# Patient Record
Sex: MALE | Race: WHITE | Employment: OTHER | URBAN - METROPOLITAN AREA
[De-identification: names, ages, dates, MRNs, and addresses within clinical notes are randomized per-mention and may not be internally consistent; named-entity substitution may affect disease eponyms.]

---

## 2017-01-01 ENCOUNTER — TELEPHONE (OUTPATIENT)
Dept: INTERNAL MEDICINE | Facility: CLINIC | Age: 82
End: 2017-01-01

## 2017-01-01 ENCOUNTER — CARE COORDINATION (OUTPATIENT)
Dept: CARE COORDINATION | Facility: CLINIC | Age: 82
End: 2017-01-01

## 2017-01-01 ENCOUNTER — OFFICE VISIT (OUTPATIENT)
Dept: INTERNAL MEDICINE | Facility: CLINIC | Age: 82
End: 2017-01-01
Payer: COMMERCIAL

## 2017-01-01 ENCOUNTER — TELEPHONE (OUTPATIENT)
Dept: PALLIATIVE MEDICINE | Facility: CLINIC | Age: 82
End: 2017-01-01

## 2017-01-01 ENCOUNTER — RADIOLOGY INJECTION OFFICE VISIT (OUTPATIENT)
Dept: PALLIATIVE MEDICINE | Facility: CLINIC | Age: 82
End: 2017-01-01
Payer: COMMERCIAL

## 2017-01-01 ENCOUNTER — OFFICE VISIT (OUTPATIENT)
Dept: PALLIATIVE MEDICINE | Facility: CLINIC | Age: 82
End: 2017-01-01
Payer: COMMERCIAL

## 2017-01-01 ENCOUNTER — RADIANT APPOINTMENT (OUTPATIENT)
Dept: GENERAL RADIOLOGY | Facility: CLINIC | Age: 82
End: 2017-01-01
Attending: INTERNAL MEDICINE
Payer: COMMERCIAL

## 2017-01-01 ENCOUNTER — RADIANT APPOINTMENT (OUTPATIENT)
Dept: GENERAL RADIOLOGY | Facility: CLINIC | Age: 82
End: 2017-01-01
Attending: ANESTHESIOLOGY

## 2017-01-01 ENCOUNTER — APPOINTMENT (OUTPATIENT)
Dept: NUCLEAR MEDICINE | Facility: CLINIC | Age: 82
End: 2017-01-01
Attending: PHYSICIAN ASSISTANT
Payer: MEDICARE

## 2017-01-01 ENCOUNTER — DOCUMENTATION ONLY (OUTPATIENT)
Dept: OTHER | Facility: CLINIC | Age: 82
End: 2017-01-01

## 2017-01-01 ENCOUNTER — TRANSFERRED RECORDS (OUTPATIENT)
Dept: HEALTH INFORMATION MANAGEMENT | Facility: CLINIC | Age: 82
End: 2017-01-01

## 2017-01-01 ENCOUNTER — HOSPITAL ENCOUNTER (OUTPATIENT)
Dept: CT IMAGING | Facility: CLINIC | Age: 82
Discharge: HOME | End: 2017-11-28
Attending: INTERNAL MEDICINE | Admitting: INTERNAL MEDICINE
Payer: MEDICARE

## 2017-01-01 ENCOUNTER — APPOINTMENT (OUTPATIENT)
Dept: GENERAL RADIOLOGY | Facility: CLINIC | Age: 82
End: 2017-01-01
Attending: EMERGENCY MEDICINE
Payer: MEDICARE

## 2017-01-01 ENCOUNTER — TELEPHONE (OUTPATIENT)
Dept: CARE COORDINATION | Facility: CLINIC | Age: 82
End: 2017-01-01

## 2017-01-01 ENCOUNTER — HOSPITAL ENCOUNTER (OUTPATIENT)
Facility: CLINIC | Age: 82
Setting detail: OBSERVATION
Discharge: HOME | End: 2017-10-02
Attending: EMERGENCY MEDICINE | Admitting: INTERNAL MEDICINE
Payer: MEDICARE

## 2017-01-01 VITALS — HEART RATE: 69 BPM | DIASTOLIC BLOOD PRESSURE: 81 MMHG | SYSTOLIC BLOOD PRESSURE: 159 MMHG | OXYGEN SATURATION: 99 %

## 2017-01-01 VITALS
OXYGEN SATURATION: 91 % | BODY MASS INDEX: 24.02 KG/M2 | HEART RATE: 75 BPM | DIASTOLIC BLOOD PRESSURE: 78 MMHG | TEMPERATURE: 97.6 F | WEIGHT: 158 LBS | SYSTOLIC BLOOD PRESSURE: 138 MMHG

## 2017-01-01 VITALS
HEART RATE: 107 BPM | RESPIRATION RATE: 16 BRPM | DIASTOLIC BLOOD PRESSURE: 70 MMHG | OXYGEN SATURATION: 92 % | SYSTOLIC BLOOD PRESSURE: 140 MMHG

## 2017-01-01 VITALS
TEMPERATURE: 97.6 F | SYSTOLIC BLOOD PRESSURE: 136 MMHG | OXYGEN SATURATION: 92 % | DIASTOLIC BLOOD PRESSURE: 68 MMHG | HEART RATE: 80 BPM

## 2017-01-01 VITALS
OXYGEN SATURATION: 99 % | BODY MASS INDEX: 23.45 KG/M2 | RESPIRATION RATE: 24 BRPM | SYSTOLIC BLOOD PRESSURE: 162 MMHG | WEIGHT: 154.7 LBS | TEMPERATURE: 96.1 F | HEIGHT: 68 IN | DIASTOLIC BLOOD PRESSURE: 71 MMHG

## 2017-01-01 VITALS
BODY MASS INDEX: 22.5 KG/M2 | HEART RATE: 70 BPM | DIASTOLIC BLOOD PRESSURE: 74 MMHG | SYSTOLIC BLOOD PRESSURE: 160 MMHG | OXYGEN SATURATION: 94 % | TEMPERATURE: 97.9 F | WEIGHT: 148 LBS

## 2017-01-01 VITALS — OXYGEN SATURATION: 95 % | HEART RATE: 76 BPM | DIASTOLIC BLOOD PRESSURE: 76 MMHG | SYSTOLIC BLOOD PRESSURE: 143 MMHG

## 2017-01-01 DIAGNOSIS — M47.817 FACET ARTHROPATHY, LUMBOSACRAL: Primary | ICD-10-CM

## 2017-01-01 DIAGNOSIS — S09.90XA TRAUMATIC INJURY OF HEAD, INITIAL ENCOUNTER: Primary | ICD-10-CM

## 2017-01-01 DIAGNOSIS — D72.829 LEUKOCYTOSIS, UNSPECIFIED TYPE: ICD-10-CM

## 2017-01-01 DIAGNOSIS — G89.29 OTHER CHRONIC PAIN: ICD-10-CM

## 2017-01-01 DIAGNOSIS — K21.9 GASTROESOPHAGEAL REFLUX DISEASE, ESOPHAGITIS PRESENCE NOT SPECIFIED: ICD-10-CM

## 2017-01-01 DIAGNOSIS — R07.9 CHEST PAIN, UNSPECIFIED TYPE: ICD-10-CM

## 2017-01-01 DIAGNOSIS — Z71.89 ADVANCED DIRECTIVES, COUNSELING/DISCUSSION: Chronic | ICD-10-CM

## 2017-01-01 DIAGNOSIS — M47.816 LUMBAR FACET ARTHROPATHY: Primary | ICD-10-CM

## 2017-01-01 DIAGNOSIS — J84.10 PULMONARY FIBROSIS (H): Primary | Chronic | ICD-10-CM

## 2017-01-01 DIAGNOSIS — R41.0 DELIRIUM: ICD-10-CM

## 2017-01-01 DIAGNOSIS — I50.9 CONGESTIVE HEART FAILURE, UNSPECIFIED CONGESTIVE HEART FAILURE CHRONICITY, UNSPECIFIED CONGESTIVE HEART FAILURE TYPE: Chronic | ICD-10-CM

## 2017-01-01 DIAGNOSIS — M47.816 LUMBAR FACET ARTHROPATHY: ICD-10-CM

## 2017-01-01 DIAGNOSIS — G89.29 OTHER CHRONIC PAIN: Primary | ICD-10-CM

## 2017-01-01 DIAGNOSIS — M47.816 FACET ARTHROPATHY, LUMBAR: Primary | ICD-10-CM

## 2017-01-01 DIAGNOSIS — M54.16 LUMBAR RADICULOPATHY: ICD-10-CM

## 2017-01-01 DIAGNOSIS — J84.10 PULMONARY FIBROSIS (H): Chronic | ICD-10-CM

## 2017-01-01 DIAGNOSIS — S09.90XA TRAUMATIC INJURY OF HEAD, INITIAL ENCOUNTER: ICD-10-CM

## 2017-01-01 DIAGNOSIS — N18.30 CKD (CHRONIC KIDNEY DISEASE) STAGE 3, GFR 30-59 ML/MIN (H): Chronic | ICD-10-CM

## 2017-01-01 DIAGNOSIS — M48.061 LUMBAR STENOSIS: ICD-10-CM

## 2017-01-01 DIAGNOSIS — G89.29 CHRONIC BACK PAIN, UNSPECIFIED BACK LOCATION, UNSPECIFIED BACK PAIN LATERALITY: Primary | ICD-10-CM

## 2017-01-01 DIAGNOSIS — I25.10 CORONARY ARTERY DISEASE INVOLVING NATIVE CORONARY ARTERY OF NATIVE HEART WITHOUT ANGINA PECTORIS: ICD-10-CM

## 2017-01-01 DIAGNOSIS — M54.9 CHRONIC BACK PAIN, UNSPECIFIED BACK LOCATION, UNSPECIFIED BACK PAIN LATERALITY: Primary | ICD-10-CM

## 2017-01-01 DIAGNOSIS — M47.816 FACET ARTHROPATHY, LUMBAR: ICD-10-CM

## 2017-01-01 DIAGNOSIS — M47.817 LUMBOSACRAL SPONDYLOSIS WITHOUT MYELOPATHY: Primary | ICD-10-CM

## 2017-01-01 DIAGNOSIS — I10 ESSENTIAL HYPERTENSION: Chronic | ICD-10-CM

## 2017-01-01 DIAGNOSIS — R41.3 MEMORY LOSS: ICD-10-CM

## 2017-01-01 DIAGNOSIS — D69.6 THROMBOCYTOPENIA (H): ICD-10-CM

## 2017-01-01 LAB
ALBUMIN SERPL-MCNC: 3.7 G/DL (ref 3.4–5)
ALBUMIN SERPL-MCNC: 3.7 G/DL (ref 3.4–5)
ALBUMIN UR-MCNC: 30 MG/DL
ALP SERPL-CCNC: 151 U/L (ref 40–150)
ALP SERPL-CCNC: 176 U/L (ref 40–150)
ALT SERPL W P-5'-P-CCNC: 20 U/L (ref 0–70)
ALT SERPL W P-5'-P-CCNC: 33 U/L (ref 0–70)
ANION GAP SERPL CALCULATED.3IONS-SCNC: 4 MMOL/L (ref 3–14)
ANION GAP SERPL CALCULATED.3IONS-SCNC: 6 MMOL/L (ref 3–14)
ANION GAP SERPL CALCULATED.3IONS-SCNC: 7 MMOL/L (ref 3–14)
APPEARANCE UR: CLEAR
AST SERPL W P-5'-P-CCNC: 19 U/L (ref 0–45)
AST SERPL W P-5'-P-CCNC: 23 U/L (ref 0–45)
BASOPHILS # BLD AUTO: 0.1 10E9/L (ref 0–0.2)
BASOPHILS NFR BLD AUTO: 0.8 %
BILIRUB SERPL-MCNC: 0.7 MG/DL (ref 0.2–1.3)
BILIRUB SERPL-MCNC: 0.8 MG/DL (ref 0.2–1.3)
BILIRUB UR QL STRIP: NEGATIVE
BUN SERPL-MCNC: 26 MG/DL (ref 7–30)
BUN SERPL-MCNC: 26 MG/DL (ref 7–30)
BUN SERPL-MCNC: 34 MG/DL (ref 7–30)
CALCIUM SERPL-MCNC: 10.1 MG/DL (ref 8.5–10.1)
CALCIUM SERPL-MCNC: 10.1 MG/DL (ref 8.5–10.1)
CALCIUM SERPL-MCNC: 9.6 MG/DL (ref 8.5–10.1)
CHLORIDE SERPL-SCNC: 100 MMOL/L (ref 94–109)
CHLORIDE SERPL-SCNC: 103 MMOL/L (ref 94–109)
CHLORIDE SERPL-SCNC: 105 MMOL/L (ref 94–109)
CO2 SERPL-SCNC: 32 MMOL/L (ref 20–32)
CO2 SERPL-SCNC: 32 MMOL/L (ref 20–32)
CO2 SERPL-SCNC: 33 MMOL/L (ref 20–32)
COLOR UR AUTO: YELLOW
CREAT SERPL-MCNC: 1.45 MG/DL (ref 0.66–1.25)
CREAT SERPL-MCNC: 1.49 MG/DL (ref 0.66–1.25)
CREAT SERPL-MCNC: 1.96 MG/DL (ref 0.66–1.25)
DIFFERENTIAL METHOD BLD: ABNORMAL
EOSINOPHIL # BLD AUTO: 0.3 10E9/L (ref 0–0.7)
EOSINOPHIL NFR BLD AUTO: 3.5 %
ERYTHROCYTE [DISTWIDTH] IN BLOOD BY AUTOMATED COUNT: 12.4 % (ref 10–15)
ERYTHROCYTE [DISTWIDTH] IN BLOOD BY AUTOMATED COUNT: 12.9 % (ref 10–15)
ERYTHROCYTE [DISTWIDTH] IN BLOOD BY AUTOMATED COUNT: 14.1 % (ref 10–15)
GFR SERPL CREATININE-BSD FRML MDRD: 32 ML/MIN/1.7M2
GFR SERPL CREATININE-BSD FRML MDRD: 44 ML/MIN/1.7M2
GFR SERPL CREATININE-BSD FRML MDRD: 46 ML/MIN/1.7M2
GLUCOSE SERPL-MCNC: 102 MG/DL (ref 70–99)
GLUCOSE SERPL-MCNC: 105 MG/DL (ref 70–99)
GLUCOSE SERPL-MCNC: 105 MG/DL (ref 70–99)
GLUCOSE UR STRIP-MCNC: NEGATIVE MG/DL
HCT VFR BLD AUTO: 36.3 % (ref 40–53)
HCT VFR BLD AUTO: 38.2 % (ref 40–53)
HCT VFR BLD AUTO: 42.7 % (ref 40–53)
HGB BLD-MCNC: 11.6 G/DL (ref 13.3–17.7)
HGB BLD-MCNC: 12.8 G/DL (ref 13.3–17.7)
HGB BLD-MCNC: 13.9 G/DL (ref 13.3–17.7)
HGB UR QL STRIP: NEGATIVE
IMM GRANULOCYTES # BLD: 0 10E9/L (ref 0–0.4)
IMM GRANULOCYTES NFR BLD: 0.2 %
INR PPP: 0.94 (ref 0.86–1.14)
INTERPRETATION ECG - MUSE: NORMAL
KETONES UR STRIP-MCNC: NEGATIVE MG/DL
LEUKOCYTE ESTERASE UR QL STRIP: NEGATIVE
LIPASE SERPL-CCNC: 105 U/L (ref 73–393)
LYMPHOCYTES # BLD AUTO: 1.5 10E9/L (ref 0.8–5.3)
LYMPHOCYTES NFR BLD AUTO: 17.3 %
MAGNESIUM SERPL-MCNC: 2.3 MG/DL (ref 1.6–2.3)
MCH RBC QN AUTO: 29.6 PG (ref 26.5–33)
MCH RBC QN AUTO: 29.7 PG (ref 26.5–33)
MCH RBC QN AUTO: 30.2 PG (ref 26.5–33)
MCHC RBC AUTO-ENTMCNC: 32 G/DL (ref 31.5–36.5)
MCHC RBC AUTO-ENTMCNC: 32.6 G/DL (ref 31.5–36.5)
MCHC RBC AUTO-ENTMCNC: 33.5 G/DL (ref 31.5–36.5)
MCV RBC AUTO: 89 FL (ref 78–100)
MCV RBC AUTO: 93 FL (ref 78–100)
MCV RBC AUTO: 93 FL (ref 78–100)
MONOCYTES # BLD AUTO: 0.7 10E9/L (ref 0–1.3)
MONOCYTES NFR BLD AUTO: 7.4 %
MUCOUS THREADS #/AREA URNS LPF: PRESENT /LPF
NEUTROPHILS # BLD AUTO: 6.3 10E9/L (ref 1.6–8.3)
NEUTROPHILS NFR BLD AUTO: 70.8 %
NITRATE UR QL: NEGATIVE
NRBC # BLD AUTO: 0 10*3/UL
NRBC BLD AUTO-RTO: 0 /100
PH UR STRIP: 7 PH (ref 5–7)
PLATELET # BLD AUTO: 165 10E9/L (ref 150–450)
PLATELET # BLD AUTO: 178 10E9/L (ref 150–450)
PLATELET # BLD AUTO: 200 10E9/L (ref 150–450)
POTASSIUM SERPL-SCNC: 3.7 MMOL/L (ref 3.4–5.3)
POTASSIUM SERPL-SCNC: 4.7 MMOL/L (ref 3.4–5.3)
POTASSIUM SERPL-SCNC: 4.7 MMOL/L (ref 3.4–5.3)
PROCALCITONIN SERPL-MCNC: <0.05 NG/ML
PROT SERPL-MCNC: 7.3 G/DL (ref 6.8–8.8)
PROT SERPL-MCNC: 7.4 G/DL (ref 6.8–8.8)
RBC # BLD AUTO: 3.92 10E12/L (ref 4.4–5.9)
RBC # BLD AUTO: 4.31 10E12/L (ref 4.4–5.9)
RBC # BLD AUTO: 4.61 10E12/L (ref 4.4–5.9)
RBC #/AREA URNS AUTO: 1 /HPF (ref 0–2)
SODIUM SERPL-SCNC: 138 MMOL/L (ref 133–144)
SODIUM SERPL-SCNC: 142 MMOL/L (ref 133–144)
SODIUM SERPL-SCNC: 142 MMOL/L (ref 133–144)
SOURCE: ABNORMAL
SP GR UR STRIP: 1.01 (ref 1–1.03)
SQUAMOUS #/AREA URNS AUTO: <1 /HPF (ref 0–1)
TROPONIN I SERPL-MCNC: <0.015 UG/L (ref 0–0.04)
TSH SERPL DL<=0.005 MIU/L-ACNC: 1.01 MU/L (ref 0.4–4)
UROBILINOGEN UR STRIP-MCNC: 0 MG/DL (ref 0–2)
WBC # BLD AUTO: 12.1 10E9/L (ref 4–11)
WBC # BLD AUTO: 8.7 10E9/L (ref 4–11)
WBC # BLD AUTO: 8.9 10E9/L (ref 4–11)
WBC #/AREA URNS AUTO: 1 /HPF (ref 0–2)

## 2017-01-01 PROCEDURE — 84484 ASSAY OF TROPONIN QUANT: CPT | Performed by: EMERGENCY MEDICINE

## 2017-01-01 PROCEDURE — 78452 HT MUSCLE IMAGE SPECT MULT: CPT

## 2017-01-01 PROCEDURE — 64494 INJ PARAVERT F JNT L/S 2 LEV: CPT | Mod: 50 | Performed by: ANESTHESIOLOGY

## 2017-01-01 PROCEDURE — 25000132 ZZH RX MED GY IP 250 OP 250 PS 637: Mod: GY | Performed by: PHYSICIAN ASSISTANT

## 2017-01-01 PROCEDURE — 34300033 ZZH RX 343: Performed by: INTERNAL MEDICINE

## 2017-01-01 PROCEDURE — A9270 NON-COVERED ITEM OR SERVICE: HCPCS | Mod: GY | Performed by: PHYSICIAN ASSISTANT

## 2017-01-01 PROCEDURE — 99214 OFFICE O/P EST MOD 30 MIN: CPT | Performed by: INTERNAL MEDICINE

## 2017-01-01 PROCEDURE — 85025 COMPLETE CBC W/AUTO DIFF WBC: CPT | Performed by: EMERGENCY MEDICINE

## 2017-01-01 PROCEDURE — 99495 TRANSJ CARE MGMT MOD F2F 14D: CPT | Performed by: INTERNAL MEDICINE

## 2017-01-01 PROCEDURE — 94640 AIRWAY INHALATION TREATMENT: CPT

## 2017-01-01 PROCEDURE — 84484 ASSAY OF TROPONIN QUANT: CPT | Performed by: INTERNAL MEDICINE

## 2017-01-01 PROCEDURE — 80048 BASIC METABOLIC PNL TOTAL CA: CPT | Performed by: INTERNAL MEDICINE

## 2017-01-01 PROCEDURE — 70450 CT HEAD/BRAIN W/O DYE: CPT

## 2017-01-01 PROCEDURE — A9502 TC99M TETROFOSMIN: HCPCS | Performed by: INTERNAL MEDICINE

## 2017-01-01 PROCEDURE — 85610 PROTHROMBIN TIME: CPT | Performed by: EMERGENCY MEDICINE

## 2017-01-01 PROCEDURE — 25000125 ZZHC RX 250: Performed by: PHYSICIAN ASSISTANT

## 2017-01-01 PROCEDURE — 85027 COMPLETE CBC AUTOMATED: CPT | Performed by: INTERNAL MEDICINE

## 2017-01-01 PROCEDURE — 84443 ASSAY THYROID STIM HORMONE: CPT | Performed by: INTERNAL MEDICINE

## 2017-01-01 PROCEDURE — 64493 INJ PARAVERT F JNT L/S 1 LEV: CPT | Mod: 50 | Performed by: ANESTHESIOLOGY

## 2017-01-01 PROCEDURE — 99217 ZZC OBSERVATION CARE DISCHARGE: CPT | Performed by: PHYSICIAN ASSISTANT

## 2017-01-01 PROCEDURE — 40000275 ZZH STATISTIC RCP TIME EA 10 MIN

## 2017-01-01 PROCEDURE — A9270 NON-COVERED ITEM OR SERVICE: HCPCS | Mod: GY | Performed by: EMERGENCY MEDICINE

## 2017-01-01 PROCEDURE — 25000132 ZZH RX MED GY IP 250 OP 250 PS 637: Mod: GY | Performed by: EMERGENCY MEDICINE

## 2017-01-01 PROCEDURE — 99220 ZZC INITIAL OBSERVATION CARE,LEVL III: CPT | Performed by: PHYSICIAN ASSISTANT

## 2017-01-01 PROCEDURE — 78452 HT MUSCLE IMAGE SPECT MULT: CPT | Mod: 26 | Performed by: INTERNAL MEDICINE

## 2017-01-01 PROCEDURE — 93005 ELECTROCARDIOGRAM TRACING: CPT

## 2017-01-01 PROCEDURE — G0378 HOSPITAL OBSERVATION PER HR: HCPCS

## 2017-01-01 PROCEDURE — 71020 XR CHEST 2 VW: CPT

## 2017-01-01 PROCEDURE — 25000128 H RX IP 250 OP 636

## 2017-01-01 PROCEDURE — 36415 COLL VENOUS BLD VENIPUNCTURE: CPT | Performed by: INTERNAL MEDICINE

## 2017-01-01 PROCEDURE — 80053 COMPREHEN METABOLIC PANEL: CPT | Performed by: EMERGENCY MEDICINE

## 2017-01-01 PROCEDURE — 99285 EMERGENCY DEPT VISIT HI MDM: CPT | Mod: 25

## 2017-01-01 PROCEDURE — 93017 CV STRESS TEST TRACING ONLY: CPT

## 2017-01-01 PROCEDURE — 80053 COMPREHEN METABOLIC PANEL: CPT | Performed by: INTERNAL MEDICINE

## 2017-01-01 PROCEDURE — 83735 ASSAY OF MAGNESIUM: CPT | Performed by: EMERGENCY MEDICINE

## 2017-01-01 PROCEDURE — A9585 GADOBUTROL INJECTION: HCPCS | Performed by: ANESTHESIOLOGY

## 2017-01-01 PROCEDURE — 84145 PROCALCITONIN (PCT): CPT | Performed by: EMERGENCY MEDICINE

## 2017-01-01 PROCEDURE — 81001 URINALYSIS AUTO W/SCOPE: CPT | Performed by: EMERGENCY MEDICINE

## 2017-01-01 PROCEDURE — 99215 OFFICE O/P EST HI 40 MIN: CPT | Performed by: NURSE PRACTITIONER

## 2017-01-01 PROCEDURE — 93018 CV STRESS TEST I&R ONLY: CPT | Performed by: INTERNAL MEDICINE

## 2017-01-01 PROCEDURE — 83690 ASSAY OF LIPASE: CPT | Performed by: EMERGENCY MEDICINE

## 2017-01-01 PROCEDURE — 99213 OFFICE O/P EST LOW 20 MIN: CPT | Performed by: NURSE PRACTITIONER

## 2017-01-01 PROCEDURE — 36415 COLL VENOUS BLD VENIPUNCTURE: CPT | Performed by: EMERGENCY MEDICINE

## 2017-01-01 RX ORDER — DONEPEZIL HYDROCHLORIDE 10 MG/1
10 TABLET, FILM COATED ORAL AT BEDTIME
Status: DISCONTINUED | OUTPATIENT
Start: 2017-01-01 | End: 2017-01-01 | Stop reason: HOSPADM

## 2017-01-01 RX ORDER — ACETAMINOPHEN 325 MG/1
325 TABLET ORAL 3 TIMES DAILY PRN
COMMUNITY
End: 2018-01-01

## 2017-01-01 RX ORDER — FUROSEMIDE 20 MG
TABLET ORAL
Qty: 30 TABLET | Refills: 0 | OUTPATIENT
Start: 2017-01-01

## 2017-01-01 RX ORDER — ATORVASTATIN CALCIUM 20 MG/1
20 TABLET, FILM COATED ORAL DAILY
Qty: 30 TABLET | Refills: 0 | Status: SHIPPED | OUTPATIENT
Start: 2017-01-01 | End: 2018-01-01

## 2017-01-01 RX ORDER — PANTOPRAZOLE SODIUM 40 MG/1
40 TABLET, DELAYED RELEASE ORAL EVERY MORNING
Status: DISCONTINUED | OUTPATIENT
Start: 2017-01-01 | End: 2017-01-01 | Stop reason: HOSPADM

## 2017-01-01 RX ORDER — LEVOTHYROXINE SODIUM 88 UG/1
88 TABLET ORAL DAILY
Status: DISCONTINUED | OUTPATIENT
Start: 2017-01-01 | End: 2017-01-01 | Stop reason: HOSPADM

## 2017-01-01 RX ORDER — DONEPEZIL HYDROCHLORIDE 10 MG/1
TABLET, FILM COATED ORAL
Qty: 90 TABLET | Refills: 0 | Status: SHIPPED | OUTPATIENT
Start: 2017-01-01 | End: 2018-01-01

## 2017-01-01 RX ORDER — NITROGLYCERIN 0.4 MG/1
0.4 TABLET SUBLINGUAL EVERY 5 MIN PRN
Status: DISCONTINUED | OUTPATIENT
Start: 2017-01-01 | End: 2017-01-01

## 2017-01-01 RX ORDER — LEVOFLOXACIN 250 MG/1
250 TABLET, FILM COATED ORAL DAILY
Qty: 7 TABLET | Refills: 0 | Status: SHIPPED | OUTPATIENT
Start: 2017-01-01 | End: 2017-01-01

## 2017-01-01 RX ORDER — ALUMINA, MAGNESIA, AND SIMETHICONE 2400; 2400; 240 MG/30ML; MG/30ML; MG/30ML
15-30 SUSPENSION ORAL EVERY 4 HOURS PRN
Status: DISCONTINUED | OUTPATIENT
Start: 2017-01-01 | End: 2017-01-01 | Stop reason: HOSPADM

## 2017-01-01 RX ORDER — ATORVASTATIN CALCIUM 20 MG/1
20 TABLET, FILM COATED ORAL EVERY EVENING
Status: DISCONTINUED | OUTPATIENT
Start: 2017-01-01 | End: 2017-01-01 | Stop reason: HOSPADM

## 2017-01-01 RX ORDER — ASPIRIN 81 MG/1
81 TABLET ORAL EVERY EVENING
Status: DISCONTINUED | OUTPATIENT
Start: 2017-01-01 | End: 2017-01-01 | Stop reason: HOSPADM

## 2017-01-01 RX ORDER — IPRATROPIUM BROMIDE AND ALBUTEROL SULFATE 2.5; .5 MG/3ML; MG/3ML
3 SOLUTION RESPIRATORY (INHALATION) 4 TIMES DAILY PRN
Status: DISCONTINUED | OUTPATIENT
Start: 2017-01-01 | End: 2017-01-01 | Stop reason: HOSPADM

## 2017-01-01 RX ORDER — POLYETHYLENE GLYCOL 3350 17 G/17G
1 POWDER, FOR SOLUTION ORAL DAILY PRN
COMMUNITY
End: 2018-01-01

## 2017-01-01 RX ORDER — LOSARTAN POTASSIUM 100 MG/1
100 TABLET ORAL EVERY EVENING
Status: DISCONTINUED | OUTPATIENT
Start: 2017-01-01 | End: 2017-01-01 | Stop reason: HOSPADM

## 2017-01-01 RX ORDER — TRAMADOL HYDROCHLORIDE 50 MG/1
TABLET ORAL
Qty: 20 TABLET
Start: 2017-01-01 | End: 2017-01-01

## 2017-01-01 RX ORDER — ASPIRIN 81 MG/1
324 TABLET, CHEWABLE ORAL ONCE
Status: COMPLETED | OUTPATIENT
Start: 2017-01-01 | End: 2017-01-01

## 2017-01-01 RX ORDER — AMLODIPINE BESYLATE 5 MG/1
10 TABLET ORAL EVERY EVENING
Status: DISCONTINUED | OUTPATIENT
Start: 2017-01-01 | End: 2017-01-01 | Stop reason: HOSPADM

## 2017-01-01 RX ORDER — LIDOCAINE/PRILOCAINE 2.5 %-2.5%
CREAM (GRAM) TOPICAL
COMMUNITY
Start: 2017-01-01 | End: 2018-01-01

## 2017-01-01 RX ORDER — METOPROLOL SUCCINATE 25 MG/1
25 TABLET, EXTENDED RELEASE ORAL DAILY
Status: DISCONTINUED | OUTPATIENT
Start: 2017-01-01 | End: 2017-01-01 | Stop reason: HOSPADM

## 2017-01-01 RX ORDER — LEVOFLOXACIN 250 MG/1
250 TABLET, FILM COATED ORAL DAILY
Status: DISCONTINUED | OUTPATIENT
Start: 2017-01-01 | End: 2017-01-01

## 2017-01-01 RX ORDER — SENNOSIDES 8.6 MG
1-2 TABLET ORAL 2 TIMES DAILY PRN
Status: DISCONTINUED | OUTPATIENT
Start: 2017-01-01 | End: 2017-01-01 | Stop reason: HOSPADM

## 2017-01-01 RX ORDER — FUROSEMIDE 20 MG
TABLET ORAL
Qty: 30 TABLET | Refills: 0 | Status: SHIPPED | OUTPATIENT
Start: 2017-01-01 | End: 2017-01-01

## 2017-01-01 RX ORDER — LIDOCAINE/PRILOCAINE 2.5 %-2.5%
CREAM (GRAM) TOPICAL
COMMUNITY
Start: 2016-11-11 | End: 2017-01-01

## 2017-01-01 RX ORDER — ACETAMINOPHEN 325 MG/1
650 TABLET ORAL EVERY 4 HOURS PRN
Status: DISCONTINUED | OUTPATIENT
Start: 2017-01-01 | End: 2017-01-01 | Stop reason: HOSPADM

## 2017-01-01 RX ORDER — DULOXETIN HYDROCHLORIDE 30 MG/1
30 CAPSULE, DELAYED RELEASE ORAL DAILY
Qty: 30 CAPSULE | Refills: 11 | Status: SHIPPED | OUTPATIENT
Start: 2017-01-01 | End: 2018-01-01

## 2017-01-01 RX ORDER — AMINOPHYLLINE 25 MG/ML
INJECTION, SOLUTION INTRAVENOUS
Status: COMPLETED
Start: 2017-01-01 | End: 2017-01-01

## 2017-01-01 RX ORDER — PANTOPRAZOLE SODIUM 40 MG/1
TABLET, DELAYED RELEASE ORAL
Qty: 90 TABLET | Refills: 3 | Status: SHIPPED | OUTPATIENT
Start: 2017-01-01 | End: 2018-01-01

## 2017-01-01 RX ORDER — HYDROMORPHONE HCL/0.9% NACL/PF 0.2MG/0.2
0.2 SYRINGE (ML) INTRAVENOUS
Status: DISCONTINUED | OUTPATIENT
Start: 2017-01-01 | End: 2017-01-01 | Stop reason: HOSPADM

## 2017-01-01 RX ORDER — NORTRIPTYLINE HCL 25 MG
25 CAPSULE ORAL AT BEDTIME
Status: DISCONTINUED | OUTPATIENT
Start: 2017-01-01 | End: 2017-01-01

## 2017-01-01 RX ORDER — METHYLPREDNISOLONE 16 MG/1
TABLET ORAL
COMMUNITY
Start: 2017-06-09 | End: 2017-01-01

## 2017-01-01 RX ORDER — METOPROLOL SUCCINATE 25 MG/1
TABLET, EXTENDED RELEASE ORAL
Qty: 90 TABLET | Refills: 2 | Status: SHIPPED | OUTPATIENT
Start: 2017-01-01 | End: 2018-01-01

## 2017-01-01 RX ORDER — LEVOFLOXACIN 500 MG/1
500 TABLET, FILM COATED ORAL ONCE
Status: COMPLETED | OUTPATIENT
Start: 2017-01-01 | End: 2017-01-01

## 2017-01-01 RX ORDER — TRAMADOL HYDROCHLORIDE 50 MG/1
TABLET ORAL
Qty: 120 TABLET | Refills: 5 | Status: ON HOLD
Start: 2017-01-01 | End: 2017-01-01

## 2017-01-01 RX ORDER — FUROSEMIDE 20 MG
TABLET ORAL
COMMUNITY
Start: 2017-01-01 | End: 2017-01-01

## 2017-01-01 RX ORDER — LIDOCAINE/PRILOCAINE 2.5 %-2.5%
CREAM (GRAM) TOPICAL
Qty: 30 G | Refills: 2 | Status: ON HOLD | OUTPATIENT
Start: 2017-01-01 | End: 2017-01-01

## 2017-01-01 RX ORDER — ACETAMINOPHEN 325 MG/1
325-650 TABLET ORAL EVERY 6 HOURS PRN
Status: DISCONTINUED | OUTPATIENT
Start: 2017-01-01 | End: 2017-01-01 | Stop reason: DRUGHIGH

## 2017-01-01 RX ORDER — REGADENOSON 0.08 MG/ML
INJECTION, SOLUTION INTRAVENOUS
Status: COMPLETED
Start: 2017-01-01 | End: 2017-01-01

## 2017-01-01 RX ORDER — TRAMADOL HYDROCHLORIDE 50 MG/1
50 TABLET ORAL 3 TIMES DAILY
COMMUNITY
End: 2017-01-01

## 2017-01-01 RX ORDER — ACETAMINOPHEN 650 MG/1
650 SUPPOSITORY RECTAL EVERY 4 HOURS PRN
Status: DISCONTINUED | OUTPATIENT
Start: 2017-01-01 | End: 2017-01-01 | Stop reason: HOSPADM

## 2017-01-01 RX ORDER — NORTRIPTYLINE HCL 25 MG
CAPSULE ORAL
COMMUNITY
Start: 2017-01-01 | End: 2017-01-01 | Stop reason: ALTCHOICE

## 2017-01-01 RX ORDER — METHYLPREDNISOLONE 32 MG/1
TABLET ORAL
COMMUNITY
Start: 2017-01-24 | End: 2017-01-01

## 2017-01-01 RX ORDER — NALOXONE HYDROCHLORIDE 0.4 MG/ML
.1-.4 INJECTION, SOLUTION INTRAMUSCULAR; INTRAVENOUS; SUBCUTANEOUS
Status: DISCONTINUED | OUTPATIENT
Start: 2017-01-01 | End: 2017-01-01 | Stop reason: HOSPADM

## 2017-01-01 RX ADMIN — LEVOFLOXACIN 500 MG: 500 TABLET, FILM COATED ORAL at 17:34

## 2017-01-01 RX ADMIN — ACETAMINOPHEN 650 MG: 325 TABLET, FILM COATED ORAL at 16:55

## 2017-01-01 RX ADMIN — ASPIRIN 81 MG: 81 TABLET, COATED ORAL at 21:47

## 2017-01-01 RX ADMIN — TETROFOSMIN 10 MCI.: 1.38 INJECTION, POWDER, LYOPHILIZED, FOR SOLUTION INTRAVENOUS at 08:28

## 2017-01-01 RX ADMIN — AMINOPHYLLINE 50 MG: 25 INJECTION, SOLUTION INTRAVENOUS at 10:16

## 2017-01-01 RX ADMIN — SENNOSIDES 1 TABLET: 8.6 TABLET, FILM COATED ORAL at 17:19

## 2017-01-01 RX ADMIN — LEVOTHYROXINE SODIUM 88 MCG: 88 TABLET ORAL at 16:55

## 2017-01-01 RX ADMIN — ASPIRIN 81 MG 324 MG: 81 TABLET ORAL at 10:23

## 2017-01-01 RX ADMIN — REGADENOSON 0.4 MG: 0.08 INJECTION, SOLUTION INTRAVENOUS at 10:02

## 2017-01-01 RX ADMIN — TETROFOSMIN 30.5 MCI.: 1.38 INJECTION, POWDER, LYOPHILIZED, FOR SOLUTION INTRAVENOUS at 10:00

## 2017-01-01 RX ADMIN — LOSARTAN POTASSIUM 100 MG: 100 TABLET, FILM COATED ORAL at 21:48

## 2017-01-01 RX ADMIN — IPRATROPIUM BROMIDE AND ALBUTEROL SULFATE 3 ML: .5; 3 SOLUTION RESPIRATORY (INHALATION) at 15:23

## 2017-01-01 RX ADMIN — PANTOPRAZOLE SODIUM 40 MG: 40 TABLET, DELAYED RELEASE ORAL at 08:04

## 2017-01-01 RX ADMIN — ATORVASTATIN CALCIUM 20 MG: 20 TABLET, FILM COATED ORAL at 21:48

## 2017-01-01 RX ADMIN — DONEPEZIL HYDROCHLORIDE 10 MG: 10 TABLET ORAL at 21:47

## 2017-01-01 RX ADMIN — AMLODIPINE BESYLATE 10 MG: 5 TABLET ORAL at 21:48

## 2017-01-01 RX ADMIN — LEVOTHYROXINE SODIUM 88 MCG: 88 TABLET ORAL at 08:04

## 2017-01-01 ASSESSMENT — ANXIETY QUESTIONNAIRES
5. BEING SO RESTLESS THAT IT IS HARD TO SIT STILL: NOT AT ALL
GAD7 TOTAL SCORE: 0
1. FEELING NERVOUS, ANXIOUS, OR ON EDGE: NOT AT ALL
3. WORRYING TOO MUCH ABOUT DIFFERENT THINGS: NOT AT ALL
7. FEELING AFRAID AS IF SOMETHING AWFUL MIGHT HAPPEN: NOT AT ALL
GAD7 TOTAL SCORE: 0
GAD7 TOTAL SCORE: 0
2. NOT BEING ABLE TO STOP OR CONTROL WORRYING: NOT AT ALL
4. TROUBLE RELAXING: NOT AT ALL
GAD7 TOTAL SCORE: 0
7. FEELING AFRAID AS IF SOMETHING AWFUL MIGHT HAPPEN: NOT AT ALL
6. BECOMING EASILY ANNOYED OR IRRITABLE: NOT AT ALL

## 2017-01-01 ASSESSMENT — ENCOUNTER SYMPTOMS
ABDOMINAL PAIN: 1
SHORTNESS OF BREATH: 1
LIGHT-HEADEDNESS: 0
CONFUSION: 1
BACK PAIN: 1
NAUSEA: 1
VOMITING: 0
HEADACHES: 0

## 2017-01-01 ASSESSMENT — PAIN SCALES - GENERAL: PAINLEVEL: MODERATE PAIN (4)

## 2017-01-01 ASSESSMENT — PAIN DESCRIPTION - DESCRIPTORS: DESCRIPTORS: ACHING

## 2017-01-01 ASSESSMENT — PATIENT HEALTH QUESTIONNAIRE - PHQ9: SUM OF ALL RESPONSES TO PHQ QUESTIONS 1-9: 15

## 2017-01-06 ENCOUNTER — CARE COORDINATION (OUTPATIENT)
Dept: CARE COORDINATION | Facility: CLINIC | Age: 82
End: 2017-01-06

## 2017-01-06 NOTE — PROGRESS NOTES
Aracelis-  This patient has had only 1 office visit with PCP within [facility] & that was with Dr Fox on 11/26/2016 at [facility].  Radha Wilson is the Clinic Care Coordinator,  for that clinic so this information is being forwarded to Radha.  HARINDER Luo  [email or web address]  [phone]  Clinic  for [facility], [facility], [facility] & [facility]  What is Care Coordination?  [facility] Care Coordination services are available to people in complex situations, for example medical, social, or financial. The care coordinator, a  or nurse, works with the patient and their doctor or primary care provider to determine health goals, obtain resources, achieve outcomes, and develop plans to coordinate care across settings.      The information transmitted in this message (including any attachments) is intended only for the person or persons to whom it is addressed, and may contain material that is confidential and/or privileged. Any review, retransmission, dissemination or other use of the information contained herein by persons or entities other than the intended recipient is prohibited. If you have received this message in error, please notify the sender immediately and delete this message.          From: Aracelis Reed   Sent: Friday, January 06, 2017 9:02 AM  To: Dione Schultz  Subject: FW: PHI    Correction, this is from my visit with Chaz on 12/15/16.     RAJAN Guo  [facility]  cell [phone], email [email or web address]    From: Aracelis Reed  Sent: Friday, January 06, 2017 9:00 AM  To: Dione Schultz  Subject: PHI  Hi Dione     The following is visit note on Chaz Soler. I met with him and family 12/20/17. Wife needs encouragement to accept assistance. CH DCd 12/30/17. Thanks for following up.     Pt ambulated to dining room table with walker upon arrival. Appeared well  groomed. Friendly and receptive. Wife and son present and participated.     LISW explored long term plan. Wife expressed desire for them to remain in their condo as long as possible. She does receive cleaning assist 1x/month and family appears to be very supportive.      ADC  LISW noted Pt needed extra time to respond to some questions. He has Dx dementia. 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 She also reported Pt has not been to Sh[employer] for a while, a change as she is involved in music ministry there. LISW explored need for increased stimulation and socialization. Wife and son reported Pt does attend some activities within the building. LISW explained that persons with memory problems often do not initiate activities and need to be encouraged which is what family appears to be doing. Educated re ADC as both an option for increased activity/stimulation for Pt and respite for wife. Couple and son seemed open to the idea. Provided contact info for [employer] and [employer] programs, encouraging them to schedule tours.      Neurology  Son commented Pt does have memory problems, but does not feel this is a significant issue for Pt. LISW encouraged wife and son to schedule appt with a neurologist, explaining this is specialist who can address memory loss and may also be able to help with treating chronic pain. Pt, wife, and son receptive. Provided contact info for [facility] in [place].     Caregiver Support  LISW counseled wife re need to care for herself, encouraging her make any necessary appts. Son concurred, pointing out that 2 of his sisters will be in town next week and can stay with Pt while she goes out to appts. Wife agreed to call and schedule appt to address her foot problem. LISW provided hand out for [facility]. Wife stated she has been attending a group through her Shinto. Pt commented, Shes  an excellent caregiver. LISW also provided contact info for [employer], encouraging wife to call there for additional info re support groups and other resources related to dementia.      Safe Return  LISW briefly educated re Safe Return program, providing contact number. Wife and son reported there has not been any wandering.      [employer]  LISW encouraged LL install for both Pt and wife. Provided pamphlet and contact number as well as monthly cost. Explained there are many programs available and that the ones advertised on tv are typically out of state and often require a contract.      Meals  Wife and son denied need for meal delivery at this time. Couple receives some food from [employer] and some prepared meals from [employer]. That is meeting their needs for now. Provided meal hand out which has contact info for several meal delivery programs for their reference should meals be needed in the future.     Private Pay Homemaker/HHA  Couple not in need of private pay services at this time. Provided hand out explaining difference between homemaker and HHA. Wrote out contact info for 3 agencies as well as hourly rates and minimums for each agency. Wife will call agencies if needed.     Advance Care Planing  LISW explored advance care planning needs. Pt and wife have not completed HC Directive. Explained benefit of doing so. Facilitated completion of short form with both Pt and wife. Cleaning lady who was in the home served as second witness. Explained copy of form should be given to PMD and one to each person appointed. Pts agent is his wife and alternate is son. Wife will follow through with getting copies where they need to go.  Educated re POLST, explaining that it is MD order and focus of document is on whether a person wants CPR if found unresponsive. Educated re CPR methods and diminished effectiveness in those who are older with medical problems. Pt indicated he does not want CPR and wife the same.  Facilitated completion of POLST with both of them. Explained form will be sent to MD for review/signature and copy will be sent to them to be placed on fridge. Suggested they clip copy of HC Directive to the back of each POLST. Wife verbalized understanding.  Plan FU by phone in 2 to 3 weeks to check status of POLST. Wife aware and in agreement. Report given to team.     RAJAN Guo  [facility]  cell [phone], email [email or web address]

## 2017-01-08 DIAGNOSIS — N40.1 BPH (BENIGN PROSTATIC HYPERTROPHY) WITH URINARY RETENTION: Primary | ICD-10-CM

## 2017-01-08 DIAGNOSIS — R33.8 BPH (BENIGN PROSTATIC HYPERTROPHY) WITH URINARY RETENTION: Primary | ICD-10-CM

## 2017-01-08 NOTE — TELEPHONE ENCOUNTER
tamsulosin (FLOMAX) 0.4 MG 24 hr capsule    Last Written Prescription Date: 03/21/16  Last Fill Quantity: 90 cap, # refills: 2  Last Office Visit with [facility] or [facility] prescribing provider: 07/11/16       DEXA Scan:  No order of DX HIP/PELVIS/SPINE is found.     No order of DX HIP/PELVIS/SPINE W LAT FRACTION ANALYSIS is found.       CREATININE   Date Value Ref Range Status   11/14/2016 1.51* 0.66 - 1.25 mg/dL Final

## 2017-01-09 RX ORDER — TAMSULOSIN HYDROCHLORIDE 0.4 MG/1
0.4 CAPSULE ORAL DAILY
Qty: 90 CAPSULE | Refills: 1 | Status: SHIPPED | OUTPATIENT
Start: 2017-01-09 | End: 2017-07-27

## 2017-01-09 NOTE — TELEPHONE ENCOUNTER
Prescription approved per [facility] Refill Protocol. MELINDA Muir R.N.

## 2017-01-18 DIAGNOSIS — M54.50 CHRONIC BILATERAL LOW BACK PAIN WITHOUT SCIATICA: Primary | ICD-10-CM

## 2017-01-18 DIAGNOSIS — G89.29 CHRONIC BILATERAL LOW BACK PAIN WITHOUT SCIATICA: Primary | ICD-10-CM

## 2017-01-18 RX ORDER — GABAPENTIN 100 MG/1
CAPSULE ORAL
Qty: 90 CAPSULE | Refills: 0 | Status: SHIPPED | OUTPATIENT
Start: 2017-01-18 | End: 2017-02-24

## 2017-01-18 NOTE — TELEPHONE ENCOUNTER
Medication request reviewed and approved.  Need to schedule follow up visit.  Lissy Galeana, CNP    [facility]

## 2017-01-18 NOTE — TELEPHONE ENCOUNTER
RX confirmed - spoke with Jada and scheduled follow up 1/20/17 at 0900.    gabapentin (NEURONTIN) 100 MG capsule 90 capsule 0 1/18/2017  No      Sig: Take 1 capsules Three times a day for pain     Class: E-Prescribe     Order: [id number]     E-Prescribing Status: Receipt confirmed by pharmacy (1/18/2017 10:34 AM CST)

## 2017-01-18 NOTE — TELEPHONE ENCOUNTER
Received fax request from [facility] requesting refill(s) for Gabapentin 100 mg    Last refilled on 12/16/2016    Pt last seen on 12/01/2016  Next appt scheduled for no future appointments    Will facilitate refill.      Tracey Newsome [facility] [place]

## 2017-01-20 ENCOUNTER — OFFICE VISIT (OUTPATIENT)
Dept: PALLIATIVE MEDICINE | Facility: CLINIC | Age: 82
End: 2017-01-20
Payer: COMMERCIAL

## 2017-01-20 VITALS
DIASTOLIC BLOOD PRESSURE: 50 MMHG | OXYGEN SATURATION: 94 % | HEART RATE: 78 BPM | SYSTOLIC BLOOD PRESSURE: 94 MMHG | WEIGHT: 155 LBS | BODY MASS INDEX: 25.03 KG/M2

## 2017-01-20 DIAGNOSIS — M54.50 BILATERAL LOW BACK PAIN WITHOUT SCIATICA: Primary | ICD-10-CM

## 2017-01-20 DIAGNOSIS — G89.29 CHRONIC MIDLINE LOW BACK PAIN WITHOUT SCIATICA: Primary | ICD-10-CM

## 2017-01-20 DIAGNOSIS — M54.50 CHRONIC MIDLINE LOW BACK PAIN WITHOUT SCIATICA: Primary | ICD-10-CM

## 2017-01-20 PROCEDURE — 99214 OFFICE O/P EST MOD 30 MIN: CPT | Performed by: NURSE PRACTITIONER

## 2017-01-20 ASSESSMENT — PAIN SCALES - GENERAL: PAINLEVEL: SEVERE PAIN (7)

## 2017-01-20 NOTE — NURSING NOTE
"Chief Complaint   Patient presents with     Pain       Initial BP 94/50 mmHg  Pulse 78  Wt 70.308 kg (155 lb)  SpO2 94% Estimated body mass index is 25.03 kg/(m^2) as calculated from the following:    Height as of 11/14/16: 1.676 m (5' 6\").    Weight as of this encounter: 70.308 kg (155 lb).  BP completed using cuff size: gabi Avalos CMA   [facility]    "

## 2017-01-20 NOTE — PROGRESS NOTES
"           [facility]  INTERVAL VISIT    This a  follow up examination  for Chaz Soler is a 88 year old male.  Primary Care provider:  Alfonso Lynn MD    Today's visit: 01/20/2017  Last visit: 11/10/16    Chief Complaint   Patient presents with     Pain   post FRANCK Transforaminal right L4-5  Helped alot, pain in right low back starting to return  Finished Physical Therapy at home      MEDICAL DECISION MAKING:   \"ELIZABETH\"is an 88 year old male with mild dementia in the setting of stage 3 CKD CAD and CHF who presents in follow up to the pain clinic seeking improved pain control of his Chronic right radicular low back pain. Wife and son are frustrated with not being able to find a source for his pain. .   The patient is post right aminotomy many years ago and 3 months post FRANCK at L4-5.  He report that his back pain as worse and right leg pain since hip bursa injection gone.  He has a history of  falls due to some RLE weakness from sitting to standing, but not since last visit.  It is unclear if the right leg pain is radicular low back or coming from hip.  He also had minimal relief from the recent FRANCK.  His imaging of the lumbar spine does not support facet mediated pain so  a medial branch block would not. Could consider a repeat FRANCK at L4-5. He is not able to tolerate a caudal approach due to positioning.   His dementia is likely playing a role in pain cognitive processing.   For now, will start low dose Gabapentin and liberlize Tramadol to 4 /day. No further increase in Gabapentin with Creatinine Clearance is  32.4 mg/dL  PAIN REHABILITATION POTENTIAL: Patient  limited chronic pain rehab potential due to cognitive impairment.    Chronic pain is a multidimensional disorder which  impacts the physical, psychological and social aspects of the patient. The treatment plan for this patient will focus on these areas. I am recommending home care referral with home Physical Therapy.    Treatment limitations " include age, CKD and progressive dementia.    Pain Rehab potential limited.    ASSESSMENT  1. Chronic pain Syndrome    2. Chronic Low Back pain with radicular features to the L4-5 right    1. Lumbar Facet Disease L4-5 right with moderate stenosis  2. Lumbar Disc Disease L4-5    3. Long term use of current opioid analgesia     1. Tramadol    4. Limiting treatment conditions:    1. Dementia  2. Gait and balance problems, falls risk    3. CKD  4. CAD  5. Probable right hip bursitis  6. Dysfunctional walking and glutea1/quadraceps deconditioning play a role in referred right leg pain      PLAN:  continue Gabapentin 100 mg 3x per day for now.  Tramadol 50 mg to one tablet every 6 hours as needed for pain, limit to 3 tabs per day  Add one Tylenol 325 mg with each Tramadol   Continue topicals, Diclofenac gel  Referral: Repeat Lumbar epidural right L4-5.  [phone] to schedule if not contacted in a few days  Imaging:     Follow up:  2-4  weeks with Lissy Serrato  After epidural   Family to talk to PCP about restarting Home Physical Therapy. Discussed in home, home care such as  [facility]iting [facility].     PAIN HISTORY:Mr. Soler presents today with a longstanding history of low back pain.  He reports that his back pain began as early as age 12 and as a teen he had to wear a corset.  Over the years his pain remained essentially unchanged, chronic and unrelenting.  However, in 2014, he had a resection and cyst removal at L3-4.  He states this gave him 6 months of relief.  Over the past couple of months prior to this consultation, he reports that he was having increased focal low back pain.  He received a lumbar epidural at L4-L5 for stenosis of moderate severe lright L4-L5.  He states that the epidural helped for only a few minutes.  Approximately 2 weeks ago, he reports that he fell while he was in a sitting to standing position.  He reports right leg weakness and pain in his low back and also behind his  right knee.  He also states that the right leg weakness is intermittent and it is usually from sitting or lying to standing.      The patient has never been to a pain clinic.  He has had physical therapy in the past without help.  He also recently had a lumbar epidural at L4-L5 on the right without significant relief. Lumbar surgery  which was noted above.  The patient reports that he also sits in his vibrating chair which seems to help his back pain significantly.  The patient also has used a vibrating chair which he says helps his back pain significantly.        PAST MEDICATION TRIALS:    OPIOIDS:  Vicodin, oxycodone and tramadol.    NONSTEROIDAL ANTI-INFLAMMATORIES:  None.    ANTIMIGRAINE MEDICATIONS:  None.    MUSCLE RELAXANTS:  None.    ANTICONVULSANTS:  None.    TRANQUILIZERS, SLEEP AIDS:  None.    TOPICAL MEDICATIONS:  None.          INTERVAL HISTORY:  Pain Description: less in the leg and hips, but still stabbing, shooting, aching throbbing, stabbing  Location: low back (L4-5)  Pain rating 8/10, range: 3-8/10  Quality: constant  Aggravating factors:  Heat, rest,   Relieving factors:  Weight bearing    Current Pain Medications: Tramadol 50 mg 2 per day, Diclofenac gel, Lidocaine cream, Acetaminophen (Tylenol) Gabapentin   Medication side effects: none     Progress in pain program: limited, needs home service due to dementia   Impact on function: moderate  Working  Retired   Quality of life: reduced ( in the setting of co morbid dementia)  Self care yes exercise some walking, relaxation no, body awareness no. Able to walk about 30 ft before quads tire, no increase pain or radiculitis.     INVESTIGATIONAL:  All relevant labs and diagnostic tests have been reviewed         11/04/16 MRI Lumbar  No disc herniation or stenosis. Facet joints are  unremarkable.     L1-L2:  Broad-based disc bulge lateralizes to the left. No central  stenosis. Neural foramen are patent. Facet joints are unremarkable.     L2-L3:  Mild  diffuse disc bulge. No central or foraminal stenosis.  Previously there was a right central disc bulge or protrusion that is  less prominent on the current study. Facet joints are unremarkable.     L3-L4:  Previous right laminotomy at this level. No disc herniation or  stenosis. Mild facet degenerative changes.     L4-L5:  Degenerative disc disease with broad-based disc bulge and  osteophytic ridging lateralizing to the right. Mild to moderate  central stenosis. Moderate right foraminal stenosis. Left neural  foramen is patent. Likely no significant change.     L5-S1:  Advanced degenerative disc disease. No stenosis.     Paraspinous soft tissues:  Probable abdominal aortic aneurysm that is  minimally imaged on this study. This has been recently imaged on a CT  scan from 6/14/2016. There is a stent graft in place.                                                                       IMPRESSION:     1. At L2-L3 there is degenerative disc disease and disc bulge without  stenosis.  2. At L3-L4 there is a previous right laminotomy. No disc herniation  or stenosis.  3. At L4-L5 there is mild to moderate central stenosis. Moderate right  foraminal stenosis. Left neural foramen is patent. Likely no  significant change.  4. At L5-S1 there is advanced degenerative disc disease but no  Stenosis.    11/10/16 x ray hips and Pelvis  No acute or significant chronic bony abnormalities related  to the hips or pelvis. There is degenerative change in the visualized  lower lumbar spine. There is an aortobiiliac vascular graft and  extensive vascular calcifications are seen. No change since the prior    Creatinine Clearance 32.4 Mg/dL based on creatinine of 1.72mg/dL on 10/21/16    PMHX:  Past Medical History   Diagnosis Date     Hyperplasia of prostate      Lumbago      LEFT LUNG GRANULOMA      Essential hypertension      Gout 1/06     knee     Unspecified hypothyroidism      Vitamin D deficiency      Pulmonary fibrosis (H)       Hyperparathyroidism (H)      AAA (abdominal aortic aneurysm) (H) 10/5/2011     6.1 cm     CAD (coronary artery disease)      MI - distal inferior/apex. Non transmural     Hyperlipidemia LDL goal < 70      CKD (chronic kidney disease) stage 3, GFR 30-59 ml/min 11/30/2011     Anemia 11/30/2011     Thrombocytopenia (H) 11/30/2011     Proteinuria 2/8/2012     Aortic stenosis 10/26/2012     CHF (congestive heart failure) (H) 12/31/2014     Hypercalcemia 1/2/2015     Hyperparathyroidism, unspecified (H) 4/22/2015     Dementia 8/4/2015     Edema, unspecified edema 1/17/2016     Other chronic pain 10/11/2016     PAST SURGICAL HISTORY  Past Surgical History   Procedure Laterality Date     Vasectomy       Right cataract extraction/iol  12/03     Colonoscopy  9/02 per patient     Laparoscopic cholecystectomy  Nov 2011     Repair aneurysm abdominal aorta  Nov 2011     Endovascular repair aneurysm abdominal aorta  11/18/2011     Procedure:ENDOVASCULAR REPAIR ANEURYSM ABDOMINAL AORTA; ENDOVASCULAR AAA,RIGHT FEMORAL       Laparoscopic cholecystectomy  11/18/2011     Procedure:LAPAROSCOPIC CHOLECYSTECTOMY; LAPAROSCOPIC CHOLECYSTECTOMY ; Surgeon:DARRYL DORADO; Location: OR     Discectomy lumbar posterior microscopic one level  8/11/2014     Procedure: DISCECTOMY LUMBAR POSTERIOR MICROSCOPIC ONE LEVEL;  Surgeon: Jone Carvalho MD;  Location:  OR     Transcatheter aortic valve implant anesthesia N/A 11/12/2014     Procedure: TRANSCATHETER AORTIC VALVE IMPLANT ANESTHESIA;  Surgeon: Generic Anesthesia Provider;  Location: [facility] OR       CURRENT MEDICATIONS:   Current Outpatient Prescriptions   Medication     gabapentin (NEURONTIN) 100 MG capsule     tamsulosin (FLOMAX) 0.4 MG capsule     losartan (COZAAR) 100 MG tablet     nortriptyline (PAMELOR) 25 MG capsule     levothyroxine (SYNTHROID/LEVOTHROID) 88 MCG tablet     traMADol (ULTRAM) 50 MG tablet     order for DME     polyethylene glycol (MIRALAX) packet      "atorvastatin (LIPITOR) 20 MG tablet     cyanocobalamin (VITAMIN  B-12) 1000 MCG tablet     AMOXICILLIN PO     diclofenac (VOLTAREN) 1 % GEL     lidocaine-prilocaine (EMLA) cream     donepezil (ARICEPT) 10 MG tablet     amLODIPine (NORVASC) 10 MG tablet     metoprolol (TOPROL-XL) 25 MG 24 hr tablet     pantoprazole (PROTONIX) 40 MG enteric coated tablet     aspirin 81 MG tablet     acetaminophen (TYLENOL) 325 MG tablet     Ferrous Sulfate (IRON SUPPLEMENT PO)     No current facility-administered medications for this visit.     Facility-Administered Medications Ordered in Other Visits   Medication     neostigmine (PROSTIGMINE) injection     ROS: twelve systems review negative and included in interval except for: lorenzo,   Since last visit: changes in mood: No, suicide thoughts No  Any changes in your medical condition, illness, injury or hospitalizations: Yes  Post ED to hospital 11/14 intractable pain and constipation  Sleep: Restorative: Yes Current sleep well per wife and son     PHYSICAL EXAM:  Constitutional:Blood pressure 94/50, pulse 78, weight 70.308 kg (155 lb), SpO2 94 %. Body mass index is 25.03 kg/(m^2).  Psyche: oriented to person place, Behavioral Observations: Eye contact fair. Mood  and spirits are anxious, mildly distressed.  Sits in w/chair with eyes closed \"something must be done\".  Musculoskeletal exam:  Gait: not onbserved, in wheelchair. SOLOMON x 4, mild tenderness left low back  Neuro exam:   Alert,  [place] @  Speech clear fluent and appropriate. Strength 5/5 reduced quadraceps  Skin/Vascular/ Autonomic:  warm, dry and intact    OTHER: Opioid risk for ongoing opioid management for non malignant chronic pain   DIRE Score for ongoing opioid management is calculated as follows:    Diagnosis = 2    Intractability = 2    Risk: Psych = 2  Chem Hlth = 3  Reliability = 1  Social = 1    Efficacy = 2    Total DIRE Score = 13 (14 or higher predicts good candidate for ongoing opioid management; 13 or lower " predicts poor candidate for opioid management)   Magdaleno Steven 2006,The Journal of  Pain.,The DIRE Score: Predicting Outcomes of Opioid Prescribing for Chronic Pain Vol.7,No.9, pp 671-681.  MNPMP : Reviewed and as expected without evidence of abuse or misuse? Yes  URINE DRUG SCREEN  Yes, DATE: 9/15/16. OPIOID AGREEMENT: No DATE:  OPOID TOLERANCE:low, Morphine  EQ:  10 mg /day   Analgesia poor Adverse effects none  Activity reduced  Adherence good     Opioid management will continue with Lissy Galeana CNP, Tramadol     PROCEDURES: none      Time spent: 30  minutes 100 % face to face including  20 minutes counseling on treatment options, reviewing dx tests and coordinating care for the above identified medical problems.   Signed: KALYANI Smith, C.N.P.,   [facility]

## 2017-01-20 NOTE — PATIENT INSTRUCTIONS
PLAN:  continue Gabapentin 100 mg 3x per day for now.  Tramadol 50 mg to one tablet every 6 hours as needed for pain, limit to 3 tabs per day  Add one Tylenol 325 mg with each Tramadol   Continue topicals, Diclofenac gel  Referral: Repeat Lumbar epidural right L4-5.  [phone] to schedule if not contacted in a few days  Imaging:     Follow up:  2-4  weeks with Lissy Serrato  After epidural   Family to talk to PCP about restarting Home Physical Therapy. Discussed in home, home care such as  Visiting [facility] Instead.       Nurse Triage line:  [phone]   Call this number with any questions or concerns. You may leave a detailed message anytime. Calls are typically returned Monday through Friday between 8 AM and 4:30 PM. We usually get back to you within 2 business days depending on the issue/request.       Medication refills:    For non-narcotic medications, call your pharmacy directly to request a refill. The pharmacy will contact the Pain Management Center for authorization. Please allow 3-4 days for these refills to be processed.     For narcotic refills, call the nurse triage line or send a [employer] message. Please contact us 7-10 days before your refill is due. The message MUST include the name of the specific medication(s) requested and how you would like to receive the prescription(s). The options are as follows:    Pain Clinic staff can mail the prescription to your pharmacy. Please tell us the name of the pharmacy.    You may pick the prescription up at the Pain Clinic (tell us the location) or during a clinic visit with your pain provider    Pain Clinic staff can deliver the prescription to the [facility] pharmacy in the clinic building. Please tell us the location.      Scheduling number: [phone].  Call this number to schedule or change appointments.    We believe regular attendance is key to your success in our program.    Any time you are unable to keep your appointment we ask that you  call us at least 24 hours in advance to let us know. This will allow us to offer the appointment time to another patient.

## 2017-01-20 NOTE — MR AVS SNAPSHOT
After Visit Summary   1/20/2017    Chaz Soler    MRN: [id number]           Patient Information     Date Of Birth          5/21/1928        Visit Information        Provider Department      1/20/2017 9:00 AM Lissy Galeana APRN CNP [facility]        Today's Diagnoses     Chronic midline low back pain without sciatica    -  1       Care Instructions    PLAN:  continue Gabapentin 100 mg 3x per day for now.  Tramadol 50 mg to one tablet every 6 hours as needed for pain, limit to 3 tabs per day  Add one Tylenol 325 mg with each Tramadol   Continue topicals, Diclofenac gel  Referral: Repeat Lumbar epidural right L4-5.  [phone] to schedule if not contacted in a few days  Imaging:     Follow up:  2-4  weeks with Lissy Serrato  After epidural   Family to talk to PCP about restarting Home Physical Therapy. Discussed in home, home care such as  Vis[facility] Instead.       Nurse Triage line:  [phone]   Call this number with any questions or concerns. You may leave a detailed message anytime. Calls are typically returned Monday through Friday between 8 AM and 4:30 PM. We usually get back to you within 2 business days depending on the issue/request.       Medication refills:    For non-narcotic medications, call your pharmacy directly to request a refill. The pharmacy will contact the [facility] for authorization. Please allow 3-4 days for these refills to be processed.     For narcotic refills, call the nurse triage line or send a [employer] message. Please contact us 7-10 days before your refill is due. The message MUST include the name of the specific medication(s) requested and how you would like to receive the prescription(s). The options are as follows:    Pain Clinic staff can mail the prescription to your pharmacy. Please tell us the name of the pharmacy.    You may pick the prescription up at the Pain Clinic (tell us the location)  "or during a clinic visit with your pain provider    Pain Clinic staff can deliver the prescription to the [facility] pharmacy in the clinic building. Please tell us the location.      Scheduling number: [phone].  Call this number to schedule or change appointments.    We believe regular attendance is key to your success in our program.    Any time you are unable to keep your appointment we ask that you call us at least 24 hours in advance to let us know. This will allow us to offer the appointment time to another patient.             Follow-ups after your visit        Future tests that were ordered for you today     Open Future Orders        Priority Expected Expires Ordered    XR Lumbar Translaminar Inj Incl Imaging Routine 1/20/2017 1/20/2018 1/20/2017            Who to contact     If you have questions or need follow up information about today's clinic visit or your schedule please contact [facility] directly at [phone].  Normal or non-critical lab and imaging results will be communicated to you by MyChart, letter or phone within 4 business days after the clinic has received the results. If you do not hear from us within 7 days, please contact the clinic through [employer] or phone. If you have a critical or abnormal lab result, we will notify you by phone as soon as possible.  Submit refill requests through [employer] or call your pharmacy and they will forward the refill request to us. Please allow 3 business days for your refill to be completed.          Additional Information About Your Visit        MyChart Information     View[employer] lets you send messages to your doctor, view your test results, renew your prescriptions, schedule appointments and more. To sign up, go to [email or web address] . Click on \"Log in\" on the left side of the screen, which will take you to the Welcome page. Then click on \"Sign up Now\" on the right side of the page.     You will be asked to enter the access code " listed below, as well as some personal information. Please follow the directions to create your username and password.     Your access code is: [email or web address]  Expires: 2017  9:51 AM     Your access code will  in 90 days. If you need help or a new code, please call your [facility] or [phone].        Care EveryWhere ID     This is your Care EveryWhere ID. This could be used by other organizations to access your [facility] medical records  [id number]        Your Vitals Were     Pulse Pulse Oximetry                78 94%           Blood Pressure from Last 3 Encounters:   17 94/50   16 86/48   16 120/64    Weight from Last 3 Encounters:   17 70.308 kg (155 lb)   16 71.215 kg (157 lb)   16 65.545 kg (144 lb 8 oz)               Primary Care Provider Office Phone # Fax #    Alirio Fox [phone]       [facility] [address] [place] [address]        Thank you!     Thank you for choosing [facility]  for your care. Our goal is always to provide you with excellent care. Hearing back from our patients is one way we can continue to improve our services. Please take a few minutes to complete the written survey that you may receive in the mail after your visit with us. Thank you!             Your Updated Medication List - Protect others around you: Learn how to safely use, store and throw away your medicines at www.disposemymeds.org.          This list is accurate as of: 17  9:53 AM.  Always use your most recent med list.                   Brand Name Dispense Instructions for use    acetaminophen 325 MG tablet    TYLENOL    100 tablet    Take 2 tablets (650 mg) by mouth every 6 hours as needed for mild pain       amLODIPine 10 MG tablet    NORVASC    90 tablet    Take 1 tablet (10 mg) by mouth daily       AMOXICILLIN PO      Take 500 mg by mouth once as needed       aspirin 81 MG tablet      Take 81 mg by mouth daily        atorvastatin 20 MG tablet    LIPITOR    90 tablet    Take 1 tablet (20 mg) by mouth daily       cyanocobalamin 1000 MCG tablet    vitamin  B-12     Take 1,000 mcg by mouth daily       diclofenac 1 % Gel topical gel    VOLTAREN    100 g    Apply 4 grams to low back right hip and right leg four times as needed  daily using enclosed dosing card.  Lidocaine cream       donepezil 10 MG tablet    ARICEPT    90 tablet    Take 1 tablet (10 mg) by mouth At Bedtime       gabapentin 100 MG capsule    NEURONTIN    90 capsule    Take 1 capsules Three times a day for pain       IRON SUPPLEMENT PO      Take 325 mg by mouth daily (with breakfast)       levothyroxine 88 MCG tablet    SYNTHROID/LEVOTHROID    90 tablet    Take 1 tablet (88 mcg) by mouth daily       lidocaine-prilocaine cream    EMLA    30 g    Apply topically as needed for moderate pain To low back, right hip  and right leg Apply same time as Diclofenac gel       losartan 100 MG tablet    COZAAR    90 tablet    Take 1 tablet (100 mg) by mouth daily       metoprolol 25 MG 24 hr tablet    TOPROL-XL    90 tablet    Take 1 tablet (25 mg) by mouth daily       nortriptyline 25 MG capsule    PAMELOR    90 capsule    Take 1 capsule (25 mg) by mouth At Bedtime       order for DME     1 each    Equipment being ordered: TENS and supplies.       pantoprazole 40 MG EC tablet    PROTONIX    90 tablet    Take 1 tablet (40 mg) by mouth every morning       polyethylene glycol Packet    MIRALAX    30 packet    Take 17 g by mouth daily       tamsulosin 0.4 MG capsule    FLOMAX    90 capsule    Take 1 capsule (0.4 mg) by mouth daily       traMADol 50 MG tablet    ULTRAM    90 tablet    1 tab tid a day prn abut 8 hrs apart

## 2017-01-24 ENCOUNTER — TELEPHONE (OUTPATIENT)
Dept: INTERNAL MEDICINE | Facility: CLINIC | Age: 82
End: 2017-01-24

## 2017-01-24 ENCOUNTER — TRANSFERRED RECORDS (OUTPATIENT)
Dept: HEALTH INFORMATION MANAGEMENT | Facility: CLINIC | Age: 82
End: 2017-01-24

## 2017-01-24 NOTE — TELEPHONE ENCOUNTER
Contacted patients wife and gave her instructions per Dr. Fox. Patient stated that she is not going to stop with the Nortriptyline at this time. But will call into the clinic to give an update in the event that she does.

## 2017-01-24 NOTE — TELEPHONE ENCOUNTER
Don't know who Dr Hernandez is and what type of physician he is. MD aware that this med can cause some sedation  But was started due to limited options for longterm pain control in pt. Pt on Nortriptyline as part of pain control for chronic back pain and radiation of pain into  LEs.  Has been on med since 2014 to lessen pain signals along with other meds through Pain clinic. Given pt's mental status about the same before and after med was started, unlikely to be causing memory loss in patient. Pt may try holding the med for 1 week to see if mental status improves but may also worsen pain off of med. Pt to try stopping Nortriptyline for the next 1 week and wife then t call update to nurseline re: if improvement in mental status/fatigue and if pain worse or the same

## 2017-01-24 NOTE — TELEPHONE ENCOUNTER
Patients wife called and they saw Dr. Frost today and he was questioning why Patient is taking Nortriptyline because that causes memory loss.  Doctor was wondering if patient could drop this med.  Call wife back for clarification at home #[phone] as far as why he was taking it and if okay to drop med. Ok to leave message.

## 2017-01-25 ENCOUNTER — HOSPITAL ENCOUNTER (OUTPATIENT)
Dept: GENERAL RADIOLOGY | Facility: CLINIC | Age: 82
Discharge: HOME | End: 2017-01-25
Attending: NURSE PRACTITIONER | Admitting: NURSE PRACTITIONER
Payer: MEDICARE

## 2017-01-25 PROCEDURE — 25000125 ZZHC RX 250: Performed by: PHYSICIAN ASSISTANT

## 2017-01-25 PROCEDURE — 62323 NJX INTERLAMINAR LMBR/SAC: CPT

## 2017-01-25 PROCEDURE — 25500064 ZZH RX 255 OP 636: Performed by: PHYSICIAN ASSISTANT

## 2017-01-25 RX ORDER — LIDOCAINE HYDROCHLORIDE 10 MG/ML
3 INJECTION, SOLUTION EPIDURAL; INFILTRATION; INTRACAUDAL; PERINEURAL ONCE
Status: COMPLETED | OUTPATIENT
Start: 2017-01-25 | End: 2017-01-25

## 2017-01-25 RX ORDER — IOPAMIDOL 408 MG/ML
10 INJECTION, SOLUTION INTRATHECAL ONCE
Status: COMPLETED | OUTPATIENT
Start: 2017-01-25 | End: 2017-01-25

## 2017-01-25 RX ORDER — DEXAMETHASONE SODIUM PHOSPHATE 10 MG/ML
20 INJECTION, SOLUTION INTRAMUSCULAR; INTRAVENOUS ONCE
Status: COMPLETED | OUTPATIENT
Start: 2017-01-25 | End: 2017-01-25

## 2017-01-25 RX ORDER — LIDOCAINE HYDROCHLORIDE 10 MG/ML
5 INJECTION, SOLUTION EPIDURAL; INFILTRATION; INTRACAUDAL; PERINEURAL ONCE
Status: COMPLETED | OUTPATIENT
Start: 2017-01-25 | End: 2017-01-25

## 2017-01-25 RX ADMIN — LIDOCAINE HYDROCHLORIDE 50 MG: 10 INJECTION, SOLUTION EPIDURAL; INFILTRATION; INTRACAUDAL; PERINEURAL at 15:02

## 2017-01-25 RX ADMIN — IOPAMIDOL 3 ML: 408 INJECTION, SOLUTION INTRATHECAL at 15:02

## 2017-01-25 RX ADMIN — DEXAMETHASONE SODIUM PHOSPHATE 20 MG: 10 INJECTION, SOLUTION INTRAMUSCULAR; INTRAVENOUS at 15:03

## 2017-01-25 RX ADMIN — LIDOCAINE HYDROCHLORIDE 30 MG: 10 INJECTION, SOLUTION EPIDURAL; INFILTRATION; INTRACAUDAL; PERINEURAL at 15:03

## 2017-01-25 NOTE — PROGRESS NOTES
Pt tolerated procedure well, pt was premedicated with methylprednisolone 32 mg 12 and 2 hours prior to procedure and did just fine, no signs of allergy from contrast. Post procedure pain level improved, was numb. Pt verbalized understanding of written and verbal instructions and left department in satisfactory condition with son and friend. There is no evidence of bleeding upon discharge.

## 2017-01-29 ENCOUNTER — APPOINTMENT (OUTPATIENT)
Dept: GENERAL RADIOLOGY | Facility: CLINIC | Age: 82
DRG: 551 | End: 2017-01-29
Attending: EMERGENCY MEDICINE
Payer: MEDICARE

## 2017-01-29 ENCOUNTER — APPOINTMENT (OUTPATIENT)
Dept: CT IMAGING | Facility: CLINIC | Age: 82
DRG: 551 | End: 2017-01-29
Attending: EMERGENCY MEDICINE
Payer: MEDICARE

## 2017-01-29 ENCOUNTER — HOSPITAL ENCOUNTER (INPATIENT)
Facility: CLINIC | Age: 82
LOS: 3 days | Discharge: NURSING, REHAB OR LONG-TERM CARE | DRG: 551 | End: 2017-02-01
Attending: EMERGENCY MEDICINE | Admitting: INTERNAL MEDICINE
Payer: MEDICARE

## 2017-01-29 DIAGNOSIS — G89.29 OTHER CHRONIC PAIN: Primary | ICD-10-CM

## 2017-01-29 DIAGNOSIS — M54.16 LUMBAR RADICULOPATHY: ICD-10-CM

## 2017-01-29 DIAGNOSIS — S32.000A COMPRESSION FX, LUMBAR SPINE, CLOSED, INITIAL ENCOUNTER (H): ICD-10-CM

## 2017-01-29 DIAGNOSIS — J18.9 PNEUMONIA OF LEFT LOWER LOBE DUE TO INFECTIOUS ORGANISM: ICD-10-CM

## 2017-01-29 DIAGNOSIS — F03.91 DEMENTIA WITH BEHAVIORAL DISTURBANCE, UNSPECIFIED DEMENTIA TYPE: ICD-10-CM

## 2017-01-29 LAB
ALBUMIN UR-MCNC: 30 MG/DL
ANION GAP SERPL CALCULATED.3IONS-SCNC: 8 MMOL/L (ref 3–14)
APPEARANCE UR: CLEAR
BASOPHILS # BLD AUTO: 0.1 10E9/L (ref 0–0.2)
BASOPHILS NFR BLD AUTO: 0.4 %
BILIRUB UR QL STRIP: NEGATIVE
BUN SERPL-MCNC: 31 MG/DL (ref 7–30)
CALCIUM SERPL-MCNC: 9.3 MG/DL (ref 8.5–10.1)
CHLORIDE SERPL-SCNC: 101 MMOL/L (ref 94–109)
CO2 SERPL-SCNC: 30 MMOL/L (ref 20–32)
COLOR UR AUTO: YELLOW
CREAT SERPL-MCNC: 1.48 MG/DL (ref 0.66–1.25)
DIFFERENTIAL METHOD BLD: ABNORMAL
EOSINOPHIL # BLD AUTO: 0.6 10E9/L (ref 0–0.7)
EOSINOPHIL NFR BLD AUTO: 4.2 %
ERYTHROCYTE [DISTWIDTH] IN BLOOD BY AUTOMATED COUNT: 12.7 % (ref 10–15)
FLUAV+FLUBV AG SPEC QL: NEGATIVE
FLUAV+FLUBV AG SPEC QL: NORMAL
GFR SERPL CREATININE-BSD FRML MDRD: 45 ML/MIN/1.7M2
GLUCOSE SERPL-MCNC: 100 MG/DL (ref 70–99)
GLUCOSE UR STRIP-MCNC: NEGATIVE MG/DL
HCT VFR BLD AUTO: 38.3 % (ref 40–53)
HGB BLD-MCNC: 12.8 G/DL (ref 13.3–17.7)
HGB UR QL STRIP: NEGATIVE
IMM GRANULOCYTES # BLD: 0 10E9/L (ref 0–0.4)
IMM GRANULOCYTES NFR BLD: 0.3 %
KETONES UR STRIP-MCNC: NEGATIVE MG/DL
LACTATE BLD-SCNC: 1.3 MMOL/L (ref 0.7–2.1)
LEUKOCYTE ESTERASE UR QL STRIP: NEGATIVE
LYMPHOCYTES # BLD AUTO: 1.7 10E9/L (ref 0.8–5.3)
LYMPHOCYTES NFR BLD AUTO: 12.6 %
MCH RBC QN AUTO: 29.9 PG (ref 26.5–33)
MCHC RBC AUTO-ENTMCNC: 33.4 G/DL (ref 31.5–36.5)
MCV RBC AUTO: 90 FL (ref 78–100)
MONOCYTES # BLD AUTO: 1.1 10E9/L (ref 0–1.3)
MONOCYTES NFR BLD AUTO: 8.2 %
NEUTROPHILS # BLD AUTO: 9.8 10E9/L (ref 1.6–8.3)
NEUTROPHILS NFR BLD AUTO: 74.3 %
NITRATE UR QL: NEGATIVE
NRBC # BLD AUTO: 0 10*3/UL
NRBC BLD AUTO-RTO: 0 /100
PH UR STRIP: 7 PH (ref 5–7)
PLATELET # BLD AUTO: 151 10E9/L (ref 150–450)
POTASSIUM SERPL-SCNC: 4.3 MMOL/L (ref 3.4–5.3)
RBC # BLD AUTO: 4.28 10E12/L (ref 4.4–5.9)
RBC #/AREA URNS AUTO: 3 /HPF (ref 0–2)
SODIUM SERPL-SCNC: 139 MMOL/L (ref 133–144)
SP GR UR STRIP: 1.01 (ref 1–1.03)
SPECIMEN SOURCE: NORMAL
URN SPEC COLLECT METH UR: ABNORMAL
UROBILINOGEN UR STRIP-MCNC: NORMAL MG/DL (ref 0–2)
WBC # BLD AUTO: 13.2 10E9/L (ref 4–11)
WBC #/AREA URNS AUTO: 1 /HPF (ref 0–2)

## 2017-01-29 PROCEDURE — 80048 BASIC METABOLIC PNL TOTAL CA: CPT | Performed by: EMERGENCY MEDICINE

## 2017-01-29 PROCEDURE — 87040 BLOOD CULTURE FOR BACTERIA: CPT | Performed by: EMERGENCY MEDICINE

## 2017-01-29 PROCEDURE — A9270 NON-COVERED ITEM OR SERVICE: HCPCS | Mod: GY | Performed by: INTERNAL MEDICINE

## 2017-01-29 PROCEDURE — 96374 THER/PROPH/DIAG INJ IV PUSH: CPT

## 2017-01-29 PROCEDURE — A9270 NON-COVERED ITEM OR SERVICE: HCPCS | Mod: GY | Performed by: EMERGENCY MEDICINE

## 2017-01-29 PROCEDURE — 99223 1ST HOSP IP/OBS HIGH 75: CPT | Mod: AI | Performed by: INTERNAL MEDICINE

## 2017-01-29 PROCEDURE — 93005 ELECTROCARDIOGRAM TRACING: CPT

## 2017-01-29 PROCEDURE — 25000125 ZZHC RX 250: Performed by: EMERGENCY MEDICINE

## 2017-01-29 PROCEDURE — 81001 URINALYSIS AUTO W/SCOPE: CPT | Performed by: EMERGENCY MEDICINE

## 2017-01-29 PROCEDURE — 25000132 ZZH RX MED GY IP 250 OP 250 PS 637: Mod: GY | Performed by: EMERGENCY MEDICINE

## 2017-01-29 PROCEDURE — 25000132 ZZH RX MED GY IP 250 OP 250 PS 637: Mod: GY | Performed by: INTERNAL MEDICINE

## 2017-01-29 PROCEDURE — 99285 EMERGENCY DEPT VISIT HI MDM: CPT

## 2017-01-29 PROCEDURE — 12000000 ZZH R&B MED SURG/OB

## 2017-01-29 PROCEDURE — 85025 COMPLETE CBC W/AUTO DIFF WBC: CPT | Performed by: EMERGENCY MEDICINE

## 2017-01-29 PROCEDURE — 87804 INFLUENZA ASSAY W/OPTIC: CPT | Performed by: EMERGENCY MEDICINE

## 2017-01-29 PROCEDURE — 83605 ASSAY OF LACTIC ACID: CPT | Performed by: EMERGENCY MEDICINE

## 2017-01-29 PROCEDURE — 71020 XR CHEST 2 VW: CPT

## 2017-01-29 PROCEDURE — 72131 CT LUMBAR SPINE W/O DYE: CPT

## 2017-01-29 PROCEDURE — 36415 COLL VENOUS BLD VENIPUNCTURE: CPT

## 2017-01-29 RX ORDER — AZITHROMYCIN 250 MG/1
250 TABLET, FILM COATED ORAL EVERY EVENING
Status: DISCONTINUED | OUTPATIENT
Start: 2017-01-30 | End: 2017-01-29

## 2017-01-29 RX ORDER — NALOXONE HYDROCHLORIDE 0.4 MG/ML
.1-.4 INJECTION, SOLUTION INTRAMUSCULAR; INTRAVENOUS; SUBCUTANEOUS
Status: DISCONTINUED | OUTPATIENT
Start: 2017-01-29 | End: 2017-02-01 | Stop reason: HOSPADM

## 2017-01-29 RX ORDER — FUROSEMIDE 20 MG
20 TABLET ORAL DAILY
COMMUNITY
End: 2017-02-24

## 2017-01-29 RX ORDER — AZITHROMYCIN 250 MG/1
500 TABLET, FILM COATED ORAL ONCE
Status: COMPLETED | OUTPATIENT
Start: 2017-01-29 | End: 2017-01-29

## 2017-01-29 RX ORDER — TRAMADOL HYDROCHLORIDE 50 MG/1
50 TABLET ORAL ONCE
Status: COMPLETED | OUTPATIENT
Start: 2017-01-29 | End: 2017-01-29

## 2017-01-29 RX ORDER — NORTRIPTYLINE HCL 25 MG
25 CAPSULE ORAL AT BEDTIME
Status: DISCONTINUED | OUTPATIENT
Start: 2017-01-29 | End: 2017-02-01 | Stop reason: HOSPADM

## 2017-01-29 RX ORDER — HYDROMORPHONE HCL/0.9% NACL/PF 0.2MG/0.2
0.2 SYRINGE (ML) INTRAVENOUS
Status: DISCONTINUED | OUTPATIENT
Start: 2017-01-29 | End: 2017-02-01 | Stop reason: HOSPADM

## 2017-01-29 RX ORDER — AMLODIPINE BESYLATE 10 MG/1
10 TABLET ORAL DAILY
Status: DISCONTINUED | OUTPATIENT
Start: 2017-01-30 | End: 2017-02-01 | Stop reason: HOSPADM

## 2017-01-29 RX ORDER — ONDANSETRON 4 MG/1
4 TABLET, ORALLY DISINTEGRATING ORAL EVERY 6 HOURS PRN
Status: DISCONTINUED | OUTPATIENT
Start: 2017-01-29 | End: 2017-02-01 | Stop reason: HOSPADM

## 2017-01-29 RX ORDER — POLYETHYLENE GLYCOL 3350 17 G/17G
17 POWDER, FOR SOLUTION ORAL DAILY
Status: DISCONTINUED | OUTPATIENT
Start: 2017-01-30 | End: 2017-02-01 | Stop reason: HOSPADM

## 2017-01-29 RX ORDER — ATORVASTATIN CALCIUM 20 MG/1
20 TABLET, FILM COATED ORAL DAILY
Status: DISCONTINUED | OUTPATIENT
Start: 2017-01-30 | End: 2017-02-01 | Stop reason: HOSPADM

## 2017-01-29 RX ORDER — ACETAMINOPHEN 500 MG
1000 TABLET ORAL 3 TIMES DAILY
Status: DISCONTINUED | OUTPATIENT
Start: 2017-01-30 | End: 2017-02-01 | Stop reason: HOSPADM

## 2017-01-29 RX ORDER — AMOXICILLIN 250 MG
1-2 CAPSULE ORAL 2 TIMES DAILY PRN
Status: DISCONTINUED | OUTPATIENT
Start: 2017-01-29 | End: 2017-02-01 | Stop reason: HOSPADM

## 2017-01-29 RX ORDER — ONDANSETRON 2 MG/ML
4 INJECTION INTRAMUSCULAR; INTRAVENOUS EVERY 6 HOURS PRN
Status: DISCONTINUED | OUTPATIENT
Start: 2017-01-29 | End: 2017-02-01 | Stop reason: HOSPADM

## 2017-01-29 RX ORDER — LOSARTAN POTASSIUM 100 MG/1
100 TABLET ORAL EVERY EVENING
Status: DISCONTINUED | OUTPATIENT
Start: 2017-01-29 | End: 2017-02-01 | Stop reason: HOSPADM

## 2017-01-29 RX ORDER — BISACODYL 10 MG
10 SUPPOSITORY, RECTAL RECTAL DAILY PRN
Status: DISCONTINUED | OUTPATIENT
Start: 2017-01-29 | End: 2017-02-01 | Stop reason: HOSPADM

## 2017-01-29 RX ORDER — AZITHROMYCIN 250 MG/1
250 TABLET, FILM COATED ORAL EVERY EVENING
Status: DISCONTINUED | OUTPATIENT
Start: 2017-01-30 | End: 2017-02-01 | Stop reason: HOSPADM

## 2017-01-29 RX ORDER — TRAMADOL HYDROCHLORIDE 50 MG/1
50 TABLET ORAL EVERY 6 HOURS PRN
Status: DISCONTINUED | OUTPATIENT
Start: 2017-01-29 | End: 2017-02-01 | Stop reason: HOSPADM

## 2017-01-29 RX ORDER — DONEPEZIL HYDROCHLORIDE 10 MG/1
10 TABLET, FILM COATED ORAL AT BEDTIME
Status: DISCONTINUED | OUTPATIENT
Start: 2017-01-29 | End: 2017-02-01 | Stop reason: HOSPADM

## 2017-01-29 RX ORDER — CEFTRIAXONE 1 G/1
1 INJECTION, POWDER, FOR SOLUTION INTRAMUSCULAR; INTRAVENOUS ONCE
Status: COMPLETED | OUTPATIENT
Start: 2017-01-29 | End: 2017-01-29

## 2017-01-29 RX ORDER — TAMSULOSIN HYDROCHLORIDE 0.4 MG/1
0.4 CAPSULE ORAL DAILY
Status: DISCONTINUED | OUTPATIENT
Start: 2017-01-30 | End: 2017-02-01 | Stop reason: HOSPADM

## 2017-01-29 RX ORDER — PROCHLORPERAZINE MALEATE 5 MG
5 TABLET ORAL EVERY 6 HOURS PRN
Status: DISCONTINUED | OUTPATIENT
Start: 2017-01-29 | End: 2017-02-01 | Stop reason: HOSPADM

## 2017-01-29 RX ORDER — NORTRIPTYLINE HCL 25 MG
25 CAPSULE ORAL AT BEDTIME
COMMUNITY
End: 2017-02-06 | Stop reason: DRUGHIGH

## 2017-01-29 RX ORDER — LIDOCAINE 50 MG/G
2 PATCH TOPICAL
Status: DISCONTINUED | OUTPATIENT
Start: 2017-01-29 | End: 2017-02-01 | Stop reason: HOSPADM

## 2017-01-29 RX ORDER — MORPHINE SULFATE 2 MG/ML
2 INJECTION, SOLUTION INTRAMUSCULAR; INTRAVENOUS EVERY 10 MIN PRN
Status: DISCONTINUED | OUTPATIENT
Start: 2017-01-29 | End: 2017-01-29

## 2017-01-29 RX ORDER — GABAPENTIN 100 MG/1
100 CAPSULE ORAL 3 TIMES DAILY
Status: DISCONTINUED | OUTPATIENT
Start: 2017-01-29 | End: 2017-02-01 | Stop reason: HOSPADM

## 2017-01-29 RX ORDER — LEVOTHYROXINE SODIUM 88 UG/1
88 TABLET ORAL DAILY
Status: DISCONTINUED | OUTPATIENT
Start: 2017-01-30 | End: 2017-02-01 | Stop reason: HOSPADM

## 2017-01-29 RX ORDER — ACETAMINOPHEN 325 MG/1
650 TABLET ORAL ONCE
Status: COMPLETED | OUTPATIENT
Start: 2017-01-29 | End: 2017-01-29

## 2017-01-29 RX ORDER — FUROSEMIDE 20 MG
20 TABLET ORAL DAILY
Status: DISCONTINUED | OUTPATIENT
Start: 2017-01-30 | End: 2017-02-01 | Stop reason: HOSPADM

## 2017-01-29 RX ORDER — METOPROLOL SUCCINATE 25 MG/1
25 TABLET, EXTENDED RELEASE ORAL DAILY
Status: DISCONTINUED | OUTPATIENT
Start: 2017-01-30 | End: 2017-02-01 | Stop reason: HOSPADM

## 2017-01-29 RX ORDER — METHOCARBAMOL 500 MG/1
500 TABLET, FILM COATED ORAL ONCE
Status: COMPLETED | OUTPATIENT
Start: 2017-01-29 | End: 2017-01-29

## 2017-01-29 RX ORDER — ACETAMINOPHEN 325 MG/1
650 TABLET ORAL EVERY 4 HOURS PRN
Status: DISCONTINUED | OUTPATIENT
Start: 2017-01-29 | End: 2017-01-29

## 2017-01-29 RX ORDER — POLYETHYLENE GLYCOL 3350 17 G/17G
17 POWDER, FOR SOLUTION ORAL DAILY PRN
Status: DISCONTINUED | OUTPATIENT
Start: 2017-01-29 | End: 2017-02-01 | Stop reason: HOSPADM

## 2017-01-29 RX ORDER — PROCHLORPERAZINE 25 MG
12.5 SUPPOSITORY, RECTAL RECTAL EVERY 12 HOURS PRN
Status: DISCONTINUED | OUTPATIENT
Start: 2017-01-29 | End: 2017-02-01 | Stop reason: HOSPADM

## 2017-01-29 RX ORDER — ASPIRIN 81 MG/1
81 TABLET ORAL EVERY EVENING
Status: DISCONTINUED | OUTPATIENT
Start: 2017-01-29 | End: 2017-02-01 | Stop reason: HOSPADM

## 2017-01-29 RX ORDER — PANTOPRAZOLE SODIUM 40 MG/1
40 TABLET, DELAYED RELEASE ORAL EVERY MORNING
Status: DISCONTINUED | OUTPATIENT
Start: 2017-01-30 | End: 2017-02-01 | Stop reason: HOSPADM

## 2017-01-29 RX ORDER — CEFTRIAXONE 1 G/1
1 INJECTION, POWDER, FOR SOLUTION INTRAMUSCULAR; INTRAVENOUS EVERY 24 HOURS
Status: DISCONTINUED | OUTPATIENT
Start: 2017-01-30 | End: 2017-02-01 | Stop reason: HOSPADM

## 2017-01-29 RX ORDER — LIDOCAINE 40 MG/G
CREAM TOPICAL
Status: DISCONTINUED | OUTPATIENT
Start: 2017-01-29 | End: 2017-02-01 | Stop reason: HOSPADM

## 2017-01-29 RX ADMIN — METHOCARBAMOL 500 MG: 500 TABLET ORAL at 19:18

## 2017-01-29 RX ADMIN — ASPIRIN 81 MG: 81 TABLET, COATED ORAL at 23:15

## 2017-01-29 RX ADMIN — CEFTRIAXONE 1 G: 1 INJECTION, POWDER, FOR SOLUTION INTRAMUSCULAR; INTRAVENOUS at 21:17

## 2017-01-29 RX ADMIN — LIDOCAINE 2 PATCH: 50 PATCH CUTANEOUS at 23:17

## 2017-01-29 RX ADMIN — Medication 12.5 MG: at 23:16

## 2017-01-29 RX ADMIN — ACETAMINOPHEN 650 MG: 325 TABLET, FILM COATED ORAL at 19:18

## 2017-01-29 RX ADMIN — DONEPEZIL HYDROCHLORIDE 10 MG: 10 TABLET, FILM COATED ORAL at 23:16

## 2017-01-29 RX ADMIN — AZITHROMYCIN 500 MG: 250 TABLET, FILM COATED ORAL at 21:17

## 2017-01-29 RX ADMIN — TRAMADOL HYDROCHLORIDE 50 MG: 50 TABLET, COATED ORAL at 21:17

## 2017-01-29 RX ADMIN — GABAPENTIN 100 MG: 100 CAPSULE ORAL at 23:15

## 2017-01-29 ASSESSMENT — ACTIVITIES OF DAILY LIVING (ADL)
COGNITION: 1 - ATTENTION OR MEMORY DEFICITS
TOILETING: 1-->ASSISTIVE EQUIPMENT
AMBULATION: 1-->ASSISTIVE EQUIPMENT
RETIRED_COMMUNICATION: 0-->UNDERSTANDS/COMMUNICATES WITHOUT DIFFICULTY
RETIRED_EATING: 0-->INDEPENDENT
FALL_HISTORY_WITHIN_LAST_SIX_MONTHS: YES
SWALLOWING: 0-->SWALLOWS FOODS/LIQUIDS WITHOUT DIFFICULTY
WHICH_OF_THE_ABOVE_FUNCTIONAL_RISKS_HAD_A_RECENT_ONSET_OR_CHANGE?: AMBULATION
DRESS: 0-->INDEPENDENT
TRANSFERRING: 1-->ASSISTIVE EQUIPMENT
NUMBER_OF_TIMES_PATIENT_HAS_FALLEN_WITHIN_LAST_SIX_MONTHS: 5
BATHING: 0-->INDEPENDENT

## 2017-01-29 ASSESSMENT — ENCOUNTER SYMPTOMS
CONFUSION: 0
FEVER: 1
COUGH: 1
BACK PAIN: 1

## 2017-01-29 NOTE — IP AVS SNAPSHOT
[facility]: [phone]                                              INTERAGENCY TRANSFER FORM - LAB / IMAGING / EKG / EMG RESULTS   2017                    Hospital Admission Date: 2017  NATTY MAN   : 1928  Sex: Male        Attending Provider: Carie Bowers MD     Allergies:  Contrast Dye, Lisinopril, Oxycodone    Infection:  None   Service:  GENERAL MEDI    Ht:  --   Wt:  --   Admission Wt:  --    BMI:  --   BSA:  --            Patient PCP Information     Provider PCP Type    Alirio Fox MD General         Lab Results - 3 Days (17 - 17)      Sputum Culture Aerobic Bacterial [[id number]]  Resulted: 17 1129, Result status: Final result    Ordering provider: Carie Bowers MD  17 2210 Resulting lab: [facility]    Specimen Information    Type Source Collected On   Sputum  17 0930          Components       Value Reference Range Flag Lab   Specimen Description Sputum   FrRdHs   Culture Micro Light growth Normal jakub   225   Micro Report Status FINAL 2017   225            Creatinine [[id number]] (Abnormal)  Resulted: 17 0742, Result status: Final result    Ordering provider: Carie Bowers MD  17 1800 Resulting lab: [facility]    Specimen Information    Type Source Collected On   Blood  17 0652          Components       Value Reference Range Flag Lab   Creatinine 1.65 0.66 - 1.25 mg/dL H FrStHsLb   GFR Estimate 40 >60 mL/min/1.7m2 L FrStHsLb   Comment:  Non  GFR Calc   GFR Estimate If Black 48 >60 mL/min/1.7m2 L FrStHsLb   Comment:   GFR Calc            Platelet count [[id number]] (Abnormal)  Resulted: 17 0730, Result status: Final result    Ordering provider: Carie Bowers MD  17 0000 Resulting lab: [facility]    Specimen Information    Type Source Collected On   Blood  17 0652           Components       Value Reference Range Flag Lab   Platelet Count 127 150 - 450 10e9/L L [facility]            Blood culture [[id number]]  Resulted: 02/01/17 0237, Result status: Preliminary result    Ordering provider: Shaun Ponce MD  01/29/17 2119 Resulting lab: [facility]    Specimen Information    Type Source Collected On   Blood Arm, Right 01/29/17 2125          Components       Value Reference Range Flag Lab   Specimen Description Blood Unspecified Site   75   Special Requests Aerobic and anaerobic bottles received   75   Culture Micro No growth after 3 days   75   Micro Report Status Pending   75            Blood culture [[id number]]  Resulted: 02/01/17 0237, Result status: Preliminary result    Ordering provider: Lenora Clements MD  01/29/17 1908 Resulting lab: [facility]    Specimen Information    Type Source Collected On   Blood Arm, Left 01/29/17 1850          Components       Value Reference Range Flag Lab   Specimen Description Blood Left Arm   75   Special Requests Aerobic and anaerobic bottles received   75   Culture Micro No growth after 3 days   75   Micro Report Status Pending   75            Gram stain [[id number]]  Resulted: 01/30/17 1738, Result status: Final result    Ordering provider: Carie Bowers MD  01/30/17 0930 Resulting lab: [facility]ID [facility]    Specimen Information    Type Source Collected On     01/30/17 0930          Components       Value Reference Range Flag Lab   Specimen Description Sputum   [facility]   Special Requests Screen   75   Gram Stain --   226   Result:         <10 Squamous epithelial cells/low power field  >25 PMNs/low power field  Few Mixed gram positive and gram negative bacteria present.     Micro Report Status FINAL 01/30/2017   226   Result:              Basic metabolic panel [[id number]] (Abnormal)  Resulted: 01/30/17 0726, Result status: Final result    Ordering provider:  Carie Bowers MD  01/30/17 0000 Resulting lab: [facility]    Specimen Information    Type Source Collected On   Blood  01/30/17 0655          Components       Value Reference Range Flag Lab   Sodium 139 133 - 144 mmol/L  FrRdHs   Potassium 4.2 3.4 - 5.3 mmol/L  FrRdHs   Chloride 102 94 - 109 mmol/L  FrRdHs   Carbon Dioxide 31 20 - 32 mmol/L  FrRdHs   Anion Gap 6 3 - 14 mmol/L  FrRdHs   Glucose 88 70 - 99 mg/dL  FrRdHs   Urea Nitrogen 29 7 - 30 mg/dL  FrRdHs   Creatinine 1.36 0.66 - 1.25 mg/dL H FrRdHs   GFR Estimate 49 >60 mL/min/1.7m2 L FrRdHs   Comment:  Non  GFR Calc   GFR Estimate If Black 60 >60 mL/min/1.7m2 L FrRdHs   Comment:  African American GFR Calc   Calcium 9.5 8.5 - 10.1 mg/dL  FrRdHs            Magnesium [[id number]]  Resulted: 01/30/17 0726, Result status: Final result    Ordering provider: Carie Bowers MD  01/30/17 0000 Resulting lab: [facility]    Specimen Information    Type Source Collected On   Blood  01/30/17 0655          Components       Value Reference Range Flag Lab   Magnesium 2.3 1.6 - 2.3 mg/dL  FrRdHs            CBC with platelets [[id number]] (Abnormal)  Resulted: 01/30/17 0710, Result status: Final result    Ordering provider: Carie Bowers MD  01/30/17 0000 Resulting lab: [facility]    Specimen Information    Type Source Collected On   Blood  01/30/17 0655          Components       Value Reference Range Flag Lab   WBC 9.5 4.0 - 11.0 10e9/L  FrRdHs   RBC Count 3.77 4.4 - 5.9 10e12/L L FrRdHs   Hemoglobin 11.3 13.3 - 17.7 g/dL L FrRdHs   Hematocrit 33.9 40.0 - 53.0 % L FrRdHs   MCV 90 78 - 100 fl  FrRdHs   MCH 30.0 26.5 - 33.0 pg  FrRdHs   MCHC 33.3 31.5 - 36.5 g/dL  FrRdHs   RDW 12.6 10.0 - 15.0 %  FrRdHs   Platelet Count 120 150 - 450 10e9/L L FrRdHs            UA reflex to Microscopic [[id number]] (Abnormal)  Resulted: 01/29/17 2103, Result status: Final result    Ordering provider: Lenora Clements  MD Christo  01/29/17 1905 Resulting lab: [facility]    Specimen Information    Type Source Collected On   Urine Urine clean catch 01/29/17 2026          Components       Value Reference Range Flag Lab   Color Urine Yellow   FrRdHs   Appearance Urine Clear   FrRdHs   Glucose Urine Negative NEG mg/dL  FrRdHs   Bilirubin Urine Negative NEG   FrRdHs   Ketones Urine Negative NEG mg/dL  FrRdHs   Specific Gravity Urine 1.011 1.003 - 1.035   FrRdHs   Blood Urine Negative NEG   FrRdHs   pH Urine 7.0 5.0 - 7.0 pH  FrRdHs   Protein Albumin Urine 30 NEG mg/dL A FrRdHs   Urobilinogen mg/dL Normal 0.0 - 2.0 mg/dL  FrRdHs   Nitrite Urine Negative NEG   FrRdHs   Leukocyte Esterase Urine Negative NEG   FrRdHs   Source Midstream Urine   FrRdHs   RBC Urine 3 0 - 2 /HPF H FrRdHs   WBC Urine 1 0 - 2 /HPF  FrRdHs            Influenza A/B antigen [[id number]]  Resulted: 01/29/17 2009, Result status: Final result    Ordering provider: Lenora Clements MD  01/29/17 1905 Resulting lab: [facility]    Specimen Information    Type Source Collected On   Nasopharyngeal  01/29/17 1921          Components       Value Reference Range Flag Lab   Influenza A/B Agn Specimen Nasopharyngeal   FrRdHs   Influenza A Negative NEG   FrRdHs   Influenza B -- NEG   FrRdHs   Result:         Negative   Test results must be correlated with clinical data. If necessary, results   should be confirmed by a molecular assay or viral culture.              Basic metabolic panel [[id number]] (Abnormal)  Resulted: 01/29/17 1942, Result status: Final result    Ordering provider: Lenora Clements MD  01/29/17 1905 Resulting lab: [facility]    Specimen Information    Type Source Collected On   Blood  01/29/17 1850          Components       Value Reference Range Flag Lab   Sodium 139 133 - 144 mmol/L  FrRdHs   Potassium 4.3 3.4 - 5.3 mmol/L  FrRdHs   Chloride 101 94 - 109 mmol/L  FrRdHs   Carbon Dioxide 30 20  - 32 mmol/L  FrRdHs   Anion Gap 8 3 - 14 mmol/L  FrRdHs   Glucose 100 70 - 99 mg/dL H FrRdHs   Urea Nitrogen 31 7 - 30 mg/dL H FrRdHs   Creatinine 1.48 0.66 - 1.25 mg/dL H FrRdHs   GFR Estimate 45 >60 mL/min/1.7m2 L FrRdHs   Comment:  Non  GFR Calc   GFR Estimate If Black 54 >60 mL/min/1.7m2 L FrRdHs   Comment:  African American GFR Calc   Calcium 9.3 8.5 - 10.1 mg/dL  FrRdHs            Lactic acid whole blood [[id number]]  Resulted: 01/29/17 1929, Result status: Final result    Ordering provider: Lenora Clements MD  01/29/17 1905 Resulting lab: [facility]    Specimen Information    Type Source Collected On   Blood  01/29/17 1850          Components       Value Reference Range Flag Lab   Lactic Acid 1.3 0.7 - 2.1 mmol/L  Rd            CBC + differential [[id number]] (Abnormal)  Resulted: 01/29/17 1925, Result status: Final result    Ordering provider: Lenora Clements MD  01/29/17 1905 Resulting lab: [facility]    Specimen Information    Type Source Collected On   Blood  01/29/17 1850          Components       Value Reference Range Flag Lab   WBC 13.2 4.0 - 11.0 10e9/L H FrRdHs   RBC Count 4.28 4.4 - 5.9 10e12/L L FrRdHs   Hemoglobin 12.8 13.3 - 17.7 g/dL L FrRdHs   Hematocrit 38.3 40.0 - 53.0 % L FrRdHs   MCV 90 78 - 100 fl  FrRdHs   MCH 29.9 26.5 - 33.0 pg  FrRdHs   MCHC 33.4 31.5 - 36.5 g/dL  FrRdHs   RDW 12.7 10.0 - 15.0 %  FrRdHs   Platelet Count 151 150 - 450 10e9/L  FrRdHs   Diff Method Automated Method   FrRdHs   % Neutrophils 74.3 %  FrRdHs   % Lymphocytes 12.6 %  FrRdHs   % Monocytes 8.2 %  FrRdHs   % Eosinophils 4.2 %  FrRdHs   % Basophils 0.4 %  FrRdHs   % Immature Granulocytes 0.3 %  FrRdHs   Nucleated RBCs 0 0 /100  FrRdHs   Absolute Neutrophil 9.8 1.6 - 8.3 10e9/L H FrRdHs   Absolute Lymphocytes 1.7 0.8 - 5.3 10e9/L  FrRdHs   Absolute Monocytes 1.1 0.0 - 1.3 10e9/L  FrRdHs   Absolute Eosinophils 0.6 0.0 - 0.7 10e9/L  FrRdHs    Absolute Basophils 0.1 0.0 - 0.2 10e9/L  FrRdHs   Abs Immature Granulocytes 0.0 0 - 0.4 10e9/L  FrRdHs   Absolute Nucleated RBC 0.0   FrRdHs            Testing Performed By     Lab - Abbreviation Name Director Address Valid Date Range    12 - FrRdHs [facility] Unknown [address] [place] [address] 05/08/15 1057 - Present    14 - FrStHsLb [facility] Unknown [address] [place] [address] 05/08/15 1057 - Present    75 - Unknown [facility] Unknown [address] [facility] [address] 01/15/15 1019 - Present    225 - Unknown [facility] Unknown [address] [facility] [address] 12/19/14 0954 - Present    226 - Unknown MICRO RAPID TESTING LAB Unknown [address] [facility] [address] 12/19/14 0955 - Present            Unresulted Labs (24h ago through future)    Start       Ordered    02/01/17 0600  Platelet count -  (Pharmacological Prophylaxis - enoxaparin (LOVENOX) *Use only if creatinine clearance is greater than 30 mL/min)   EVERY THREE DAYS,   Routine     Comments:  Repeat every 3 days while on VTE prophylaxis.  Notify provider and hold enoxaparin if platelet count falls by 50% of baseline. If no result is listed, this lab has not been done the past 365 days. LATEST LAB RESULT: PLATELET COUNT       Date                     Value                 01/29/2017               151 10e9/L            11/25/2014               179 10^9/L       ----------    01/29/17 2210 02/01/17 0000  Creatinine -  (Pharmacological Prophylaxis - enoxaparin (LOVENOX) *Use only if creatinine clearance is greater than 30 mL/min)   EVERY THREE DAYS,   Routine     Comments:  Repeat every 3 days while on VTE prophylaxis.    01/29/17 2210         Imaging Results - 3 Days (01/29/17 - 01/29/17)      XR Chest 2 Views [[id number]]  Resulted: 01/29/17 2148, Result status: Final result    Ordering provider: Lenora Clements  MD Christo  01/29/17 1905 Resulted by: Gage Baker MD    Performed: 01/29/17 1946 - 01/29/17 1956 Resulting lab: RADIOLOGY RESULTS    Narrative:       CHEST TWO VIEW 1/29/2017 7:56 PM     COMPARISON: Chest CT 6/14/2016.    HISTORY: Cough, fever.      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 There may be new airspace opacity in the mid and  lower aspects of the left lung that may represent pneumonia. There is  no pleural effusion or pneumothorax. Heart size is normal. There is no  evidence for congestive failure.    GAGE BAKER MD    Specimen Information    Type Source Collected On                  Lumbar spine CT w/o contrast [[id number]]  Resulted: 01/29/17 1954, Result status: Final result    Ordering provider: Lenora Clements MD  01/29/17 1905 Resulted by: Gage Baker MD    Performed: 01/29/17 1932 - 01/29/17 1943 Resulting lab: RADIOLOGY RESULTS    Narrative:       CT OF THE LUMBAR SPINE WITHOUT CONTRAST  1/29/2017 7:43 PM     COMPARISON: Lumbar spine MRI 11/4/16.    HISTORY: Fall, back pain.     TECHNIQUE: Axial images of the lumbar spine were acquired without  intravenous contrast. Multiplanar reformations were created from the  axial source images.      FINDINGS:  There has been interval development of mild compression  deformities of the L2 and L3 vertebral bodies. No other fractures.  Mild degenerative anterolisthesis of L4 upon L5 again noted. There is  moderate facet arthropathy bilaterally at L3-L4, L4-L5 and L5-S1.  There is no significant spinal canal stenosis of the lumbar spine.  There is moderate facet arthropathy bilaterally at the L1-L2, L2-L3,  L3-L4 and L4-L5 levels.      Impression:       IMPRESSION:  1. Recent mild compression fractures of the L2 and L3 vertebral bodies  that are new from the comparison lumbar spine MRI 11/4/2016.  2. No significant spinal canal stenosis of the lumbar  spine.  3. Diffuse degenerative changes of the lumbar spine.      Radiation dose for this scan was reduced using automated exposure  control, adjustment of the mA and/or kV according to patient size, or  iterative reconstruction technique    GAGE GARRETT MD    Specimen Information    Type Source Collected On                  Testing Performed By     Lab - Abbreviation Name Director Address Valid Date Range    104 - Rad Rslts RADIOLOGY RESULTS Unknown Unknown 02/16/05 1553 - Present            Encounter-Level Documents:     There are no encounter-level documents.      Order-Level Documents:     There are no order-level documents.

## 2017-01-29 NOTE — IP AVS SNAPSHOT
[facility] ORTHO SPINE: [phone]                                              INTERAGENCY TRANSFER FORM - PHYSICIAN ORDERS   2017                    Hospital Admission Date: 2017  NATTY MAN   : 1928  Sex: Male        Attending Provider: Carie Bowers MD     Allergies:  Contrast Dye, Lisinopril, Oxycodone    Infection:  None   Service:  GENERAL MEDI    Ht:  --   Wt:  --   Admission Wt:  --    BMI:  --   BSA:  --            Patient PCP Information     Provider PCP Type    Alirio Fox MD General      ED Clinical Impression     Diagnosis Description Comment Added By Time Added    Compression fx, lumbar spine, closed, initial encounter (H) [S32.000A] Compression fx, lumbar spine, closed, initial encounter (H) [S32.000A]  Lenora Clements MD 2017  8:38 PM    Pneumonia of left lower lobe due to infectious organism [J18.9] Pneumonia of left lower lobe due to infectious organism [J18.9]  Lenora Clements MD 2017  8:38 PM      Hospital Problems as of 2017              Priority Class Noted POA    Pneumonia Medium  2017 Yes      Non-Hospital Problems as of 2017              Priority Class Noted    Congenital anomaly of lung   2003    Lumbago   2003    Essential hypertension   2005    Other specified disorders of prostate   2006    Pulmonary fibrosis (H)   Unknown    Advanced directives, counseling/discussion   2011    CAD (coronary artery disease)   Unknown    CKD (chronic kidney disease) stage 3, GFR 30-59 ml/min   2011    Proteinuria   2012    Hyperlipidemia with target LDL less than 70   Unknown    Low back pain   10/1/2013    Lumbar radiculopathy   2014    Status post lumbar discectomy   2014    SOB (shortness of breath)   10/8/2014    S/P TAVR (transcatheter aortic valve replacement)   2014    Physical deconditioning   2014    CHF (congestive heart failure) (H)   2014     Hypercalcemia   1/2/2015    Anemia Medium  1/20/2015    SAI (obstructive sleep apnea) Medium  2/24/2015    Hyperparathyroidism (H)   4/22/2015    Dementia Medium  8/4/2015    Hypothyroidism Medium  10/27/2015    Health Care Home   11/5/2015    Edema, unspecified edema Medium  1/17/2016    Other chronic pain Medium  10/11/2016    Altered mental status Medium  11/14/2016      Code Status History     Date Active Date Inactive Code Status Order ID Comments User Context    2/1/2017  9:49 AM  Full Code [id number]  lAejo Lopez MD Outpatient    1/29/2017 10:10 PM 2/1/2017  9:49 AM Full Code [id number]  Carie Bowers MD Inpatient    11/15/2016 11:02 AM 1/29/2017 10:10 PM Full Code [id number]  Yudelka Castillo MD Outpatient    11/14/2016  6:32 PM 11/15/2016 11:02 AM Full Code [id number]  Yudelka Castillo MD Inpatient    11/20/2014 10:49 AM 11/14/2016  6:32 PM Full Code [id number]  Martínez Larson MD Outpatient    11/19/2014  4:47 AM 11/20/2014 10:49 AM Full Code [id number]  Lokesh Walls MD Inpatient    11/17/2014  3:16 PM 11/19/2014  4:47 AM Full Code [id number]  Joie Caballero PA-C Outpatient    11/12/2014  3:48 PM 11/17/2014  3:16 PM Full Code [id number]  Ruby Monteiro MD Inpatient    10/8/2014  2:52 PM 10/10/2014  8:06 PM Full Code [id number]  Carie Bowers MD Inpatient    8/15/2014 12:40 PM 10/8/2014  2:52 PM Full Code [id number]  Melissa Isaac NP Outpatient    8/14/2014  9:55 AM 8/15/2014 12:40 PM Full Code [id number]  Melissa Isaac NP Outpatient    8/11/2014 12:20 PM 8/14/2014  9:55 AM Full Code [id number]  Jone Carvalho MD Inpatient    11/21/2011  9:22 AM 8/11/2014 12:20 PM Full Code [id number]  Jose Antonio Shin PA-C Outpatient    11/18/2011  6:50 PM 11/21/2011  9:22 AM Full Code [id number]  Niya Persaud, ELIOT Inpatient    11/18/2011  3:48 PM 11/18/2011  6:49 PM Full Code [id number]  Niya Persaud, ELIOT Inpatient         Medication Review      START  taking        Dose / Directions Comments    azithromycin 250 MG tablet   Commonly known as:  ZITHROMAX   Indication:  Community Acquired Pneumonia   Used for:  Pneumonia of left lower lobe due to infectious organism        Dose:  250 mg   Take 1 tablet (250 mg) by mouth every evening for 2 days   Quantity:  2 tablet   Refills:  0        cefUROXime 500 MG tablet   Commonly known as:  CEFTIN   Used for:  Pneumonia of left lower lobe due to infectious organism        Dose:  500 mg   Take 1 tablet (500 mg) by mouth 2 times daily for 4 days   Quantity:  8 tablet   Refills:  0        QUEtiapine 25 MG tablet   Commonly known as:  SEROquel   Used for:  Dementia with behavioral disturbance, unspecified dementia type        Dose:  12.5 mg   Take 0.5 tablets (12.5 mg) by mouth At Bedtime   Refills:  0        senna-docusate 8.6-50 MG per tablet   Commonly known as:  SENOKOT-S;PERICOLACE   Used for:  Compression fx, lumbar spine, closed, initial encounter (H)   Notes to Patient:  Last bm 1/31        Dose:  2 tablet   Take 2 tablets by mouth 2 times daily as needed (constipation )   Quantity:  100 tablet   Refills:  0          CONTINUE these medications which may have CHANGED, or have new prescriptions. If we are uncertain of the size of tablets/capsules you have at home, strength may be listed as something that might have changed.        Dose / Directions Comments    acetaminophen 500 MG tablet   Commonly known as:  TYLENOL   This may have changed:    - medication strength  - how much to take  - when to take this  - reasons to take this   Used for:  Compression fx, lumbar spine, closed, initial encounter (H)        Dose:  1000 mg   Take 2 tablets (1,000 mg) by mouth 3 times daily   Refills:  0        traMADol 50 MG tablet   Commonly known as:  ULTRAM   This may have changed:    - how much to take  - how to take this  - when to take this  - reasons to take this  - additional instructions   Used for:  Other chronic pain, Lumbar  radiculopathy        Dose:  50 mg   Take 1 tablet (50 mg) by mouth every 6 hours as needed for moderate pain   Quantity:  30 tablet   Refills:  0          CONTINUE these medications which have NOT CHANGED        Dose / Directions Comments    amLODIPine 10 MG tablet   Commonly known as:  NORVASC   Used for:  Essential hypertension        Dose:  10 mg   Take 1 tablet (10 mg) by mouth daily   Quantity:  90 tablet   Refills:  2        AMOXICILLIN PO   Indication:  pre dental        Dose:  500 mg   Take 500 mg by mouth once as needed   Refills:  0        aspirin 81 MG EC tablet        Dose:  81 mg   Take 81 mg by mouth every evening   Refills:  0        atorvastatin 20 MG tablet   Commonly known as:  LIPITOR   Used for:  Coronary artery disease involving native coronary artery of native heart without angina pectoris        Dose:  20 mg   Take 1 tablet (20 mg) by mouth daily   Quantity:  90 tablet   Refills:  3        cyanocobalamin 1000 MCG tablet   Commonly known as:  vitamin  B-12   Indication:  Inadequate Vitamin B12        Dose:  1000 mcg   Take 1,000 mcg by mouth daily   Refills:  0        diclofenac 1 % Gel topical gel   Commonly known as:  VOLTAREN   Used for:  Chronic pain syndrome, Lumbar radiculopathy, Facet arthritis of lumbar region (H), DDD (degenerative disc disease), lumbar        Apply 4 grams to low back right hip and right leg four times as needed  daily using enclosed dosing card.  Lidocaine cream   Quantity:  100 g   Refills:  1        donepezil 10 MG tablet   Commonly known as:  ARICEPT   Used for:  Memory loss        Dose:  10 mg   Take 1 tablet (10 mg) by mouth At Bedtime   Quantity:  90 tablet   Refills:  3        furosemide 20 MG tablet   Commonly known as:  LASIX        Dose:  20 mg   Take 20 mg by mouth daily   Refills:  0        gabapentin 100 MG capsule   Commonly known as:  NEURONTIN   Used for:  Chronic bilateral low back pain without sciatica        Take 1 capsules Three times a day for  pain   Quantity:  90 capsule   Refills:  0        IRON SUPPLEMENT PO        Dose:  325 mg   Take 325 mg by mouth daily (with breakfast)   Refills:  0        levothyroxine 88 MCG tablet   Commonly known as:  SYNTHROID/LEVOTHROID   Used for:  Hypothyroidism, unspecified type        Dose:  88 mcg   Take 1 tablet (88 mcg) by mouth daily   Quantity:  90 tablet   Refills:  3        lidocaine-prilocaine cream   Commonly known as:  EMLA   Used for:  Chronic pain syndrome, Lumbar radiculopathy, Facet arthritis of lumbar region (H), DDD (degenerative disc disease), lumbar        Apply topically as needed for moderate pain To low back, right hip  and right leg Apply same time as Diclofenac gel   Quantity:  30 g   Refills:  1        losartan 100 MG tablet   Commonly known as:  COZAAR   Used for:  Essential hypertension        Dose:  100 mg   Take 1 tablet (100 mg) by mouth daily   Quantity:  90 tablet   Refills:  3    Note dose change back to 100mg daily. Profile only. Pt doesn't require refill at this time       metoprolol 25 MG 24 hr tablet   Commonly known as:  TOPROL-XL   Used for:  Essential hypertension        Dose:  25 mg   Take 1 tablet (25 mg) by mouth daily   Quantity:  90 tablet   Refills:  3        nortriptyline 25 MG capsule   Commonly known as:  PAMELOR        Dose:  25 mg   Take 25 mg by mouth At Bedtime   Refills:  0        order for DME   Used for:  Other chronic pain, Lumbar radiculopathy, DDD (degenerative disc disease), lumbar, Spinal stenosis of lumbar region without neurogenic claudication        Equipment being ordered: TENS and supplies.   Quantity:  1 each   Refills:  0        pantoprazole 40 MG EC tablet   Commonly known as:  PROTONIX   Used for:  Gastroesophageal reflux disease, esophagitis presence not specified        Dose:  40 mg   Take 1 tablet (40 mg) by mouth every morning   Quantity:  90 tablet   Refills:  3    Profile only. Pt doesn't require refill at this time. Note increase in quantity per  refill       polyethylene glycol Packet   Commonly known as:  MIRALAX   Used for:  Chronic constipation        Dose:  1 packet   Take 17 g by mouth daily   Quantity:  30 packet   Refills:  11        tamsulosin 0.4 MG capsule   Commonly known as:  FLOMAX   Used for:  BPH (benign prostatic hypertrophy) with urinary retention        Dose:  0.4 mg   Take 1 capsule (0.4 mg) by mouth daily   Quantity:  90 capsule   Refills:  1                  Further instructions from your care team       Call [phone] to make a follow up appointment with Jessie Wolff CNP at [facility] in 6 weeks.     Obtain a lumbar/flexion extension x-ray prior to this appointment.   Wear brace when out of bed, check skin daily to check for irritated areas. Call spine clinic if issues with brace    Call primary care md if:  Temperature  Increased/persistent cough above baseline  Increased shortness of breath at rest and/or with activity.  New or increased numbness, tingling and/or increased weakness in lower ext. Call spine surgeon  Chest pain or extreme shortness of breath call [phone]    Summary of Visit     Reason for your hospital stay       You were admitted with a fall found to have lumbar compression fractures as well as pneumonia.             After Care     Activity - Up with nursing assistance       Brace on when out of bed. May take off to shower with supervision.       Advance Diet as Tolerated       Follow this diet upon discharge: regular diet       Fall precautions           General info for SNF       Length of Stay Estimate: Short Term Care: Estimated # of Days <30  Condition at Discharge: Improving  Level of care:skilled   Rehabilitation Potential: Fair  Admission H&P remains valid and up-to-date: Yes  Recent Chemotherapy: N/A  Use Nursing Home Standing Orders: Yes       Mantoux instructions       Give two-step Mantoux (PPD) Per Facility Policy Yes             Referrals     Occupational Therapy Adult Consult       Evaluate  and treat as clinically indicated.    Reason:  Deconditioning       Physical Therapy Adult Consult       Evaluate and treat as clinically indicated.    Reason:  Deconditioning             Radiology & Cardiology Orders     Future Labs/Procedures Complete By Expires    X-ray lumbar spine 2-3 views*  3/14/2017 (Approximate) 4/30/2017    Comments:    FSOC -flexion/extension      Radiology & Cardiology Orders     X-ray lumbar spine 2-3 views*       FS[facility] -flexion/extension             Your next 10 appointments already scheduled     Feb 24, 2017  9:00 AM   Return Visit with CELIA Olson CNP   [facility] ([facility])    [address]  [address]  [place] [address]   [phone]              Follow-Up Appointment Instructions     Future Labs/Procedures    Follow Up and recommended labs and tests     Comments:    Please follow up at the [facility] in 6 weeks with flex/ext X-ray prior to appointment. Please call the clinic at [phone] to schedule your appointment with Jessie Wolff CNP.    Follow Up and recommended labs and tests     Comments:    Follow up with Nursing home physician.      Follow-Up Appointment Instructions     Follow Up and recommended labs and tests       Please follow up at the [facility] in 6 weeks with flex/ext X-ray prior to appointment. Please call the clinic at [phone] to schedule your appointment with Jessie Wolff CNP.       Follow Up and recommended labs and tests       Follow up with Nursing home physician.             Statement of Approval     Ordered          02/01/17 1025  I have reviewed and agree with all the recommendations and orders detailed in this document.   EFFECTIVE NOW     Approved and electronically signed by:  Alejo Lopez MD

## 2017-01-29 NOTE — LETTER
Warm Hand-Off to Next Level of Care    Name: Chaz Soler  MRN #: [id number]  :  1928  Reason for Hospitalization:  Compression fx, lumbar spine, closed, initial encounter (H) [S32.000A]  Pneumonia of left lower lobe due to infectious organism [J18.9]  Admit Date/Time: 2017  6:25 PM  Discharge Date:  2017  Payor Source:  Medicare   PCP: Alirio Fox    Patient will receive the following: TCU    Key Recommendations: Pt. was consulted by Neuro/Spine and fitted for TLSO, discharged to [facility]    Eugenia Weller RN BSN CTS  Care Transitions Team  [phone]

## 2017-01-29 NOTE — IP AVS SNAPSHOT
` `     [facility] ORTHO SPINE: [phone]            Medication Administration Report for Chaz Soler as of 02/01/17 1309   Legend:    Given Hold Not Given Due Canceled Entry Other Actions    Time Time (Time) Time  Time-Action       Inactive    Active    Linked        Medications 01/26/17 01/27/17 01/28/17 01/29/17 01/30/17 01/31/17 02/01/17    acetaminophen (TYLENOL) tablet 1,000 mg  Dose: 1,000 mg Freq: 3 TIMES DAILY Route: PO  Start: 01/30/17 0800   Admin Instructions: Maximum acetaminophen dose from all sources = 75 mg/kg/day not to exceed 4 gram         0920 (1,000 mg)-Given       1453 (1,000 mg)-Given       2120 (1,000 mg)-Given        0942 (1,000 mg)-Given       1705 (1,000 mg)-Given       2055 (1,000 mg)-Given        0954 (1,000 mg)-Given       [ ] 1400       [ ] 2000           amLODIPine (NORVASC) tablet 10 mg  Dose: 10 mg Freq: DAILY Route: PO  Start: 01/30/17 0800   Admin Instructions: Hold for SBP < 120         0923 (10 mg)-Given        0944 (10 mg)-Given        0955 (10 mg)-Given           aspirin EC EC tablet 81 mg  Dose: 81 mg Freq: EVERY EVENING Route: PO  Start: 01/29/17 2300       2315 (81 mg)-Given        2120 (81 mg)-Given        2055 (81 mg)-Given        [ ] 2000           atorvastatin (LIPITOR) tablet 20 mg  Dose: 20 mg Freq: DAILY Route: PO  Start: 01/30/17 0800        0922 (20 mg)-Given        0943 (20 mg)-Given        0956 (20 mg)-Given           azithromycin (ZITHROMAX) tablet 250 mg  Dose: 250 mg Freq: EVERY EVENING Route: PO  Indications of Use: COMMUNITY ACQUIRED PNEUMONIA  Start: 01/30/17 2000   End: 02/03/17 1959        2120 (250 mg)-Given        2055 (250 mg)-Given        [ ] 2000           bisacodyl (DULCOLAX) Suppository 10 mg  Dose: 10 mg Freq: DAILY PRN Route: RE  PRN Reason: constipation  Start: 01/29/17 2210   Admin Instructions: Hold for loose stools.  This is the third step of a three step constipation treatment protocol.               cefTRIAXone (ROCEPHIN) 1 g  vial to attach to  mL bag for ADULTS or NS 50 mL bag for PEDS  Dose: 1 g Freq: EVERY 24 HOURS Route: IV  Indications of Use: COMMUNITY ACQUIRED PNEUMONIA  Last Dose: 1 g (01/31/17 2218)  Start: 01/30/17 2100 2119 (1 g)-New Bag        (2054)-Not Given       2218 (1 g)-New Bag        [ ] 2100           donepezil (ARICEPT) tablet 10 mg  Dose: 10 mg Freq: AT BEDTIME Route: PO  Start: 01/29/17 2230       2316 (10 mg)-Given        2120 (10 mg)-Given        2055 (10 mg)-Given               [ ] 2200           enoxaparin (LOVENOX) injection 30 mg  Dose: 30 mg Freq: EVERY 24 HOURS Route: SC  Start: 01/30/17 1000   Admin Instructions: HOLD if platelet count falls below 50% of baseline or less than 100,000/ L and notify provider.         1043 (30 mg)-Given        0946 (30 mg)-Given        0956 (30 mg)-Given           furosemide (LASIX) tablet 20 mg  Dose: 20 mg Freq: DAILY Route: PO  Start: 01/30/17 0800        0921 (20 mg)-Given        0945 (20 mg)-Given        0955 (20 mg)-Given           gabapentin (NEURONTIN) capsule 100 mg  Dose: 100 mg Freq: 3 TIMES DAILY Route: PO  Start: 01/29/17 2230       2315 (100 mg)-Given        0923 (100 mg)-Given       1454 (100 mg)-Given       2120 (100 mg)-Given        0945 (100 mg)-Given       1442 (100 mg)-Given       2055 (100 mg)-Given        0955 (100 mg)-Given       [ ] 1400       [ ] 2000           HYDROmorphone (DILAUDID) injection 0.2 mg  Dose: 0.2 mg Freq: EVERY 3 HOURS PRN Route: IV  PRN Reason: severe pain  Start: 01/29/17 2229              levothyroxine (SYNTHROID/LEVOTHROID) tablet 88 mcg  Dose: 88 mcg Freq: DAILY Route: PO  Start: 01/30/17 0800        0922 (88 mcg)-Given        0945 (88 mcg)-Given        0954 (88 mcg)-Given           lidocaine (LIDODERM) 5 % Patch 2 patch  Dose: 2 patch Freq: EVERY 24 HOURS 2000 Route: TD  Start: 01/29/17 2230   Admin Instructions: Apply patch(s) to area of pain on back . To prevent lidocaine toxicity, patient should be patch free  "for 12 hrs daily. Patches may be cut to smaller size prior to removing release liner.  NEVER APPLY HEAT OVER PATCH which will increase absorption and may lead to risk of local anesthetic toxicity. Do not apply over area where liposomal bupivacaine was injected for 96 hours post injection.        2317 (2 patch)-Given        2121 (2 patch)-Given [C]        2054 (2 patch)-Given [C]        [ ] 2000          And  lidocaine (LIDODERM) patch REMOVAL  Freq: EVERY 24 HOURS 0800 Route: TD  Start: 01/30/17 0800   Admin Instructions: Remove lidocaine Patch.         0932 ( )-Patch Removed        0900 ( )-Patch Removed        1000 ( )-Patch Removed          And  lidocaine (LIDODERM) Patch in Place  Freq: EVERY 8 HOURS Route: TD  Start: 01/29/17 2230   Admin Instructions: Chart every shift, confirming that patch is still in place on patient (no barcode scan needed). See patch order for dose information.  NEVER APPLY HEAT OVER PATCH which will increase absorption and may lead to risk of local anesthetic toxicity. Do not apply over area where liposomal bupivacaine injected for 96 hours.        2317 ( )-Patch in Place        0652 ( )-Patch in Place       (1455)-Not Given       2145 ( )-Patch in Place        0508 ( )-Patch in Place       (1434)-Not Given       2218 ( )-Patch in Place        0500 ( )-Patch in Place       [ ] 1400       [ ] 2200           lidocaine (LMX4) kit  Freq: EVERY 1 HOUR PRN Route: Top  PRN Reason: pain  PRN Comment: with VAD insertion or accessing implanted port.  Start: 01/29/17 2210   Admin Instructions: Do NOT give if patient has a history of allergy to any local anesthetic or any \"vikas\" product.   Apply 30 minutes prior to VAD insertion or port access.  MAX Dose:  2.5 g (  of 5 g tube)               lidocaine 1 % 1 mL  Dose: 1 mL Freq: EVERY 1 HOUR PRN Route: OTHER  PRN Comment: mild pain with VAD insertion or accessing implanted port  Start: 01/29/17 2210   Admin Instructions: Do NOT give if patient has a " "history of allergy to any local anesthetic or any \"vikas\" product. MAX dose 1 mL subcutaneous OR intradermal in divided doses.               losartan (COZAAR) tablet 100 mg  Dose: 100 mg Freq: EVERY EVENING Route: PO  Start: 01/29/17 2300   Admin Instructions: Hold for SBP < 110         0013 (100 mg)-Given       2120 (100 mg)-Given        2055 (100 mg)-Given        [ ] 2000           metoprolol (TOPROL-XL) 24 hr tablet 25 mg  Dose: 25 mg Freq: DAILY Route: PO  Start: 01/30/17 0800   Admin Instructions: DO NOT CRUSH. Tablet may be split in half along score line. Hold for SBP < 110         0923 (25 mg)-Given        0944 (25 mg)-Given        0955 (25 mg)-Given           naloxone (NARCAN) injection 0.1-0.4 mg  Dose: 0.1-0.4 mg Freq: EVERY 2 MIN PRN Route: IV  PRN Reason: opioid reversal  Start: 01/29/17 2210   Admin Instructions: For respiratory rate LESS than or EQUAL to 8.  Partial reversal dose:  0.1 mg titrated q 2 minutes for Analgesia Side Effects Monitoring Sedation Level of 3 (frequently drowsy, arousable, drifts to sleep during conversation).Full reversal dose:  0.4 mg bolus for Analgesia Side Effects Monitoring Sedation Level of 4 (somnolent, minimal or no response to stimulation).               nortriptyline (PAMELOR) capsule 25 mg  Dose: 25 mg Freq: AT BEDTIME Route: PO  Start: 01/29/17 2230        0013 (25 mg)-Given       2120 (25 mg)-Given               2055 (25 mg)-Given               [ ] 2200           ondansetron (ZOFRAN-ODT) ODT tab 4 mg  Dose: 4 mg Freq: EVERY 6 HOURS PRN Route: PO  PRN Reason: nausea  Start: 01/29/17 2210   Admin Instructions: This is Step 1 of nausea and vomiting management.  If nausea not resolved in 15 minutes, go to Step 2 prochlorperazine (COMPAZINE). Do not push through foil backing. Peel back foil and gently remove. Place on tongue immediately. Administration with liquid unnecessary              Or  ondansetron (ZOFRAN) injection 4 mg  Dose: 4 mg Freq: EVERY 6 HOURS PRN Route: " IV  PRN Reasons: nausea,vomiting  Start: 01/29/17 2210   Admin Instructions: This is Step 1 of nausea and vomiting management.  If nausea not resolved in 15 minutes, go to Step 2 prochlorperazine (COMPAZINE).               pantoprazole (PROTONIX) EC tablet 40 mg  Dose: 40 mg Freq: EVERY MORNING Route: PO  Start: 01/30/17 0800   Admin Instructions: DO NOT CRUSH.         0920 (40 mg)-Given        0944 (40 mg)-Given        0956 (40 mg)-Given           polyethylene glycol (MIRALAX/GLYCOLAX) Packet 17 g  Dose: 17 g Freq: DAILY Route: PO  Start: 01/30/17 0800   Admin Instructions: 1 Packet = 17 grams. Mixed prescribed dose in 8 ounces of water. Follow with 8 oz. of water.         0924 (17 g)-Given        0945 (17 g)-Given        0953 (17 g)-Given           polyethylene glycol (MIRALAX/GLYCOLAX) Packet 17 g  Dose: 17 g Freq: DAILY PRN Route: PO  PRN Reason: constipation  Start: 01/29/17 2210   Admin Instructions: Give in 8oz of  water, juice, or soda. Hold for loose stools.  This is the second step of a three step constipation treatment protocol.  1 Packet = 17 grams. Mixed prescribed dose in 8 ounces of water. Follow with 8 oz. of water.               prochlorperazine (COMPAZINE) injection 5 mg  Dose: 5 mg Freq: EVERY 6 HOURS PRN Route: IV  PRN Reasons: nausea,vomiting  Start: 01/29/17 2210   Admin Instructions: This is Step 2 of nausea and vomiting management.   If nausea not resolved in 15 minutes, give metoclopramide (REGLAN) if ordered (step 3 of nausea and vomiting management)              Or  prochlorperazine (COMPAZINE) tablet 5 mg  Dose: 5 mg Freq: EVERY 6 HOURS PRN Route: PO  PRN Reason: vomiting  Start: 01/29/17 2210   Admin Instructions: This is Step 2 of nausea and vomiting management.   If nausea not resolved in 15 minutes, give metoclopramide (REGLAN) if ordered (step 3 of nausea and vomiting management)              Or  prochlorperazine (COMPAZINE) Suppository 12.5 mg  Dose: 12.5 mg Freq: EVERY 12 HOURS PRN  Route: RE  PRN Reasons: nausea,vomiting  Start: 01/29/17 2210   Admin Instructions: This is Step 2 of nausea and vomiting management.   If nausea not resolved in 15 minutes, give metoclopramide (REGLAN) if ordered (step 3 of nausea and vomiting management)               QUEtiapine (SEROquel) quarter-tab 12.5 mg  Dose: 12.5 mg Freq: AT BEDTIME Route: PO  Start: 01/29/17 2215       2316 (12.5 mg)-Given        2121 (12.5 mg)-Given        2054 (12.5 mg)-Given               [ ] 2200           senna-docusate (SENOKOT-S;PERICOLACE) 8.6-50 MG per tablet 1-2 tablet  Dose: 1-2 tablet Freq: 2 TIMES DAILY PRN Route: PO  PRN Comment: constipation   Start: 01/29/17 2210   Admin Instructions: If no bowel movement in 24 hours, increase to 2 tablets PO BID.  Hold for loose stools.   This is the first step of a three step constipation treatment protocol.               sodium chloride (PF) 0.9% PF flush 3 mL  Dose: 3 mL Freq: EVERY 8 HOURS Route: IK  Start: 01/29/17 2215   Admin Instructions: And Q1H PRN, to lock peripheral IV dormant line.         0013 (3 mL)-Given              1455 (3 mL)-Given       2122 (3 mL)-Given        (0307)-Not Given       0509 (3 mL)-Given       0951 (3 mL)-Given       1706 (3 mL)-Given        (0500)-Not Given       [ ] 0800       [ ] 1600           sodium chloride (PF) 0.9% PF flush 3 mL  Dose: 3 mL Freq: EVERY 1 HOUR PRN Route: IK  PRN Reason: line flush  PRN Comment: for peripheral IV flush post IV meds  Start: 01/29/17 2210              tamsulosin (FLOMAX) capsule 0.4 mg  Dose: 0.4 mg Freq: DAILY Route: PO  Start: 01/30/17 0800   Admin Instructions: Administer 30 minutes after the same meal each day.  Capsules should be swallowed whole; do not crush chew or open.         0922 (0.4 mg)-Given        0943 (0.4 mg)-Given        0954 (0.4 mg)-Given           traMADol (ULTRAM) tablet 50 mg  Dose: 50 mg Freq: EVERY 6 HOURS PRN Route: PO  PRN Reason: moderate pain  Start: 01/29/17 2226             Completed  Medications  Medications 01/26/17 01/27/17 01/28/17 01/29/17 01/30/17 01/31/17 02/01/17         Dose: 650 mg Freq: ONCE Route: PO  Start: 01/29/17 1906   End: 01/29/17 1918   Admin Instructions: Maximum acetaminophen dose from all sources = 75 mg/kg/day not to exceed 4 grams/day.        1918 (650 mg)-Given                Dose: 500 mg Freq: ONCE Route: PO  Indications of Use: COMMUNITY ACQUIRED PNEUMONIA  Start: 01/29/17 2023   End: 01/29/17 2117       2117 (500 mg)-Given                Dose: 1 g Freq: ONCE Route: IV  Indications of Use: COMMUNITY ACQUIRED PNEUMONIA  Start: 01/29/17 2023   End: 01/29/17 2147 2117 (1 g)-New Bag                Dose: 500 mg Freq: ONCE Route: PO  Start: 01/29/17 1906   End: 01/29/17 1918 1918 (500 mg)-Given                Dose: 50 mg Freq: ONCE Route: PO  Start: 01/29/17 2036   End: 01/29/17 2117 2117 (50 mg)-Given             Discontinued Medications  Medications 01/26/17 01/27/17 01/28/17 01/29/17 01/30/17 01/31/17 02/01/17         Dose: 650 mg Freq: EVERY 4 HOURS PRN Route: PO  PRN Reason: mild pain  Start: 01/29/17 2210   End: 01/29/17 2355   Admin Instructions: Alternate ibuprofen (if ordered) with acetaminophen.  Maximum acetaminophen dose from all sources = 75 mg/kg/day not to exceed 4 grams/day.        2355-Med Discontinued            Dose: 81 mg Freq: EVERY EVENING Route: PO  Start: 01/30/17 2000   End: 01/30/17 1308   Admin Instructions: DO NOT CRUSH.         1308-Med Discontinued           Dose: 250 mg Freq: EVERY EVENING Route: PO  Indications of Use: COMMUNITY ACQUIRED PNEUMONIA  Start: 01/30/17 2000   End: 01/29/17 2355       2355-Med Discontinued            Dose: 2 mg Freq: EVERY 10 MIN PRN Route: IV  PRN Reason: moderate to severe pain  Start: 01/29/17 1904   End: 01/29/17 2210       2210-Med Discontinued

## 2017-01-29 NOTE — IP AVS SNAPSHOT
` `     [facility] ORTHO SPINE: [phone]                                              INTERAGENCY TRANSFER FORM - NURSING   2017                    Hospital Admission Date: 2017  NATTY MAN   : 1928  Sex: Male        Attending Provider: Carie Bowers MD     Allergies:  Contrast Dye, Lisinopril, Oxycodone    Infection:  None   Service:  GENERAL [facility]    Ht:  --   Wt:  --   Admission Wt:  --    BMI:  --   BSA:  --            Patient PCP Information     Provider PCP Type    Alirio Fox MD General      Current Code Status     Date Active Code Status Order ID Comments User Context       Prior      Code Status History     Date Active Date Inactive Code Status Order ID Comments User Context    2017  9:49 AM  Full Code [id number]  Alejo Lopez MD Outpatient    2017 10:10 PM 2017  9:49 AM Full Code [id number]  Carie Bowers MD Inpatient    11/15/2016 11:02 AM 2017 10:10 PM Full Code [id number]  Yudelka Castillo MD Outpatient    2016  6:32 PM 11/15/2016 11:02 AM Full Code [id number]  Yudelka Castillo MD Inpatient    2014 10:49 AM 2016  6:32 PM Full Code [id number]  Martínez Larson MD Outpatient    2014  4:47 AM 2014 10:49 AM Full Code [id number]  Lokesh Walls MD Inpatient    2014  3:16 PM 2014  4:47 AM Full Code [id number]  Joie Caballero PA-C Outpatient    2014  3:48 PM 2014  3:16 PM Full Code [id number]  Ruby Monteiro MD Inpatient    10/8/2014  2:52 PM 10/10/2014  8:06 PM Full Code [id number]  Carie Bowers MD Inpatient    8/15/2014 12:40 PM 10/8/2014  2:52 PM Full Code [id number]  Melissa Isaac NP Outpatient    2014  9:55 AM 8/15/2014 12:40 PM Full Code [id number]  Melissa Isaac NP Outpatient    2014 12:20 PM 2014  9:55 AM Full Code [id number]  Jone Carvalho MD Inpatient    2011  9:22 AM 2014 12:20 PM Full Code [id number]   Jose Antonio Shin PA-C Outpatient    11/18/2011  6:50 PM 11/21/2011  9:22 AM Full Code [id number]  Niya Persaud, RN Inpatient    11/18/2011  3:48 PM 11/18/2011  6:49 PM Full Code [id number]  Niya Persaud, RN Inpatient      Advance Directives        Does patient have a scanned Advance Directive/ACP document in EPIC?           Yes        Hospital Problems as of 2/1/2017              Priority Class Noted POA    Pneumonia Medium  1/29/2017 Yes      Non-Hospital Problems as of 2/1/2017              Priority Class Noted    Congenital anomaly of lung   1/31/2003    Lumbago   1/31/2003    Essential hypertension   2/18/2005    Other specified disorders of prostate   4/4/2006    Pulmonary fibrosis (H)   Unknown    Advanced directives, counseling/discussion   9/13/2011    CAD (coronary artery disease)   Unknown    CKD (chronic kidney disease) stage 3, GFR 30-59 ml/min   11/30/2011    Proteinuria   2/8/2012    Hyperlipidemia with target LDL less than 70   Unknown    Low back pain   10/1/2013    Lumbar radiculopathy   8/11/2014    Status post lumbar discectomy   8/12/2014    SOB (shortness of breath)   10/8/2014    S/P TAVR (transcatheter aortic valve replacement)   11/12/2014    Physical deconditioning   11/21/2014    CHF (congestive heart failure) (H)   12/31/2014    Hypercalcemia   1/2/2015    Anemia Medium  1/20/2015    SAI (obstructive sleep apnea) Medium  2/24/2015    Hyperparathyroidism (H)   4/22/2015    Dementia Medium  8/4/2015    Hypothyroidism Medium  10/27/2015    Health Care Home   11/5/2015    Edema, unspecified edema Medium  1/17/2016    Other chronic pain Medium  10/11/2016    Altered mental status Medium  11/14/2016      Immunizations     Name Date      HERPES ZOSTER 04/27/10     Influenza (H1N1) 01/09/10     Influenza (High Dose) 3 valent vaccine 10/11/16     Influenza (High Dose) 3 valent vaccine 10/08/15     Influenza (High Dose) 3 valent vaccine 10/10/14     Influenza (IIV3) 10/05/12     Influenza (IIV3)  10/16/11     Influenza (IIV3) 10/01/05     Influenza (IIV3) 10/21/04     Pneumococcal (PCV 13) 04/22/15     Pneumococcal 23 valent 10/01/05     Pneumococcal 23 valent 01/01/98     TD (ADULT, 7+) 04/20/10     TD (ADULT, 7+) 05/21/99          END      ASSESSMENT     Discharge Profile Flowsheet     EXPECTED DISCHARGE     COMMUNICATION ASSESSMENT      Expected Discharge Date  02/02/17 (DRG LOS 4.2/[facility] senior living ) 01/30/17 1051   Patient's communication style  spoken language (English or Bilingual) 01/29/17 2159    DISCHARGE NEEDS ASSESSMENT     FINAL RESOURCES      Concerns To Be Addressed  other (see comments) (caregiving issues) 01/06/17 1133   Resources List  Skilled Nursing Facility 01/30/17 1407    Concerns Comments  routed to Clinic Care CoordinatorPETER assigned to patient's clnic 01/06/17 1133   Skilled Nursing Facility  [facility] [phone], Fax: [phone] 01/31/17 1459    Patient/family verbalizes understanding of discharge plan recommendations?  Yes 01/30/17 1407   Existing Resources/Services  Home Care ([facility]) 11/17/14 1415    Medical Team notified of plan?  yes 01/30/17 1407   SKIN      Readmission Within The Last 30 Days  no previous admission in last 30 days 01/30/17 1407   Inspection  Full 02/01/17 1015    Transportation Available  family or friend will provide (2/1 @ 2575) 01/31/17 1459   Skin WDL  ex 02/01/17 1015    # of Referrals Placed by CTS  Post Acute Facilities 01/30/17 1407   Skin Color/Characteristics  bruised (ecchymotic) 02/01/17 1015    Equipment Used at Home  cane, straight 11/05/15 1604   Skin Temperature  warm 02/01/17 1015    ASSESSMENT OF FUNCTIONAL STATUS     Skin Moisture  dry 02/01/17 1015    Prior to admission patient needed assistance with:  Home maintenance/Yard work 01/30/17 1407   Skin Elasticity  quick return to original state 01/31/17 1515    Assesssment of Functional Status  Not at baseline with ADL Functioning;Not at baseline with  "mobility;Needs placement in a SNF/TCF for rehabilitation 01/30/17 1407   Skin Integrity  intact 02/01/17 1015    GASTROINTESTINAL (ADULT,PEDIATRIC,OB)     SAFETY      GI WDL  WDL 02/01/17 1015   Safety WDL  WDL 02/01/17 1015    Last Bowel Movement  01/31/17 01/31/17 1716   Safety Factors  ID band on;call light in reach;wheels locked;bed in low position 01/31/17 1720    Passing flatus  yes 01/31/17 1716                      Assessment WDL (Within Defined Limits) Definitions           Safety WDL     Effective: 09/28/15    Row Information: <b>WDL Definition:</b> Bed in low position, wheels locked; call light in reach; upper side rails up x 2; ID band on<br> <font color=\"gray\"><i>Item=AS safety wdl>>List=AS safety wdl>>Version=F14</i></font>      Skin WDL     Effective: 09/28/15    Row Information: <b>WDL Definition:</b> Warm; dry; intact; elastic; without discoloration; pressure points without redness<br> <font color=\"gray\"><i>Item=AS skin wdl>>List=AS skin wdl>>Version=F14</i></font>      Vitals     Vital Signs Flowsheet     VITAL SIGNS     Pain Orientation  Lower 01/31/17 1711    Temp  97.7  F (36.5  C) 02/01/17 0951   Pain Descriptors  Aching;Sore;Constant 01/31/17 1711    Temp src  Oral 02/01/17 0951   Pain Management Interventions  pain plan reviewed with patient/caregiver 02/01/17 0951    Resp  18 02/01/17 0951   Pain Intervention(s)  Repositioned 02/01/17 0951    Pulse  74 02/01/17 0951   Response to Interventions  Absence of nonverbal indicators of pain 01/31/17 1550    Pulse/Heart Rate Source  Monitor 01/31/17 0508   ANALGESIA SIDE EFFECTS MONITORING      BP  146/46 mmHg 02/01/17 0951   Side Effects Monitoring: Respiratory Quality  R 02/01/17 0620    BP Location  Right arm 01/31/17 1311   Side Effects Monitoring: Respiratory Depth  N 02/01/17 0620    OXYGEN THERAPY     Side Effects Monitoring: Sedation Level  S 02/01/17 0620    SpO2  96 % 02/01/17 0951   POSITIONING      O2 Device  Nasal cannula 02/01/17 0951  "  Body Position  independently positioning 02/01/17 1015    Oxygen Delivery  3 LPM 02/01/17 0951   Head of Bed (HOB)  HOB at 20-30 degrees 02/01/17 1015    PAIN/COMFORT     Positioning/Transfer Devices  pillows;in use 01/31/17 2138    Patient Currently in Pain  denies 02/01/17 0951   DAILY CARE      Preferred Pain Scale  number (Numeric Rating Pain Scale) 01/31/17 1711   Activity Type  activity adjusted per tolerance;activity encouraged 02/01/17 1015    Patient's Stated Pain Goal  4 01/31/17 1443   Activity Level of Assistance  assistance, 2 people 02/01/17 1015    0-10 Pain Scale  6 02/01/17 0954   Activity Assistive Device  gait belt;walker 02/01/17 1015    Pain Location  Back 01/31/17 1711   Symptoms Noted During/After Activity  significant change in vital signs (drop in O2 sat on O2) 01/30/17 2253            Patient Lines/Drains/Airways Status    Active LINES/DRAINS/AIRWAYS     Name: Placement date: Placement time: Site: Days: Last dressing change:    Peripheral IV 01/31/17 Right;Anterior 01/31/17  2205    less than 1     Incision/Surgical Site 11/19/14 Lateral Chest 11/19/14  0212   805     Incision/Surgical Site 11/19/14 Mid;Lateral Chest 11/19/14  0214   805             Patient Lines/Drains/Airways Status    Active PICC/CVC     **None**            Intake/Output Detail Report     Date Intake   Output Net    Shift P.O. IV Piggyback Total Urine Total       Noc 01/30/17 2300 - 01/31/17 0659 -- -- -- 300 300 -300    Day 01/31/17 0700 - 01/31/17 1459 -- -- -- 650 650 -650    Shabnam 01/31/17 1500 - 01/31/17 2259 360 -- 360 [id number] [id number]    Noc 01/31/17 2300 - 02/01/17 0659 60 -- 60 200 200 -140    Day 02/01/17 0700 - 02/01/17 1459 -- -- -- -- -- 0      Last Void/BM       Most Recent Value    Urine Occurrence 1 at 02/01/2017 0620    Stool Occurrence       Case Management/Discharge Planning     Case Management/Discharge Planning Flowsheet     REFERRAL INFORMATION     Do you work full or part-time?  no 01/30/17 7273     Did the Initial Social Work Assessment result in a Social Work Case?  Yes 01/30/17 1407   COPING/STRESS      Admission Type  inpatient 01/30/17 1407   Major Change/Loss/Stressor  denies 01/29/17 2208    Arrived From  home or self-care 01/30/17 1407   Patient Personal Strengths  expressive of needs;strong support system 01/30/17 1407    Referral Source  physician 01/30/17 1407   Sources Of Support  adult child(katherine);spouse;other family members 01/30/17 1407    New Steerage to  Clinics?  No 01/30/17 1407   Reaction To Health Status  adjusting;accepting 01/30/17 1407    # of Referrals Placed by CTS  Post Acute Facilities 01/30/17 1407   EXPECTED DISCHARGE      Reason For Consult  discharge planning 01/30/17 1407   Expected Discharge Date  02/02/17 (DRG LOS 4.2/[facility] senior living ) 01/30/17 1051    Record Reviewed  clinical discipline documentation;history and physical;plan of care;patient profile 01/30/17 1407   ASSESSMENT/CONCERNS TO BE ADDRESSED       Assigned to Case  Marisol Owens 01/30/17 1407   Concerns To Be Addressed  other (see comments) (caregiving issues) 01/06/17 1133    Primary Care MD Name  Veum 01/30/17 1407   Concerns Comments  routed to Clinic Care Coordinator, PETER assigned to patient's clnic 01/06/17 1133    LIVING ENVIRONMENT     DISCHARGE PLANNING      Lives With  spouse 01/30/17 1407   Patient/family verbalizes understanding of discharge plan recommendations?  Yes 01/30/17 1407    Living Arrangements  apartment 01/30/17 1407   Medical Team notified of plan?  yes 01/30/17 1407    Provides Primary Care For  -- 01/30/17 1407   Readmission Within The Last 30 Days  no previous admission in last 30 days 01/30/17 1407    Primary Care Provided By  spouse/significant other (wife oversees pts needs due to his cognition) 01/30/17 1418   Transportation Available  family or friend will provide (2/1 @ 1330) 01/31/17 1459    Quality Of Family Relationships  supportive;involved 01/30/17 1407    Equipment Used at Home  cane, straight 11/05/15 1604    Able to Return to Prior Living Arrangements  other (see comments) (anticipated after rehab stay) 01/30/17 1407   PATIENT PLACEMENT INFORMATION      ASSESSMENT OF FAMILY/SOCIAL SUPPORT     Did the patient choose [facility]?  Yes 01/30/17 1407    Marital Status   01/30/17 1407   Placement Choice Reason  location 01/30/17 1407    Who is your support system?  Wife;Children 01/30/17 1407   Did [facility] accept the patient?  Yes 01/31/17 1459    Spouse's Name  Kylee 01/30/17 1407   FINAL RESOURCES      Description of Support System  Involved;Supportive 01/30/17 1407   Resources List  Skilled Nursing Facility 01/30/17 1407    Support Assessment  Adequate family and caregiver support 01/30/17 1407   Skilled Nursing Facility  [facility] [phone], Fax: [phone] 01/31/17 1459    Quality of Family Relationships  supportive;involved 01/30/17 1407   Existing Resources/Services  Home Care ( Home Care) 11/17/14 1415    ASSESSMENT OF FUNCTIONAL STATUS     ABUSE RISK SCREEN      Prior to admission patient needed assistance with:  Home maintenance/Yard work 01/30/17 1407   QUESTION TO PATIENT:  Has a member of your family or a partner(now or in the past) intimidated, hurt, manipulated, or controlled you in any way?  no 01/29/17 1825    Assesssment of Functional Status  Not at baseline with ADL Functioning;Not at baseline with mobility;Needs placement in a SNF/TCF for rehabilitation 01/30/17 1407   (R) MENTAL HEALTH SUICIDE RISK      EMPLOYMENT     Are you depressed or being treated for depression?  No 01/29/17 6676

## 2017-01-29 NOTE — IP AVS SNAPSHOT
` `     [facility] [place][note 3] ORTHO SPINE: [phone]                 INTERAGENCY TRANSFER FORM - NOTES (H&P, Discharge Summary, Consults, Procedures, Therapies)   2017                    Hospital Admission Date: 2017  NATTY MAN   : 1928  Sex: Male        Patient PCP Information     Provider PCP Type    Alirio Fox MD General      History & Physicals     No notes of this type exist for this encounter.         Discharge Summaries      Discharge Summaries by Alejo Lopez MD at 2017  9:38 AM     Author:  Alejo Lopez MD Service:  Hospitalist Author Type:  Physician    Filed:  2017 10:27 AM Note Time:  2017  9:38 AM Status:  Addendum    :  Alejo Lopez MD (Physician)      Related Notes: Original Note by Alejo Lopez MD (Physician) filed at 2017 10:23 AM         [facility]  Discharge Summary  Name: Natty Man    MRN: [id number]  YOB: 1928    Age: 88 year old  Date of Discharge:  2017  Date of Admission: 2017  Primary Care Provider: Alirio Fox  Discharge Physician:  Vazquez Lopez MD  Discharging Service:  Hospitalist      Hospital Course/Discharge Diagnoses:  Natty Man is a 88 year old male who was admitted on 2017. Patient with a past medical history significant for pulmonary fibrosis on chronic home oxygen, heart failure with preserved EF, coronary artery disease, chronic kidney disease, s/p TAVR and more recently issues with chronic back pain, who presented to the hospital after a fall. In ER, he was found to have a pneumonia and compression fractures of L2 and L3.  He was treated for pneumonia with ceftriaxone and azithromycin and now is back to his baseline O2 requirement (2-3L).  He will complete oral antibiotics with ceftin and azithro after discharge.  He was seen in consultation by Spine Surgery with placement of a brace to be used when up  and outpatient follow up has been recommended.  He is discharging to a TCU today.  I discussed all of this at length with his son, Daniel, who is in agreement with the plan of care.      1.  Left Sided Community Acquired Pneumonia:  -- on Ceftriaxone and Zithromax while here, change to Ceftin and azithro to complete 7 and 5 day courses respectively  -- Blood cultures still negative  -- influenza neg    2.  compression fractures of L2 and L3:  -- pain control work continues.  Still painful with movement.  Contiue PRN tramadol and scheduled tylenol and gabapentin for now.  -- h/o confusion with narcs so avoiding stronger narcotics as possible  -- spine surgery consulted, recommended TLSO brace when up with outpatient follow-up.  --- Dementia may cause trouble wearing brace, it should be worn as he is able/willing with redirection.     3.  S/p Fall:  -- mechanical fall, comp fractures as above.  -- falls precaution  -- PT/OT    4.  AS s/p TAVR  -- stable, no new cardiac issues.    5.  CKD:  -- Baseline 1.4- 1.6  -- creatinine 1.36 most recently      6.  Chronic hypoxemic respiratory failure on chronic home O2:  --h/o pulm fibrosis and COPD  --on Home O2 2-3 liters    7.  H/o Delirium: likely worse due to pain/meds, pneumonia, new environment.  --treat underlying issues  -- started on low dose Seroquel at bedtime with good effect.    8.  HTN:  -- on amlodipine, losartan and metoprolol    9.  Hypothyroidism:  -- On synthyroid    10.  Dementia:  -- on Aricept       Discharge Disposition:  Discharged to short-term care facility     Allergies:  Allergies   Allergen Reactions     Contrast Dye      SOB, increased Bp, difficulty swallowing. Date 6/3/96 (ipaque contrast)  Was premedicated prior to FRANCK and had no reaction at all (solumedrol and benadryl) 2016  Was premedicated with Methylprednisolone protocol, no reaction 1/25/17     Lisinopril      cough     Oxycodone      Delirium        Discharge Medications:      Review of  your medicines      START taking       Dose / Directions    azithromycin 250 MG tablet   Commonly known as:  ZITHROMAX   Indication:  Community Acquired Pneumonia   Used for:  Pneumonia of left lower lobe due to infectious organism        Dose:  250 mg   Take 1 tablet (250 mg) by mouth every evening for 2 days   Quantity:  2 tablet   Refills:  0       cefUROXime 500 MG tablet   Commonly known as:  CEFTIN   Used for:  Pneumonia of left lower lobe due to infectious organism        Dose:  500 mg   Take 1 tablet (500 mg) by mouth 2 times daily for 4 days   Quantity:  8 tablet   Refills:  0       QUEtiapine 25 MG tablet   Commonly known as:  SEROquel   Used for:  Dementia with behavioral disturbance, unspecified dementia type        Dose:  12.5 mg   Take 0.5 tablets (12.5 mg) by mouth At Bedtime   Refills:  0       senna-docusate 8.6-50 MG per tablet   Commonly known as:  SENOKOT-S;PERICOLACE   Used for:  Compression fx, lumbar spine, closed, initial encounter (H)        Dose:  2 tablet   Take 2 tablets by mouth 2 times daily as needed (constipation )   Quantity:  100 tablet   Refills:  0         CONTINUE these medicines which may have CHANGED, or have new prescriptions. If we are uncertain of the size of tablets/capsules you have at home, strength may be listed as something that might have changed.       Dose / Directions    acetaminophen 500 MG tablet   Commonly known as:  TYLENOL   This may have changed:    - medication strength  - how much to take  - when to take this  - reasons to take this   Used for:  Compression fx, lumbar spine, closed, initial encounter (H)        Dose:  1000 mg   Take 2 tablets (1,000 mg) by mouth 3 times daily   Refills:  0       traMADol 50 MG tablet   Commonly known as:  ULTRAM   This may have changed:    - how much to take  - how to take this  - when to take this  - reasons to take this  - additional instructions   Used for:  Other chronic pain, Lumbar radiculopathy        Dose:  50 mg    Take 1 tablet (50 mg) by mouth every 6 hours as needed for moderate pain   Quantity:  30 tablet   Refills:  0         CONTINUE these medicines which have NOT CHANGED       Dose / Directions    amLODIPine 10 MG tablet   Commonly known as:  NORVASC   Used for:  Essential hypertension        Dose:  10 mg   Take 1 tablet (10 mg) by mouth daily   Quantity:  90 tablet   Refills:  2       AMOXICILLIN PO   Indication:  pre dental        Dose:  500 mg   Take 500 mg by mouth once as needed   Refills:  0       aspirin 81 MG EC tablet        Dose:  81 mg   Take 81 mg by mouth every evening   Refills:  0       atorvastatin 20 MG tablet   Commonly known as:  LIPITOR   Used for:  Coronary artery disease involving native coronary artery of native heart without angina pectoris        Dose:  20 mg   Take 1 tablet (20 mg) by mouth daily   Quantity:  90 tablet   Refills:  3       cyanocobalamin 1000 MCG tablet   Commonly known as:  vitamin  B-12   Indication:  Inadequate Vitamin B12        Dose:  1000 mcg   Take 1,000 mcg by mouth daily   Refills:  0       diclofenac 1 % Gel topical gel   Commonly known as:  VOLTAREN   Used for:  Chronic pain syndrome, Lumbar radiculopathy, Facet arthritis of lumbar region (H), DDD (degenerative disc disease), lumbar        Apply 4 grams to low back right hip and right leg four times as needed  daily using enclosed dosing card.  Lidocaine cream   Quantity:  100 g   Refills:  1       donepezil 10 MG tablet   Commonly known as:  ARICEPT   Used for:  Memory loss        Dose:  10 mg   Take 1 tablet (10 mg) by mouth At Bedtime   Quantity:  90 tablet   Refills:  3       furosemide 20 MG tablet   Commonly known as:  LASIX        Dose:  20 mg   Take 20 mg by mouth daily   Refills:  0       gabapentin 100 MG capsule   Commonly known as:  NEURONTIN   Used for:  Chronic bilateral low back pain without sciatica        Take 1 capsules Three times a day for pain   Quantity:  90 capsule   Refills:  0       IRON  SUPPLEMENT PO        Dose:  325 mg   Take 325 mg by mouth daily (with breakfast)   Refills:  0       levothyroxine 88 MCG tablet   Commonly known as:  SYNTHROID/LEVOTHROID   Used for:  Hypothyroidism, unspecified type        Dose:  88 mcg   Take 1 tablet (88 mcg) by mouth daily   Quantity:  90 tablet   Refills:  3       lidocaine-prilocaine cream   Commonly known as:  EMLA   Used for:  Chronic pain syndrome, Lumbar radiculopathy, Facet arthritis of lumbar region (H), DDD (degenerative disc disease), lumbar        Apply topically as needed for moderate pain To low back, right hip  and right leg Apply same time as Diclofenac gel   Quantity:  30 g   Refills:  1       losartan 100 MG tablet   Commonly known as:  COZAAR   Used for:  Essential hypertension        Dose:  100 mg   Take 1 tablet (100 mg) by mouth daily   Quantity:  90 tablet   Refills:  3       metoprolol 25 MG 24 hr tablet   Commonly known as:  TOPROL-XL   Used for:  Essential hypertension        Dose:  25 mg   Take 1 tablet (25 mg) by mouth daily   Quantity:  90 tablet   Refills:  3       nortriptyline 25 MG capsule   Commonly known as:  PAMELOR        Dose:  25 mg   Take 25 mg by mouth At Bedtime   Refills:  0       order for DME   Used for:  Other chronic pain, Lumbar radiculopathy, DDD (degenerative disc disease), lumbar, Spinal stenosis of lumbar region without neurogenic claudication        Equipment being ordered: TENS and supplies.   Quantity:  1 each   Refills:  0       pantoprazole 40 MG EC tablet   Commonly known as:  PROTONIX   Used for:  Gastroesophageal reflux disease, esophagitis presence not specified        Dose:  40 mg   Take 1 tablet (40 mg) by mouth every morning   Quantity:  90 tablet   Refills:  3       polyethylene glycol Packet   Commonly known as:  MIRALAX   Used for:  Chronic constipation        Dose:  1 packet   Take 17 g by mouth daily   Quantity:  30 packet   Refills:  11       tamsulosin 0.4 MG capsule   Commonly known as:   FLOMAX   Used for:  BPH (benign prostatic hypertrophy) with urinary retention        Dose:  0.4 mg   Take 1 capsule (0.4 mg) by mouth daily   Quantity:  90 capsule   Refills:  1            Where to get your medicines      Some of these will need a paper prescription and others can be bought over the counter. Ask your nurse if you have questions.     You don't need a prescription for these medications    - acetaminophen 500 MG tablet  - azithromycin 250 MG tablet  - cefUROXime 500 MG tablet  - QUEtiapine 25 MG tablet  - senna-docusate 8.6-50 MG per tablet      Information about where to get these medications is not yet available     ! Ask your nurse or doctor about these medications    - traMADol 50 MG tablet                 Condition on Discharge:  Discharge condition: Stable   Discharge vitals: Blood pressure 129/67, pulse 71, temperature 97.5  F (36.4  C), temperature source Axillary, resp. rate 18, SpO2 94 %.   Code status on discharge: Full Code     History of Illness:  See detailed admission note for full details.    Physical Exam:  Blood pressure 129/67, pulse 71, temperature 97.5  F (36.4  C), temperature source Axillary, resp. rate 18, SpO2 94 %.  Wt Readings from Last 1 Encounters:   01/20/17 70.308 kg (155 lb)     General: Alert, awake, no acute distress.  HEENT: NC/AT, eyes anicteric, external occular movements intact, face symmetric.  Dentition WNL, MM moist.  Cardiac: RRR, S1, S2.  No murmurs appreciated.  Pulmonary: Normal chest rise, normal work of breathing.  Lungs CTA BL  Abdomen: soft, non-tender, non-distended.  Bowel Sounds Present.  No guarding.  Extremities: no deformities.  Warm, well perfused.  Skin: no rashes or lesions noted.  Warm and Dry.  Neuro: No focal deficits noted.  Speech clear.  Coordination and strength grossly normal.  Psych: Appropriate affect.    Procedures other than Imaging:  None other than placement of brace     Imaging:  Results for orders placed or performed during the  hospital encounter of 01/29/17   XR Chest 2 Views    Narrative    CHEST TWO VIEW 1/29/2017 7:56 PM     COMPARISON: Chest CT 6/14/2016.    HISTORY: Cough, fever.      Impression    IMPRESSION: Severe extensive coarse fibrotic changes throughout both  lungs again noted. There may be new airspace opacity in the mid and  lower aspects of the left lung that may represent pneumonia. There is  no pleural effusion or pneumothorax. Heart size is normal. There is no  evidence for congestive failure.    GAGE GARRETT MD   Lumbar spine CT w/o contrast    Narrative    CT OF THE LUMBAR SPINE WITHOUT CONTRAST  1/29/2017 7:43 PM     COMPARISON: Lumbar spine MRI 11/4/16.    HISTORY: Fall, back pain.     TECHNIQUE: Axial images of the lumbar spine were acquired without  intravenous contrast. Multiplanar reformations were created from the  axial source images.      FINDINGS:  There has been interval development of mild compression  deformities of the L2 and L3 vertebral bodies. No other fractures.  Mild degenerative anterolisthesis of L4 upon L5 again noted. There is  moderate facet arthropathy bilaterally at L3-L4, L4-L5 and L5-S1.  There is no significant spinal canal stenosis of the lumbar spine.  There is moderate facet arthropathy bilaterally at the L1-L2, L2-L3,  L3-L4 and L4-L5 levels.      Impression    IMPRESSION:  1. Recent mild compression fractures of the L2 and L3 vertebral bodies  that are new from the comparison lumbar spine MRI 11/4/2016.  2. No significant spinal canal stenosis of the lumbar spine.  3. Diffuse degenerative changes of the lumbar spine.      Radiation dose for this scan was reduced using automated exposure  control, adjustment of the mA and/or kV according to patient size, or  iterative reconstruction technique    GAGE GARRETT MD        Consultations:  Spine Surgery  PT/OT/SW.       Recent Lab Results:    Recent Labs  Lab 02/01/17  0652 01/30/17  0655 01/29/17  1850   WBC  --  9.5 13.2*   HGB  --   11.3* 12.8*   HCT  --  33.9* 38.3*   MCV  --  90 90   * 120* 151          NA      139   1/30/2017  NA      139   1/29/2017  NA      139   11/14/2016 CHLORIDE      102   1/30/2017  CHLORIDE      101   1/29/2017  CHLORIDE      103   11/14/2016 BUN       29   1/30/2017  BUN       31   1/29/2017  BUN       33   11/14/2016   POTASSIUM      4.2   1/30/2017  POTASSIUM      4.3   1/29/2017  POTASSIUM      4.8   11/14/2016 CO2       31   1/30/2017  CO2       30   1/29/2017  CO2       33   11/14/2016 CR     1.65   2/1/2017  CR     1.36   1/30/2017  CR     1.48   1/29/2017          Pending Results:    Unresulted Labs Ordered in the Past 30 Days of this Admission     Date and Time Order Name Status Description    1/29/2017 2210 Sputum Culture Aerobic Bacterial Preliminary     1/29/2017 2119 Blood culture Preliminary     1/29/2017 1908 Blood culture Preliminary            Discharge Instructions and Follow-Up:     Discharge Procedure Orders  X-ray lumbar spine 2-3 views*   Standing Status: Future  Standing Exp. Date: 04/30/17   Order Comments: FSOC -flexion/extension   Order Specific Question Answer Comments   Is this exam to be performed at [facility]? No      Activity - Up with nursing assistance   Order Comments: Brace on when out of bed. May take off to shower with supervision.   Order Specific Question Answer Comments   Is discharge order? Yes      Follow Up and recommended labs and tests   Order Comments: [facility] in 6 weeks with flex/ext X-ray prior to appointment. Please call the clinic at [phone] to schedule your appointment with Jessie Wolff CNP.         Total time spent in face to face contact with the patient and coordinating discharge was:  50 Minutes.                        Consult Notes      Consults by Smith Owens APRN CNP at 1/30/2017  9:50 AM     Author:  Smith Owens APRN CNP Service:  Neurosurgery Author Type:   Nurse Practitioner    Filed:  1/30/2017 10:40 AM Note Time:  1/30/2017  9:50 AM Status:  Attested    :  Smith Owens APRN CNP (Nurse Practitioner) Cosigner:  Jone Carvalho MD at 1/31/2017  3:10 AM     Consult Orders:    1. Spine Surgery Adult IP Consult: new compression fracture; Consultant may enter orders: Yes; Patient to be seen: Routine - within 24 hours [[id number]] ordered by Carie Bowers MD at 01/29/17 2144           Attestation signed by Jone Carvalho MD at 1/31/2017  3:10 AM        Physician Attestation  I agree with the information in this note.    Jone Carvalho                        [facility]    Neurosurgery Consultation     Date of Admission:  1/29/2017  Date of Consult (When I saw the patient): 01/30/2017    Assessment and Plan  Chaz Soler is a 88 year old male who was admitted on 1/29/2017. I was asked to see the patient for low back pain. History is obtained with the help of patient's son who was present in the room. Patient does have some baseline dementia. He sustained a mechanical fall in his senior living [facility] on Friday as he was reaching for his walker. Since the fall he has had increased low back back pain, muscle spasms and was unable to get up off the toilet, prompting his family to call EMS. He has also had worsening cough.  He denies hitting his head or losing consciousness. Denies any changes to bowel or bladder.  He reports 7/10 lumbar pain. Denies any radicular symptoms, tingling or numbness in his bilateral LE. He does report that his bilateral lower extremities have been weaker for the past several months and he has been using his walker. He has a long history of chronic low back pain and is seen at the [facility] for this. He has undergone 2 previous epidural steroid injections (most recently on 1/25/17) and his low back pain was reportedly improving until his fall on Friday. He also states he  underwent an injection into his right hip which seems to have eliminated his past RLE pain. CT of his lumbar spine reveals new mild compression fractures of the L2 and L3 vertebral bodies but no significant spinal stenosis. No surgery is indicated.     Active Problems:    Pneumonia, Low Back Pain    Assessment: - Mild Compression Fractures L2 and L3.      Plan: No surgery indicated. Orthotics Consult entered for TLSO brace. Patient will need to wear brace when OOB for a minimum of 12 weeks. Brace may be removed in bed or when showering but should have nursing assistance with this. He should call [phone] to make a follow up appointment with [facility]ff [facility] 6 weeks. He should obtain a lumbar/flexion extension x-ray prior to this appointment. Due to his dementia, concurrent pneumonia and likely physical de-conditioning, patient may benefit from TCU placement. SW has been consulted to assist with this if needed.       I have discussed the following assessment and plan with the Dr. Jone Carvalho who is in agreement with initial plan and will follow up with further consultation recommendations.    CELIA Hood CNP  [facility]  [place] [address]    Tel [phone]  Pager [phone]        Code Status   Full Code    Reason for Consult  Reason for consult: I was asked by Dr. Bowers to evaluate this patient for low back pain.    Primary Care Physician  Alirio Fox    Chief Complaint  Low back pain    History is obtained from the patient and his son who was present in the room.     History of Present Illness  Chaz Soler is a 88 year old male who presents with increasing low back pain. He sustained a mechanical fall in his senior living [facility] on Friday as he was reaching for his walker. Since the fall he has had increased low back back pain, muscle spasms and was unable to get up off the  toilet, prompting his family to call EMS. He has also had worsening cough.  He denies hitting his head or losing consciousness. Denies any changes to bowel or bladder.  He reports 7/10 lumbar pain. Denies any radicular symptoms, tingling or numbness in his bilateral LE. He does report that his bilateral lower extremities have been weaker for the past several months and he has been using his walker. He has a long history of chronic low back pain and is seen at the [facility] for this. He has undergone 2 previous epidural steroid injections (most recently on 1/25/17) and his low back pain was reportedly improving until his fall on Friday. He also states he underwent an injection into his right hip which seems to have eliminated his past RLE pain.     Past Medical History  I have reviewed this patient's medical history and updated it with pertinent information if needed.   Past Medical History   Diagnosis Date     Hyperplasia of prostate      Lumbago      LEFT LUNG GRANULOMA      Essential hypertension      Gout 1/06     knee     Unspecified hypothyroidism      Vitamin D deficiency      Pulmonary fibrosis (H)      Hyperparathyroidism (H)      AAA (abdominal aortic aneurysm) (H) 10/5/2011     6.1 cm     CAD (coronary artery disease)      MI - distal inferior/apex. Non transmural     Hyperlipidemia LDL goal < 70      CKD (chronic kidney disease) stage 3, GFR 30-59 ml/min 11/30/2011     Anemia 11/30/2011     Thrombocytopenia (H) 11/30/2011     Proteinuria 2/8/2012     Aortic stenosis 10/26/2012     CHF (congestive heart failure) (H) 12/31/2014     Hypercalcemia 1/2/2015     Hyperparathyroidism, unspecified (H) 4/22/2015     Dementia 8/4/2015     Edema, unspecified edema 1/17/2016     Other chronic pain 10/11/2016       Past Surgical History  I have reviewed this patient's surgical history and updated it with pertinent information if needed.  Past Surgical History   Procedure Laterality Date     Vasectomy        Right cataract extraction/iol  12/03     Colonoscopy  9/02 per patient     Laparoscopic cholecystectomy  Nov 2011     Repair aneurysm abdominal aorta  Nov 2011     Endovascular repair aneurysm abdominal aorta  11/18/2011     Procedure:ENDOVASCULAR REPAIR ANEURYSM ABDOMINAL AORTA; ENDOVASCULAR AAA,RIGHT FEMORAL       Laparoscopic cholecystectomy  11/18/2011     Procedure:LAPAROSCOPIC CHOLECYSTECTOMY; LAPAROSCOPIC CHOLECYSTECTOMY ; Surgeon:DARRYL DORADO; Location:[facility] OR     Discectomy lumbar posterior microscopic one level  8/11/2014     Procedure: DISCECTOMY LUMBAR POSTERIOR MICROSCOPIC ONE LEVEL;  Surgeon: Jone Carvalho MD;  Location: [facility] OR     Transcatheter aortic valve implant anesthesia N/A 11/12/2014     Procedure: TRANSCATHETER AORTIC VALVE IMPLANT ANESTHESIA;  Surgeon: Generic Anesthesia Provider;  Location: [facility] OR       Prior to Admission Medications  Prior to Admission Medications   Prescriptions Last Dose Informant Patient Reported? Taking?   AMOXICILLIN PO  Spouse/Significant Other Yes Yes   Sig: Take 500 mg by mouth once as needed   Ferrous Sulfate (IRON SUPPLEMENT PO)  Spouse/Significant Other Yes Yes   Sig: Take 325 mg by mouth daily (with breakfast)    acetaminophen (TYLENOL) 325 MG tablet 1/29/2017 at Unknown time Spouse/Significant Other No Yes   Sig: Take 2 tablets (650 mg) by mouth every 6 hours as needed for mild pain   amLODIPine (NORVASC) 10 MG tablet  Spouse/Significant Other No Yes   Sig: Take 1 tablet (10 mg) by mouth daily   aspirin 81 MG EC tablet 1/28/2017 at Unknown time  Yes Yes   Sig: Take 81 mg by mouth every evening   atorvastatin (LIPITOR) 20 MG tablet  Spouse/Significant Other No Yes   Sig: Take 1 tablet (20 mg) by mouth daily   cyanocobalamin (VITAMIN  B-12) 1000 MCG tablet  Spouse/Significant Other Yes Yes   Sig: Take 1,000 mcg by mouth daily   diclofenac (VOLTAREN) 1 % GEL  Spouse/Significant Other No Yes   Sig: Apply 4 grams to low back right hip and right leg four  times as needed  daily using enclosed dosing card.  Lidocaine cream   donepezil (ARICEPT) 10 MG tablet  Spouse/Significant Other No Yes   Sig: Take 1 tablet (10 mg) by mouth At Bedtime   furosemide (LASIX) 20 MG tablet  Spouse/Significant Other Yes Yes   Sig: Take 20 mg by mouth daily   gabapentin (NEURONTIN) 100 MG capsule 2017 at PM Spouse/Significant Other No Yes   Sig: Take 1 capsules Three times a day for pain   levothyroxine (SYNTHROID/LEVOTHROID) 88 MCG tablet  Spouse/Significant Other No Yes   Sig: Take 1 tablet (88 mcg) by mouth daily   lidocaine-prilocaine (EMLA) cream  Spouse/Significant Other No Yes   Sig: Apply topically as needed for moderate pain To low back, right hip  and right leg Apply same time as Diclofenac gel   losartan (COZAAR) 100 MG tablet 2017 at PM Spouse/Significant Other No Yes   Sig: Take 1 tablet (100 mg) by mouth daily   metoprolol (TOPROL-XL) 25 MG 24 hr tablet  Spouse/Significant Other No Yes   Sig: Take 1 tablet (25 mg) by mouth daily   nortriptyline (PAMELOR) 25 MG capsule  Spouse/Significant Other Yes Yes   Sig: Take 25 mg by mouth At Bedtime   order for DME  Spouse/Significant Other No No   Sig: Equipment being ordered: TENS and supplies.   pantoprazole (PROTONIX) 40 MG enteric coated tablet  Spouse/Significant Other No Yes   Sig: Take 1 tablet (40 mg) by mouth every morning   polyethylene glycol (MIRALAX) packet  Spouse/Significant Other No Yes   Sig: Take 17 g by mouth daily   tamsulosin (FLOMAX) 0.4 MG capsule  Spouse/Significant Other No Yes   Sig: Take 1 capsule (0.4 mg) by mouth daily   traMADol (ULTRAM) 50 MG tablet 2017 at PM Spouse/Significant Other No Yes   Si tab tid a day prn abut 8 hrs apart      Facility-Administered Medications: None     Allergies  Allergies   Allergen Reactions     Contrast Dye      SOB, increased Bp, difficulty swallowing. Date 6/3/96 (ipaque contrast)  Was premedicated prior to FRANCK and had no reaction at all (solumedrol and  benadryl) 2016  Was premedicated with Methylprednisolone protocol, no reaction 1/25/17     Lisinopril      cough     Oxycodone      Delirium       Social History  I have reviewed this patient's social history and updated it with pertinent information if needed. Chaz Soler  reports that he has never smoked. He has never used smokeless tobacco. He reports that he drinks alcohol. He reports that he does not use illicit drugs.    Family History  I have reviewed this patient's family history and updated it with pertinent information if needed.   Family History   Problem Relation Age of Onset     Hypertension Mother      Hypertension Father        Review of Systems  C: NEGATIVE for fever, chills, change in weight  I: NEGATIVE for worrisome rashes, moles or lesions  E: NEGATIVE for vision changes or irritation  E/M: NEGATIVE for ear, mouth and throat problems  R: Positive for cough and increased congestion.NEGATIVE for SOB    B: NEGATIVE for masses, tenderness or discharge  CV: NEGATIVE for chest pain, palpitations or peripheral edema  GI: NEGATIVE for nausea, abdominal pain, heartburn, or change in bowel habits  : NEGATIVE for frequency, dysuria, or hematuria  M: Positive for recent muscle spasms in low back. NEGATIVE for significant arthralgias   N: Positive for weakness in BLE. NEGATIVE for dizziness or paresthesias  E: NEGATIVE for temperature intolerance, skin/hair changes  H: NEGATIVE for bleeding problems  P: NEGATIVE for changes in mood or affect    Physical Exam  Temp: 99  F (37.2  C) Temp src: Oral BP: 171/77 mmHg Pulse: 79   Resp: 18 SpO2: 96 % O2 Device: Oxymask Oxygen Delivery: 2 LPM  Vital Signs with Ranges  Temp:  [98.4  F (36.9  C)-100.9  F (38.3  C)] 99  F (37.2  C)  Pulse:  [65-94] 79  Resp:  [18-22] 18  BP: (124-181)/(46-91) 171/77 mmHg  SpO2:  [85 %-98 %] 96 %  0 lbs 0 oz     , Blood pressure 171/77, pulse 79, temperature 99  F (37.2  C), temperature source Oral, resp. rate 18, SpO2 96 %.  0 lbs  0 oz  HEENT:  Normocephalic, atraumatic.  PERRLA.  EOM s intact.   Neck:  Supple, non-tender, without lymphadenopathy.  Heart:  No peripheral edema  Lungs:  No SOB  Abdomen:  Soft, non-tender, non-distended.  Normal bowel sounds.  Skin:  Warm and dry, good capillary refill.  Extremities:  Good radial and dorsalis pedis pulses bilaterally, no edema, cyanosis or clubbing.    NEUROLOGICAL EXAMINATION:   Mental status:  Alert and Oriented to person. Not oriented to place, speech is fluent.  Cranial nerves:  II-XII intact.   Motor:  Strength is 5/5 throughout the lower extremities  Hip Flexor:                Right: 5/5  Left:  5/5  Hip Adductor:             Right:  5/5  Left:  5/5  Hip Abductor:             Right:  5/5  Left:  5/5  Gastroc Soleus:        Right:  5/5  Left:  5/5  Tib/Ant:                      Right:  5/5  Left:  5/5  EHL:                     Right:  5/5  Left:  5/5  Sensation:  intact  Reflexes:   Negative Babinski.  Negative Clonus.    Gait:  Unsteady. Currently assist of 2 with standing/ambulating    Lumbar examination reveals tenderness of the lumbar spine and paraspinous muscles. Straight leg raise is negative bilaterally.     Data  All new lab and imaging data was personally reviewed by me.  CT: CT OF THE LUMBAR SPINE WITHOUT CONTRAST  1/29/2017 7:43 PM       COMPARISON: Lumbar spine MRI 11/4/16.     HISTORY: Fall, back pain.      TECHNIQUE: Axial images of the lumbar spine were acquired without  intravenous contrast. Multiplanar reformations were created from the  axial source images.        FINDINGS:  There has been interval development of mild compression  deformities of the L2 and L3 vertebral bodies. No other fractures.  Mild degenerative anterolisthesis of L4 upon L5 again noted. There is  moderate facet arthropathy bilaterally at L3-L4, L4-L5 and L5-S1.  There is no significant spinal canal stenosis of the lumbar spine.  There is moderate facet arthropathy bilaterally at the L1-L2,  L2-L3,  L3-L4 and L4-L5 levels.                                                                       IMPRESSION:  1. Recent mild compression fractures of the L2 and L3 vertebral bodies  that are new from the comparison lumbar spine MRI 11/4/2016.  2. No significant spinal canal stenosis of the lumbar spine.  3. Diffuse degenerative changes of the lumbar spine.        Radiation dose for this scan was reduced using automated exposure  control, adjustment of the mA and/or kV according to patient size, or  iterative reconstruction technique     GAGE GARRETT MD    CBC RESULTS:   Recent Labs   Lab Test  01/30/17   0655   WBC  9.5   RBC  3.77*   HGB  11.3*   HCT  33.9*   MCV  90   MCH  30.0   MCHC  33.3   RDW  12.6   PLT  120*     Basic Metabolic Panel:  NA      139   1/30/2017   POTASSIUM      4.2   1/30/2017  CHLORIDE      102   1/30/2017  LEXIS      9.5   1/30/2017  CO2       31   1/30/2017  BUN       29   1/30/2017  CR     1.36   1/30/2017  GLC       88   1/30/2017           Consults by Marisol Owens LSW at 1/30/2017  2:07 PM     Author:  Marisol Owens LSW Service:  (none) Author Type:      Filed:  1/30/2017  2:39 PM Note Time:  1/30/2017  2:07 PM Status:  Signed    :  Marisol Owens LSW ()       Consult Orders:    1. Physical Therapy Adult Consult [[id number]] ordered by Martínez Larson MD at 11/20/14 1049                SWS     D: Per MD request to assist with discharge planning, per discussion with spine PA anticipate pt's transfer to rehab facility on discharge. Chart reviewed noting pt's admit yesterday due to fever and cough, back pain... noted findings of compression fx, plan of care for treatment of pneumonia. Not past medical hx including COPD, pt's use of home oxygen. With chart review it is noted that pt had been receiving [facility] services until December, 2016. Documentation of home care SW notes that pt had been independent with ambulation with use of  walker, wife and family noted to be supportive and available for assist as needed. Documentation also notes of pt's cognitive limitations with wife's monitoring of his needs at home.They have bee receiving housekeeping support at home as well as use of [employer] food delivery for some meals, daughter-in-law also assists with brining meals to them at times. Information provided to family by SW at the time of December visit included [employer], other food delivery services, [facility] adult day services for pt as well as caregiver support services for wife.     I: Met with pt, wife and daughter also present in room.. they affirm planning in anticipation of pt's transfer to rehab facility on discharge and identify [facility] as facility of preference due to pt's previous stay there, semi-private room has been requested. Referral made, it is noted in contact to with admissions coordinator that there are currently beds available at facility, assessment in progress.      A/P: Good family support and needs identification, will await MD determination of discharge date, [facility] assessment, continue planning accordingly.                 Progress Notes - Physician (Notes from 01/29/17 through 02/01/17)      Progress Notes by Marisol Owens LSW at 1/31/2017  2:51 PM     Author:  Marisol Owens LSW Service:  (none) Author Type:      Filed:  1/31/2017  2:58 PM Note Time:  1/31/2017  2:51 PM Status:  Signed    :  Marisol Owens LSW ()           SWS     D: Discharge planning continuing.. per discussion with MD anticipate pt's discharge tomorrow. [facility] has assessed and would be able to accept pt tomorrow, semi-private room.      I: Spoke by phone to wife discussed above, she has asked that planning continue for pt's tranfser to [facility], family will provide transport tomorrow @ 1330, son will bring portable oxygen unit from home for the transport. Additional questions of wife  were addressed.      A/P: Anticipate no problem with arrangements made for transfer tomorrow, SW available until discharge should adjustments be needed in plan as noted.        Progress Notes by Alirio Falk at 1/31/2017  2:29 PM     Author:  Alirio Falk Service:  (none) Author Type:  Orthotist    Filed:  1/31/2017  2:30 PM Note Time:  1/31/2017  2:29 PM Status:  Signed    :  Alirio Falk (Orthotist)           Fit patient with tlso. Patient wamted it left on.  I left it on and told nursing.  I checked chart, it says brace on when out of bed.        Progress Notes by Michael Gallardo DO at 1/31/2017 11:17 AM     Author:  Michael Gallardo DO Service:  Hospitalist Author Type:  Physician    Filed:  1/31/2017 11:29 AM Note Time:  1/31/2017 11:17 AM Status:  Signed    :  Michael Gallardo DO (Physician)           [facility]    Hospitalist Progress Note  Name: Chaz Soler    MRN: [id number]  Provider:  Michael Gallardo DO [facility] ([facility])  Date of Service: 01/31/2017    Assessment and Plan  Summary of Stay: Chaz Soler is a 88 year old male who was admitted on 1/29/2017. Patient with a past medical history significant for pulmonary fibrosis on chronic home oxygen, heart failure with preserved EF, coronary artery disease, chronic kidney disease, s/p TAVR and more recently issues with chronic back pain, who presented to the hospital after a fall. In ER, he was found to have a pneumonia and a compression fracture.      1.  Left Sided Community Acquired Pneumonia:  -- on Ceftriaxone and Zithromax, likely change to oral tomorrow.    -- Blood cultures pending, still negative  -- influenza neg    2.  compression fractures of L2 and L3:  -- pain control work continues.  Still painful with movement.  Contiue PRN tramadol and scheduled tylenol and gabapentin.  -- h/o confusion with narcs so avoiding stronger narcotics as possible  -- spine surgery consulted, recommended TLSO  brace when up with outpatient follow-up.  --- Dementia may cause trouble wearing brace, it should be worn as he is able/willing with redirection.     3.  S/p Fall:  -- mechanical fall, comp fractures as above.  -- falls precaution  -- PT/OT    4.  AS s/p TAVR  -- stable, no new cardiac issues.    5.  CKD:  -- Baseline 1.4- 1.6  -- creatinine 1.36 today      6.  Chronic hypoxemic respiratory failure on chronic home O2:  --h/o pulm fibrosis and COPD  --on Home O2 2-3 liters    7.  H/o Delirium:  -- on Delirium protocol  -- started on low dose Seroquel at bedtime    8.  HTN:  -- on amlodipine, losartan and metoprolol    9.  Hypothyroidism:  -- On synthyroid    10.  Dementia:  -- on Aricept    DVT Prophylaxis: Enoxaprain (Lovenox) SQ  Code Status: Full Code    Disposition:  Based on status as AM progressed, I think he needs one more day in the hospital.  Plan continued work on pain control and increased activity.  Like to rehab/TCU tomorrow.      Interval History  Assumed care for the day, history reviewed.      -Data reviewed today: I reviewed all new labs and imaging reports over the last 24 hours. I personally reviewed no images or EKG's today.    Physical Exam  Temp: 96.7  F (35.9  C) Temp src: Oral BP: 163/76 mmHg Pulse: 78   Resp: 18 SpO2: 90 % O2 Device: Oxymask Oxygen Delivery: 4 LPM  There were no vitals filed for this visit.  Vital Signs with Ranges  Temp:  [95.2  F (35.1  C)-99.9  F (37.7  C)] 96.7  F (35.9  C)  Pulse:  [60-78] 78  Resp:  [18-20] 18  BP: (119-163)/(51-76) 163/76 mmHg  SpO2:  [80 %-100 %] 90 %  I/O last 3 completed shifts:  In: 360 [P.O.:360]  Out: 1450 [Urine:1450]    GEN:  Alert, appears comfortable, no overt distress.  HEENT:  Normocephalic/atraumatic, no scleral icterus, no nasal discharge, mouth moist.  CV:  Regular rate and rhythm, distant, no loud murmur/rub.  LUNGS:  Clear to auscultation upper with faint bibasilar crackles.  No wheezes/retractions.  Symmetric chest rise on inhalation  noted.  ABD:  Active bowel sounds, soft, non-tender/non-distended.  No rebound/guarding/rigidity.  EXT:  Trace edema.  No cyanosis.  No acute joint synovitis noted.  SKIN:  Dry to touch, no exanthems noted in the visualized areas.    Medications       cefTRIAXone  1 g Intravenous Q24H     sodium chloride (PF)  3 mL Intracatheter Q8H     enoxaparin  30 mg Subcutaneous Q24H     acetaminophen  1,000 mg Oral TID     QUEtiapine  12.5 mg Oral At Bedtime     amLODIPine  10 mg Oral Daily     aspirin EC  81 mg Oral QPM     atorvastatin  20 mg Oral Daily     donepezil  10 mg Oral At Bedtime     furosemide  20 mg Oral Daily     gabapentin  100 mg Oral TID     levothyroxine  88 mcg Oral Daily     losartan  100 mg Oral QPM     metoprolol  25 mg Oral Daily     nortriptyline  25 mg Oral At Bedtime     pantoprazole  40 mg Oral QAM     polyethylene glycol  17 g Oral Daily     tamsulosin  0.4 mg Oral Daily     lidocaine  2 patch Transdermal Q24h    And     lidocaine   Transdermal Q24H    And     lidocaine   Transdermal Q8H     azithromycin  250 mg Oral QPM     Data      Recent Labs  Lab 01/30/17  0655 01/29/17  1850   WBC 9.5 13.2*   HGB 11.3* 12.8*   HCT 33.9* 38.3*   MCV 90 90   * 151       Recent Labs  Lab 01/29/17  2125 01/29/17  1850   CULT No growth after 2 days No growth after 2 days       Recent Labs  Lab 01/30/17  0655 01/29/17  1850    139   POTASSIUM 4.2 4.3   CHLORIDE 102 101   CO2 31 30   ANIONGAP 6 8   GLC 88 100*   BUN 29 31*   CR 1.36* 1.48*   GFRESTIMATED 49* 45*   GFRESTBLACK 60* 54*   LEXIS 9.5 9.3   MAG 2.3  --        No results found for this or any previous visit (from the past 24 hour(s)).             Progress Notes by Michael Gallardo DO at 1/31/2017  8:24 AM     Author:  Michael Gallardo DO Service:  Hospitalist Author Type:  Physician    Filed:  1/31/2017  8:26 AM Note Time:  1/31/2017  8:24 AM Status:  Signed    :  Michael Gallardo DO (Physician)           Doing better, no new complaints.   Weaning O2 (hypoxia improving).  Continue abx today.  Await TLSO brace arrival.  Once available will get patient up and more active to assess pain/mobility with therapy.  If he does well with activity and hypoxia he may be able to d/c late today to rehab.  If uncontrolled pain, TLSO brace not available, or more respiratory issues/fevers he will need another day in the hospital.  I will check back on his status this afternoon.       Progress Notes by Alirio Falk at 1/30/2017  5:01 PM     Author:  Alirio Falk Service:  (none) Author Type:  Orthotist    Filed:  1/30/2017  5:01 PM Note Time:  1/30/2017  5:01 PM Status:  Signed    :  Alirio Falk (Orthotist)           Measured patient for tlso.  Will deliver in the afternoon tomorrow.         Progress Notes by Bernice Saravia MD at 1/30/2017  9:03 AM     Author:  Bernice Saravia MD Service:  Hospitalist Author Type:  Physician    Filed:  1/30/2017 12:57 PM Note Time:  1/30/2017  9:03 AM Status:  Signed    :  Bernice Saravia MD (Physician)           [facility]    Hospitalist Progress Note  Name: Chaz Soler    MRN: [id number]  Provider: Bernice Saravia MD  Date of Service: 01/30/2017    Assessment and Plan  Summary of Stay: Chaz Soler is a 88 year old male who was admitted on 1/29/2017. Patient with a past medical history significant for pulmonary fibrosis on chronic home oxygen, heart failure with preserved EF, coronary artery disease, chronic kidney disease, s/p TAVR and more recently issues with chronic back pain, who presented to the hospital after a fall. In ER, he was found to have a pneumonia and a compression fracture.      Left Sided Pneumonia  --Started on Ceftriaxone for community acquired pneumonia  -- on Ceftriaxone and Azithromax  -- Blood cultures pending  -- influenza neg    compression fractures of L2 and L3  -- pain control. PRN tramadol  -- continue gabapentin  -- h/o  confusion with narcs  -- low dose hydrocodone for breakthrough pain  -- spine surgery consulted  --- patient may need brace but his dementia may cause trouble wearing brace    S/p Fall  -- mechanical fall  -- falls precaution  -- PT/OT    AS s/p TAVR  -- stable    CKD  -- Baseline 1.4- 1.6  -- creatinine 1.36 today      Ch hypoxemic respiratory failure  --h/o pulm fibrosis and COPD  --on Home O2 2-3 liters    H/o Delirium  -- on Delirium protocol  -- started on low dose Seroquel at bedtime    HTN  -- on amlodipine, losartan and metoprolol    Hypothyroidism  -- On synthyroid    Dementia  -- on Aricept    Care plan d/w wife    DVT Prophylaxis: Enoxaprain (Lovenox) SQ  Code Status: Full Code    Disposition: Expected discharge in 1-2 days to short term facility/ TCU for deconditioning and pain      Interval History  C/o weakness, severe back pain. + cough. No chest pain, no palp, no fever or chills. Review of all other systems negative    -Data reviewed today: I reviewed all new labs and imaging reports over the last 24 hours. I personally reviewed no images or EKG's today.    Physical Exam  Temp: 99  F (37.2  C) Temp src: Oral BP: 166/56 mmHg Pulse: 65   Resp: 18 SpO2: 95 % O2 Device: Oxymask Oxygen Delivery: 4 LPM  There were no vitals filed for this visit.  Vital Signs with Ranges  Temp:  [98.4  F (36.9  C)-100.9  F (38.3  C)] 99  F (37.2  C)  Pulse:  [65-94] 65  Resp:  [18-22] 18  BP: (124-181)/(46-91) 166/56 mmHg  SpO2:  [85 %-98 %] 95 %  I/O last 3 completed shifts:  In: 0   Out: 300 [Urine:300]      GEN:  Alert, oriented x 3, appears comfortable, NAD.  HEENT:  Normocephalic/atraumatic, no scleral icterus, no nasal discharge, mouth moist.  CV:  Regular rate and rhythm, no murmur or JVD.  S1 + S2 noted, no S3 or S4.  LUNGS:  Few bibasilar crackles. Bilateral air entry.  Symmetric chest rise on inhalation noted.  ABD:  Active bowel sounds, soft, non-tender/non-distended.  No rebound/guarding/rigidity.  EXT:  No  edema.  No cyanosis.    SKIN:  Dry to touch, no exanthems noted in the visualized areas.    Medications       cefTRIAXone  1 g Intravenous Q24H     sodium chloride (PF)  3 mL Intracatheter Q8H     enoxaparin  30 mg Subcutaneous Q24H     acetaminophen  1,000 mg Oral TID     QUEtiapine  12.5 mg Oral At Bedtime     amLODIPine  10 mg Oral Daily     aspirin EC  81 mg Oral QPM     atorvastatin  20 mg Oral Daily     donepezil  10 mg Oral At Bedtime     furosemide  20 mg Oral Daily     gabapentin  100 mg Oral TID     levothyroxine  88 mcg Oral Daily     losartan  100 mg Oral QPM     metoprolol  25 mg Oral Daily     nortriptyline  25 mg Oral At Bedtime     pantoprazole  40 mg Oral QAM     polyethylene glycol  17 g Oral Daily     tamsulosin  0.4 mg Oral Daily     lidocaine  2 patch Transdermal Q24h    And     lidocaine   Transdermal Q24H    And     lidocaine   Transdermal Q8H     azithromycin  250 mg Oral QPM     Data      Recent Labs  Lab 01/30/17  0655 01/29/17  1850   WBC 9.5 13.2*   HGB 11.3* 12.8*   HCT 33.9* 38.3*   MCV 90 90   * 151       Recent Labs  Lab 01/30/17  0655 01/29/17  1850    139   POTASSIUM 4.2 4.3   CHLORIDE 102 101   CO2 31 30   ANIONGAP 6 8   GLC 88 100*   BUN 29 31*   CR 1.36* 1.48*   GFRESTIMATED 49* 45*   GFRESTBLACK 60* 54*   LEXIS 9.5 9.3       Recent Labs  Lab 01/29/17  2125 01/29/17  1850   CULT No growth after 1 hour No growth after 1 hour     No results for input(s): AST, ALT, GGT, ALKPHOS, BILITOTAL, BILICONJ, BILIDIRECT, WAYNE in the last 168 hours.    Invalid input(s): BILIRUBININDIRECT  No results for input(s): INR in the last 168 hours.    Recent Labs  Lab 01/29/17 2026   COLOR Yellow   APPEARANCE Clear   URINEGLC Negative   URINEBILI Negative   URINEKETONE Negative   SG 1.011   UBLD Negative   URINEPH 7.0   PROTEIN 30*   NITRITE Negative   LEUKEST Negative   RBCU 3*   WBCU 1       Recent Results (from the past 24 hour(s))   Lumbar spine CT w/o contrast    Narrative    CT OF  THE LUMBAR SPINE WITHOUT CONTRAST  1/29/2017 7:43 PM     COMPARISON: Lumbar spine MRI 11/4/16.    HISTORY: Fall, back pain.     TECHNIQUE: Axial images of the lumbar spine were acquired without  intravenous contrast. Multiplanar reformations were created from the  axial source images.      FINDINGS:  There has been interval development of mild compression  deformities of the L2 and L3 vertebral bodies. No other fractures.  Mild degenerative anterolisthesis of L4 upon L5 again noted. There is  moderate facet arthropathy bilaterally at L3-L4, L4-L5 and L5-S1.  There is no significant spinal canal stenosis of the lumbar spine.  There is moderate facet arthropathy bilaterally at the L1-L2, L2-L3,  L3-L4 and L4-L5 levels.      Impression    IMPRESSION:  1. Recent mild compression fractures of the L2 and L3 vertebral bodies  that are new from the comparison lumbar spine MRI 11/4/2016.  2. No significant spinal canal stenosis of the lumbar spine.  3. Diffuse degenerative changes of the lumbar spine.      Radiation dose for this scan was reduced using automated exposure  control, adjustment of the mA and/or kV according to patient size, or  iterative reconstruction technique    GAGE GARRETT MD   XR Chest 2 Views    Narrative    CHEST TWO VIEW 1/29/2017 7:56 PM     COMPARISON: Chest CT 6/14/2016.    HISTORY: Cough, fever.      Impression    IMPRESSION: Severe extensive coarse fibrotic changes throughout both  lungs again noted. There may be new airspace opacity in the mid and  lower aspects of the left lung that may represent pneumonia. There is  no pleural effusion or pneumothorax. Heart size is normal. There is no  evidence for congestive failure.    GAGE GARRETT MD                  ED Provider Notes by Lenora Clements MD at 1/29/2017  6:38 PM     Author:  Lenora Clements MD Service:  Emergency Medicine Author Type:  Physician    Filed:  1/29/2017 11:24 PM Note Time:  1/29/2017  6:38 PM  "Status:  Signed    :  Lenora Clements MD (Physician)             History     Chief Complaint:  Back pain  Fever  cough    HPI   Patient presents to the ED via EMS and with his wife and daughter who present his history secondary to his condition.  Chaz Soler is a 88 year old male, with a history of chronic back pain, hypertension, gout, CAD, AAA- s/p repair, pulmonary fibrosis, among others, who presents with back pain, fever and cough. The daughter reports that over the past few months his chronic back pain has worsened. On Wednesday he had an epidural steroid injection for this. Thursday he was feeling ok. Friday he was exercising in his building. When he tried to get off the exercise back he complained to his daughter and wife that his back pain was worse and he had difficulties getting up.  They helped him to the apartment and into his walker. They left him for a second and  He got up and went to the bathroom. En route to the bathroom, he fell trying to reach his walker without hitting his head or losing consciousness.  He landed on his bottom.  His back pain has been much worse since the fall.  Additionally, since Friday he has also had a cough and some congestion. Saturday he noted that his \"spasming\" pain in his back had been worsening.  However, he took a nap, went to the bathroom, and then could not get off from the toilet secondary to the muscle spasming and pain, prompting his wife to call EMS for transport here for evaluation. On the toilet he was noted to be nauseous but did not vomit. On EMS arrival he was noted to have a fever. At home he is noted to be constantly on oxygen at 2-2.5L O2 for a hx of pulmonary fibrosis. The family denies the patient's confusion being any different than normal. He was last given Gabapentin, tramadol, and tylenol this morning at breakfast. He endorses trouble making it to the bathroom secondary to pain but not due to " incontinence.      Allergies:  Contrast dye - increased BP, difficulty swallowing  Lisinopril - cough   Oxycodone - Delirium     Medications:    Gabapentin (neurontin) 100 mg capsule  Tamsulosin (flomax) 0.4 mg capsule  Losartan (cozaar) 100 mg tablet  Levothyroxine (synthroid/levothroid) 88 mcg tablet  Tramadol (ultram) 50 mg tablet  Order for dme  Polyethylene glycol (miralax) packet  Atorvastatin (lipitor) 20 mg tablet  Cyanocobalamin (vitamin  b-12) 1000 mcg tablet  Amoxicillin po  Diclofenac (voltaren) 1 % gel  Lidocaine-prilocaine (emla) cream  Donepezil (aricept) 10 mg tablet  Amlodipine (norvasc) 10 mg tablet  Metoprolol (toprol-xl) 25 mg 24 hr tablet  Pantoprazole (protonix) 40 mg enteric coated tablet  Aspirin 81 mg tablet  Acetaminophen (tylenol) 325 mg tablet  Ferrous sulfate (iron supplement po)    Past Medical History:    Hyperplasia of prostate   Lumbago   LEFT LUNG GRANULOMA   Essential hypertension   Gout   Unspecified hypothyroidism   Vitamin D deficiency   Pulmonary fibrosis (H)   Hyperparathyroidism (H)   AAA (abdominal aortic aneurysm) (H)   CAD (coronary artery disease)   Hyperlipidemia LDL goal < 70   CKD (chronic kidney disease) stage 3, GFR 30-59 ml/min   Anemia   Thrombocytopenia (H)   Proteinuria   Aortic stenosis   CHF (congestive heart failure) (H)   Hypercalcemia   Hyperparathyroidism, unspecified (H)   Dementia   Edema, unspecified edema   Other chronic pain       Past Surgical History:    Vasectomy   Right cataract extraction/iol   Colonoscopy   Laparoscopic cholecystectomy   Repair aneurysm abdominal aorta   Endovascular repair aneurysm abdominal aorta   Laparoscopic cholecystectomy   Discectomy lumbar posterior microscopic one level   Transcatheter aortic valve implant anesthesia     Family History:    Hypertension     Social History:  Smoking status: never  Alcohol use: yes - 1 glass wine/day   Marital Status:       Review of Systems   Constitutional: Positive for fever.    HENT: Positive for congestion.    Respiratory: Positive for cough.    Musculoskeletal: Positive for back pain.   Neurological: Negative for syncope.   Psychiatric/Behavioral: Negative for confusion.   All other systems reviewed and are negative.      Physical Exam     Patient Vitals for the past 24 hrs:   BP Temp Temp src Pulse Resp SpO2   01/29/17 1850 - - - - - 98 %   01/29/17 1845 - - - - - 98 %   01/29/17 1840 - - - - - 97 %   01/29/17 1832 (!) 176/91 mmHg 100.9  F (38.3  C) Oral 94 22 90 %       Physical Exam    Gen: alert  HEENT: PERRL, oropharynx clear  Neck: normal ROM  CV: RRR, no murmurs  Pulm: breath sounds equal, lungs clear  Abd: Soft, nontender  Back: no evidence of injury, no cva tenderness  MSK: no deformity, moves all extremities  Skin: no rash  Neuro: alert, appropriate conversation and interaction  Neuro: 5/5 strength BLE in hip flexion and ext, knee flexion and ext, plantar and dorsiflexion, and extension of EHL, sensation intact to light touch over all dermatomes of the legs      Emergency Department Course   ECG (20:15:22):  Rate 87 bpm. KS interval 184. QRS duration 120. QT/QTc 360/433. P-R-T axes 20 -37 99.   Normal sinus rhythm.  Left axis deviation.  Left ventricular hypertrophy with QRS widening and repolarization abnormality.  Abnormal ECG.  Agree with computer interpretation.  Interpreted at 2109 by Lenora Clements MD*.    Imaging:  Radiographic findings were communicated with the patient who voiced understanding of the findings.    XR chest 2 views  IMPRESSION: Severe extensive coarse fibrotic changes throughout both  lungs again noted. There may be new airspace opacity in the mid and  lower aspects of the left lung that may represent pneumonia. There is  no pleural effusion or pneumothorax. Heart size is normal. There is no  evidence for congestive failure.  As read by Radiology.    Lumbar spine CT w/o contrast  IMPRESSION:  1. Recent mild compression fractures of the L2 and  L3 vertebral bodies  that are new from the comparison lumbar spine MRI 11/4/2016.  2. No significant spinal canal stenosis of the lumbar spine.  3. Diffuse degenerative changes of the lumbar spine.  Radiation dose for this scan was reduced using automated exposure  control, adjustment of the mA and/or kV according to patient size, or  iterative reconstruction technique  As read by Radiology.    Laboratory:  Blood Culture x2: Pending  CBC: WBC 13.2 (H), HGB 12.8 (L), o/w WNL ()   BMP: Glucose 100 (H), BUN 31 (H), Creatinine 1.48 (H), GFR estimate 45 (L), o/w WNL  UA reflex to microscopic: protein albumin urine 30, RBC/HPF 3 (H), o/w negative  Influenza A/B antigen: negative  Lactic acid whole blood: 1.3    Interventions:  1918 - Tylenol 650 mg PO  1918 - Robaxin 500 mg PO    Emergency Department Course:  Past medical records, nursing notes, and vitals reviewed.  1838: I performed an exam of the patient and obtained history, as documented above.  IV inserted and blood drawn.  The patient was sent for a X-ray and CT scan while in the emergency department, findings above.  2036: I discussed the case with Dr. Bowers regarding the patient.  Findings and plan explained to the Patient who consents to admission.   2039: Discussed the patient with Dr. Bowers, who will admit the patient to a medical bed for further monitoring, evaluation, and treatment.       Impression & Plan      Medical Decision Making:  Chaz Soler is a 88 year old male who presents for multiple complaints. First is back pain. Patient has chronic back pain, however fell at home. CT of the lumbar spine shows new compression fractures of the L2 and L3 vertebrae. Lower extremity neuro exam is intact.  No neck pain or thoracic back pain to suggest need for further spinal imaging. Nursing note had noted incontinence, however this is further specified with the patient and he reports he has been unable to make it to the bathroom secondary to back pain,  but not truly having urinary incontinence. Again, distally neurologically intact.   The patient also notes fever and cough. Patient has productive cough and coarse bilateral breath sounds. Fever and leukocytosis noted. Lactic acid within normal limits. Blood pressures slightly hypertensive. No tachycardia. Chest X-ray shows new left-sided infiltrate. Will treat for community-acquired pneumonia.  Fever attributed to pneumonia and did not feel that MRI of the spine was indicated to eval for epidural abscess or infectious complication of FRANCK.  Back pain is explained by fall and compression fractures. It is noted that the patient is briefly admitted to [facility] for observation overnight in November. Given this is a brief stay in and no other medical exposure, we will treat for CAP. UA negative. Influenza negative.antibiotics started. Pain control. Plan for admission for treatment of compression fractures as well as pneumonia. Patient was assessed with discussed with Dr. Bowers of the hospitalist services and was admitted to a medical bed.    Diagnosis:    ICD-10-CM    1. Compression fx, lumbar spine, closed, initial encounter (H) S32.000A UA reflex to Microscopic     Blood culture   2. Pneumonia of left lower lobe due to infectious organism J18.9        Disposition:  Admitted to Dr. Bowers.        Janak Costello  1/29/2017   [facility] EMERGENCY DEPARTMENT    I, Janak Costello, am serving as a scribe at 6:38 PM on 1/29/2017 to document services personally performed by Lenora Clements MD* based on my observations and the provider's statements to me.      Lenora Clements MD  01/29/17 9110       Progress Notes by Carie Bowers MD at 1/29/2017 10:36 PM     Author:  Carie Bowers MD Service:  (none) Author Type:  Physician    Filed:  1/29/2017 10:36 PM Note Time:  1/29/2017 10:36 PM Status:  Signed    :  Carie Bowers MD (Physician)           H&P dictated. 87 yo  male presenting with pneumonia and compression fracture.        ED Notes by Gretta Davis RN at 1/29/2017  9:42 PM     Author:  Gretta Davis RN Service:  Emergency Medicine Author Type:  Registered Nurse    Filed:  1/29/2017  9:42 PM Note Time:  1/29/2017  9:42 PM Status:  Signed    :  Gretta Davis RN (Registered Nurse)           Report called to the floor and pt can be transferred at this time. Denies complaints at this time. Family remains at bedside. Will cont to monitor.        ED Notes by Gretta Davis RN at 1/29/2017  8:30 PM     Author:  Gretta Davis RN Service:  Emergency Medicine Author Type:  Registered Nurse    Filed:  1/29/2017  9:42 PM Note Time:  1/29/2017  8:30 PM Status:  Signed    :  Gretta Davis RN (Registered Nurse)           Pt resting on cart. Denies complaints at this time. Offered comfort cares at this time. No acute distress noted. Will cont to monitor.          ED Notes by Gretta Davis RN at 1/29/2017  7:30 PM     Author:  Gretta Davis RN Service:  Emergency Medicine Author Type:  Registered Nurse    Filed:  1/29/2017  9:37 PM Note Time:  1/29/2017  7:30 PM Status:  Signed    :  Gretta Davis RN (Registered Nurse)           Assumed pt care at this time. Pt resting on cart at this time. Family at bedside. Will cont to monitor.        ED Notes by Rachael Nagy RN at 1/29/2017  6:35 PM     Author:  Rachael Nagy RN Service:  (none) Author Type:  Registered Nurse    Filed:  1/29/2017  6:35 PM Note Time:  1/29/2017  6:35 PM Status:  Signed    :  Rachael Nagy RN (Registered Nurse)           Pt has incontinence x 1 today, which is not usual for him.        ED Notes by Rachael Nagy RN at 1/29/2017  6:29 PM     Author:  Rachael Nagy RN Service:  (none) Author Type:  Registered Nurse    Filed:  1/29/2017  6:32 PM Note Time:  1/29/2017  6:29 PM Status:  Signed    :  Rachael Nagy RN (Registered  "Nurse)           ABCs intact. Pt hx chronic back pain. Pt had an epidural on Wednesday. Pt was finishing exercising on Friday and fell onto his buttocks.  Pt didn't remember the fall. Pt c/o increased back pain since fall. Pt had an episode of \"nonsense talking\". Family states he has \"periods of nonsense talking\" every once in a while.     Pt's home meds: see [facility]       ED Notes by Shanae Curiel RN at 1/29/2017  6:25 PM     Author:  Shanae Curiel RN Service:  (none) Author Type:  Registered Nurse    Filed:  1/29/2017  6:25 PM Note Time:  1/29/2017  6:25 PM Status:  Signed    :  Shanae Curiel RN (Registered Nurse)           Bed: ED01  Expected date: 1/29/17  Expected time:   Means of arrival: Ambulance  Comments:  Yonis 594, 87 y/o M             Procedure Notes     No notes of this type exist for this encounter.      Progress Notes - Therapies (Notes from 01/29/17 through 02/01/17)     No notes of this type exist for this encounter.      "

## 2017-01-30 LAB
ANION GAP SERPL CALCULATED.3IONS-SCNC: 6 MMOL/L (ref 3–14)
BUN SERPL-MCNC: 29 MG/DL (ref 7–30)
CALCIUM SERPL-MCNC: 9.5 MG/DL (ref 8.5–10.1)
CHLORIDE SERPL-SCNC: 102 MMOL/L (ref 94–109)
CO2 SERPL-SCNC: 31 MMOL/L (ref 20–32)
CREAT SERPL-MCNC: 1.36 MG/DL (ref 0.66–1.25)
ERYTHROCYTE [DISTWIDTH] IN BLOOD BY AUTOMATED COUNT: 12.6 % (ref 10–15)
GFR SERPL CREATININE-BSD FRML MDRD: 49 ML/MIN/1.7M2
GLUCOSE SERPL-MCNC: 88 MG/DL (ref 70–99)
GRAM STN SPEC: NORMAL
HCT VFR BLD AUTO: 33.9 % (ref 40–53)
HGB BLD-MCNC: 11.3 G/DL (ref 13.3–17.7)
INTERPRETATION ECG - MUSE: NORMAL
Lab: NORMAL
MAGNESIUM SERPL-MCNC: 2.3 MG/DL (ref 1.6–2.3)
MCH RBC QN AUTO: 30 PG (ref 26.5–33)
MCHC RBC AUTO-ENTMCNC: 33.3 G/DL (ref 31.5–36.5)
MCV RBC AUTO: 90 FL (ref 78–100)
MICRO REPORT STATUS: NORMAL
PLATELET # BLD AUTO: 120 10E9/L (ref 150–450)
POTASSIUM SERPL-SCNC: 4.2 MMOL/L (ref 3.4–5.3)
RBC # BLD AUTO: 3.77 10E12/L (ref 4.4–5.9)
SODIUM SERPL-SCNC: 139 MMOL/L (ref 133–144)
SPECIMEN SOURCE: NORMAL
WBC # BLD AUTO: 9.5 10E9/L (ref 4–11)

## 2017-01-30 PROCEDURE — A9270 NON-COVERED ITEM OR SERVICE: HCPCS | Mod: GY | Performed by: INTERNAL MEDICINE

## 2017-01-30 PROCEDURE — 99233 SBSQ HOSP IP/OBS HIGH 50: CPT | Performed by: INTERNAL MEDICINE

## 2017-01-30 PROCEDURE — 99222 1ST HOSP IP/OBS MODERATE 55: CPT | Performed by: NURSE PRACTITIONER

## 2017-01-30 PROCEDURE — 83735 ASSAY OF MAGNESIUM: CPT | Performed by: INTERNAL MEDICINE

## 2017-01-30 PROCEDURE — 25000132 ZZH RX MED GY IP 250 OP 250 PS 637: Mod: GY | Performed by: INTERNAL MEDICINE

## 2017-01-30 PROCEDURE — 80048 BASIC METABOLIC PNL TOTAL CA: CPT | Performed by: INTERNAL MEDICINE

## 2017-01-30 PROCEDURE — 87205 SMEAR GRAM STAIN: CPT | Performed by: INTERNAL MEDICINE

## 2017-01-30 PROCEDURE — 25000125 ZZHC RX 250: Performed by: INTERNAL MEDICINE

## 2017-01-30 PROCEDURE — 12000000 ZZH R&B MED SURG/OB

## 2017-01-30 PROCEDURE — 85027 COMPLETE CBC AUTOMATED: CPT | Performed by: INTERNAL MEDICINE

## 2017-01-30 PROCEDURE — 36415 COLL VENOUS BLD VENIPUNCTURE: CPT | Performed by: INTERNAL MEDICINE

## 2017-01-30 PROCEDURE — 87070 CULTURE OTHR SPECIMN AEROBIC: CPT | Performed by: INTERNAL MEDICINE

## 2017-01-30 RX ORDER — ASPIRIN 81 MG/1
81 TABLET ORAL EVERY EVENING
Status: DISCONTINUED | OUTPATIENT
Start: 2017-01-30 | End: 2017-01-30

## 2017-01-30 RX ADMIN — GABAPENTIN 100 MG: 100 CAPSULE ORAL at 14:54

## 2017-01-30 RX ADMIN — ENOXAPARIN SODIUM 30 MG: 30 INJECTION SUBCUTANEOUS at 10:43

## 2017-01-30 RX ADMIN — FUROSEMIDE 20 MG: 20 TABLET ORAL at 09:21

## 2017-01-30 RX ADMIN — Medication 12.5 MG: at 21:21

## 2017-01-30 RX ADMIN — METOPROLOL SUCCINATE 25 MG: 25 TABLET, EXTENDED RELEASE ORAL at 09:23

## 2017-01-30 RX ADMIN — LIDOCAINE 2 PATCH: 50 PATCH CUTANEOUS at 21:21

## 2017-01-30 RX ADMIN — LEVOTHYROXINE SODIUM 88 MCG: 88 TABLET ORAL at 09:22

## 2017-01-30 RX ADMIN — LOSARTAN POTASSIUM 100 MG: 100 TABLET, FILM COATED ORAL at 00:13

## 2017-01-30 RX ADMIN — ACETAMINOPHEN 1000 MG: 500 TABLET, FILM COATED ORAL at 21:20

## 2017-01-30 RX ADMIN — GABAPENTIN 100 MG: 100 CAPSULE ORAL at 09:23

## 2017-01-30 RX ADMIN — NORTRIPTYLINE HYDROCHLORIDE 25 MG: 25 CAPSULE ORAL at 00:13

## 2017-01-30 RX ADMIN — TAMSULOSIN HYDROCHLORIDE 0.4 MG: 0.4 CAPSULE ORAL at 09:22

## 2017-01-30 RX ADMIN — AZITHROMYCIN 250 MG: 250 TABLET, FILM COATED ORAL at 21:20

## 2017-01-30 RX ADMIN — ASPIRIN 81 MG: 81 TABLET, COATED ORAL at 21:20

## 2017-01-30 RX ADMIN — PANTOPRAZOLE SODIUM 40 MG: 40 TABLET, DELAYED RELEASE ORAL at 09:20

## 2017-01-30 RX ADMIN — ACETAMINOPHEN 1000 MG: 500 TABLET, FILM COATED ORAL at 09:20

## 2017-01-30 RX ADMIN — NORTRIPTYLINE HYDROCHLORIDE 25 MG: 25 CAPSULE ORAL at 21:20

## 2017-01-30 RX ADMIN — AMLODIPINE BESYLATE 10 MG: 10 TABLET ORAL at 09:23

## 2017-01-30 RX ADMIN — CEFTRIAXONE 1 G: 1 INJECTION, POWDER, FOR SOLUTION INTRAMUSCULAR; INTRAVENOUS at 21:19

## 2017-01-30 RX ADMIN — GABAPENTIN 100 MG: 100 CAPSULE ORAL at 21:20

## 2017-01-30 RX ADMIN — DONEPEZIL HYDROCHLORIDE 10 MG: 10 TABLET, FILM COATED ORAL at 21:20

## 2017-01-30 RX ADMIN — ATORVASTATIN CALCIUM 20 MG: 20 TABLET, FILM COATED ORAL at 09:22

## 2017-01-30 RX ADMIN — LOSARTAN POTASSIUM 100 MG: 100 TABLET, FILM COATED ORAL at 21:20

## 2017-01-30 RX ADMIN — ACETAMINOPHEN 1000 MG: 500 TABLET, FILM COATED ORAL at 14:53

## 2017-01-30 RX ADMIN — POLYETHYLENE GLYCOL 3350 17 G: 17 POWDER, FOR SOLUTION ORAL at 09:24

## 2017-01-30 NOTE — PHARMACY-ADMISSION MEDICATION HISTORY
Admission medication history interview status for this patient is complete. See [facility] admission navigator for allergy information, prior to admission medications and immunization status.     Medication history interview source(s): Wife  Medication history resources (including written lists, pill bottles, clinic record): Printed list  Primary pharmacy: [facility]  [place]    Changes made to PTA medication list:  Added (2): Furosemide, Nortriptyline  Deleted: N/A  Changed: N/A    Actions taken by pharmacist (provider contacted, etc):None     Additional medication history information:None    Medication reconciliation/reorder completed by provider prior to medication history? No    For patients on insulin therapy: No    Prior to Admission medications    Medication Sig Last Dose Taking? Auth Provider   furosemide (LASIX) 20 MG tablet Take 20 mg by mouth daily  Yes Unknown, Entered By History   nortriptyline (PAMELOR) 25 MG capsule Take 25 mg by mouth At Bedtime  Yes Unknown, Entered By History   gabapentin (NEURONTIN) 100 MG capsule Take 1 capsules Three times a day for pain 1/28/2017 at PM Yes Lissy Galeana APRN CNP   tamsulosin (FLOMAX) 0.4 MG capsule Take 1 capsule (0.4 mg) by mouth daily  Yes Alirio Fox MD   losartan (COZAAR) 100 MG tablet Take 1 tablet (100 mg) by mouth daily 1/28/2017 at PM Yes Alirio Fox MD   levothyroxine (SYNTHROID/LEVOTHROID) 88 MCG tablet Take 1 tablet (88 mcg) by mouth daily  Yes Alirio Fox MD   traMADol (ULTRAM) 50 MG tablet 1 tab tid a day prn abut 8 hrs apart 1/28/2017 at PM Yes Lissy Galeana APRN CNP   polyethylene glycol (MIRALAX) packet Take 17 g by mouth daily  Yes Yudelka Castillo MD   atorvastatin (LIPITOR) 20 MG tablet Take 1 tablet (20 mg) by mouth daily  Yes Tyrell Jackson MD   cyanocobalamin (VITAMIN  B-12) 1000 MCG tablet Take 1,000 mcg by mouth daily  Yes Reported, Patient   AMOXICILLIN PO Take 500 mg by mouth once as  needed  Yes Reported, Patient   diclofenac (VOLTAREN) 1 % GEL Apply 4 grams to low back right hip and right leg four times as needed  daily using enclosed dosing card.  Lidocaine cream  Yes Lissy Galeana APRN CNP   lidocaine-prilocaine (EMLA) cream Apply topically as needed for moderate pain To low back, right hip  and right leg Apply same time as Diclofenac gel  Yes Lissy Galeana APRN CNP   donepezil (ARICEPT) 10 MG tablet Take 1 tablet (10 mg) by mouth At Bedtime  Yes Alirio Fox MD   amLODIPine (NORVASC) 10 MG tablet Take 1 tablet (10 mg) by mouth daily  Yes Aliroi Fox MD   metoprolol (TOPROL-XL) 25 MG 24 hr tablet Take 1 tablet (25 mg) by mouth daily  Yes Alirio Fox MD   pantoprazole (PROTONIX) 40 MG enteric coated tablet Take 1 tablet (40 mg) by mouth every morning  Yes Alirio Fox MD   aspirin 81 MG tablet Take 81 mg by mouth daily  1/28/2017 at PM Yes Reported, Patient   acetaminophen (TYLENOL) 325 MG tablet Take 2 tablets (650 mg) by mouth every 6 hours as needed for mild pain 1/29/2017 at Unknown time Yes Melissa Isaac APRN CNP   Ferrous Sulfate (IRON SUPPLEMENT PO) Take 325 mg by mouth daily (with breakfast)   Yes Reported, Patient   order for DME Equipment being ordered: TENS and supplies.   Lissy Galeana APRN CNP

## 2017-01-30 NOTE — ED NOTES
Assumed pt care at this time. Pt resting on cart at this time. Family at bedside. Will cont to monitor.

## 2017-01-30 NOTE — ED NOTES
Bed: ED01  Expected date: 1/29/17  Expected time:   Means of arrival: Ambulance  Comments:  Yonis 594, 89 y/o M

## 2017-01-30 NOTE — PLAN OF CARE
Problem: Goal Outcome Summary  Goal: Goal Outcome Summary  OT: Orders received. Pt awaiting TLSO and will likely discharge to rehab facility, will postpone evaluation until after PT evaluation tomorrow to assess for OT needs.

## 2017-01-30 NOTE — CONSULTS
MERARY     D: Per MD request to assist with discharge planning, per discussion with spine PA anticipate pt's transfer to rehab facility on discharge. Chart reviewed noting pt's admit yesterday due to fever and cough, back pain... noted findings of compression fx, plan of care for treatment of pneumonia. Not past medical hx including COPD, pt's use of home oxygen. With chart review it is noted that pt had been receiving [facility] services until December, 2016. Documentation of home care SW notes that pt had been independent with ambulation with use of walker, wife and family noted to be supportive and available for assist as needed. Documentation also notes of pt's cognitive limitations with wife's monitoring of his needs at home.They have bee receiving housekeeping support at home as well as use of [employer] food delivery for some meals, daughter-in-law also assists with brining meals to them at times. Information provided to family by SW at the time of December visit included [employer], other food delivery services, [facility] adult day services for pt as well as caregiver support services for wife.     I: Met with pt, wife and daughter also present in room.. they affirm planning in anticipation of pt's transfer to rehab facility on discharge and identify [facility] as facility of preference due to pt's previous stay there, semi-private room has been requested. Referral made, it is noted in contact to with admissions coordinator that there are currently beds available at facility, assessment in progress.      A/P: Good family support and needs identification, will await MD determination of discharge date, [facility]- assessment, continue planning accordingly.

## 2017-01-30 NOTE — ED NOTES
Report called to the floor and pt can be transferred at this time. Denies complaints at this time. Family remains at bedside. Will cont to monitor.

## 2017-01-30 NOTE — ED NOTES
"ABCs intact. Pt hx chronic back pain. Pt had an epidural on Wednesday. Pt was finishing exercising on Friday and fell onto his buttocks.  Pt didn't remember the fall. Pt c/o increased back pain since fall. Pt had an episode of \"nonsense talking\". Family states he has \"periods of nonsense talking\" every once in a while.     Pt's home meds: see [facility]  "

## 2017-01-30 NOTE — DISCHARGE INSTRUCTIONS
Call [phone] to make a follow up appointment with Jessie Wolff CNP at [facility] in 6 weeks.     Obtain a lumbar/flexion extension x-ray prior to this appointment.   Wear brace when out of bed, check skin daily to check for irritated areas. Call spine clinic if issues with brace    Call primary care md if:  Temperature  Increased/persistent cough above baseline  Increased shortness of breath at rest and/or with activity.  New or increased numbness, tingling and/or increased weakness in lower ext. Call spine surgeon  Chest pain or extreme shortness of breath call [phone]

## 2017-01-30 NOTE — PLAN OF CARE
Problem: Goal Outcome Summary  Goal: Goal Outcome Summary  Outcome: Improving  Disoriented to place, time and situation. VS stable, afebrile. Denies pain. Lidoderm patches on pt's back. Stood at the bedside with assist of two, continent of urine. Infrequent nonproductive cough, need a sputum culture. Sleeping between cares. Continue monitoring.

## 2017-01-30 NOTE — PLAN OF CARE
Problem: Goal Outcome Summary  Goal: Goal Outcome Summary  Outcome: No Change  Collected sputum sample and sent to the lab, awaiting results. Pt is droplet precautions, confused regarding person, time. Spinal brace has been ordered. Pt will remain on antibiotics for 2 days [facility] family informed. Pt was steady on 2 LPM/NC throughout day until 1430, patient sat up on own, dangling legs off side of bed wanting to use bathroom. SP02 dropped to 85%, slowly increased o2 to stabalize spo2, Nc at 4.5 LPM at moment c reading of 99%. Pt c/o of pain in back in beginning of shift, taking tylenol for pain mgmt. narcs make pt hallucinate. Pt very lethargic most of shift. Incontinent in pull ups

## 2017-01-30 NOTE — PLAN OF CARE
Problem: Goal Outcome Summary  Goal: Goal Outcome Summary  PT: Orders received. Approached pt for evaluation and treatment. Pt very sleepy and painful with movement. Pt attempts adjusting in bed and reports that his pain is very high. This writer assists pt with adjusting pillows for comfort. Upon completion pt is falling back to sleep. Additionally, pt awaiting TLSO. Given these factors PT eval will be postponed. Nursing in agreement. Pt rescheduled appropriately.

## 2017-01-30 NOTE — PROGRESS NOTES
[facility]    Hospitalist Progress Note  Name: Chaz Soler    MRN: [id number]  Provider: Bernice Saravia MD  Date of Service: 01/30/2017    Assessment and Plan  Summary of Stay: Chaz Soler is a 88 year old male who was admitted on 1/29/2017. Patient with a past medical history significant for pulmonary fibrosis on chronic home oxygen, heart failure with preserved EF, coronary artery disease, chronic kidney disease, s/p TAVR and more recently issues with chronic back pain, who presented to the hospital after a fall. In ER, he was found to have a pneumonia and a compression fracture.      Left Sided Pneumonia  --Started on Ceftriaxone for community acquired pneumonia  -- on Ceftriaxone and Azithromax  -- Blood cultures pending  -- influenza neg    compression fractures of L2 and L3  -- pain control. PRN tramadol  -- continue gabapentin  -- h/o confusion with narcs  -- low dose hydrocodone for breakthrough pain  -- spine surgery consulted  --- patient may need brace but his dementia may cause trouble wearing brace    S/p Fall  -- mechanical fall  -- falls precaution  -- PT/OT    AS s/p TAVR  -- stable    CKD  -- Baseline 1.4- 1.6  -- creatinine 1.36 today      Ch hypoxemic respiratory failure  --h/o pulm fibrosis and COPD  --on Home O2 2-3 liters    H/o Delirium  -- on Delirium protocol  -- started on low dose Seroquel at bedtime    HTN  -- on amlodipine, losartan and metoprolol    Hypothyroidism  -- On synthyroid    Dementia  -- on Aricept    Care plan d/w wife    DVT Prophylaxis: Enoxaprain (Lovenox) SQ  Code Status: Full Code    Disposition: Expected discharge in 1-2 days to short term facility/ TCU for deconditioning and pain      Interval History  C/o weakness, severe back pain. + cough. No chest pain, no palp, no fever or chills. Review of all other systems negative    -Data reviewed today: I reviewed all new labs and imaging reports over the last 24 hours. I personally reviewed no images  or EKG's today.    Physical Exam  Temp: 99  F (37.2  C) Temp src: Oral BP: 166/56 mmHg Pulse: 65   Resp: 18 SpO2: 95 % O2 Device: Oxymask Oxygen Delivery: 4 LPM  There were no vitals filed for this visit.  Vital Signs with Ranges  Temp:  [98.4  F (36.9  C)-100.9  F (38.3  C)] 99  F (37.2  C)  Pulse:  [65-94] 65  Resp:  [18-22] 18  BP: (124-181)/(46-91) 166/56 mmHg  SpO2:  [85 %-98 %] 95 %  I/O last 3 completed shifts:  In: 0   Out: 300 [Urine:300]      GEN:  Alert, oriented x 3, appears comfortable, NAD.  HEENT:  Normocephalic/atraumatic, no scleral icterus, no nasal discharge, mouth moist.  CV:  Regular rate and rhythm, no murmur or JVD.  S1 + S2 noted, no S3 or S4.  LUNGS:  Few bibasilar crackles. Bilateral air entry.  Symmetric chest rise on inhalation noted.  ABD:  Active bowel sounds, soft, non-tender/non-distended.  No rebound/guarding/rigidity.  EXT:  No edema.  No cyanosis.    SKIN:  Dry to touch, no exanthems noted in the visualized areas.    Medications       cefTRIAXone  1 g Intravenous Q24H     sodium chloride (PF)  3 mL Intracatheter Q8H     enoxaparin  30 mg Subcutaneous Q24H     acetaminophen  1,000 mg Oral TID     QUEtiapine  12.5 mg Oral At Bedtime     amLODIPine  10 mg Oral Daily     aspirin EC  81 mg Oral QPM     atorvastatin  20 mg Oral Daily     donepezil  10 mg Oral At Bedtime     furosemide  20 mg Oral Daily     gabapentin  100 mg Oral TID     levothyroxine  88 mcg Oral Daily     losartan  100 mg Oral QPM     metoprolol  25 mg Oral Daily     nortriptyline  25 mg Oral At Bedtime     pantoprazole  40 mg Oral QAM     polyethylene glycol  17 g Oral Daily     tamsulosin  0.4 mg Oral Daily     lidocaine  2 patch Transdermal Q24h    And     lidocaine   Transdermal Q24H    And     lidocaine   Transdermal Q8H     azithromycin  250 mg Oral QPM     Data      Recent Labs  Lab 01/30/17  0655 01/29/17  1850   WBC 9.5 13.2*   HGB 11.3* 12.8*   HCT 33.9* 38.3*   MCV 90 90   * 151       Recent Labs  Lab  01/30/17  0655 01/29/17  1850    139   POTASSIUM 4.2 4.3   CHLORIDE 102 101   CO2 31 30   ANIONGAP 6 8   GLC 88 100*   BUN 29 31*   CR 1.36* 1.48*   GFRESTIMATED 49* 45*   GFRESTBLACK 60* 54*   LEXIS 9.5 9.3       Recent Labs  Lab 01/29/17  2125 01/29/17  1850   CULT No growth after 1 hour No growth after 1 hour     No results for input(s): AST, ALT, GGT, ALKPHOS, BILITOTAL, BILICONJ, BILIDIRECT, WAYNE in the last 168 hours.    Invalid input(s): BILIRUBININDIRECT  No results for input(s): INR in the last 168 hours.    Recent Labs  Lab 01/29/17 2026   COLOR Yellow   APPEARANCE Clear   URINEGLC Negative   URINEBILI Negative   URINEKETONE Negative   SG 1.011   UBLD Negative   URINEPH 7.0   PROTEIN 30*   NITRITE Negative   LEUKEST Negative   RBCU 3*   WBCU 1       Recent Results (from the past 24 hour(s))   Lumbar spine CT w/o contrast    Narrative    CT OF THE LUMBAR SPINE WITHOUT CONTRAST  1/29/2017 7:43 PM     COMPARISON: Lumbar spine MRI 11/4/16.    HISTORY: Fall, back pain.     TECHNIQUE: Axial images of the lumbar spine were acquired without  intravenous contrast. Multiplanar reformations were created from the  axial source images.      FINDINGS:  There has been interval development of mild compression  deformities of the L2 and L3 vertebral bodies. No other fractures.  Mild degenerative anterolisthesis of L4 upon L5 again noted. There is  moderate facet arthropathy bilaterally at L3-L4, L4-L5 and L5-S1.  There is no significant spinal canal stenosis of the lumbar spine.  There is moderate facet arthropathy bilaterally at the L1-L2, L2-L3,  L3-L4 and L4-L5 levels.      Impression    IMPRESSION:  1. Recent mild compression fractures of the L2 and L3 vertebral bodies  that are new from the comparison lumbar spine MRI 11/4/2016.  2. No significant spinal canal stenosis of the lumbar spine.  3. Diffuse degenerative changes of the lumbar spine.      Radiation dose for this scan was reduced using automated  exposure  control, adjustment of the mA and/or kV according to patient size, or  iterative reconstruction technique    GAGE GARRETT MD   XR Chest 2 Views    Narrative    CHEST TWO VIEW 1/29/2017 7:56 PM     COMPARISON: Chest CT 6/14/2016.    HISTORY: Cough, fever.      Impression    IMPRESSION: Severe extensive coarse fibrotic changes throughout both  lungs again noted. There may be new airspace opacity in the mid and  lower aspects of the left lung that may represent pneumonia. There is  no pleural effusion or pneumothorax. Heart size is normal. There is no  evidence for congestive failure.    GAGE GARRETT MD

## 2017-01-30 NOTE — H&P
[facility]    History and Physical  Hospitalist    Name: Chaz Soler    MRN: [id number]  YOB: 1928    Age: 88 year old  Primary care provider: Alirio Fox       Date of Admission:  1/29/2017  Date of Service (when I saw the patient): 01/29/2017      ASSESSMENT AND PLAN:  Chaz Soler is an 88-year-old gentleman with a past medical history significant for pulmonary fibrosis on chronic home oxygen, heart failure with preserved EF, coronary artery disease, chronic kidney disease, s/p TAVR and more recently issues with chronic back pain, who presents to the hospital after a fall.     He had a steroid injection a few days ago for his chronic back pain.  He has been having more cough at home recently, and has been more weak.  Today, he fell down while he was ambulating without using his walker, and fell down on his buttocks.  In the ER, he was found to have a pneumonia and a compression fracture, and is being admitted for further evaluation.     1.  Left-sided pneumonia:  He has chronic fibrotic changes on x-ray, although on my review of imaging, he does seem to have increased consolidation on the left side.  He had a brief hospital stay for an observation visit a few months ago.  For now we will continue treating for community-acquired sources with ceftriaxone and azithromycin.  We will try to obtain sputum cultures.  Blood cultures have been obtained and will be followed.  His influenza antigen is negative.     2.  Recent mild compression fractures of L2 and L3, causing acute on chronic worsening of his back pain:  As mentioned above, the patient had a steroid injection into his back just a few days ago.  For now we will continue pain control mainly with p.r.n. tramadol and continue his home gabapentin.  Family mentions that he is very sensitive to pain medications and gets confused, and they do not want him on any medications like Percocet or oxycodone.  We discussed that we will  have low-dose hydromorphone available for breakthrough pain, and the family is agreeable to that.  We will also request a Spine Surgery consultation for further evaluation, as the patient may need a brace, although with his dementia, he may have difficulty wearing that.     3.  Fall:  Sounds mechanical in nature, as he was not using his walker to get around.  No other signs of trauma noted.  Spine Surgery consultation, as discussed above.  We will have PT and OT evaluate the patient.   4.  Aortic valve stenosis, status post TAVR procedure, and history of chronic diastolic heart failure:  Seems to be compensated at this time.  No signs of acute volume overload.  We will follow his usual cardiac medications including the diuretic.   5.  Chronic kidney disease:  Creatinine appears to be stable.  This will be followed while in the hospital.  His baseline seems to be around the 1.4-1.6 range.   6.  Chronic hypoxemic respiratory failure:  The patient has a history of pulmonary fibrosis and COPD, and is on chronic home oxygen.  Right now he seems to be doing well on about 2-3 liters of oxygen, which is close to his baseline.  He might have some worsening due to pneumonia, and will be closely monitored.   7.  Concerns for delirium:  The patient is elderly with memory impairment and dementia, and he also gets confused in the hospital.  We will resume the delirium protocol and we will closely monitor him.  I will try him on a low dose Seroquel at bedtime, 12.5 mg at bedtime, as this seemed to have benefited him before.  Family is in agreement with that.   8.  Hypertension:  Resume the patient on his other usual chronic home medications including amlodipine, metoprolol, and losartan.   9.  Hypothyroidism:  Resume his Synthroid.   10.  Dementia:  Resume the patient on his Aricept.   11.  DVT prophylaxis:  Enoxaparin.   12.  CODE STATUS:  I have discussed this with the patient's family at bedside.  They would like him to be FULL  CODE for now.  On looking at his previous Advance Directive, it sounds like he was DNR with a short trial of intubation for reversible illness only.  This can be further discussed with the family later on.      Plan of care has been discussed with the patient and his family at bedside in great detail including his spouse and his daughter.  All of their questions were answered.  They are comfortable with the plan and agree with it      CHIEF COMPLAINT:  Fall and back pain.      HISTORY OF PRESENTING ILLNESS:  This is an 88-year-old gentleman with a complicated past medical history, as discussed above, with multiple comorbidities, who presented to the hospital after a fall.  Family mentions that he has chronic issues with back pain, and he had an epidural injection on Wednesday.  They said that he has been just feeling weak and has been having a lot of productive coughing with sticky greenish phlegm.  They have not noticed any fevers at home, although he was febrile in the ER today.  Today, he was trying to get to the washroom, and apparently was not using his walker, and he fell down on his buttocks.  No other trauma.  No injury to the head was noted.  He was having increased back pain afterwards and had a hard time getting up on his own.  He also was getting more confused, and the family brought him here to the hospital.  They have noticed some increased urinary frequency and episode of incontinence as well at home.  His UA today has been negative for UTI.      On arrival to the ER, the patient's imaging showed concern for pneumonia, and a compression fracture in his spine.  At this time, the patient reports that his pain is doing okay.  He is somewhat confused and trying to get out of bed.  He says that he does not have any pain in his chest.  There are no reports of any shortness of breath, although he does state that he is having a productive cough.  Otherwise, he is somewhat confused and it is difficult to  obtain a detailed history.        Past Medical History   I have reviewed this patient's medical history and updated it with pertinent information if needed.   Past Medical History   Diagnosis Date     Hyperplasia of prostate      Lumbago      LEFT LUNG GRANULOMA      Essential hypertension      Gout 1/06     knee     Unspecified hypothyroidism      Vitamin D deficiency      Pulmonary fibrosis (H)      Hyperparathyroidism (H)      AAA (abdominal aortic aneurysm) (H) 10/5/2011     6.1 cm     CAD (coronary artery disease)      MI - distal inferior/apex. Non transmural     Hyperlipidemia LDL goal < 70      CKD (chronic kidney disease) stage 3, GFR 30-59 ml/min 11/30/2011     Anemia 11/30/2011     Thrombocytopenia (H) 11/30/2011     Proteinuria 2/8/2012     Aortic stenosis 10/26/2012     CHF (congestive heart failure) (H) 12/31/2014     Hypercalcemia 1/2/2015     Hyperparathyroidism, unspecified (H) 4/22/2015     Dementia 8/4/2015     Edema, unspecified edema 1/17/2016     Other chronic pain 10/11/2016       Past Surgical History  I have reviewed this patient's surgical history and updated it with pertinent information if needed.  Past Surgical History   Procedure Laterality Date     Vasectomy       Right cataract extraction/iol  12/03     Colonoscopy  9/02 per patient     Laparoscopic cholecystectomy  Nov 2011     Repair aneurysm abdominal aorta  Nov 2011     Endovascular repair aneurysm abdominal aorta  11/18/2011     Procedure:ENDOVASCULAR REPAIR ANEURYSM ABDOMINAL AORTA; ENDOVASCULAR AAA,RIGHT FEMORAL       Laparoscopic cholecystectomy  11/18/2011     Procedure:LAPAROSCOPIC CHOLECYSTECTOMY; LAPAROSCOPIC CHOLECYSTECTOMY ; Surgeon:DARRYL DORADO; Location: OR     Discectomy lumbar posterior microscopic one level  8/11/2014     Procedure: DISCECTOMY LUMBAR POSTERIOR MICROSCOPIC ONE LEVEL;  Surgeon: Jone Carvalho MD;  Location:  OR     Transcatheter aortic valve implant anesthesia N/A 11/12/2014      Procedure: TRANSCATHETER AORTIC VALVE IMPLANT ANESTHESIA;  Surgeon: Generic Anesthesia Provider;  Location: [facility] OR       Prior to Admission Medications  Prior to Admission Medications   Prescriptions Last Dose Informant Patient Reported? Taking?   AMOXICILLIN PO  Spouse/Significant Other Yes Yes   Sig: Take 500 mg by mouth once as needed   Ferrous Sulfate (IRON SUPPLEMENT PO)  Spouse/Significant Other Yes Yes   Sig: Take 325 mg by mouth daily (with breakfast)    acetaminophen (TYLENOL) 325 MG tablet 1/29/2017 at Unknown time Spouse/Significant Other No Yes   Sig: Take 2 tablets (650 mg) by mouth every 6 hours as needed for mild pain   amLODIPine (NORVASC) 10 MG tablet  Spouse/Significant Other No Yes   Sig: Take 1 tablet (10 mg) by mouth daily   aspirin 81 MG EC tablet 1/28/2017 at Unknown time  Yes Yes   Sig: Take 81 mg by mouth every evening   atorvastatin (LIPITOR) 20 MG tablet  Spouse/Significant Other No Yes   Sig: Take 1 tablet (20 mg) by mouth daily   cyanocobalamin (VITAMIN  B-12) 1000 MCG tablet  Spouse/Significant Other Yes Yes   Sig: Take 1,000 mcg by mouth daily   diclofenac (VOLTAREN) 1 % GEL  Spouse/Significant Other No Yes   Sig: Apply 4 grams to low back right hip and right leg four times as needed  daily using enclosed dosing card.  Lidocaine cream   donepezil (ARICEPT) 10 MG tablet  Spouse/Significant Other No Yes   Sig: Take 1 tablet (10 mg) by mouth At Bedtime   furosemide (LASIX) 20 MG tablet  Spouse/Significant Other Yes Yes   Sig: Take 20 mg by mouth daily   gabapentin (NEURONTIN) 100 MG capsule 1/28/2017 at PM Spouse/Significant Other No Yes   Sig: Take 1 capsules Three times a day for pain   levothyroxine (SYNTHROID/LEVOTHROID) 88 MCG tablet  Spouse/Significant Other No Yes   Sig: Take 1 tablet (88 mcg) by mouth daily   lidocaine-prilocaine (EMLA) cream  Spouse/Significant Other No Yes   Sig: Apply topically as needed for moderate pain To low back, right hip  and right leg Apply same time as  Diclofenac gel   losartan (COZAAR) 100 MG tablet 2017 at PM Spouse/Significant Other No Yes   Sig: Take 1 tablet (100 mg) by mouth daily   metoprolol (TOPROL-XL) 25 MG 24 hr tablet  Spouse/Significant Other No Yes   Sig: Take 1 tablet (25 mg) by mouth daily   nortriptyline (PAMELOR) 25 MG capsule  Spouse/Significant Other Yes Yes   Sig: Take 25 mg by mouth At Bedtime   order for DME  Spouse/Significant Other No No   Sig: Equipment being ordered: TENS and supplies.   pantoprazole (PROTONIX) 40 MG enteric coated tablet  Spouse/Significant Other No Yes   Sig: Take 1 tablet (40 mg) by mouth every morning   polyethylene glycol (MIRALAX) packet  Spouse/Significant Other No Yes   Sig: Take 17 g by mouth daily   tamsulosin (FLOMAX) 0.4 MG capsule  Spouse/Significant Other No Yes   Sig: Take 1 capsule (0.4 mg) by mouth daily   traMADol (ULTRAM) 50 MG tablet 2017 at PM Spouse/Significant Other No Yes   Si tab tid a day prn abut 8 hrs apart      Facility-Administered Medications: None     Allergies  Allergies   Allergen Reactions     Contrast Dye      SOB, increased Bp, difficulty swallowing. Date 6/3/96 (ipaque contrast)  Was premedicated prior to FRANCK and had no reaction at all (solumedrol and benadryl) 2016  Was premedicated with Methylprednisolone protocol, no reaction 17     Lisinopril      cough     Oxycodone      Delirium       Social History  I have reviewed this patient's social history and updated it with pertinent information if needed. Chaz Kingarely  reports that he has never smoked. He has never used smokeless tobacco. He reports that he drinks alcohol. He reports that he does not use illicit drugs.    Family History  I have reviewed this patient's family history and updated it with pertinent information if needed.   Family History   Problem Relation Age of Onset     Hypertension Mother      Hypertension Father        Review of Systems  The 10 point Review of Systems is negative other than  noted in the HPI or here.     Physical Exam  Temp: 99.9  F (37.7  C) Temp src: Oral BP: 156/68 mmHg Pulse: 74   Resp: 20 SpO2: 94 % O2 Device: Oxymask Oxygen Delivery: 2.5 LPM  Vital Signs with Ranges  Temp:  [99.9  F (37.7  C)-100.9  F (38.3  C)] 99.9  F (37.7  C)  Pulse:  [74-94] 74  Resp:  [20-22] 20  BP: (156-181)/(68-91) 156/68 mmHg  SpO2:  [90 %-98 %] 94 %  0 lbs 0 oz  70.3 kg    GENERAL: Comfortable appearing, No acute distress. Confused, trying to get out of bed.    PSYCH: confused but not agitated, no hallucinations , redirectable   EYES: EOMI, conjunctiva clear  HEENT:  Head is normocephalic, atraumatic, Neck is Supple, trachea is midline    CARDIOVASCULAR: Regular rate and rhythm, Normal S1, S2, no loud murmurs, no rubs or gallops.    PULMONARY:  Coarse sounds bilaterally with some rhonchi on left lung base, good entry on both sides  CHEST: Good inspiratory effort bilaterally    GI: Abdomen is soft, non tender, non-distended, no masses palpated, normal bowel sounds. No rebound or guarding    SKIN:  Dry, warm to touch. No obvious exanthems on exposed areas  EXTREMITIES:  Good capillary refill with signs of adequate peripheral perfusion. No peripheral edema    Neuro: Awake, alert, able to tell me he is in hospital, not the date, good strength in all four extremities, 5/5, No focal weakness or numbness is noted. Grossly non-focal.   MSK: Good range of motion in all major joints of upper and lower extremity, No acute joint synovitis of the major joints is noted.     Data  Data reviewed today: I personally reviewed the EKG tracing showing sinus rythm with LVH, QTc is 433.      Lab data and imaging results from today have been reviewed.       Recent Labs  Lab 01/29/17  1850   WBC 13.2*   HGB 12.8*   MCV 90         POTASSIUM 4.3   CHLORIDE 101   CO2 30   BUN 31*   CR 1.48*   ANIONGAP 8   LEXIS 9.3   *       Recent Results (from the past 24 hour(s))   Lumbar spine CT w/o contrast    Narrative     CT OF THE LUMBAR SPINE WITHOUT CONTRAST  2017 7:43 PM     COMPARISON: Lumbar spine MRI 16.    HISTORY: Fall, back pain.     TECHNIQUE: Axial images of the lumbar spine were acquired without  intravenous contrast. Multiplanar reformations were created from the  axial source images.      FINDINGS:  There has been interval development of mild compression  deformities of the L2 and L3 vertebral bodies. No other fractures.  Mild degenerative anterolisthesis of L4 upon L5 again noted. There is  moderate facet arthropathy bilaterally at L3-L4, L4-L5 and L5-S1.  There is no significant spinal canal stenosis of the lumbar spine.  There is moderate facet arthropathy bilaterally at the L1-L2, L2-L3,  L3-L4 and L4-L5 levels.      Impression    IMPRESSION:  1. Recent mild compression fractures of the L2 and L3 vertebral bodies  that are new from the comparison lumbar spine MRI 2016.  2. No significant spinal canal stenosis of the lumbar spine.  3. Diffuse degenerative changes of the lumbar spine.      Radiation dose for this scan was reduced using automated exposure  control, adjustment of the mA and/or kV according to patient size, or  iterative reconstruction technique    GAGE GARRETT MD   XR Chest 2 Views    Narrative    CHEST TWO VIEW 2017 7:56 PM     COMPARISON: Chest CT 2016.    HISTORY: Cough, fever.      Impression    IMPRESSION: Severe extensive coarse fibrotic changes throughout both  lungs again noted. There may be new airspace opacity in the mid and  lower aspects of the left lung that may represent pneumonia. There is  no pleural effusion or pneumothorax. Heart size is normal. There is no  evidence for congestive failure.    MD FILIPPO BRICENO MD             D: 2017 22:33   T: 2017 23:57   MT: [id number]      Name:     NATTY MAN   MRN:      [id number]        Account:      [id number]   :      1928           Admitted:     [id number]       Document: [id number]       cc: Alirio Fox MD

## 2017-01-30 NOTE — ED PROVIDER NOTES
"  History     Chief Complaint:  Back pain  Fever  cough    HPI   Patient presents to the ED via EMS and with his wife and daughter who present his history secondary to his condition.  Chaz Soler is a 88 year old male, with a history of chronic back pain, hypertension, gout, CAD, AAA- s/p repair, pulmonary fibrosis, among others, who presents with back pain, fever and cough. The daughter reports that over the past few months his chronic back pain has worsened. On Wednesday he had an epidural steroid injection for this. Thursday he was feeling ok. Friday he was exercising in his building. When he tried to get off the exercise back he complained to his daughter and wife that his back pain was worse and he had difficulties getting up.  They helped him to the apartment and into his walker. They left him for a second and  He got up and went to the bathroom. En route to the bathroom, he fell trying to reach his walker without hitting his head or losing consciousness.  He landed on his bottom.  His back pain has been much worse since the fall.  Additionally, since Friday he has also had a cough and some congestion. Saturday he noted that his \"spasming\" pain in his back had been worsening.  However, he took a nap, went to the bathroom, and then could not get off from the toilet secondary to the muscle spasming and pain, prompting his wife to call EMS for transport here for evaluation. On the toilet he was noted to be nauseous but did not vomit. On EMS arrival he was noted to have a fever. At home he is noted to be constantly on oxygen at 2-2.5L O2 for a hx of pulmonary fibrosis. The family denies the patient's confusion being any different than normal. He was last given Gabapentin, tramadol, and tylenol this morning at breakfast. He endorses trouble making it to the bathroom secondary to pain but not due to incontinence.      Allergies:  Contrast dye - increased BP, difficulty swallowing  Lisinopril - cough   Oxycodone " - Delirium     Medications:    Gabapentin (neurontin) 100 mg capsule  Tamsulosin (flomax) 0.4 mg capsule  Losartan (cozaar) 100 mg tablet  Levothyroxine (synthroid/levothroid) 88 mcg tablet  Tramadol (ultram) 50 mg tablet  Order for dme  Polyethylene glycol (miralax) packet  Atorvastatin (lipitor) 20 mg tablet  Cyanocobalamin (vitamin  b-12) 1000 mcg tablet  Amoxicillin po  Diclofenac (voltaren) 1 % gel  Lidocaine-prilocaine (emla) cream  Donepezil (aricept) 10 mg tablet  Amlodipine (norvasc) 10 mg tablet  Metoprolol (toprol-xl) 25 mg 24 hr tablet  Pantoprazole (protonix) 40 mg enteric coated tablet  Aspirin 81 mg tablet  Acetaminophen (tylenol) 325 mg tablet  Ferrous sulfate (iron supplement po)    Past Medical History:    Hyperplasia of prostate   Lumbago   LEFT LUNG GRANULOMA   Essential hypertension   Gout   Unspecified hypothyroidism   Vitamin D deficiency   Pulmonary fibrosis (H)   Hyperparathyroidism (H)   AAA (abdominal aortic aneurysm) (H)   CAD (coronary artery disease)   Hyperlipidemia LDL goal < 70   CKD (chronic kidney disease) stage 3, GFR 30-59 ml/min   Anemia   Thrombocytopenia (H)   Proteinuria   Aortic stenosis   CHF (congestive heart failure) (H)   Hypercalcemia   Hyperparathyroidism, unspecified (H)   Dementia   Edema, unspecified edema   Other chronic pain       Past Surgical History:    Vasectomy   Right cataract extraction/iol   Colonoscopy   Laparoscopic cholecystectomy   Repair aneurysm abdominal aorta   Endovascular repair aneurysm abdominal aorta   Laparoscopic cholecystectomy   Discectomy lumbar posterior microscopic one level   Transcatheter aortic valve implant anesthesia     Family History:    Hypertension     Social History:  Smoking status: never  Alcohol use: yes - 1 glass wine/day   Marital Status:       Review of Systems   Constitutional: Positive for fever.   HENT: Positive for congestion.    Respiratory: Positive for cough.    Musculoskeletal: Positive for back pain.    Neurological: Negative for syncope.   Psychiatric/Behavioral: Negative for confusion.   All other systems reviewed and are negative.      Physical Exam     Patient Vitals for the past 24 hrs:   BP Temp Temp src Pulse Resp SpO2   01/29/17 1850 - - - - - 98 %   01/29/17 1845 - - - - - 98 %   01/29/17 1840 - - - - - 97 %   01/29/17 1832 (!) 176/91 mmHg 100.9  F (38.3  C) Oral 94 22 90 %       Physical Exam    Gen: alert  HEENT: PERRL, oropharynx clear  Neck: normal ROM  CV: RRR, no murmurs  Pulm: breath sounds equal, lungs clear  Abd: Soft, nontender  Back: no evidence of injury, no cva tenderness  MSK: no deformity, moves all extremities  Skin: no rash  Neuro: alert, appropriate conversation and interaction  Neuro: 5/5 strength BLE in hip flexion and ext, knee flexion and ext, plantar and dorsiflexion, and extension of EHL, sensation intact to light touch over all dermatomes of the legs      Emergency Department Course   ECG (20:15:22):  Rate 87 bpm. NM interval 184. QRS duration 120. QT/QTc 360/433. P-R-T axes 20 -37 99.   Normal sinus rhythm.  Left axis deviation.  Left ventricular hypertrophy with QRS widening and repolarization abnormality.  Abnormal ECG.  Agree with computer interpretation.  Interpreted at 2109 by Lenora Clements MD*.    Imaging:  Radiographic findings were communicated with the patient who voiced understanding of the findings.    XR chest 2 views  IMPRESSION: Severe extensive coarse fibrotic changes throughout both  lungs again noted. There may be new airspace opacity in the mid and  lower aspects of the left lung that may represent pneumonia. There is  no pleural effusion or pneumothorax. Heart size is normal. There is no  evidence for congestive failure.  As read by Radiology.    Lumbar spine CT w/o contrast  IMPRESSION:  1. Recent mild compression fractures of the L2 and L3 vertebral bodies  that are new from the comparison lumbar spine MRI 11/4/2016.  2. No significant spinal  canal stenosis of the lumbar spine.  3. Diffuse degenerative changes of the lumbar spine.  Radiation dose for this scan was reduced using automated exposure  control, adjustment of the mA and/or kV according to patient size, or  iterative reconstruction technique  As read by Radiology.    Laboratory:  Blood Culture x2: Pending  CBC: WBC 13.2 (H), HGB 12.8 (L), o/w WNL ()   BMP: Glucose 100 (H), BUN 31 (H), Creatinine 1.48 (H), GFR estimate 45 (L), o/w WNL  UA reflex to microscopic: protein albumin urine 30, RBC/HPF 3 (H), o/w negative  Influenza A/B antigen: negative  Lactic acid whole blood: 1.3    Interventions:  1918 - Tylenol 650 mg PO  1918 - Robaxin 500 mg PO    Emergency Department Course:  Past medical records, nursing notes, and vitals reviewed.  1838: I performed an exam of the patient and obtained history, as documented above.  IV inserted and blood drawn.  The patient was sent for a X-ray and CT scan while in the emergency department, findings above.  2036: I discussed the case with Dr. Bowers regarding the patient.  Findings and plan explained to the Patient who consents to admission.   2039: Discussed the patient with Dr. Bowers, who will admit the patient to a medical bed for further monitoring, evaluation, and treatment.       Impression & Plan      Medical Decision Making:  Chaz Soler is a 88 year old male who presents for multiple complaints. First is back pain. Patient has chronic back pain, however fell at home. CT of the lumbar spine shows new compression fractures of the L2 and L3 vertebrae. Lower extremity neuro exam is intact.  No neck pain or thoracic back pain to suggest need for further spinal imaging. Nursing note had noted incontinence, however this is further specified with the patient and he reports he has been unable to make it to the bathroom secondary to back pain, but not truly having urinary incontinence. Again, distally neurologically intact.   The patient also notes  fever and cough. Patient has productive cough and coarse bilateral breath sounds. Fever and leukocytosis noted. Lactic acid within normal limits. Blood pressures slightly hypertensive. No tachycardia. Chest X-ray shows new left-sided infiltrate. Will treat for community-acquired pneumonia.  Fever attributed to pneumonia and did not feel that MRI of the spine was indicated to eval for epidural abscess or infectious complication of FRANCK.  Back pain is explained by fall and compression fractures. It is noted that the patient is briefly admitted to [facility] for observation overnight in November. Given this is a brief stay in and no other medical exposure, we will treat for CAP. UA negative. Influenza negative.antibiotics started. Pain control. Plan for admission for treatment of compression fractures as well as pneumonia. Patient was assessed with discussed with Dr. Bowers of the hospitalist services and was admitted to a medical bed.    Diagnosis:    ICD-10-CM    1. Compression fx, lumbar spine, closed, initial encounter (H) S32.000A UA reflex to Microscopic     Blood culture   2. Pneumonia of left lower lobe due to infectious organism J18.9        Disposition:  Admitted to Dr. Bowers.        Janak Costello  1/29/2017   [facility] EMERGENCY DEPARTMENT    I, Janak Costello, am serving as a scribe at 6:38 PM on 1/29/2017 to document services personally performed by Lenora Clements MD* based on my observations and the provider's statements to me.      Lenora Clements MD  01/29/17 8826

## 2017-01-30 NOTE — PLAN OF CARE
Problem: Goal Outcome Summary  Goal: Goal Outcome Summary  Outcome: No Change  Pt admitted for compression fracture and pneumonia.  Spine consult in AM.  Started on IV Rocephin and PO Zithromax.  On 2L via oxymask--chronic O2 at home.  Does have confusion at times-family reports it has been worse the past few days.  Alert to self, family and day only upon arrival to floor.  At home transfers with walker.  No narcotics per family as pt has severe hallucinations and mood changes.  Has Ultram as needed as well as Tylenol.  Will continue to monitor

## 2017-01-30 NOTE — ED NOTES
Pt resting on cart. Denies complaints at this time. Offered comfort cares at this time. No acute distress noted. Will cont to monitor.

## 2017-01-30 NOTE — CONSULTS
[facility]    Neurosurgery Consultation     Date of Admission:  1/29/2017  Date of Consult (When I saw the patient): 01/30/2017    Assessment and Plan  Chaz Soler is a 88 year old male who was admitted on 1/29/2017. I was asked to see the patient for low back pain. History is obtained with the help of patient's son who was present in the room. Patient does have some baseline dementia. He sustained a mechanical fall in his senior living condo on Friday as he was reaching for his walker. Since the fall he has had increased low back back pain, muscle spasms and was unable to get up off the toilet, prompting his family to call EMS. He has also had worsening cough.  He denies hitting his head or losing consciousness. Denies any changes to bowel or bladder.  He reports 7/10 lumbar pain. Denies any radicular symptoms, tingling or numbness in his bilateral LE. He does report that his bilateral lower extremities have been weaker for the past several months and he has been using his walker. He has a long history of chronic low back pain and is seen at the [facility] for this. He has undergone 2 previous epidural steroid injections (most recently on 1/25/17) and his low back pain was reportedly improving until his fall on Friday. He also states he underwent an injection into his right hip which seems to have eliminated his past RLE pain. CT of his lumbar spine reveals new mild compression fractures of the L2 and L3 vertebral bodies but no significant spinal stenosis. No surgery is indicated.     Active Problems:    Pneumonia, Low Back Pain    Assessment: - Mild Compression Fractures L2 and L3.      Plan: No surgery indicated. Orthotics Consult entered for TLSO brace. Patient will need to wear brace when OOB for a minimum of 12 weeks. Brace may be removed in bed or when showering but should have nursing assistance with this. He should call [phone] to make a follow up appointment with Jessie  SHERMAN Wolff at [facility] in 6 weeks. He should obtain a lumbar/flexion extension x-ray prior to this appointment. Due to his dementia, concurrent pneumonia and likely physical de-conditioning, patient may benefit from TCU placement. SW has been consulted to assist with this if needed.       I have discussed the following assessment and plan with the Dr. Jone Carvalho who is in agreement with initial plan and will follow up with further consultation recommendations.    CELIA Hood CNP  [facility]  [place] [id number]    Tel [phone]  Pager [phone]        Code Status   Full Code    Reason for Consult  Reason for consult: I was asked by Dr. Bowers to evaluate this patient for low back pain.    Primary Care Physician  Alirio Fox    Chief Complaint  Low back pain    History is obtained from the patient and his son who was present in the room.     History of Present Illness  Chaz Solre is a 88 year old male who presents with increasing low back pain. He sustained a mechanical fall in his senior living condo on Friday as he was reaching for his walker. Since the fall he has had increased low back back pain, muscle spasms and was unable to get up off the toilet, prompting his family to call EMS. He has also had worsening cough.  He denies hitting his head or losing consciousness. Denies any changes to bowel or bladder.  He reports 7/10 lumbar pain. Denies any radicular symptoms, tingling or numbness in his bilateral LE. He does report that his bilateral lower extremities have been weaker for the past several months and he has been using his walker. He has a long history of chronic low back pain and is seen at the [facility] for this. He has undergone 2 previous epidural steroid injections (most recently on 1/25/17) and his low back pain was reportedly improving until his fall on Friday. He also  states he underwent an injection into his right hip which seems to have eliminated his past RLE pain.     Past Medical History  I have reviewed this patient's medical history and updated it with pertinent information if needed.   Past Medical History   Diagnosis Date     Hyperplasia of prostate      Lumbago      LEFT LUNG GRANULOMA      Essential hypertension      Gout 1/06     knee     Unspecified hypothyroidism      Vitamin D deficiency      Pulmonary fibrosis (H)      Hyperparathyroidism (H)      AAA (abdominal aortic aneurysm) (H) 10/5/2011     6.1 cm     CAD (coronary artery disease)      MI - distal inferior/apex. Non transmural     Hyperlipidemia LDL goal < 70      CKD (chronic kidney disease) stage 3, GFR 30-59 ml/min 11/30/2011     Anemia 11/30/2011     Thrombocytopenia (H) 11/30/2011     Proteinuria 2/8/2012     Aortic stenosis 10/26/2012     CHF (congestive heart failure) (H) 12/31/2014     Hypercalcemia 1/2/2015     Hyperparathyroidism, unspecified (H) 4/22/2015     Dementia 8/4/2015     Edema, unspecified edema 1/17/2016     Other chronic pain 10/11/2016       Past Surgical History  I have reviewed this patient's surgical history and updated it with pertinent information if needed.  Past Surgical History   Procedure Laterality Date     Vasectomy       Right cataract extraction/iol  12/03     Colonoscopy  9/02 per patient     Laparoscopic cholecystectomy  Nov 2011     Repair aneurysm abdominal aorta  Nov 2011     Endovascular repair aneurysm abdominal aorta  11/18/2011     Procedure:ENDOVASCULAR REPAIR ANEURYSM ABDOMINAL AORTA; ENDOVASCULAR AAA,RIGHT FEMORAL       Laparoscopic cholecystectomy  11/18/2011     Procedure:LAPAROSCOPIC CHOLECYSTECTOMY; LAPAROSCOPIC CHOLECYSTECTOMY ; Surgeon:DARRYL DORADO; Location: OR     Discectomy lumbar posterior microscopic one level  8/11/2014     Procedure: DISCECTOMY LUMBAR POSTERIOR MICROSCOPIC ONE LEVEL;  Surgeon: Jone Carvalho MD;  Location:  OR      Transcatheter aortic valve implant anesthesia N/A 11/12/2014     Procedure: TRANSCATHETER AORTIC VALVE IMPLANT ANESTHESIA;  Surgeon: Generic Anesthesia Provider;  Location: [facility] OR       Prior to Admission Medications  Prior to Admission Medications   Prescriptions Last Dose Informant Patient Reported? Taking?   AMOXICILLIN PO  Spouse/Significant Other Yes Yes   Sig: Take 500 mg by mouth once as needed   Ferrous Sulfate (IRON SUPPLEMENT PO)  Spouse/Significant Other Yes Yes   Sig: Take 325 mg by mouth daily (with breakfast)    acetaminophen (TYLENOL) 325 MG tablet 1/29/2017 at Unknown time Spouse/Significant Other No Yes   Sig: Take 2 tablets (650 mg) by mouth every 6 hours as needed for mild pain   amLODIPine (NORVASC) 10 MG tablet  Spouse/Significant Other No Yes   Sig: Take 1 tablet (10 mg) by mouth daily   aspirin 81 MG EC tablet 1/28/2017 at Unknown time  Yes Yes   Sig: Take 81 mg by mouth every evening   atorvastatin (LIPITOR) 20 MG tablet  Spouse/Significant Other No Yes   Sig: Take 1 tablet (20 mg) by mouth daily   cyanocobalamin (VITAMIN  B-12) 1000 MCG tablet  Spouse/Significant Other Yes Yes   Sig: Take 1,000 mcg by mouth daily   diclofenac (VOLTAREN) 1 % GEL  Spouse/Significant Other No Yes   Sig: Apply 4 grams to low back right hip and right leg four times as needed  daily using enclosed dosing card.  Lidocaine cream   donepezil (ARICEPT) 10 MG tablet  Spouse/Significant Other No Yes   Sig: Take 1 tablet (10 mg) by mouth At Bedtime   furosemide (LASIX) 20 MG tablet  Spouse/Significant Other Yes Yes   Sig: Take 20 mg by mouth daily   gabapentin (NEURONTIN) 100 MG capsule 1/28/2017 at PM Spouse/Significant Other No Yes   Sig: Take 1 capsules Three times a day for pain   levothyroxine (SYNTHROID/LEVOTHROID) 88 MCG tablet  Spouse/Significant Other No Yes   Sig: Take 1 tablet (88 mcg) by mouth daily   lidocaine-prilocaine (EMLA) cream  Spouse/Significant Other No Yes   Sig: Apply topically as needed for  moderate pain To low back, right hip  and right leg Apply same time as Diclofenac gel   losartan (COZAAR) 100 MG tablet 2017 at PM Spouse/Significant Other No Yes   Sig: Take 1 tablet (100 mg) by mouth daily   metoprolol (TOPROL-XL) 25 MG 24 hr tablet  Spouse/Significant Other No Yes   Sig: Take 1 tablet (25 mg) by mouth daily   nortriptyline (PAMELOR) 25 MG capsule  Spouse/Significant Other Yes Yes   Sig: Take 25 mg by mouth At Bedtime   order for DME  Spouse/Significant Other No No   Sig: Equipment being ordered: TENS and supplies.   pantoprazole (PROTONIX) 40 MG enteric coated tablet  Spouse/Significant Other No Yes   Sig: Take 1 tablet (40 mg) by mouth every morning   polyethylene glycol (MIRALAX) packet  Spouse/Significant Other No Yes   Sig: Take 17 g by mouth daily   tamsulosin (FLOMAX) 0.4 MG capsule  Spouse/Significant Other No Yes   Sig: Take 1 capsule (0.4 mg) by mouth daily   traMADol (ULTRAM) 50 MG tablet 2017 at PM Spouse/Significant Other No Yes   Si tab tid a day prn abut 8 hrs apart      Facility-Administered Medications: None     Allergies  Allergies   Allergen Reactions     Contrast Dye      SOB, increased Bp, difficulty swallowing. Date 6/3/96 (ipaque contrast)  Was premedicated prior to FRANCK and had no reaction at all (solumedrol and benadryl) 2016  Was premedicated with Methylprednisolone protocol, no reaction 17     Lisinopril      cough     Oxycodone      Delirium       Social History  I have reviewed this patient's social history and updated it with pertinent information if needed. Chaz Soler  reports that he has never smoked. He has never used smokeless tobacco. He reports that he drinks alcohol. He reports that he does not use illicit drugs.    Family History  I have reviewed this patient's family history and updated it with pertinent information if needed.   Family History   Problem Relation Age of Onset     Hypertension Mother      Hypertension Father        Review  of Systems  C: NEGATIVE for fever, chills, change in weight  I: NEGATIVE for worrisome rashes, moles or lesions  E: NEGATIVE for vision changes or irritation  E/M: NEGATIVE for ear, mouth and throat problems  R: Positive for cough and increased congestion.NEGATIVE for SOB    B: NEGATIVE for masses, tenderness or discharge  CV: NEGATIVE for chest pain, palpitations or peripheral edema  GI: NEGATIVE for nausea, abdominal pain, heartburn, or change in bowel habits  : NEGATIVE for frequency, dysuria, or hematuria  M: Positive for recent muscle spasms in low back. NEGATIVE for significant arthralgias   N: Positive for weakness in BLE. NEGATIVE for dizziness or paresthesias  E: NEGATIVE for temperature intolerance, skin/hair changes  H: NEGATIVE for bleeding problems  P: NEGATIVE for changes in mood or affect    Physical Exam  Temp: 99  F (37.2  C) Temp src: Oral BP: 171/77 mmHg Pulse: 79   Resp: 18 SpO2: 96 % O2 Device: Oxymask Oxygen Delivery: 2 LPM  Vital Signs with Ranges  Temp:  [98.4  F (36.9  C)-100.9  F (38.3  C)] 99  F (37.2  C)  Pulse:  [65-94] 79  Resp:  [18-22] 18  BP: (124-181)/(46-91) 171/77 mmHg  SpO2:  [85 %-98 %] 96 %  0 lbs 0 oz     , Blood pressure 171/77, pulse 79, temperature 99  F (37.2  C), temperature source Oral, resp. rate 18, SpO2 96 %.  0 lbs 0 oz  HEENT:  Normocephalic, atraumatic.  PERRLA.  EOM s intact.   Neck:  Supple, non-tender, without lymphadenopathy.  Heart:  No peripheral edema  Lungs:  No SOB  Abdomen:  Soft, non-tender, non-distended.  Normal bowel sounds.  Skin:  Warm and dry, good capillary refill.  Extremities:  Good radial and dorsalis pedis pulses bilaterally, no edema, cyanosis or clubbing.    NEUROLOGICAL EXAMINATION:   Mental status:  Alert and Oriented to person. Not oriented to place, speech is fluent.  Cranial nerves:  II-XII intact.   Motor:  Strength is 5/5 throughout the lower extremities  Hip Flexor:                Right: 5/5  Left:  5/5  Hip Adductor:              Right:  5/5  Left:  5/5  Hip Abductor:             Right:  5/5  Left:  5/5  Gastroc Soleus:        Right:  5/5  Left:  5/5  Tib/Ant:                      Right:  5/5  Left:  5/5  EHL:                     Right:  5/5  Left:  5/5  Sensation:  intact  Reflexes:   Negative Babinski.  Negative Clonus.    Gait:  Unsteady. Currently assist of 2 with standing/ambulating    Lumbar examination reveals tenderness of the lumbar spine and paraspinous muscles. Straight leg raise is negative bilaterally.     Data  All new lab and imaging data was personally reviewed by me.  CT: CT OF THE LUMBAR SPINE WITHOUT CONTRAST  1/29/2017 7:43 PM       COMPARISON: Lumbar spine MRI 11/4/16.     HISTORY: Fall, back pain.      TECHNIQUE: Axial images of the lumbar spine were acquired without  intravenous contrast. Multiplanar reformations were created from the  axial source images.        FINDINGS:  There has been interval development of mild compression  deformities of the L2 and L3 vertebral bodies. No other fractures.  Mild degenerative anterolisthesis of L4 upon L5 again noted. There is  moderate facet arthropathy bilaterally at L3-L4, L4-L5 and L5-S1.  There is no significant spinal canal stenosis of the lumbar spine.  There is moderate facet arthropathy bilaterally at the L1-L2, L2-L3,  L3-L4 and L4-L5 levels.                                                                       IMPRESSION:  1. Recent mild compression fractures of the L2 and L3 vertebral bodies  that are new from the comparison lumbar spine MRI 11/4/2016.  2. No significant spinal canal stenosis of the lumbar spine.  3. Diffuse degenerative changes of the lumbar spine.        Radiation dose for this scan was reduced using automated exposure  control, adjustment of the mA and/or kV according to patient size, or  iterative reconstruction technique     GAGE GARRETT MD    CBC RESULTS:   Recent Labs   Lab Test  01/30/17   0655   WBC  9.5   RBC  3.77*   HGB  11.3*   HCT   33.9*   MCV  90   MCH  30.0   MCHC  33.3   RDW  12.6   PLT  120*     Basic Metabolic Panel:  NA      139   1/30/2017   POTASSIUM      4.2   1/30/2017  CHLORIDE      102   1/30/2017  LEXIS      9.5   1/30/2017  CO2       31   1/30/2017  BUN       29   1/30/2017  CR     1.36   1/30/2017  GLC       88   1/30/2017

## 2017-01-31 PROCEDURE — A9270 NON-COVERED ITEM OR SERVICE: HCPCS | Mod: GY | Performed by: INTERNAL MEDICINE

## 2017-01-31 PROCEDURE — 99232 SBSQ HOSP IP/OBS MODERATE 35: CPT | Performed by: INTERNAL MEDICINE

## 2017-01-31 PROCEDURE — 12000000 ZZH R&B MED SURG/OB

## 2017-01-31 PROCEDURE — 25000128 H RX IP 250 OP 636: Performed by: INTERNAL MEDICINE

## 2017-01-31 PROCEDURE — 25000132 ZZH RX MED GY IP 250 OP 250 PS 637: Mod: GY | Performed by: INTERNAL MEDICINE

## 2017-01-31 PROCEDURE — L0486 TLSO RIGIDLINED CUST FAB TWO: HCPCS

## 2017-01-31 PROCEDURE — 25000125 ZZHC RX 250: Performed by: INTERNAL MEDICINE

## 2017-01-31 RX ADMIN — TAMSULOSIN HYDROCHLORIDE 0.4 MG: 0.4 CAPSULE ORAL at 09:43

## 2017-01-31 RX ADMIN — DONEPEZIL HYDROCHLORIDE 10 MG: 10 TABLET, FILM COATED ORAL at 20:55

## 2017-01-31 RX ADMIN — ACETAMINOPHEN 1000 MG: 500 TABLET, FILM COATED ORAL at 20:55

## 2017-01-31 RX ADMIN — GABAPENTIN 100 MG: 100 CAPSULE ORAL at 20:55

## 2017-01-31 RX ADMIN — LIDOCAINE 2 PATCH: 50 PATCH CUTANEOUS at 20:54

## 2017-01-31 RX ADMIN — ACETAMINOPHEN 1000 MG: 500 TABLET, FILM COATED ORAL at 17:05

## 2017-01-31 RX ADMIN — ATORVASTATIN CALCIUM 20 MG: 20 TABLET, FILM COATED ORAL at 09:43

## 2017-01-31 RX ADMIN — FUROSEMIDE 20 MG: 20 TABLET ORAL at 09:45

## 2017-01-31 RX ADMIN — ASPIRIN 81 MG: 81 TABLET, COATED ORAL at 20:55

## 2017-01-31 RX ADMIN — POLYETHYLENE GLYCOL 3350 17 G: 17 POWDER, FOR SOLUTION ORAL at 09:45

## 2017-01-31 RX ADMIN — ENOXAPARIN SODIUM 30 MG: 30 INJECTION SUBCUTANEOUS at 09:46

## 2017-01-31 RX ADMIN — AZITHROMYCIN 250 MG: 250 TABLET, FILM COATED ORAL at 20:55

## 2017-01-31 RX ADMIN — METOPROLOL SUCCINATE 25 MG: 25 TABLET, EXTENDED RELEASE ORAL at 09:44

## 2017-01-31 RX ADMIN — LOSARTAN POTASSIUM 100 MG: 100 TABLET, FILM COATED ORAL at 20:55

## 2017-01-31 RX ADMIN — PANTOPRAZOLE SODIUM 40 MG: 40 TABLET, DELAYED RELEASE ORAL at 09:44

## 2017-01-31 RX ADMIN — LEVOTHYROXINE SODIUM 88 MCG: 88 TABLET ORAL at 09:45

## 2017-01-31 RX ADMIN — AMLODIPINE BESYLATE 10 MG: 10 TABLET ORAL at 09:44

## 2017-01-31 RX ADMIN — GABAPENTIN 100 MG: 100 CAPSULE ORAL at 09:45

## 2017-01-31 RX ADMIN — ACETAMINOPHEN 1000 MG: 500 TABLET, FILM COATED ORAL at 09:42

## 2017-01-31 RX ADMIN — Medication 12.5 MG: at 20:54

## 2017-01-31 RX ADMIN — CEFTRIAXONE 1 G: 1 INJECTION, POWDER, FOR SOLUTION INTRAMUSCULAR; INTRAVENOUS at 22:18

## 2017-01-31 RX ADMIN — NORTRIPTYLINE HYDROCHLORIDE 25 MG: 25 CAPSULE ORAL at 20:55

## 2017-01-31 RX ADMIN — GABAPENTIN 100 MG: 100 CAPSULE ORAL at 14:42

## 2017-01-31 NOTE — PLAN OF CARE
Problem: Goal Outcome Summary  Goal: Goal Outcome Summary  PT: Per SW, planning on TCU accepted. PT to defer to TCU.

## 2017-01-31 NOTE — PLAN OF CARE
Problem: Goal Outcome Summary  Goal: Goal Outcome Summary  Outcome: Improving  LS diminished. Occasional desaturation of oxygen on 3L. Mostly maintain mid 90s on 3L. Encouraged deep breaths. Calm and cooperative. Appropriate for situation. Use bedside urinal with assist x1, gait belt, and walker. Continent urine with toileting q2hrs. Continue zithromax and rocephine. No family will be here tomorrow. Three meals ordered for tomorrow by daughter. Afebrile. Vss. Discharge planning in 2 days to TCU.

## 2017-01-31 NOTE — PROGRESS NOTES
[facility]    Hospitalist Progress Note  Name: Chaz Soler    MRN: [id number]  Provider:  Michael Gallardo DO, FHM (Text Page)  Date of Service: 01/31/2017    Assessment and Plan  Summary of Stay: Chaz Soler is a 88 year old male who was admitted on 1/29/2017. Patient with a past medical history significant for pulmonary fibrosis on chronic home oxygen, heart failure with preserved EF, coronary artery disease, chronic kidney disease, s/p TAVR and more recently issues with chronic back pain, who presented to the hospital after a fall. In ER, he was found to have a pneumonia and a compression fracture.      1.  Left Sided Community Acquired Pneumonia:  -- on Ceftriaxone and Zithromax, likely change to oral tomorrow.    -- Blood cultures pending, still negative  -- influenza neg    2.  compression fractures of L2 and L3:  -- pain control work continues.  Still painful with movement.  Contiue PRN tramadol and scheduled tylenol and gabapentin.  -- h/o confusion with narcs so avoiding stronger narcotics as possible  -- spine surgery consulted, recommended TLSO brace when up with outpatient follow-up.  --- Dementia may cause trouble wearing brace, it should be worn as he is able/willing with redirection.     3.  S/p Fall:  -- mechanical fall, comp fractures as above.  -- falls precaution  -- PT/OT    4.  AS s/p TAVR  -- stable, no new cardiac issues.    5.  CKD:  -- Baseline 1.4- 1.6  -- creatinine 1.36 today      6.  Chronic hypoxemic respiratory failure on chronic home O2:  --h/o pulm fibrosis and COPD  --on Home O2 2-3 liters    7.  H/o Delirium:  -- on Delirium protocol  -- started on low dose Seroquel at bedtime    8.  HTN:  -- on amlodipine, losartan and metoprolol    9.  Hypothyroidism:  -- On synthyroid    10.  Dementia:  -- on Aricept    DVT Prophylaxis: Enoxaprain (Lovenox) SQ  Code Status: Full Code    Disposition:  Based on status as AM progressed, I think he needs one more day in the  hospital.  Plan continued work on pain control and increased activity.  Like to rehab/TCU tomorrow.      Interval History  Assumed care for the day, history reviewed.      -Data reviewed today: I reviewed all new labs and imaging reports over the last 24 hours. I personally reviewed no images or EKG's today.    Physical Exam  Temp: 96.7  F (35.9  C) Temp src: Oral BP: 163/76 mmHg Pulse: 78   Resp: 18 SpO2: 90 % O2 Device: Oxymask Oxygen Delivery: 4 LPM  There were no vitals filed for this visit.  Vital Signs with Ranges  Temp:  [95.2  F (35.1  C)-99.9  F (37.7  C)] 96.7  F (35.9  C)  Pulse:  [60-78] 78  Resp:  [18-20] 18  BP: (119-163)/(51-76) 163/76 mmHg  SpO2:  [80 %-100 %] 90 %  I/O last 3 completed shifts:  In: 360 [P.O.:360]  Out: 1450 [Urine:1450]    GEN:  Alert, appears comfortable, no overt distress.  HEENT:  Normocephalic/atraumatic, no scleral icterus, no nasal discharge, mouth moist.  CV:  Regular rate and rhythm, distant, no loud murmur/rub.  LUNGS:  Clear to auscultation upper with faint bibasilar crackles.  No wheezes/retractions.  Symmetric chest rise on inhalation noted.  ABD:  Active bowel sounds, soft, non-tender/non-distended.  No rebound/guarding/rigidity.  EXT:  Trace edema.  No cyanosis.  No acute joint synovitis noted.  SKIN:  Dry to touch, no exanthems noted in the visualized areas.    Medications       cefTRIAXone  1 g Intravenous Q24H     sodium chloride (PF)  3 mL Intracatheter Q8H     enoxaparin  30 mg Subcutaneous Q24H     acetaminophen  1,000 mg Oral TID     QUEtiapine  12.5 mg Oral At Bedtime     amLODIPine  10 mg Oral Daily     aspirin EC  81 mg Oral QPM     atorvastatin  20 mg Oral Daily     donepezil  10 mg Oral At Bedtime     furosemide  20 mg Oral Daily     gabapentin  100 mg Oral TID     levothyroxine  88 mcg Oral Daily     losartan  100 mg Oral QPM     metoprolol  25 mg Oral Daily     nortriptyline  25 mg Oral At Bedtime     pantoprazole  40 mg Oral QAM     polyethylene glycol   17 g Oral Daily     tamsulosin  0.4 mg Oral Daily     lidocaine  2 patch Transdermal Q24h    And     lidocaine   Transdermal Q24H    And     lidocaine   Transdermal Q8H     azithromycin  250 mg Oral QPM     Data      Recent Labs  Lab 01/30/17  0655 01/29/17  1850   WBC 9.5 13.2*   HGB 11.3* 12.8*   HCT 33.9* 38.3*   MCV 90 90   * 151       Recent Labs  Lab 01/29/17 2125 01/29/17  1850   CULT No growth after 2 days No growth after 2 days       Recent Labs  Lab 01/30/17  0655 01/29/17  1850    139   POTASSIUM 4.2 4.3   CHLORIDE 102 101   CO2 31 30   ANIONGAP 6 8   GLC 88 100*   BUN 29 31*   CR 1.36* 1.48*   GFRESTIMATED 49* 45*   GFRESTBLACK 60* 54*   LEXIS 9.5 9.3   MAG 2.3  --        No results found for this or any previous visit (from the past 24 hour(s)).

## 2017-01-31 NOTE — PROGRESS NOTES
SWS     D: Discharge planning continuing.. per discussion with MD anticipate pt's discharge tomorrow. [facility] has assessed and would be able to accept pt tomorrow, semi-private room.      I: Spoke by phone to wife discussed above, she has asked that planning continue for pt's tranfser to [facility], family will provide transport tomorrow @ 1330, son will bring portable oxygen unit from home for the transport. Additional questions of wife were addressed.      A/P: Anticipate no problem with arrangements made for transfer tomorrow, SW available until discharge should adjustments be needed in plan as noted.

## 2017-01-31 NOTE — PLAN OF CARE
Problem: Goal Outcome Summary  Goal: Goal Outcome Summary  Outcome: Improving  Pt slept most of shift. Was fitted and received TLSO, no c/o pain. Productive, infrequent cough. Incontinent.

## 2017-01-31 NOTE — PROGRESS NOTES
Doing better, no new complaints.  Weaning O2 (hypoxia improving).  Continue abx today.  Await TLSO brace arrival.  Once available will get patient up and more active to assess pain/mobility with therapy.  If he does well with activity and hypoxia he may be able to d/c late today to rehab.  If uncontrolled pain, TLSO brace not available, or more respiratory issues/fevers he will need another day in the hospital.  I will check back on his status this afternoon.

## 2017-01-31 NOTE — PLAN OF CARE
Problem: Goal Outcome Summary  Goal: Goal Outcome Summary  Alert to person and situation. reorients easily.  LS dim infrequent non productive cough.  On Zithromax and Rocephin antibiotics.  voiding at bedside with urinal assist 1/  Increase pain with activity. fitted for brace by orthotics.  OT/PT. Will dc to tcu 1-2 days.

## 2017-01-31 NOTE — PLAN OF CARE
Problem: Goal Outcome Summary  Goal: Goal Outcome Summary  OT: Per SW, planning on TCU accepted. OT to defer to TCU.

## 2017-01-31 NOTE — PROGRESS NOTES
Fit patient with tlso. Patient wamted it left on.  I left it on and told nursing.  I checked chart, it says brace on when out of bed.

## 2017-02-01 VITALS
SYSTOLIC BLOOD PRESSURE: 146 MMHG | DIASTOLIC BLOOD PRESSURE: 46 MMHG | RESPIRATION RATE: 18 BRPM | OXYGEN SATURATION: 96 % | HEART RATE: 74 BPM | TEMPERATURE: 97.7 F

## 2017-02-01 LAB
BACTERIA SPEC CULT: NORMAL
CREAT SERPL-MCNC: 1.65 MG/DL (ref 0.66–1.25)
GFR SERPL CREATININE-BSD FRML MDRD: 40 ML/MIN/1.7M2
MICRO REPORT STATUS: NORMAL
PLATELET # BLD AUTO: 127 10E9/L (ref 150–450)
SPECIMEN SOURCE: NORMAL

## 2017-02-01 PROCEDURE — 85049 AUTOMATED PLATELET COUNT: CPT | Performed by: INTERNAL MEDICINE

## 2017-02-01 PROCEDURE — 36415 COLL VENOUS BLD VENIPUNCTURE: CPT | Performed by: INTERNAL MEDICINE

## 2017-02-01 PROCEDURE — A9270 NON-COVERED ITEM OR SERVICE: HCPCS | Mod: GY | Performed by: INTERNAL MEDICINE

## 2017-02-01 PROCEDURE — 82565 ASSAY OF CREATININE: CPT | Performed by: INTERNAL MEDICINE

## 2017-02-01 PROCEDURE — 99239 HOSP IP/OBS DSCHRG MGMT >30: CPT | Performed by: INTERNAL MEDICINE

## 2017-02-01 PROCEDURE — 25000132 ZZH RX MED GY IP 250 OP 250 PS 637: Mod: GY | Performed by: INTERNAL MEDICINE

## 2017-02-01 PROCEDURE — 25000128 H RX IP 250 OP 636: Performed by: INTERNAL MEDICINE

## 2017-02-01 RX ORDER — TRAMADOL HYDROCHLORIDE 50 MG/1
50 TABLET ORAL EVERY 6 HOURS PRN
Qty: 30 TABLET | Refills: 0 | Status: SHIPPED | DISCHARGE
Start: 2017-02-01 | End: 2017-04-08

## 2017-02-01 RX ORDER — QUETIAPINE FUMARATE 25 MG/1
12.5 TABLET, FILM COATED ORAL AT BEDTIME
DISCHARGE
Start: 2017-02-01 | End: 2017-02-07

## 2017-02-01 RX ORDER — AZITHROMYCIN 250 MG/1
250 TABLET, FILM COATED ORAL EVERY EVENING
Qty: 2 TABLET | DISCHARGE
Start: 2017-02-01 | End: 2017-02-03

## 2017-02-01 RX ORDER — CEFUROXIME AXETIL 500 MG/1
500 TABLET ORAL 2 TIMES DAILY
Qty: 8 TABLET | Refills: 0 | DISCHARGE
Start: 2017-02-01 | End: 2017-02-05

## 2017-02-01 RX ORDER — AMOXICILLIN 250 MG
2 CAPSULE ORAL 2 TIMES DAILY PRN
Qty: 100 TABLET | DISCHARGE
Start: 2017-02-01 | End: 2017-06-15

## 2017-02-01 RX ORDER — ACETAMINOPHEN 500 MG
1000 TABLET ORAL 3 TIMES DAILY
DISCHARGE
Start: 2017-02-01 | End: 2017-03-03

## 2017-02-01 RX ADMIN — ENOXAPARIN SODIUM 30 MG: 30 INJECTION SUBCUTANEOUS at 09:56

## 2017-02-01 RX ADMIN — FUROSEMIDE 20 MG: 20 TABLET ORAL at 09:55

## 2017-02-01 RX ADMIN — METOPROLOL SUCCINATE 25 MG: 25 TABLET, EXTENDED RELEASE ORAL at 09:55

## 2017-02-01 RX ADMIN — ATORVASTATIN CALCIUM 20 MG: 20 TABLET, FILM COATED ORAL at 09:56

## 2017-02-01 RX ADMIN — POLYETHYLENE GLYCOL 3350 17 G: 17 POWDER, FOR SOLUTION ORAL at 09:53

## 2017-02-01 RX ADMIN — TAMSULOSIN HYDROCHLORIDE 0.4 MG: 0.4 CAPSULE ORAL at 09:54

## 2017-02-01 RX ADMIN — PANTOPRAZOLE SODIUM 40 MG: 40 TABLET, DELAYED RELEASE ORAL at 09:56

## 2017-02-01 RX ADMIN — LEVOTHYROXINE SODIUM 88 MCG: 88 TABLET ORAL at 09:54

## 2017-02-01 RX ADMIN — GABAPENTIN 100 MG: 100 CAPSULE ORAL at 09:55

## 2017-02-01 RX ADMIN — ACETAMINOPHEN 1000 MG: 500 TABLET, FILM COATED ORAL at 09:54

## 2017-02-01 RX ADMIN — AMLODIPINE BESYLATE 10 MG: 10 TABLET ORAL at 09:55

## 2017-02-01 RX ADMIN — GABAPENTIN 100 MG: 100 CAPSULE ORAL at 13:21

## 2017-02-01 NOTE — PROGRESS NOTES
Your information has been submitted on February 01st, 2017 at 03:03:18 PM CST. The confirmation number is [id number]

## 2017-02-01 NOTE — PLAN OF CARE
Problem: Goal Outcome Summary  Goal: Goal Outcome Summary  Outcome: Improving  LS diminished. Mid 90s on 3L. Desaturation at times with mobility. Recovers with deep breaths. Mobility with TLSO brace, gait belt, and walker. Voiding. Incontinent bowel. Pleasant and cooperative with cares. Afebrile. Vss. Discharge to [facility] tomorrow at 1330 by family.

## 2017-02-01 NOTE — PLAN OF CARE
Problem: Goal Outcome Summary  Goal: Goal Outcome Summary  Outcome: Adequate for Discharge Date Met:  02/01/17  Pt at baseline cognition. Able to stand c assist of one an walker. pt was discharged to son and daughter who are transporting pt to TCU. Education provided to son. TLSO applied for pt transfer. Isolation precautions were removed. Pt left on personal portable o2 2LPM.

## 2017-02-01 NOTE — DISCHARGE SUMMARY
[facility]  Discharge Summary  Name: Chaz Soler    MRN: [id number]  YOB: 1928    Age: 88 year old  Date of Discharge:  2/1/2017  Date of Admission: 1/29/2017  Primary Care Provider: Alirio Fox  Discharge Physician:  Vazquez Lopez MD  Discharging Service:  Hospitalist      Hospital Course/Discharge Diagnoses:  Chaz Soler is a 88 year old male who was admitted on 1/29/2017. Patient with a past medical history significant for pulmonary fibrosis on chronic home oxygen, heart failure with preserved EF, coronary artery disease, chronic kidney disease, s/p TAVR and more recently issues with chronic back pain, who presented to the hospital after a fall. In ER, he was found to have a pneumonia and compression fractures of L2 and L3.  He was treated for pneumonia with ceftriaxone and azithromycin and now is back to his baseline O2 requirement (2-3L).  He will complete oral antibiotics with ceftin and azithro after discharge.  He was seen in consultation by Spine Surgery with placement of a brace to be used when up and outpatient follow up has been recommended.  He is discharging to a TCU today.  I discussed all of this at length with his son, Daniel, who is in agreement with the plan of care.      1.  Left Sided Community Acquired Pneumonia:  -- on Ceftriaxone and Zithromax while here, change to Ceftin and azithro to complete 7 and 5 day courses respectively  -- Blood cultures still negative  -- influenza neg    2.  compression fractures of L2 and L3:  -- pain control work continues.  Still painful with movement.  Contiue PRN tramadol and scheduled tylenol and gabapentin for now.  -- h/o confusion with narcs so avoiding stronger narcotics as possible  -- spine surgery consulted, recommended TLSO brace when up with outpatient follow-up.  --- Dementia may cause trouble wearing brace, it should be worn as he is able/willing with redirection.     3.  S/p Fall:  -- mechanical fall, comp  fractures as above.  -- falls precaution  -- PT/OT    4.  AS s/p TAVR  -- stable, no new cardiac issues.    5.  CKD:  -- Baseline 1.4- 1.6  -- creatinine 1.36 most recently      6.  Chronic hypoxemic respiratory failure on chronic home O2:  --h/o pulm fibrosis and COPD  --on Home O2 2-3 liters    7.  H/o Delirium: likely worse due to pain/meds, pneumonia, new environment.  --treat underlying issues  -- started on low dose Seroquel at bedtime with good effect.    8.  HTN:  -- on amlodipine, losartan and metoprolol    9.  Hypothyroidism:  -- On synthyroid    10.  Dementia:  -- on Aricept       Discharge Disposition:  Discharged to short-term care facility     Allergies:  Allergies   Allergen Reactions     Contrast Dye      SOB, increased Bp, difficulty swallowing. Date 6/3/96 (ipaque contrast)  Was premedicated prior to FRANCK and had no reaction at all (solumedrol and benadryl) 2016  Was premedicated with Methylprednisolone protocol, no reaction 1/25/17     Lisinopril      cough     Oxycodone      Delirium        Discharge Medications:      Review of your medicines      START taking       Dose / Directions    azithromycin 250 MG tablet   Commonly known as:  ZITHROMAX   Indication:  Community Acquired Pneumonia   Used for:  Pneumonia of left lower lobe due to infectious organism        Dose:  250 mg   Take 1 tablet (250 mg) by mouth every evening for 2 days   Quantity:  2 tablet   Refills:  0       cefUROXime 500 MG tablet   Commonly known as:  CEFTIN   Used for:  Pneumonia of left lower lobe due to infectious organism        Dose:  500 mg   Take 1 tablet (500 mg) by mouth 2 times daily for 4 days   Quantity:  8 tablet   Refills:  0       QUEtiapine 25 MG tablet   Commonly known as:  SEROquel   Used for:  Dementia with behavioral disturbance, unspecified dementia type        Dose:  12.5 mg   Take 0.5 tablets (12.5 mg) by mouth At Bedtime   Refills:  0       senna-docusate 8.6-50 MG per tablet   Commonly known as:   SENOKOT-S;PERICOLACE   Used for:  Compression fx, lumbar spine, closed, initial encounter (H)        Dose:  2 tablet   Take 2 tablets by mouth 2 times daily as needed (constipation )   Quantity:  100 tablet   Refills:  0         CONTINUE these medicines which may have CHANGED, or have new prescriptions. If we are uncertain of the size of tablets/capsules you have at home, strength may be listed as something that might have changed.       Dose / Directions    acetaminophen 500 MG tablet   Commonly known as:  TYLENOL   This may have changed:    - medication strength  - how much to take  - when to take this  - reasons to take this   Used for:  Compression fx, lumbar spine, closed, initial encounter (H)        Dose:  1000 mg   Take 2 tablets (1,000 mg) by mouth 3 times daily   Refills:  0       traMADol 50 MG tablet   Commonly known as:  ULTRAM   This may have changed:    - how much to take  - how to take this  - when to take this  - reasons to take this  - additional instructions   Used for:  Other chronic pain, Lumbar radiculopathy        Dose:  50 mg   Take 1 tablet (50 mg) by mouth every 6 hours as needed for moderate pain   Quantity:  30 tablet   Refills:  0         CONTINUE these medicines which have NOT CHANGED       Dose / Directions    amLODIPine 10 MG tablet   Commonly known as:  NORVASC   Used for:  Essential hypertension        Dose:  10 mg   Take 1 tablet (10 mg) by mouth daily   Quantity:  90 tablet   Refills:  2       AMOXICILLIN PO   Indication:  pre dental        Dose:  500 mg   Take 500 mg by mouth once as needed   Refills:  0       aspirin 81 MG EC tablet        Dose:  81 mg   Take 81 mg by mouth every evening   Refills:  0       atorvastatin 20 MG tablet   Commonly known as:  LIPITOR   Used for:  Coronary artery disease involving native coronary artery of native heart without angina pectoris        Dose:  20 mg   Take 1 tablet (20 mg) by mouth daily   Quantity:  90 tablet   Refills:  3        cyanocobalamin 1000 MCG tablet   Commonly known as:  vitamin  B-12   Indication:  Inadequate Vitamin B12        Dose:  1000 mcg   Take 1,000 mcg by mouth daily   Refills:  0       diclofenac 1 % Gel topical gel   Commonly known as:  VOLTAREN   Used for:  Chronic pain syndrome, Lumbar radiculopathy, Facet arthritis of lumbar region (H), DDD (degenerative disc disease), lumbar        Apply 4 grams to low back right hip and right leg four times as needed  daily using enclosed dosing card.  Lidocaine cream   Quantity:  100 g   Refills:  1       donepezil 10 MG tablet   Commonly known as:  ARICEPT   Used for:  Memory loss        Dose:  10 mg   Take 1 tablet (10 mg) by mouth At Bedtime   Quantity:  90 tablet   Refills:  3       furosemide 20 MG tablet   Commonly known as:  LASIX        Dose:  20 mg   Take 20 mg by mouth daily   Refills:  0       gabapentin 100 MG capsule   Commonly known as:  NEURONTIN   Used for:  Chronic bilateral low back pain without sciatica        Take 1 capsules Three times a day for pain   Quantity:  90 capsule   Refills:  0       IRON SUPPLEMENT PO        Dose:  325 mg   Take 325 mg by mouth daily (with breakfast)   Refills:  0       levothyroxine 88 MCG tablet   Commonly known as:  SYNTHROID/LEVOTHROID   Used for:  Hypothyroidism, unspecified type        Dose:  88 mcg   Take 1 tablet (88 mcg) by mouth daily   Quantity:  90 tablet   Refills:  3       lidocaine-prilocaine cream   Commonly known as:  EMLA   Used for:  Chronic pain syndrome, Lumbar radiculopathy, Facet arthritis of lumbar region (H), DDD (degenerative disc disease), lumbar        Apply topically as needed for moderate pain To low back, right hip  and right leg Apply same time as Diclofenac gel   Quantity:  30 g   Refills:  1       losartan 100 MG tablet   Commonly known as:  COZAAR   Used for:  Essential hypertension        Dose:  100 mg   Take 1 tablet (100 mg) by mouth daily   Quantity:  90 tablet   Refills:  3       metoprolol 25  MG 24 hr tablet   Commonly known as:  TOPROL-XL   Used for:  Essential hypertension        Dose:  25 mg   Take 1 tablet (25 mg) by mouth daily   Quantity:  90 tablet   Refills:  3       nortriptyline 25 MG capsule   Commonly known as:  PAMELOR        Dose:  25 mg   Take 25 mg by mouth At Bedtime   Refills:  0       order for DME   Used for:  Other chronic pain, Lumbar radiculopathy, DDD (degenerative disc disease), lumbar, Spinal stenosis of lumbar region without neurogenic claudication        Equipment being ordered: TENS and supplies.   Quantity:  1 each   Refills:  0       pantoprazole 40 MG EC tablet   Commonly known as:  PROTONIX   Used for:  Gastroesophageal reflux disease, esophagitis presence not specified        Dose:  40 mg   Take 1 tablet (40 mg) by mouth every morning   Quantity:  90 tablet   Refills:  3       polyethylene glycol Packet   Commonly known as:  MIRALAX   Used for:  Chronic constipation        Dose:  1 packet   Take 17 g by mouth daily   Quantity:  30 packet   Refills:  11       tamsulosin 0.4 MG capsule   Commonly known as:  FLOMAX   Used for:  BPH (benign prostatic hypertrophy) with urinary retention        Dose:  0.4 mg   Take 1 capsule (0.4 mg) by mouth daily   Quantity:  90 capsule   Refills:  1            Where to get your medicines      Some of these will need a paper prescription and others can be bought over the counter. Ask your nurse if you have questions.     You don't need a prescription for these medications    - acetaminophen 500 MG tablet  - azithromycin 250 MG tablet  - cefUROXime 500 MG tablet  - QUEtiapine 25 MG tablet  - senna-docusate 8.6-50 MG per tablet      Information about where to get these medications is not yet available     ! Ask your nurse or doctor about these medications    - traMADol 50 MG tablet                 Condition on Discharge:  Discharge condition: Stable   Discharge vitals: Blood pressure 129/67, pulse 71, temperature 97.5  F (36.4  C), temperature  source Axillary, resp. rate 18, SpO2 94 %.   Code status on discharge: Full Code     History of Illness:  See detailed admission note for full details.    Physical Exam:  Blood pressure 129/67, pulse 71, temperature 97.5  F (36.4  C), temperature source Axillary, resp. rate 18, SpO2 94 %.  Wt Readings from Last 1 Encounters:   01/20/17 70.308 kg (155 lb)     General: Alert, awake, no acute distress.  HEENT: NC/AT, eyes anicteric, external occular movements intact, face symmetric.  Dentition WNL, MM moist.  Cardiac: RRR, S1, S2.  No murmurs appreciated.  Pulmonary: Normal chest rise, normal work of breathing.  Lungs CTA BL  Abdomen: soft, non-tender, non-distended.  Bowel Sounds Present.  No guarding.  Extremities: no deformities.  Warm, well perfused.  Skin: no rashes or lesions noted.  Warm and Dry.  Neuro: No focal deficits noted.  Speech clear.  Coordination and strength grossly normal.  Psych: Appropriate affect.    Procedures other than Imaging:  None other than placement of brace     Imaging:  Results for orders placed or performed during the hospital encounter of 01/29/17   XR Chest 2 Views    Narrative    CHEST TWO VIEW 1/29/2017 7:56 PM     COMPARISON: Chest CT 6/14/2016.    HISTORY: Cough, fever.      Impression    IMPRESSION: Severe extensive coarse fibrotic changes throughout both  lungs again noted. There may be new airspace opacity in the mid and  lower aspects of the left lung that may represent pneumonia. There is  no pleural effusion or pneumothorax. Heart size is normal. There is no  evidence for congestive failure.    GAGE GARRETT MD   Lumbar spine CT w/o contrast    Narrative    CT OF THE LUMBAR SPINE WITHOUT CONTRAST  1/29/2017 7:43 PM     COMPARISON: Lumbar spine MRI 11/4/16.    HISTORY: Fall, back pain.     TECHNIQUE: Axial images of the lumbar spine were acquired without  intravenous contrast. Multiplanar reformations were created from the  axial source images.      FINDINGS:  There has  been interval development of mild compression  deformities of the L2 and L3 vertebral bodies. No other fractures.  Mild degenerative anterolisthesis of L4 upon L5 again noted. There is  moderate facet arthropathy bilaterally at L3-L4, L4-L5 and L5-S1.  There is no significant spinal canal stenosis of the lumbar spine.  There is moderate facet arthropathy bilaterally at the L1-L2, L2-L3,  L3-L4 and L4-L5 levels.      Impression    IMPRESSION:  1. Recent mild compression fractures of the L2 and L3 vertebral bodies  that are new from the comparison lumbar spine MRI 11/4/2016.  2. No significant spinal canal stenosis of the lumbar spine.  3. Diffuse degenerative changes of the lumbar spine.      Radiation dose for this scan was reduced using automated exposure  control, adjustment of the mA and/or kV according to patient size, or  iterative reconstruction technique    GAGE GARRETT MD        Consultations:  Spine Surgery  PT/OT/SW.       Recent Lab Results:    Recent Labs  Lab 02/01/17  0652 01/30/17  0655 01/29/17  1850   WBC  --  9.5 13.2*   HGB  --  11.3* 12.8*   HCT  --  33.9* 38.3*   MCV  --  90 90   * 120* 151          NA      139   1/30/2017  NA      139   1/29/2017  NA      139   11/14/2016 CHLORIDE      102   1/30/2017  CHLORIDE      101   1/29/2017  CHLORIDE      103   11/14/2016 BUN       29   1/30/2017  BUN       31   1/29/2017  BUN       33   11/14/2016   POTASSIUM      4.2   1/30/2017  POTASSIUM      4.3   1/29/2017  POTASSIUM      4.8   11/14/2016 CO2       31   1/30/2017  CO2       30   1/29/2017  CO2       33   11/14/2016 CR     1.65   2/1/2017  CR     1.36   1/30/2017  CR     1.48   1/29/2017          Pending Results:    Unresulted Labs Ordered in the Past 30 Days of this Admission     Date and Time Order Name Status Description    1/29/2017 2210 Sputum Culture Aerobic Bacterial Preliminary     1/29/2017 2119 Blood culture Preliminary     1/29/2017 1908 Blood culture Preliminary             Discharge Instructions and Follow-Up:     Discharge Procedure Orders  X-ray lumbar spine 2-3 views*   Standing Status: Future  Standing Exp. Date: 04/30/17   Order Comments: FSOC -flexion/extension   Order Specific Question Answer Comments   Is this exam to be performed at [facility]? No      Activity - Up with nursing assistance   Order Comments: Brace on when out of bed. May take off to shower with supervision.   Order Specific Question Answer Comments   Is discharge order? Yes      Follow Up and recommended labs and tests   Order Comments: [facility] with flex/ext X-ray prior to appointment. Please call the clinic at [phone] to schedule your appointment with Jessie Wolff CNP.         Total time spent in face to face contact with the patient and coordinating discharge was:  50 Minutes.

## 2017-02-01 NOTE — PROGRESS NOTES
Warm Hand-Off to Next Level of Care    Name: Chaz Soler  MRN #: [id number]  :  1928  Reason for Hospitalization:  Compression fx, lumbar spine, closed, initial encounter (H) [S32.000A]  Pneumonia of left lower lobe due to infectious organism [J18.9]  Admit Date/Time: 2017  6:25 PM  Discharge Date:  2017  Payor Source:  Medicare   PCP: Alirio Fox    Patient will receive the following: TCU    Key Recommendations: Pt. was consulted by Neuro/Spine and fitted for TLSO, discharged to [facility].    Eugenia Weller RN BSN CTS  Care Transitions Team  [phone]'

## 2017-02-01 NOTE — PLAN OF CARE
Problem: Goal Outcome Summary  Goal: Goal Outcome Summary  Alert to person and situation, reorients easily.  LS diminished infrequent nonproductive cough. Impulsive alarm on.  Voiding at the bed side.  Will dc to tcu today at 1330 with family to transport.

## 2017-02-02 ENCOUNTER — NURSING HOME VISIT (OUTPATIENT)
Dept: GERIATRICS | Facility: CLINIC | Age: 82
End: 2017-02-02
Payer: COMMERCIAL

## 2017-02-02 VITALS
WEIGHT: 154.6 LBS | SYSTOLIC BLOOD PRESSURE: 106 MMHG | RESPIRATION RATE: 18 BRPM | HEIGHT: 68 IN | HEART RATE: 76 BPM | DIASTOLIC BLOOD PRESSURE: 57 MMHG | BODY MASS INDEX: 23.43 KG/M2 | OXYGEN SATURATION: 94 % | TEMPERATURE: 97.7 F

## 2017-02-02 DIAGNOSIS — F03.91 DEMENTIA WITH BEHAVIORAL DISTURBANCE, UNSPECIFIED DEMENTIA TYPE: ICD-10-CM

## 2017-02-02 DIAGNOSIS — N18.30 CKD (CHRONIC KIDNEY DISEASE) STAGE 3, GFR 30-59 ML/MIN (H): Chronic | ICD-10-CM

## 2017-02-02 DIAGNOSIS — J84.10 PULMONARY FIBROSIS (H): Chronic | ICD-10-CM

## 2017-02-02 DIAGNOSIS — I50.9 CONGESTIVE HEART FAILURE, UNSPECIFIED CONGESTIVE HEART FAILURE CHRONICITY, UNSPECIFIED CONGESTIVE HEART FAILURE TYPE: Chronic | ICD-10-CM

## 2017-02-02 DIAGNOSIS — Z95.2 S/P TAVR (TRANSCATHETER AORTIC VALVE REPLACEMENT): Chronic | ICD-10-CM

## 2017-02-02 DIAGNOSIS — R53.81 PHYSICAL DECONDITIONING: ICD-10-CM

## 2017-02-02 DIAGNOSIS — J18.9 COMMUNITY ACQUIRED PNEUMONIA: Primary | ICD-10-CM

## 2017-02-02 DIAGNOSIS — R41.0 DELIRIUM: ICD-10-CM

## 2017-02-02 DIAGNOSIS — I25.119 CORONARY ARTERY DISEASE INVOLVING NATIVE HEART WITH ANGINA PECTORIS, UNSPECIFIED VESSEL OR LESION TYPE (H): Chronic | ICD-10-CM
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
## 2017-02-02 DIAGNOSIS — E03.9 HYPOTHYROIDISM, UNSPECIFIED TYPE: Chronic | ICD-10-CM

## 2017-02-02 DIAGNOSIS — I10 ESSENTIAL HYPERTENSION: Chronic | ICD-10-CM

## 2017-02-02 PROCEDURE — 99310 SBSQ NF CARE HIGH MDM 45: CPT | Performed by: NURSE PRACTITIONER

## 2017-02-02 PROCEDURE — 99207 ZZC CDG-CORRECTLY CODED, REVIEWED AND AGREE: CPT | Performed by: NURSE PRACTITIONER

## 2017-02-03 ENCOUNTER — CARE COORDINATION (OUTPATIENT)
Dept: CARE COORDINATION | Facility: CLINIC | Age: 82
End: 2017-02-03

## 2017-02-03 NOTE — PROGRESS NOTES
Clinic Care Coordination Contact  OUTREACH    Referral Information:  Referral Source: Home Care Priority Patient  Reason for Contact: follow up after TCU placement s/p hospital discharge for pneumonia and neck fracture s/p fall        Universal Utilization:   ED Visits in last year: 0  Hospital visits in last year: 2  Last PCP appointment: 11/22/16  Missed Appointments: 0  Concerns:  (none)  Multiple Providers or Specialists:  (cardiology, vascular, pain, ortho, neurology, spine surgeon)    Clinical Concerns:  Current Medical Concerns: as above    Current Behavioral Concerns: dementia-stable    Education Provided to patient: NA      Clinical Pathway: None    Medication Management:  Managed by TCU     Functional Status:  Mobility Status: Independent w/Device  Equipment Currently Used at Home: oxygen, walker, rolling  Transportation: family           Psychosocial:  Current living arrangement:: Other (TCU)  Financial/Insurance: not reviewed       Resources and Interventions:  Current Resources: Skilled Nursing Facility;    PAS Number: [id number]     Advanced Care Plans/Directives on file:: No           Plan: called TCU SW and introduced self. Asked her to contact me when patient discharges to home and will continue to follow outpatient. Patient likely will restart services with home care upon discharge    Yuliya Lozano R.N.  Clinic Care Coordinator  [facility]  [phone]

## 2017-02-04 LAB
BACTERIA SPEC CULT: NO GROWTH
BACTERIA SPEC CULT: NO GROWTH
Lab: NORMAL
Lab: NORMAL
MICRO REPORT STATUS: NORMAL
MICRO REPORT STATUS: NORMAL
SPECIMEN SOURCE: NORMAL
SPECIMEN SOURCE: NORMAL

## 2017-02-06 ENCOUNTER — TRANSFERRED RECORDS (OUTPATIENT)
Dept: HEALTH INFORMATION MANAGEMENT | Facility: CLINIC | Age: 82
End: 2017-02-06

## 2017-02-06 ENCOUNTER — NURSING HOME VISIT (OUTPATIENT)
Dept: GERIATRICS | Facility: CLINIC | Age: 82
End: 2017-02-06
Payer: COMMERCIAL

## 2017-02-06 VITALS
WEIGHT: 154.6 LBS | DIASTOLIC BLOOD PRESSURE: 74 MMHG | BODY MASS INDEX: 23.43 KG/M2 | SYSTOLIC BLOOD PRESSURE: 148 MMHG | HEART RATE: 68 BPM | HEIGHT: 68 IN | RESPIRATION RATE: 16 BRPM | TEMPERATURE: 96.9 F | OXYGEN SATURATION: 99 %

## 2017-02-06 DIAGNOSIS — J18.9 COMMUNITY ACQUIRED PNEUMONIA: ICD-10-CM

## 2017-02-06 DIAGNOSIS — I10 ESSENTIAL HYPERTENSION: Chronic | ICD-10-CM

## 2017-02-06 DIAGNOSIS — R53.81 PHYSICAL DECONDITIONING: ICD-10-CM

## 2017-02-06 DIAGNOSIS — J84.10 PULMONARY FIBROSIS (H): Chronic | ICD-10-CM

## 2017-02-06 DIAGNOSIS — D64.9 ANEMIA, UNSPECIFIED TYPE: Primary | Chronic | ICD-10-CM

## 2017-02-06 DIAGNOSIS — N18.30 CKD (CHRONIC KIDNEY DISEASE) STAGE 3, GFR 30-59 ML/MIN (H): Chronic | ICD-10-CM

## 2017-02-06 LAB
ANION GAP SERPL CALCULATED.3IONS-SCNC: 7 MMOL/L (ref 5–18)
BUN SERPL-MCNC: 28 MG/DL (ref 8–28)
CALCIUM SERPL-MCNC: 9.4 MG/DL (ref 8.5–10.5)
CHLORIDE SERPLBLD-SCNC: 106 MMOL/L (ref 98–107)
CO2 SERPL-SCNC: 29 MMOL/L (ref 22–31)
CREAT SERPL-MCNC: 1.45 MG/DL (ref 0.7–1.3)
ERYTHROCYTE [DISTWIDTH] IN BLOOD BY AUTOMATED COUNT: 12.4 % (ref 11–14.5)
GFR SERPL CREATININE-BSD FRML MDRD: 46 ML/MIN/1.73M2
GLUCOSE SERPL-MCNC: 84 MG/DL (ref 70–125)
HCT VFR BLD AUTO: 31.7 % (ref 40–54)
HEMOGLOBIN: 10.5 G/DL (ref 14–18)
MCH RBC QN AUTO: 30.3 PG (ref 27–34)
MCHC RBC AUTO-ENTMCNC: 33.4 G/DL (ref 32–36)
MCV RBC AUTO: 91 FL (ref 80–100)
PLATELET # BLD AUTO: 169 THOU/UL (ref 140–440)
POTASSIUM SERPL-SCNC: 4 MMOL/L (ref 3.5–5)
RBC # BLD AUTO: 3.5 MILL/UL (ref 4.4–6.2)
SODIUM SERPL-SCNC: 142 MMOL/L (ref 136–145)
WBC # BLD AUTO: 8.3 THOU/UL (ref 4–11)

## 2017-02-06 PROCEDURE — 99310 SBSQ NF CARE HIGH MDM 45: CPT | Performed by: NURSE PRACTITIONER

## 2017-02-06 PROCEDURE — 99207 ZZC CDG-UP CODE -MED NECESSITY: CPT | Performed by: NURSE PRACTITIONER

## 2017-02-06 RX ORDER — NORTRIPTYLINE HYDROCHLORIDE 10 MG/5ML
12.5 SOLUTION ORAL AT BEDTIME
COMMUNITY
End: 2017-02-21

## 2017-02-06 NOTE — PROGRESS NOTES
[facility]    Chief Complaint   Patient presents with     Nursing Home Acute       HPI:    Chaz Soler is a 88 year old  (5/21/1928), who is being seen today for an episodic care visit at [facility]. Today's concern is:  Pulmonary fibrosis (H)  Community acquired pneumonia  Presented to ED after fall at home. He has pulmonary fibrosis and wears 2-3L oxygen at baseline at home. CXR showed possible consolidation on left side. He was started on IV ceftriaxone and azithromycin. Blood cultures negative, and flu negative. He was successfully weaned to baseline oxygen use of 3L NC.  Changed to oral antibiotics, now completed: ceftin (last day 2/5) and azithromycin last day (2/2). He remains afebrile and VSS. Occasional cough and breathing at baseline. Denies any chills or night sweats.    Compression fracture L2-3  Patient presented to ED with increased back pain after fall at home. CT revealed new L2-3 compression fracture. Neurosurgery was consulted and recommended no surgery, TLSO brace when OOB for 12 weeks. He was started on scheduled tylenol and tramadol for pain. He is also on gabapentin 100 mg TID. He has PRN Voltaren gel and lidocaine cream available QID PRN. Narcotics should be avoided as have previously caused him confusion. He reports that his pain is controlled, most of discomfort is from TLSO brace.  He does have a history of chronic low back pain, and has been on nortrypline, dose recently decreased to 12.5 mg qhs per family request.     Anemia, unspecified type  Hgb was 11.3 on discharge, decreased to 10.5 on labs on 2/6. Patient denies any increased fatigue, no lightheadedness, or dizziness. Denies any new bruising or blood in stool or urine. VSS.    Essential hypertension  He is currently on losartan ASA, metoprolol, amlodipine, lasix, ASA 81mg and atorvastatin. BP occasionally elevated in TCU. No episodes of hypotension. He denies any  lightheadedness, headaches, vision changes, or dizziness. Denies any chest pain, SOB, or palpitations.  Last 3 BPs: 148/71, 133/69, 167/82.  Admission Weight: 154.6lbs    CKD (chronic kidney disease) stage 3, GFR 30-59 ml/min  Baseline creatinine 1.4-1.6. Creatinine 1.4 on recheck in TCU.    Physical deconditioning  Had recent fall at home and some generalized weakness from recent pneumonia. Also has cliff TLSO brace for compression fracture. Will be working with PT and OT while at TCU. Currently lives in senior housing with spouse, and plan is to discharge back to there. Patient reports that his wife is his caretaker.          ALLERGIES: Contrast dye; Lisinopril; and Oxycodone  Past Medical, Surgical, Family and Social History reviewed and updated in [facility].    Current Outpatient Prescriptions   Medication Sig Dispense Refill     nortriptyline (PAMELOR) 10 MG/5ML solution Take 12.5 mg by mouth At Bedtime       traMADol (ULTRAM) 50 MG tablet Take 1 tablet (50 mg) by mouth every 6 hours as needed for moderate pain 30 tablet 0     acetaminophen (TYLENOL) 500 MG tablet Take 2 tablets (1,000 mg) by mouth 3 times daily       QUEtiapine (SEROQUEL) 25 MG tablet Take 0.5 tablets (12.5 mg) by mouth At Bedtime       senna-docusate (SENOKOT-S;PERICOLACE) 8.6-50 MG per tablet Take 2 tablets by mouth 2 times daily as needed (constipation ) 100 tablet      furosemide (LASIX) 20 MG tablet Take 20 mg by mouth daily       aspirin 81 MG EC tablet Take 81 mg by mouth every evening       gabapentin (NEURONTIN) 100 MG capsule Take 1 capsules Three times a day for pain 90 capsule 0     tamsulosin (FLOMAX) 0.4 MG capsule Take 1 capsule (0.4 mg) by mouth daily 90 capsule 1     losartan (COZAAR) 100 MG tablet Take 1 tablet (100 mg) by mouth daily 90 tablet 3     levothyroxine (SYNTHROID/LEVOTHROID) 88 MCG tablet Take 1 tablet (88 mcg) by mouth daily 90 tablet 3     order for DME Equipment being ordered: TENS and supplies. 1 each 0      polyethylene glycol (MIRALAX) packet Take 17 g by mouth daily 30 packet 11     atorvastatin (LIPITOR) 20 MG tablet Take 1 tablet (20 mg) by mouth daily 90 tablet 3     cyanocobalamin (VITAMIN  B-12) 1000 MCG tablet Take 1,000 mcg by mouth daily       AMOXICILLIN PO Take 500 mg by mouth once as needed       diclofenac (VOLTAREN) 1 % GEL Apply 4 grams to low back right hip and right leg four times as needed  daily using enclosed dosing card.  Lidocaine cream 100 g 1     lidocaine-prilocaine (EMLA) cream Apply topically as needed for moderate pain To low back, right hip  and right leg Apply same time as Diclofenac gel 30 g 1     donepezil (ARICEPT) 10 MG tablet Take 1 tablet (10 mg) by mouth At Bedtime 90 tablet 3     amLODIPine (NORVASC) 10 MG tablet Take 1 tablet (10 mg) by mouth daily 90 tablet 2     metoprolol (TOPROL-XL) 25 MG 24 hr tablet Take 1 tablet (25 mg) by mouth daily 90 tablet 3     pantoprazole (PROTONIX) 40 MG enteric coated tablet Take 1 tablet (40 mg) by mouth every morning 90 tablet 3     Ferrous Sulfate (IRON SUPPLEMENT PO) Take 325 mg by mouth daily (with breakfast)        Medications reviewed:  Medications reconciled to facility chart and changes were made to reflect current medications as identified as above med list. Below are the changes that were made:   Medications stopped since last EPIC medication reconciliation:   Medications Discontinued During This Encounter   Medication Reason     nortriptyline (PAMELOR) 25 MG capsule Dose adjustment       Medications started since last [facility] medication reconciliation:  Orders Placed This Encounter   Medications     nortriptyline (PAMELOR) 10 MG/5ML solution     Sig: Take 12.5 mg by mouth At Bedtime       REVIEW OF SYSTEMS:  10 point ROS of systems including Constitutional, Eyes, Respiratory, Cardiovascular, Gastroenterology, Genitourinary, Integumentary, Muscularskeletal, Psychiatric were all negative except for pertinent positives noted in my  "HPI.    Physical Exam:  /74 mmHg  Pulse 68  Temp(Src) 96.9  F (36.1  C)  Resp 16  Ht 5' 8\" (1.727 m)  Wt 154 lb 9.6 oz (70.126 kg)  BMI 23.51 kg/m2  SpO2 99%  GENERAL APPEARANCE:  Alert, in no distress, cooperative  ENT:  Mouth and posterior oropharynx normal, moist mucous membranes, normal hearing acuity  EYES:  PERRL, Conjunctiva and lids normal  NECK:  No adenopathy,masses or thyromegaly  RESP:  respiratory effort and palpation of chest normal, lungs clear to auscultation , no respiratory distress, diminished breath sounds BLL, cough occasionaly, productive, clear sputum, exam limited as TLSO brace in place, 3L NC  CV:  Palpation and auscultation of heart done , regular rate and rhythm, no murmur, rub, or gallop, no edema, +2 pedal pulses  ABDOMEN:  normal bowel sounds, exam limited as TLSO brace in place  M/S:   Gait and station normal, TLSO brace in place  Digits and nails normal  SKIN:  Inspection of skin and subcutaneous tissue baseline, Palpation of skin and subcutaneous tissue baseline,  NEURO:   Cranial nerves 2-12 are normal tested and grossly at patient's baseline, Examination of sensation by touch normal  PSYCH:  oriented to self, place, year, disoriented to date, insight and judgement impaired, memory impaired , affect and mood normal, pleasant demeanor    Recent Labs:    CBC RESULTS:   Recent Labs   Lab Test  02/01/17   0652  01/30/17   0655  01/29/17   1850   WBC   --   9.5  13.2*   RBC   --   3.77*  4.28*   HGB   --   11.3*  12.8*   HCT   --   33.9*  38.3*   MCV   --   90  90   MCH   --   30.0  29.9   MCHC   --   33.3  33.4   RDW   --   12.6  12.7   PLT  127*  120*  151       Last Basic Metabolic Panel:  Recent Labs   Lab Test  02/01/17   0652  01/30/17   0655  01/29/17   1850   NA   --   139  139   POTASSIUM   --   4.2  4.3   CHLORIDE   --   102  101   LEXIS   --   9.5  9.3   CO2   --   31  30   BUN   --   29  31*   CR  1.65*  1.36*  1.48*   GLC   --   88  100*       Liver Function " Studies -   Lab Test  10/21/16   0857   PROTTOTAL  6.9   ALBUMIN  3.5   BILITOTAL  0.7   ALKPHOS  147   AST  17   ALT  26     TSH   Date Value Ref Range Status   11/14/2016 0.93 0.40 - 4.00 mU/L Final     Assessment/Plan:  (J84.10) Pulmonary fibrosis (H)  (primary encounter diagnosis)  (J18.9) Community acquired pneumonia  Comment: Respiratory status improving. Baseline oxygen use. No s/s of bacteremia. Abx completed.  Plan: Monitor VS, respiratory status. Adjust as needed. CBC, BMP PRN    (D64.9) Anemia, unspecified type   Comment: Asymptomatic, no s/s of bleeding  Plan: Monitor VS, for s/s of bleeding. Recheck CBC on 2/13.    (T14.8) Compression fracture L2-3  Comment: Pain controlled, sensation intact.   Plan: Continue tramadol, gabapentin, tylenol TID. Continue Decrease nortriptyline to 12.5 mg qhs, wean off if able. TLSO brace when OOB x 12 weeks.  Outpatient follow up in 6 weeks with neurosurgery CNP with lumbar/flexion extension x-ray prior to this appointment.    (I10) Essential hypertension  Comment: Occasionally elevated, likely due to pain, asymptomatic.   Plan: Continue current medications at current doses. Monitor VS, weight and adjust as needed.    (N18.3) CKD (chronic kidney disease) stage 3, GFR 30-59 ml/min  Comment: Chronic. See labs. At baseline creatinine  Plan: BMP PRN. Control BP.  Avoid nephrotoxic medications.    (R53.81) Physical deconditioning  Comment: R/t recent acute illness, fall with compression fracture. Goal is to discharge home.  Plan: PT/OT eval and treat. Discharge planning per their recommendation    Electronically signed by  CELIA Thao CNP

## 2017-02-07 VITALS
WEIGHT: 154.6 LBS | RESPIRATION RATE: 18 BRPM | HEIGHT: 68 IN | SYSTOLIC BLOOD PRESSURE: 142 MMHG | TEMPERATURE: 97.4 F | OXYGEN SATURATION: 97 % | HEART RATE: 77 BPM | DIASTOLIC BLOOD PRESSURE: 70 MMHG | BODY MASS INDEX: 23.43 KG/M2

## 2017-02-08 ENCOUNTER — NURSING HOME VISIT (OUTPATIENT)
Dept: GERIATRICS | Facility: CLINIC | Age: 82
End: 2017-02-08
Payer: COMMERCIAL

## 2017-02-08 DIAGNOSIS — N18.30 CKD (CHRONIC KIDNEY DISEASE) STAGE 3, GFR 30-59 ML/MIN (H): Chronic | ICD-10-CM

## 2017-02-08 DIAGNOSIS — I25.10 CORONARY ARTERY DISEASE INVOLVING NATIVE CORONARY ARTERY OF NATIVE HEART WITHOUT ANGINA PECTORIS: Chronic | ICD-10-CM

## 2017-02-08 DIAGNOSIS — Z95.2 S/P TAVR (TRANSCATHETER AORTIC VALVE REPLACEMENT): Chronic | ICD-10-CM

## 2017-02-08 DIAGNOSIS — J84.10 PULMONARY FIBROSIS (H): Chronic | ICD-10-CM

## 2017-02-08 DIAGNOSIS — R53.81 PHYSICAL DECONDITIONING: ICD-10-CM

## 2017-02-08 PROCEDURE — 99207 ZZC CDG-CORRECTLY CODED, REVIEWED AND AGREE: CPT | Performed by: INTERNAL MEDICINE

## 2017-02-08 PROCEDURE — 99306 1ST NF CARE HIGH MDM 50: CPT | Performed by: INTERNAL MEDICINE

## 2017-02-08 NOTE — PROGRESS NOTES
PRIMARY CARE PROVIDER AND CLINIC RESPONSIBLE:  Alirio Fox [facility] [address]  / [facility] [id number]        ADMISSION HISTORY AND PHYSICAL EXAMINATION     Chief Complaint   Patient presents with     Hospital F/U         HISTORY OF PRESENT ILLNESS:  88 year old male, (5/21/1928), [facility] medical care and [facility].    Pt admitted [facility] 1/29 to 2/1 for fall and was noted to have pneumonia and compression fx of L2 and L3. Hx of IPF on home oxygen.    Patient is seen and examined by me with Pattie Garcia CNP. Please see Pattie Garcia 's admit noted dated 2/2 for details of admission, past medical history, family history, allergies, medication list, social history and other details pertinent with this admission. Hospital admission and dc summary reviewed.      Past Medical History   Diagnosis Date     Hyperplasia of prostate      Lumbago      LEFT LUNG GRANULOMA      Essential hypertension      Gout 1/06     knee     Unspecified hypothyroidism      Vitamin D deficiency      Pulmonary fibrosis (H)      Hyperparathyroidism (H)      AAA (abdominal aortic aneurysm) (H) 10/5/2011     6.1 cm     CAD (coronary artery disease)      MI - distal inferior/apex. Non transmural     Hyperlipidemia LDL goal < 70      CKD (chronic kidney disease) stage 3, GFR 30-59 ml/min 11/30/2011     Anemia 11/30/2011     Thrombocytopenia (H) 11/30/2011     Proteinuria 2/8/2012     Aortic stenosis 10/26/2012     CHF (congestive heart failure) (H) 12/31/2014     Hypercalcemia 1/2/2015     Hyperparathyroidism, unspecified (H) 4/22/2015     Dementia 8/4/2015     Edema, unspecified edema 1/17/2016     Other chronic pain 10/11/2016       Past Surgical History   Procedure Laterality Date     Vasectomy       Right cataract extraction/iol  12/03     Colonoscopy  9/02 per patient     Laparoscopic cholecystectomy  Nov 2011     Repair aneurysm abdominal aorta  Nov 2011     Endovascular repair  aneurysm abdominal aorta  11/18/2011     Procedure:ENDOVASCULAR REPAIR ANEURYSM ABDOMINAL AORTA; ENDOVASCULAR AAA,RIGHT FEMORAL       Laparoscopic cholecystectomy  11/18/2011     Procedure:LAPAROSCOPIC CHOLECYSTECTOMY; LAPAROSCOPIC CHOLECYSTECTOMY ; Surgeon:DARRYL DORADO; Location:[facility] OR     Discectomy lumbar posterior microscopic one level  8/11/2014     Procedure: DISCECTOMY LUMBAR POSTERIOR MICROSCOPIC ONE LEVEL;  Surgeon: Jone Carvalho MD;  Location:  OR     Transcatheter aortic valve implant anesthesia N/A 11/12/2014     Procedure: TRANSCATHETER AORTIC VALVE IMPLANT ANESTHESIA;  Surgeon: Generic Anesthesia Provider;  Location: [facility] OR       Current Outpatient Prescriptions   Medication Sig     nortriptyline (PAMELOR) 10 MG/5ML solution Take 12.5 mg by mouth At Bedtime     traMADol (ULTRAM) 50 MG tablet Take 1 tablet (50 mg) by mouth every 6 hours as needed for moderate pain     acetaminophen (TYLENOL) 500 MG tablet Take 2 tablets (1,000 mg) by mouth 3 times daily     senna-docusate (SENOKOT-S;PERICOLACE) 8.6-50 MG per tablet Take 2 tablets by mouth 2 times daily as needed (constipation )     furosemide (LASIX) 20 MG tablet Take 20 mg by mouth daily     aspirin 81 MG EC tablet Take 81 mg by mouth every evening     gabapentin (NEURONTIN) 100 MG capsule Take 1 capsules Three times a day for pain     tamsulosin (FLOMAX) 0.4 MG capsule Take 1 capsule (0.4 mg) by mouth daily     losartan (COZAAR) 100 MG tablet Take 1 tablet (100 mg) by mouth daily     levothyroxine (SYNTHROID/LEVOTHROID) 88 MCG tablet Take 1 tablet (88 mcg) by mouth daily     order for DME Equipment being ordered: TENS and supplies.     polyethylene glycol (MIRALAX) packet Take 17 g by mouth daily     atorvastatin (LIPITOR) 20 MG tablet Take 1 tablet (20 mg) by mouth daily     cyanocobalamin (VITAMIN  B-12) 1000 MCG tablet Take 1,000 mcg by mouth daily     AMOXICILLIN PO Take 500 mg by mouth once as needed     diclofenac (VOLTAREN) 1 %  GEL Apply 4 grams to low back right hip and right leg four times as needed  daily using enclosed dosing card.  Lidocaine cream     lidocaine-prilocaine (EMLA) cream Apply topically as needed for moderate pain To low back, right hip  and right leg Apply same time as Diclofenac gel     donepezil (ARICEPT) 10 MG tablet Take 1 tablet (10 mg) by mouth At Bedtime     amLODIPine (NORVASC) 10 MG tablet Take 1 tablet (10 mg) by mouth daily     metoprolol (TOPROL-XL) 25 MG 24 hr tablet Take 1 tablet (25 mg) by mouth daily     pantoprazole (PROTONIX) 40 MG enteric coated tablet Take 1 tablet (40 mg) by mouth every morning     Ferrous Sulfate (IRON SUPPLEMENT PO) Take 325 mg by mouth daily (with breakfast)      No current facility-administered medications for this visit.     Facility-Administered Medications Ordered in Other Visits   Medication     neostigmine (PROSTIGMINE) injection       Allergies   Allergen Reactions     Contrast Dye      SOB, increased Bp, difficulty swallowing. Date 6/3/96 (ipaque contrast)  Was premedicated prior to FRANCK and had no reaction at all (solumedrol and benadryl) 2016  Was premedicated with Methylprednisolone protocol, no reaction 1/25/17     Lisinopril      cough     Oxycodone      Delirium       Social History     Social History     Marital Status:      Spouse Name: N/A     Number of Children: 4     Years of Education: 18     Occupational History      Retired     Social History Main Topics     Smoking status: Never Smoker      Smokeless tobacco: Never Used     Alcohol Use: 0.0 oz/week     0 Standard drinks or equivalent per week      Comment: 1 glass wine/day     Drug Use: No     Sexual Activity: No     Other Topics Concern     Caffeine Concern Yes     1 day      Sleep Concern Yes     too much      Weight Concern Yes     appetite reduced      Special Diet No     Back Care Yes     Exercise Yes     daily 5 x per week.  recummbant bike      Seat Belt Yes     Social History Narrative     "Lives in  [facility]ior co[facility]op in [place] for the past 13 years.     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     ROS: All 10 point review of system completed, those pertinent positive, please see H&P, the remaining ROS is negative.    /70 mmHg  Pulse 77  Temp(Src) 97.4  F (36.3  C)  Resp 18  Ht 5' 8\" (1.727 m)  Wt 154 lb 9.6 oz (70.126 kg)  BMI 23.51 kg/m2  SpO2 97%    PHYSICAL EXAMINATION:   GENERAL:  No acute distress. Sitting in chair. + NC. + TLSO.  SKIN:  Dry and warm.  There is no rash, lesions, ulcers or juandice at area of skin examined.  HEENT:  Head without trauma.  Pupils round, reactive. Exam of conjunctiva and lids are normal. Sclera without icterus. There is no oral thrush.  NECK:  Supple.  There is no cervical adenopathy, no thyromegaly. No jugular venous distension.  CHEST: TLSO.  LUNGS:  Normal respiratory effort. + TLSO.  HEART:  + TLSO.  ABDOMEN:  + TLSO.  EXTREMITIES: No edema.   NEUROLOGIC:  Alert and oriented to self.    Lab/Diagnostic data:  Reviewed    WBC      8.3   2/6/2017  RBC     3.50   2/6/2017  HGB     10.5   2/6/2017  HCT     31.7   2/6/2017  MCV       91   2/6/2017  MCH     30.3   2/6/2017  MCHC     33.4   2/6/2017  RDW     12.4   2/6/2017  PLT      169   2/6/2017  PLT      179   11/25/2014    Last Basic Metabolic Panel:  NA      142   2/6/2017   POTASSIUM      4.0   2/6/2017  CHLORIDE      106   2/6/2017  LEXIS      9.4   2/6/2017  CO2       29   2/6/2017  BUN       28   2/6/2017  CR     1.45   2/6/2017  GLC       84   2/6/2017    ASSESSMENT / PLAN:     Compression fracture L2 and L3.  - TLSO brace.  - Pain control.  - Bowel regimen.  - f/u with NS as scheduled.    Community Acquired Pneumonia.  - s/p rocephin and azithromycin.  - Finished course of ceftin.      S/P TAVR (transcatheter aortic valve replacement)  - stable.    CKD (chronic kidney disease) " stage 3, GFR 30-59 ml/min  - Avoid nephrotoxins.    Coronary artery disease involving native coronary artery of native heart without angina pectoris  - On ASA, metoprolol, lipitor and norvasc.    Pulmonary fibrosis (H)  - On home oxygen.    Physical deconditioning  -Plan: PT/OT, fall precautions. Care conference with patient and family for the progress of rehab and disposition issues will be discussed as planned. Rehab evaluation and other evaluations including CPT are at rehab logs, to be reviewed separately.  Fall risk assessment as well as cognitive evaluation will be formed during rehab stay if indicated.    Chronic diastolic HF.  - Low salt diet.  - On metoprolol and lasix.  - daily weights.    Hypothyroidism,unspecified.  - On synthroid.    Dementia with no behavioral disturbance.  - On aricept.    HTN, benign essential.  - On cozaar, metoprolol and norvasc.    BPH with no LUTS.  - On flomax.    Other problems with same care. Primary care doctor and other specialists to address those chronic problems in next clinic appointment to be scheduled upon discharge from the TCU.    Total time spent with patient visit was 45 min including patient visit, review of past records, 1/2 time on patients counseling and coordinating care.

## 2017-02-15 ENCOUNTER — NURSING HOME VISIT (OUTPATIENT)
Dept: GERIATRICS | Facility: CLINIC | Age: 82
End: 2017-02-15
Payer: COMMERCIAL

## 2017-02-15 VITALS
WEIGHT: 156.8 LBS | HEIGHT: 68 IN | DIASTOLIC BLOOD PRESSURE: 65 MMHG | TEMPERATURE: 97.5 F | BODY MASS INDEX: 23.76 KG/M2 | OXYGEN SATURATION: 97 % | SYSTOLIC BLOOD PRESSURE: 137 MMHG | HEART RATE: 79 BPM | RESPIRATION RATE: 18 BRPM

## 2017-02-15 DIAGNOSIS — I10 ESSENTIAL HYPERTENSION: ICD-10-CM

## 2017-02-15 DIAGNOSIS — J18.9 COMMUNITY ACQUIRED PNEUMONIA: ICD-10-CM

## 2017-02-15 DIAGNOSIS — F03.91 DEMENTIA WITH BEHAVIORAL DISTURBANCE, UNSPECIFIED DEMENTIA TYPE: ICD-10-CM

## 2017-02-15 DIAGNOSIS — R53.81 PHYSICAL DECONDITIONING: ICD-10-CM

## 2017-02-15 DIAGNOSIS — J84.10 PULMONARY FIBROSIS (H): ICD-10-CM

## 2017-02-15 PROCEDURE — 99309 SBSQ NF CARE MODERATE MDM 30: CPT | Performed by: NURSE PRACTITIONER

## 2017-02-15 NOTE — PROGRESS NOTES
[facility]    Chief Complaint   Patient presents with     Nursing Home Acute       HPI:    Chaz Soler is a 88 year old  (5/21/1928), who is being seen today for an episodic care visit at [facility]. Today's concern is:  Compression fracture L2-3  Patient presented to ED with increased back pain after fall at home. CT revealed new L2-3 compression fracture. Neurosurgery was consulted and recommended no surgery, TLSO brace when OOB for 12 weeks. He was started on scheduled tylenol and tramadol for pain. He is also on gabapentin 100 mg TID. He has PRN Voltaren gel and lidocaine cream available QID PRN. Narcotics should be avoided as have previously caused him confusion. He reports that his pain is controlled, most of discomfort is from TLSO brace.  He does have a history of chronic low back pain, and has been on nortryptiline. Family requested this medication be discontinued, and has been weaned down, currently at 12.5 mg qhs QOD.     Essential hypertension  He is currently on losartan ASA, metoprolol, amlodipine, lasix, ASA 81mg and atorvastatin. BP occasionally elevated in TCU, suspect likely related to pain. No episodes of hypotension. He denies any lightheadedness, headaches, vision changes, or dizziness. Denies any chest pain, SOB, or palpitations.  Last 3 BPs: 159/83, 145/61, 140/70.  Admission Weight: 154.6lbs  Current Weight: 156.8lbs    Community acquired pneumonia  Pulmonary fibrosis (H)  Presented to ED after fall at home. He has pulmonary fibrosis and wears 2-3L oxygen at baseline at home. CXR showed possible consolidation on left side. He was started on IV ceftriaxone and azithromycin. Blood cultures negative, and flu negative. He was successfully weaned to baseline oxygen use of 3L NC.  Changed to oral antibiotics, now completed: ceftin (last day 2/5) and azithromycin last day (2/2). He remains afebrile and VSS. Occasional cough and breathing at  baseline. Denies any chills or night sweats.    Dementia with behavioral disturbance, unspecified dementia type  He is on aricept. Did have some delirium while IP, thought to be likely related to pain/medications, pneumonia, and stress of new environment. He was started on low dose Seroquel with improvement of symptoms. He has at baseline mentation and behaviors per wife. Seroquel was discontinued and there have been no behavorial concerns.      Physical deconditioning  Had recent fall at home and some generalized weakness from recent pneumonia. Also has cliff TLSO brace for compression fracture. Working with PT and OT [facility]. Currently lives in senior housing with spouse, and plan is to discharge back to there. Patient reports that his wife is his caretaker. Current function: UE Drsg: s/u (max for brace), LE Drsg: mod, Bed Mobility: indep, Transfers: cga,Walkinft 2ww cga.  SLUMS: 15/30, CPT: 5.0/5.6      ALLERGIES: Contrast dye; Lisinopril; and Oxycodone  Past Medical, Surgical, Family and Social History reviewed and updated in [facility].    Current Outpatient Prescriptions   Medication Sig Dispense Refill     nortriptyline (PAMELOR) 10 MG/5ML solution Take 12.5 mg by mouth At Bedtime       traMADol (ULTRAM) 50 MG tablet Take 1 tablet (50 mg) by mouth every 6 hours as needed for moderate pain 30 tablet 0     acetaminophen (TYLENOL) 500 MG tablet Take 2 tablets (1,000 mg) by mouth 3 times daily       senna-docusate (SENOKOT-S;PERICOLACE) 8.6-50 MG per tablet Take 2 tablets by mouth 2 times daily as needed (constipation ) 100 tablet      furosemide (LASIX) 20 MG tablet Take 20 mg by mouth daily       aspirin 81 MG EC tablet Take 81 mg by mouth every evening       gabapentin (NEURONTIN) 100 MG capsule Take 1 capsules Three times a day for pain 90 capsule 0     tamsulosin (FLOMAX) 0.4 MG capsule Take 1 capsule (0.4 mg) by mouth daily 90 capsule 1     losartan (COZAAR) 100 MG tablet Take 1 tablet (100 mg) by mouth  daily 90 tablet 3     levothyroxine (SYNTHROID/LEVOTHROID) 88 MCG tablet Take 1 tablet (88 mcg) by mouth daily 90 tablet 3     order for DME Equipment being ordered: TENS and supplies. 1 each 0     polyethylene glycol (MIRALAX) packet Take 17 g by mouth daily 30 packet 11     atorvastatin (LIPITOR) 20 MG tablet Take 1 tablet (20 mg) by mouth daily 90 tablet 3     cyanocobalamin (VITAMIN  B-12) 1000 MCG tablet Take 1,000 mcg by mouth daily       AMOXICILLIN PO Take 500 mg by mouth once as needed       diclofenac (VOLTAREN) 1 % GEL Apply 4 grams to low back right hip and right leg four times as needed  daily using enclosed dosing card.  Lidocaine cream 100 g 1     lidocaine-prilocaine (EMLA) cream Apply topically as needed for moderate pain To low back, right hip  and right leg Apply same time as Diclofenac gel 30 g 1     donepezil (ARICEPT) 10 MG tablet Take 1 tablet (10 mg) by mouth At Bedtime 90 tablet 3     amLODIPine (NORVASC) 10 MG tablet Take 1 tablet (10 mg) by mouth daily 90 tablet 2     metoprolol (TOPROL-XL) 25 MG 24 hr tablet Take 1 tablet (25 mg) by mouth daily 90 tablet 3     pantoprazole (PROTONIX) 40 MG enteric coated tablet Take 1 tablet (40 mg) by mouth every morning 90 tablet 3     Ferrous Sulfate (IRON SUPPLEMENT PO) Take 325 mg by mouth daily (with breakfast)        Medications reviewed:  Medications reconciled to facility chart and changes were made to reflect current medications as identified as above med list. Below are the changes that were made:   Medications stopped since last EPIC medication reconciliation:   There are no discontinued medications.    Medications started since last Psychiatric medication reconciliation:  No orders of the defined types were placed in this encounter.      REVIEW OF SYSTEMS:  10 point ROS of systems including Constitutional, Eyes, Respiratory, Cardiovascular, Gastroenterology, Genitourinary, Integumentary, Muscularskeletal, Psychiatric were all negative except for  "pertinent positives noted in my HPI.    Physical Exam:  /65  Pulse 79  Temp 97.5  F (36.4  C)  Resp 18  Ht 5' 8\" (1.727 m)  Wt 156 lb 12.8 oz (71.1 kg)  SpO2 97%  BMI 23.84 kg/m2  GENERAL APPEARANCE:  Alert, in no distress, cooperative  ENT:  Mouth and posterior oropharynx normal, moist mucous membranes, normal hearing acuity  EYES:  PERRL, Conjunctiva and lids normal  NECK:  No adenopathy, masses or thyromegaly  RESP:  respiratory effort and palpation of chest normal, lungs clear to auscultation , no respiratory distress, diminished breath sounds BLL, cough occasionaly, productive, clear sputum, exam limited as TLSO brace in place, 2L O2 via NC  CV:  Palpation and auscultation of heart done , regular rate and rhythm, no murmur, rub, or gallop, no edema, +2 pedal pulses  ABDOMEN:  normal bowel sounds, exam limited as TLSO brace in place  M/S:   Gait and station normal, TLSO brace in place  Digits and nails normal  SKIN:  Inspection of skin and subcutaneous tissue baseline, Palpation of skin and subcutaneous tissue baseline,  NEURO:   Cranial nerves 2-12 are normal tested and grossly at patient's baseline, Examination of sensation by touch normal  PSYCH:  oriented to self, place, year, disoriented to date, insight and judgement impaired, memory impaired , affect and mood normal, pleasant demeanor    Recent Labs:    CBC RESULTS:   Recent Labs   Lab Test 02/06/17 02/01/17   0652  01/30/17   0655   WBC  8.3   --   9.5   RBC  3.50*   --   3.77*   HGB  10.5*   --   11.3*   HCT  31.7*   --   33.9*   MCV  91   --   90   MCH  30.3   --   30.0   MCHC  33.4   --   33.3   RDW  12.4   --   12.6   PLT  169  127*  120*       Last Basic Metabolic Panel:  Recent Labs   Lab Test 02/06/17 02/01/17   0652  01/30/17   0655   NA  142   --   139   POTASSIUM  4.0   --   4.2   CHLORIDE  106   --   102   LEXIS  9.4   --   9.5   CO2  29   --   31   BUN  28   --   29   CR  1.45*  1.65*  1.36*   GLC  84   --   88     TSH   Date Value Ref " Range Status   11/14/2016 0.93 0.40 - 4.00 mU/L Final     Assessment/Plan:  Compression fracture L2-3  Comment: Pain controlled, sensation intact.   Plan: Continue tramadol, gabapentin, tylenol TID. IF pain remain controlled, discontinue nortriptyline later this week. TLSO brace when OOB x 12 weeks.  Outpatient follow up in 6 weeks with neurosurgery CNP with lumbar/flexion extension x-ray prior to this appointment.    Essential hypertension  Comment: Occasionally elevated, likely due to pain, asymptomatic.   Plan: Continue current medications at current doses. Monitor VS, weight and adjust as needed.    Community acquired pneumonia  Pulmonary fibrosis (H)  Comment: Respiratory status improving. Baseline oxygen use. No s/s of bacteremia. Abx completed.  Plan: Monitor VS, respiratory status. Adjust as needed. CBC, BMP PRN    Dementia with behavioral disturbance, unspecified dementia type  Comment: At baseline mentation. No behavioral concerns in TCU. Seroquel discontinued  Plan: Continue aricept. Monitor behavior mentation, promote adequate sleep/rest.    Physical deconditioning  Comment: R/t recent acute illness, fall with compression fracture. Goal is to discharge home.  Plan: PT/OT eval and treat. Discharge planning per their recommendation      Electronically signed by  CELIA Thao CNP

## 2017-02-21 ENCOUNTER — NURSING HOME VISIT (OUTPATIENT)
Dept: GERIATRICS | Facility: CLINIC | Age: 82
End: 2017-02-21
Payer: COMMERCIAL

## 2017-02-21 VITALS
TEMPERATURE: 97.2 F | SYSTOLIC BLOOD PRESSURE: 138 MMHG | RESPIRATION RATE: 18 BRPM | DIASTOLIC BLOOD PRESSURE: 68 MMHG | HEIGHT: 68 IN | WEIGHT: 152.6 LBS | OXYGEN SATURATION: 97 % | BODY MASS INDEX: 23.13 KG/M2 | HEART RATE: 70 BPM

## 2017-02-21 DIAGNOSIS — G89.29 CHRONIC LOW BACK PAIN, UNSPECIFIED BACK PAIN LATERALITY, WITH SCIATICA PRESENCE UNSPECIFIED: ICD-10-CM

## 2017-02-21 DIAGNOSIS — R53.81 PHYSICAL DECONDITIONING: ICD-10-CM

## 2017-02-21 DIAGNOSIS — I10 ESSENTIAL HYPERTENSION: ICD-10-CM

## 2017-02-21 DIAGNOSIS — F03.91 DEMENTIA WITH BEHAVIORAL DISTURBANCE, UNSPECIFIED DEMENTIA TYPE: ICD-10-CM

## 2017-02-21 DIAGNOSIS — J84.10 PULMONARY FIBROSIS (H): ICD-10-CM

## 2017-02-21 DIAGNOSIS — M54.5 CHRONIC LOW BACK PAIN, UNSPECIFIED BACK PAIN LATERALITY, WITH SCIATICA PRESENCE UNSPECIFIED: ICD-10-CM

## 2017-02-21 PROCEDURE — 99309 SBSQ NF CARE MODERATE MDM 30: CPT | Performed by: NURSE PRACTITIONER

## 2017-02-21 PROCEDURE — 99207 ZZC CDG-CORRECTLY CODED, REVIEWED AND AGREE: CPT | Performed by: NURSE PRACTITIONER

## 2017-02-21 NOTE — PROGRESS NOTES
[facility]    Chief Complaint   Patient presents with     Nursing Home Acute       HPI:    Chaz Soler is a 88 year old  (5/21/1928), who is being seen today for an episodic care visit at [facility]. Today's concern is:  Compression fracture L2-3  Chronic low back pain, unspecified back pain laterality, with sciatica presence unspecified  Patient presented to ED with increased back pain after fall at home. CT revealed new L2-3 compression fracture. Neurosurgery was consulted and recommended no surgery, TLSO brace when OOB for 12 weeks. He was started on scheduled tylenol and tramadol for pain. He is also on gabapentin 100 mg TID. He has PRN Voltaren gel and lidocaine cream available QID PRN. Narcotics should be avoided as have previously caused him confusion. He reports that his pain is controlled, most of discomfort is from TLSO brace.  He does have a history of chronic low back pain, and has been on nortryptiline. Per family request this medication was weaned off and he reports no increase in back pain.     Dementia with behavioral disturbance, unspecified dementia type  He is on aricept. Did have some delirium while IP, thought to be likely related to pain/medications, pneumonia, and stress of new environment. He was started on low dose Seroquel with improvement of symptoms. He is at baseline mentation and behaviors per wife. Seroquel was discontinued; no behavorial concerns noted by staff.    Pulmonary fibrosis (H)  Presented to ED after fall at home. He has pulmonary fibrosis and wears 2-3L oxygen at baseline at home. CXR showed possible consolidation on left side. He was started on IV ceftriaxone and azithromycin. Blood cultures negative, and flu negative. He was successfully weaned to baseline oxygen use of 3L NC.  Changed to oral antibiotics, now completed: ceftin (last day 2/5) and azithromycin (last day 2/2). He remains afebrile and VSS. Occasional  cough and breathing at baseline. Denies any chills or night sweats. Denies and chest pain or dyspnea.    Essential hypertension  He is currently on losartan ASA, metoprolol, amlodipine, lasix, ASA 81mg and atorvastatin. BP was occasionally elevated in TCU, suspect likely related to pain. No episodes of hypotension. He denies any lightheadedness, headaches, vision changes, or dizziness. Denies any chest pain, SOB, or palpitations.  Last 3 BPs: 155/74, 152/68, 128/59.  Admission Weight: 154.6lbs  Current Weight: 152.6lbs    Physical deconditioning  Had recent fall at home and some generalized weakness from recent pneumonia. Also has cliff TLSO brace for compression fracture. Working with PT and OT while at TCU. Currently lives in senior housing with spouse, and plan is to discharge back to there. Patient reports that his wife is his caretaker. Current function: UE Drsg: s/u (max for brace), LE Drsg: mod, Bed Mobility: indep, Transfers: cga,Walkin-180 ft 2ww SBA.  SLUMS: 15/30, CPT: 5.0/5.6        ALLERGIES: Contrast dye; Lisinopril; and Oxycodone  Past Medical, Surgical, Family and Social History reviewed and updated in [facility].    Current Outpatient Prescriptions   Medication Sig Dispense Refill     traMADol (ULTRAM) 50 MG tablet Take 1 tablet (50 mg) by mouth every 6 hours as needed for moderate pain 30 tablet 0     acetaminophen (TYLENOL) 500 MG tablet Take 2 tablets (1,000 mg) by mouth 3 times daily       senna-docusate (SENOKOT-S;PERICOLACE) 8.6-50 MG per tablet Take 2 tablets by mouth 2 times daily as needed (constipation ) 100 tablet      furosemide (LASIX) 20 MG tablet Take 20 mg by mouth daily       aspirin 81 MG EC tablet Take 81 mg by mouth every evening       gabapentin (NEURONTIN) 100 MG capsule Take 1 capsules Three times a day for pain 90 capsule 0     tamsulosin (FLOMAX) 0.4 MG capsule Take 1 capsule (0.4 mg) by mouth daily 90 capsule 1     losartan (COZAAR) 100 MG tablet Take 1 tablet (100 mg) by  mouth daily 90 tablet 3     levothyroxine (SYNTHROID/LEVOTHROID) 88 MCG tablet Take 1 tablet (88 mcg) by mouth daily 90 tablet 3     order for DME Equipment being ordered: TENS and supplies. 1 each 0     polyethylene glycol (MIRALAX) packet Take 17 g by mouth daily 30 packet 11     atorvastatin (LIPITOR) 20 MG tablet Take 1 tablet (20 mg) by mouth daily 90 tablet 3     cyanocobalamin (VITAMIN  B-12) 1000 MCG tablet Take 1,000 mcg by mouth daily       AMOXICILLIN PO Take 500 mg by mouth once as needed       diclofenac (VOLTAREN) 1 % GEL Apply 4 grams to low back right hip and right leg four times as needed  daily using enclosed dosing card.  Lidocaine cream 100 g 1     lidocaine-prilocaine (EMLA) cream Apply topically as needed for moderate pain To low back, right hip  and right leg Apply same time as Diclofenac gel 30 g 1     donepezil (ARICEPT) 10 MG tablet Take 1 tablet (10 mg) by mouth At Bedtime 90 tablet 3     amLODIPine (NORVASC) 10 MG tablet Take 1 tablet (10 mg) by mouth daily 90 tablet 2     metoprolol (TOPROL-XL) 25 MG 24 hr tablet Take 1 tablet (25 mg) by mouth daily 90 tablet 3     pantoprazole (PROTONIX) 40 MG enteric coated tablet Take 1 tablet (40 mg) by mouth every morning 90 tablet 3     Ferrous Sulfate (IRON SUPPLEMENT PO) Take 325 mg by mouth daily (with breakfast)        Medications reviewed:  Medications reconciled to facility chart and changes were made to reflect current medications as identified as above med list. Below are the changes that were made:   Medications stopped since last EPIC medication reconciliation:   Medications Discontinued During This Encounter   Medication Reason     nortriptyline (PAMELOR) 10 MG/5ML solution Medication Reconciliation Clean Up       Medications started since last [facility] medication reconciliation:  No orders of the defined types were placed in this encounter.      REVIEW OF SYSTEMS:  10 point ROS of systems including Constitutional, Eyes, Respiratory,  "Cardiovascular, Gastroenterology, Genitourinary, Integumentary, Muscularskeletal, Psychiatric were all negative except for pertinent positives noted in my HPI.    Physical Exam:  /68  Pulse 70  Temp 97.2  F (36.2  C)  Resp 18  Ht 5' 8\" (1.727 m)  Wt 152 lb 9.6 oz (69.2 kg)  SpO2 97%  BMI 23.2 kg/m2  GENERAL APPEARANCE:  Alert, in no distress, cooperative  ENT:  Mouth and posterior oropharynx normal, moist mucous membranes, normal hearing acuity  EYES:  PERRL, Conjunctiva and lids normal  NECK:  No adenopathy, masses or thyromegaly  RESP:  respiratory effort and palpation of chest normal, lungs clear to auscultation , no respiratory distress, diminished breath sounds BLL, cough occasionaly, productive, clear sputum, exam limited as TLSO brace in place, 2L O2 via NC  CV:  Palpation and auscultation of heart done , regular rate and rhythm, no murmur, rub, or gallop, no edema, +2 pedal pulses  ABDOMEN:  normal bowel sounds, exam limited as TLSO brace in place  M/S:   Gait and station normal, TLSO brace in place  Digits and nails normal  SKIN:  Inspection of skin and subcutaneous tissue baseline, Palpation of skin and subcutaneous tissue baseline,  NEURO:   Cranial nerves 2-12 are normal tested and grossly at patient's baseline, Examination of sensation by touch normal  PSYCH:  oriented to self, place, year, disoriented to date, insight and judgement impaired, memory impaired , affect and mood normal, pleasant demeanor    Recent Labs:    CBC RESULTS:   Recent Labs   Lab Test 02/06/17 02/01/17   0652  01/30/17   0655   WBC  8.3   --   9.5   RBC  3.50*   --   3.77*   HGB  10.5*   --   11.3*   HCT  31.7*   --   33.9*   MCV  91   --   90   MCH  30.3   --   30.0   MCHC  33.4   --   33.3   RDW  12.4   --   12.6   PLT  169  127*  120*       Last Basic Metabolic Panel:  Recent Labs   Lab Test 02/06/17 02/01/17   0652  01/30/17   0655   NA  142   --   139   POTASSIUM  4.0   --   4.2   CHLORIDE  106   --   102   LEXIS  9.4   " --   9.5   CO2  29   --   31   BUN  28   --   29   CR  1.45*  1.65*  1.36*   GLC  84   --   88     Liver Function Studies -   Lab Test  10/21/16   0857   PROTTOTAL  6.9   ALBUMIN  3.5   BILITOTAL  0.7   ALKPHOS  147   AST  17   ALT  26     TSH   Date Value Ref Range Status   11/14/2016 0.93 0.40 - 4.00 mU/L Final     Assessment/Plan:  (T14.8) Compression fracture L2-3  (primary encounter diagnosis)  (M54.5,  G89.29) Chronic low back pain, unspecified back pain laterality, with sciatica presence unspecified  Comment: Pain controlled, sensation intact. Successfully weaned of nortriptyline.  Plan: Continue tramadol, gabapentin, tylenol TID. IF pain remain controlled, discontinue nortriptyline later this week. TLSO brace when OOB x 12 weeks.  Outpatient follow up in 6 weeks with neurosurgery CNP with lumbar/flexion extension x-ray prior to this appointment.    (F03.91) Dementia with behavioral disturbance, unspecified dementia type  Comment: At baseline mentation. No behavioral concerns in TCU. Seroquel discontinued  Plan: Continue aricept. Monitor behavior & mentation, promote adequate sleep/rest.    (J84.10) Pulmonary fibrosis (H)  Comment: Respiratory status, oxygen use at baseline No s/s of bacteremia. Abx completed.  Plan: Monitor VS, respiratory status. Adjust as needed. CBC, BMP PRN    (I10) Essential hypertension  Comment: Occasionally elevated, likely due to pain, typically <140. asymptomatic.   Plan: Continue current medications at current doses. Monitor VS, weight and adjust as needed.    (R53.81) Physical deconditioning  Comment: R/t recent acute illness, fall with compression fracture. Goal is to discharge home.  Plan: PT/OT eval and treat. Discharge planning per their recommendation        Electronically signed by  CELIA Thao CNP

## 2017-02-22 ENCOUNTER — CARE COORDINATION (OUTPATIENT)
Dept: CARE COORDINATION | Facility: CLINIC | Age: 82
End: 2017-02-22

## 2017-02-22 NOTE — PROGRESS NOTES
Clinic Care Coordination Contact  Care Team Conversations    Chart reviewed. Patient remains in TCU. Will recheck placement in 2-3 weeks.     Yuliya Lozano R.N.  Clinic Care Coordinator  [facility] [place]  [phone]

## 2017-02-23 ENCOUNTER — DISCHARGE SUMMARY NURSING HOME (OUTPATIENT)
Dept: GERIATRICS | Facility: CLINIC | Age: 82
End: 2017-02-23
Payer: COMMERCIAL

## 2017-02-23 VITALS
TEMPERATURE: 97.4 F | HEART RATE: 80 BPM | OXYGEN SATURATION: 93 % | SYSTOLIC BLOOD PRESSURE: 154 MMHG | BODY MASS INDEX: 24.37 KG/M2 | RESPIRATION RATE: 16 BRPM | WEIGHT: 160.8 LBS | DIASTOLIC BLOOD PRESSURE: 69 MMHG | HEIGHT: 68 IN

## 2017-02-23 DIAGNOSIS — Z95.2 S/P TAVR (TRANSCATHETER AORTIC VALVE REPLACEMENT): ICD-10-CM

## 2017-02-23 DIAGNOSIS — F03.91 DEMENTIA WITH BEHAVIORAL DISTURBANCE, UNSPECIFIED DEMENTIA TYPE: ICD-10-CM

## 2017-02-23 DIAGNOSIS — J18.9 CAP (COMMUNITY ACQUIRED PNEUMONIA): ICD-10-CM

## 2017-02-23 DIAGNOSIS — I25.10 CORONARY ARTERY DISEASE INVOLVING NATIVE CORONARY ARTERY OF NATIVE HEART WITHOUT ANGINA PECTORIS: ICD-10-CM

## 2017-02-23 DIAGNOSIS — N18.30 CKD (CHRONIC KIDNEY DISEASE) STAGE 3, GFR 30-59 ML/MIN (H): ICD-10-CM

## 2017-02-23 DIAGNOSIS — R53.81 PHYSICAL DECONDITIONING: ICD-10-CM

## 2017-02-23 DIAGNOSIS — M54.5 CHRONIC LOW BACK PAIN, UNSPECIFIED BACK PAIN LATERALITY, WITH SCIATICA PRESENCE UNSPECIFIED: ICD-10-CM

## 2017-02-23 DIAGNOSIS — I50.9 CONGESTIVE HEART FAILURE, UNSPECIFIED CONGESTIVE HEART FAILURE CHRONICITY, UNSPECIFIED CONGESTIVE HEART FAILURE TYPE: ICD-10-CM

## 2017-02-23 DIAGNOSIS — G89.29 CHRONIC LOW BACK PAIN, UNSPECIFIED BACK PAIN LATERALITY, WITH SCIATICA PRESENCE UNSPECIFIED: ICD-10-CM

## 2017-02-23 DIAGNOSIS — I10 ESSENTIAL HYPERTENSION: ICD-10-CM

## 2017-02-23 DIAGNOSIS — J84.10 PULMONARY FIBROSIS (H): ICD-10-CM

## 2017-02-23 PROCEDURE — 99207 ZZC CDG-CORRECTLY CODED, REVIEWED AND AGREE: CPT | Performed by: NURSE PRACTITIONER

## 2017-02-23 PROCEDURE — 99316 NF DSCHRG MGMT 30 MIN+: CPT | Performed by: NURSE PRACTITIONER

## 2017-02-23 NOTE — PROGRESS NOTES
[facility] GERIATRIC SERVICES DISCHARGE SUMMARY    PATIENT'S NAME: Chaz Soler  YOB: 1928  MEDICAL RECORD NUMBER:  [id number]    PRIMARY CARE PROVIDER AND CLINIC RESPONSIBLE AFTER TRANSFER: [facility] [address] / [facility] [address]    CODE STATUS/ADVANCE DIRECTIVES DISCUSSION:   CPR/Full code        Allergies   Allergen Reactions     Contrast Dye      SOB, increased Bp, difficulty swallowing. Date 6/3/96 (ipaque contrast)  Was premedicated prior to FRANCK and had no reaction at all (solumedrol and benadryl) 2016  Was premedicated with Methylprednisolone protocol, no reaction 17     Lisinopril      cough     Oxycodone      Delirium       TRANSFERRING PROVIDERS: CELIA Thao CNP, Dr. Susi Galvez MD  DATE OF SNF ADMISSION:    DATE OF SNF (anticipated) DISCHARGE:   DISCHARGE DISPOSITION: G Provider   Nursing Facility: [facility] stay 17 to 17.     Condition on Discharge:  Improving.  Function:  UE Drsg: s/u (max for brace), LE Drsg: mod, Bed Mobility: indep, Transfers: cga,Walkin-180 ft 2ww SBA.  Cognitive Scores: SLUMS 15/30    Equipment: walker and TLSO brace    DISCHARGE DIAGNOSIS:   1. Compression fracture L2-3    2. Chronic low back pain, unspecified back pain laterality, with sciatica presence unspecified    3. Pulmonary fibrosis (H)    4. CAP (community acquired pneumonia)    5. Dementia with behavioral disturbance, unspecified dementia type    6. Essential hypertension    7. Coronary artery disease involving native coronary artery of native heart without angina pectoris    8. Congestive heart failure, unspecified congestive heart failure chronicity, unspecified congestive heart failure type (H)    9. S/P TAVR (transcatheter aortic valve replacement)    10. CKD (chronic kidney disease) stage 3, GFR 30-59 ml/min    11.  Physical deconditioning        [facility] Course:  Chaz Soler is a 88 year old male with a past medical history significant for pulmonary fibrosis on chronic home oxygen, heart failure with preserved EF, coronary artery disease, chronic kidney disease, s/p TAVR and chronic back pain. He initially presented to ER after a fall. He was found to have a left sided pneumonia and compression fractures of L2 and L3.  He was seen in consultation by Spine Surgery and they recommended placement of TLSO brace to be used when OOB and outpatient follow up. He was treated for pneumonia with ceftriaxone and azithromycin and was successfully weaned back to his baseline O2 requirement (2-3L).  He was transitioned to oral ceftin and azithromycin and discharged to TCU for rehab and further medical management. TCU stay was uncomplicated, he good improvement in mobility and endurance and had good pain control. Will discharge home with PT, OT, and HHA    Compression fracture L2-3  Chronic low back pain, unspecified back pain laterality, with sciatica presence unspecified  Patient presented to ED with increased back pain after fall at home. CT revealed new L2-3 compression fracture. Neurosurgery was consulted and recommended no surgery, TLSO brace when OOB for 12 weeks. He was started on scheduled tylenol and tramadol for pain. He is also on gabapentin 100 mg TID. He has PRN Voltaren gel and lidocaine cream available QID PRN. Narcotics should be avoided as have previously caused him confusion. He reports that his pain is well controlled, some discomfort is from TLSO brace, but this is tolerable.  He does have a history of chronic low back pain, and has been on nortryptiline. Per family request this medication was weaned off and he reports no increase in back pain.     Pulmonary fibrosis (H)  CAP (community acquired pneumonia)  Presented to ED after fall at home. He has pulmonary fibrosis and wears 2-3L oxygen at baseline at  home. CXR showed possible consolidation on left side. He was started on IV ceftriaxone and azithromycin. Blood cultures negative, and flu negative. He was successfully weaned to baseline oxygen use of 2-3L NC.  Changed to oral antibiotics, now completed: ceftin (last day 2/5) and azithromycin (last day 2/2). He remains afebrile and VSS. Occasional cough and breathing at baseline. Denies any chills or night sweats. Denies and chest pain or dyspnea    Dementia with behavioral disturbance, unspecified dementia type  He is on aricept. Did have some delirium while IP, thought to be likely related to pain/medications, pneumonia, and stress of new environment. He was started on low dose Seroquel with improvement of symptoms. He is at baseline mentation and behaviors per wife. Seroquel was discontinued in TCU as delirium resolved; no behavorial concerns noted by staff.    Essential hypertension  Coronary artery disease involving native coronary artery of native heart without angina pectoris  He is currently on losartan ASA, metoprolol, amlodipine, lasix, ASA 81mg and atorvastatin. BP was slightly low on hospital admission, but improved to normal prior to discharge to TCU. He denies any lightheadedness, headaches, vision changes, or dizziness. Denies any chest pain, SOB, or palpitations. No concerns or medication changes while in TCU.  Last 3 BPs: 169/88, 138/68, 155/74.  Admission Weight: 154.6lbs  Current Weight: 160.8lbs    Congestive heart failure, unspecified congestive heart failure chronicity, unspecified congestive heart failure type (H)  S/P TAVR (transcatheter aortic valve replacement)  Echo 11/2015 had EF 55-60%. He has a history of a TAVR. No cardiac issues while IP. He is on lasix 20 mg daily, losartan 100mg daily, ASA 81mg, metoprolol 25 mg daily. He denies any chest pain, SOB, palpitations, or orthropnea    CKD (chronic kidney disease) stage 3, GFR 30-59 ml/min  Baseline creatinine 1.4-1.6. Creatinine 1.65 on  discharge, improved to 1.45 on  in TCU.    Physical deconditioning  Had recent fall at home and some generalized weakness from recent pneumonia. Also has cliff TLSO brace for compression fracture. Working with PT and OT while at TCU. Currently lives in senior housing with spouse, who is his caretaker. Underwent home evaluation with therapy and did well. Wife was able to successfully manage TLSO brace. Current function: UE Drsg: s/u (max for brace), LE Drsg: mod, Bed Mobility: indep, Transfers: cga,Walkin-180 ft 2ww SBA.  SLUMS: 15/30, CPT: 5.0/5.6        PAST MEDICAL HISTORY:  has a past medical history of AAA (abdominal aortic aneurysm) (H) (10/5/2011); Anemia (2011); Aortic stenosis (10/26/2012); CAD (coronary artery disease); CHF (congestive heart failure) (H) (2014); CKD (chronic kidney disease) stage 3, GFR 30-59 ml/min (2011); Dementia (2015); Edema, unspecified edema (2016); Essential hypertension; Gout (); Hypercalcemia (2015); Hyperlipidemia LDL goal < 70; Hyperparathyroidism (H); Hyperparathyroidism, unspecified (H) (2015); Hyperplasia of prostate; LEFT LUNG GRANULOMA; Lumbago; Other chronic pain (10/11/2016); Proteinuria (2012); Pulmonary fibrosis (H); Thrombocytopenia (H) (2011); Unspecified hypothyroidism; and Vitamin D deficiency.    DISCHARGE MEDICATIONS:  Current Outpatient Prescriptions   Medication Sig Dispense Refill     traMADol (ULTRAM) 50 MG tablet Take 1 tablet (50 mg) by mouth every 6 hours as needed for moderate pain 30 tablet 0     acetaminophen (TYLENOL) 500 MG tablet Take 2 tablets (1,000 mg) by mouth 3 times daily       senna-docusate (SENOKOT-S;PERICOLACE) 8.6-50 MG per tablet Take 2 tablets by mouth 2 times daily as needed (constipation ) 100 tablet      furosemide (LASIX) 20 MG tablet Take 20 mg by mouth daily       aspirin 81 MG EC tablet Take 81 mg by mouth every evening       gabapentin (NEURONTIN) 100 MG capsule Take 1  capsules Three times a day for pain 90 capsule 0     tamsulosin (FLOMAX) 0.4 MG capsule Take 1 capsule (0.4 mg) by mouth daily 90 capsule 1     losartan (COZAAR) 100 MG tablet Take 1 tablet (100 mg) by mouth daily 90 tablet 3     levothyroxine (SYNTHROID/LEVOTHROID) 88 MCG tablet Take 1 tablet (88 mcg) by mouth daily 90 tablet 3     order for DME Equipment being ordered: TENS and supplies. 1 each 0     polyethylene glycol (MIRALAX) packet Take 17 g by mouth daily 30 packet 11     atorvastatin (LIPITOR) 20 MG tablet Take 1 tablet (20 mg) by mouth daily 90 tablet 3     cyanocobalamin (VITAMIN  B-12) 1000 MCG tablet Take 1,000 mcg by mouth daily       AMOXICILLIN PO Take 500 mg by mouth once as needed       diclofenac (VOLTAREN) 1 % GEL Apply 4 grams to low back right hip and right leg four times as needed  daily using enclosed dosing card.  Lidocaine cream 100 g 1     lidocaine-prilocaine (EMLA) cream Apply topically as needed for moderate pain To low back, right hip  and right leg Apply same time as Diclofenac gel 30 g 1     donepezil (ARICEPT) 10 MG tablet Take 1 tablet (10 mg) by mouth At Bedtime 90 tablet 3     amLODIPine (NORVASC) 10 MG tablet Take 1 tablet (10 mg) by mouth daily 90 tablet 2     metoprolol (TOPROL-XL) 25 MG 24 hr tablet Take 1 tablet (25 mg) by mouth daily 90 tablet 3     pantoprazole (PROTONIX) 40 MG enteric coated tablet Take 1 tablet (40 mg) by mouth every morning 90 tablet 3     Ferrous Sulfate (IRON SUPPLEMENT PO) Take 325 mg by mouth daily (with breakfast)          MEDICATION CHANGES/RATIONALE:   Nortriptyline discontinued due concerns may be contributing to falls, listed on BEERs criteria of potentially inappropriate medications in elderly adults  Seroquel discontinued as delirium resolved        Controlled medications sent with patient: Script for tramadol medication for 40 tabs and 0 refills given to patient at dischage to have them fill at their out patient pharmacy     ROS:    10 point  "ROS of systems including Constitutional, Eyes, Respiratory, Cardiovascular, Gastroenterology, Genitourinary, Integumentary, Muscularskeletal, Psychiatric were all negative except for pertinent positives noted in my HPI.    Physical Exam:   Vitals: /69  Pulse 80  Temp 97.4  F (36.3  C)  Resp 16  Ht 5' 8\" (1.727 m)  Wt 160 lb 12.8 oz (72.9 kg)  SpO2 93%  BMI 24.45 kg/m2  BMI= Body mass index is 24.45 kg/(m^2).    GENERAL APPEARANCE:  Alert, in no distress, cooperative  ENT:  Mouth and posterior oropharynx normal, moist mucous membranes, normal hearing acuity  EYES:  PERRL, Conjunctiva and lids normal  NECK:  No adenopathy, masses or thyromegaly  RESP:  respiratory effort and palpation of chest normal, lungs clear to auscultation , no respiratory distress, diminished breath sounds BLL, cough occasionaly, productive, clear sputum, exam limited as TLSO brace in place, 2L O2 via NC  CV:  Palpation and auscultation of heart done , regular rate and rhythm, no murmur, rub, or gallop, no edema, +2 pedal pulses  ABDOMEN:  normal bowel sounds, exam limited as TLSO brace in place  M/S:   Gait and station normal, TLSO brace in place, uses 4WW for ambulation  Digits and nails normal  SKIN:  Inspection of skin and subcutaneous tissue baseline, Palpation of skin and subcutaneous tissue baseline,  NEURO:   Cranial nerves 2-12 are normal tested and grossly at patient's baseline, Examination of sensation by touch normal  PSYCH:  oriented to self, place, year, disoriented to date, insight and judgement impaired, memory impaired , affect and mood normal, pleasant demeanor    DISCHARGE PLAN:  Occupational Therapy, Physical Therapy and Home Health Aide  Patient instructed to follow-up with:  PCP in 7 days and [facility] with Jessie Wolff CNP in 3/23      [facility] scheduled appointments:      Pending labs: None    SNF labs   CBC RESULTS:   Recent Labs   Lab Test 02/06/17 02/01/17   0652  01/30/17   0655   WBC  " 8.3   --   9.5   RBC  3.50*   --   3.77*   HGB  10.5*   --   11.3*   HCT  31.7*   --   33.9*   MCV  91   --   90   MCH  30.3   --   30.0   MCHC  33.4   --   33.3   RDW  12.4   --   12.6   PLT  169  127*  120*       Last Basic Metabolic Panel:  Recent Labs   Lab Test 02/06/17 02/01/17   0652  01/30/17   0655   NA  142   --   139   POTASSIUM  4.0   --   4.2   CHLORIDE  106   --   102   LEXIS  9.4   --   9.5   CO2  29   --   31   BUN  28   --   29   CR  1.45*  1.65*  1.36*   GLC  84   --   88       Liver Function Studies -   Lab Test  10/21/16   0857   PROTTOTAL  6.9   ALBUMIN  3.5   BILITOTAL  0.7   ALKPHOS  147   AST  17   ALT  26       TSH   Date Value Ref Range Status   11/14/2016 0.93 0.40 - 4.00 mU/L Final   10/21/2016 0.48 0.40 - 4.00 mU/L Final     Discharge Treatments:   Follow up with PCP in 7 days  Follow up with [facility] on 3/23  Home PT, OT, and HHA through [place]    TOTAL DISCHARGE TIME:   Greater than 30 minutes  Electronically signed by:  CELIA Thao CNP

## 2017-02-24 ENCOUNTER — CARE COORDINATION (OUTPATIENT)
Dept: CARE COORDINATION | Facility: CLINIC | Age: 82
End: 2017-02-24

## 2017-02-24 ENCOUNTER — TELEPHONE (OUTPATIENT)
Dept: INTERNAL MEDICINE | Facility: CLINIC | Age: 82
End: 2017-02-24

## 2017-02-24 DIAGNOSIS — I50.9 CONGESTIVE HEART FAILURE, UNSPECIFIED CONGESTIVE HEART FAILURE CHRONICITY, UNSPECIFIED CONGESTIVE HEART FAILURE TYPE: Primary | Chronic | ICD-10-CM

## 2017-02-24 RX ORDER — GABAPENTIN 100 MG/1
100 CAPSULE ORAL 3 TIMES DAILY
Qty: 90 CAPSULE | COMMUNITY
Start: 2017-02-24 | End: 2017-03-25

## 2017-02-24 RX ORDER — FUROSEMIDE 20 MG
20 TABLET ORAL DAILY
Qty: 90 TABLET | Refills: 1 | Status: SHIPPED | OUTPATIENT
Start: 2017-02-24 | End: 2017-05-21

## 2017-02-24 RX ORDER — FUROSEMIDE 20 MG
20 TABLET ORAL DAILY
Qty: 90 TABLET | Refills: 1 | Status: SHIPPED | OUTPATIENT
Start: 2017-02-24 | End: 2017-02-24

## 2017-02-24 NOTE — PROGRESS NOTES
Clinic Care Coordination Contact  OUTREACH    Referral Information:  Referral Source: CTS  Reason for Contact: Hospitalization 1/29-2/1/2017-  DISCHARGE DIAGNOSIS:   1. Compression fracture L2-3    2. Chronic low back pain, unspecified back pain laterality, with sciatica presence unspecified    3. Pulmonary fibrosis (H)    4. CAP (community acquired pneumonia)    5. Dementia with behavioral disturbance, unspecified dementia type    6. Essential hypertension    7. Coronary artery disease involving native coronary artery of native heart without angina pectoris    8. Congestive heart failure, unspecified congestive heart failure chronicity, unspecified congestive heart failure type (H)    9. S/P TAVR (transcatheter aortic valve replacement)    10. CKD (chronic kidney disease) stage 3, GFR 30-59 ml/min    11. Physical deconditioning        [facility]--  Care Conference:  (No)     Universal Utilization:   ED Visits in last year: 0  Hospital visits in last year: 2  Last PCP appointment: 11/22/16  Missed Appointments: 0  Concerns:  (none)  Multiple Providers or Specialists:  (cardiology, vascular, pain, ortho, neurology, spine surgeon)    Clinical Concerns:  Current Medical Concerns: CC spoke to Anne/wife and she reports the patient is sleeping comfortably in the recliner.  Patient doesn't like his and hope to have it discontinued when he sees the spine specialist on 3/23/2017  Patient will have [facility] services and CC will leave contact information with the [facility] RN to call back with updates and when discharged from home care   Current Behavioral Concerns: not discussed       Clinical Pathway Name: None  Clinical Pathway: None    Medication Management:  Reviewed.  Patient doesn't have Lasix.  CC will request an RX be sent to [facility] in [place]      Functional Status:  Mobility Status: Independent w/Device  Equipment Currently Used at Home: oxygen, walker, rolling       Psychosocial:  Current living  arrangement:: Lives with spouse      Resources and Interventions:  Current Resources: Skilled Nursing Facility;    PAS Number: [id number]  Senior Linkage Line Referral Placed:  (NA)  Advanced Care Plans/Directives on file:: No  Referrals Placed:  (NA)      Barriers: Deconditioned   Strengths: [facility] RN services   Patient/Caregiver understanding: Wife expresses understanding of discharge instructions   Frequency of Care Coordination: PRN  Upcoming appointment: 03/06/17     Plan: CC will leave contact information with [facility] RN to call back when discharged from home care     Pebbles Leija RN  Care Coordinator-[facility]  [email or web address]  [phone]

## 2017-02-24 NOTE — TELEPHONE ENCOUNTER
See care coordination encounter.  This encounter closed   Pebbles Leija RN  Care Coordinator-[facility]  [email or web address]  [phone]

## 2017-02-24 NOTE — LETTER
Health Care Home - Access Care Plan    About Me  Patient Name:  Chaz Man    YOB: 1928  Age:                            88 year old   Mehnaz MRN:         [id number] Telephone Information:     Home Phone [phone]   Mobile none       Address:    [address] [place] [address] Email address:  [email or web address]      Emergency Contact(s)  Name Relationship Lgl Grd Work Phone Home Phone Mobile Phone   1. CATHI CROSS Daughter  none [phone]   2. CESAR MAN Spouse No none [phone] none   3. MODE MAN Son No [phone]             Health Maintenance: Routine Health maintenance Reviewed: Not assessed    My Access Plan  Medical Emergency 911   Questions or concerns during clinic hours Primary Clinic Line, I will call the clinic directly: Primary Clinic: [facility] [phone]   24 Hour Appointment Line [phone] or  [phone] ([phone])  (toll free)   24 Hour Nurse Line [phone] (toll free)   Questions or concerns outside clinic hours 24 Hour Appointment Line, I will call the after-hours on-call line:   [facility] [phone] or [phone] ([phone]) (toll-free)   Preferred Urgent Care Preferred Urgent Care: [facility], [phone]   Preferred Hospital Preferred Hospital: [facility]  [phone]   Preferred Pharmacy [facility] [address] [place], [place] [address] Crisis Line [facility], [phone] or [phone]     My Care Team Members  Patient Care Team       Relationship Specialty Notifications Start End    Alirio Fox MD PCP - General   2/15/02     Phone: [phone] Fax: [phone]         [facility] [address] [facility] [address]        My Medical and Care Information  Problem List   Patient Active Problem List   Diagnosis      Congenital anomaly of lung     Lumbago     Essential hypertension     Other specified disorders of prostate     Pulmonary fibrosis (H)     Advanced directives, counseling/discussion     CAD (coronary artery disease)     CKD (chronic kidney disease) stage 3, GFR 30-59 ml/min     Proteinuria     Hyperlipidemia with target LDL less than 70     Low back pain     Lumbar radiculopathy     Status post lumbar discectomy     SOB (shortness of breath)     S/P TAVR (transcatheter aortic valve replacement)     Physical deconditioning     CHF (congestive heart failure) (H)     Hypercalcemia     Anemia     SAI (obstructive sleep apnea)     Hyperparathyroidism (H)     Dementia     Hypothyroidism     Health Care Home     Edema, unspecified edema     Other chronic pain     Altered mental status     Pneumonia     Compression fracture L2-3      Current Medications and Allergies:  See printed Medication Report

## 2017-02-28 ENCOUNTER — DOCUMENTATION ONLY (OUTPATIENT)
Dept: CARE COORDINATION | Facility: CLINIC | Age: 82
End: 2017-02-28

## 2017-02-28 NOTE — PROGRESS NOTES
[facility] now requests orders and shares plan of care/discharge summaries for some patients through [employer].  Please REPLY TO THIS MESSAGE in order to give authorization for orders when needed.  This is considered a verbal order, you will still receive a faxed copy of orders for signature.  Thank you for your assistance in improving collaboration for our patients.    ORDER    5m1    skilled OT services to address, Equipment recommendations for safety with ADLs/MRALDs, Baseline cognitive testing and safety service recommendation.     MD SUMMARY/PLAN OF CARE

## 2017-03-06 ENCOUNTER — RADIANT APPOINTMENT (OUTPATIENT)
Dept: GENERAL RADIOLOGY | Facility: CLINIC | Age: 82
End: 2017-03-06
Attending: INTERNAL MEDICINE
Payer: COMMERCIAL

## 2017-03-06 ENCOUNTER — OFFICE VISIT (OUTPATIENT)
Dept: INTERNAL MEDICINE | Facility: CLINIC | Age: 82
End: 2017-03-06
Payer: COMMERCIAL

## 2017-03-06 ENCOUNTER — DOCUMENTATION ONLY (OUTPATIENT)
Dept: CARE COORDINATION | Facility: CLINIC | Age: 82
End: 2017-03-06

## 2017-03-06 VITALS
HEART RATE: 69 BPM | WEIGHT: 161 LBS | SYSTOLIC BLOOD PRESSURE: 120 MMHG | DIASTOLIC BLOOD PRESSURE: 60 MMHG | OXYGEN SATURATION: 94 % | TEMPERATURE: 98.4 F | HEIGHT: 68 IN | BODY MASS INDEX: 24.4 KG/M2

## 2017-03-06 DIAGNOSIS — J84.10 PULMONARY FIBROSIS (H): Primary | Chronic | ICD-10-CM

## 2017-03-06 DIAGNOSIS — J84.10 PULMONARY FIBROSIS (H): Chronic | ICD-10-CM

## 2017-03-06 DIAGNOSIS — K92.1 HEMATOCHEZIA: ICD-10-CM

## 2017-03-06 DIAGNOSIS — N18.30 CKD (CHRONIC KIDNEY DISEASE) STAGE 3, GFR 30-59 ML/MIN (H): Chronic | ICD-10-CM

## 2017-03-06 DIAGNOSIS — K21.9 GASTROESOPHAGEAL REFLUX DISEASE, ESOPHAGITIS PRESENCE NOT SPECIFIED: ICD-10-CM

## 2017-03-06 LAB
ANION GAP SERPL CALCULATED.3IONS-SCNC: 5 MMOL/L (ref 3–14)
BUN SERPL-MCNC: 27 MG/DL (ref 7–30)
CALCIUM SERPL-MCNC: 9.8 MG/DL (ref 8.5–10.1)
CHLORIDE SERPL-SCNC: 102 MMOL/L (ref 94–109)
CO2 SERPL-SCNC: 34 MMOL/L (ref 20–32)
CREAT SERPL-MCNC: 1.32 MG/DL (ref 0.66–1.25)
ERYTHROCYTE [DISTWIDTH] IN BLOOD BY AUTOMATED COUNT: 13.4 % (ref 10–15)
GFR SERPL CREATININE-BSD FRML MDRD: 51 ML/MIN/1.7M2
GLUCOSE SERPL-MCNC: 150 MG/DL (ref 70–99)
HCT VFR BLD AUTO: 36.5 % (ref 40–53)
HGB BLD-MCNC: 11.9 G/DL (ref 13.3–17.7)
IRON SATN MFR SERPL: 25 % (ref 15–46)
IRON SERPL-MCNC: 60 UG/DL (ref 35–180)
MCH RBC QN AUTO: 30.1 PG (ref 26.5–33)
MCHC RBC AUTO-ENTMCNC: 32.6 G/DL (ref 31.5–36.5)
MCV RBC AUTO: 92 FL (ref 78–100)
PLATELET # BLD AUTO: 189 10E9/L (ref 150–450)
POTASSIUM SERPL-SCNC: 4.8 MMOL/L (ref 3.4–5.3)
RBC # BLD AUTO: 3.95 10E12/L (ref 4.4–5.9)
SODIUM SERPL-SCNC: 141 MMOL/L (ref 133–144)
TIBC SERPL-MCNC: 236 UG/DL (ref 240–430)
WBC # BLD AUTO: 9.9 10E9/L (ref 4–11)

## 2017-03-06 PROCEDURE — 71020 XR CHEST 2 VW: CPT

## 2017-03-06 PROCEDURE — 36415 COLL VENOUS BLD VENIPUNCTURE: CPT | Performed by: INTERNAL MEDICINE

## 2017-03-06 PROCEDURE — 85027 COMPLETE CBC AUTOMATED: CPT | Performed by: INTERNAL MEDICINE

## 2017-03-06 PROCEDURE — 80048 BASIC METABOLIC PNL TOTAL CA: CPT | Performed by: INTERNAL MEDICINE

## 2017-03-06 PROCEDURE — 83540 ASSAY OF IRON: CPT | Performed by: INTERNAL MEDICINE

## 2017-03-06 PROCEDURE — 83550 IRON BINDING TEST: CPT | Performed by: INTERNAL MEDICINE

## 2017-03-06 PROCEDURE — 99214 OFFICE O/P EST MOD 30 MIN: CPT | Performed by: INTERNAL MEDICINE

## 2017-03-06 NOTE — PROGRESS NOTES
SUBJECTIVE:                                                    Chaz Soler is a 88 year old male who presents to clinic today for the following health issues:          Hospital Follow-up Visit:    Hospital/Nursing Home/ Rehab Facility: [facility]  Date of Admission: 02/01/2017  Date of Discharge: 02/24/2017  Reason(s) for Admission: Pneumonia in Hospital            Problems taking medications regularly:  None       Medication changes since discharge: None       Problems adhering to non-medication therapy:  None    Summary of hospitalization:  [facility] discharge summary reviewed  Diagnostic Tests/Treatments reviewed.  Follow up needed: none  Other Healthcare Providers Involved in Patient s Care: PT/OT  Update since discharge: Was doing well at first, now having more problems with his legs and back pain 6/10.    Post Discharge Medication Reconciliation: Medications list reviewed.  Plan of care communicated with patient and family     Coding guidelines for this visit:  Type of Medical   Decision Making Face-to-Face Visit       within 7 Days of discharge Face-to-Face Visit        within 14 days of discharge   Moderate Complexity [id number]   High Complexity [id number]            Pt is easily bruising, oxygen levels dropping with activity, black stools,  back pain in bed at times, belching ? Acid refulx    Problem list and histories reviewed & adjusted, as indicated.  Additional history: as documented  Pt's past medical history, family history, habits, medications and allergies were reviewed with the patient today.  See snap shot for  HCM status. Most recent lab results reviewed with pt. Problem list and histories reviewed & adjusted, as indicated.  Additional history as below:    On 3 liters/min at rest. With walking, will drop into the low 80s such as walking in his home.   History of pulmonary fibrosis.No cough. No F/C.  Has darker stools occ but on iron therapy.  No  "abd pain. Appetite good.  Occ belching. Has BM daily. Using Tylenol, Gabapentin and tramadol for back pain.  Used tramadol 500mg BID being used.  Intoelrant of stronger narcotic pain meds. Chronic right lumbar pain with some radiation into the right buttock.  No actual DJD in hip Xray from Nov 2016. USing a back brace for compression fractures. Will be seeing neurosurgery in future as scheduled. No acute numbness in LEs or acute LE weakness or acute incontinence issues     Additional ROS:   Constitutional, HEENT, Cardiovascular, Pulmonary, GI and , Neuro, MSK and Psych review of systems/symptoms are otherwise negative or unchanged from previous, except as noted above.      OBJECTIVE:  /60  Pulse 69  Temp 98.4  F (36.9  C)  Ht 5' 8\" (1.727 m)  Wt 161 lb (73 kg)  SpO2 94%  BMI 24.48 kg/m2   Estimated body mass index is 24.48 kg/(m^2) as calculated from the following:    Height as of this encounter: 5' 8\" (1.727 m).    Weight as of this encounter: 161 lb (73 kg).  Eye: PERRL, EOMI  HENT: ear canals and TM's normal and nose and mouth without ulcers or lesions   Neck: no adenopathy. Thyroid normal to palpation. No bruits  Pulm:Chronic crackles posterior mid/lower lung fields bilaterally  CV: Regular rates and rhythm  GI: Soft, nontender, Normal active bowel sounds, No hepatosplenomegaly or masses palpable  Ext: Peripheral pulses intact. No edema.  Neuro: Normal strength and tone, sensory exam grossly normal  MSK: Tenderness to palpation bilateral paralumbar musculature and palpable lower lumbar spinal process over site of known comp fx. Mild tenderness to palpation right buttock psterior notch. No tenderness to I/E rotation of the bilateral hips  Rectal: Stool faintly guaiac positive and brown rather than darker as described by wife as seen occ    Assessment/Plan: (See plan discussion below for further details)  1. Compression fracture L2-3  Continue back brace. Need to stop ASA and Diclofenac cream with  GI " issues. Continue Tylenol,  Tramadol, ice, gabapentin/ Follow-up with neurosurgery as scheduled    2. Pulmonary fibrosis (H)  Irreversible. Failed previous treatment trials at [place]. Oxygen adjustment as below. CXR to r/o any acute issues in addtion  - XR Chest 2 Views; Future    3. Hematochezia  Labs as ordered. See plan below. If Hgb dropping in future, will then obtain EGD but with pt's multiple med issues including #2, would prefer to avoid procedure for now if med changes stabilize things  - Iron and iron binding capacity  - CBC with platelets    4. CKD (chronic kidney disease) stage 3, GFR 30-59 ml/min  Lab as ordered  - Basic metabolic panel    Plan discussion:   Stop aspirin and Diclofenac   Increase Pantoprazole to 40mg twice a day ( before breakfast and supper) for 2 weeks, then back to 40mg daily   Increase oxygen to 4 liters with walking and back to 3 liters at rest  Labs as ordered  CXR     Alirio Fox MD  Internal Medicine Department  [facility]

## 2017-03-06 NOTE — PATIENT INSTRUCTIONS
Stop aspirin and Diclofenac   Increase Pantoprazole to 40mg twice a day ( before breakfast and supper) for 2 weeks, then back to 40mg daily   Increase oxygen to 4 liters with walking and back to 3 liters at rest  Labs as ordered  CXR

## 2017-03-06 NOTE — MR AVS SNAPSHOT
After Visit Summary   3/6/2017    Chaz Soler    MRN: [id number]           Patient Information     Date Of Birth          5/21/1928        Visit Information        Provider Department      3/6/2017 2:00 PM Alirio Fox MD [facility]        Today's Diagnoses     Pulmonary fibrosis (H)    -  1    Hematochezia        CKD (chronic kidney disease) stage 3, GFR 30-59 ml/min          Care Instructions     Stop aspirin and Diclofenac   Increase Pantoprazole to 40mg twice a day ( before breakfast and supper) for 2 weeks, then back to 40mg daily   Increase oxygen to 4 liters with walking and back to 3 liters at rest  Labs as ordered  CXR         Follow-ups after your visit        Your next 10 appointments already scheduled     Mar 23, 2017  2:00 PM CDT   New Visit with CELIA Wooten CNP   [facility] ([facility])    [address] [address] [id number] [phone]              Future tests that were ordered for you today     Open Future Orders        Priority Expected Expires Ordered    XR Chest 2 Views Routine 3/6/2017 3/6/2018 3/6/2017            Who to contact     If you have questions or need follow up information about today's clinic visit or your schedule please contact [facility] directly at [phone].  Normal or non-critical lab and imaging results will be communicated to you by MyChart, letter or phone within 4 business days after the clinic has received the results. If you do not hear from us within 7 days, please contact the clinic through MyChart or phone. If you have a critical or abnormal lab result, we will notify you by phone as soon as possible.  Submit refill requests through Yakimbi or call your pharmacy and they will forward the refill request to us. Please allow 3 business days for your refill to be completed.          Additional Information About Your  "Visit        MyChart Information     [employer] lets you send messages to your doctor, view your test results, renew your prescriptions, schedule appointments and more. To sign up, go to [email or web address] . Click on \"Log in\" on the left side of the screen, which will take you to the Welcome page. Then click on \"Sign up Now\" on the right side of the page.     You will be asked to enter the access code listed below, as well as some personal information. Please follow the directions to create your username and password.     Your access code is: [email or web address]  Expires: 2017  9:51 AM     Your access code will  in 90 days. If you need help or a new code, please call your [place] clinic or [phone].        Care EveryWhere ID     This is your Care EveryWhere ID. This could be used by other organizations to access your [place] medical records  [id number]        Your Vitals Were     Pulse Temperature Height Pulse Oximetry BMI (Body Mass Index)       69 98.4  F (36.9  C) 5' 8\" (1.727 m) 94% 24.48 kg/m2        Blood Pressure from Last 3 Encounters:   17 120/60   17 154/69   17 138/68    Weight from Last 3 Encounters:   17 161 lb (73 kg)   17 160 lb 12.8 oz (72.9 kg)   17 152 lb 9.6 oz (69.2 kg)              We Performed the Following     Basic metabolic panel     CBC with platelets     Iron and iron binding capacity        Primary Care Provider Office Phone # Fax #    Alirio Fox [phone]       [facility] [address] [facility] [address]        Thank you!     Thank you for choosing [facility]  for your care. Our goal is always to provide you with excellent care. Hearing back from our patients is one way we can continue to improve our services. Please take a few minutes to complete the written survey that you may receive in the mail after your visit with us. Thank you!             Your Updated Medication List - " Protect others around you: Learn how to safely use, store and throw away your medicines at www.disposemymeds.org.          This list is accurate as of: 3/6/17  2:50 PM.  Always use your most recent med list.                   Brand Name Dispense Instructions for use    amLODIPine 10 MG tablet    NORVASC    90 tablet    Take 1 tablet (10 mg) by mouth daily       AMOXICILLIN PO      Take 500 mg by mouth once as needed       aspirin 81 MG EC tablet      Take 81 mg by mouth every evening       atorvastatin 20 MG tablet    LIPITOR    90 tablet    Take 1 tablet (20 mg) by mouth daily       cyanocobalamin 1000 MCG tablet    vitamin  B-12     Take 1,000 mcg by mouth daily       diclofenac 1 % Gel topical gel    VOLTAREN    100 g    Apply 4 grams to low back right hip and right leg four times as needed  daily using enclosed dosing card.  Lidocaine cream       donepezil 10 MG tablet    ARICEPT    90 tablet    Take 1 tablet (10 mg) by mouth At Bedtime       furosemide 20 MG tablet    LASIX    90 tablet    Take 1 tablet (20 mg) by mouth daily       gabapentin 100 MG capsule    NEURONTIN    90 capsule    Take 1 capsule (100 mg) by mouth 3 times daily For pain       IRON SUPPLEMENT PO      Take 325 mg by mouth daily (with breakfast)       levothyroxine 88 MCG tablet    SYNTHROID/LEVOTHROID    90 tablet    Take 1 tablet (88 mcg) by mouth daily       lidocaine-prilocaine cream    EMLA    30 g    Apply topically as needed for moderate pain To low back, right hip  and right leg Apply same time as Diclofenac gel       losartan 100 MG tablet    COZAAR    90 tablet    Take 1 tablet (100 mg) by mouth daily       metoprolol 25 MG 24 hr tablet    TOPROL-XL    90 tablet    Take 1 tablet (25 mg) by mouth daily       order for DME     1 each    Equipment being ordered: TENS and supplies.       pantoprazole 40 MG EC tablet    PROTONIX    90 tablet    Take 1 tablet (40 mg) by mouth every morning       polyethylene glycol Packet    MIRALAX    30  packet    Take 17 g by mouth daily       senna-docusate 8.6-50 MG per tablet    SENOKOT-S;PERICOLACE    100 tablet    Take 2 tablets by mouth 2 times daily as needed (constipation )       tamsulosin 0.4 MG capsule    FLOMAX    90 capsule    Take 1 capsule (0.4 mg) by mouth daily       traMADol 50 MG tablet    ULTRAM    30 tablet    Take 1 tablet (50 mg) by mouth every 6 hours as needed for moderate pain

## 2017-03-06 NOTE — NURSING NOTE
"Chief Complaint   Patient presents with     RECHECK       Initial /60  Pulse 69  Temp 98.4  F (36.9  C)  Ht 5' 8\" (1.727 m)  Wt 161 lb (73 kg)  SpO2 94%  BMI 24.48 kg/m2 Estimated body mass index is 24.48 kg/(m^2) as calculated from the following:    Height as of this encounter: 5' 8\" (1.727 m).    Weight as of this encounter: 161 lb (73 kg).  Medication Reconciliation: complete     Yazmin Bush, DAMARIS      "

## 2017-03-06 NOTE — PROGRESS NOTES
[facility] now requests orders and shares plan of care/discharge summaries for some patients through [email or web address].  Please REPLY TO THIS MESSAGE in order to give authorization for orders when needed.  This is considered a verbal order, you will still receive a faxed copy of orders for signature.  Thank you for your assistance in improving collaboration for our patients.    ORDER  PT requesting plan of care extension, 2w2 in addition to current poc w visits beginning 3/19/17    MD SUMMARY/PLAN OF CARE  Pt presents w ongoing back pain after 1/29/17 vertebral compression fx x2, also right hip pain and very impaired activity tolerance. He will benefit from continued skilled therapy at least through his upcoming follow up on 3/23/17 w spine/brain clinic.

## 2017-03-06 NOTE — TELEPHONE ENCOUNTER
Reason for Call:  Medication or medication refill:    Do you use a [facility]?  Name of the pharmacy and phone number for the current request:  [facility] [address] [place] [address]    Name of the medication requested: Pantoprazole 40mg  2x a day     Other request: n/a     Can we leave a detailed message on this number? YES    Phone number patient can be reached at: Home number on file [phone] (home)    Best Time: anytime     Call taken on 3/6/2017 at 4:21 PM by Melissa De Oliveira

## 2017-03-07 ENCOUNTER — DOCUMENTATION ONLY (OUTPATIENT)
Dept: OTHER | Facility: CLINIC | Age: 82
End: 2017-03-07

## 2017-03-07 DIAGNOSIS — Z71.89 ADVANCED DIRECTIVES, COUNSELING/DISCUSSION: Chronic | ICD-10-CM

## 2017-03-07 NOTE — TELEPHONE ENCOUNTER
pantoprazole (PROTONIX) 40 MG enteric coated tablet      Last Written Prescription Date: 6/16/2016  Last Fill Quantity: 90,  # refills: 3   Last Office Visit with FMKARMA, JENNIEP or [facility] prescribing provider: 3/06/2017

## 2017-03-08 ENCOUNTER — TELEPHONE (OUTPATIENT)
Dept: NEUROSURGERY | Facility: CLINIC | Age: 82
End: 2017-03-08

## 2017-03-08 DIAGNOSIS — S32.000A COMPRESSION FRACTURE OF LUMBAR VERTEBRA (H): Primary | ICD-10-CM

## 2017-03-08 RX ORDER — PANTOPRAZOLE SODIUM 40 MG/1
TABLET, DELAYED RELEASE ORAL
Qty: 60 TABLET | Refills: 0 | Status: SHIPPED | OUTPATIENT
Start: 2017-03-08 | End: 2017-04-08

## 2017-03-08 NOTE — TELEPHONE ENCOUNTER
Pt's wife said that Chaz Rodriguez was seen in the ED, and that on the form they got from his rehab center they want him to have an xray prior to his appt with Jessie. I told pt's wife that we don't order xray's for pt's we have not seen yet.

## 2017-03-08 NOTE — TELEPHONE ENCOUNTER
Rx sent to pharmacy for the BID dosing shortterm. Will keep the longterm daily Rx on file still. Note sent with Rx to pharmacy

## 2017-03-08 NOTE — TELEPHONE ENCOUNTER
Order placed for flex/ext lumbar x-ray to be done prior to appt with Jessie Wolff CNP 3/23/17. Spoke to patient's wife Kylee to have them come 15 minutes early to get x-ray done at [facility] 3rd floor at [facility]. She verbalized understanding.

## 2017-03-13 PROBLEM — J18.9 PNEUMONIA: Status: RESOLVED | Noted: 2017-01-29 | Resolved: 2017-03-13

## 2017-03-15 ENCOUNTER — TELEPHONE (OUTPATIENT)
Dept: INTERNAL MEDICINE | Facility: CLINIC | Age: 82
End: 2017-03-15

## 2017-03-15 DIAGNOSIS — N18.30 CKD (CHRONIC KIDNEY DISEASE) STAGE 3, GFR 30-59 ML/MIN (H): Primary | Chronic | ICD-10-CM

## 2017-03-15 NOTE — TELEPHONE ENCOUNTER
Spoke with pt/wife re: recent CXR and lab results. Stools brown. CXR without acute changes and Hgb and BMP better. Will have repeat NF labs in 1 month for Hgb and BMP. Saw OT today and back betetr after therapy. HAs appt later this month  With neurosurgery. Continues to use brace for now. Not candidate for biphosphonate with GFR

## 2017-03-20 ENCOUNTER — DOCUMENTATION ONLY (OUTPATIENT)
Dept: CARE COORDINATION | Facility: CLINIC | Age: 82
End: 2017-03-20

## 2017-03-20 NOTE — PROGRESS NOTES
[facility] now requests orders and shares plan of care/discharge summaries for some patients through [employer].  Please REPLY TO THIS MESSAGE in order to give authorization for orders when needed.  This is considered a verbal order, you will still receive a faxed copy of orders for signature.  Thank you for your assistance in improving collaboration for our patients.    ORDER  PT requesting extenstion of HHA orders 2w2    MD SUMMARY/PLAN OF CARE  Pt continues w mod to severe back pain and sob w min exertion. He will benefit from continued assist w bathing to skilled therapy.

## 2017-03-23 ENCOUNTER — RADIANT APPOINTMENT (OUTPATIENT)
Dept: GENERAL RADIOLOGY | Facility: CLINIC | Age: 82
End: 2017-03-23
Attending: NURSE PRACTITIONER
Payer: COMMERCIAL

## 2017-03-23 ENCOUNTER — OFFICE VISIT (OUTPATIENT)
Dept: NEUROSURGERY | Facility: CLINIC | Age: 82
End: 2017-03-23
Attending: NURSE PRACTITIONER
Payer: COMMERCIAL

## 2017-03-23 VITALS
HEIGHT: 68 IN | WEIGHT: 161 LBS | BODY MASS INDEX: 24.4 KG/M2 | OXYGEN SATURATION: 97 % | DIASTOLIC BLOOD PRESSURE: 56 MMHG | HEART RATE: 56 BPM | SYSTOLIC BLOOD PRESSURE: 138 MMHG

## 2017-03-23 DIAGNOSIS — S32.000A COMPRESSION FRACTURE OF LUMBAR VERTEBRA (H): ICD-10-CM

## 2017-03-23 DIAGNOSIS — S32.020D CLOSED WEDGE COMPRESSION FRACTURE OF SECOND LUMBAR VERTEBRA WITH ROUTINE HEALING, SUBSEQUENT ENCOUNTER: Primary | ICD-10-CM

## 2017-03-23 PROCEDURE — 99211 OFF/OP EST MAY X REQ PHY/QHP: CPT | Performed by: NURSE PRACTITIONER

## 2017-03-23 PROCEDURE — 72100 X-RAY EXAM L-S SPINE 2/3 VWS: CPT

## 2017-03-23 PROCEDURE — 99214 OFFICE O/P EST MOD 30 MIN: CPT | Performed by: NURSE PRACTITIONER

## 2017-03-23 ASSESSMENT — PAIN SCALES - GENERAL: PAINLEVEL: MODERATE PAIN (5)

## 2017-03-23 NOTE — NURSING NOTE
"Chaz Soler is a 88 year old male who presents for:  Chief Complaint   Patient presents with     Neurologic Problem     Follow up for lumbar compression fracture, low back pain        Initial Vitals:  /56  Pulse 56  Ht 5' 8\" (1.727 m)  Wt 161 lb (73 kg)  SpO2 97%  BMI 24.48 kg/m2 Estimated body mass index is 24.48 kg/(m^2) as calculated from the following:    Height as of this encounter: 5' 8\" (1.727 m).    Weight as of this encounter: 161 lb (73 kg).. Body surface area is 1.87 meters squared. BP completed using cuff size: regular  Moderate Pain (5)    Do you feel safe in your environment?  Yes  Do you need any refills today? No    Nursing Comments: Follow up for lumbar compression fracture, low back pain.        5 min. nursing intake time  Christiana Gresham MA       Discharge plan: continue to wear TLSO at all times when out of bed  - Repeat XR of lumbar spine AP and Lateral at the end of April   - Return to clinic following repeat XR spine at the end of April  - Call the Spine and Brain clinic for any questions or concerns during interim, at [phone]  - Seek medical attention immediately if any paresthesias, weakness, gait instability, bowel/bladder incontinence, new pain.  2 min. nursing discharge time  Christiana Gresham MA        "

## 2017-03-23 NOTE — MR AVS SNAPSHOT
After Visit Summary   3/23/2017    Chaz Soler    MRN: [id number]           Patient Information     Date Of Birth          5/21/1928        Visit Information        Provider Department      3/23/2017 2:00 PM Jessie Wolff APRN CNP [facility]        Today's Diagnoses     Closed wedge compression fracture of second lumbar vertebra with routine healing, subsequent encounter    -  1      Care Instructions    - continue to wear TLSO at all times when out of bed  - Repeat XR of lumbar spine AP and Lateral at the end of April   - Return to clinic following repeat XR spine at the end of April  - Call the Spine and Brain clinic for any questions or concerns during interim, at [phone]  - Seek medical attention immediately if any paresthesias, weakness, gait instability, bowel/bladder incontinence, new pain.            Follow-ups after your visit        Your next 10 appointments already scheduled     Jun 02, 2017  8:15 AM CDT   LAB with RU LAB   [facility] ([facility])    [address]  [facility] [address] [phone]           Patient must bring picture ID.  Patient should be prepared to give a urine specimen  Please do not eat 10-12 hours before your appointment if you are coming in fasting for labs on lipids, cholesterol, or glucose (sugar).  Pregnant women should follow their Care Team instructions. Water with medications is okay. Do not drink coffee or other fluids.   If you have concerns about taking  your medications, please ask at office or if scheduling via Zapniphart, send a message by clicking on Secure Messaging, Message Your Care Team.            Jun 02, 2017  8:30 AM CDT   Ech Complete with [id number] [facility] [place] ([facility])    [address]  [facility] [address] [phone]           1.  Please bring or wear a comfortable  two-piece outfit. 2.  You may eat, drink and take your normal medicines. 3.  For any questions that cannot be answered, please contact the ordering physician ***Please check-in at the [place] Registration Office located in [address] in the [facility] building. When you are finished registering, please go to [address] and have a seat. The technician will call your name for the test.            Jun 02, 2017  9:30 AM CDT   US CAROTID BILATERAL with HECTOR   [facility] ([facility])    [address]  [facility] [id number] [phone]           Please bring a list of your medicines (including vitamins, minerals and over-the-counter drugs). Also, tell your doctor about any allergies you may have. Wear comfortable clothes and leave your valuables at home.  You do not need to do anything special to prepare for your exam.  Please call the Imaging Department at your exam site with any questions.            Francesco 15, 2017  1:15 PM CDT   Return Visit with Tyrell Jackson MD   [facility] [place] ([facility])    [address]  [facility] [id number] [phone]              Future tests that were ordered for you today     Open Future Orders        Priority Expected Expires Ordered    XR Lumbar Spine 2/3 Views Routine 4/28/2017 3/23/2018 3/23/2017            Who to contact     If you have questions or need follow up information about today's clinic visit or your schedule please contact [facility] [facility] directly at [phone].  Normal or non-critical lab and imaging results will be communicated to you by MyChart, letter or phone within 4 business days after the clinic has received the results. If you do not hear from us within 7 days, please contact the clinic through MyChart or phone. If you have a critical or abnormal lab result, we will notify you by phone as  "soon as possible.  Submit refill requests through Vidit or call your pharmacy and they will forward the refill request to us. Please allow 3 business days for your refill to be completed.          Additional Information About Your Visit        MusicGremlinhart Information     [employer] lets you send messages to your doctor, view your test results, renew your prescriptions, schedule appointments and more. To sign up, go to [email or web address] . Click on \"Log in\" on the left side of the screen, which will take you to the Welcome page. Then click on \"Sign up Now\" on the right side of the page.     You will be asked to enter the access code listed below, as well as some personal information. Please follow the directions to create your username and password.     Your access code is: [email or web address]  Expires: 2017 10:51 AM     Your access code will  in 90 days. If you need help or a new code, please call your [place] clinic or [phone].        Care EveryWhere ID     This is your Care EveryWhere ID. This could be used by other organizations to access your [place] medical records  [id number]        Your Vitals Were     Pulse Height Pulse Oximetry BMI (Body Mass Index)          56 5' 8\" (1.727 m) 97% 24.48 kg/m2         Blood Pressure from Last 3 Encounters:   17 138/56   17 120/60   17 154/69    Weight from Last 3 Encounters:   17 161 lb (73 kg)   17 161 lb (73 kg)   17 160 lb 12.8 oz (72.9 kg)               Primary Care Provider Office Phone # Fax #    Alirio Fox [phone]       [facility] [address] [facility] [address]        Thank you!     Thank you for choosing [facility]  for your care. Our goal is always to provide you with excellent care. Hearing back from our patients is one way we can continue to improve our services. Please take a few minutes to complete the written survey that you may receive in the mail " after your visit with us. Thank you!             Your Updated Medication List - Protect others around you: Learn how to safely use, store and throw away your medicines at www.disposemymeds.org.          This list is accurate as of: 3/23/17  2:21 PM.  Always use your most recent med list.                   Brand Name Dispense Instructions for use    amLODIPine 10 MG tablet    NORVASC    90 tablet    Take 1 tablet (10 mg) by mouth daily       AMOXICILLIN PO      Take 500 mg by mouth once as needed       atorvastatin 20 MG tablet    LIPITOR    90 tablet    Take 1 tablet (20 mg) by mouth daily       cyanocobalamin 1000 MCG tablet    vitamin  B-12     Take 1,000 mcg by mouth daily       donepezil 10 MG tablet    ARICEPT    90 tablet    Take 1 tablet (10 mg) by mouth At Bedtime       furosemide 20 MG tablet    LASIX    90 tablet    Take 1 tablet (20 mg) by mouth daily       gabapentin 100 MG capsule    NEURONTIN    90 capsule    Take 1 capsule (100 mg) by mouth 3 times daily For pain       IRON SUPPLEMENT PO      Take 325 mg by mouth daily (with breakfast)       levothyroxine 88 MCG tablet    SYNTHROID/LEVOTHROID    90 tablet    Take 1 tablet (88 mcg) by mouth daily       lidocaine-prilocaine cream    EMLA    30 g    Apply topically as needed for moderate pain To low back, right hip  and right leg Apply same time as Diclofenac gel       losartan 100 MG tablet    COZAAR    90 tablet    Take 1 tablet (100 mg) by mouth daily       metoprolol 25 MG 24 hr tablet    TOPROL-XL    90 tablet    Take 1 tablet (25 mg) by mouth daily       order for DME     1 each    Equipment being ordered: TENS and supplies.       pantoprazole 40 MG EC tablet    PROTONIX    60 tablet    1 tab twice a day       polyethylene glycol Packet    MIRALAX    30 packet    Take 17 g by mouth daily       senna-docusate 8.6-50 MG per tablet    SENOKOT-S;PERICOLACE    100 tablet    Take 2 tablets by mouth 2 times daily as needed (constipation )       tamsulosin  0.4 MG capsule    FLOMAX    90 capsule    Take 1 capsule (0.4 mg) by mouth daily       traMADol 50 MG tablet    ULTRAM    30 tablet    Take 1 tablet (50 mg) by mouth every 6 hours as needed for moderate pain

## 2017-03-23 NOTE — PROGRESS NOTES
[facility]  Neurosurgery followup:    HPI: Mr. Soler is a 88 year old male that returns today post fall and lumbar fractures. He was seen by our service on 1- as an inpatient. He was asked to wear a brace for a minimum of 12 weeks when out of bed and to follow up with us in 6 weeks.  The pt presents today in a wheelchair with brace off post xray. He reports that he does not have any radicular pain. He does have leg weakness but has had this for some time.  He denies any back pain but according to the notes he does still have lumbar pain with movement. He reports his back pain 5/10.   Exam:  Constitutional:  Alert, well nourished, NAD.  HEENT: Normocephalic, atraumatic.   Pulm:  Without shortness of breath   CV:  No pitting edema of BLE.      Neurological:  Awake  Alert  Oriented x 3  Motor exam:        IP Q DF PF EHL  R   5  5   5   5    5  L   5  5   5   5    5     Able to spontaneously move L/E bilaterally  Sensation intact throughout all L/E dermatomes       Imaging: L2 fracture still noted  A/P: Mr. Soler is a 88 year old male that returns today post fall and lumbar fractures. He was seen by our service on 1- as an inpatient. He was asked to wear a brace for a minimum of 12 weeks when out of bed and to follow up with us in 6 weeks.  The pt presents today in a wheelchair with brace off post xray. He reports that he does not have any radicular pain. He does have leg weakness but has had this for some time.  He denies any back pain but according to the notes he does still have lumbar pain with movement. He reports his back pain 5/10. He is here with his wife and son. He was educated that he would need to wear the brace for a total of 12 weeks. He was not very happy about this but they understand and remember being told this in the hospital.  We will have him return in 6 weeks with another xray.     Plan:    - continue to wear TLSO at all times when out of bed  - Repeat XR of lumbar  spine AP and Lateral at the end of April   - Return to clinic following repeat XR spine at the end of April  - Call the Spine and Brain clinic for any questions or concerns during interim, at [phone]  - Seek medical attention immediately if any paresthesias, weakness, gait instability, bowel/bladder incontinence, new pain.          Jessie Wolff [employer]  [facility]  [address] [address]    Tel [phone]  Pager [phone]

## 2017-03-23 NOTE — PATIENT INSTRUCTIONS
- continue to wear TLSO at all times when out of bed  - Repeat XR of lumbar spine AP and Lateral at the end of April   - Return to clinic following repeat XR spine at the end of April  - Call the [facility] clinic for any questions or concerns during interim, at [phone]  - Seek medical attention immediately if any paresthesias, weakness, gait instability, bowel/bladder incontinence, new pain.

## 2017-03-25 NOTE — TELEPHONE ENCOUNTER
gabapentin (NEURONTIN) 100 MG capsule      Last Written Prescription Date:  02/24/17  Last Fill Quantity: 90 cap,   # refills: historical  Last Office Visit with [facility] prescribing provider: 03/06/17  Future Office visit:    Next 5 appointments (look out 90 days)     Apr 27, 2017  2:00 PM CDT   Return Visit with CELIA Wooten CNP   [facility] ([facility])    [address]  [facility] [id number] [phone]            Francesco 15, 2017  1:15 PM CDT   Return Visit with Tyrell Jackson MD   [facility] [place] ([facility])    [address]  [facility] [address] [phone]                   Routing refill request to provider for review/approval because:  Drug not on the [facility] refill protocol or controlled substance

## 2017-03-27 RX ORDER — GABAPENTIN 100 MG/1
100 CAPSULE ORAL 3 TIMES DAILY
Qty: 90 CAPSULE | Refills: 0 | Status: SHIPPED | OUTPATIENT
Start: 2017-03-27 | End: 2017-05-02

## 2017-03-27 NOTE — TELEPHONE ENCOUNTER
Appears to have been started after recent hospitalization for compression fracture.  Script approved.

## 2017-03-30 ENCOUNTER — TELEPHONE (OUTPATIENT)
Dept: INTERNAL MEDICINE | Facility: CLINIC | Age: 82
End: 2017-03-30

## 2017-04-03 ENCOUNTER — TELEPHONE (OUTPATIENT)
Dept: INTERNAL MEDICINE | Facility: CLINIC | Age: 82
End: 2017-04-03

## 2017-04-07 ENCOUNTER — TELEPHONE (OUTPATIENT)
Dept: INTERNAL MEDICINE | Facility: CLINIC | Age: 82
End: 2017-04-07

## 2017-04-07 NOTE — TELEPHONE ENCOUNTER
Wife calling.  Had to call 911 yesterday evening d/t extreme aggitation.  One leg wouldn't stop jerking/twitching.  [facility] came and vitals all normal.  Spent some time with him and he seemed to settle down, so they didn't take him to ER.  Leg finally stopped jerking later in the evening.  He was somewhat confused.  Slept thru the night and today doesn't remember anything about it.  Is at baseline today.   Just wanted to let you know, see if there is anything she should do.     Also aide noted nickel size red spot on penis.  He put some sort of cream on it.  And advised her to do daily.  litoi.

## 2017-04-07 NOTE — TELEPHONE ENCOUNTER
Cannot r/o seizure vs muscle spasms from nerve irritation from back from other. If has recurrent episode, then inform MD and will order EEG brain through neuro if happens again. Penile issue likely fungal issue and recommend using Clotrimazole antifungal cream OTC twice  day until resolved. If rash not improved in 2 weeks, then inform MD

## 2017-04-08 DIAGNOSIS — M54.16 LUMBAR RADICULOPATHY: ICD-10-CM

## 2017-04-08 DIAGNOSIS — G89.29 OTHER CHRONIC PAIN: ICD-10-CM

## 2017-04-09 NOTE — TELEPHONE ENCOUNTER
traMADol (ULTRAM) 50 MG tablet      Last Written Prescription Date:  02/01/17  Last Fill Quantity: 30 tab,   # refills: 0  Last Office Visit with [facility] prescribing provider: 03/06/17  Future Office visit:    Next 5 appointments (look out 90 days)     Apr 27, 2017  2:00 PM CDT   Return Visit with CELIA Wooten CNP   [facility] ([facility])    [address]  [facility] [id number] [phone]            Francesco 15, 2017  1:15 PM CDT   Return Visit with Tyrell Jackson MD   [facility] PHYSICIANS [facility] AT [place] ([facility])    [address]  [facility] [id number] [phone]                   Routing refill request to provider for review/approval because:  Drug not on the [facility] refill protocol or controlled substance

## 2017-04-11 RX ORDER — TRAMADOL HYDROCHLORIDE 50 MG/1
50 TABLET ORAL EVERY 6 HOURS PRN
Qty: 60 TABLET | Refills: 0
Start: 2017-04-11 | End: 2017-05-17

## 2017-04-18 DIAGNOSIS — N18.30 CKD (CHRONIC KIDNEY DISEASE) STAGE 3, GFR 30-59 ML/MIN (H): Chronic | ICD-10-CM

## 2017-04-18 LAB
ANION GAP SERPL CALCULATED.3IONS-SCNC: 6 MMOL/L (ref 3–14)
BUN SERPL-MCNC: 31 MG/DL (ref 7–30)
CALCIUM SERPL-MCNC: 10.1 MG/DL (ref 8.5–10.1)
CHLORIDE SERPL-SCNC: 102 MMOL/L (ref 94–109)
CO2 SERPL-SCNC: 32 MMOL/L (ref 20–32)
CREAT SERPL-MCNC: 1.71 MG/DL (ref 0.66–1.25)
GFR SERPL CREATININE-BSD FRML MDRD: 38 ML/MIN/1.7M2
GLUCOSE SERPL-MCNC: 111 MG/DL (ref 70–99)
HGB BLD-MCNC: 11.9 G/DL (ref 13.3–17.7)
POTASSIUM SERPL-SCNC: 4.2 MMOL/L (ref 3.4–5.3)
SODIUM SERPL-SCNC: 140 MMOL/L (ref 133–144)

## 2017-04-18 PROCEDURE — 36415 COLL VENOUS BLD VENIPUNCTURE: CPT | Performed by: INTERNAL MEDICINE

## 2017-04-18 PROCEDURE — 80048 BASIC METABOLIC PNL TOTAL CA: CPT | Performed by: INTERNAL MEDICINE

## 2017-04-18 PROCEDURE — 85018 HEMOGLOBIN: CPT | Performed by: INTERNAL MEDICINE

## 2017-04-27 ENCOUNTER — OFFICE VISIT (OUTPATIENT)
Dept: NEUROSURGERY | Facility: CLINIC | Age: 82
End: 2017-04-27
Attending: NURSE PRACTITIONER
Payer: COMMERCIAL

## 2017-04-27 ENCOUNTER — RADIANT APPOINTMENT (OUTPATIENT)
Dept: GENERAL RADIOLOGY | Facility: CLINIC | Age: 82
End: 2017-04-27
Attending: NURSE PRACTITIONER
Payer: COMMERCIAL

## 2017-04-27 VITALS — OXYGEN SATURATION: 96 % | HEART RATE: 56 BPM | SYSTOLIC BLOOD PRESSURE: 125 MMHG | DIASTOLIC BLOOD PRESSURE: 54 MMHG

## 2017-04-27 DIAGNOSIS — S32.030G CLOSED WEDGE COMPRESSION FRACTURE OF THIRD LUMBAR VERTEBRA WITH DELAYED HEALING, SUBSEQUENT ENCOUNTER: ICD-10-CM

## 2017-04-27 DIAGNOSIS — S32.020D COMPRESSION FRACTURE OF L2, WITH ROUTINE HEALING, SUBSEQUENT ENCOUNTER: Primary | ICD-10-CM

## 2017-04-27 DIAGNOSIS — M80.08XG AGE-RELATED OSTEOPOROSIS WITH CURRENT PATHOLOGICAL FRACTURE OF VERTEBRA WITH DELAYED HEALING: ICD-10-CM

## 2017-04-27 DIAGNOSIS — S32.020D CLOSED WEDGE COMPRESSION FRACTURE OF SECOND LUMBAR VERTEBRA WITH ROUTINE HEALING, SUBSEQUENT ENCOUNTER: ICD-10-CM

## 2017-04-27 PROCEDURE — 99214 OFFICE O/P EST MOD 30 MIN: CPT | Performed by: NURSE PRACTITIONER

## 2017-04-27 PROCEDURE — 72100 X-RAY EXAM L-S SPINE 2/3 VWS: CPT

## 2017-04-27 PROCEDURE — 99211 OFF/OP EST MAY X REQ PHY/QHP: CPT | Performed by: NURSE PRACTITIONER

## 2017-04-27 ASSESSMENT — PAIN SCALES - GENERAL: PAINLEVEL: MODERATE PAIN (5)

## 2017-04-27 NOTE — PROGRESS NOTES
Spine and Brain Clinic  Neurosurgery followup:    HPI: Mr. Soler is a 88 year old male that returns today post fall and lumbar fractures. He was seen by our service on 1- as an inpatient. The pt presents today in a wheelchair with brace off post xray. He reports that he does not have any radicular pain. He does have leg weakness but has had this for some time. He denies any back pain but according to the notes he does still have lumbar pain with movement. He reports his back pain 5/10.   Exam:  Constitutional:  Alert, well nourished, NAD.  HEENT: Normocephalic, atraumatic.   Pulm:  Without shortness of breath   CV:  No pitting edema of BLE.      Neurological:  Awake  Alert  Oriented x 3  Motor exam:        IP Q DF PF EHL  R   5  5   5   5    5  L   5  5   5   5    5     Able to spontaneously move L/E bilaterally  Sensation intact throughout all L/E dermatomes       Imaging: IMPRESSION: Flexion and extension lateral views only. Five functional  lumbar vertebral segments have been previously demonstrated. Superior  endplate wedge compression deformities at L2 and L3 appear unchanged.  Minimal degenerative anterolisthesis L3 on L4 and L4 on L5 are again  seen and there is no significant motion on flexion or extension.  Overall, no significant change since the prior exam. Aortobiiliac  stent is again noted.     A/P: Mr. Soler is a 88 year old male that returns today post fall and lumbar fractures. He was seen by our service on 1- as an inpatient. The pt presents today in a wheelchair with brace off post xray. He reports that he does not have any radicular pain. He does have leg weakness but has had this for some time. He denies any back pain but according to the notes he does still have lumbar pain with movement. He reports his back pain 5/10.  He has been in his brace for 12 weeks and xray shows no significant movement.  He reports he had significant relief with vertebroplasty in the past.  We will  order this for him.      Patient Instructions   1.  Recommend vertebroplasty procedure.  If not able then would recommend repeat epidural injections.      2.  Ok to take brace off. Try lumbar corset for comfort.      3.  Please contact the clinic if pain persists at [phone].        [facility]  Spine and Brain Clinic  [address] [address]    Tel [phone]  Pager [phone]

## 2017-04-27 NOTE — MR AVS SNAPSHOT
After Visit Summary   4/27/2017    Chaz Soler    MRN: [id number]           Patient Information     Date Of Birth          5/21/1928        Visit Information        Provider Department      4/27/2017 2:00 PM Jessie Wolff APRN CNP [facility]        Today's Diagnoses     Compression fracture of L2, with routine healing, subsequent encounter    -  1    Closed wedge compression fracture of third lumbar vertebra with delayed healing, subsequent encounter        Age-related osteoporosis with current pathological fracture of vertebra with delayed healing           Care Instructions    1.  Recommend vertebroplasty procedure.  If not able then would recommend repeat epidural injections.      2.  Ok to take brace off. Try lumbar corset for comfort.      3.  Please contact the clinic if pain persists at [phone].         Follow-ups after your visit        Additional Services     ORTHOTICS REFERRAL       **This referral order prints off in the [facility]  (Orthotics & Prosthetics) Central Scheduling Office**    The [facility] Central Scheduling Staff will contact the patient to schedule appointments.     Central Scheduling Contact Information: [phone] ([place])    Orthotics: lumbar corset     Please be aware that coverage of these services is subject to the terms and limitations of your health insurance plan.  Call member services at your health plan with any benefit or coverage questions.      Please bring the following to your appointment:    >>   Any x-rays, CTs or MRIs which have been performed.  Contact the facility where they were done to arrange for  prior to your scheduled appointment.    >>   List of current medications   >>   This referral request   >>   Any documents/labs given to you for this referral                  Your next 10 appointments already scheduled     Jun 02, 2017  8:15 AM CDT   LAB with RU LAB   [facility]  [facility] AT [place] ([facility])    [address]  [facility] [address]   [phone]           Patient must bring picture ID.  Patient should be prepared to give a urine specimen  Please do not eat 10-12 hours before your appointment if you are coming in fasting for labs on lipids, cholesterol, or glucose (sugar).  Pregnant women should follow their Care Team instructions. Water with medications is okay. Do not drink coffee or other fluids.   If you have concerns about taking  your medications, please ask at office or if scheduling via Skyfiber, send a message by clicking on Secure Messaging, Message Your Care Team.            Jun 02, 2017  8:30 AM CDT   Ech Complete with [id number] ([facility])    [address]  [facility] [id number] [phone]           1.  Please bring or wear a comfortable two-piece outfit. 2.  You may eat, drink and take your normal medicines. 3.  For any questions that cannot be answered, please contact the ordering physician ***Please check-in at the [place] Registration Office located in [address] in the [facility] building. When you are finished registering, please go to [address] and have a seat. The technician will call your name for the test.            Jun 02, 2017  9:30 AM CDT   US CAROTID BILATERAL with RHERAJESHVUS   [facility] ([facility])    [address]  [facility] [id number] [phone]           Please bring a list of your medicines (including vitamins, minerals and over-the-counter drugs). Also, tell your doctor about any allergies you may have. Wear comfortable clothes and leave your valuables at home.  You do not need to do anything special to prepare for your exam.  Please call the Imaging Department at your exam site with any questions.            Francesco 15, 2017  1:15 PM  "CDT   Return Visit with Tyrell Jackson MD   [facility] PHYSICIANS [facility] AT [place] ([email or web address] Clin[facility])    [address]  [facility] [address] [phone]              Future tests that were ordered for you today     Open Future Orders        Priority Expected Expires Ordered    IR Lumbar Vertebroplasty Routine  2018            Who to contact     If you have questions or need follow up information about today's clinic visit or your schedule please contact [place] [facility] directly at [phone].  Normal or non-critical lab and imaging results will be communicated to you by StormMQhart, letter or phone within 4 business days after the clinic has received the results. If you do not hear from us within 7 days, please contact the clinic through StormMQhart or phone. If you have a critical or abnormal lab result, we will notify you by phone as soon as possible.  Submit refill requests through Taptu or call your pharmacy and they will forward the refill request to us. Please allow 3 business days for your refill to be completed.          Additional Information About Your Visit        MyChart Information     Taptu lets you send messages to your doctor, view your test results, renew your prescriptions, schedule appointments and more. To sign up, go to [email or web address] . Click on \"Log in\" on the left side of the screen, which will take you to the Welcome page. Then click on \"Sign up Now\" on the right side of the page.     You will be asked to enter the access code listed below, as well as some personal information. Please follow the directions to create your username and password.     Your access code is: [email or web address]  Expires: 2017  2:23 PM     Your access code will  in 90 days. If you need help or a new code, please call your [place] clinic or [phone].        Care EveryWhere ID     This is your Care EveryWhere ID. This could " be used by other organizations to access your [place] medical records  [id number]        Your Vitals Were     Pulse Pulse Oximetry                56 96%           Blood Pressure from Last 3 Encounters:   04/27/17 125/54   03/23/17 138/56   03/06/17 120/60    Weight from Last 3 Encounters:   03/23/17 161 lb (73 kg)   03/06/17 161 lb (73 kg)   02/23/17 160 lb 12.8 oz (72.9 kg)              We Performed the Following     ORTHOTICS REFERRAL        Primary Care Provider Office Phone # Fax #    Alirio Fox [phone]       [facility] [address] [facility] [address]        Thank you!     Thank you for choosing [facility]  for your care. Our goal is always to provide you with excellent care. Hearing back from our patients is one way we can continue to improve our services. Please take a few minutes to complete the written survey that you may receive in the mail after your visit with us. Thank you!             Your Updated Medication List - Protect others around you: Learn how to safely use, store and throw away your medicines at www.disposemymeds.org.          This list is accurate as of: 4/27/17  2:24 PM.  Always use your most recent med list.                   Brand Name Dispense Instructions for use    amLODIPine 10 MG tablet    NORVASC    90 tablet    Take 1 tablet (10 mg) by mouth daily       AMOXICILLIN PO      Take 500 mg by mouth once as needed       atorvastatin 20 MG tablet    LIPITOR    90 tablet    Take 1 tablet (20 mg) by mouth daily       cyanocobalamin 1000 MCG tablet    vitamin  B-12     Take 1,000 mcg by mouth daily       donepezil 10 MG tablet    ARICEPT    90 tablet    Take 1 tablet (10 mg) by mouth At Bedtime       furosemide 20 MG tablet    LASIX    90 tablet    Take 1 tablet (20 mg) by mouth daily       gabapentin 100 MG capsule    NEURONTIN    90 capsule    Take 1 capsule (100 mg) by mouth 3 times daily For pain       IRON SUPPLEMENT PO       Take 325 mg by mouth daily (with breakfast)       levothyroxine 88 MCG tablet    SYNTHROID/LEVOTHROID    90 tablet    Take 1 tablet (88 mcg) by mouth daily       lidocaine-prilocaine cream    EMLA    30 g    Apply topically as needed for moderate pain To low back, right hip  and right leg Apply same time as Diclofenac gel       losartan 100 MG tablet    COZAAR    90 tablet    Take 1 tablet (100 mg) by mouth daily       metoprolol 25 MG 24 hr tablet    TOPROL-XL    90 tablet    Take 1 tablet (25 mg) by mouth daily       order for DME     1 each    Equipment being ordered: TENS and supplies.       polyethylene glycol Packet    MIRALAX    30 packet    Take 17 g by mouth daily       senna-docusate 8.6-50 MG per tablet    SENOKOT-S;PERICOLACE    100 tablet    Take 2 tablets by mouth 2 times daily as needed (constipation )       tamsulosin 0.4 MG capsule    FLOMAX    90 capsule    Take 1 capsule (0.4 mg) by mouth daily       traMADol 50 MG tablet    ULTRAM    60 tablet    Take 1 tablet (50 mg) by mouth every 6 hours as needed for moderate pain

## 2017-04-27 NOTE — NURSING NOTE
"Chaz Soler is a 88 year old male who presents for:  Chief Complaint   Patient presents with     Neurologic Problem     follow up closed wedge compression fracture of 2nd lumbar vertebra with routine healing        Initial Vitals:  /54 (BP Location: Right arm)  Pulse 56  SpO2 96% Estimated body mass index is 24.48 kg/(m^2) as calculated from the following:    Height as of 3/23/17: 5' 8\" (1.727 m).    Weight as of 3/23/17: 161 lb (73 kg).. There is no height or weight on file to calculate BSA. BP completed using cuff size: regular  Moderate Pain (5)    Do you feel safe in your environment?  Yes  Do you need any refills today? No    Nursing Comments: follow up closed wedge compression fracture of 2nd lumbar vertebra with routine healing.        5 min. nursing intake time  Christiana Gresham MA       Discharge plan: 1.  Recommend vertebroplasty procedure.  If not able then would recommend repeat epidural injections.      2.  Ok to take brace off. Try lumbar corset for comfort.      3.  Please contact the clinic if pain persists at [phone].   2 min. nursing discharge time  Christiana Gresham MA        "

## 2017-05-02 NOTE — TELEPHONE ENCOUNTER
Gabapentin      Last Written Prescription Date:  3/27/17  Last Fill Quantity: 90,   # refills: 0  [facility] provider: 3/6/17  Future Office visit:    Next 5 appointments (look out 90 days)     Francesco 15, 2017  1:15 PM CDT   Return Visit with Tyrell Jackson MD   [facility] AT [place] ([facility])    [address] [place] [address] [phone]                   Routing refill request to provider for review/approval because:  Drug not on the [facility] refill protocol or controlled substance

## 2017-05-03 RX ORDER — GABAPENTIN 100 MG/1
CAPSULE ORAL
Qty: 90 CAPSULE | Refills: 11 | Status: SHIPPED | OUTPATIENT
Start: 2017-05-03 | End: 2018-01-01

## 2017-05-09 ENCOUNTER — TELEPHONE (OUTPATIENT)
Dept: NEUROSURGERY | Facility: CLINIC | Age: 82
End: 2017-05-09

## 2017-05-09 DIAGNOSIS — S32.020A COMPRESSION FRACTURE OF L2 (H): Primary | ICD-10-CM

## 2017-05-09 NOTE — TELEPHONE ENCOUNTER
Alena from [facility].  Needs to recent MRI to evaluate for vertroplasty.   Please place order and then alert Alena when order is placed.  MRI can be done at [facility] or [place] Alena has ability to see imaging in [place].

## 2017-05-10 NOTE — TELEPHONE ENCOUNTER
MRI order placed and faxed to Alena at [facility] at [phone] on 5/10/17.

## 2017-05-11 ENCOUNTER — CARE COORDINATION (OUTPATIENT)
Dept: CARE COORDINATION | Facility: CLINIC | Age: 82
End: 2017-05-11

## 2017-05-11 ENCOUNTER — HOSPITAL ENCOUNTER (OUTPATIENT)
Dept: MRI IMAGING | Facility: CLINIC | Age: 82
Discharge: HOME | End: 2017-05-11
Attending: NURSE PRACTITIONER | Admitting: NURSE PRACTITIONER
Payer: MEDICARE

## 2017-05-11 DIAGNOSIS — S32.020A COMPRESSION FRACTURE OF L2 (H): ICD-10-CM

## 2017-05-11 PROCEDURE — 72148 MRI LUMBAR SPINE W/O DYE: CPT

## 2017-05-11 NOTE — PROGRESS NOTES
Clinic Care Coordination Contact  OUTREACH    Referral Information:  Referral Source: CTS  Reason for Contact: Reason for Contact: Hospitalization 1/29-2/1/2017-  DISCHARGE DIAGNOSIS:   1. Compression fracture L2-3    2. Chronic low back pain, unspecified back pain laterality, with sciatica presence unspecified    3. Pulmonary fibrosis (H)    4. CAP (community acquired pneumonia)    5. Dementia with behavioral disturbance, unspecified dementia type    6. Essential hypertension    7. Coronary artery disease involving native coronary artery of native heart without angina pectoris    8. Congestive heart failure, unspecified congestive heart failure chronicity, unspecified congestive heart failure type (H)    9. S/P TAVR (transcatheter aortic valve replacement)    10. CKD (chronic kidney disease) stage 3, GFR 30-59 ml/min    11. Physical deconditioning          Isabella U 2/1-2/23/2017--    Care Conference: No     Universal Utilization:   ED Visits in last year: 0  Hospital visits in last year: 2  Last PCP appointment: 11/22/16  Missed Appointments: 0  Concerns:  (none)  Multiple Providers or Specialists:  (cardiology, vascular, pain, ortho, neurology, spine surgeon)    Clinical Concerns:  Current Medical Concerns: CC spoke to Kemi/wife and she reports the patient is getting around with a walker and is doing his daily home PT exercises.  Brief conversation because the patient is getting ready for a MRI appointment this afternoon   Clinical Pathway Name: None  Clinical Pathway: None    Medication Management:  Not reviewed      Functional Status:  Mobility Status: Independent w/Device  Equipment Currently Used at Home: oxygen, walker, rolling     Psychosocial:  Current living arrangement:: I live in a private home with spouse (TCU)     Resources and Interventions:  Current Resources: Skilled Nursing Facility;  (NA)  PAS Number: [id number]  Senior Linkage Line Referral Placed:  (NA)  Advanced Care Plans/Directives on  file:: No  Referrals Placed:  (NA)        Barriers: No barriers identified   Strengths: Supportive wife     Frequency of Care Coordination: PRN       Plan: CC will follow up in 3-5 business days to follow up on MRI results     Pebbles Leija RN  Care Coordinator-[facility]  [email or web address]  [phone]

## 2017-05-15 ENCOUNTER — CARE COORDINATION (OUTPATIENT)
Dept: CARE COORDINATION | Facility: CLINIC | Age: 82
End: 2017-05-15

## 2017-05-15 ENCOUNTER — HOSPITAL ENCOUNTER (OUTPATIENT)
Dept: GENERAL RADIOLOGY | Facility: CLINIC | Age: 82
Discharge: HOME | End: 2017-05-15
Attending: NURSE PRACTITIONER | Admitting: NURSE PRACTITIONER
Payer: MEDICARE

## 2017-05-15 DIAGNOSIS — S32.030G CLOSED WEDGE COMPRESSION FRACTURE OF THIRD LUMBAR VERTEBRA WITH DELAYED HEALING, SUBSEQUENT ENCOUNTER: ICD-10-CM

## 2017-05-15 DIAGNOSIS — M80.08XG AGE-RELATED OSTEOPOROSIS WITH CURRENT PATHOLOGICAL FRACTURE OF VERTEBRA WITH DELAYED HEALING: ICD-10-CM

## 2017-05-15 PROCEDURE — 99211 OFF/OP EST MAY X REQ PHY/QHP: CPT | Mod: TC

## 2017-05-15 NOTE — PROGRESS NOTES
This whole work-up with MRI and IR consult was ordered by neurosurgery. Spoke with  Neurosurgery staff as Jessie Wolff (NP) is out of the clinic today. Per that clinic staff, there is already an order for the vertebroplasty in the chart from Jessie Wolff. [facility] staff with speak with  IR to be sure that order suffices for getting procedure done and they will also speak with pt's wife today with update and plan since I have not been involved with recent back management and vertebroplasty jamie

## 2017-05-15 NOTE — PROGRESS NOTES
VERTEBROPLASTY PAIN MANAGEMENT CONSULT  Patient Name: Chaz Soler  Patient MRN: [id number]  Patient : 1928    S: Mr. Soler is a 88 year old year old male presenting for evaluation of low back pain.  These symptoms have been occuring for the last 4 months and are related to a mechanical fall in 2017. Conservative therapy has been attempted for over 3 months utilizing techniques such as bed rest, back bracing with TLSO and currently with back support belt, and medicinal pain management although the patient does not tolerate narcotic pain medications well. The patient currently rates the pain as 4-8/10. The symptoms are primarily located in the midline low back.  The symptoms are aggravated with movement and alleviated by rest.  The patient's activities of daily living have been compromised, he did frequently ride stationary bike and has too much pain for this now. Mr. Soler did previously use a walker to help with stability and now feels more dependant on this.  He does have a history of radicular back pain into the right lower extremity and underwent hemilaminectomy for this in .  No report of osteoporosis or recent DEXA scan available, there is a history of hyperparathyroidism.      Past Medical History:   Diagnosis Date     AAA (abdominal aortic aneurysm) (H) 10/5/2011    6.1 cm     Anemia 2011     Aortic stenosis 10/26/2012     CAD (coronary artery disease)     MI - distal inferior/apex. Non transmural     CHF (congestive heart failure) (H) 2014     CKD (chronic kidney disease) stage 3, GFR 30-59 ml/min 2011     Dementia 2015     Edema, unspecified edema 2016     Essential hypertension      Gout     knee     Hypercalcemia 2015     Hyperlipidemia LDL goal < 70      Hyperparathyroidism (H)      Hyperparathyroidism, unspecified (H) 2015     Hyperplasia of prostate      LEFT LUNG GRANULOMA      Lumbago      Other chronic pain 10/11/2016      Proteinuria 2/8/2012     Pulmonary fibrosis (H)      Thrombocytopenia (H) 11/30/2011     Unspecified hypothyroidism      Vitamin D deficiency        Prescription Medications as of 5/15/2017             gabapentin (NEURONTIN) 100 MG capsule TAKE ONE CAPSULE BY MOUTH THREE TIMES DAILY FOR  PAIN    traMADol (ULTRAM) 50 MG tablet Take 1 tablet (50 mg) by mouth every 6 hours as needed for moderate pain    furosemide (LASIX) 20 MG tablet Take 1 tablet (20 mg) by mouth daily    senna-docusate (SENOKOT-S;PERICOLACE) 8.6-50 MG per tablet Take 2 tablets by mouth 2 times daily as needed (constipation )    tamsulosin (FLOMAX) 0.4 MG capsule Take 1 capsule (0.4 mg) by mouth daily    losartan (COZAAR) 100 MG tablet Take 1 tablet (100 mg) by mouth daily    levothyroxine (SYNTHROID/LEVOTHROID) 88 MCG tablet Take 1 tablet (88 mcg) by mouth daily    order for DME Equipment being ordered: TENS and supplies.    polyethylene glycol (MIRALAX) packet Take 17 g by mouth daily    atorvastatin (LIPITOR) 20 MG tablet Take 1 tablet (20 mg) by mouth daily    cyanocobalamin (VITAMIN  B-12) 1000 MCG tablet Take 1,000 mcg by mouth daily    AMOXICILLIN PO Take 500 mg by mouth once as needed    lidocaine-prilocaine (EMLA) cream Apply topically as needed for moderate pain To low back, right hip  and right leg Apply same time as Diclofenac gel    donepezil (ARICEPT) 10 MG tablet Take 1 tablet (10 mg) by mouth At Bedtime    amLODIPine (NORVASC) 10 MG tablet Take 1 tablet (10 mg) by mouth daily    metoprolol (TOPROL-XL) 25 MG 24 hr tablet Take 1 tablet (25 mg) by mouth daily    Ferrous Sulfate (IRON SUPPLEMENT PO) Take 325 mg by mouth daily (with breakfast)       Facility Administered Medications as of 5/15/2017             neostigmine (PROSTIGMINE) injection Inject into the vein as needed          Allergies   Allergen Reactions     Contrast Dye      SOB, increased Bp, difficulty swallowing. Date 6/3/96 (ipaque contrast)  Was premedicated prior to FRANCK  and had no reaction at all (solumedrol and benadryl) 2016  Was premedicated with Methylprednisolone protocol, no reaction 1/25/17     Lisinopril      cough     Oxycodone      Delirium       Review Of Systems  Respiratory: No shortness of breath, dyspnea on exertion, cough, or hemoptysis, no change in supplemental O2 req  Cardiovascular: negative  Gastrointestinal: negative  Genitourinary: negative  Musculoskeletal: positive for back pain, non radicular  Neurologic: negative  Hematologic/Lymphatic/Immunologic: negative    Objective:  Vitals: B/P: Data Unavailable, T: Data Unavailable, P: Data Unavailable, R: Data Unavailable  GA: WDWN male in NAD  BACK: ttp along midline lower back with deep palpation  LOWER EXTREMITIES: Normal circulation, sensation, and strength. Trace Edema bilat.    Imaging:   MR LUMBAR SPINE WITHOUT CONTRAST 5/11/2017 3:21 PM     HISTORY: Recent fall. MR for consideration of vertebroplasty  appropriateness.     COMPARISON: Plain films 4/27/2017. MR 11/4/2016.     TECHNIQUE: Routine MR lumbar spine mid T12 through the sacrum.     FINDINGS: Moderate scoliotic curve concave to the right centered at  L2-L3. Mild compression of the superior endplate of L3 with moderately  extensive bone edema on STIR images. This indicates this is a subacute  compression fracture that has not yet healed. No retropulsion. This  would be amenable to vertebroplasty if indicated. There is subtle high  T2 signal in the anterosuperior portion of the L2 vertebral body which  could be bone bruising or microfractures. I do not see a well-defined  area of compression. I would favor treatment of the L3 vertebral body  and not L2 at this time.     Degenerative changes as follows:     L1-L2: Mild annular bulge. No central or lateral stenosis.     L2-L3: No disc protrusion. No central or lateral stenosis.     L3-L4: Moderate bilateral facet joint disease worse on the left. No  central or lateral stenosis.     L4-L5: Moderate  degenerative narrowing of the interspace. Schmorl's  node in the inferior endplate of L4. Advanced facet joint disease.  Mild central stenosis. Moderate to severe right-sided foraminal  stenosis and mild left-sided foraminal stenosis.     L5-S1: Moderate narrowing of the interspace. Minimal annular bulge. No  central or lateral stenosis. Moderate facet joint disease on the left.         IMPRESSION:  1. Mild compression fracture of the superior endplate of L3 but  considerable edema throughout most of the L3 vertebral body. This  would be amenable to vertebroplasty. No retropulsion.  2. There may be minimal bone edema in the anterosuperior margin of L2  but no definite fracture line identified.  3. Degenerative changes as described.  4. The degenerative disease was present on 11/4/2016. The compression  fracture of L3 is new.     EVELINE TAN MD        Assessment/Plan:  1. Subacute L3 vertebral compression fracture. Patient and family (wife and daughter present for exam) would like to proceed with vertebroplasty of L3.  No contrast dye required for vertebroplasty and with elevated Creatinine will use IV fluid hydration omar-procedurally. No changes in anticoagulation medications needed.  Complex cardiopulmonary history.   Will plan for pre-procedure evaluation in light of medical history and need for moderate procedural sedation.    2. The patient should work with primary care provider to optimize his treatment for bone density.    3. Patient given instructions to contact imaging office to schedule if approved for procedure.  Follow-up for the vertebroplasty will occur at 1 week, 1 month, and 3 months over the phone. The patient can call [phone] if he has any questions.      Thank you for the consultation. We will continue to monitor this patient after the procedure.    I meet with this patient for 25 minutes, of which greater than 50% revolved around discussion of treatment options and coordination of  care.      Rylan Roy PA-C

## 2017-05-15 NOTE — PROGRESS NOTES
Clinic Care Coordination Contact  Care Team Conversations    Incoming call from Violet/wife.  Violet reports they received a call from Rylan in imaging and they were informed the patient would be a good candidate from a vertebral plasty injection.  Violet is wondering if Dr Fox could order this or if the patient needed a future appointment to discuss further     Please have care team call the wife back with Dr Fox's decision    Pebbles Leija, RN  Care Coordinator-[facility]  [email or web address]  [phone]

## 2017-05-16 ENCOUNTER — OFFICE VISIT (OUTPATIENT)
Dept: INTERNAL MEDICINE | Facility: CLINIC | Age: 82
End: 2017-05-16
Payer: COMMERCIAL

## 2017-05-16 VITALS
SYSTOLIC BLOOD PRESSURE: 130 MMHG | DIASTOLIC BLOOD PRESSURE: 54 MMHG | OXYGEN SATURATION: 96 % | HEART RATE: 57 BPM | TEMPERATURE: 98.3 F | HEIGHT: 68 IN | RESPIRATION RATE: 18 BRPM | WEIGHT: 156.7 LBS | BODY MASS INDEX: 23.75 KG/M2

## 2017-05-16 DIAGNOSIS — S32.030G COMPRESSION FRACTURE OF L3 LUMBAR VERTEBRA, WITH DELAYED HEALING, SUBSEQUENT ENCOUNTER: ICD-10-CM

## 2017-05-16 DIAGNOSIS — D69.6 THROMBOCYTOPENIA (H): ICD-10-CM

## 2017-05-16 DIAGNOSIS — Z01.818 PREOP GENERAL PHYSICAL EXAM: Primary | ICD-10-CM

## 2017-05-16 DIAGNOSIS — N18.30 CKD (CHRONIC KIDNEY DISEASE) STAGE 3, GFR 30-59 ML/MIN (H): Chronic | ICD-10-CM

## 2017-05-16 LAB
ANION GAP SERPL CALCULATED.3IONS-SCNC: 6 MMOL/L (ref 3–14)
BUN SERPL-MCNC: 38 MG/DL (ref 7–30)
CALCIUM SERPL-MCNC: 9.6 MG/DL (ref 8.5–10.1)
CHLORIDE SERPL-SCNC: 105 MMOL/L (ref 94–109)
CO2 SERPL-SCNC: 32 MMOL/L (ref 20–32)
CREAT SERPL-MCNC: 1.9 MG/DL (ref 0.66–1.25)
GFR SERPL CREATININE-BSD FRML MDRD: 34 ML/MIN/1.7M2
GLUCOSE SERPL-MCNC: 108 MG/DL (ref 70–99)
POTASSIUM SERPL-SCNC: 4.6 MMOL/L (ref 3.4–5.3)
SODIUM SERPL-SCNC: 143 MMOL/L (ref 133–144)

## 2017-05-16 PROCEDURE — 36415 COLL VENOUS BLD VENIPUNCTURE: CPT | Performed by: INTERNAL MEDICINE

## 2017-05-16 PROCEDURE — 93000 ELECTROCARDIOGRAM COMPLETE: CPT | Performed by: INTERNAL MEDICINE

## 2017-05-16 PROCEDURE — 99215 OFFICE O/P EST HI 40 MIN: CPT | Performed by: INTERNAL MEDICINE

## 2017-05-16 PROCEDURE — 80048 BASIC METABOLIC PNL TOTAL CA: CPT | Performed by: INTERNAL MEDICINE

## 2017-05-16 NOTE — MR AVS SNAPSHOT
After Visit Summary   5/16/2017    Chaz Soler    MRN: [id number]           Patient Information     Date Of Birth          5/21/1928        Visit Information        Provider Department      5/16/2017 2:30 PM Alirio Fox MD [facility]        Today's Diagnoses     Preop general physical exam    -  1    CKD (chronic kidney disease) stage 3, GFR 30-59 ml/min          Care Instructions    Take Amlodipine Gabapentin,  Levothyroxine, Metoprolol the AM of the procedure with a small amount of water. Hold other usual AM meds until you get home later that day  Nothing else to eat/drink after midnight prior to the procedure  Labs as ordered today        Follow-ups after your visit        Your next 10 appointments already scheduled     Jun 02, 2017  8:15 AM CDT   LAB with RU LAB   [facility] ([facility])    [address]  [place] [id number] [phone]           Patient must bring picture ID.  Patient should be prepared to give a urine specimen  Please do not eat 10-12 hours before your appointment if you are coming in fasting for labs on lipids, cholesterol, or glucose (sugar).  Pregnant women should follow their Care Team instructions. Water with medications is okay. Do not drink coffee or other fluids.   If you have concerns about taking  your medications, please ask at office or if scheduling via [employer], send a message by clicking on Secure Messaging, Message Your Care Team.            Jun 02, 2017  8:30 AM CDT   Ech Complete with [id number] ([facility])    [address]  [place] [id number] [phone]           1.  Please bring or wear a comfortable two-piece outfit. 2.  You may eat, drink and take your normal medicines. 3.  For any questions that cannot be answered, please contact the ordering physician ***Please check-in  at the [facility] located in [address] in the [facility] building. When you are finished registering, please go to [address] and have a seat. The technician will call your name for the test.            Jun 02, 2017  9:30 AM CDT   US CAROTID BILATERAL with RHERAJESHVUS   [facility] ([facility])    [address]  [place] [address]   [phone]           Please bring a list of your medicines (including vitamins, minerals and over-the-counter drugs). Also, tell your doctor about any allergies you may have. Wear comfortable clothes and leave your valuables at home.  You do not need to do anything special to prepare for your exam.  Please call the Imaging Department at your exam site with any questions.            Francesco 15, 2017  1:15 PM CDT   Return Visit with Tyrell Jackson MD   [facility] ([facility])    [address]  [place] [address]   [phone]            Jun 16, 2017  2:15 PM CDT   CTA ANGIOGRAM ABDOMEN PELVIS with [email or web address]   [facility] ([facility])    [address]  [place] [id number] [phone]           Please bring any scans or X-rays taken at other hospitals, if similar tests were done. Also bring a list of your medicines, including vitamins, minerals and over-the-counter drugs. It is safest to leave personal items at home.  Be sure to tell your doctor:   If you have any allergies.   If there s any chance you are pregnant.   If you are breastfeeding.   If you have any special needs.  You will have contrast for this exam. To prepare:   Do not eat or drink for 2 hours before your exam. If you need to take medicine, you may take it with small sips of water. (We may ask you to take liquid medicine as well.)   The day before your exam, drink extra fluids at least  "six 8-ounce glasses (unless your doctor tells you to restrict your fluids).  Patients over 70 or patients with diabetes or kidney problems:   If you haven t had a blood test (creatinine test) within the last 30 days, go to your clinic or Diagnostic Imaging Department for this test.  If you have diabetes:   If your kidney function is normal, continue taking your metformin (Avandamet, Glucophage, Glucovance, Metaglip) on the day of your exam.   If your kidney function is abnormal, wait 48 hours before restarting this medicine.  Please wear loose clothing, such as a sweat suit or jogging clothes. Avoid snaps, zippers and other metal. We may ask you to undress and put on a hospital gown.  If you have any questions, please call the Imaging Department where you will have your exam.              Who to contact     If you have questions or need follow up information about today's clinic visit or your schedule please contact [facility] directly at [phone].  Normal or non-critical lab and imaging results will be communicated to you by [employer], letter or phone within 4 business days after the clinic has received the results. If you do not hear from us within 7 days, please contact the clinic through [employer] or phone. If you have a critical or abnormal lab result, we will notify you by phone as soon as possible.  Submit refill requests through [employer] or call your pharmacy and they will forward the refill request to us. Please allow 3 business days for your refill to be completed.          Additional Information About Your Visit        [employer] Information     [employer] lets you send messages to your doctor, view your test results, renew your prescriptions, schedule appointments and more. To sign up, go to [email or web address] . Click on \"Log in\" on the left side of the screen, which will take you to the Welcome page. Then click on \"Sign up Now\" on the right side of the page.     You will be asked " "to enter the access code listed below, as well as some personal information. Please follow the directions to create your username and password.     Your access code is: [email or web address]  Expires: 2017  2:23 PM     Your access code will  in 90 days. If you need help or a new code, please call your [facility] clinic or [phone].        Care EveryWhere ID     This is your Care EveryWhere ID. This could be used by other organizations to access your [facility] medical records  [id number]        Your Vitals Were     Pulse Temperature Respirations Height Pulse Oximetry BMI (Body Mass Index)    57 98.3  F (36.8  C) (Oral) 18 5' 8\" (1.727 m) 96% 23.83 kg/m2       Blood Pressure from Last 3 Encounters:   17 130/54   17 125/54   17 138/56    Weight from Last 3 Encounters:   17 156 lb 11.2 oz (71.1 kg)   17 161 lb (73 kg)   17 161 lb (73 kg)              We Performed the Following     Basic metabolic panel     EKG 12-lead complete w/read - Clinics     EKG 12-lead complete w/read - Clinics        Primary Care Provider Office Phone # Fax #    Alirio Fox [phone]       [facility] [address] [address]        Thank you!     Thank you for choosing [facility]  for your care. Our goal is always to provide you with excellent care. Hearing back from our patients is one way we can continue to improve our services. Please take a few minutes to complete the written survey that you may receive in the mail after your visit with us. Thank you!             Your Updated Medication List - Protect others around you: Learn how to safely use, store and throw away your medicines at www.disposemymeds.org.          This list is accurate as of: 17  3:33 PM.  Always use your most recent med list.                   Brand Name Dispense Instructions for use    amLODIPine 10 MG tablet    NORVASC    90 tablet    Take 1 tablet (10 mg) by " mouth daily       AMOXICILLIN PO      Take 500 mg by mouth once as needed       atorvastatin 20 MG tablet    LIPITOR    90 tablet    Take 1 tablet (20 mg) by mouth daily       cyanocobalamin 1000 MCG tablet    vitamin  B-12     Take 1,000 mcg by mouth daily       donepezil 10 MG tablet    ARICEPT    90 tablet    Take 1 tablet (10 mg) by mouth At Bedtime       furosemide 20 MG tablet    LASIX    90 tablet    Take 1 tablet (20 mg) by mouth daily       gabapentin 100 MG capsule    NEURONTIN    90 capsule    TAKE ONE CAPSULE BY MOUTH THREE TIMES DAILY FOR  PAIN       IRON SUPPLEMENT PO      Take 325 mg by mouth daily (with breakfast)       levothyroxine 88 MCG tablet    SYNTHROID/LEVOTHROID    90 tablet    Take 1 tablet (88 mcg) by mouth daily       lidocaine-prilocaine cream    EMLA    30 g    Apply topically as needed for moderate pain To low back, right hip  and right leg Apply same time as Diclofenac gel       losartan 100 MG tablet    COZAAR    90 tablet    Take 1 tablet (100 mg) by mouth daily       metoprolol 25 MG 24 hr tablet    TOPROL-XL    90 tablet    Take 1 tablet (25 mg) by mouth daily       order for DME     1 each    Equipment being ordered: TENS and supplies.       polyethylene glycol Packet    MIRALAX    30 packet    Take 17 g by mouth daily       senna-docusate 8.6-50 MG per tablet    SENOKOT-S;PERICOLACE    100 tablet    Take 2 tablets by mouth 2 times daily as needed (constipation )       tamsulosin 0.4 MG capsule    FLOMAX    90 capsule    Take 1 capsule (0.4 mg) by mouth daily       traMADol 50 MG tablet    ULTRAM    60 tablet    Take 1 tablet (50 mg) by mouth every 6 hours as needed for moderate pain

## 2017-05-16 NOTE — PATIENT INSTRUCTIONS
Take Amlodipine Gabapentin,  Levothyroxine, Metoprolol the AM of the procedure with a small amount of water. Hold other usual AM meds until you get home later that day  Nothing else to eat/drink after midnight prior to the procedure  Follow-up with cardiology next month as scheduled. Contact clinic earlier if sx of chest pain or worsened SOB  Hold Lasix and repeat  BMP, platelets in a couple weeks  Continue oxygen therapy

## 2017-05-16 NOTE — PROGRESS NOTES
[facility]  [address] [address]  [phone]  Dept: [phone]    PRE-OP EVALUATION:  Today's date: 2017    Chaz Soler (: 1928) presents for pre-operative evaluation assessment as requested by Dr. galeano.  He requires evaluation and anesthesia risk assessment prior to undergoing surgery/procedure for treatment of broken L3 vertebrae.  Proposed procedure: vertebroplasty    Date of Surgery/ Procedure:   Time of Surgery/ Procedure:   Hospital/Surgical Facility: [facility]    Primary Physician: Alirio Fox  Type of Anesthesia Anticipated: Local    Patient has a Health Care Directive or Living Will:  YES          HPI:                                                      Brief HPI related to upcoming procedure: Hx chronic back pain with L3 compression fx. Seen by  IR and had MRO LS spine showing eddema in the L3 vertebrae and felt good candidate for vertebroplasty to help with chronic back pain. Using lumbar belt and followed [facility] neurosurgery. Because of chronic pulm fibrosis hx, I was asked to see pt for clearance to undergo  Procedure as above. Pt on chronic O2. Able to lie flat with O2 ot have increase in SOB. No chest pain. Limited activity throughout the day due to back pain. Some general walking that tolerates OK CV-wise           MEDICAL HISTORY:                                                      Patient Active Problem List    Diagnosis Date Noted     Compression fracture L2-3 2017     Priority: Medium     Other chronic pain 10/11/2016     Priority: Medium     Edema, unspecified edema 2016     Priority: Medium     Health Care Home 2015     Priority: Medium     State Tier Level:    Status:  Declined      See Letters for Emergency  Care Plan  Date:  2015         Hypothyroidism 10/27/2015     Priority: Medium     Dementia 2015     Priority: Medium     Hyperparathyroidism (H) 2015     Priority: Medium      Problem list name updated by automated process. Provider to review       SAI (obstructive sleep apnea) 02/24/2015     Priority: Medium     Mild SAI. PSG 2/5/2015 (158 lbs)  Events - The polysomnogram revealed a presence of - obstructive, - central, and - mixed apneas resulting in an apnea index of - events per hour. There were 31 hypopneas resulting in a hypopnea index of 5.9 events per hour. The combined apnea/hypopnea index was 5.9 events per hour.  S.  Sleep Associated Hypoxemia - (Greater than 5 minutes O2 sat below 89%) was not present. Baseline oxygen saturation was 93.8%. Lowest oxygen saturation was 84.4%. Time spent below 89% was 3.9 minutes.       Anemia 01/20/2015     Priority: Medium     CHF (congestive heart failure) (H) 12/31/2014     Priority: Medium     Physical deconditioning 11/21/2014     Priority: Medium     S/P TAVR (transcatheter aortic valve replacement) 11/12/2014     Priority: Medium     Status post lumbar discectomy 08/12/2014     Priority: Medium     Lumbar radiculopathy 08/11/2014     Priority: Medium     Hyperlipidemia with target LDL less than 70      Priority: Medium     Diagnosis updated by automated process. Provider to review and confirm.       Proteinuria 02/08/2012     Priority: Medium     CKD (chronic kidney disease) stage 3, GFR 30-59 ml/min 11/30/2011     Priority: Medium     CAD (coronary artery disease)      Priority: Medium     Coronary angiographies(10/19/2014). There is diffuse coronary calcification.   There is mild to moderate coronary disease; however, no lesions   greater than 50% and no culprit lesions.       Advance Care Planning 09/13/2011     Priority: Medium     Advance Care Planning 5/16/2017: Receipt of ACP document:  Received: POLST which was signed and dated by provider on 2-7-17.  Document previously scanned on 4-24-17.  Order reviewed and found to be valid.  Code Status reflects choices in most recent ACP document..  Confirmed/documented designated decision  maker(s).  Added by Kaylan Owens RN Advance Care Planning Liaison with [employer]  Advance Care Planning 3/7/2017: Receipt of ACP document:  Received: POLST which was signed and dated by provider on 12/22/16.  Document previously scanned on 12/23/16.  Order reviewed and found to be valid.  Code Status needs to be updated to reflect choices in most recent ACP document - order for DNR.  Confirmed/documented designated decision maker(s).  Added by Galilea Allred RN, Advance Care Planning Liaison.  Advance Care Planning 3/7/2017: Receipt of ACP document:  Received: Health Care Directive which was witnessed or notarized on 12/15/16.  Document previously scanned on 2/17/17.  Validation form completed and sent to be scanned.  Code Status needs to be updated to reflect choices in most recent ACP document. POLST order for DNR.  Confirmed/documented designated decision maker(s).  Added by Galilea Allred RN, Advance Care Planning Liaison.  Advance Care Planning 9/13/2011: Patient states has Advance Directive and will bring in a copy to clinic. Added by Gifty Chi         Pulmonary fibrosis (H)      Priority: Medium     Other specified disorders of prostate 04/04/2006     Priority: Medium     Diagnosis updated by automated process. Provider to review and confirm.       Essential hypertension 02/18/2005     Priority: Medium     Problem list name updated by automated process. Provider to review        Past Medical History:   Diagnosis Date     AAA (abdominal aortic aneurysm) (H) 10/5/2011    6.1 cm     Anemia 11/30/2011     Aortic stenosis 10/26/2012     CAD (coronary artery disease)     MI - distal inferior/apex. Non transmural     CHF (congestive heart failure) (H) 12/31/2014     CKD (chronic kidney disease) stage 3, GFR 30-59 ml/min 11/30/2011     Dementia 8/4/2015     Edema, unspecified edema 1/17/2016     Essential hypertension      Gout 1/06    knee     Hypercalcemia 1/2/2015     Hyperlipidemia LDL goal < 70       Hyperparathyroidism (H)      Hyperparathyroidism, unspecified (H) 4/22/2015     Hyperplasia of prostate      LEFT LUNG GRANULOMA      Lumbago      Other chronic pain 10/11/2016     Proteinuria 2/8/2012     Pulmonary fibrosis (H)      Thrombocytopenia (H) 11/30/2011     Unspecified hypothyroidism      Vitamin D deficiency      Past Surgical History:   Procedure Laterality Date     COLONOSCOPY  9/02 per patient     DISCECTOMY LUMBAR POSTERIOR MICROSCOPIC ONE LEVEL  8/11/2014    Procedure: DISCECTOMY LUMBAR POSTERIOR MICROSCOPIC ONE LEVEL;  Surgeon: Jone Carvalho MD;  Location: [facility] OR     ENDOVASCULAR REPAIR ANEURYSM ABDOMINAL AORTA  11/18/2011    Procedure:ENDOVASCULAR REPAIR ANEURYSM ABDOMINAL AORTA; ENDOVASCULAR AAA,RIGHT FEMORAL       LAPAROSCOPIC CHOLECYSTECTOMY  Nov 2011     LAPAROSCOPIC CHOLECYSTECTOMY  11/18/2011    Procedure:LAPAROSCOPIC CHOLECYSTECTOMY; LAPAROSCOPIC CHOLECYSTECTOMY ; Surgeon:DARRYL DORADO; Location:[facility] OR     REPAIR ANEURYSM ABDOMINAL AORTA  Nov 2011     right cataract extraction/IOL  12/03     TRANSCATHETER AORTIC VALVE IMPLANT ANESTHESIA N/A 11/12/2014    Procedure: TRANSCATHETER AORTIC VALVE IMPLANT ANESTHESIA;  Surgeon: Generic Anesthesia Provider;  Location: [facility] OR     VASECTOMY       Current Outpatient Prescriptions   Medication Sig Dispense Refill     gabapentin (NEURONTIN) 100 MG capsule TAKE ONE CAPSULE BY MOUTH THREE TIMES DAILY FOR  PAIN 90 capsule 11     traMADol (ULTRAM) 50 MG tablet Take 1 tablet (50 mg) by mouth every 6 hours as needed for moderate pain 60 tablet 0     furosemide (LASIX) 20 MG tablet Take 1 tablet (20 mg) by mouth daily 90 tablet 1     senna-docusate (SENOKOT-S;PERICOLACE) 8.6-50 MG per tablet Take 2 tablets by mouth 2 times daily as needed (constipation ) 100 tablet      tamsulosin (FLOMAX) 0.4 MG capsule Take 1 capsule (0.4 mg) by mouth daily 90 capsule 1     losartan (COZAAR) 100 MG tablet Take 1 tablet (100 mg) by mouth daily 90 tablet 3      levothyroxine (SYNTHROID/LEVOTHROID) 88 MCG tablet Take 1 tablet (88 mcg) by mouth daily 90 tablet 3     order for DME Equipment being ordered: TENS and supplies. 1 each 0     polyethylene glycol (MIRALAX) packet Take 17 g by mouth daily 30 packet 11     atorvastatin (LIPITOR) 20 MG tablet Take 1 tablet (20 mg) by mouth daily 90 tablet 3     cyanocobalamin (VITAMIN  B-12) 1000 MCG tablet Take 1,000 mcg by mouth daily       AMOXICILLIN PO Take 500 mg by mouth once as needed       lidocaine-prilocaine (EMLA) cream Apply topically as needed for moderate pain To low back, right hip  and right leg Apply same time as Diclofenac gel 30 g 1     donepezil (ARICEPT) 10 MG tablet Take 1 tablet (10 mg) by mouth At Bedtime 90 tablet 3     amLODIPine (NORVASC) 10 MG tablet Take 1 tablet (10 mg) by mouth daily 90 tablet 2     metoprolol (TOPROL-XL) 25 MG 24 hr tablet Take 1 tablet (25 mg) by mouth daily 90 tablet 3     Ferrous Sulfate (IRON SUPPLEMENT PO) Take 325 mg by mouth daily (with breakfast)        OTC products: None, except as noted above    Allergies   Allergen Reactions     Contrast Dye      SOB, increased Bp, difficulty swallowing. Date 6/3/96 (ipaque contrast)  Was premedicated prior to FRANCK and had no reaction at all (solumedrol and benadryl) 2016  Was premedicated with Methylprednisolone protocol, no reaction 1/25/17     Lisinopril      cough     Oxycodone      Delirium      Latex Allergy: NO    Social History   Substance Use Topics     Smoking status: Never Smoker     Smokeless tobacco: Never Used     Alcohol use 0.0 oz/week     0 Standard drinks or equivalent per week      Comment: 1 glass wine/day     History   Drug Use No       REVIEW OF SYSTEMS:                                                    C: NEGATIVE for fever, chills. Weight down 5 pounds. Appetite OK  I: NEGATIVE for worrisome rashes, moles or lesions  E: NEGATIVE for vision changes or irritation  E/M: NEGATIVE for ear, mouth and throat problems  R:  "POSITIVE for pulm fibrosis. On O2. Rare cough  CV: NEGATIVE for chest pain, palpitations or peripheral edema with diuretic use. No orthopnea  GI: NEGATIVE for nausea, abdominal pain, heartburn, or change in bowel habits  : NEGATIVE for frequency, dysuria, or hematuria. Hx CKD  M: Chronic LBP  N: NEGATIVE for weakness, dizziness or paresthesias  E: NEGATIVE for temperature intolerance, skin/hair changes  H: NEGATIVE for bleeding problems  P: NEGATIVE for changes in mood or affect    EXAM:                                                    /54  Pulse 57  Temp 98.3  F (36.8  C) (Oral)  Resp 18  Ht 5' 8\" (1.727 m)  Wt 156 lb 11.2 oz (71.1 kg)  SpO2 96%  BMI 23.83 kg/m2  Eye: PERRL, EOMI  HENT: ear canals and TM's normal and nose and mouth without ulcers or lesions   Neck: no adenopathy. Thyroid normal to palpation. No bruits  Pulm: Mild chronic crackles bilateral lung bases  CV: Regular rates and rhythm  GI: Soft, nontender, Normal active bowel sounds, No hepatosplenomegaly or masses palpable  Ext: Peripheral pulses intact. No edema.  Neuro: Normal strength and tone, sensory exam grossly normal  Back: Tenderness to palpation L3 vertebral area. Neg SLRT bilaterally.      DIAGNOSTICS:                                                    EKG:  Sinus bradycardia with rate 55. LAFB. Mild T inversions anterior leads  Labs:  Component      Latest Ref Rng & Units 3/6/2017 4/18/2017 5/16/2017 5/18/2017   Sodium      133 - 144 mmol/L  140 143    Potassium      3.4 - 5.3 mmol/L  4.2 4.6    Chloride      94 - 109 mmol/L  102 105    Carbon Dioxide      20 - 32 mmol/L  32 32    Anion Gap      3 - 14 mmol/L  6 6    Glucose      70 - 99 mg/dL  111 (H) 108 (H)    Urea Nitrogen      7 - 30 mg/dL  31 (H) 38 (H)    Creatinine      0.66 - 1.25 mg/dL  1.71 (H) 1.90 (H)    GFR Estimate      >60 mL/min/1.7m2  38 (L) 34 (L)    GFR Estimate If Black      >60 mL/min/1.7m2  46 (L) 41 (L)    Calcium      8.5 - 10.1 mg/dL  10.1 9.6  "   WBC      4.0 - 11.0 10e9/L 9.9      RBC Count      4.4 - 5.9 10e12/L 3.95 (L)      Hemoglobin      13.3 - 17.7 g/dL 11.9 (L) 11.9 (L)  12.3 (L)   Hematocrit      40.0 - 53.0 % 36.5 (L)      MCV      78 - 100 fl 92      MCH      26.5 - 33.0 pg 30.1      MCHC      31.5 - 36.5 g/dL 32.6      RDW      10.0 - 15.0 % 13.4      Platelet Count      150 - 450 10e9/L 189   136 (L)   PTT      22 - 37 sec    37            IMPRESSION:                                                    Reason for surgery/procedure:  L3 compression fx  Diagnosis/reason for consult:  CKD (worsened some), mild thrombocytopenia (new), CAD with some new anterior T wave inversions/le ischemia in patient without sx of chest pain with baseline activity, pulmonary fibrosis (stable O2 sats with O2), hyperlipidemia, hypothyroidism, mild anemia (improved)    The proposed surgical procedure is considered LOW risk.    REVISED CARDIAC RISK INDEX  The patient has the following serious cardiovascular risks for perioperative complications such as (MI, PE, VFib and 3  AV Block):  Coronary Artery Disease (MI, positive stress test, angina, Qs on EKG)  INTERPRETATION: 1 risks: Class II (low risk - 0.9% complication rate)    The patient has the following additional risks for perioperative complications:  Oxygen dependent lung disease        RECOMMENDATIONS:                                                        APPROVAL GIVEN to proceed with proposed procedure, without further diagnostic evaluation     PLAN:  Take Amlodipine Gabapentin,  Levothyroxine, Metoprolol the AM of the procedure with a small amount of water. Hold other usual AM meds until you get home later that day  Nothing else to eat/drink after midnight prior to the procedure  Follow-up with cardiology next month as scheduled. Contact clinic earlier if sx of chest pain or worsened SOB. With assymptomatic state and low risk procedure that is not expected to put much strain on heart with local anesthetic,  do not feel need to delay procedure for further cardiology work-up prior  Hold Lasix and repeat  BMP, platelets in a couple weeks  Continue oxygen therapy    Signed Electronically by: Alirio Fox MD    Copy of this evaluation report is provided to requesting physician.    [place] Preop Guidelines

## 2017-05-16 NOTE — NURSING NOTE
"Chief Complaint   Patient presents with     Pre-Op Exam       Initial /54  Pulse 57  Temp 98.3  F (36.8  C) (Oral)  Resp 18  Ht 5' 8\" (1.727 m)  Wt 156 lb 11.2 oz (71.1 kg)  SpO2 96%  BMI 23.83 kg/m2 Estimated body mass index is 23.83 kg/(m^2) as calculated from the following:    Height as of this encounter: 5' 8\" (1.727 m).    Weight as of this encounter: 156 lb 11.2 oz (71.1 kg).  Medication Reconciliation: complete   Pricilla Meyer MA   "

## 2017-05-17 DIAGNOSIS — G89.29 OTHER CHRONIC PAIN: ICD-10-CM

## 2017-05-17 DIAGNOSIS — M54.16 LUMBAR RADICULOPATHY: ICD-10-CM

## 2017-05-17 RX ORDER — TRAMADOL HYDROCHLORIDE 50 MG/1
TABLET ORAL
Qty: 60 TABLET | Refills: 0 | Status: SHIPPED | OUTPATIENT
Start: 2017-05-17 | End: 2017-06-06

## 2017-05-17 RX ORDER — LIDOCAINE 40 MG/G
CREAM TOPICAL
Status: CANCELLED | OUTPATIENT
Start: 2017-05-17

## 2017-05-17 RX ORDER — CEFAZOLIN SODIUM 2 G/100ML
2 INJECTION, SOLUTION INTRAVENOUS
Status: CANCELLED | OUTPATIENT
Start: 2017-05-17

## 2017-05-17 NOTE — TELEPHONE ENCOUNTER
Tramadol---pt does not have chronic pain in problem list       Last Written Prescription Date:  4/11/17  Last Fill Quantity: 60,   # refills: 0  Last Office Visit with [facility] prescribing provider: [phone]/[id number]  Future Office visit:    Next 5 appointments (look out 90 days)     Francesco 15, 2017  1:15 PM CDT   Return Visit with Tyrell Jackson MD   [facility] ([facility])    [id number] [place] [id number] [phone]                   Routing refill request to provider for review/approval because:  Drug not on the [facility] refill protocol or controlled substance

## 2017-05-18 ENCOUNTER — HOSPITAL ENCOUNTER (OUTPATIENT)
Facility: CLINIC | Age: 82
Discharge: HOME | End: 2017-05-18
Attending: RADIOLOGY | Admitting: RADIOLOGY
Payer: MEDICARE

## 2017-05-18 ENCOUNTER — APPOINTMENT (OUTPATIENT)
Dept: INTERVENTIONAL RADIOLOGY/VASCULAR | Facility: CLINIC | Age: 82
End: 2017-05-18
Attending: NURSE PRACTITIONER
Payer: MEDICARE

## 2017-05-18 VITALS
OXYGEN SATURATION: 98 % | DIASTOLIC BLOOD PRESSURE: 68 MMHG | BODY MASS INDEX: 23.46 KG/M2 | HEIGHT: 68 IN | RESPIRATION RATE: 16 BRPM | WEIGHT: 154.8 LBS | SYSTOLIC BLOOD PRESSURE: 151 MMHG | TEMPERATURE: 97.7 F | HEART RATE: 59 BPM

## 2017-05-18 DIAGNOSIS — S32.030G CLOSED WEDGE COMPRESSION FRACTURE OF THIRD LUMBAR VERTEBRA WITH DELAYED HEALING, SUBSEQUENT ENCOUNTER: ICD-10-CM

## 2017-05-18 DIAGNOSIS — M80.08XG AGE-RELATED OSTEOPOROSIS WITH CURRENT PATHOLOGICAL FRACTURE OF VERTEBRA WITH DELAYED HEALING: ICD-10-CM

## 2017-05-18 LAB
APTT PPP: 37 SEC (ref 22–37)
HGB BLD-MCNC: 12.3 G/DL (ref 13.3–17.7)
INR PPP: 0.97 (ref 0.86–1.14)
PLATELET # BLD AUTO: 136 10E9/L (ref 150–450)

## 2017-05-18 PROCEDURE — 25000128 H RX IP 250 OP 636: Performed by: RADIOLOGY

## 2017-05-18 PROCEDURE — 27210911 IR LUMBAR VERTEBROPLASTY

## 2017-05-18 PROCEDURE — 36415 COLL VENOUS BLD VENIPUNCTURE: CPT | Performed by: RADIOLOGY

## 2017-05-18 PROCEDURE — 85049 AUTOMATED PLATELET COUNT: CPT | Performed by: RADIOLOGY

## 2017-05-18 PROCEDURE — 25000128 H RX IP 250 OP 636

## 2017-05-18 PROCEDURE — 27210905 ZZH KIT CR7

## 2017-05-18 PROCEDURE — 85610 PROTHROMBIN TIME: CPT | Performed by: RADIOLOGY

## 2017-05-18 PROCEDURE — 40000854 ZZH STATISTIC SIMPLE TUBE INSERTION/CHARGE, PORT, CATH, FISTULOGRAM

## 2017-05-18 PROCEDURE — 85018 HEMOGLOBIN: CPT | Performed by: RADIOLOGY

## 2017-05-18 PROCEDURE — 25000125 ZZHC RX 250

## 2017-05-18 PROCEDURE — 25000125 ZZHC RX 250: Performed by: RADIOLOGY

## 2017-05-18 PROCEDURE — 85730 THROMBOPLASTIN TIME PARTIAL: CPT | Performed by: RADIOLOGY

## 2017-05-18 RX ORDER — FENTANYL CITRATE 50 UG/ML
INJECTION, SOLUTION INTRAMUSCULAR; INTRAVENOUS
Status: COMPLETED
Start: 2017-05-18 | End: 2017-05-18

## 2017-05-18 RX ORDER — FENTANYL CITRATE 50 UG/ML
25-50 INJECTION, SOLUTION INTRAMUSCULAR; INTRAVENOUS EVERY 5 MIN PRN
Status: DISCONTINUED | OUTPATIENT
Start: 2017-05-18 | End: 2017-05-18

## 2017-05-18 RX ORDER — NALOXONE HYDROCHLORIDE 0.4 MG/ML
.1-.4 INJECTION, SOLUTION INTRAMUSCULAR; INTRAVENOUS; SUBCUTANEOUS
Status: DISCONTINUED | OUTPATIENT
Start: 2017-05-18 | End: 2017-05-18 | Stop reason: HOSPADM

## 2017-05-18 RX ORDER — FLUMAZENIL 0.1 MG/ML
0.2 INJECTION, SOLUTION INTRAVENOUS
Status: DISCONTINUED | OUTPATIENT
Start: 2017-05-18 | End: 2017-05-18 | Stop reason: HOSPADM

## 2017-05-18 RX ORDER — LIDOCAINE 40 MG/G
CREAM TOPICAL
Status: DISCONTINUED | OUTPATIENT
Start: 2017-05-18 | End: 2017-05-18

## 2017-05-18 RX ORDER — LIDOCAINE HYDROCHLORIDE 10 MG/ML
1-30 INJECTION, SOLUTION EPIDURAL; INFILTRATION; INTRACAUDAL; PERINEURAL
Status: COMPLETED | OUTPATIENT
Start: 2017-05-18 | End: 2017-05-18

## 2017-05-18 RX ORDER — CEFAZOLIN SODIUM 2 G/100ML
2 INJECTION, SOLUTION INTRAVENOUS
Status: DISCONTINUED | OUTPATIENT
Start: 2017-05-18 | End: 2017-05-18 | Stop reason: HOSPADM

## 2017-05-18 RX ADMIN — MIDAZOLAM HYDROCHLORIDE 0.5 MG: 1 INJECTION, SOLUTION INTRAMUSCULAR; INTRAVENOUS at 10:01

## 2017-05-18 RX ADMIN — LIDOCAINE HYDROCHLORIDE 70 MG: 10 INJECTION, SOLUTION EPIDURAL; INFILTRATION; INTRACAUDAL; PERINEURAL at 09:52

## 2017-05-18 RX ADMIN — FENTANYL CITRATE 25 MCG: 50 INJECTION, SOLUTION INTRAMUSCULAR; INTRAVENOUS at 10:01

## 2017-05-18 RX ADMIN — MIDAZOLAM HYDROCHLORIDE 0.5 MG: 1 INJECTION, SOLUTION INTRAMUSCULAR; INTRAVENOUS at 09:47

## 2017-05-18 RX ADMIN — FENTANYL CITRATE 50 MCG: 50 INJECTION, SOLUTION INTRAMUSCULAR; INTRAVENOUS at 09:35

## 2017-05-18 RX ADMIN — MIDAZOLAM HYDROCHLORIDE 1 MG: 1 INJECTION, SOLUTION INTRAMUSCULAR; INTRAVENOUS at 09:36

## 2017-05-18 RX ADMIN — FENTANYL CITRATE 25 MCG: 50 INJECTION, SOLUTION INTRAMUSCULAR; INTRAVENOUS at 09:47

## 2017-05-18 NOTE — IP AVS SNAPSHOT
MRN:[id number]                      After Visit Summary   5/18/2017    Chaz Soler    MRN: [id number]           Visit Information        Department      5/18/2017  7:57 AM [facility]          Review of your medicines      UNREVIEWED medicines. Ask your doctor about these medicines        Dose / Directions    amLODIPine 10 MG tablet   Commonly known as:  NORVASC   Used for:  Essential hypertension        Dose:  10 mg   Take 1 tablet (10 mg) by mouth daily   Quantity:  90 tablet   Refills:  2       AMOXICILLIN PO   Indication:  pre dental        Dose:  500 mg   Take 500 mg by mouth once as needed   Refills:  0       atorvastatin 20 MG tablet   Commonly known as:  LIPITOR   Used for:  Coronary artery disease involving native coronary artery of native heart without angina pectoris        Dose:  20 mg   Take 1 tablet (20 mg) by mouth daily   Quantity:  90 tablet   Refills:  3       cyanocobalamin 1000 MCG tablet   Commonly known as:  vitamin  B-12   Indication:  Inadequate Vitamin B12        Dose:  1000 mcg   Take 1,000 mcg by mouth daily   Refills:  0       donepezil 10 MG tablet   Commonly known as:  ARICEPT   Used for:  Memory loss        Dose:  10 mg   Take 1 tablet (10 mg) by mouth At Bedtime   Quantity:  90 tablet   Refills:  3       furosemide 20 MG tablet   Commonly known as:  LASIX   Used for:  Congestive heart failure, unspecified congestive heart failure chronicity, unspecified congestive heart failure type (H)        Dose:  20 mg   Take 1 tablet (20 mg) by mouth daily   Quantity:  90 tablet   Refills:  1       gabapentin 100 MG capsule   Commonly known as:  NEURONTIN   Used for:  Compression fracture        TAKE ONE CAPSULE BY MOUTH THREE TIMES DAILY FOR  PAIN   Quantity:  90 capsule   Refills:  11       IRON SUPPLEMENT PO        Dose:  325 mg   Take 325 mg by mouth daily (with breakfast)   Refills:  0       levothyroxine 88 MCG tablet   Commonly known as:   SYNTHROID/LEVOTHROID   Used for:  Hypothyroidism, unspecified type        Dose:  88 mcg   Take 1 tablet (88 mcg) by mouth daily   Quantity:  90 tablet   Refills:  3       lidocaine-prilocaine cream   Commonly known as:  EMLA   Used for:  Chronic pain syndrome, Lumbar radiculopathy, Facet arthritis of lumbar region (H), DDD (degenerative disc disease), lumbar        Apply topically as needed for moderate pain To low back, right hip  and right leg Apply same time as Diclofenac gel   Quantity:  30 g   Refills:  1       losartan 100 MG tablet   Commonly known as:  COZAAR   Used for:  Essential hypertension        Dose:  100 mg   Take 1 tablet (100 mg) by mouth daily   Quantity:  90 tablet   Refills:  3       metoprolol 25 MG 24 hr tablet   Commonly known as:  TOPROL-XL   Used for:  Essential hypertension        Dose:  25 mg   Take 1 tablet (25 mg) by mouth daily   Quantity:  90 tablet   Refills:  3       polyethylene glycol Packet   Commonly known as:  MIRALAX   Used for:  Chronic constipation        Dose:  1 packet   Take 17 g by mouth daily   Quantity:  30 packet   Refills:  11       senna-docusate 8.6-50 MG per tablet   Commonly known as:  SENOKOT-S;PERICOLACE   Used for:  Compression fx, lumbar spine, closed, initial encounter (H)        Dose:  2 tablet   Take 2 tablets by mouth 2 times daily as needed (constipation )   Quantity:  100 tablet   Refills:  0       tamsulosin 0.4 MG capsule   Commonly known as:  FLOMAX   Used for:  BPH (benign prostatic hypertrophy) with urinary retention        Dose:  0.4 mg   Take 1 capsule (0.4 mg) by mouth daily   Quantity:  90 capsule   Refills:  1       traMADol 50 MG tablet   Commonly known as:  ULTRAM   Used for:  Other chronic pain, Lumbar radiculopathy        TAKE ONE TABLET BY MOUTH EVERY 6 HOURS AS NEEDED FOR  MODERATE  PAIN   Quantity:  60 tablet   Refills:  0         CONTINUE these medicines which have NOT CHANGED        Dose / Directions    order for DME   Used for:   Other chronic pain, Lumbar radiculopathy, DDD (degenerative disc disease), lumbar, Spinal stenosis of lumbar region without neurogenic claudication        Equipment being ordered: TENS and supplies.   Quantity:  1 each   Refills:  0                Protect others around you: Learn how to safely use, store and throw away your medicines at www.disposemymeds.org.         Follow-ups after your visit        Your next 10 appointments already scheduled     Jun 02, 2017  8:15 AM CDT   LAB with RU LAB   [facility] ([facility])    [address]  [facility] [address] [phone]           Patient must bring picture ID.  Patient should be prepared to give a urine specimen  Please do not eat 10-12 hours before your appointment if you are coming in fasting for labs on lipids, cholesterol, or glucose (sugar).  Pregnant women should follow their Care Team instructions. Water with medications is okay. Do not drink coffee or other fluids.   If you have concerns about taking  your medications, please ask at office or if scheduling via Private Practice, send a message by clicking on Secure Messaging, Message Your Care Team.            Jun 02, 2017  8:30 AM CDT   [facility] ([facility])    [address]  [facility] [address] [phone]           1.  Please bring or wear a comfortable two-piece outfit. 2.  You may eat, drink and take your normal medicines. 3.  For any questions that cannot be answered, please contact the ordering physician ***Please check-in at the [place] Registration Office located in [address] in the [facility] building. When you are finished registering, please go to [address] and have a seat. The technician will call your name for the test.            Jun 02, 2017  9:30 AM CDT   US CAROTID BILATERAL with HECTOR   [facility]  ([facility])    [address]  [facility] [address]   [phone]           Please bring a list of your medicines (including vitamins, minerals and over-the-counter drugs). Also, tell your doctor about any allergies you may have. Wear comfortable clothes and leave your valuables at home.  You do not need to do anything special to prepare for your exam.  Please call the Imaging Department at your exam site with any questions.            Francesco 15, 2017  1:15 PM CDT   Return Visit with Tyrell Jackson MD   [facility] [place] ([facility])    [address]  [facility] [address]   [phone]            Jun 16, 2017  2:15 PM CDT   CTA ANGIOGRAM ABDOMEN PELVIS with [id number]   [facility] ([facility])    [address]  [facility] [address]   [phone]           Please bring any scans or X-rays taken at other hospitals, if similar tests were done. Also bring a list of your medicines, including vitamins, minerals and over-the-counter drugs. It is safest to leave personal items at home.  Be sure to tell your doctor:   If you have any allergies.   If there s any chance you are pregnant.   If you are breastfeeding.   If you have any special needs.  You will have contrast for this exam. To prepare:   Do not eat or drink for 2 hours before your exam. If you need to take medicine, you may take it with small sips of water. (We may ask you to take liquid medicine as well.)   The day before your exam, drink extra fluids at least six 8-ounce glasses (unless your doctor tells you to restrict your fluids).  Patients over 70 or patients with diabetes or kidney problems:   If you haven t had a blood test (creatinine test) within the last 30 days, go to your clinic or Diagnostic Imaging Department for this test.  If you have diabetes:   If your kidney  function is normal, continue taking your metformin (Avandamet, Glucophage, Glucovance, Metaglip) on the day of your exam.   If your kidney function is abnormal, wait 48 hours before restarting this medicine.  Please wear loose clothing, such as a sweat suit or jogging clothes. Avoid snaps, zippers and other metal. We may ask you to undress and put on a hospital gown.  If you have any questions, please call the Imaging Department where you will have your exam.               Care Instructions        Further instructions from your care team       Vertebroplasty Discharge Instructions    After you go home:      Have an adult stay with you until tomorrow.    Drink extra fluids for 2 days.    You may resume your normal diet.    No smoking       For 24 hours - due to the sedation you received:    Relax and take it easy.    Do NOT make any important or legal decisions.    Do NOT drive or operate machines at home or at work.    Do NOT drink alcohol.    Care of Back Puncture Site:      Keep the bandages on until your incision heals. Make sure the bandages are clean and dry.    You may take a shower tomorrow.     Wait three days before swimming or taking a bath.    Activity:      We urge you to lie down for the rest of the day. You may get up to use the toilet.    You may drive tomorrow.     Slowly increase your activity level. Walking will help you get stronger.     If you cannot increase your activity or you are not getting stronger, call your primary care provider.    Bleeding:       If you start bleeding from the site in your back, lie down and press gently on the site for 10 minutes. You will need assistance to do this.    If bleeding does not stop - call your primary care provider as soon as you can.       Call 911 right away if you have heavy bleeding that does not stop.      Medicines:      Take your medications, including blood thinners, unless your provider tells you not to.    If you take aspirin, Coumadin  "(Warfarin) or Plavix - you may start taking it this evening if no bleeding present.    If you take Coumadin (Warfarin), have your INR checked by your provider in 3 to 5 days. Call your clinic to schedule this.    If you have stopped any other medicines, check with your provider about when to restart them.    Follow Up Appointments:      Follow up with your primary care provider as needed.    Call the clinic if:      Back pain that gets worse.    You have increased pain or a large or growing hard lump around the site.    The site is red, swollen, hot or tender.    Blood or fluid is draining from the site.    You have chills or a fever greater than 101 F (38 C).    Any questions or concerns.    Other Information:      You may have bruising and new soreness in your back for two to three days. Take Tylenol (acetaminophen) or the pain medicine your provider prescribed.    You may feel pain relief right away, or it may take several days. If your pain does not improve, call your provider.    As your pain lessens and you become more active, you will need to be careful. You may have a higher risk for broken bones in the spine.    If you have osteoporosis (brittle bones) - talk to your provider about a treatment plan. This may include:        Medicine to strengthen your bones (such as Fosamax or Actonel).      Calcium tablets, along with vitamins D and C.      Exercise to increase strength and balance.      Physical therapy, if advised by your provider.      Bone scans (called DEXA scans) to check your bone density.    If you have questions or concerns, call:    ELIOT Carvajal @ [facility]     @ [phone] during business hours         Additional Information About Your Visit        [employer] Information     Results Scorecard lets you send messages to your doctor, view your test results, renew your prescriptions, schedule appointments and more. To sign up, go to [email or web address] . Click on \"Log in\" on the left side of the " "screen, which will take you to the Welcome page. Then click on \"Sign up Now\" on the right side of the page.     You will be asked to enter the access code listed below, as well as some personal information. Please follow the directions to create your username and password.     Your access code is: [email or web address]  Expires: 2017  2:23 PM     Your access code will  in 90 days. If you need help or a new code, please call your [facility] clinic or [phone].        Care EveryWhere ID     This is your Care EveryWhere ID. This could be used by other organizations to access your [facility] medical records  [id number]        Your Vitals Were     Blood Pressure Pulse Temperature Respirations Height Weight    137/65 59 97.7  F (36.5  C) (Oral) 16 1.727 m (5' 8\") 70.2 kg (154 lb 12.8 oz)    Pulse Oximetry BMI (Body Mass Index)                98% 23.54 kg/m2           Primary Care Provider Office Phone # Fax #    Alirio Fox [phone]      Thank you!     Thank you for choosing [facility] for your care. Our goal is always to provide you with excellent care. Hearing back from our patients is one way we can continue to improve our services. Please take a few minutes to complete the written survey that you may receive in the mail after you visit with us. Thank you!             Medication List: This is a list of all your medications and when to take them. Check marks below indicate your daily home schedule. Keep this list as a reference.      Medications           Morning Afternoon Evening Bedtime As Needed    amLODIPine 10 MG tablet   Commonly known as:  NORVASC   Take 1 tablet (10 mg) by mouth daily                                AMOXICILLIN PO   Take 500 mg by mouth once as needed                                atorvastatin 20 MG tablet   Commonly known as:  LIPITOR   Take 1 tablet (20 mg) by mouth daily                                cyanocobalamin 1000 MCG tablet   Commonly known as:  vitamin  B-12 "   Take 1,000 mcg by mouth daily                                donepezil 10 MG tablet   Commonly known as:  ARICEPT   Take 1 tablet (10 mg) by mouth At Bedtime                                furosemide 20 MG tablet   Commonly known as:  LASIX   Take 1 tablet (20 mg) by mouth daily                                gabapentin 100 MG capsule   Commonly known as:  NEURONTIN   TAKE ONE CAPSULE BY MOUTH THREE TIMES DAILY FOR  PAIN                                IRON SUPPLEMENT PO   Take 325 mg by mouth daily (with breakfast)                                levothyroxine 88 MCG tablet   Commonly known as:  SYNTHROID/LEVOTHROID   Take 1 tablet (88 mcg) by mouth daily                                lidocaine-prilocaine cream   Commonly known as:  EMLA   Apply topically as needed for moderate pain To low back, right hip  and right leg Apply same time as Diclofenac gel                                losartan 100 MG tablet   Commonly known as:  COZAAR   Take 1 tablet (100 mg) by mouth daily                                metoprolol 25 MG 24 hr tablet   Commonly known as:  TOPROL-XL   Take 1 tablet (25 mg) by mouth daily                                order for DME   Equipment being ordered: TENS and supplies.                                polyethylene glycol Packet   Commonly known as:  MIRALAX   Take 17 g by mouth daily                                senna-docusate 8.6-50 MG per tablet   Commonly known as:  SENOKOT-S;PERICOLACE   Take 2 tablets by mouth 2 times daily as needed (constipation )                                tamsulosin 0.4 MG capsule   Commonly known as:  FLOMAX   Take 1 capsule (0.4 mg) by mouth daily                                traMADol 50 MG tablet   Commonly known as:  ULTRAM   TAKE ONE TABLET BY MOUTH EVERY 6 HOURS AS NEEDED FOR  MODERATE  PAIN

## 2017-05-18 NOTE — PROGRESS NOTES
RADIOLOGY PROCEDURE NOTE  Patient name: Chaz Soler  MRN: [id number]  : 1928    Pre-procedure diagnosis: L3 Compression Fracture  Post-procedure diagnosis: Same    Procedure Date/Time: May 18, 2017  10:24 AM  Procedure: L3 Percutaneous Vertebroplasty  Estimated blood loss: None  Specimen(s) collected with description: none  The patient tolerated the procedure well with no immediate complications.  Significant findings:none    See imaging dictation for procedural details.    Provider name: Dr. Fontenot  Assistant(s):Rylan Roy

## 2017-05-18 NOTE — PROGRESS NOTES
1030  Patient returned from IR, s/p L3 Vertebroplasty.  Plan is 2 hours bedrest/up sandra 12:30PM.  Wife at bedside    1100  L low back vertebroplasty site is stable.  Patient log-rolling to side.  Taking sips clear liquids.

## 2017-05-18 NOTE — PROGRESS NOTES
Pt admitted for vertebroplasty of lower back. States low back pain is 5/10. Procedure explained and questions answered. States understanding. Wife at bedside. Labs pending

## 2017-05-18 NOTE — IP AVS SNAPSHOT
[place] [address]    [place] [address]    Phone:  [phone]                                       After Visit Summary   5/18/2017    Chaz Soler    MRN: [id number]           After Visit Summary Signature Page     I have received my discharge instructions, and my questions have been answered. I have discussed any challenges I see with this plan with the nurse or doctor.    ..........................................................................................................................................  Patient/Patient Representative Signature      ..........................................................................................................................................  Patient Representative Print Name and Relationship to Patient    ..................................................               ................................................  Date                                            Time    ..........................................................................................................................................  Reviewed by Signature/Title    ...................................................              ..............................................  Date                                                            Time

## 2017-05-18 NOTE — DISCHARGE INSTRUCTIONS
Vertebroplasty Discharge Instructions    After you go home:      Have an adult stay with you until tomorrow.    Drink extra fluids for 2 days.    You may resume your normal diet.    No smoking       For 24 hours - due to the sedation you received:    Relax and take it easy.    Do NOT make any important or legal decisions.    Do NOT drive or operate machines at home or at work.    Do NOT drink alcohol.    Care of Back Puncture Site:      Keep the bandages on until your incision heals. Make sure the bandages are clean and dry.    You may take a shower tomorrow.     Wait three days before swimming or taking a bath.    Activity:      We urge you to lie down for the rest of the day. You may get up to use the toilet.    You may drive tomorrow.     Slowly increase your activity level. Walking will help you get stronger.     If you cannot increase your activity or you are not getting stronger, call your primary care provider.    Bleeding:       If you start bleeding from the site in your back, lie down and press gently on the site for 10 minutes. You will need assistance to do this.    If bleeding does not stop - call your primary care provider as soon as you can.       Call 911 right away if you have heavy bleeding that does not stop.      Medicines:      Take your medications, including blood thinners, unless your provider tells you not to.    If you take aspirin, Coumadin (Warfarin) or Plavix - you may start taking it this evening if no bleeding present.    If you take Coumadin (Warfarin), have your INR checked by your provider in 3 to 5 days. Call your clinic to schedule this.    If you have stopped any other medicines, check with your provider about when to restart them.    Follow Up Appointments:      Follow up with your primary care provider as needed.    Call the clinic if:      Back pain that gets worse.    You have increased pain or a large or growing hard lump around the site.    The site is red, swollen, hot or  tender.    Blood or fluid is draining from the site.    You have chills or a fever greater than 101 F (38 C).    Any questions or concerns.    Other Information:      You may have bruising and new soreness in your back for two to three days. Take Tylenol (acetaminophen) or the pain medicine your provider prescribed.    You may feel pain relief right away, or it may take several days. If your pain does not improve, call your provider.    As your pain lessens and you become more active, you will need to be careful. You may have a higher risk for broken bones in the spine.    If you have osteoporosis (brittle bones) - talk to your provider about a treatment plan. This may include:        Medicine to strengthen your bones (such as Fosamax or Actonel).      Calcium tablets, along with vitamins D and C.      Exercise to increase strength and balance.      Physical therapy, if advised by your provider.      Bone scans (called DEXA scans) to check your bone density.    If you have questions or concerns, call:    ELIOT Carvajal @ [facility]     @ [phone] during business hours

## 2017-05-18 NOTE — PROGRESS NOTES
Pt sleeping on side. VSS. Wife at bedside. Low back drsg D/I.    1230 Awake. Walked with walker and assist of 1 to bathroom to void. Lumbar site D/I. VSS. States low back pain is 5/10. Eating lunch. Discharge instructions done with wife as pt has dementia. States understanding.     Tolerated lunch tray. IV DCD. Pt states he is fairly comfortable but still rates back pain a 5/10. Will take meds when he gets home per wife. Discharged per w/c to  with wife.

## 2017-05-19 ENCOUNTER — CARE COORDINATION (OUTPATIENT)
Dept: CARE COORDINATION | Facility: CLINIC | Age: 82
End: 2017-05-19

## 2017-05-19 NOTE — PROGRESS NOTES
Clinic Care Coordination Contact  OUTREACH    Referral Information:  Referral Source: CTS  Reason for Contact: Follow up after Vertebralplasty yesterday        Universal Utilization:   ED Visits in last year: 0  Hospital visits in last year: 2  Last PCP appointment: 11/22/16  Missed Appointments: 0  Concerns:  (none)  Multiple Providers or Specialists:  (cardiology, vascular, pain, ortho, neurology, spine surgeon)    Clinical Concerns:  Current Medical Concerns: Patient continues to have a lot of back pain which his wife states is to be expected after the procedure.  Wife is givinig Tramadol and Tylenol as directed and making the patient lie down and rest     Clinical Pathway Name: None  Clinical Pathway: None    Medication Management:  Reviewed pain medication      Functional Status:  Mobility Status: Independent w/Device  Equipment Currently Used at Home: oxygen, walker, rolling  Transportation:Family provides transportation          Psychosocial:  Current living arrangement:: I live in a private home with spouse (TCU)     Resources and Interventions:  Current Resources: Skilled Nursing Facility;  (NA)  PAS Number: [id number]  Senior Linkage Line Referral Placed:  (NA)  Advanced Care Plans/Directives on file:: No  Referrals Placed:  (NA)     Goals: I will degrease back pain-My wife will give me Tramadol and Tylenol as directed          Barriers: Continues to have back pain   Strengths: Patient just had a Vetebralplasty   Patient/Caregiver understanding: Kylee/wife will call CC with any concerns   Frequency of Care Coordination: PRN       Plan: CC will follow up 3-5 business days     Pebbles Leija RN  Care Coordinator-[facility]  [email or web address]  [phone]

## 2017-05-24 ENCOUNTER — CARE COORDINATION (OUTPATIENT)
Dept: CARE COORDINATION | Facility: CLINIC | Age: 82
End: 2017-05-24

## 2017-05-24 NOTE — PROGRESS NOTES
Clinic Care Coordination Contact  OUTREACH    Referral Information:  Referral Source: CTS  Reason for Contact: Follow up on  vertebroplasty of lower back (5/18/2017)        [place] Utilization:   ED Visits in last year: 0  Hospital visits in last year: 2  Last PCP appointment: 11/22/16  Missed Appointments: 0  Concerns:  (none)  Multiple Providers or Specialists:  (cardiology, vascular, pain, ortho, neurology, spine surgeon)    Clinic Care Coordination Contact  UT/Voicemail    Referral Source: CTS  Clinical Data: Care Coordinator Outreach  Outreach attempted x 1.  Left message on voicemail with call back information and requested return call.  Plan:  Care Coordinator will try to reach patient again in 3-5 business days.    Pebbles Leija RN / Clinical Care Coordinator     [address] [address]  [email or web address]  Office :  [phone] / Fax :  [phone]

## 2017-05-24 NOTE — PROGRESS NOTES
Will need to wait a few weeks at least to see how responds fully to procedure. If not improved still, would have pt f/u with neurosurgery for opinion whether trial of a L4-5 cortisone injection might be of benefit. If not, then see me or partner back in clinic to discuss possible tapering upward of gabapentin or tramadol dose. Pt has had some side effects with  stronger narcotic pain meds

## 2017-05-24 NOTE — PROGRESS NOTES
"Clinic Care Coordination RN :      Incoming call from the patients wife.  Left a message on CC VM that the patient continues to have a lot of pain and no change.  The injection has not helped     CC spoke to wife and she states the back pain level is a #3.5 to a # 6.0. On a scale of 1-10.  Every once in awhile \"not to often\" the patient  will scream out and then she thinks it might be up to a #10  .  Patient is taking Tramadol-Tylenol and Gabapentin. Wife talked to Alena in Imaging and she states it could take up to a week to get relief     Pebbles Leija RN / Clinical Care Coordinator     [place] [address]  [email or web address]  Office :  [phone] / Fax :  [phone]  "

## 2017-05-25 NOTE — PROGRESS NOTES
Wife informed of Dr Fox's advice .  Agrees with the plan.  CC will follow up in 3-5 business days     Pebbles Leija RN / Clinical Care Coordinator     [address] [address]  [email or web address]  Office :  [phone] / Fax :  [phone]

## 2017-06-01 ENCOUNTER — CARE COORDINATION (OUTPATIENT)
Dept: CARE COORDINATION | Facility: CLINIC | Age: 82
End: 2017-06-01

## 2017-06-01 NOTE — PROGRESS NOTES
Clinic Care Coordination Contact  OUTREACH    Referral Information:  Referral Source: CTS  Reason for Contact: Follow up on back pain   Care Conference: No     [place] Utilization:   ED Visits in last year: 0  Hospital visits in last year: 2  Last PCP appointment: 11/22/16  Missed Appointments: 0  Concerns:  (none)  Multiple Providers or Specialists:  (cardiology, vascular, pain, ortho, neurology, spine surgeon)    Clinical Concerns:  Current Medical Concerns: CC spoke to the patients wife and the back pain has not decreased and appears to be the same and no change.  Wife will call the Neuro surgeon and discuss whether a cortisone injection would benefit the patient .  CC will follow up in 3-5 business days        Goals:   Goal 1 Statement:  (I will decrease back discomfort)  Goal 1 Progression Percent: 70%         Barriers: Back pain      Plan:   Wife will call the Neurosurgeon and discuss a possible future cortisone injection for the back  pain   CC will follow up in 3-5 business days     Pebbles Leija RN / Clinical Care Coordinator     [address] [address]  [email or web address]  Office :  [phone] / Fax :  [phone]

## 2017-06-02 ENCOUNTER — HOSPITAL ENCOUNTER (OUTPATIENT)
Dept: CARDIOLOGY | Facility: CLINIC | Age: 82
Discharge: HOME | End: 2017-06-02
Attending: INTERNAL MEDICINE | Admitting: INTERNAL MEDICINE
Payer: MEDICARE

## 2017-06-02 ENCOUNTER — HOSPITAL ENCOUNTER (OUTPATIENT)
Dept: CARDIOLOGY | Facility: CLINIC | Age: 82
End: 2017-06-02
Attending: INTERNAL MEDICINE
Payer: MEDICARE

## 2017-06-02 DIAGNOSIS — I77.9 RIGHT-SIDED CAROTID ARTERY DISEASE (H): ICD-10-CM

## 2017-06-02 DIAGNOSIS — I71.21 ASCENDING AORTIC ANEURYSM (H): ICD-10-CM

## 2017-06-02 DIAGNOSIS — I35.9 AORTIC VALVE DISORDER: ICD-10-CM

## 2017-06-02 DIAGNOSIS — I25.10 CORONARY ARTERY DISEASE INVOLVING NATIVE CORONARY ARTERY OF NATIVE HEART WITHOUT ANGINA PECTORIS: ICD-10-CM

## 2017-06-02 LAB
ALT SERPL W P-5'-P-CCNC: 21 U/L (ref 0–70)
CHOLEST SERPL-MCNC: 163 MG/DL
HDLC SERPL-MCNC: 63 MG/DL
LDLC SERPL CALC-MCNC: 68 MG/DL
NONHDLC SERPL-MCNC: 100 MG/DL
TRIGL SERPL-MCNC: 161 MG/DL

## 2017-06-02 PROCEDURE — 80061 LIPID PANEL: CPT | Performed by: INTERNAL MEDICINE

## 2017-06-02 PROCEDURE — 93880 EXTRACRANIAL BILAT STUDY: CPT

## 2017-06-02 PROCEDURE — 93306 TTE W/DOPPLER COMPLETE: CPT | Mod: 26 | Performed by: INTERNAL MEDICINE

## 2017-06-02 PROCEDURE — 93880 EXTRACRANIAL BILAT STUDY: CPT | Mod: 26 | Performed by: INTERNAL MEDICINE

## 2017-06-02 PROCEDURE — 36415 COLL VENOUS BLD VENIPUNCTURE: CPT | Performed by: INTERNAL MEDICINE

## 2017-06-02 PROCEDURE — 93306 TTE W/DOPPLER COMPLETE: CPT

## 2017-06-02 PROCEDURE — 84460 ALANINE AMINO (ALT) (SGPT): CPT | Performed by: INTERNAL MEDICINE

## 2017-06-05 ENCOUNTER — TELEPHONE (OUTPATIENT)
Dept: NEUROSURGERY | Facility: CLINIC | Age: 82
End: 2017-06-05

## 2017-06-05 DIAGNOSIS — M54.50 LOW BACK PAIN WITHOUT SCIATICA: Primary | ICD-10-CM

## 2017-06-05 DIAGNOSIS — T50.8X5A ALLERGIC REACTION TO CONTRAST DYE: ICD-10-CM

## 2017-06-05 NOTE — TELEPHONE ENCOUNTER
Had a vertroplasty about 3 weeks ago, wife doesn't feel it's helping.   Would like to get a steroid injection if possible.   Please call

## 2017-06-06 DIAGNOSIS — G89.29 OTHER CHRONIC PAIN: ICD-10-CM

## 2017-06-06 DIAGNOSIS — M54.16 LUMBAR RADICULOPATHY: ICD-10-CM

## 2017-06-07 NOTE — TELEPHONE ENCOUNTER
traMADol (ULTRAM) 50 MG tablet      Last Written Prescription Date:  5/17/17  Last Fill Quantity: 60,   # refills: 0  Last Office Visit with [facility], [facility] or [facility] prescribing provider: 5/16/17  Future Office visit:    Next 5 appointments (look out 90 days)     Francesco 15, 2017  1:15 PM CDT   Return Visit with Tyrell Jackson MD   [facility] ([facility])    [address] [address] [id number] [phone]                   Routing refill request to provider for review/approval because:  Drug not on the [facility], [facility] or [facility] refill protocol or controlled substance

## 2017-06-08 RX ORDER — METHYLPREDNISOLONE 16 MG/1
TABLET ORAL
Qty: 4 TABLET | Refills: 0 | Status: SHIPPED | OUTPATIENT
Start: 2017-06-08 | End: 2017-06-14

## 2017-06-08 NOTE — TELEPHONE ENCOUNTER
Per rd Wolff CNP Ok to order injection to be done 4 weeks after vertebroplasty date. Spoke to patient's wife she reports he has Low back pain without sciatica. Patient has contrast dye allergy. Patient to be premedicated with methylprednisolone prior to injection per protocol. Rx sent to [facility] pharmacy. Order placed in [facility] and provided Patient's wife Violet with central scheduling to set up injection.

## 2017-06-12 ENCOUNTER — CARE COORDINATION (OUTPATIENT)
Dept: CARE COORDINATION | Facility: CLINIC | Age: 82
End: 2017-06-12

## 2017-06-12 DIAGNOSIS — M54.5 LOW BACK PAIN, UNSPECIFIED BACK PAIN LATERALITY, UNSPECIFIED CHRONICITY, WITH SCIATICA PRESENCE UNSPECIFIED: Primary | ICD-10-CM

## 2017-06-12 NOTE — PROGRESS NOTES
Clinic Care Coordination Contact  OUTREACH    Referral Information:  Referral Source: CTS  Reason for Contact:follow up on back pain  Care Conference: No     Universal Utilization:   ED Visits in last year: 0  Hospital visits in last year: 2  Last PCP appointment: 11/22/16  Missed Appointments: 0  Concerns:  (none)  Multiple Providers or Specialists:  (cardiology, vascular, pain, ortho, neurology, spine surgeon)    Clinical Concerns:  Current Medical Concerns: CC spoke to the patients wife and she states the patient continues with the back pain and has an Epidural injection on Wed 6/14/2017  . Patient will see the Cardiologist on Thursday and then schedule an Ultrasound to check the Aorta stent.  Current Behavioral Concerns: Not discussed       Clinical Pathway Name: None  Clinical Pathway: None    Medication Management:  Not discussed today      Functional Status:  Mobility Status: Independent w/Device  Equipment Currently Used at Home: oxygen, walker, rolling     Psychosocial:  Current living arrangement:: I live in a private home with spouse (TCU)     Resources and Interventions:  Current Resources: Skilled Nursing Facility;  (NA)  PAS Number: [id number]  Senior Linkage Line Referral Placed:  (NA)  Advanced Care Plans/Directives on file:: No  Referrals Placed:  (NA)     Goals:   Goal 1 Statement:  (I will decrease back discomfort) I will continue the pain medication as directed and keep appointment for the Epidural injection on Wednesday       Barriers: Back pain continues   Strengths: Epidural injection 6/14/2017    Frequency of Care Coordination: 2-4 weeks        Plan: CC will follow up in 3-5 business days     Pebbles Leija RN / Clinical Care Coordinator     [address] [address]  [email or web address]  Office :  [phone] / Fax :  [phone]

## 2017-06-14 ENCOUNTER — HOSPITAL ENCOUNTER (OUTPATIENT)
Dept: GENERAL RADIOLOGY | Facility: CLINIC | Age: 82
Discharge: HOME | End: 2017-06-14
Attending: NURSE PRACTITIONER | Admitting: NURSE PRACTITIONER
Payer: MEDICARE

## 2017-06-14 VITALS — HEART RATE: 69 BPM | DIASTOLIC BLOOD PRESSURE: 66 MMHG | SYSTOLIC BLOOD PRESSURE: 141 MMHG

## 2017-06-14 DIAGNOSIS — M54.50 LOW BACK PAIN WITHOUT SCIATICA: ICD-10-CM

## 2017-06-14 PROCEDURE — 25000125 ZZHC RX 250: Performed by: PHYSICIAN ASSISTANT

## 2017-06-14 PROCEDURE — 25500064 ZZH RX 255 OP 636: Performed by: PHYSICIAN ASSISTANT

## 2017-06-14 PROCEDURE — 62323 NJX INTERLAMINAR LMBR/SAC: CPT

## 2017-06-14 PROCEDURE — 25000125 ZZHC RX 250

## 2017-06-14 RX ORDER — BETAMETHASONE SODIUM PHOSPHATE AND BETAMETHASONE ACETATE 3; 3 MG/ML; MG/ML
INJECTION, SUSPENSION INTRA-ARTICULAR; INTRALESIONAL; INTRAMUSCULAR; SOFT TISSUE
Status: COMPLETED
Start: 2017-06-14 | End: 2017-06-14

## 2017-06-14 RX ORDER — BETAMETHASONE SODIUM PHOSPHATE AND BETAMETHASONE ACETATE 3; 3 MG/ML; MG/ML
18 INJECTION, SUSPENSION INTRA-ARTICULAR; INTRALESIONAL; INTRAMUSCULAR; SOFT TISSUE ONCE
Status: COMPLETED | OUTPATIENT
Start: 2017-06-14 | End: 2017-06-14

## 2017-06-14 RX ORDER — LIDOCAINE HYDROCHLORIDE 10 MG/ML
INJECTION, SOLUTION INFILTRATION; PERINEURAL
Status: COMPLETED
Start: 2017-06-14 | End: 2017-06-14

## 2017-06-14 RX ORDER — LIDOCAINE HYDROCHLORIDE 10 MG/ML
5 INJECTION, SOLUTION EPIDURAL; INFILTRATION; INTRACAUDAL; PERINEURAL ONCE
Status: COMPLETED | OUTPATIENT
Start: 2017-06-14 | End: 2017-06-14

## 2017-06-14 RX ORDER — IOPAMIDOL 408 MG/ML
10 INJECTION, SOLUTION INTRATHECAL ONCE
Status: COMPLETED | OUTPATIENT
Start: 2017-06-14 | End: 2017-06-14

## 2017-06-14 RX ORDER — LIDOCAINE HYDROCHLORIDE 10 MG/ML
3 INJECTION, SOLUTION EPIDURAL; INFILTRATION; INTRACAUDAL; PERINEURAL ONCE
Status: COMPLETED | OUTPATIENT
Start: 2017-06-14 | End: 2017-06-14

## 2017-06-14 RX ORDER — TRAMADOL HYDROCHLORIDE 50 MG/1
TABLET ORAL
Qty: 60 TABLET | Refills: 5 | Status: SHIPPED | OUTPATIENT
Start: 2017-06-14 | End: 2017-01-01

## 2017-06-14 RX ADMIN — BETAMETHASONE SODIUM PHOSPHATE AND BETAMETHASONE ACETATE 18 MG: 3; 3 INJECTION, SUSPENSION INTRA-ARTICULAR; INTRALESIONAL; INTRAMUSCULAR at 14:27

## 2017-06-14 RX ADMIN — LIDOCAINE HYDROCHLORIDE 70 MG: 10 INJECTION, SOLUTION EPIDURAL; INFILTRATION; INTRACAUDAL; PERINEURAL at 14:27

## 2017-06-14 RX ADMIN — BETAMETHASONE SODIUM PHOSPHATE AND BETAMETHASONE ACETATE 18 MG: 3; 3 INJECTION, SUSPENSION INTRA-ARTICULAR; INTRALESIONAL; INTRAMUSCULAR; SOFT TISSUE at 14:27

## 2017-06-14 RX ADMIN — LIDOCAINE HYDROCHLORIDE 30 MG: 10 INJECTION, SOLUTION INFILTRATION; PERINEURAL at 14:27

## 2017-06-14 RX ADMIN — IOPAMIDOL 2 ML: 408 INJECTION, SOLUTION INTRATHECAL at 14:27

## 2017-06-14 RX ADMIN — LIDOCAINE HYDROCHLORIDE 70 MG: 10 INJECTION, SOLUTION INFILTRATION; PERINEURAL at 14:27

## 2017-06-14 NOTE — PROGRESS NOTES
Pt tolerated ortho procedure well, post procedure pain level unchanged. There were no complications during procedure. Pt verbalized understanding of written and verbal instructions and left department in stable and satisfactory condition with family. There is no evidence of bleeding upon discharge.

## 2017-06-15 ENCOUNTER — HOSPITAL ENCOUNTER (OUTPATIENT)
Dept: CT IMAGING | Facility: CLINIC | Age: 82
Discharge: HOME | End: 2017-06-15
Attending: RADIOLOGY | Admitting: RADIOLOGY
Payer: MEDICARE

## 2017-06-15 ENCOUNTER — OFFICE VISIT (OUTPATIENT)
Dept: CARDIOLOGY | Facility: CLINIC | Age: 82
End: 2017-06-15
Attending: INTERNAL MEDICINE
Payer: COMMERCIAL

## 2017-06-15 VITALS
WEIGHT: 156 LBS | HEART RATE: 77 BPM | HEIGHT: 68 IN | BODY MASS INDEX: 23.64 KG/M2 | DIASTOLIC BLOOD PRESSURE: 66 MMHG | SYSTOLIC BLOOD PRESSURE: 126 MMHG

## 2017-06-15 DIAGNOSIS — I35.9 AORTIC VALVE DISORDER: ICD-10-CM

## 2017-06-15 DIAGNOSIS — I71.22 AORTIC ARCH ANEURYSM (H): ICD-10-CM

## 2017-06-15 DIAGNOSIS — Z95.2 S/P TAVR (TRANSCATHETER AORTIC VALVE REPLACEMENT): ICD-10-CM

## 2017-06-15 DIAGNOSIS — I71.40 ABDOMINAL AORTIC ANEURYSM (H): ICD-10-CM

## 2017-06-15 DIAGNOSIS — I71.21 ASCENDING AORTIC ANEURYSM (H): ICD-10-CM

## 2017-06-15 DIAGNOSIS — I25.10 CORONARY ARTERY DISEASE INVOLVING NATIVE CORONARY ARTERY OF NATIVE HEART WITHOUT ANGINA PECTORIS: ICD-10-CM

## 2017-06-15 DIAGNOSIS — Z91.041 CONTRAST MEDIA ALLERGY: Primary | ICD-10-CM

## 2017-06-15 PROCEDURE — 99215 OFFICE O/P EST HI 40 MIN: CPT | Performed by: INTERNAL MEDICINE

## 2017-06-15 PROCEDURE — 74176 CT ABD & PELVIS W/O CONTRAST: CPT

## 2017-06-15 NOTE — PROGRESS NOTES
HPI and Plan:   See dictation    Orders Placed This Encounter   Procedures     Lipid Profile     ALT     Follow-Up with Cardiologist       Orders Placed This Encounter   Medications     Acetaminophen (TYLENOL PO)     Sig: Take 325 mg by mouth       Medications Discontinued During This Encounter   Medication Reason     lidocaine-prilocaine (EMLA) cream      senna-docusate (SENOKOT-S;PERICOLACE) 8.6-50 MG per tablet          Encounter Diagnoses   Name Primary?     Aortic valve disorder      Ascending aortic aneurysm (H)      Coronary artery disease involving native coronary artery of native heart without angina pectoris      Contrast media allergy Yes     S/P TAVR (transcatheter aortic valve replacement)      Aortic arch aneurysm (H)        CURRENT MEDICATIONS:  Current Outpatient Prescriptions   Medication Sig Dispense Refill     Acetaminophen (TYLENOL PO) Take 325 mg by mouth       traMADol (ULTRAM) 50 MG tablet TAKE ONE TABLET BY MOUTH EVERY 6 HOURS AS NEEDED FOR  MODERATE  PAIN 60 tablet 5     gabapentin (NEURONTIN) 100 MG capsule TAKE ONE CAPSULE BY MOUTH THREE TIMES DAILY FOR  PAIN 90 capsule 11     tamsulosin (FLOMAX) 0.4 MG capsule Take 1 capsule (0.4 mg) by mouth daily 90 capsule 1     losartan (COZAAR) 100 MG tablet Take 1 tablet (100 mg) by mouth daily 90 tablet 3     levothyroxine (SYNTHROID/LEVOTHROID) 88 MCG tablet Take 1 tablet (88 mcg) by mouth daily 90 tablet 3     order for DME Equipment being ordered: TENS and supplies. 1 each 0     polyethylene glycol (MIRALAX) packet Take 17 g by mouth daily 30 packet 11     atorvastatin (LIPITOR) 20 MG tablet Take 1 tablet (20 mg) by mouth daily 90 tablet 3     cyanocobalamin (VITAMIN  B-12) 1000 MCG tablet Take 1,000 mcg by mouth daily       AMOXICILLIN PO Take 500 mg by mouth once as needed       donepezil (ARICEPT) 10 MG tablet Take 1 tablet (10 mg) by mouth At Bedtime 90 tablet 3     amLODIPine (NORVASC) 10 MG tablet Take 1 tablet (10 mg) by mouth daily 90  tablet 2     metoprolol (TOPROL-XL) 25 MG 24 hr tablet Take 1 tablet (25 mg) by mouth daily 90 tablet 3     Ferrous Sulfate (IRON SUPPLEMENT PO) Take 325 mg by mouth daily (with breakfast)          ALLERGIES     Allergies   Allergen Reactions     Contrast Dye      SOB, increased Bp, difficulty swallowing. Date 6/3/96 (ipaque contrast)  Was premedicated prior to FRANCK and had no reaction at all (solumedrol and benadryl) 2016  Was premedicated with Methylprednisolone protocol, no reaction 1/25/17     Lisinopril      cough     Oxycodone      Delirium       PAST MEDICAL HISTORY:  Past Medical History:   Diagnosis Date     AAA (abdominal aortic aneurysm) (H) 10/5/2011    6.1 cm     Anemia 11/30/2011     Aortic stenosis 10/26/2012     CAD (coronary artery disease)     MI - distal inferior/apex. Non transmural     Carotid artery disease (H)      CHF (congestive heart failure) (H) 12/31/2014     CKD (chronic kidney disease) stage 3, GFR 30-59 ml/min 11/30/2011     COPD (chronic obstructive pulmonary disease) (H)      Dementia 8/4/2015     Edema, unspecified edema 1/17/2016     Essential hypertension      Gout 1/06    knee     Hypercalcemia 1/2/2015     Hyperlipidemia LDL goal < 70      Hyperparathyroidism, unspecified (H) 4/22/2015     Hyperplasia of prostate      LEFT LUNG GRANULOMA      Lumbago      Other chronic pain 10/11/2016     Proteinuria 2/8/2012     Pulmonary fibrosis (H)      PVD (peripheral vascular disease) (H)      Thrombocytopenia (H) 11/30/2011     Unspecified hypothyroidism      Vitamin D deficiency        PAST SURGICAL HISTORY:  Past Surgical History:   Procedure Laterality Date     COLONOSCOPY  9/02 per patient     DISCECTOMY LUMBAR POSTERIOR MICROSCOPIC ONE LEVEL  8/11/2014    Procedure: DISCECTOMY LUMBAR POSTERIOR MICROSCOPIC ONE LEVEL;  Surgeon: Jone Carvalho MD;  Location: [facility] OR     ENDOVASCULAR REPAIR ANEURYSM ABDOMINAL AORTA  11/18/2011    Procedure:ENDOVASCULAR REPAIR ANEURYSM ABDOMINAL  AORTA; ENDOVASCULAR AAA,RIGHT FEMORAL       LAPAROSCOPIC CHOLECYSTECTOMY  Nov 2011     LAPAROSCOPIC CHOLECYSTECTOMY  11/18/2011    Procedure:LAPAROSCOPIC CHOLECYSTECTOMY; LAPAROSCOPIC CHOLECYSTECTOMY ; Surgeon:DARRYL DORADO Location:[facility] OR     REPAIR ANEURYSM ABDOMINAL AORTA  Nov 2011     right cataract extraction/IOL  12/03     TRANSCATHETER AORTIC VALVE IMPLANT ANESTHESIA N/A 11/12/2014    Bioprosthetic. Procedure: TRANSCATHETER AORTIC VALVE IMPLANT ANESTHESIA;  Surgeon: Generic Anesthesia Provider;  Location: [facility] OR     VASECTOMY         FAMILY HISTORY:  Family History   Problem Relation Age of Onset     Hypertension Mother      Hypertension Father        SOCIAL HISTORY:  Social History     Social History     Marital status:      Spouse name: N/A     Number of children: 4     Years of education: 18     Occupational History      Retired     Social History Main Topics     Smoking status: Never Smoker     Smokeless tobacco: Never Used     Alcohol use 0.0 oz/week     0 Standard drinks or equivalent per week      Comment: 1 glass wine/day     Drug use: No     Sexual activity: No     Other Topics Concern     Caffeine Concern Yes     1 day      Sleep Concern Yes     too much      Weight Concern Yes     appetite reduced      Special Diet No     Back Care Yes     Exercise Yes     daily 5 x per week.  recummbant bike      Seat Belt Yes     Social History Narrative    Lives in  Senior co-op in [place] for the past 13 years.     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            Review of Systems:  Skin:  Positive for bruising     Eyes:  Positive for glasses    ENT:  Positive for hearing loss    Respiratory:  Positive for shortness of breath on oxygen   Cardiovascular:    fatigue;Positive for    Gastroenterology: Negative for      Genitourinary:  not assessed      Musculoskeletal:  Positive for back pain    Neurologic:  Negative      Psychiatric:  Negative      Heme/Lymph/Imm:  Negative      Endocrine:  Positive  "for thyroid disorder      Physical Exam:  Vitals: /66  Pulse 77  Ht 1.727 m (5' 8\")  Wt 70.8 kg (156 lb)  BMI 23.72 kg/m2    Constitutional:  cooperative;alert and oriented;in no acute distress frail      Skin:  warm and dry to the touch        Head:  normocephalic, no masses or lesions        Eyes:  pupils equal and round;conjunctivae and lids unremarkable        ENT:  no pallor or cyanosis        Neck:  JVP normal;no thyromegaly right carotid bruit      Chest:  healed surgical scar fine rales        Cardiac: regular rhythm;normal S1 and S2;apical impulse not displaced       grade 1;systolic ejection murmur          Abdomen:  abdomen soft;non-tender        Vascular: pulses full and equal                                        Extremities and Back:  no edema;no deformities, clubbing, cyanosis, erythema observed              Neurological:  affect appropriate, oriented to time, person and place;no gross motor deficits              CC  Tyrell Jackson MD   [facility]  [address]  [place] [address]              "

## 2017-06-15 NOTE — LETTER
6/15/2017    Alirio Fox MD  [address] [facility] [address]    RE: Chaz Soler       Dear Colleague,    I had the pleasure of seeing Chaz Soler in the [facility].    PRIMARY CARE PHYSICIAN:  Alirio Fox MD      It is my pleasure to see your patient, Chaz Soler, who is an extremely pleasant 89-year-old gentleman with a history of severe symptomatic aortic stenosis and who had a TAVR performed in 2015 using a transapical approach.  That was highly successful for him.  Echocardiography which was performed approximately 2 weeks ago showed that her bioprosthetic valve which is an Edward Cesilia valve is functioning normally with a mean gradient of 9 mmHg across the gradient and these values are stable compared to the last echo in 11/2015.  There is no aortic regurgitation present.  His ejection fraction is normal with an EF of 55%-60%.  The patient was noted to have moderate coronary artery disease on the pre-TAVR cath.  This is being treated medically.  He has no symptoms of chest pain or chest pressure or arm, throat or jaw discomfort to suggest ischemia.  His lipid profile on 05/02 was excellent with an LDL of 68, HDL of 63 and triglycerides of 161.  The triglycerides are slightly raised.  His liver function tests are normal.      This patient has a known history of carotid artery disease.  If you remember ultrasound in 2014 did show moderate carotid disease.  We repeated his carotid ultrasound on 05/02 of this year and this shows that there has been progression of his carotid disease.  He now has a greater than 70% stenosis in the right internal carotid artery.  The left internal carotid artery is moderate at 50%-69%.  He is being followed by Vascular Surgery for abdominal aortic aneurysm.  His last CT scan of the abdomen was performed 1 year ago.  This showed that there was an infrarenal abdominal aortic endograft, and the graft did not change in position.  The maximum  dimensions of the abdominal aortic aneurysm are 5.3 x 4.0 cm.  There was no evidence of a thoracic aortic aneurysm, although the measurement was 3.6 cm at the level of the main pulmonary artery, which we, in Cardiology, feel is borderline to mildly dilated.      His blood pressure is well-controlled at 126/66.  The patient does have pulmonary fibrosis and is on home oxygen.      Outpatient Encounter Prescriptions as of 6/15/2017   Medication Sig Dispense Refill     Acetaminophen (TYLENOL PO) Take 325 mg by mouth       traMADol (ULTRAM) 50 MG tablet TAKE ONE TABLET BY MOUTH EVERY 6 HOURS AS NEEDED FOR  MODERATE  PAIN 60 tablet 5     gabapentin (NEURONTIN) 100 MG capsule TAKE ONE CAPSULE BY MOUTH THREE TIMES DAILY FOR  PAIN 90 capsule 11     [DISCONTINUED] tamsulosin (FLOMAX) 0.4 MG capsule Take 1 capsule (0.4 mg) by mouth daily 90 capsule 1     losartan (COZAAR) 100 MG tablet Take 1 tablet (100 mg) by mouth daily 90 tablet 3     levothyroxine (SYNTHROID/LEVOTHROID) 88 MCG tablet Take 1 tablet (88 mcg) by mouth daily 90 tablet 3     order for DME Equipment being ordered: TENS and supplies. 1 each 0     polyethylene glycol (MIRALAX) packet Take 17 g by mouth daily 30 packet 11     atorvastatin (LIPITOR) 20 MG tablet Take 1 tablet (20 mg) by mouth daily 90 tablet 3     cyanocobalamin (VITAMIN  B-12) 1000 MCG tablet Take 1,000 mcg by mouth daily       AMOXICILLIN PO Take 500 mg by mouth once as needed       donepezil (ARICEPT) 10 MG tablet Take 1 tablet (10 mg) by mouth At Bedtime 90 tablet 3     [DISCONTINUED] amLODIPine (NORVASC) 10 MG tablet Take 1 tablet (10 mg) by mouth daily 90 tablet 2     [DISCONTINUED] metoprolol (TOPROL-XL) 25 MG 24 hr tablet Take 1 tablet (25 mg) by mouth daily 90 tablet 3     Ferrous Sulfate (IRON SUPPLEMENT PO) Take 325 mg by mouth daily (with breakfast)        [DISCONTINUED] senna-docusate (SENOKOT-S;PERICOLACE) 8.6-50 MG per tablet Take 2 tablets by mouth 2 times daily as needed  (constipation ) 100 tablet      [DISCONTINUED] lidocaine-prilocaine (EMLA) cream Apply topically as needed for moderate pain To low back, right hip  and right leg Apply same time as Diclofenac gel 30 g 1     Facility-Administered Encounter Medications as of 6/15/2017   Medication Dose Route Frequency Provider Last Rate Last Dose     neostigmine (PROSTIGMINE) injection   Intravenous PRN Dayami Duque, APRN CRNA   3 mg at 08/11/14 1007     IMPRESSION:   1.  TAVR.  The Walter Cesilia valve appears to be functioning normally with stable gradients compared to the last study in 11/2015.   2.  Coronary artery disease.  The patient is asymptomatic with respect to coronary artery disease.   3.  Carotid disease.  There appears to have been progression of the right internal carotid artery disease such that he appears to have a severe stenosis in the right internal carotid artery with moderate disease in the left internal carotid artery.   4.  Abdominal aortic aneurysm.  The endograft appears to be stable on CT angiography 1 year ago.   5.  Hyperlipidemia.  His lipids are excellent with the exception of borderline raised triglycerides.   6.  Essential hypertension.  His blood pressure is well-controlled at present on his present medications.   7.  Pulmonary fibrosis.  The patient is on continuous oxygen for that.      PLAN:   1.  The patient is due to see the vascular surgeons for the abdominal aortic aneurysm and I have asked them to mention that the right internal carotid artery has progressed.  The patient or his wife or family are to contact me if there is no followup on the carotid.   2.  I will have the patient follow up in 6 months' time with me and at that stage we will check a lipid profile.      As always, the patient has been told to contact me with any questions or any concerns.  It has been a pleasure to be involved in the care of this extremely nice patient.     Sincerely,    Tyrell Jackson MD      [facility]

## 2017-06-15 NOTE — MR AVS SNAPSHOT
After Visit Summary   6/15/2017    Chaz Soler    MRN: [id number]           Patient Information     Date Of Birth          5/21/1928        Visit Information        Provider Department      6/15/2017 1:15 PM Tyrell Jackson MD [facility] HEART AT [place]        Today's Diagnoses     Contrast media allergy    -  1    Aortic valve disorder        Ascending aortic aneurysm (H)        Coronary artery disease involving native coronary artery of native heart without angina pectoris        S/P TAVR (transcatheter aortic valve replacement)        Aortic arch aneurysm (H)           Follow-ups after your visit        Additional Services     Follow-Up with Cardiologist                 Your next 10 appointments already scheduled     Francesco 15, 2017  2:15 PM CDT   CTA ANGIOGRAM ABDOMEN PELVIS with [address] ([facility])    [address]  [facility] [address] [phone]           Please bring any scans or X-rays taken at other hospitals, if similar tests were done. Also bring a list of your medicines, including vitamins, minerals and over-the-counter drugs. It is safest to leave personal items at home.  Be sure to tell your doctor:   If you have any allergies.   If there s any chance you are pregnant.   If you are breastfeeding.   If you have any special needs.  You will have contrast for this exam. To prepare:   Do not eat or drink for 2 hours before your exam. If you need to take medicine, you may take it with small sips of water. (We may ask you to take liquid medicine as well.)   The day before your exam, drink extra fluids at least six 8-ounce glasses (unless your doctor tells you to restrict your fluids).  Patients over 70 or patients with diabetes or kidney problems:   If you haven t had a blood test (creatinine test) within the last 30 days, go to your clinic or Diagnostic Imaging  "Department for this test.  If you have diabetes:   If your kidney function is normal, continue taking your metformin (Avandamet, Glucophage, Glucovance, Metaglip) on the day of your exam.   If your kidney function is abnormal, wait 48 hours before restarting this medicine.  Please wear loose clothing, such as a sweat suit or jogging clothes. Avoid snaps, zippers and other metal. We may ask you to undress and put on a hospital gown.  If you have any questions, please call the Imaging Department where you will have your exam.              Future tests that were ordered for you today     Open Future Orders        Priority Expected Expires Ordered    Lipid Profile Routine 12/12/2017 6/15/2018 6/15/2017    ALT Routine 12/12/2017 6/15/2018 6/15/2017    Follow-Up with Cardiologist Routine 12/12/2017 6/15/2018 6/15/2017            Who to contact     If you have questions or need follow up information about today's clinic visit or your schedule please contact [facility] PHYSICIANS HEART AT [place] directly at [phone].  Normal or non-critical lab and imaging results will be communicated to you by CBLPathhart, letter or phone within 4 business days after the clinic has received the results. If you do not hear from us within 7 days, please contact the clinic through Dimple Dought or phone. If you have a critical or abnormal lab result, we will notify you by phone as soon as possible.  Submit refill requests through Closely or call your pharmacy and they will forward the refill request to us. Please allow 3 business days for your refill to be completed.          Additional Information About Your Visit        Closely Information     Closely lets you send messages to your doctor, view your test results, renew your prescriptions, schedule appointments and more. To sign up, go to [email or web address] . Click on \"Log in\" on the left side of the screen, which will take you to the Welcome page. Then click on \"Sign up Now\" " "on the right side of the page.     You will be asked to enter the access code listed below, as well as some personal information. Please follow the directions to create your username and password.     Your access code is: [email or web address]  Expires: 2017  2:23 PM     Your access code will  in 90 days. If you need help or a new code, please call your [facility] clinic or [phone].        Care EveryWhere ID     This is your Care EveryWhere ID. This could be used by other organizations to access your [facility] medical records  [id number]        Your Vitals Were     Pulse Height BMI (Body Mass Index)             77 1.727 m (5' 8\") 23.72 kg/m2          Blood Pressure from Last 3 Encounters:   06/15/17 126/66   17 141/66   17 151/68    Weight from Last 3 Encounters:   06/15/17 70.8 kg (156 lb)   17 70.2 kg (154 lb 12.8 oz)   17 71.1 kg (156 lb 11.2 oz)              We Performed the Following     Follow-Up with Cardiologist          Today's Medication Changes          These changes are accurate as of: 6/15/17  1:41 PM.  If you have any questions, ask your nurse or doctor.               Stop taking these medicines if you haven't already. Please contact your care team if you have questions.     lidocaine-prilocaine cream   Commonly known as:  EMLA   Stopped by:  Tyrell Jackson MD           senna-docusate 8.6-50 MG per tablet   Commonly known as:  SENOKOT-S;PERICOLACE   Stopped by:  Tyrell Jackson MD                    Primary Care Provider Office Phone # Fax #    Alirio Fox [phone]       [facility] [address] [facility] [address]        Thank you!     Thank you for choosing [facility] PHYSICIANS HEART AT [place]  for your care. Our goal is always to provide you with excellent care. Hearing back from our patients is one way we can continue to improve our services. Please take a few minutes to complete the written " survey that you may receive in the mail after your visit with us. Thank you!             Your Updated Medication List - Protect others around you: Learn how to safely use, store and throw away your medicines at www.disposemymeds.org.          This list is accurate as of: 6/15/17  1:41 PM.  Always use your most recent med list.                   Brand Name Dispense Instructions for use    amLODIPine 10 MG tablet    NORVASC    90 tablet    Take 1 tablet (10 mg) by mouth daily       AMOXICILLIN PO      Take 500 mg by mouth once as needed       atorvastatin 20 MG tablet    LIPITOR    90 tablet    Take 1 tablet (20 mg) by mouth daily       cyanocobalamin 1000 MCG tablet    vitamin  B-12     Take 1,000 mcg by mouth daily       donepezil 10 MG tablet    ARICEPT    90 tablet    Take 1 tablet (10 mg) by mouth At Bedtime       gabapentin 100 MG capsule    NEURONTIN    90 capsule    TAKE ONE CAPSULE BY MOUTH THREE TIMES DAILY FOR  PAIN       IRON SUPPLEMENT PO      Take 325 mg by mouth daily (with breakfast)       levothyroxine 88 MCG tablet    SYNTHROID/LEVOTHROID    90 tablet    Take 1 tablet (88 mcg) by mouth daily       losartan 100 MG tablet    COZAAR    90 tablet    Take 1 tablet (100 mg) by mouth daily       metoprolol 25 MG 24 hr tablet    TOPROL-XL    90 tablet    Take 1 tablet (25 mg) by mouth daily       order for DME     1 each    Equipment being ordered: TENS and supplies.       polyethylene glycol Packet    MIRALAX    30 packet    Take 17 g by mouth daily       tamsulosin 0.4 MG capsule    FLOMAX    90 capsule    Take 1 capsule (0.4 mg) by mouth daily       traMADol 50 MG tablet    ULTRAM    60 tablet    TAKE ONE TABLET BY MOUTH EVERY 6 HOURS AS NEEDED FOR  MODERATE  PAIN       TYLENOL PO      Take 325 mg by mouth

## 2017-06-16 ENCOUNTER — CARE COORDINATION (OUTPATIENT)
Dept: CARE COORDINATION | Facility: CLINIC | Age: 82
End: 2017-06-16

## 2017-06-16 DIAGNOSIS — I65.21 CAROTID STENOSIS, RIGHT: Primary | ICD-10-CM

## 2017-06-16 NOTE — LETTER
[facility]  Complex Care Plan  About Me  Patient Name:  Chaz Man    YOB: 1928  Age:   89 year old   Mehnaz MRN: [id number] Telephone Information:     Home Phone [phone]   Mobile none       Address:    [address] [place] [address] [address]    [facility] [address] Email address:  [email or web address]      Emergency Contact(s)  Name Relationship Lgl Grd Work Phone Home Phone Mobile Phone   1. MARYJANE MAN Spouse No none [phone] none   2. MODE MAN Son No [phone]   3. CATHI CROSS Daughter No none [phone]           Primary language:  English     needed? No   [facility] [employer] [facility]:  [phone] op. 1  Other communication barriers:  (dementia)  Preferred Method of Communication:   (unknown)  Current living arrangement: I live in a private home with spouse (TCU)  Mobility Status/ Medical Equipment: Independent w/Device  Other information to know about me:    Health Maintenance  Health Maintenance Reviewed:      My Access Plan  Medical Emergency 911   Primary Clinic Line [facility]- [phone]   24 Hour Appointment Line [phone] or  [phone]) (toll-free)   24 Hour Nurse Line [phone] (toll-free)   Preferred Urgent Care [facility], [phone]   Preferred Hospital [facility]  [phone]   Preferred Pharmacy [facility] [address] [address] [address] [facility]     Behavioral Health Crisis Line [facility], [phone] or 911     My Care Team Members  Patient Care Team       Relationship Specialty Notifications Start End    VeAlirio navarro MD PCP - General   2/15/02     Phone: [phone] Fax: [phone]         [facility] [address] [facility] [address]         My Care Plans  Self Management and Treatment Plan  Goals and (Comments)  Goal #1:  (I will  decrease back discomfort)   I will use Tramadol as directed,daily exercises and massage device in the chair    90% of goal reached      Action Plans on File: None  Advance Care Plans/Directives Type:   Type Advanced Care Plans/Directives: Other (n/a)    My Medical and Care Information  Problem List   Patient Active Problem List   Diagnosis     Essential hypertension     Other specified disorders of prostate     Pulmonary fibrosis (H)     Advance Care Planning     CAD (coronary artery disease)     CKD (chronic kidney disease) stage 3, GFR 30-59 ml/min     Proteinuria     Hyperlipidemia with target LDL less than 70     Lumbar radiculopathy     Status post lumbar discectomy     S/P TAVR (transcatheter aortic valve replacement)     Physical deconditioning     CHF (congestive heart failure) (H)     Anemia     SAI (obstructive sleep apnea)     Hyperparathyroidism (H)     Dementia     Hypothyroidism     Health Care Home     Edema, unspecified edema     Other chronic pain     Compression fracture L2-3      Current Medications and Allergies:  See printed Medication Report.    Care Coordination Start Date: 02/24/17   Frequency of Care Coordination: PRN   Form Last Updated: 06/16/2017

## 2017-06-16 NOTE — PROGRESS NOTES
Clinic Care Coordination Contact    Incoming call from the patient's wife stating they found out from Dr Kirill Aceves that the patient has a 70% blockage of the Carotid US.  Patient needs to be referred to a Neurovascular provider.  Patient has seen Dr Aguilar in the past and is wondering if he would be an appropriate referral?  Patient continues to use Tramadol daily exercises and using a massage device on his chair with relief     Please advice and have care team call the patient back       Pebbles Leija RN / Clinical Care Coordinator     [address] [address]  [email or web address]  Office :  [phone] / Fax :  [phone]

## 2017-06-16 NOTE — PROGRESS NOTES
PRIMARY CARE PHYSICIAN:  Alirio Fox MD      HISTORY OF PRESENT ILLNESS:  It is my pleasure to see your patient, Chaz Soler, who is an extremely pleasant 89-year-old gentleman with a history of severe symptomatic aortic stenosis and who had a TAVR performed in 2015 using a transapical approach.  That was highly successful for him.  Echocardiography which was performed approximately 2 weeks ago showed that her bioprosthetic valve which is an Edward Cesilia valve is functioning normally with a mean gradient of 9 mmHg across the gradient and these values are stable compared to the last echo in 11/2015.  There is no aortic regurgitation present.  His ejection fraction is normal with an EF of 55%-60%.  The patient was noted to have moderate coronary artery disease on the pre-TAVR cath.  This is being treated medically.  He has no symptoms of chest pain or chest pressure or arm, throat or jaw discomfort to suggest ischemia.  His lipid profile on 05/02 was excellent with an LDL of 68, HDL of 63 and triglycerides of 161.  The triglycerides are slightly raised.  His liver function tests are normal.      This patient has a known history of carotid artery disease.  If you remember ultrasound in 2014 did show moderate carotid disease.  We repeated his carotid ultrasound on 05/02 of this year and this shows that there has been progression of his carotid disease.  He now has a greater than 70% stenosis in the right internal carotid artery.  The left internal carotid artery is moderate at 50%-69%.  He is being followed by Vascular Surgery for abdominal aortic aneurysm.  His last CT scan of the abdomen was performed 1 year ago.  This showed that there was an infrarenal abdominal aortic endograft, and the graft did not change in position.  The maximum dimensions of the abdominal aortic aneurysm are 5.3 x 4.0 cm.  There was no evidence of a thoracic aortic aneurysm, although the measurement was 3.6 cm at the level of the main  pulmonary artery, which we, in Cardiology, feel is borderline to mildly dilated.      His blood pressure is well-controlled at 126/66.  The patient does have pulmonary fibrosis and is on home oxygen.      IMPRESSION:   1.  TAVR.  The Walter Cesilia valve appears to be functioning normally with stable gradients compared to the last study in 11/2015.   2.  Coronary artery disease.  The patient is asymptomatic with respect to coronary artery disease.   3.  Carotid disease.  There appears to have been progression of the right internal carotid artery disease such that he appears to have a severe stenosis in the right internal carotid artery with moderate disease in the left internal carotid artery.   4.  Abdominal aortic aneurysm.  The endograft appears to be stable on CT angiography 1 year ago.   5.  Hyperlipidemia.  His lipids are excellent with the exception of borderline raised triglycerides.   6.  Essential hypertension.  His blood pressure is well-controlled at present on his present medications.   7.  Pulmonary fibrosis.  The patient is on continuous oxygen for that.      PLAN:   1.  The patient is due to see the vascular surgeons for the abdominal aortic aneurysm and I have asked them to mention that the right internal carotid artery has progressed.  The patient or his wife or family are to contact me if there is no followup on the carotid.   2.  I will have the patient follow up in 6 months' time with me and at that stage we will check a lipid profile.      As always, the patient has been told to contact me with any questions or any concerns.  It has been a pleasure to be involved in the care of this extremely nice patient.      Tyrell Aceves MD      cc:   [place] [address]         TY[facility] ACEVES [facility], [facility]             D: 06/15/2017 13:40   T: 06/16/2017 08:49   MT: al      Name:     NATTY MAN   MRN:      [id number]        Account:       [id number]   :      1928           Service Date: 06/15/2017      Document: [id number]

## 2017-06-17 NOTE — PROGRESS NOTES
Recent cardiology appt note reviewed. Recommend pt see Dr Camacho (Vascular Surgery) re: >70% right carotid stenosis. Spoke with pt's wife (pt [facility]) and gave tele number to schedule appt with Dr Camacho. Pt without acute neuro sx per wife

## 2017-06-19 ENCOUNTER — TELEPHONE (OUTPATIENT)
Dept: OTHER | Facility: CLINIC | Age: 82
End: 2017-06-19

## 2017-06-19 DIAGNOSIS — I71.40 ABDOMINAL AORTIC ANEURYSM (H): Primary | ICD-10-CM

## 2017-06-19 NOTE — TELEPHONE ENCOUNTER
Pt referred to [facility] by Alirio Fox MD for Carotid Artery Disease - Asymptomatic & Ultrasound showing > 70% right carotid.    US CAROTID BILATERAL 6/2/2017 9:59 AM  Impression:  1. Right internal carotid artery >70% stenosis  .  2..Left internal carotid artery 50-69% stenosis    Pt needs to be scheduled for a consult with vascular surgery. Requesting Dr. Camacho per MD's note.  Will route to scheduling to coordinate an appointment next available.    Mary Allen RN, BSN.

## 2017-06-19 NOTE — TELEPHONE ENCOUNTER
Called pt and spoke to pt's wife Violet. Scheduled consult appt for pt for Thursday July 6th at 2:00pm with Dr. Camacho. Pt's wife is in agreement with this appt and had no further questions.

## 2017-06-19 NOTE — TELEPHONE ENCOUNTER
Pt is also seeing Dr. Camacho.  Requested results on previous CT of abdomen s/p EVAR 2011.   Aneursym sac has decreased in size.  Recommend annual f/u. Will follow with ultrasound imaging. Wife denies questions or concerns at this time.  Emy Whitaker RN  IR nurse clinician  COMPARISON: 6/14/2016     FINDINGS:   Lung bases: Fibrotic changes are seen in the lung bases, unchanged. No  pleural effusion or pericardial effusion.     Abdomen: Postoperative changes of endovascular aortobiiliac aneurysm  repair. The aneurysm sac measures 5.2 x 3.6 cm, previously 5.3 x 4.0  cm. Evaluation of endoleak is limited without the use of intravenous  contrast. 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 Atherosclerotic  disease is seen throughout the abdominal aorta, visceral vessels and  pelvic vessels.     Evaluation of the solid organ parenchyma is limited without the use of  intravenous contrast. The spleen, adrenal glands, liver and pancreas  show no focal abnormality. Atrophy of the right kidney is again noted.  Left renal cystic lesions are again noted. Postoperative changes of  cholecystectomy. No intrahepatic or extrahepatic biliary dilatation.  No intraperitoneal free air or free fluid.     Small and large bowel are normal in caliber without evidence of  obstruction. Scattered diverticulosis without evidence of  diverticulitis. The appendix is normal. No abdominal or pelvic  lymphadenopathy. Bladder is normal.     Bones: Degenerative changes are seen throughout the lumbar spine.  Vertebroplasty at L3 is again noted.          IMPRESSION:  1. Changes of endovascular aortobiiliac aneurysm repair. The aneurysm  sac measures 5.2 x 3.6 cm, previously 5.3 x 4.0 cm. Evaluation of  endoleak is limited without the use of intravenous contrast.  2. Postoperative changes of cholecystectomy.  3. Changes of vertebroplasty at L3, unchanged.  4. Diverticulosis without evidence of  diverticulitis.     MARYBEL MARROQUIN,

## 2017-06-20 ENCOUNTER — TELEPHONE (OUTPATIENT)
Dept: CARE COORDINATION | Facility: CLINIC | Age: 82
End: 2017-06-20

## 2017-06-20 DIAGNOSIS — I10 ESSENTIAL HYPERTENSION: Chronic | ICD-10-CM

## 2017-06-20 NOTE — TELEPHONE ENCOUNTER
Clinic Care Coordination RN :      Incoming call from the patients wife Violet.  Wife calling with an update stating the patient has an appointment with Dr Camacho/Vascular surgeon  July 6 and wanted to thank Dr Fox for his help with this appointment       Pebbles Leija RN / Clinical Care Coordinator     [address] [address]  [email or web address]  Office :  [phone] / Fax :  [phone]

## 2017-06-21 RX ORDER — AMLODIPINE BESYLATE 10 MG/1
TABLET ORAL
Qty: 90 TABLET | Refills: 3 | Status: ON HOLD | OUTPATIENT
Start: 2017-06-21 | End: 2018-01-01

## 2017-07-06 ENCOUNTER — OFFICE VISIT (OUTPATIENT)
Dept: OTHER | Facility: CLINIC | Age: 82
End: 2017-07-06
Attending: SURGERY
Payer: COMMERCIAL

## 2017-07-06 VITALS — HEART RATE: 56 BPM | SYSTOLIC BLOOD PRESSURE: 128 MMHG | DIASTOLIC BLOOD PRESSURE: 61 MMHG

## 2017-07-06 DIAGNOSIS — I65.23 CAROTID STENOSIS, BILATERAL: Primary | ICD-10-CM

## 2017-07-06 DIAGNOSIS — I71.40 ABDOMINAL AORTIC ANEURYSM (AAA) WITHOUT RUPTURE (H): ICD-10-CM

## 2017-07-06 PROCEDURE — 99203 OFFICE O/P NEW LOW 30 MIN: CPT | Mod: ZP | Performed by: SURGERY

## 2017-07-06 PROCEDURE — 99211 OFF/OP EST MAY X REQ PHY/QHP: CPT

## 2017-07-06 NOTE — MR AVS SNAPSHOT
After Visit Summary   7/6/2017    Chaz Soler    MRN: [id number]           Patient Information     Date Of Birth          5/21/1928        Visit Information        Provider Department      7/6/2017 2:00 PM Suhail Camacho MD [facility]      Today's Diagnoses     Carotid stenosis, bilateral    -  1    Abdominal aortic aneurysm (AAA) without rupture (H)           Follow-ups after your visit        Who to contact     If you have questions or need follow up information about today's clinic visit or your schedule please contact [facility] directly at [phone].  Normal or non-critical lab and imaging results will be communicated to you by MyChart, letter or phone within 4 business days after the clinic has received the results. If you do not hear from us within 7 days, please contact the clinic through [employer] or phone. If you have a critical or abnormal lab result, we will notify you by phone as soon as possible.  Submit refill requests through [employer] or call your pharmacy and they will forward the refill request to us. Please allow 3 business days for your refill to be completed.          Additional Information About Your Visit        MyChart Information     [employer] gives you secure access to your electronic health record. If you see a primary care provider, you can also send messages to your care team and make appointments. If you have questions, please call your primary care clinic.  If you do not have a primary care provider, please call [phone] and they will assist you.        Care EveryWhere ID     This is your Care EveryWhere ID. This could be used by other organizations to access your [place] medical records  [id number]        Your Vitals Were     Pulse                   56            Blood Pressure from Last 3 Encounters:   07/06/17 128/61   06/15/17 126/66   06/14/17 141/66    Weight  from Last 3 Encounters:   06/15/17 156 lb (70.8 kg)   05/18/17 154 lb 12.8 oz (70.2 kg)   05/16/17 156 lb 11.2 oz (71.1 kg)              Today, you had the following     No orders found for display       Primary Care Provider Office Phone # Fax #    Alirio Fox [phone]       [facility] [address] [facility] [address]        Equal Access to Services     NELIDA PHILLIPS : Hadii aad ku hadasho Soomaali, waaxda luqadaha, qaybta kaalmada adeegyada, waxay idiin hayaan adeeg marianna ingram. So [facility] [phone].    ATENCIÓN: Si habla español, tiene a denise disposición servicios gratuitos de asistencia lingüística. Llame al [phone].    We comply with applicable federal civil rights laws and [place] laws. We do not discriminate on the basis of race, color, national origin, age, disability sex, sexual orientation or gender identity.            Thank you!     Thank you for choosing [facility]  for your care. Our goal is always to provide you with excellent care. Hearing back from our patients is one way we can continue to improve our services. Please take a few minutes to complete the written survey that you may receive in the mail after your visit with us. Thank you!             Your Updated Medication List - Protect others around you: Learn how to safely use, store and throw away your medicines at www.disposemymeds.org.          This list is accurate as of: 7/6/17  2:47 PM.  Always use your most recent med list.                   Brand Name Dispense Instructions for use Diagnosis    amLODIPine 10 MG tablet    NORVASC    90 tablet    TAKE ONE TABLET BY MOUTH ONCE DAILY    Essential hypertension       AMOXICILLIN PO      Take 500 mg by mouth once as needed        atorvastatin 20 MG tablet    LIPITOR    90 tablet    Take 1 tablet (20 mg) by mouth daily    Coronary artery disease involving native coronary artery of native heart without angina pectoris        cyanocobalamin 1000 MCG tablet    vitamin  B-12     Take 1,000 mcg by mouth daily        donepezil 10 MG tablet    ARICEPT    90 tablet    Take 1 tablet (10 mg) by mouth At Bedtime    Memory loss       gabapentin 100 MG capsule    NEURONTIN    90 capsule    TAKE ONE CAPSULE BY MOUTH THREE TIMES DAILY FOR  PAIN    Compression fracture       IRON SUPPLEMENT PO      Take 325 mg by mouth daily (with breakfast)        levothyroxine 88 MCG tablet    SYNTHROID/LEVOTHROID    90 tablet    Take 1 tablet (88 mcg) by mouth daily    Hypothyroidism, unspecified type       losartan 100 MG tablet    COZAAR    90 tablet    Take 1 tablet (100 mg) by mouth daily    Essential hypertension       metoprolol 25 MG 24 hr tablet    TOPROL-XL    90 tablet    Take 1 tablet (25 mg) by mouth daily    Essential hypertension       order for DME     1 each    Equipment being ordered: TENS and supplies.    Other chronic pain, Lumbar radiculopathy, DDD (degenerative disc disease), lumbar, Spinal stenosis of lumbar region without neurogenic claudication       polyethylene glycol Packet    MIRALAX    30 packet    Take 17 g by mouth daily    Chronic constipation       tamsulosin 0.4 MG capsule    FLOMAX    90 capsule    Take 1 capsule (0.4 mg) by mouth daily    BPH (benign prostatic hypertrophy) with urinary retention       traMADol 50 MG tablet    ULTRAM    60 tablet    TAKE ONE TABLET BY MOUTH EVERY 6 HOURS AS NEEDED FOR  MODERATE  PAIN    Other chronic pain, Lumbar radiculopathy       TYLENOL PO      Take 325 mg by mouth

## 2017-07-06 NOTE — PROGRESS NOTES
Chaz Soler Along with his son and wife came to see me today to discuss his worsening asymptomatic right carotid stenosis per recommendation of Dr. Jackson.  This 89-year-old with multiple medical problems has been followed for Asymptomatic carotid stenosis. Most recently there was noted to be an increasing velocity of the right carotid bifurcation which does increase his risk of stroke.    He does have a complex medical history.  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  This was recently evaluated with a noncontrast CT scan revealing a decreasing aneurysm sac now measuring 5.2 x 3.6 cm. This implied no endoleak or other complications.  He also has known asymptomatic PAD.    He underwent a TAVR repair  In 2015.  These done well following this with the most recent ejection fraction of 55-60%.    He has COPD ( Never smoked) and is on home oxygen.He is relatively sedentary.  He is in a wheelchair today.    PMH: Medications: Norvasc, Lipitor, Neurontin, Cozaar, Synthroid, Toprol-XL            He is also taking Aricept for dementia.            Living independently with his wife.               CKI With most recent serum creatinine= 1.90      Most recent LDL=68    Exam: Very pleasant gentleman.  He is on high flow oxygen and does have some problems with breathing.  Blood pressure 128/61.  Pulse 56.  Glasses.  Right carotid bruits noted.  Chest= clear  Cardiovascular=RR  No palpable distal pulses.  Minimal edema.  No ulcerations.  Normal sensation.      I reviewed the duplex ultrasound from 6/2/2017 from [place] [facility].  This reveals an elevated right ICA PSV=  311 cm/s with a ratio of 2.6.  The images does reveal a stenosis but probably less than the predicted 80% from these numbers.  The waveforms are slightly turbulent but still preserved implying a lesser stenosis.  On the left side  ICA PSV= 153 cm/s with a ratio of 0.9 consistent with a stenosis less than  50%.          Impression:  Patient does have a severe possibly worsening a stenotic right carotid stenosis with moderate changes on the left. However, as mentioned the velocities may be overestimating the degree of stenosis.  I'm reluctant to recommend any contrast study such as a CTA due to his renal insufficiency and the unlikely that this would change her opinion.  With his multiple comorbidities particularly his oxygen dependency and shortness of breath even sitting down he is not a very good operative candidate.  Also I do not feel that carotid artery stenting in an asymptomatic patient is worth the risks involved with potential embolization.                         We discussed his situation for over 30 minutes today in the office with over 50% in counseling. Both he and his family are comfortable with conservative treatment.  He has good control of his risk factors.  We've recommended a follow-up carotid duplex ultrasound in six months.   If there is a worsening stenosis this situation can be reevaluated pending his health and overall status.                          We also discussed his AAA.  He endograft is working well with no evidence of endoleak or other complications.  Aneurysmal sac size is decreasing which is also very encouraging.        Suhail Camacho MD     Please route or send letter to:  Primary Care Provider (PCP) and Dr Juan Jackson

## 2017-07-06 NOTE — NURSING NOTE
"Chief Complaint   Patient presents with     Consult     Carotid artery disease referred by Dr. Jackson, Bilateral carotid U/S done on 6/2/17, records in Epic, pt also has hx of EVAR 2011 *lr       Initial /61 (BP Location: Left arm, Patient Position: Chair, Cuff Size: Adult Regular)  Pulse 56 Estimated body mass index is 23.72 kg/(m^2) as calculated from the following:    Height as of 6/15/17: 5' 8\" (1.727 m).    Weight as of 6/15/17: 156 lb (70.8 kg).  Medication Reconciliation: complete     Face to face nursing time: 8 minutes    Aylin Diggs MA     "

## 2017-07-06 NOTE — LETTER
[facility] [place] [address]  [place] [address]  Phone: [phone]  Fax: [phone]      2017    RE:  Chaz Soler-:  28    Chaz Soler along with his son and wife came to see me today to discuss his worsening asymptomatic right carotid stenosis per recommendation of Dr. Jackson.  This 89-year-old with multiple medical problems has been followed for Asymptomatic carotid stenosis. Most recently there was noted to be an increasing velocity of the right carotid bifurcation which does increase his risk of stroke.     He does have a complex medical history.  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  This was recently evaluated with a noncontrast CT scan revealing a decreasing aneurysm sac now measuring 5.2 x 3.6 cm. This implied no endoleak or other complications.  He also has known asymptomatic PAD.     He underwent a TAVR repair  In .  These done well following this with the most recent ejection fraction of 55-60%.     He has COPD ( Never smoked) and is on home oxygen.He is relatively sedentary.  He is in a wheelchair today.     PMH: Medications: Norvasc, Lipitor, Neurontin, Cozaar, Synthroid, Toprol-XL  He is also taking Aricept for dementia.  Living independently with his wife.     CKI With most recent serum creatinine= 1.90     Most recent LDL=68     Exam: Very pleasant gentleman.  He is on high flow oxygen and does have some problems with breathing.  Blood pressure 128/61.  Pulse 56.  Glasses.  Right carotid bruits noted.  Chest= clear  Cardiovascular=RR  No palpable distal pulses.  Minimal edema.  No ulcerations.  Normal sensation.     I reviewed the duplex ultrasound from 2017 from [place] [facility].  This reveals an elevated right ICA PSV=  311 cm/s with a ratio of 2.6.  The images does reveal a stenosis but probably less than the predicted 80% from these numbers.  The waveforms are slightly turbulent but  still preserved implying a lesser stenosis.  On the left side  ICA PSV= 153 cm/s with a ratio of 0.9 consistent with a stenosis less than 50%.     Impression:  Patient does have a severe possibly worsening a stenotic right carotid stenosis with moderate changes on the left. However, as mentioned the velocities may be overestimating the degree of stenosis.  I'm reluctant to recommend any contrast study such as a CTA due to his renal insufficiency and the unlikely that this would change her opinion.  With his multiple comorbidities particularly his oxygen dependency and shortness of breath even sitting down he is not a very good operative candidate.  Also I do not feel that carotid artery stenting in an asymptomatic patient is worth the risks involved with potential embolization.     We discussed his situation for over 30 minutes today in the office with over 50% in counseling. Both he and his family are comfortable with conservative treatment.  He has good control of his risk factors.  We've recommended a follow-up carotid duplex ultrasound in six months.   If there is a worsening stenosis this situation can be reevaluated pending his health and overall status.     We also discussed his AAA.  He endograft is working well with no evidence of endoleak or other complications.  Aneurysmal sac size is decreasing which is also very encouraging.        Suhail Camacho MD

## 2017-07-07 DIAGNOSIS — I65.23 CAROTID STENOSIS, BILATERAL: Primary | ICD-10-CM

## 2017-07-10 ENCOUNTER — CARE COORDINATION (OUTPATIENT)
Dept: CARE COORDINATION | Facility: CLINIC | Age: 82
End: 2017-07-10

## 2017-07-10 DIAGNOSIS — I25.10 CORONARY ARTERY DISEASE INVOLVING NATIVE CORONARY ARTERY OF NATIVE HEART WITHOUT ANGINA PECTORIS: Primary | Chronic | ICD-10-CM

## 2017-07-10 DIAGNOSIS — G89.29 OTHER CHRONIC PAIN: ICD-10-CM

## 2017-07-10 NOTE — PROGRESS NOTES
Clinic Care Coordination Contact      Incoming call from Kemi/wife.    1) Patient continues to have L3 back pain.  What is the next step?    2) Patient was taken off Nortriptyline per Neurologist to see if memory improves .  No change in memory problems should he restart?    3) Patient previously taken off of low dose ASA.  Wife not sure why.    Should he restart ?       Pebbles Leija RN / Clinical Care Coordinator     [place] [address]  [email or web address]  Office :  [phone] / Fax :  [phone]

## 2017-07-27 DIAGNOSIS — R33.8 BENIGN PROSTATIC HYPERPLASIA WITH URINARY RETENTION: ICD-10-CM

## 2017-07-27 DIAGNOSIS — N40.1 BENIGN PROSTATIC HYPERPLASIA WITH URINARY RETENTION: ICD-10-CM

## 2017-07-27 RX ORDER — TAMSULOSIN HYDROCHLORIDE 0.4 MG/1
CAPSULE ORAL
Qty: 90 CAPSULE | Refills: 2 | Status: SHIPPED | OUTPATIENT
Start: 2017-07-27 | End: 2018-01-01

## 2017-07-27 NOTE — PROGRESS NOTES
Clinic Care Coordination RN :    Incoming call from Violet/wife  requesting a call back with a plan for the patients back pain.  Message routed to Dr Fox and his care team     Pebbles Leija RN / Clinical Care Coordinator     [address] [address]  [email or web address]  Office :  [phone] / Fax :  [phone]

## 2017-07-27 NOTE — TELEPHONE ENCOUNTER
Prescription approved per [facility] Refill Protocol.      Flomax          Last Written Prescription Date: 1/9/17  Last Fill Quantity: 90, # refills: 1    Last Office Visit with [facility], [facility] or [facility] prescribing provider:  5/16/17   Future Office Visit:      BP Readings from Last 3 Encounters:   07/06/17 128/61   06/15/17 126/66   06/14/17 141/66

## 2017-07-28 RX ORDER — NORTRIPTYLINE HCL 25 MG
25 CAPSULE ORAL AT BEDTIME
Qty: 30 CAPSULE | Refills: 11 | Status: ON HOLD | OUTPATIENT
Start: 2017-07-28 | End: 2017-01-01

## 2017-07-28 RX ORDER — NORTRIPTYLINE HCL 25 MG
25 CAPSULE ORAL AT BEDTIME
Qty: 30 CAPSULE | Refills: 11 | Status: SHIPPED | OUTPATIENT
Start: 2017-07-28 | End: 2017-07-28

## 2017-08-01 NOTE — LETTER
August 1, 2017    Chaz Soler  [address]  [place] [address]    Dear Fer Ware  Welcome to the [facility] at the [facility]. We are located on the 6th floor, [address], of the [facility] located at [address], [place] [address]. For general parking, the [address] is the closest to our building.     Your appointment at the [facility] has been scheduled on September 14th at 11:00am with Andreina Brewster NP.    At your first visit, you will meet your team of caregivers who will help you to develop pain management strategies that will last a lifetime. You will meet with our support staff to validate parking at a reduced rate, review your insurance information, and collect your co-payment if required by your insurance company. You will also meet with a medical pain specialist and care coordinator who will assess your pain and develop a plan of care for your successful pain rehabilitation. You should expect to spend 1-2 hours at your first visit with us. Usually, patients work with us for a period of 6-12 months, and eventually return to their primary doctor once their pain management has stabilized.      To help us make your visit go as smoothly as possible, please bring the following items with you on your visit:   Completed Pain Questionnaire enclosed in this packet.  If you do not bring the completed questionnaire, we may have to reschedule your appointment.  List of any medicines that you are currently taking or have been prescribed  Important NON-[place] medical information such as medical records or tests results (X-rays, or laboratory tests)  Your health insurance card  Financial resources to cover your co-payment or balance due at the time of service (cash, personal check, Visa, and MasterCard are acceptable methods of payment)     Due to the demand for new patient  evaluations, you must notify the scheduling department 48 hours in advance if you are not able to keep this appointment.  Failure to do so could affect your ability to reschedule with our clinic. Please do not assume that you will  receive any prescription medications at your first visit.    Please call [phone] with any questions regarding your appointment.  We look forward to meeting you and working to address your health care needs.       Sincerely,      [facility]

## 2017-08-01 NOTE — TELEPHONE ENCOUNTER
Pain Management Center Referral      1. Confirmed address with patient? Yes  2. Confirmed phone number with patient? Yes  3. Confirmed referring provider? Yes  4. Is the PCP the same as the referring provider? No  5. Has the patient been to any previous pain clinics? No  (If yes, send CARLOS with welcome letter)  6. Which insurance are we to bill for this appointment?  Medicare/medicare    7. Informed pt of cancellation (48 hour) policy? Yes    REGARDING OPIOID MEDICATIONS: We will always address appropriateness of opioid pain medications, but we generally will not automatically take on a prescribing role. When we do take on prescribing of opioids for chronic pain, it is in collaboration with the referring physician for an intermediate period of time (months), with an expectation that the primary physician or provider will assume the prescribing role if medications are effective at stable doses with demonstrated compliance. Therefore, please do not assume that your prescribing responsibilities end on the day of pain clinic consultation.  7. Informed pt of prescribing policy? Yes      8. Referring Provider: Jessie Wolff

## 2017-08-10 NOTE — PROGRESS NOTES
Clinic Care Coordination Contact  OUTREACH    Referral Information:  Referral Source: CTS  Reason for Contact: Follow up on back pain  Care Conference: No     Universal Utilization:   ED Visits in last year: 0  Hospital visits in last year: 2  Last PCP appointment: 11/22/16  Missed Appointments: 0  Concerns:  (none)  Multiple Providers or Specialists:  (cardiology, vascular, pain, ortho, neurology, spine surgeon)    Clinical Concerns:  Current Medical Concerns: Patient's wife reports she called the Neurosurgeon and was informed he patient already had 4 injections and can't have another injection.  [facility] clinic doesn't have any providers available right now.   Patient has an appointment at the [facility] September 14   Current Behavioral Concerns: Not discussed     Clinical Pathway Name: None  Clinical Pathway: None    Medication Management:  Reviewed pain medications      Functional Status:  Mobility Status: Independent w/Device  Equipment Currently Used at Home: oxygen, walker, rolling        Psychosocial:  Current living arrangement:: I live in a private home with spouse (TCU)    Resources and Interventions:  Current Resources: Skilled Nursing Facility;  (NA)  PAS Number: [id number]  Senior Linkage Line Referral Placed:  (NA)  Advanced Care Plans/Directives on file:: No  Referrals Placed:  (NA)     Goals:   Goal 1 Statement: I will decrease back pain I will take Tramadol,Gabapentin and Tylenol as directed and use the massage chair prn   Goal 1 Progression Percent: 70%  Goal 1 Progression Date: 08/10/17        Barriers: Back pain continues   Strengths: Pain Clinic appointment 9/14    Frequency of Care Coordination: PRN       Plan: CC will follow up after pain clinic appointment 9/16/17    Pebbles Leija RN / Clinical Care Coordinator     [address] [address]  [email or web address]  Office :  [phone] / Fax :  [phone]

## 2017-08-11 NOTE — PROGRESS NOTES
Patient's spouse calling back, reporting change in patient's mental status.  States patient has been delusional and hearing things all day today.  Spouse reports patient took a Tramadol and Gabapentin at 2130 yesterday and again today at 0100 by mistake.  Spouse prepares the medications for the morning and when the patient got up in the middle of the night at 0100 he took the medications again.  Spouse denies patient having any increased difficulty breathing (wear O2 at baseline), weakness, or increased fatigue.  Advised for patient to be seen in ER now.  Spouse stated understanding and will have son take patient to ER.

## 2017-08-11 NOTE — PROGRESS NOTES
Clinic Care Coordination Contact     Incoming call from patients wife.   Left a message on CC VM that the  patient is delusional and hearing things .    CC called and talked to Violet/wife and she reports the patient got up this morning at 1:00 am and took his morning pills.  Today at 11:00 the patient stated he has been working hard and needed to rest in the chair.  Patient told his wife his son was visiting but the son had not been in the home today .  Patient got out of the shower this morning and rated his back pain as a 10.5.  Patient took Tramadol and Gabapentin last night at 9:30 and then again at 1:00 am.  Could this be causing the confusion?  CC instructed the patient's wife to closely monitor the patient for safety and confusion.   Wife states the patient has an appointment Sept 14 at the Pain Clinic and was wondering if Dr Fox called the Pain Clinic to get him in sooner.  CC routed this to Dr Fox and the care team  for advice .    Pebbles Leija RN / Clinical Care Coordinator     [address] [address]  [email or web address]  Office :  [phone] / Fax :  [phone]

## 2017-08-11 NOTE — PROGRESS NOTES
Spoke with pt's wife. Pt just ate good supper and also had ice cream for desert after and now  Fell asleep some in chair watching TV which she states is common for him.  HAS been drinking fluids OK. No F/C. No acute urinary sx, cough so doubt infectious delirium. Got excess pain meds with taking AM meds last night at 1 AM. Likely med induced delirium in setting if reduced GFR. Since pt improving, does not need to go to ER. Wife instructed to  Have pt stop taking tramadol, Gabapentin and Nortriptyline for now and I will call pt/wife again tomorrow AM. If mental status back to baseline, will then add back Gabapentin and tramadol cautiously without Nortriptyline. Am working on trying to get pt  To get care transfer to [facility] to consider if candidate for intrathecal pain pump or other option for increased pain control without  Brain CNS side effects. Pt's pain OK at the  moment per wife. If pt has acute severe pain overnight off of the 3 meds mentioned above or has worsening delirium again, then wife will have pt brought to ER but not necessary at this time

## 2017-08-14 NOTE — TELEPHONE ENCOUNTER
METOPROLOL ER 25MG TAB      Last Written Prescription Date: 07/18/16  Last Fill Quantity: 90, # refills: 3    Last Office Visit with G, P or [facility] prescribing provider:  05/16/17   Future Office Visit:        BP Readings from Last 3 Encounters:   07/06/17 128/61   06/15/17 126/66   06/14/17 141/66

## 2017-08-16 NOTE — TELEPHONE ENCOUNTER
Spoke with wife. Pt  Had increased pain off of pain meds so she restarted pt on previous Gabapentin, tramadol and Nortriptyline and pain well controlled now and pt at normal mental status. Previous delirium likely related getting AM pain meds at 1 AM by  Mistake so built up pain med levels too high. Wife is now managing his meds and dispensing to prevent recurrence. Will continue same therapy then for now

## 2017-08-17 NOTE — TELEPHONE ENCOUNTER
Routing refill request to provider for review/approval because:  Drug not active on patient's medication list-

## 2017-08-23 NOTE — PROGRESS NOTES
Clinic Care Coordination Contact  OUTREACH    Referral Information:  Referral Source: CTS  Reason for Contact: Follow up on back pain and confusion  Care Conference: No     Universal Utilization:   ED Visits in last year: 0  Hospital visits in last year: 2  Last PCP appointment: 11/22/16  Missed Appointments: 0  Concerns:  (none)  Multiple Providers or Specialists:  (cardiology, vascular, pain, ortho, neurology, spine surgeon)    Clinical Concerns:  Current Medical Concerns: CC spoke to Kylee/wife and she states the patient reports his back pain as a #4 today.  Patient has an appointment with the Pain Clinic 9/14/2017.  Patient has not had any further episodes of confusion    Clinical Pathway Name: None  Clinical Pathway: None    Medication Management:  Not discussed      Functional Status:  Mobility Status: Independent w/Device  Equipment Currently Used at Home: oxygen, walker, rolling  Psychosocial:  Current living arrangement:: I live in a private home with spouse (TCU)     Resources and Interventions:  Current Resources: Skilled Nursing Facility;  (NA)  PAS Number: [id number]  Senior Linkage Line Referral Placed:  (NA)  Advanced Care Plans/Directives on file:: No  Referrals Placed:  (NA)     Goals:   Goal 1 Statement: I will decrease back pain I will take pain medication as directed,do home exercises and use massage chair       Barriers: Back pain continues   Strengths: Pain Clinic appointment 9/14/2017    Frequency of Care Coordination: PRN  Upcoming appointment: Pain Clinic appointment 9/14/2017     Plan: CC will follow up after Pain Clinic appointment 9/14/2017    Pebbles Leija RN / Clinical Care Coordinator     [address] [address]  [email or web address]  Office :  [phone] / Fax :  [phone]

## 2017-08-25 NOTE — TELEPHONE ENCOUNTER
furosemide (LASIX) 20 MG tablet       Last Written Prescription Date:  2/24/2017  Last Fill Quantity: 90,   # refills: 1  Last Office Visit with G, [facility] or [facility] prescribing provider: 5/16/2017  Future Office visit:       Routing refill request to provider for review/approval because:  Discontinued

## 2017-08-29 NOTE — TELEPHONE ENCOUNTER
Routing refill request to provider for review/approval because:  Medication is reported/historical/ medication d/c on 5/21/17

## 2017-08-29 NOTE — TELEPHONE ENCOUNTER
Pt overdue for f/u labs for kidney, etc. Need labs before will consider longterm RF of Furosemide. RF done for 30 days. Pt to have nonfasting labs done in the next 1 week at either [facility] or [facility] (pt lives in  so may be more convenient to have lab there is they wish). INform pt's wife Kylee as pt is hard of hearing

## 2017-09-14 NOTE — MR AVS SNAPSHOT
After Visit Summary   9/14/2017    Chaz Soler    MRN: [id number]           Patient Information     Date Of Birth          5/21/1928        Visit Information        Provider Department      9/14/2017 11:00 AM Andreina Brewster APRN CNP [facility]        Care Instructions    After Visit Instructions:     Thank you for coming to [facility] for your care. It is my goal to partner with you to help you reach your optimal state of health.     I am recommending multidisciplinary care at this time.  The focus of care will be to continue gradual rehabilitation and pain management with medication adjustments as needed.    Continue daily self-care, identifying contributing factors, and monitoring variations in pain level. Continue to integrate self-care into your life.      1. Andreina Brewster will check with Dr Fox about medication changes. We will ask about a small increase in Tramadol.   2. Procedures recommended: We will look into a Radio Frequency Ablation for low back pain. Andreina Brewster will be in touch about this.    Interventional procedures  are done at our [place] and [place] locations.       CELIA Boyer, NP-C  [facility]    Contact information: [facility]    Please call if any side effects, questions, or concerns arise.    Nurse Triage line:  [phone]   Call this number with any questions or concerns. You may leave a detailed message anytime. Calls are typically returned Monday through Friday between 8 AM and 4:30 PM. We usually get back to you within 2 business days depending on the issue/request.    Scheduling number: [phone].  Call this number to schedule or change appointments.          Medication Refills Policy:    For non-narcotic medications, please your pharmacy directly to request a refill and the pharmacy will call the [facility] for authorization.  Please allow 3-4 days for these refills.    For narcotic refills, call the nurse triage line and leave a message requesting your refill along with the name of the pharmacy that you use. Narcotic prescriptions will be mailed to your pharmacy or you may pick them up at the clinic.  Please call 7-10 days before your refill is due  The above policy allows adequate time so that you do not run out of medication.    No Show - Late Cancellation - Late Arrival Policy  We believe regular attendance is key to your success in our program.    Any time you are unable to keep your appointment we ask that you call us at  least 24 hours in advance to let us know. This will allow us to offer the appointment time to another patient. The following is our policy for missed appointments. This also applies to appointments cancelled with less than 24 hours notice.    You will receive a letter after missing your 1st and 2nd appointments without contacting the clinic before your scheduled appointment time.     After missing 3 appointments without calling first, we will cancel all of your future appointments at [facility].    At that point, you will not be able to resume services unless approved by your care team  We understand that unforseen circumstances arise, however, out of respect for all concerned and to provide this appointment to another patient, this policy will be enforced.    Please note that most follow up appointments are 30 minutes long. If you arrive late, your provider may not be able to see you for the entire 30 minutes. Please also note that if you arrive more than 15 minutes for any appointment, it may be rescheduled.                                     Follow-ups after your visit        Who to contact     If you have questions or need follow up information about today's clinic visit or your schedule please contact [facility] directly at [phone].  Normal or non-critical lab  and imaging results will be communicated to you by MyChart, letter or phone within 4 business days after the clinic has received the results. If you do not hear from us within 7 days, please contact the clinic through [employer] or phone. If you have a critical or abnormal lab result, we will notify you by phone as soon as possible.  Submit refill requests through [employer] or call your pharmacy and they will forward the refill request to us. Please allow 3 business days for your refill to be completed.          Additional Information About Your Visit        [employer] Information     [employer] gives you secure access to your electronic health record. If you see a primary care provider, you can also send messages to your care team and make appointments. If you have questions, please call your primary care clinic.  If you do not have a primary care provider, please call [phone] and they will assist you.        Care EveryWhere ID     This is your Care EveryWhere ID. This could be used by other organizations to access your [facility] medical records  [id number]        Your Vitals Were     Pulse Respirations Pulse Oximetry             107 16 92%          Blood Pressure from Last 3 Encounters:   09/14/17 140/70   07/06/17 128/61   06/15/17 126/66    Weight from Last 3 Encounters:   06/15/17 70.8 kg (156 lb)   05/18/17 70.2 kg (154 lb 12.8 oz)   05/16/17 71.1 kg (156 lb 11.2 oz)              Today, you had the following     No orders found for display       Primary Care Provider Office Phone # Fax #    Alirio Fox [phone]       [address] [place] [address]        Equal Access to Services     [facility]: Hadii aad ku hadasho Soomaali, waaxda luqadaha, qaybta kaalmada adeegkhadra, johnny ingram. So [facility] [phone].    ATENCIÓN: Si habla español, tiene a denise disposición servicios gratuitos de asistencia lingüística. Llame al [phone].    We comply with applicable  federal civil rights laws and [place] laws. We do not discriminate on the basis of race, color, national origin, age, disability sex, sexual orientation or gender identity.            Thank you!     Thank you for choosing [facility]  for your care. Our goal is always to provide you with excellent care. Hearing back from our patients is one way we can continue to improve our services. Please take a few minutes to complete the written survey that you may receive in the mail after your visit with us. Thank you!             Your Updated Medication List - Protect others around you: Learn how to safely use, store and throw away your medicines at www.disposemymeds.org.          This list is accurate as of: 9/14/17 11:53 AM.  Always use your most recent med list.                   Brand Name Dispense Instructions for use Diagnosis    amLODIPine 10 MG tablet    NORVASC    90 tablet    TAKE ONE TABLET BY MOUTH ONCE DAILY    Essential hypertension       AMOXICILLIN PO      Take 500 mg by mouth once as needed        aspirin 81 MG tablet     30 tablet    Take 1 tablet (81 mg) by mouth daily    Coronary artery disease involving native coronary artery of native heart without angina pectoris       atorvastatin 20 MG tablet    LIPITOR    90 tablet    Take 1 tablet (20 mg) by mouth daily    Coronary artery disease involving native coronary artery of native heart without angina pectoris       cyanocobalamin 1000 MCG tablet    vitamin  B-12     Take 1,000 mcg by mouth daily        donepezil 10 MG tablet    ARICEPT    90 tablet    Take 1 tablet (10 mg) by mouth At Bedtime    Memory loss       furosemide 20 MG tablet    LASIX    30 tablet    TAKE ONE TABLET BY MOUTH ONCE DAILY    Congestive heart failure, unspecified congestive heart failure chronicity, unspecified congestive heart failure type (H)       gabapentin 100 MG capsule    NEURONTIN    90 capsule    TAKE ONE CAPSULE BY MOUTH THREE TIMES DAILY FOR  PAIN     Compression fracture       IRON SUPPLEMENT PO      Take 325 mg by mouth daily (with breakfast)        levothyroxine 88 MCG tablet    SYNTHROID/LEVOTHROID    90 tablet    Take 1 tablet (88 mcg) by mouth daily    Hypothyroidism, unspecified type       losartan 100 MG tablet    COZAAR    90 tablet    Take 1 tablet (100 mg) by mouth daily    Essential hypertension       metoprolol 25 MG 24 hr tablet    TOPROL-XL    90 tablet    TAKE ONE TABLET BY MOUTH DAILY    Essential hypertension       nortriptyline 25 MG capsule    PAMELOR    30 capsule    Take 1 capsule (25 mg) by mouth At Bedtime    Other chronic pain       order for DME     1 each    Equipment being ordered: TENS and supplies.    Other chronic pain, Lumbar radiculopathy, DDD (degenerative disc disease), lumbar, Spinal stenosis of lumbar region without neurogenic claudication       pantoprazole 40 MG EC tablet    PROTONIX    90 tablet    TAKE ONE TABLET BY MOUTH EVERY MORNING    Gastroesophageal reflux disease, esophagitis presence not specified       polyethylene glycol Packet    MIRALAX    30 packet    Take 17 g by mouth daily    Chronic constipation       tamsulosin 0.4 MG capsule    FLOMAX    90 capsule    TAKE ONE CAPSULE BY MOUTH ONCE DAILY    Benign prostatic hyperplasia with urinary retention       traMADol 50 MG tablet    ULTRAM    60 tablet    TAKE ONE TABLET BY MOUTH EVERY 6 HOURS AS NEEDED FOR  MODERATE  PAIN    Other chronic pain, Lumbar radiculopathy       TYLENOL PO      Take 325 mg by mouth

## 2017-09-14 NOTE — NURSING NOTE
"Chief Complaint   Patient presents with     Pain       Initial /70 (BP Location: Right arm, Patient Position: Chair, Cuff Size: Adult Regular)  Pulse 107  Resp 16  SpO2 92% Estimated body mass index is 23.72 kg/(m^2) as calculated from the following:    Height as of 6/15/17: 1.727 m (5' 8\").    Weight as of 6/15/17: 70.8 kg (156 lb).  Medication Reconciliation: complete       Shiraz Ochoa MA  [facility]      "

## 2017-09-14 NOTE — PATIENT INSTRUCTIONS
After Visit Instructions:     Thank you for coming to [facility] for your care. It is my goal to partner with you to help you reach your optimal state of health.     I am recommending multidisciplinary care at this time.  The focus of care will be to continue gradual rehabilitation and pain management with medication adjustments as needed.    Continue daily self-care, identifying contributing factors, and monitoring variations in pain level. Continue to integrate self-care into your life.      1. Andreina Brewster will check with Dr Fox about medication changes. We will ask about a small increase in Tramadol.   2. Procedures recommended: We will look into a Radio Frequency Ablation for low back pain. Andreina Brewster will be in touch about this.    Interventional procedures  are done at our [place] and [place] locations.       CELIA Boyer, NP-C  [facility]    Contact information: [facility]    Please call if any side effects, questions, or concerns arise.    Nurse Triage line:  [phone]   Call this number with any questions or concerns. You may leave a detailed message anytime. Calls are typically returned Monday through Friday between 8 AM and 4:30 PM. We usually get back to you within 2 business days depending on the issue/request.    Scheduling number: [phone].  Call this number to schedule or change appointments.          Medication Refills Policy:    For non-narcotic medications, please your pharmacy directly to request a refill and the pharmacy will call the [facility] for authorization. Please allow 3-4 days for these refills.    For narcotic refills, call the nurse triage line and leave a message requesting your refill along with the name of the pharmacy that you use. Narcotic prescriptions will be mailed to your pharmacy or you may pick them up at the clinic.  Please call 7-10 days before your refill  is due  The above policy allows adequate time so that you do not run out of medication.    No Show - Late Cancellation - Late Arrival Policy  We believe regular attendance is key to your success in our program.    Any time you are unable to keep your appointment we ask that you call us at  least 24 hours in advance to let us know. This will allow us to offer the appointment time to another patient. The following is our policy for missed appointments. This also applies to appointments cancelled with less than 24 hours notice.    You will receive a letter after missing your 1st and 2nd appointments without contacting the clinic before your scheduled appointment time.     After missing 3 appointments without calling first, we will cancel all of your future appointments at [facility].    At that point, you will not be able to resume services unless approved by your care team  We understand that unforseen circumstances arise, however, out of respect for all concerned and to provide this appointment to another patient, this policy will be enforced.    Please note that most follow up appointments are 30 minutes long. If you arrive late, your provider may not be able to see you for the entire 30 minutes. Please also note that if you arrive more than 15 minutes for any appointment, it may be rescheduled.

## 2017-09-14 NOTE — Clinical Note
Dr Fox,  Please see recommendation for ongoing medication management, procedures, and therapy. Please do not hesitate to contact me with any questions or concerns.   Thank you,  CELIA Boyer, NP-C [facility]

## 2017-09-14 NOTE — PROGRESS NOTES
"Chaz Soler is a 89 year old male who presents in the company of his wife and son.     This patient is being seen at the request of his primary care provider  Dr. Fox, for evaluation of his pain issues and recommendations for management, with specific emphasis on back pain     Reason for Referral: Comprehensive Evaluation and Management    Please complete the following questions:    What is your diagnosis for the patient's pain?     Do you have any specific questions for the pain specialist? No    Are there any red flags that may impact the assessment or management of the patient? None    Primary Care Provider is Alirio Fox    The current pain medications are being prescribed by PCP    Please see the [facility] health questionnaire which the patient completed and reviewed with me in detail    CHIEF COMPLAINT:  Back pain     HISTORY OF PRESENT ILLNESS:  Chaz Soler is a 89 year old male with history of low back pain       Pain Information:   Onset/Progression:  Pain started \"all my life\" .   Pain quality: Aching    Pain timing: Constant     Pain rating: intensity ranges from 3/10 to 10/10, and averages 5/10 on a 0-10 scale.   Aggravating factors include: Changing positions   Relieving factors include: Sitting in recliner with massage    Past Pain Treatments:    Pain Clinic:   Yes; Previously saw CELIA Santos NP-C at  Pain Center     PT: Yes: At pain center and with home health care     Psychologist: No   Relaxation techniques/biofeedback: No   Chiropractor: No   Acupuncture: No   Pharmacotherapy:     Opioids: Yes      Non-opioids:  Yes    TENs Unit:No   Injections: Yes:    Self-care:   Yes     Current Pain Relevant Medications:    Tylenol 500 mg 1 tab PO PRN pain, average three tabs daily  Gabapentin 100 mg TID  Tramadol 50 mg 1 tab TID PRN pain     Previous Pain Relevant Medications: (H--helped; HI--Helped initially; [facility]--Somewhat helpful; NH--No help; W--worse; " SE--side effects; ?--Unsure if helpful)   NOTE: This medication information taken from patient's intake form, not medical records.    Opiates: Hydrocodone: SE confusion, Oxycodone:SE confusion   NSAIDS: Ibuprofen:H, cannot take due to CKD    Muscle Relaxants: none noted   Anti-migraine mediations: none noted   Anti-depressants: Nortriptyline: SE, memory loss   Sleep aids:none noted   Anxiolytics: none noted   Anti-convulsants: none noted    Topicals: none noted   Other medications not covered above: Tylenol: [facility]    Any illicit drug use: no  EtOH use: no  Caffeine use: no  Nicotine use: no  Any use of prescriptions other than how they were prescribed:no      [place] Board of Pharmacy Data Base Reviewed:    YES; No concern for abuse or misuse of controlled medications based on this report.       PAST MEDICAL HISTORY:   Past Medical History:   Diagnosis Date     AAA (abdominal aortic aneurysm) (H) 10/5/2011    6.1 cm     Anemia 11/30/2011     Aortic stenosis 10/26/2012     CAD (coronary artery disease)     MI - distal inferior/apex. Non transmural     Carotid artery disease (H)      CHF (congestive heart failure) (H) 12/31/2014     CKD (chronic kidney disease) stage 3, GFR 30-59 ml/min 11/30/2011     COPD (chronic obstructive pulmonary disease) (H)      Dementia 8/4/2015     Edema, unspecified edema 1/17/2016     Essential hypertension      Gout 1/06    knee     Hypercalcemia 1/2/2015     Hyperlipidemia LDL goal < 70      Hyperparathyroidism, unspecified (H) 4/22/2015     Hyperplasia of prostate      LEFT LUNG GRANULOMA      Lumbago      Other chronic pain 10/11/2016     Proteinuria 2/8/2012     Pulmonary fibrosis (H)      PVD (peripheral vascular disease) (H)      Thrombocytopenia (H) 11/30/2011     Unspecified hypothyroidism      Vitamin D deficiency          CURRENT FAMILY/SOCIAL SITUATION:  Living situation: Lives with wife, in senior coop,  just had 65th anniversary!  Support system: family  Occupation: retired,  worked as marketing and sales  Current stressors: pain   Safety concerns: falls/balance    FAMILY MEDICAL HISTORY:  Chronic pain: Yes   Family history of headaches:  No      HEALTH & LIFESTYLE PRACTICES:  Social History     Social History     Marital status:      Spouse name: N/A     Number of children: 4     Years of education: 18     Occupational History      Retired     Social History Main Topics     Smoking status: Never Smoker     Smokeless tobacco: Never Used     Alcohol use 0.0 oz/week     0 Standard drinks or equivalent per week      Comment: 1 glass wine/day     Drug use: No     Sexual activity: No     Other Topics Concern     Caffeine Concern Yes     1 day      Sleep Concern Yes     too much      Weight Concern Yes     appetite reduced      Special Diet No     Back Care Yes     Exercise Yes     daily 5 x per week.  recummbant bike      Seat Belt Yes     Social History Narrative    Lives in  Senior co-op in [place] for the past 13 years.     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 Date 6/3/96 (ipaque contrast)  Was premedicated prior to FRANCK and had no reaction at all (solumedrol and benadryl) 2016  Was premedicated with Methylprednisolone protocol, no reaction 1/25/17     Lisinopril      cough     Oxycodone      Delirium       MEDICATIONS:  Current Outpatient Prescriptions   Medication Sig Dispense Refill     furosemide (LASIX) 20 MG tablet TAKE ONE TABLET BY MOUTH ONCE DAILY 30 tablet 0     pantoprazole (PROTONIX) 40 MG EC tablet TAKE ONE TABLET BY MOUTH EVERY MORNING 90 tablet 3     metoprolol (TOPROL-XL) 25 MG 24 hr tablet TAKE ONE TABLET BY MOUTH DAILY 90 tablet 2     aspirin 81 MG tablet Take 1 tablet (81 mg) by mouth daily 30 tablet 11     nortriptyline (PAMELOR) 25 MG capsule Take 1 capsule (25 mg) by mouth At Bedtime 30 capsule 11     tamsulosin (FLOMAX) 0.4 MG capsule TAKE  "ONE CAPSULE BY MOUTH ONCE DAILY 90 capsule 2     amLODIPine (NORVASC) 10 MG tablet TAKE ONE TABLET BY MOUTH ONCE DAILY 90 tablet 3     Acetaminophen (TYLENOL PO) Take 325 mg by mouth       traMADol (ULTRAM) 50 MG tablet TAKE ONE TABLET BY MOUTH EVERY 6 HOURS AS NEEDED FOR  MODERATE  PAIN 60 tablet 5     gabapentin (NEURONTIN) 100 MG capsule TAKE ONE CAPSULE BY MOUTH THREE TIMES DAILY FOR  PAIN 90 capsule 11     losartan (COZAAR) 100 MG tablet Take 1 tablet (100 mg) by mouth daily 90 tablet 3     levothyroxine (SYNTHROID/LEVOTHROID) 88 MCG tablet Take 1 tablet (88 mcg) by mouth daily 90 tablet 3     order for DME Equipment being ordered: TENS and supplies. 1 each 0     polyethylene glycol (MIRALAX) packet Take 17 g by mouth daily 30 packet 11     atorvastatin (LIPITOR) 20 MG tablet Take 1 tablet (20 mg) by mouth daily 90 tablet 3     cyanocobalamin (VITAMIN  B-12) 1000 MCG tablet Take 1,000 mcg by mouth daily       AMOXICILLIN PO Take 500 mg by mouth once as needed       donepezil (ARICEPT) 10 MG tablet Take 1 tablet (10 mg) by mouth At Bedtime 90 tablet 3     Ferrous Sulfate (IRON SUPPLEMENT PO) Take 325 mg by mouth daily (with breakfast)        PHQ-9: Patient Score: 15, \"somewhat difficult\"   Oswestry Disability Index: Not completed    REVIEW OF SYSTEMS:   Constitutional:  Negative  Eyes/Head: Negative  Ears/Nose/Throat: Hearing Loss  Allergy/Immune: Negative  Skin:Negative  Hematologic/Lymphatic/Immunologic:Easy bruising  Respiratory: Shortness of Breath with exertion   Cardiovascular: Negative  Gastrointestinal: Negative  Endocrine: Negative  Musculoskeletal: Back pain  Urinary:  Negative   Any bowel or bladder incontinence: Denies   Neurologic: Memory loss and Tremor  Psychiatric: Negative    PHYSICAL EXAM    /70 (BP Location: Right arm, Patient Position: Chair, Cuff Size: Adult Regular)  Pulse 107  Resp 16  SpO2 92%     Appearance:     A&O. Patient is appropriate.   Patient is in NAD.   Patient is well " groomed and appears stated age.   Patient is not overweight/underweight.     HEENT:   Normocephalic, atraumatic, sclera, conjunctiva and pharynx normal. Pupils are equal, round. Hearing is adequate for exam .  Neck: Supple.  No deformities or adenopathy  Cardiovascular:  Regular rate and rhythm, no murmur. No edema or JVD appreciated. Peripheral pulses are palpable, no edema on bilateral lower extremities.   Pulmonary:  Clear to auscultation bilaterally. No crackles or wheezing.   Abdominal/Pelvic:   Soft, non-tender with good bowel sounds, no masses felt  Skin:  No rashes, erythema, breakdowns, lesions to exposed skin.   Hematologic:  No bruises, petechiae or ecchymosis.  Musculoskeletal:  Posture upright, shoulders and pelvis are leveled.   Deltoid: R: 3/5 L: 3/5  Biceps: R: 3/5 L: 3/5  Triceps: R: 3/5 L: 3/5  Intrinsic hand:R: 3/5 L: 3/5  Hip flexion: R: 3/5 L: 3/5  Knee ext: R: 3/5 L: 3/5  Knee flex: R: 3/5 L: 3/5  Dorsiflexion: R: 3/5 L: 3/5  Plantarflexion:R: 3/5 L: 3/5    Gait pattern:  Unable to walk on the heels and toes. Patient has antalgic gait favoring the right and left side. Uses 4WW for ambulation    Neurological:   Deep Tendon Reflex exam:   Biceps:     R:  2/4   L: 2/4   Brachioradialis   R:  2/4   L: 2/4:   Triceps:  R:  2/4   L: 2/4   Patella:  R:  2/4   L: 2/4   Achilles:  R:  2/4   L: 2/4    Saddle anesthesia: Denies    Sensory exam:   Light touch: normal bilateral upper and lower extremities    Vibration: normal in LE   No allodynia, dysesthesia, or hyperalgesia.    Cervical spine:   Flex:  20 degrees   Ext: 20 degrees   Rotation to right: 20 degrees   Rotation to left: 20 degrees   Rotation/ext to right: pain free   Rotation/ext to left: pain free   Tenderness in the cervical spine at midline. No   Tenderness in the cervical paraspinal muscles. No  Thoracic spine:    Kyphosis. Yes   Tenderness in the thoracic spine at midline. No   Tenderness in the thoracic paraspinal muscles.  No  Lumbar/Sacral spine:   Forward Flexion:  30 degrees   Ext: 0 degrees   Rotation/ext to right: unable to perform   Rotation/ext to left:: unable to perform    Lordosis. No   Tenderness in the lumbar spine at midline. Yes   Tenderness in the lumbar paraspinal muscles.Yes   Straight leg exam: Not assessed due to pain    Jarett/Vasu's test:  Not assessed due to pain    Passive internal rotation:Not assessed due to pain    Active external rotation: Not assessed due to pain    Tenderness over piriformis:     Right: negative     Left:  negative   Tenderness over Trochanteric Bursa:     Right: negative     Left: negative.     Psychiatric: affect and mood normal, recent memory impaired, patient has diagnosis of dementia    DIRE Score for ongoing opioid management is calculated as follows:    Diagnosis = 3 pts (advanced condition; severe pain/objective findings)    Intractability = 1 pt (few therapies tried; passive patient role)    Risk        Psych = 2 pts (personality dysfunction/mental illness that moderately interferes with care)  Dementia       Chem Hlth = 3 pts (no history of chemical dependency; not drug-focused)       Reliability = 3 pts (highly reliable with meds, appointments, treatments)       Social = 3 pts (supportive family/close relationships; involved in work/school; no isolation)       (Psych + Chem hlth + Reliability + Social) = 15    Efficacy = 3 pts (good improvement/function; good quality of life; stable med doses)    DIRE Score = 18        7-13: likely NOT suitable candidate for long-term opioid analgesia       14-21: may be a suitable candidate for long-term opioid analgesia    Previous Diagnostic Tests:   Imaging Studies:   MR LUMBAR SPINE WITHOUT CONTRAST  5/11/2017 3:21 PM     HISTORY: Recent fall. MR for consideration of vertebroplasty  appropriateness.     COMPARISON: Plain films 4/27/2017. MR 11/4/2016.     TECHNIQUE: Routine MR lumbar spine mid T12 through the sacrum.     FINDINGS:  Moderate scoliotic curve concave to the right centered at  L2-L3. Mild compression of the superior endplate of L3 with moderately  extensive bone edema on STIR images. This indicates this is a subacute  compression fracture that has not yet healed. No retropulsion. This  would be amenable to vertebroplasty if indicated. There is subtle high  T2 signal in the anterosuperior portion of the L2 vertebral body which  could be bone bruising or microfractures. I do not see a well-defined  area of compression. I would favor treatment of the L3 vertebral body  and not L2 at this time.     Degenerative changes as follows:     L1-L2: Mild annular bulge. No central or lateral stenosis.     L2-L3: No disc protrusion. No central or lateral stenosis.     L3-L4: Moderate bilateral facet joint disease worse on the left. No  central or lateral stenosis.     L4-L5: Moderate degenerative narrowing of the interspace. Schmorl's  node in the inferior endplate of L4. Advanced facet joint disease.  Mild central stenosis. Moderate to severe right-sided foraminal  stenosis and mild left-sided foraminal stenosis.     L5-S1: Moderate narrowing of the interspace. Minimal annular bulge. No  central or lateral stenosis. Moderate facet joint disease on the left.         IMPRESSION:  1. Mild compression fracture of the superior endplate of L3 but  considerable edema throughout most of the L3 vertebral body. This  would be amenable to vertebroplasty. No retropulsion.  2. There may be minimal bone edema in the anterosuperior margin of L2  but no definite fracture line identified.  3. Degenerative changes as described.  4. The degenerative disease was present on 11/4/2016. The compression  fracture of L3 is new.    ASSESSMENT:   1.  Low back pain: DDD, recent vertebral fracture with vertebroplasty, lumbar stenosis, facet arthropathy.   2.  Dementia  3.  Chronic kidney disease, stage 3  4.  Physical deconditioning.     Mr Ryder is a very pleasant  gentleman who presents in the company of his wife of 65 years and his son. He suffered a fall in January 2017 with a vertebral fracture at L3 and was in a nursing home for rehab for sometime. He had a vertebroplasty on 5/18/17.  He continues to have significant pain impairing his functioning and sleep. He has baseline dementia which would preclude him from active participation in the multidisciplinary pain management program. Therefore we will make recommendations back to primary care for ongoing management..     PLAN:      Recommendations:     Medication: As Mr Moore does not manage his own medications, a small increase in Tramadol is appropriate. He currently takes Tramadol 50 mg 1 tab TID PRN. I recommend allowing Tramadol 50 mg 1 tab QID PRN. Medication management to remain with current prescriber.     Physical therapy: Mr Moore is quite deconditioned which may be contributing to his overall pain. While long term rehab potential is poor given his age and overall health, I would recommend a limited term of in home PT to increase strength, stability and activity level. He currently does exercises with his wife's supervision but I would recommend working with PT directly for additional guidance.     Interventional Procedures: Given recent vertebral fracture, we can proceed cautiously with a steroid injection for facet arthropathy. Would inject at levels L4-5, L5-S1 pending interventionalist recommendation on the day of injections. Order is written and patient will be contacted by our schedulers to make this appt. Mr Soler can repeat this injection once more yearly. Although usually up to four injections is allowed per year, given his history of vertebral fracture we will be a bit more cautious.     I have reviewed these recommendations with Mrs Soler and answered all questions.     F/U with Pain Center as needed. Future injections can be ordered by primary care provider.     TIME SPENT:   A total of 60   minutes was spent on the patient today, greater than 50% of that time was spent on face to face counseling and care coordination regarding diagnoses and treatment options as mentioned above.    I would like to thank CELIA Gregory and Alirio Fox MD for allowing me to participate in the management of this patient.     CELIA Boyer, NP-C  [facility]    Chart documentation done in part with Dragon Voice recognition Software. Although reviewed after completion, some word and grammatical error may remain.

## 2017-09-19 NOTE — TELEPHONE ENCOUNTER
Pre-screening Questions for Radiology Injections:    Injection to be done at which interventional clinic site? [facility]    Procedure ordered by     Procedure ordered?  Lumbar Facet vs TBD    What insurance would patient like us to bill for this procedure? [employer]/ medicare      Worker's comp-Any injection DO NOT SCHEDULE and route to Mariama Garcia.      [employer] insurance - For SI joint injections, DO NOT SCHEDULE and route Selena Kumar.      [employer]- MBB's must be scheduled at LEAST two weeks apart      [employer] - Any injection besides hip/shoulder/knee joint DO NOT SCHEDULE and route to Selena Kumar. She will obtain PA and call pt back to schedule procedure or notify pt of denial.     HP [employer]-PA REQUIRED FOR NON-FRANCK OR Joint injections    Any chance of pregnancy? Not Applicable   If YES, do NOT schedule and route to RN pool    Is an  needed? No     Patient has a drive home? (mandatory) YES:     Is patient taking any blood thinners (plavix, coumadin, jantoven, warfarin, heparin, pradaxa or dabigatran )? No   If hold needed, do NOT schedule, route to RN pool     Is patient taking any aspirin products? Yes - Pt takes 81mg daily; instructed to hold 0 day(s) prior to procedure.      If more than 325mg/day do NOT schedule; route to RN pool     For CERVICAL procedures, hold all aspirin products for 6 days.      Does the patient have a bleeding or clotting disorder? No     If yes, okay to schedule AND route to RN nurse pool    **For any patients with platelet count <100, must be forwarded to provider**    Is patient diabetic?  No  If YES, have them bring their glucometer.    Does patient have an active infection or treated for one within the past week? No     Is patient currently taking any antibiotics?  No     For patients on chronic, preventative, or prophylactic antibiotics, procedures may be scheduled.     For patients on antibiotics for active or recent infection:    Tamra Barrios  Daryl Gastelum [place], [place]-antibiotic course must have been completed for 4 days    Tamra Mccoy-antibiotic course must have been completed for 7 days    Is patient currently taking any steroid medications? (i.e. Prednisone, Medrol)  No     For patients on steroid medications:    Daryl Lyn [place], [place]-steroid course must have been completed for 4 days    Tamra Mccoy-steroid course must have been completed for 7 days    Reviewed with patient:  If you are started on any steroids or antibiotics between now and your appointment, you must contact us because it may affect our ability to perform your procedure.  Yes    Is patient actively being treated for cancer or immunocompromised, including the spleen having been removed? No    If YES, do NOT schedule and route to RN pool     **For Dr. Cagle patients without spleens should have the chart sent to her**    Are you able to get on and off an exam table with minimal or no assistance? Yes  If NO, do NOT schedule and route to RN pool    Are you able to roll over and lay on your stomach with minimal or no assistance? Yes  If NO, do NOT schedule and route to RN pool     Any allergies to contrast dye, iodine, shellfish, or numbing and steroid medications? Yes - Contrast Dye  If YES, route to RN pool AND add allergy information to appointment notes    Allergies: Contrast dye; Lisinopril; and Oxycodone        Has the patient had a flu shot or any other vaccinations within 7 days before or after the procedure.  No       Does patient have an MRI/CT?  YES:   (SI joint, hip injections, lumbar sympathetic blocks, and stellate ganglion blocks do not require an MRI)    Was the MRI done w/in the last 3 years?  Yes    Was MRI done at [facility]? Yes      If not, where was it done? N/A       If MRI was not done at [facility], [facility] or [facility] do NOT schedule and route to nursing.  If pt has an imaging disc, the injection may be scheduled but pt has to  bring disc to appt. If they show up w/out disc the injection cannot be done    Reminders (please tell patient if applicable):       Instructed pt to arrive 30 minutes early for IV start if this is for a cervical procedure, ALL sympathetic (stellate ganglion, hypogastric, or lumbar sympathetic block) and all sedation procedures (RFA, spinal cord stimulation trials).  Not Applicable    -IVs are not routinely placed for [place] and [place] cervical case       If NPO for sedation, informed patient that it is okay to take medications with sips of water (except if they are to hold blood thinners).  Not Applicable   *DO take blood pressure medication if it is prescribed*      If this is for a cervical FRANCK, informed patient that aspirin needs to be held for 6 days.   Not Applicable      For all patients not having spinal cord stimulator (SCS) trials or radiofrequency ablations (RFAs), informed patient:  IV sedation is not provided for this procedure.  If you feel that an oral anti-anxiety medication is needed, you can discuss this further with your referring provider or primary care provider.  The Pain Clinic provider will discuss specifics of what the procedure includes at your appointment.  Most procedures last 10-20 minutes.  We use numbing medications to help with any discomfort during the procedure.  Not Applicable      Do not schedule procedures requiring IV placement in the first appointment of the day or first appointment after lunch.       For patients 85 or older we recommend having an adult stay w/ them for the remainder of the day.       Does the patient have any questions?  NO  Dominick Alvarez  [facility]

## 2017-09-21 NOTE — PROGRESS NOTES
Tramadol Rx changed to 50mg tab, 1 tab 4 times a day scheduled as recommended by pain clinic. Please call in New tramadol Rx to pt's pharmacy. Pt to see me Monday when I am back in clnic either at 11:00 or 4:30 in one of the same day appt slots.  Please schedule  Pt and inform pt's wife Kylee since pt is hard of hearing

## 2017-09-21 NOTE — TELEPHONE ENCOUNTER
Patients wife left  at 11:18 am      She would like to speak to Andreina about the patients problems.        Selena K[facility]IS[facility]    [facility]

## 2017-09-21 NOTE — PROGRESS NOTES
Clinic Care Coordination Contact  OUTREACH    Referral Information:  Referral Source: CTS  Reason for Contact: Referral Source: CTS  Reason for Contact: Reason for Contact: Hospitalization 1/29-2/1/2017-  DISCHARGE DIAGNOSIS:   1. Compression fracture L2-3    2. Chronic low back pain, unspecified back pain laterality, with sciatica presence unspecified    3. Pulmonary fibrosis (H)    4. CAP (community acquired pneumonia)    5. Dementia with behavioral disturbance, unspecified dementia type    6. Essential hypertension    7. Coronary artery disease involving native coronary artery of native heart without angina pectoris    8. Congestive heart failure, unspecified congestive heart failure chronicity, unspecified congestive heart failure type (H)    9. S/P TAVR (transcatheter aortic valve replacement)    10. CKD (chronic kidney disease) stage 3, GFR 30-59 ml/min    11. Physical deconditioning          Care Conference: No     Universal Utilization:   ED Visits in last year: 0  Hospital visits in last year: 2  Last PCP appointment: 11/22/16  Missed Appointments: 0  Concerns:  (none)  Multiple Providers or Specialists:  (cardiology, vascular, pain, ortho, neurology, spine surgeon)    Clinical Concerns:  Current Medical Concerns: CC spoke to Violet wife and she reports the patient had an appointment with the Pain Clinic 9/14/2017 and they were to notify Dr Fox to increase pain medication  Patient is on Tramadol-Gabapentin and Tylenol (Pharmacy [facility] in [place]) .     Patient is scheduled to have a steroid injection 10/18 at [facility] and then will have another injection in 2 weeks   Violet has notices the patient is experiencing increased SOB with activity.  Denies any edema in ankles .  CC encouraged daily weights.  CC will request an appointment with Dr Fox .  Wife refuses a visit with another provider in the clinic   Clinical Pathway Name: None  Clinical Pathway: None    Medication Management:  Pain medication  reviewed      Functional Status:  Mobility Status: Independent w/Device     Psychosocial:  Current living arrangement:: I live in a private home with spouse (TCU)     Resources and Interventions:  Current Resources: Skilled Nursing Facility;  (NA)  PAS Number: [id number]  Senior Linkage Line Referral Placed:  (NA)  Advanced Care Plans/Directives on file:: No  Referrals Placed:  (NA)     Goals:   Goal 1 Statement: I will decrease back pain -I will use massage chair,home exercises keep Pain Clinic appointments and take pain medications as directed       Barriers: Back Pain   Strengths: Following the Pain Clinic     Frequency of Care Coordination: PRN  Upcoming appointment: Steroid injection at [facility] 10/18/2017      Plan:   CC will request an appointment with Dr Fox for increased SOB  CC will follow up in 2-4 weeks     Pebbles Leija RN / Clinical Care Coordinator     [address] [address]  [email or web address]  Office :  [phone] / Fax :  [phone]

## 2017-09-22 NOTE — TELEPHONE ENCOUNTER
furosemide (LASIX) 20 MG tablet      Last Written Prescription Date: 8/29/17  Last Fill Quantity: 30, # refills: 0  Last Office Visit with [facility], [facility] or [facility] prescribing provider: 5/16/17  Next 5 appointments (look out 90 days)     Sep 25, 2017 11:00 AM CDT   [facility]    [address] [place] [id number] [phone]                   Potassium   Date Value Ref Range Status   09/05/2017 4.7 3.4 - 5.3 mmol/L Final     Creatinine   Date Value Ref Range Status   09/05/2017 1.96 (H) 0.66 - 1.25 mg/dL Final     BP Readings from Last 3 Encounters:   09/14/17 140/70   07/06/17 128/61   06/15/17 126/66

## 2017-09-22 NOTE — TELEPHONE ENCOUNTER
Wife Kemi had questions about RFA. She reports he has tried these facet joint injections before and has received only very temporary pain relief. She is wondering if it would make more sense to pursue the RFA that Andreina discussed with them.     YENNY VelizN, RN  Care Coordinator  [facility]

## 2017-09-25 NOTE — MR AVS SNAPSHOT
After Visit Summary   9/25/2017    Chaz Soler    MRN: [id number]           Patient Information     Date Of Birth          5/21/1928        Visit Information        Provider Department      9/25/2017 11:00 AM Alirio Fox MD [facility]        Today's Diagnoses     Chronic back pain, unspecified back location, unspecified back pain laterality    -  1    Pulmonary fibrosis (H)           Follow-ups after your visit        Additional Services     PULMONARY MEDICINE REFERRAL       Your provider has referred you to: N: [facility] [phone]   [email or web address]    Please be aware that coverage of these services is subject to the terms and limitations of your health insurance plan.  Call member services at your health plan with any benefit or coverage questions.      Please bring the following with you to your appointment:    (1) Any X-Rays, CTs or MRIs which have been performed.  Contact the facility where they were done to arrange for  prior to your scheduled appointment.    (2) List of current medications   (3) This referral request   (4) Any documents/labs given to you for this referral                  Your next 10 appointments already scheduled     Oct 18, 2017  1:15 PM CDT   Radiology Injections with Talisha Beal MD   [facility] ([facility])    [address] [address]   [phone]              Future tests that were ordered for you today     Open Future Orders        Priority Expected Expires Ordered    XR Chest 2 Views Routine 9/25/2017 9/25/2018 9/25/2017            Who to contact     If you have questions or need follow up information about today's clinic visit or your schedule please contact [facility] directly at [phone].  Normal or non-critical lab and imaging results will be communicated to you by MyChart, letter or phone  within 4 business days after the clinic has received the results. If you do not hear from us within 7 days, please contact the clinic through [facility] [employer] or phone. If you have a critical or abnormal lab result, we will notify you by phone as soon as possible.  Submit refill requests through [employer] or call your pharmacy and they will forward the refill request to us. Please allow 3 business days for your refill to be completed.          Additional Information About Your Visit        [employer] Information     [employer] gives you secure access to your electronic health record. If you see a primary care provider, you can also send messages to your care team and make appointments. If you have questions, please call your primary care clinic.  If you do not have a primary care provider, please call [phone] and they will assist you.        Care EveryWhere ID     This is your Care EveryWhere ID. This could be used by other organizations to access your [place] medical records  [id number]        Your Vitals Were     Pulse Temperature Pulse Oximetry             80 97.6  F (36.4  C) (Oral) 92%          Blood Pressure from Last 3 Encounters:   09/14/17 140/70   07/06/17 128/61   06/15/17 126/66    Weight from Last 3 Encounters:   06/15/17 156 lb (70.8 kg)   05/18/17 154 lb 12.8 oz (70.2 kg)   05/16/17 156 lb 11.2 oz (71.1 kg)              We Performed the Following     PULMONARY MEDICINE REFERRAL          Today's Medication Changes          These changes are accurate as of: 9/25/17 11:46 AM.  If you have any questions, ask your nurse or doctor.               These medicines have changed or have updated prescriptions.        Dose/Directions    * lidocaine-prilocaine cream   Commonly known as:  EMLA   This may have changed:  Another medication with the same name was added. Make sure you understand how and when to take each.   Changed by:  Alirio Fox MD        Refills:  0       * lidocaine-prilocaine cream   Commonly known as:   EMLA   This may have changed:  You were already taking a medication with the same name, and this prescription was added. Make sure you understand how and when to take each.   Used for:  Chronic back pain, unspecified back location, unspecified back pain laterality   Changed by:  Alirio Fox MD        Apply topically as needed for moderate pain To low back, right hip  and right leg Apply same time as Diclofenac gel   Quantity:  30 g   Refills:  2       * Notice:  This list has 2 medication(s) that are the same as other medications prescribed for you. Read the directions carefully, and ask your doctor or other care provider to review them with you.         Where to get your medicines      Call your pharmacy to confirm that your medication is ready for pickup. It may take up to 24 hours for them to receive the prescription. If the prescription is not ready within 3 business days, please contact your clinic or your provider.     We will let you know when these medications are ready. If you don't hear back within 3 business days, please contact us.     lidocaine-prilocaine cream                Primary Care Provider Office Phone # Fax #    Alirio Fox [phone]       [address] [place] [address]        Equal Access to Services     [facility]: Hadii laura thakur hadasho Sobrittanyali, waaxda luqadaha, qaybta kaalmada adedemaryada, johnny dinh . So [facility] [phone].    ATENCIÓN: Si habla español, tiene a denise disposición servicios gratuitos de asistencia lingüística. Llame al [phone].    We comply with applicable federal civil rights laws and [place] laws. We do not discriminate on the basis of race, color, national origin, age, disability sex, sexual orientation or gender identity.            Thank you!     Thank you for choosing [facility]  for your care. Our goal is always to provide you with excellent care. Hearing back from our patients is  one way we can continue to improve our services. Please take a few minutes to complete the written survey that you may receive in the mail after your visit with us. Thank you!             Your Updated Medication List - Protect others around you: Learn how to safely use, store and throw away your medicines at www.disposemymeds.org.          This list is accurate as of: 9/25/17 11:46 AM.  Always use your most recent med list.                   Brand Name Dispense Instructions for use Diagnosis    amLODIPine 10 MG tablet    NORVASC    90 tablet    TAKE ONE TABLET BY MOUTH ONCE DAILY    Essential hypertension       AMOXICILLIN PO      Take 500 mg by mouth once as needed        aspirin 81 MG tablet     30 tablet    Take 1 tablet (81 mg) by mouth daily    Coronary artery disease involving native coronary artery of native heart without angina pectoris       atorvastatin 20 MG tablet    LIPITOR    90 tablet    Take 1 tablet (20 mg) by mouth daily    Coronary artery disease involving native coronary artery of native heart without angina pectoris       cyanocobalamin 1000 MCG tablet    vitamin  B-12     Take 1,000 mcg by mouth daily        donepezil 10 MG tablet    ARICEPT    90 tablet    Take 1 tablet (10 mg) by mouth At Bedtime    Memory loss       furosemide 20 MG tablet    LASIX    30 tablet    TAKE ONE TABLET BY MOUTH ONCE DAILY    Congestive heart failure, unspecified congestive heart failure chronicity, unspecified congestive heart failure type (H)       gabapentin 100 MG capsule    NEURONTIN    90 capsule    TAKE ONE CAPSULE BY MOUTH THREE TIMES DAILY FOR  PAIN    Compression fracture       levothyroxine 88 MCG tablet    SYNTHROID/LEVOTHROID    90 tablet    Take 1 tablet (88 mcg) by mouth daily    Hypothyroidism, unspecified type       * lidocaine-prilocaine cream    EMLA          * lidocaine-prilocaine cream    EMLA    30 g    Apply topically as needed for moderate pain To low back, right hip  and right leg Apply  same time as Diclofenac gel    Chronic back pain, unspecified back location, unspecified back pain laterality       losartan 100 MG tablet    COZAAR    90 tablet    Take 1 tablet (100 mg) by mouth daily    Essential hypertension       * methylPREDNISolone 32 MG tablet    MEDROL          * methylPREDNISolone 16 MG tablet    MEDROL          metoprolol 25 MG 24 hr tablet    TOPROL-XL    90 tablet    TAKE ONE TABLET BY MOUTH DAILY    Essential hypertension       nortriptyline 25 MG capsule    PAMELOR    30 capsule    Take 1 capsule (25 mg) by mouth At Bedtime    Other chronic pain       order for DME     1 each    Equipment being ordered: TENS and supplies.    Other chronic pain, Lumbar radiculopathy, DDD (degenerative disc disease), lumbar, Spinal stenosis of lumbar region without neurogenic claudication       pantoprazole 40 MG EC tablet    PROTONIX    90 tablet    TAKE ONE TABLET BY MOUTH EVERY MORNING    Gastroesophageal reflux disease, esophagitis presence not specified       polyethylene glycol Packet    MIRALAX    30 packet    Take 17 g by mouth daily    Chronic constipation       tamsulosin 0.4 MG capsule    FLOMAX    90 capsule    TAKE ONE CAPSULE BY MOUTH ONCE DAILY    Benign prostatic hyperplasia with urinary retention       traMADol 50 MG tablet    ULTRAM    120 tablet    1 tab 4 times a day    Other chronic pain, Lumbar radiculopathy       TYLENOL PO      Take 325 mg by mouth        * Notice:  This list has 4 medication(s) that are the same as other medications prescribed for you. Read the directions carefully, and ask your doctor or other care provider to review them with you.

## 2017-09-25 NOTE — NURSING NOTE
"Chief Complaint   Patient presents with     Shortness of Breath     Shortness of breath when walking       Initial Pulse 80  Temp 97.6  F (36.4  C) (Oral)  SpO2 92% Estimated body mass index is 23.72 kg/(m^2) as calculated from the following:    Height as of 6/15/17: 5' 8\" (1.727 m).    Weight as of 6/15/17: 156 lb (70.8 kg).  Medication Reconciliation: complete    "

## 2017-09-25 NOTE — PROGRESS NOTES
SUBJECTIVE:   Chaz Soler is a 89 year old male who presents to clinic today for the following health issues:        Chief Complaint   Patient presents with     Shortness of Breath     Shortness of breath when walking     Pt's past medical history, family history, habits, medications and allergies were reviewed with the patient today.  See snap shot for  HCM status. Most recent lab results reviewed with pt. Problem list and histories reviewed & adjusted, as indicated.  Additional history as below:    No cough. No F/C.    No chest pain. On chronic O2 4 liters.   Using chest brace for  thoracic spine and  low back. Breathing the same whether wears it or not.  Hx pulm fibrosis. Last saw pulmonary years ago with stable reduced CLDO and  TLC at that time. Previous med trial through [facility] with acetylcysteine prior to that without much change sx.  Most recent labs as below. Mild anemia chronic and stable. Not to point toexplain SOB.  Denies blood seen in stools. Likely related to CKD. GFR worsened with diuretic use. Denies orthopnea, PND    Component      Latest Ref Rng & Units 9/5/2017   Sodium      133 - 144 mmol/L 142   Potassium      3.4 - 5.3 mmol/L 4.7   Chloride      94 - 109 mmol/L 103   Carbon Dioxide      20 - 32 mmol/L 32   Anion Gap      3 - 14 mmol/L 7   Glucose      70 - 99 mg/dL 105 (H)   Urea Nitrogen      7 - 30 mg/dL 34 (H)   Creatinine      0.66 - 1.25 mg/dL 1.96 (H)   GFR Estimate      >60 mL/min/1.7m2 32 (L)   GFR Estimate If Black      >60 mL/min/1.7m2 39 (L)   Calcium      8.5 - 10.1 mg/dL 9.6   WBC      4.0 - 11.0 10e9/L 8.7   RBC Count      4.4 - 5.9 10e12/L 3.92 (L)   Hemoglobin      13.3 - 17.7 g/dL 11.6 (L)   Hematocrit      40.0 - 53.0 % 36.3 (L)   MCV      78 - 100 fl 93   MCH      26.5 - 33.0 pg 29.6   MCHC      31.5 - 36.5 g/dL 32.0   RDW      10.0 - 15.0 % 12.9   Platelet Count      150 - 450 10e9/L 165        Additional ROS:   Constitutional, HEENT, Cardiovascular, Pulmonary,  "GI and , Neuro, MSK and Psych review of systems/symptoms are otherwise negative or unchanged from previous, except as noted above.      OBJECTIVE:  /68  Pulse 80  Temp 97.6  F (36.4  C) (Oral)  SpO2 92%   Estimated body mass index is 23.72 kg/(m^2) as calculated from the following:    Height as of 6/15/17: 5' 8\" (1.727 m).    Weight as of 6/15/17: 156 lb (70.8 kg).  Eye: PERRL, EOMI  HENT: ear canals and TM's normal and nose and mouth without ulcers or lesions   Neck: no adenopathy. Thyroid normal to palpation. No bruits  Pulm:  Fine chronic crackles at bases bilaterally. On 4 liters O2  CV: Regular rates and rhythm  GI: Soft, nontender, Normal active bowel sounds, No hepatosplenomegaly or masses palpable  Ext: Peripheral pulses intact. No edema.  Neuro: Normal strength and tone, sensory exam grossly normal  Back: Tenderness to palpation bilateral paralumbar area. Neg SLRT bilaterally.      Assessment/Plan: (See plan discussion below for further details)  1. Pulmonary fibrosis (H)  See plan below. Will check CXR to r/o new acute pulm issue. SOB likely related to progression of fibrosis. Anemia stable and actually improved some so would not explain recent increase SOB.  Recent ECHO with EF OK. No sig CAD seen on heart angio 3 years ago prior to TAVR so ischemia  not likely to explain SOB and pt does not have   - PULMONARY MEDICINE REFERRAL  - XR Chest 2 Views; Future    2. Chronic back pain, unspecified back location, unspecified back pain laterality  Topical pain med renewed that pt had been given through pain clinic. WIll send note to pain clinic to conatct family also to hel clarify upcoming treatment plan (LESI vs RFA procedure  - lidocaine-prilocaine (EMLA) cream; Apply topically as needed for moderate pain To low back, right hip  and right leg Apply same time as Diclofenac gel  Dispense: 30 g; Refill: 2    3. CKD (chronic kidney disease) stage 3, GFR 30-59 ml/min   GFR worsened with diuretic therapy " and no change breathing with it. Will stop it and repeat BMP lab 2 weeks  - Basic metabolic panel; Future    Plan discussion:   CXR to r/o acute changes such as infiltrate  Stop Furosemide   Repeat nonfasting BMP lab in 2-3 weeks  Appt with Pulmonary (Dr Taylor) for repeat PFTs with DLCO and opinion of any other med treatment options to slow pulm fibrosis progression       Alirio Fox MD  Internal Medicine Department  [facility]

## 2017-09-26 NOTE — TELEPHONE ENCOUNTER
Prior authorization    Medication name [email or web address]  Insurance medicareue  Insurance ID number [id number]  Prior authorization faxed through cover my meds

## 2017-09-27 NOTE — TELEPHONE ENCOUNTER
Furosemide being stopped as kidney function worsened with med and getting dehydrated. Discussed with pt. Inform pharmacy and then close encounter

## 2017-09-27 NOTE — TELEPHONE ENCOUNTER
Had spoken with pt earlier today re: stopping lasix and having repeat lab in 2 weeks. Spoke now with wife to convey message to her also since pt has some memory issues even though had pt write down my instructions earlier when I spoke with him and repeat them back to me

## 2017-10-01 NOTE — H&P
Full H and P Dictated    89 yo with hx of progressive pulm fibrosis on 4L O2, CAD, s/p bioprosthetic AVR via TAVR (not on anti-coag), HTN, anxiety and mild dementia who comes in with c/o acute on chronic SOB while getting OOB this morning along with c/o back and left sided CP. He also c/o dry heaving and mild nausea this am.     Work up in ED shows normal troponin, Baseline CKD Cr of 1.49, acceptable BMP.   EKG shows sinus with PVC and no ischemic changes.   CXR did show increased infiltrate of the LLL but not sure of the significance given no new cough, fevers, URI sxs.     [place] to OBS for acute on chronic SOB, chest pain and abnl CXR for ? PNA  -serial trop to r/o acs but my suspicion is low.   -I suspect his CP might be more multifactorial due to SOB, mild anxiety as he said the mornings are a tough time for him to get up and going and usually is accompanied by a lot of back back and breathing difficulty at baseline.   -Given know mod-CAD, not unreasonable to do Lexiscan tomorrow. Last cath was 2014, mod disease.  -also unsure of significance of increased LLL infiltrate. No fever, cough or chills and and normal WBC all not suggestive of PNA but given mild dementia and questionable reliability of history, will start empiric abx with Levaquin for now and check procalcitonin. If negative then would d/c abx.

## 2017-10-01 NOTE — ED NOTES
.  [facility]  ED Nurse Handoff Report    Chaz Soler is a 89 year old male   ED Chief complaint: Chest Pain  . ED Diagnosis:   Final diagnoses:   Chest pain, unspecified type     Allergies:   Allergies   Allergen Reactions     Contrast Dye      SOB, increased Bp, difficulty swallowing. Date 6/3/96 (ipaque contrast)  Was premedicated prior to FRANCK and had no reaction at all (solumedrol and benadryl) 2016  Was premedicated with Methylprednisolone protocol, no reaction 1/25/17     Lisinopril      cough     Oxycodone      Delirium       Code Status: Full Code  Activity level - Baseline/Home:  Independent. Activity Level - Current:   Stand with Assist. Lift room needed: No. Bariatric: No   Needed: No   Isolation: No. Infection: Not Applicable.     Vital Signs:   Vitals:    10/01/17 1100 10/01/17 1115 10/01/17 1130 10/01/17 1145   BP: 156/76 156/80 136/82 (!) 140/99   Resp:       Temp:       TempSrc:       SpO2: 99% 99% 100% 98%       Cardiac Rhythm:  ,   Cardiac  Cardiac Rhythm: Normal sinus rhythm  Pain level:    Patient confused: Yes, hx dementia; has been completely cooperative. Patient Falls Risk: Yes.   Elimination Status: has not voided   Patient Report - Initial Complaint: presents to the emergency department today via EMS for evaluation of chest pain. The patient's wife reports the patient was mildly incoherent last night and was unable to sleep with continuous chatter and goofiness throughout the night, however this morning between 0700 and 0800 the daughter reports the patient was heaving as though he was going to vomit and complaining of shallow pain in the upper left chest with mild incoherency. The patient reports the chest pain is across his chest  Focused Assessment: Cardiac - Cardiac WDL:  WDL except  Review of Systems (Cardiac) - Cardiac Signs/Symptoms: chest pain; nausea (prior to arrival; denies at this time)  Chest Pain Assessment - Rating (0-10):  (0) Chest Pain  Reproducible?: No  Cardiac Monitoring - EKG Monitoring: Yes Cardiac Regularity: Regular (with frequent PVC's) Cardiac Rhythm: NSR   Cognitive/Perceptual/Neuro - Cognitive/Neuro/Behavioral WDL:  WDL except (hx dementia; int confusion. Family notes talking about things not present and seeing things more over the past 24-48 hrs. Has had episodes of this at times, but not as many as past few days)  [place] Coma Scale - Best Eye Response: 4-->(E4) spontaneous Best Motor Response: 6-->(M6) obeys commands Best Verbal Response: 4-->(V4) confused [place] Coma Scale Score: 14  Tests Performed: EKG, labs, CXR. Abnormal Results: .  Labs Ordered and Resulted from Time of ED Arrival Up to the Time of Departure from the ED   CBC WITH PLATELETS DIFFERENTIAL - Abnormal; Notable for the following:        Result Value    RBC Count 4.31 (*)     Hemoglobin 12.8 (*)     Hematocrit 38.2 (*)     All other components within normal limits   COMPREHENSIVE METABOLIC PANEL - Abnormal; Notable for the following:     Glucose 105 (*)     Creatinine 1.49 (*)     GFR Estimate 44 (*)     GFR Estimate If Black 54 (*)     Alkaline Phosphatase 176 (*)     All other components within normal limits   INR   LIPASE   TROPONIN I   MAGNESIUM   .   Treatments provided: see MAR  Comments: patient is on 4L O2 via nc at home; seeing pulmonologist next week  OBS brochure/video discussed/provided to patient:  Yes, family declined to receive information or view video as have seen multiple times before  ED Medications:   Medications   nitroGLYcerin (NITROSTAT) sublingual tablet 0.4 mg (not administered)   aspirin chewable tablet 324 mg (324 mg Oral Given 10/1/17 1023)     Drips infusing:  No  For the majority of the shift, the patient's behavior Green. Interventions performed were NA.     Severe Sepsis OR Septic Shock Diagnosis Present: No      ED Nurse Name/Phone Number: Johnna Swenson RN ERB  11:50 AM    ,RECEIVING UNIT ED HANDOFF REVIEW    Above ED Nurse Handoff  Report was reviewed: YES  Reviewed by: Gifty Owens on October 1, 2017 at 12:39 PM

## 2017-10-01 NOTE — ED PROVIDER NOTES
"  History     Chief Complaint:  Chest Pain    HPI   Chaz Soler is a 89 year old male who presents to the emergency department today via EMS for evaluation of chest pain. The patient's wife reports the patient was mildly incoherent last night and was unable to sleep with continuous chatter and goofiness throughout the night, however this morning between 0700 and 0800 the daughter reports the patient was retching and complaining of shallow pian in the upper left chest with mild incoherency. The patient reports the chest pain radiates across his chest, but no other locations. The patient endorses back problems in his \"lumbar area\" and is unsure \"what is radiating from what\". The patient reports shortness of breath \"all the time\", but \"felt worse\" with this episode of chest pain and presented with diaphoresis. The patient sleeps with oxygen and was recently bumped up from 2 liters to 4 liters due to his shortness of breath, which increases with exertion. The patient reports mild general discomfort and mild central chest discomfort with feels like \"compression\" here in the ED, but otherwise he \"fells betters right now\". The patient endorses an aortic stent placed at the [facility]. The patient initially denied abdominal pain, but later reports mild left sided abdominal pain. The patient reports no breakfast this morning and taking 1 dose of aspirin, however his wife and daughter report they are unsure if he actually took his aspirin dose. The patient denies change in urination or bowel movements recently, headaches, lightheadedness, vomiting, or history of myocardial infarction.      Allergies:  Contrast Dye  Lisinopril  Oxycodone    Medications:    Tramadol  Protonix  Metoprolol  Aspirin  Pamelor  Flomax  Norvasc  Tylenol  Neurontin   Cozaar  Levothyroxine  Miralax  Lipitor  Atorvastatin  Amoxicillin  Aricept     Past Medical History:    AAA (abdominal aortic aneurysm)   Anemia   Aortic " stenosis   CAD (coronary artery disease)   Carotid artery disease   CHF (congestive heart failure)   CKD (chronic kidney disease) stage 3, GFR 30-59 ml/min   COPD (chronic obstructive pulmonary disease)    Dementia   Edema, unspecified edema   Essential hypertension   Gout   Hypercalcemia   Hyperlipidemia LDL goal < 70   Hyperparathyroidism, unspecified   Hyperplasia of prostate   LEFT LUNG GRANULOMA   Lumbago   Other chronic pain   Proteinuria   Pulmonary fibrosis    PVD (peripheral vascular disease)  Thrombocytopenia   Unspecified hypothyroidism   Vitamin D deficiency     Past Surgical History:    Discectomy lumbar posterior microscopic one level  Endovascular repair aneurysm abdominal aorta  Laparoscopic cholecystectomy   Repair aneurysm abdominal aorta  Transcatheter aortic valve implant anesthesia  Vasectomy    Family History:    Mother: Hypertension  Mother: Hypertension    Social History:  The patient was accompanied to the ED by wife.  Smoking Status: Never Smoker  Smokeless Tobacco: Never Used  Alcohol Use: Positive  Residence: The patient lives in a senior co-op with his wife.   Marital Status:   [2]     Review of Systems   Respiratory: Positive for shortness of breath.    Cardiovascular: Positive for chest pain (centralized, radiating across his chest).   Gastrointestinal: Positive for abdominal pain (left-sided) and nausea. Negative for vomiting.   Musculoskeletal: Positive for back pain.   Neurological: Negative for light-headedness and headaches.   Psychiatric/Behavioral: Positive for confusion (mild).   All other systems reviewed and are negative.    Physical Exam     Patient Vitals for the past 24 hrs:   BP Temp Temp src Heart Rate Resp SpO2   10/01/17 1145 (!) 140/99 - - 75 - 98 %   10/01/17 1130 136/82 - - 71 - 100 %   10/01/17 1115 156/80 - - 70 - 99 %   10/01/17 1100 156/76 - - 67 - 99 %   10/01/17 1045 166/79 - - 69 - 99 %   10/01/17 1030 154/77 - - 66 - 100 %   10/01/17 1015 155/75 - - -  - 100 %   10/01/17 0945 126/78 - - 72 - 100 %   10/01/17 0929 153/81 98  F (36.7  C) Oral 65 20 98 %   10/01/17 0928 153/81 - - - - 98 %     Physical Exam  Constitutional: Well developed, nontox appearance  Head: Atraumatic.   Mouth/Throat: Oropharynx is clear and moist.   Neck: Full ROM, no stridor  Eyes: no scleral icterus  Cardiovascular: RRR,intact distal pulses  Pulmonary/Chest: nml resp effort, Coarse breath sounds bilaterally, chest NT  Abdominal: ND, +BS, soft, NT, no rebound or guarding   : no CVA tenderness bilat  Ext: WWP, no edema  Neurological: A&Ox3, symmetric facies, moves ext x4  Skin: Skin is warm and dry.   Psychiatric: Behavior is normal. Thought content normal.   Nursing note and vitals reviewed.    Emergency Department Course     ECG:  ECG taken at 0927, ECG read at 1001  Sinus rhythm with frequent premature ventricular complexes  Left axis deviation  Left ventricular hypertrophy with QRS widening and repolarization abnormality  Abnormal ECG  Rate 69 bpm. GA interval 208 ms. QRS duration 132 ms. QT/QTc 420/450 ms. P-R-T axes 36 -40 90.    Imaging:  Radiology findings were communicated with the patient who voiced understanding of the findings.    XR Chest 2 Views  1. Increased left base infiltrates in the setting of chronic  fibrotic changes.  SHIMA HIGGINBOTHAM MD  Reading per radiology    Laboratory:  Laboratory findings were communicated with the patient who voiced understanding of the findings.    UA: Protein Albumin 30 (A), Mucous: Present (A)  CBC: WBC 8.9, HGB 12.8 (L), PLT 78  INR: 0.94  CMP: Glucose: 105 (H), GFR Estimate: 44 (A), Alkaline Phosphatase: 176 (H),  Creatinine 1.49 (H)  Lipase: 105  Troponin (Collected 0925): <0.015  Magnesium: 2.3    Interventions:  1023 Aspirin 324 mg PO    Emergency Department Course:    0929 Nursing notes and vitals reviewed.    0940 I performed an exam of the patient as documented above.     1001 The patient was sent for a XR Chest 2 Views while in the  emergency department, results above.     1140 I spoke with REJI Harmon, of the hospitalist service for Dr. Moreno regarding patient's presentation, findings, and plan of care. She agreed to admission.     1144 I discussed the treatment plan with the patient. They expressed understanding of this plan and consented to admission. I discussed the patient with Dr. Moreno, who will admit the patient to a monitored bed for further evaluation and treatment.    1144 I personally reviewed the imaging, ECG, and laboratory results with the patient and answered all related questions prior to admission.    Impression & Plan      Medical Decision Making:  Chaz Soler is a 89 year old male with a past medical history significant for coronary artery disease who presents to the emergency department today for evaluation of episode of retching, chest discomfort, shortness of breath.    Differential diagnosis includes intraabdominal pathology such as hepatitis, pancreatitis, gastritis, ACS. ECG unchanged from previous other than increased PVCs. Patient reports resolution of his chest discomfort in the emergency department. Orders as noted above and negative for elevated troponin. Chest Xray re-demonstrates pulmonary fibrosis with increased left lower lobe opacities although this was noticed on his previous Chest Xray as well. Given the patient's noted coronary artery disease listed in his chart, I recommended that he be admitted for serial troponins and STRESS test in the morning. The patient and his family are agreeable to plan and understanding. The patient is counseled on all results, diagnosis, and disposition prior to admission. Admitted in stable condition.     Diagnosis:    ICD-10-CM    1. Chest pain, unspecified type R07.9      Disposition:   The patient is admitted into the care of Dr. Moreno.    Scribe Disclosure:  Jh NEGRETE am serving as a scribe at 9:39 AM on 10/1/2017 to document  services personally performed by Victoriano Aldana MD based on my observations and the provider's statements to me.    [facility] EMERGENCY DEPARTMENT       Victoriano Aldana MD  10/01/17 1329

## 2017-10-01 NOTE — PLAN OF CARE
Problem: Patient Care Overview  Goal: Plan of Care/Patient Progress Review  Outcome: No Change  PRIMARY DIAGNOSIS: CHEST PAIN  OUTPATIENT/OBSERVATION GOALS TO BE MET BEFORE DISCHARGE:  1. Ruled out acute coronary syndrome (negative or stable Troponin):  No,  Troponin to be drawn this evening.  2. Pain Status: Pain free.  3. Appropriate provocative testing performed: Yes  - Stress Test Procedure: Lexiscan  Ordered for 10/2  - Interpretation of cardiac rhythm per telemetry tech: SR 1st degree AVB     4. Cleared by Consultants (if applicable):No  5. Return to near baseline physical activity: Yes  Discharge Planner Nurse   Safe discharge environment identified: Yes  Barriers to discharge: Yes, awaiting lexiscan tomorrow       Entered by: Guero Schwarz 10/01/2017 5:08 PM      Pt oriented to self and mostly to place.  Disoriented to time.  Denies any chest pain.  C/O chronic back pain 5/10.  Tylenol given for pain.  C/O constipation. Orders received. Lexiscan ordered for tomorrow am.  Ongoing assessment.     Please review provider order for any additional goals.   Nurse to notify provider when observation goals have been met and patient is ready for discharge.

## 2017-10-01 NOTE — IP AVS SNAPSHOT
MRN:[id number]                      After Visit Summary   10/1/2017    Chaz Soler    MRN: [id number]           Thank you!     Thank you for choosing [facility] for your care. Our goal is always to provide you with excellent care. Hearing back from our patients is one way we can continue to improve our services. Please take a few minutes to complete the written survey that you may receive in the mail after you visit. If you would like to speak to someone directly about your visit please contact Patient Relations at [phone]. Thank you!          Patient Information     Date Of Birth          5/21/1928        About your hospital stay     You were admitted on:  October 1, 2017 You last received care in the:  [facility] Observation Department    You were discharged on:  October 2, 2017        Reason for your hospital stay       You were admitted to the observation unit for chest pain. Your heart was monitored overnight with no abnormal findings. Your cardiac enzymes were negative for heart attack. You had a Lexiscan that was negative for heart disease so we do not believe your chest pain was related to your heart. Your worsening shortness of breath is likely related to progression of your underlying pulmonary fibrosis which you already have scheduled follow up for on Friday. We recommend you follow up with your primary doctor if you continue to have pain.                  Who to Call     For medical emergencies, please call 911.  For non-urgent questions about your medical care, please call your primary care provider or clinic, [phone]          Attending Provider     Provider Specialty    Victoriano Aldana MD Emergency Medicine    Heidi Moreno DO Internal Medicine       Primary Care Provider Office Phone # Fax #    Alirio Fox [phone]      After Care Instructions     Activity       Your activity upon discharge: activity as  "tolerated            Diet       Follow this diet upon discharge: Orders Placed This Encounter      Low Saturated Fat Na <2400 mg                  Follow-up Appointments     Follow-up and recommended labs and tests        Follow up with primary care provider, Alirio Fox, within 7 days for hospital follow- up.  No follow up labs or test are needed.  Follow up with pulmonology this Friday for pulmonary fibrosis.                  Your next 10 appointments already scheduled     Oct 18, 2017  1:15 PM CDT   Radiology Injections with Talisha Beal MD   [facility] ([facility])    [address]  [address]  [place] [address]   [phone]                         Pending Results     No orders found for last 3 day(s).            Statement of Approval     Ordered          10/02/17 3482  I have reviewed and agree with all the recommendations and orders detailed in this document.  EFFECTIVE NOW     Approved and electronically signed by:  Tracey Beltran PA-C             Admission Information     Date & Time Provider Department Dept. Phone    10/1/2017 Heidi Moreno DO [facility] Observation Department [phone]      Your Vitals Were     Blood Pressure Temperature Respirations Height Weight Pulse Oximetry    162/71 (BP Location: Right arm) 96.1  F (35.6  C) (Oral) 24 1.727 m (5' 8\") 70.2 kg (154 lb 11.2 oz) 99%    BMI (Body Mass Index)                   23.52 kg/m2           MyChart Information     [employer] gives you secure access to your electronic health record. If you see a primary care provider, you can also send messages to your care team and make appointments. If you have questions, please call your primary care clinic.  If you do not have a primary care provider, please call [phone] and they will assist you.        Care EveryWhere ID     This is your Care EveryWhere ID. This could be used by other organizations to access your " [place] medical records  [id number]        Equal Access to Services     NELIDA PHILLIPS : Hadii aad ku hadhanyivan Boyd, wajoanda jamarcus, qaadalita zanmadaniela sr, johnny orozcotimopk ingram. So [facility] [phone].    ATENCIÓN: Si habla español, tiene a denise disposición servicios gratuitos de asistencia lingüística. Llame al [phone].    We comply with applicable federal civil rights laws and [place] laws. We do not discriminate on the basis of race, color, national origin, age, disability, sex, sexual orientation, or gender identity.               Review of your medicines      CONTINUE these medicines which have NOT CHANGED        Dose / Directions    amLODIPine 10 MG tablet   Commonly known as:  NORVASC   Used for:  Essential hypertension        TAKE ONE TABLET BY MOUTH ONCE DAILY   Quantity:  90 tablet   Refills:  3       aspirin 81 MG tablet   Used for:  Coronary artery disease involving native coronary artery of native heart without angina pectoris        Dose:  81 mg   Take 1 tablet (81 mg) by mouth daily   Quantity:  30 tablet   Refills:  11       atorvastatin 20 MG tablet   Commonly known as:  LIPITOR   Used for:  Coronary artery disease involving native coronary artery of native heart without angina pectoris        Dose:  20 mg   Take 1 tablet (20 mg) by mouth daily   Quantity:  90 tablet   Refills:  3       donepezil 10 MG tablet   Commonly known as:  ARICEPT   Used for:  Memory loss        Dose:  10 mg   Take 1 tablet (10 mg) by mouth At Bedtime   Quantity:  90 tablet   Refills:  3       gabapentin 100 MG capsule   Commonly known as:  NEURONTIN   Used for:  Compression fracture        TAKE ONE CAPSULE BY MOUTH THREE TIMES DAILY FOR  PAIN   Quantity:  90 capsule   Refills:  11       levothyroxine 88 MCG tablet   Commonly known as:  SYNTHROID/LEVOTHROID   Used for:  Hypothyroidism, unspecified type        Dose:  88 mcg   Take 1 tablet (88 mcg) by mouth daily   Quantity:  90 tablet    Refills:  3       losartan 100 MG tablet   Commonly known as:  COZAAR   Used for:  Essential hypertension        Dose:  100 mg   Take 1 tablet (100 mg) by mouth daily   Quantity:  90 tablet   Refills:  3       metoprolol 25 MG 24 hr tablet   Commonly known as:  TOPROL-XL   Used for:  Essential hypertension        TAKE ONE TABLET BY MOUTH DAILY   Quantity:  90 tablet   Refills:  2       MIRALAX Packet   Generic drug:  polyethylene glycol   Notes to Patient:  As needed        Dose:  1 packet   Take 1 packet by mouth daily as needed for constipation   Refills:  0       MULTIVITAL Tabs        Dose:  1 tablet   Take 1 tablet by mouth daily   Refills:  0       order for DME   Used for:  Other chronic pain, Lumbar radiculopathy, DDD (degenerative disc disease), lumbar, Spinal stenosis of lumbar region without neurogenic claudication        Equipment being ordered: TENS and supplies.   Quantity:  1 each   Refills:  0       pantoprazole 40 MG EC tablet   Commonly known as:  PROTONIX   Used for:  Gastroesophageal reflux disease, esophagitis presence not specified        TAKE ONE TABLET BY MOUTH EVERY MORNING   Quantity:  90 tablet   Refills:  3       tamsulosin 0.4 MG capsule   Commonly known as:  FLOMAX   Used for:  Benign prostatic hyperplasia with urinary retention   Notes to Patient:  As before        TAKE ONE CAPSULE BY MOUTH ONCE DAILY   Quantity:  90 capsule   Refills:  2       traMADol 50 MG tablet   Commonly known as:  ULTRAM   Notes to Patient:  As before/ as needed        Dose:  50 mg   Take 50 mg by mouth 3 times daily Up to 4 times a day   Refills:  0       TYLENOL 325 MG tablet   Generic drug:  acetaminophen   Notes to Patient:  As before        Dose:  325 mg   Take 325 mg by mouth 3 times daily as needed for mild pain   Refills:  0                Protect others around you: Learn how to safely use, store and throw away your medicines at www.disposemymeds.org.             Medication List: This is a list of all  your medications and when to take them. Check marks below indicate your daily home schedule. Keep this list as a reference.      Medications           Morning Afternoon Evening Bedtime As Needed    amLODIPine 10 MG tablet   Commonly known as:  NORVASC   TAKE ONE TABLET BY MOUTH ONCE DAILY   Last time this was given:  10 mg on 10/1/2017  9:48 PM   Next Dose Due:  10/2/17 PM                                aspirin 81 MG tablet   Take 1 tablet (81 mg) by mouth daily   Next Dose Due:  10/2/17 pm                                atorvastatin 20 MG tablet   Commonly known as:  LIPITOR   Take 1 tablet (20 mg) by mouth daily   Last time this was given:  20 mg on 10/1/2017  9:48 PM   Next Dose Due:  10/2/17 pm                                donepezil 10 MG tablet   Commonly known as:  ARICEPT   Take 1 tablet (10 mg) by mouth At Bedtime   Last time this was given:  10 mg on 10/1/2017  9:47 PM   Next Dose Due:  10/2/17 PM                                gabapentin 100 MG capsule   Commonly known as:  NEURONTIN   TAKE ONE CAPSULE BY MOUTH THREE TIMES DAILY FOR  PAIN   Next Dose Due:  As before                                levothyroxine 88 MCG tablet   Commonly known as:  SYNTHROID/LEVOTHROID   Take 1 tablet (88 mcg) by mouth daily   Last time this was given:  88 mcg on 10/2/2017  8:04 AM   Next Dose Due:  10/3/17 AM                                losartan 100 MG tablet   Commonly known as:  COZAAR   Take 1 tablet (100 mg) by mouth daily   Last time this was given:  100 mg on 10/1/2017  9:48 PM   Next Dose Due:  10/2/17 PM                                metoprolol 25 MG 24 hr tablet   Commonly known as:  TOPROL-XL   TAKE ONE TABLET BY MOUTH DAILY   Next Dose Due:  10/3/17 AM                                MIRALAX Packet   Take 1 packet by mouth daily as needed for constipation   Generic drug:  polyethylene glycol   Notes to Patient:  As needed                                MULTIVITAL Tabs   Take 1 tablet by mouth daily   Next  Dose Due:  As before                                  order for DME   Equipment being ordered: TENS and supplies.                                pantoprazole 40 MG EC tablet   Commonly known as:  PROTONIX   TAKE ONE TABLET BY MOUTH EVERY MORNING   Last time this was given:  40 mg on 10/2/2017  8:04 AM   Next Dose Due:  10/3/17 AM                                tamsulosin 0.4 MG capsule   Commonly known as:  FLOMAX   TAKE ONE CAPSULE BY MOUTH ONCE DAILY   Notes to Patient:  As before                                traMADol 50 MG tablet   Commonly known as:  ULTRAM   Take 50 mg by mouth 3 times daily Up to 4 times a day   Notes to Patient:  As before/ as needed                                TYLENOL 325 MG tablet   Take 325 mg by mouth 3 times daily as needed for mild pain   Last time this was given:  650 mg on 10/1/2017  4:55 PM   Generic drug:  acetaminophen   Notes to Patient:  As before

## 2017-10-01 NOTE — PLAN OF CARE
ROOM #215-2    Living Situation (if not independent, order SW consult): Lives with wife  Facility name:  : [facility] [phone]    Activity level at baseline: walker  Activity level on admit: walker/asst 1      Patient registered to observation; given Patient Bill of Rights; given the opportunity to ask questions about observation status and their plan of care.  Patient has been oriented to the observation room, bathroom and call light is in place.    Discussed discharge goals and expectations with patient/family.

## 2017-10-01 NOTE — ED NOTES
Arrives via EMS from senior living where lives with wife.  On home O2 at 4L via nc.  HB=409 en route.  VSS, 12 lead EKG shows frequent PVC's.  Hx dementia and [place].  Rcvd 325mg ASA this morning at home.  Denies pain at this time. Difficulty with getting out of bed this morning, this is not new, happens occasionally. Wife and daughter on the way.  Patient reports feeling nauseous earlier this morning, denies at this time. Oriented to self/place.  ABCD's intact.     Unknown allergies or meds at this time

## 2017-10-01 NOTE — ED NOTES
"Family report unsure if patient took ASA at home or not.  Family had placed ASA on table next to patient, patient was \"retching\" at the time.  MD ok with giving full dose of ASA at this time.  "

## 2017-10-01 NOTE — IP AVS SNAPSHOT
[facility] Observation Department    [address]    [facility] [address]    Phone:  [phone]                                       After Visit Summary   10/1/2017    Chaz Soler    MRN: [id number]           After Visit Summary Signature Page     I have received my discharge instructions, and my questions have been answered. I have discussed any challenges I see with this plan with the nurse or doctor.    ..........................................................................................................................................  Patient/Patient Representative Signature      ..........................................................................................................................................  Patient Representative Print Name and Relationship to Patient    ..................................................               ................................................  Date                                            Time    ..........................................................................................................................................  Reviewed by Signature/Title    ...................................................              ..............................................  Date                                                            Time

## 2017-10-01 NOTE — ED NOTES
Bed: ED24  Expected date: 10/1/17  Expected time:   Means of arrival: Ambulance  Comments:  Yonis Morales

## 2017-10-01 NOTE — PHARMACY-ADMISSION MEDICATION HISTORY
Admission medication history interview status for this patient is complete. See [facility] admission navigator for allergy information, prior to admission medications and immunization status.     Medication history interview source(s):Family (spouse)  Medication history resources (including written lists, pill bottles, clinic record):None  Primary pharmacy:[facility] ([place])    Changes made to PTA medication list:  Added: Multi-vitamins  Deleted:  Amoxicillin, Vitamin B12, Lidocaine-prilocaine cream, Nortiptyline  Changed: Tylenol (TID), Miralax (prn), Tramadol (3 to 4 qd)    Actions taken by pharmacist (provider contacted, etc):None     Additional medication history information:None    Medication reconciliation/reorder completed by provider prior to medication history? Yes    Do you take OTC medications (eg tylenol, ibuprofen, fish oil, eye/ear drops, etc)? Yes    For patients on insulin therapy: No      Prior to Admission medications    Medication Sig Last Dose Taking? Auth Provider   acetaminophen (TYLENOL) 325 MG tablet Take 325 mg by mouth 3 times daily as needed for mild pain 9/30/2017 at Unknown time Yes Unknown, Entered By History   Multiple Vitamins-Minerals (MULTIVITAL) TABS Take 1 tablet by mouth daily 10/1/2017 at Unknown time Yes Unknown, Entered By History   traMADol (ULTRAM) 50 MG tablet Take 50 mg by mouth 3 times daily Up to 4 times a day 9/30/2017 at hs Yes Unknown, Entered By History   polyethylene glycol (MIRALAX) Packet Take 1 packet by mouth daily as needed for constipation Past Week at Unknown time Yes Unknown, Entered By History   pantoprazole (PROTONIX) 40 MG EC tablet TAKE ONE TABLET BY MOUTH EVERY MORNING 10/1/2017 at am Yes Alirio Fox MD   metoprolol (TOPROL-XL) 25 MG 24 hr tablet TAKE ONE TABLET BY MOUTH DAILY 9/30/2017 at Unknown time Yes Alirio Fox MD   aspirin 81 MG tablet Take 1 tablet (81 mg) by mouth daily 9/30/2017 at pm Yes Alirio Fox MD   tamsulosin (FLOMAX) 0.4 MG  capsule TAKE ONE CAPSULE BY MOUTH ONCE DAILY 9/30/2017 at Unknown time Yes Alirio Fox MD   amLODIPine (NORVASC) 10 MG tablet TAKE ONE TABLET BY MOUTH ONCE DAILY 9/30/2017 at Unknown time Yes Alirio Fox MD   gabapentin (NEURONTIN) 100 MG capsule TAKE ONE CAPSULE BY MOUTH THREE TIMES DAILY FOR  PAIN 9/30/2017 at hs Yes Alirio Fox MD   losartan (COZAAR) 100 MG tablet Take 1 tablet (100 mg) by mouth daily 9/30/2017 at Unknown time Yes Alirio Fox MD   levothyroxine (SYNTHROID/LEVOTHROID) 88 MCG tablet Take 1 tablet (88 mcg) by mouth daily 9/30/2017 at Unknown time Yes Alirio Fox MD   atorvastatin (LIPITOR) 20 MG tablet Take 1 tablet (20 mg) by mouth daily 9/30/2017 at Unknown time Yes Tyrell Jackson MD   donepezil (ARICEPT) 10 MG tablet Take 1 tablet (10 mg) by mouth At Bedtime 9/30/2017 at hs Yes Alirio Fox MD   order for DME Equipment being ordered: TENS and supplies.   Lissy Galeana APRN CNP

## 2017-10-01 NOTE — PLAN OF CARE
PRIMARY DIAGNOSIS: CHEST PAIN  OUTPATIENT/OBSERVATION GOALS TO BE MET BEFORE DISCHARGE:  1. Ruled out acute coronary syndrome (negative or stable Troponin):  NO, serial troponins pending  2. Pain Status: No chest pain at present, c/o chronic low back pain  3. Appropriate provocative testing performed: No  - Stress Test Procedure: Lexiscan ordered for 10/2  - Interpretation of cardiac rhythm per telemetry tech: SR, PVCs    4. Cleared by Consultants (if applicable):No}  5. Return to near baseline physical activity: No  Discharge Planner Nurse   Safe discharge environment identified: Yes  Barriers to Discharge:  Yes       Entered by: Gifty Owens 10/01/2017 2:11 PM     Please review provider order for any additional goals.   Nurse to notify provider when observation goals have been met and patient is ready for discharge.    A&Ox2, pleasant and cooperative. VSS, denies chest pain at present, [place], wears home O2 continuous, 2-4L/NC.  LS with crackles.  Reports chronic low back pain managed with Tramadol.  Wife and daughter at bedside. Continue to monitor.

## 2017-10-01 NOTE — H&P
PRIMARY CARE PROVIDER:  Dr. Alirio Fox.       PRIMARY CARDIOLOGIST:  Dr. Jackson      CHIEF COMPLAINT:  Chest pain, shortness of breath.      HISTORY OF PRESENT ILLNESS:  Mr. Chaz Soler is an 89-year-old gentleman with a known past medical history with a known history of pulmonary fibrosis, on chronic 4 liters of O2, history of chronic low back pain, hypertension, diastolic heart failure, status post bioprosthetic aortic valve replacement, known history of AAA status post endovascular repair in 2011 who presents to the emergency room with complaints of acute on chronic shortness of breath with worsening back pain as well as left-sided chest pain.  History is obtained by speaking with the ER physician as well as chart review and patient interview.  Note the patient does have some mild dementia history, but otherwise he appears to give a full history.  The patient states that he was trying to get out of bed this morning when he was having complaints of worsening back pain, shortness of breath, not new for him.  He states that usually when he first gets up in the morning he always had a hard time.  He feels quite stiff in the morning and when he tries to stand up, he has issues with his low back.  Because he is exerting himself with these activities he is always more short of breath than usual.  However, today, his family had complained that he seemed a little bit more out of it than usual.  He was also complaining of nausea and he had dry heaves a little bit of his phlegm.  He then also complained of some left upper chest pain and was a little bit more sweaty and was concerned that this could be heart related and that is why he decided to come into the emergency room.  He actually was seen by his primary care provider 5 days ago on 09/25 with followup with complaints of progressive shortness of breath.  They have done a lot of evaluation.  They did not feel that he had any signs of congestive heart failure.  He  had no cough, no fevers or chills.  His was stable.  He had no chest pain, all of which they did not feel was contributing to his shortness of breath but rather his pulmonary fibrosis.  They made a referral again to see Pulmonology  They did do a chest x-ray at that time to rule out for potential infiltrate.  At that time it showed patchy areas of opacity throughout the lungs.  It appeared the left lower lobe opacity might be a little bit worse and this was compared with 03/2017.  They did not feel that this is the signs of an acute active infection and I do not believe any medications, antibiotics were initiated.      Upon arrival to the emergency room, he was afebrile.  His vital signs were stable with blood pressures in the 140s to 150s.  He is on 4 liters of O2 at baseline, which he is on currently now saturating at %.      LABORATORY DATA:  Shows very baseline creatinine of 1.49, which is about where he has been.  His liver function tests were unremarkable.  CBC shows normal white count, hemoglobin was 12.8.  Troponin was undetectable with a negative urinalysis.  Chest x-ray performed shows some increased left base infiltrate when compared to just 5 days ago of really unclear significance.  EKG performed showed sinus rhythm with PVC, but no obvious ischemic changes.  Due to complaints of chest pain, shortness of breath in the setting of underlying coronary artery disease, he was recommended for admission to the hospital for rule out for potential ACS.      PAST MEDICAL HISTORY:     1.  Vitamin D deficiency.    2.  Hypothyroidism.    3.  Thrombocytopenia.    4.  Peripheral vascular disease.    5.  Pulmonary fibrosis on 4 liters O2.    6.  History of chronic back pain.     7.  Left lung granuloma.     8.  Prostate benign prostatic hyperplasia.    9.  Hyperparathyroidism.   10.  Hyperlipidemia.    11.  Hypercalcemia.    12.  History of gout.    13.  Hypertension.    14.  Dementia.    15.  Chronic obstructive  pulmonary disease.    16.  Chronic kidney disease with  with baseline creatinine of about 1.4 to 1.9.    17.  History of congestive diastolic heart failure with his last echocardiogram in 06/2017.  At that time he had an EF of 55% -60%, grade 1 diastolic dysfunction and mild to moderate pulmonary hypertension.  He had a bioprosthetic valve with a gradient of 9.  His last cardiac catheterization was in 10/2014.  At that time it showed diffuse coronary calcification, moderate disease in the LAD and left circumflex.  As well as a second acute marginal of the RCA that 60-70% stenosis.      PAST SURGICAL HISTORY:   1.  Includes vasectomy.    2.  TAVR.    3.  Bioprosthetic valve in 2014.   4.  Right cataract surgery.    5.  Repair of abdominal aneurysm.    6.  Laparoscopic cholecystectomy.    7.  Diskectomy of the lumbar.       MEDICATIONS:  Prior to arrival is currently being reconciled.  They so far include:   1.   Tylenol 325 mg t.i.d. as needed for pain.   2.  Multivitamins daily.   3.  Tramadol 50 mg p.o. t.i.d. for pain p.r.n.   4.  MiraLax as needed.   5.  Protonix 40 mg p.o. q.a.m.   6.  Toprol-XL 25 mg p.o. daily.   7.  Aspirin 81 mg daily.   8.  Flomax 0.4 mg daily.   9.  Norvasc 10 mg daily.   10.  Gabapentin 100 mg p.o. t.i.d. for pain.   11.  Cozaar 100 mg p.o. daily.   12.  Levothyroxine 88 mcg daily.   13  Atorvastatin 20 mg p.o. each day at bedtime.   14.  Aricept 10 mg daily.      ALLERGIES:  Contrast dye, lisinopril and oxycodone.      FAMILY HISTORY:  Reviewed and noncontributory to this admission.      SOCIAL HISTORY:  He is  and lives with his wife in a senior apartment.  He was a former smoker.  He was an alcohol user.  He is normally ambulatory with a cane or walker.      REVIEW OF SYSTEMS:  Positive for progressive shortness of breath which is not new for him.  He has chronic back pain.  Otherwise, no complaints of fevers or chills.  No recent URI symptoms.  Otherwise, 12-point system  reviewed and all negative beyond those stated in HPI.      PHYSICAL EXAMINATION:   VITAL SIGNS:  T-max of 98.0, heart rate 65, blood pressure 125/72, respiration is 24, saturating 94% on 4 liters nasal cannula.   GENERAL:  The patient is alert, oriented.  He is in no acute distress.   HEENT:  Pupils equal, round, react to light.  Extraocular movements are intact.  Sclerae are not icteric, conjunctivae is pink.  Oral mucosa is pink and moist.   NECK:  Supple with no cervical lymphadenopathy or thyromegaly.  Trachea is midline.   CARDIAC:  Regular rate and rhythm, normal S2 with no significant loud murmurs appreciated.   PULMONARY:  Dry crackles throughout his lung base, left slightly worse than the right, but no wheezing, rales or rhonchi.  No use of accessory muscles or intercostal retraction.   ABDOMEN:  Bowel sounds are present, soft, nontender, nondistended, no hepatomegaly.   EXTREMITIES:  Reveals no clubbing, cyanosis or edema.   NEUROLOGIC:  Cranial II-XII intact.  He has bilateral symmetric upper and lower extremity strength.  Sensation intact distally.  He has no focal deficits.   PSYCHIATRIC:  Mood and affect are appropriate.      LABORATORY RESULTS:  Again, as described above.      ASSESSMENT AND PLAN:  Mr. Chaz Soler is a very pleasant 89-year-old gentleman with a past medical history significant of pulmonary fibrosis on chronic O2, history of bioprosthetic aortic valve replacement, abdominal aortic aneurysm, status post endovascular repair, history of coronary artery disease with moderate disease showing on catheterization in 2014, who presents to the emergency room with complaints of acute worsening shortness of breath and left-sided chest pain that happened this morning.  So far workup in the emergency room had been fairly unremarkable with negative troponin, nonischemic EKG.  He will be admitted to the observation unit for further evaluation and treatment.   1.  Left-sided chest pain:  His pain  sounds fairly atypical.  This was in the setting of trying to get ready in the morning which he states he always had a hard time doing given his decreased mobility as well as back pain and underlying pulmonary fibrosis, shortness of breath.  His chest pain did not sound cardiac in nature.  Given his underlying disease not unreasonable to do serial troponin and Lexiscan in the morning.  He will be continued on his usual medications including aspirin and blood pressure medication.   2.  History of shortness of breath in the setting of pulmonary fibrosis:  This has been an ongoing issue with the patient, he was just seen by PCP a week ago with progressive symptoms.  They felt that his symptoms were likely a result of progressive pulmonary fibrosis and has an upcoming Pulmonology appointment.  It is noted that he has a left-sided infiltrate that has increased today from his x-ray findings 5 days ago.  I am not certain of the significance of this as he has not had any fevers or chills.  He has normal white count, subjectively it is hard to say that he has evidence of a bacterial infection or fever.  However, given his complaints of chest discomfort as well as his increasing infiltrate I will go ahead and start him empirically for Levaquin for now for potential community-acquired pneumonia.  I have added a procalcitonin.  If it is normal, we can consider discontinuing his antibiotics altogether.   3.  Hypertension.  Blood pressure is stable.  Continue Norvasc, losartan and metoprolol.   4.  Coronary artery disease:  Again, as described above, I doubt that he is having active acute coronary syndrome right now but will do rule out Lexiscan in the morning.   5.  History of abdominal aortic aneurysm, status post endovascular repair, stable:  No issues.     6.  History of diastolic heart failure:  He does not appear to be in diastolic heart failure exacerbation at this time, continue regular home meds.   7.  Hypothyroidism:   Resume Synthroid.   8.  Gastroesophageal reflux disease:  Continue proton pump inhibitor.     9.  The patient will be admitted under observation status.   10.  Deep venous thrombosis prophylaxis:  Encourage ambulation.   11.  Code status:  It is a little hard to get from him, but he had mentioned he did not want to be intubated but would be okay with a trial of CPR should his heart stop.  I will put him on TPI for now.  His formal code status can certainly be readdressed with his primary care physician as an outpatient.         LAWRENCE MARIN DO       As dictated by REJI LENZ            D: 10/01/2017 17:06   T: 10/01/2017 17:56   MT: IVANA      Name:     NATTY MAN   MRN:      [id number]        Account:      [id number]   :      1928           Admitted:     [id number]      Document: [id number]       cc: Alirio Fox MD

## 2017-10-02 NOTE — DISCHARGE SUMMARY
[facility] Outpatient / Observation Unit  Discharge Summary        Chaz Soler MRN# [id number]   YOB: 1928 Age: 89 year old     Date of Admission: 10/1/2017  Date of Discharge: 10/2/2017  Admitting Physician: Heidi Moreno, DO  Discharge Physician: Tracey Beltran PA-C  Discharging Service: Hospitalist      Primary Provider: Alirio Fox  Primary Care Physician Phone Number: [phone]         Primary Discharge Diagnoses:    Chaz Soler is 89 y/o male with PMH significant for pulmonary fibrosis on chronic O2, h/o buoprosthetic aortic valve replacement, abdominal aortic aneurysm s/p endovascular repair, CAD with moderate disease showing on catheterization in 2014 who was admitted on 10/1/2017 for concerns of acute chest pain.     1. Chest pain: ruled out ACS. Suspect multifactorial in setting of deconditioning and ongoing shortness of breath from pulmonary fibrosis, and possibly musculoskeletal strain.     2. Shortness of breath in setting of pulmonary fibrosis: recently seen by PCP for ongoing symptoms and has upcoming appointment with pulmonology later this week. CXR did show increasing left sided infiltrate from previous x-ray performed 5 days ago. Normal WBC, afebrile, and procalcitonin negative. Prophylactically started on Levaquin, will discontinue this since there is no other supporting evidence of underlying bacterial infection. Due to increased shortness of breath prior to discharge did trial nebulizer treatment which minimally improved patient's symptoms. Discussed with patient and wife prior to discharge the importance of pulmonology f/u for continued management of pulmonary fibrosis which appears to be the cause of his symptoms.          Secondary Discharge Diagnoses:     Past Medical History:   Diagnosis Date     AAA (abdominal aortic aneurysm) (H) 10/5/2011    6.1 cm     Anemia 11/30/2011     Aortic stenosis 10/26/2012     CAD (coronary artery disease)     MI - distal  inferior/apex. Non transmural     Carotid artery disease (H)      CHF (congestive heart failure) (H) 12/31/2014     CKD (chronic kidney disease) stage 3, GFR 30-59 ml/min 11/30/2011     COPD (chronic obstructive pulmonary disease) (H)      Dementia 8/4/2015     Edema, unspecified edema 1/17/2016     Essential hypertension      Gout 1/06    knee     Hypercalcemia 1/2/2015     Hyperlipidemia LDL goal < 70      Hyperparathyroidism, unspecified 4/22/2015     Hyperplasia of prostate      LEFT LUNG GRANULOMA      Lumbago      Other chronic pain 10/11/2016     Proteinuria 2/8/2012     Pulmonary fibrosis (H)      PVD (peripheral vascular disease) (H)      Thrombocytopenia (H) 11/30/2011     Unspecified hypothyroidism      Vitamin D deficiency               Code Status:      DNI        Brief Hospital Summary:       Reason for your hospital stay      You were admitted to the observation unit for chest pain. Your heart was monitored overnight with no abnormal findings. Your cardiac enzymes were negative for heart attack. You had a Lexiscan that was negative for heart   disease so we do not believe your chest pain was related to your heart. Your worsening shortness of breath is likely related to progression of your underlying pulmonary fibrosis which you already have scheduled follow   up for on Friday. We recommend you follow up with your primary doctor if you continue to have pain.       Please refer to initial admission history and physical for further details.   Briefly, Chaz Soler was admitted on 10/1/2017 for concerns of acute chest pain. Initial work up in the ED did not reveal evidence of STEMI or findings consistent with unstable angina or acute coronary ischemia. Pt was registered to the Observation Unit for further evaluation. Pt ruled out with serial troponins, underwent Lexiscan Stress Thallium test that did not show evidence of significant coronary ischemia. Labs were reviewed and significant results  addressed. On the day of discharge, pt was pain free, with no complaints of pain. Medications were reviewed and adjustments made as necessary. Pt is instructed to follow up as below.           Significant Lab During Hospitalization:        Recent Labs  Lab 10/01/17  2015 10/01/17  1610 10/01/17  0925   TROPI <0.015 <0.015 <0.015              Significant Imaging During Hospitalization:      Results for orders placed or performed during the hospital encounter of 10/01/17   XR Chest 2 Views    Narrative    CHEST TWO VIEWS 10/1/2017 10:13 AM     HISTORY: Chest pain    COMPARISON: 9/25/2017      Impression    IMPRESSION: Increased left base infiltrates in the setting of chronic  fibrotic changes.    SHIMA HIGGINBOTHAM MD   NM Lexiscan stress test (nuc card)    Narrative    GATED MYOCARDIAL PERFUSION SCINTIGRAPHY WITH INTRAVENOUS PHARMACOLOGIC  VASODILATATION LEXISCAN -ONE DAY STUDY     10/2/2017 11:47 AM  NATTY MAN  89 years  Male  5/21/1928.    Indication/Clinical History: Chest pain and shortness of breath    Impression  1.  Myocardial perfusion imaging using single isotope technique  demonstrated a small essentially fixed apical defect, probably due to  apical thinning. There is no significant ischemia identified on this  study.   2. Gated images demonstrated normal wall motion and normal left  ventricular chamber size.  The left ventricular systolic function is  normal, with an ejection fraction measured at 63%.  3. Compared to the prior study from 10/24/2011, the apical defect was  thought to involve the distal inferior wall on the previous study and  reported to be due to nontransmural infarction. There is likely no  significant difference in the perfusion images; although I cannot  entirely exclude a small area of nontransmural infarction, there is  fixed apical defect appears to be more likely due to apical thinning  due to preserved wall motion .    Procedure  Pharmacologic stress testing was performed with  Lexiscan at a rate of  0.08 mg/ml rapid bolus injection, for 15 seconds, 0.4 mg/5ml  intravenously. Low-level exercise was not performed along with the  vasodilator infusion.  The heart rate was 76 at baseline and mera to  108 beats per minute during the Lexiscan infusion. The rest blood  pressure was 174/83 mmHg and was 129/66 mm Hg during Lexiscan  infusion. The patient experienced back pain, shoulder pain, and nausea   during the test.    Myocardial perfusion imaging was performed at rest, approximately 45  minutes after the injection intravenously of 10.0 mCi of Tc-99m  Myoview. At peak pharmacologic effect, 10-20 seconds after Lexiscan,   the patient was injected intravenously with 30.5 mCi of  Tc-99m  Myoview. The post-stress tomographic imaging was performed  approximately 60 minutes after stress.    EKG Findings  The resting EKG demonstrated sinus rhythm with poor R wave progression  and occasional PVCs. The stress EKG demonstrated no significant  changes.    Tomographic Findings  Overall, the study quality is fair . On the stress images, there is  moderate count depression at the apex. On the rest images, there is  moderate count depression at the apex . Gated images demonstrated  normal wall motion and normal left ventricular chamber size. The left  ventricular ejection fraction was calculated to be 63%. TID was not  present.    KASSIE ZARCO MD              Pending Results:      None        Consultations This Hospital Stay:      No consultations were requested during this admission         Discharge Instructions and Follow-Up:      Follow-up Appointments    Follow up with primary care provider, Alirio Fox, within 7 days for hospital follow- up.  No follow up labs or test are needed.  Follow up with pulmonology this Friday for pulmonary fibrosis.           Discharge Disposition:      Discharged to home         Discharge Medications:        Current Discharge Medication List      CONTINUE these medications  which have NOT CHANGED    Details   acetaminophen (TYLENOL) 325 MG tablet Take 325 mg by mouth 3 times daily as needed for mild pain      Multiple Vitamins-Minerals (MULTIVITAL) TABS Take 1 tablet by mouth daily      traMADol (ULTRAM) 50 MG tablet Take 50 mg by mouth 3 times daily Up to 4 times a day      polyethylene glycol (MIRALAX) Packet Take 1 packet by mouth daily as needed for constipation      pantoprazole (PROTONIX) 40 MG EC tablet TAKE ONE TABLET BY MOUTH EVERY MORNING  Qty: 90 tablet, Refills: 3    Associated Diagnoses: Gastroesophageal reflux disease, esophagitis presence not specified      metoprolol (TOPROL-XL) 25 MG 24 hr tablet TAKE ONE TABLET BY MOUTH DAILY  Qty: 90 tablet, Refills: 2    Associated Diagnoses: Essential hypertension      aspirin 81 MG tablet Take 1 tablet (81 mg) by mouth daily  Qty: 30 tablet, Refills: 11    Associated Diagnoses: Coronary artery disease involving native coronary artery of native heart without angina pectoris      tamsulosin (FLOMAX) 0.4 MG capsule TAKE ONE CAPSULE BY MOUTH ONCE DAILY  Qty: 90 capsule, Refills: 2    Associated Diagnoses: Benign prostatic hyperplasia with urinary retention      amLODIPine (NORVASC) 10 MG tablet TAKE ONE TABLET BY MOUTH ONCE DAILY  Qty: 90 tablet, Refills: 3    Associated Diagnoses: Essential hypertension      gabapentin (NEURONTIN) 100 MG capsule TAKE ONE CAPSULE BY MOUTH THREE TIMES DAILY FOR  PAIN  Qty: 90 capsule, Refills: 11    Associated Diagnoses: Compression fracture      losartan (COZAAR) 100 MG tablet Take 1 tablet (100 mg) by mouth daily  Qty: 90 tablet, Refills: 3    Comments: Note dose change back to 100mg daily. Profile only. Pt doesn't require refill at this time  Associated Diagnoses: Essential hypertension      levothyroxine (SYNTHROID/LEVOTHROID) 88 MCG tablet Take 1 tablet (88 mcg) by mouth daily  Qty: 90 tablet, Refills: 3    Associated Diagnoses: Hypothyroidism, unspecified type      atorvastatin (LIPITOR) 20 MG  "tablet Take 1 tablet (20 mg) by mouth daily  Qty: 90 tablet, Refills: 3    Associated Diagnoses: Coronary artery disease involving native coronary artery of native heart without angina pectoris      donepezil (ARICEPT) 10 MG tablet Take 1 tablet (10 mg) by mouth At Bedtime  Qty: 90 tablet, Refills: 3    Associated Diagnoses: Memory loss      order for DME Equipment being ordered: TENS and supplies.  Qty: 1 each, Refills: 0    Associated Diagnoses: Other chronic pain; Lumbar radiculopathy; DDD (degenerative disc disease), lumbar; Spinal stenosis of lumbar region without neurogenic claudication                 Allergies:         Allergies   Allergen Reactions     Contrast Dye      SOB, increased Bp, difficulty swallowing. Date 6/3/96 (ipaque contrast)  Was premedicated prior to FRANCK and had no reaction at all (solumedrol and benadryl) 2016  Was premedicated with Methylprednisolone protocol, no reaction 1/25/17     Lisinopril      cough     Oxycodone      Delirium           Condition and Physical on Discharge:      Discharge condition: Stable   Vitals: Blood pressure 162/71, temperature 96.1  F (35.6  C), temperature source Oral, resp. rate 24, height 1.727 m (5' 8\"), weight 70.2 kg (154 lb 11.2 oz), SpO2 99 %.  154 lbs 11.2 oz      GENERAL:  Comfortable.  PSYCH: pleasant, oriented, No acute distress.  HEENT:  PERRLA. Normal conjunctiva, normal hearing, nasal mucosa and oropharynx are normal.  NECK:  Supple, no neck vein distention, adenopathy or bruits, normal thyroid.  HEART:  Normal S1, S2 with no murmur, no pericardial rub, gallops or S3 or S4.  LUNGS: mild dry crackles throughout lung base (L>R), no wheezing rales, or rhonchi, normal respiratory effort.   ABDOMEN:  Soft, no hepatosplenomegaly, normal bowel sounds. Non-tender, non distended.   EXTREMITIES:  No pedal edema, +2 pulses bilateral and equal.  SKIN:  Dry to touch, No rash, wound or ulcerations.  NEUROLOGIC:  CN 2-12 intact, BL 5/5 symmetric upper and " lower extremity strength, sensation is intact with no focal deficits.     Tracey Beltran PA-C

## 2017-10-02 NOTE — PROGRESS NOTES
Pre-procedure:    Initial vital signs: /83, HR 81, RR 20  Allergies reviewed: yes   Rhythm: Sinus  Medications taken within 48 hours of procedure: none   Last Caffeine: over 12 hours  Lung sounds: CTA, no wheezing, crackles or rtx  Health History (COPD, Asthma, etc): pulmonary fibrosis    Procedure: Lexiscan  Reaction/symptoms after receiving Maegan injection: Shortness of breath. Back and shoulder pain  Intensity of Pain: 0  1. Vital Signs:/66, HR 95, RR 20  2. Vital Signs:/83, HR 92, RR 20    Reversal agent: Aminophylline injection    Post:   Resolution of symptoms?: YES  Vital signs: /87, HR 95, RR 20  Walk: NO  Comment: pt complained of 8/10 back pain, wretching with emesis  Return to taken to room to wait

## 2017-10-02 NOTE — PROGRESS NOTES
Plan for discharge per PA. Pt and wife in room. Pt appears sleepy. Both pt and pt's spouse in agreement with discharge plan. Will get pt ready for d/c.

## 2017-10-02 NOTE — PROGRESS NOTES
OBSERVATION patient END time: 1710    Patient's After Visit Summary was reviewed with patient and/or family.   Patient verbalized understanding of After Visit Summary, recommended follow up and was given an opportunity to ask questions.   Discharge medications sent home with patient/family: Not applicable,    Discharged with spouse via wheelchair to home. Pt has f/u on Friday.

## 2017-10-02 NOTE — PLAN OF CARE
Problem: Patient Care Overview  Goal: Plan of Care/Patient Progress Review  PRIMARY DIAGNOSIS: CHEST PAIN  OUTPATIENT/OBSERVATION GOALS TO BE MET BEFORE DISCHARGE:  1. Ruled out acute coronary syndrome (negative or stable Troponin):  Yes, Troponin negative x 3  2. Pain Status: Pain free.  3. Appropriate provocative testing performed: Yes  - Stress Test Procedure: Lexiscan  Ordered for 10/2  - Interpretation of cardiac rhythm per telemetry tech: SR 1st degree AVB      4. Cleared by Consultants (if applicable):No  5. Return to near baseline physical activity: Yes  Discharge Planner Nurse   Safe discharge environment identified: Yes  Barriers to discharge: Yes, awaiting lexiscan on 10/2       Entered by: Guero Schwarz 10/01/2017 5:08 PM       Pt oriented to self and mostly to place.  Disoriented to time.  Denies any chest pain.  C/O constipation. Had large BM on eves.  Lexiscan ordered for AM.  Ongoing assessment.      Please review provider order for any additional goals.   Nurse to notify provider when observation goals have been met and patient is ready for discharge.

## 2017-10-02 NOTE — PLAN OF CARE
Problem: Patient Care Overview  Goal: Plan of Care/Patient Progress Review  Outcome: No Change  PRIMARY DIAGNOSIS: CHEST PAIN  OUTPATIENT/OBSERVATION GOALS TO BE MET BEFORE DISCHARGE:  1. Ruled out acute coronary syndrome (negative or stable Troponin):  Yes- trops negative  2. Pain Status: Pain free.  3. Appropriate provocative testing performed: No- awaiting for lexiscan this am  - Stress Test Procedure: Lesiscan  - Interpretation of cardiac rhythm per telemetry tech: SR w/ PVC     4. Cleared by Consultants (if applicable):No- lexiscan  5. Return to near baseline physical activity: No- SBA with walker  Discharge Planner Nurse   Safe discharge environment identified: Yes  Barriers to discharge: Yes- awaiting for lexiscan       Entered by: Gita Del Angel 10/02/2017 9:01 AM     Please review provider order for any additional goals.   Nurse to notify provider when observation goals have been met and patient is ready for discharge.

## 2017-10-02 NOTE — PLAN OF CARE
Problem: Patient Care Overview  Goal: Plan of Care/Patient Progress Review  PRIMARY DIAGNOSIS: CHEST PAIN  OUTPATIENT/OBSERVATION GOALS TO BE MET BEFORE DISCHARGE:  1. Ruled out acute coronary syndrome (negative or stable Troponin):  Yes, Troponin negative x 3  2. Pain Status: Pain free.  3. Appropriate provocative testing performed: Yes  - Stress Test Procedure: Lexiscan  Ordered for 10/2  - Interpretation of cardiac rhythm per telemetry tech: SR 1st degree AVB      4. Cleared by Consultants (if applicable):No  5. Return to near baseline physical activity: Yes  Discharge Planner Nurse   Safe discharge environment identified: Yes  Barriers to discharge: Yes, awaiting lexiscan on 10/2       Entered by: Guero Schwarz 10/01/2017 5:08 PM       Pt oriented to self and mostly to place.  Disoriented to time.  Denies any chest pain.  C/O chronic back pain 5/10.  Tylenol given for pain.  C/O constipation. Had large BM on eves.  Lexiscan ordered for AM.  Ongoing assessment.     Please review provider order for any additional goals.   Nurse to notify provider when observation goals have been met and patient is ready for discharge.

## 2017-10-02 NOTE — PLAN OF CARE
Problem: Patient Care Overview  Goal: Plan of Care/Patient Progress Review  Outcome: Improving  PRIMARY DIAGNOSIS: CHEST PAIN  OUTPATIENT/OBSERVATION GOALS TO BE MET BEFORE DISCHARGE:  1. Ruled out acute coronary syndrome (negative or stable Troponin):  Yes- trops negative  2. Pain Status: Pain free.  3. Appropriate provocative testing performed: No- awaiting for lexiscan this am  - Stress Test Procedure: lexiscan pending  - Interpretation of cardiac rhythm per telemetry tech: SR w/ PVC      4. Cleared by Consultants (if applicable):No- lexiscan  5. Return to near baseline physical activity: No- SBA with walker  Discharge Planner Nurse   Safe discharge environment identified: Yes  Barriers to discharge: Yes- awaiting for lexiscan       Entered by: Gita Del Angel 10/02/2017 1230 pm      Please review provider order for any additional goals.   Nurse to notify provider when observation goals have been met and patient is ready for discharge.

## 2017-10-03 NOTE — TELEPHONE ENCOUNTER
"Pt needs 7 day follow up for hospitalization.  Your first opening isn't until 10/16.   Do you want to work in earlier or have pt see partner?         Hospital/TCU/ED for chronic condition Discharge Protocol    \"Hi, my name is Evangelina Grady, a registered nurse, and I am calling from [facility].  I am calling to follow up and see how things are going for you after your recent emergency visit/hospital/TCU stay.\"    Tell me how you are doing now that you are home?\" spoke with wife Kylee, pt is doing well      Discharge Instructions    \"Let's review your discharge instructions.  What is/are the follow-up recommendations?  Pt. Response: Follow up with primary care provider, Alirio Fox, within 7 days for   hospital follow- up.  No follow up labs or test are needed.  Follow up with pulmonology this Friday for pulmonary fibrosis.    \"Has an appointment with your primary care provider been scheduled?\"   No (schedule appointment)    \"When you see the provider, I would recommend that you bring your medications with you.\"    Medications    \"Tell me what changed about your medicines when you discharged?\"    Changes to chronic meds?    0-1    \"What questions do you have about your medications?\"    None     New diagnoses of heart failure, COPD, diabetes, or MI?    No                  Medication reconciliation completed? Yes  Was MTM referral placed (*Make sure to put transitions as reason for referral)?   No    Call Summary    \"What questions or concerns do you have about your recent visit and your follow-up care?\"     none    \"If you have questions or things don't continue to improve, we encourage you contact us through the main clinic number (give number).  Even if the clinic is not open, triage nurses are available 24/7 to help you.     We would like you to know that our clinic has extended hours (provide information).  We also have urgent care (provide details on closest location and hours/contact info)\"      \"Thank you " "for your time and take care!\"         "

## 2017-10-04 NOTE — TELEPHONE ENCOUNTER
Hospital discharge summary reviewed. Stress test neg for ischemia. Sx causing admission likely related top pt's pulm fibrosis and has appt with pulmonary this Friday as previously  Scheduled to discuss that issue. Therefore do not feel pt needs to be seen specifically within 7 days of discharge. Pt to see me Tues 10/10/17 in one of the 2 same day appt slots available that day. Speak with pt's wife (pt is hard of hearing and has memory issues) to have appt scheduled in one of these two time slots, whichever she prefers

## 2017-10-06 NOTE — TELEPHONE ENCOUNTER
donepezil (ARICEPT) 10 MG tablet      Last Written Prescription Date: 9/06/2016  Last Fill Quantity: 90,  # refills: 3   Last Office Visit with [facility], [facility] or [facility] prescribing provider: 9/25/2017                                         Next 5 appointments (look out 90 days)     Oct 10, 2017 11:00 AM CDT   SHORT with Alirio Fox MD   [facility]    [address] [address] [phone]

## 2017-10-10 NOTE — MR AVS SNAPSHOT
After Visit Summary   10/10/2017    Chaz Soler    MRN: [id number]           Patient Information     Date Of Birth          5/21/1928        Visit Information        Provider Department      10/10/2017 11:00 AM Alirio Fox MD [facility]        Today's Diagnoses     Pulmonary fibrosis (H)    -  1    Other chronic pain           Follow-ups after your visit        Who to contact     If you have questions or need follow up information about today's clinic visit or your schedule please contact [facility] directly at [phone].  Normal or non-critical lab and imaging results will be communicated to you by sonarDesignhart, letter or phone within 4 business days after the clinic has received the results. If you do not hear from us within 7 days, please contact the clinic through sonarDesignhart or phone. If you have a critical or abnormal lab result, we will notify you by phone as soon as possible.  Submit refill requests through Glass or call your pharmacy and they will forward the refill request to us. Please allow 3 business days for your refill to be completed.          Additional Information About Your Visit        MyChart Information     Glass gives you secure access to your electronic health record. If you see a primary care provider, you can also send messages to your care team and make appointments. If you have questions, please call your primary care clinic.  If you do not have a primary care provider, please call [phone] and they will assist you.        Care EveryWhere ID     This is your Care EveryWhere ID. This could be used by other organizations to access your [place] medical records  [id number]        Your Vitals Were     Pulse Temperature Pulse Oximetry BMI (Body Mass Index)          75 97.6  F (36.4  C) (Oral) 91% 24.02 kg/m2         Blood Pressure from Last 3 Encounters:   11/15/17 (P) 157/83   10/18/17 159/81   10/10/17 138/78    Weight  from Last 3 Encounters:   10/10/17 158 lb (71.7 kg)   10/01/17 154 lb 11.2 oz (70.2 kg)   06/15/17 156 lb (70.8 kg)              Today, you had the following     No orders found for display       Primary Care Provider Office Phone # Fax #    Alirio Fox [phone]       [address] [facility] [address]        Equal Access to Services     NELIDA PHILLIPS : Hadii aad ku hadasho Soomaali, waaxda luqadaha, qaybta kaalmada adeegyada, waxay idiin hayaan adeeg kharapk la'khrisn . So [facility] [phone].    ATENCIÓN: Si habla español, tiene a denise disposición servicios gratuitos de asistencia lingüística. [facility] [phone].    We comply with applicable federal civil rights laws and [place] laws. We do not discriminate on the basis of race, color, national origin, age, disability, sex, sexual orientation, or gender identity.            Thank you!     Thank you for choosing [facility]  for your care. Our goal is always to provide you with excellent care. Hearing back from our patients is one way we can continue to improve our services. Please take a few minutes to complete the written survey that you may receive in the mail after your visit with us. Thank you!             Your Updated Medication List - Protect others around you: Learn how to safely use, store and throw away your medicines at www.disposemymeds.org.          This list is accurate as of: 10/10/17 11:59 PM.  Always use your most recent med list.                   Brand Name Dispense Instructions for use Diagnosis    amLODIPine 10 MG tablet    NORVASC    90 tablet    TAKE ONE TABLET BY MOUTH ONCE DAILY    Essential hypertension       aspirin 81 MG tablet     30 tablet    Take 1 tablet (81 mg) by mouth daily    Coronary artery disease involving native coronary artery of native heart without angina pectoris       atorvastatin 20 MG tablet    LIPITOR    90 tablet    Take 1 tablet (20 mg) by mouth daily    Coronary artery  disease involving native coronary artery of native heart without angina pectoris       donepezil 10 MG tablet    ARICEPT    90 tablet    TAKE ONE TABLET (10 MG) BY MOUTH AT BEDTIME    Memory loss       gabapentin 100 MG capsule    NEURONTIN    90 capsule    TAKE ONE CAPSULE BY MOUTH THREE TIMES DAILY FOR  PAIN    Compression fracture       levothyroxine 88 MCG tablet    SYNTHROID/LEVOTHROID    90 tablet    Take 1 tablet (88 mcg) by mouth daily    Hypothyroidism, unspecified type       lidocaine-prilocaine cream    EMLA          losartan 100 MG tablet    COZAAR    90 tablet    Take 1 tablet (100 mg) by mouth daily    Essential hypertension       metoprolol 25 MG 24 hr tablet    TOPROL-XL    90 tablet    TAKE ONE TABLET BY MOUTH DAILY    Essential hypertension       MIRALAX Packet   Generic drug:  polyethylene glycol      Take 1 packet by mouth daily as needed for constipation        MULTIVITAL Tabs      Take 1 tablet by mouth daily        nortriptyline 25 MG capsule    PAMELOR          order for DME     1 each    Equipment being ordered: TENS and supplies.    Other chronic pain, Lumbar radiculopathy, DDD (degenerative disc disease), lumbar, Spinal stenosis of lumbar region without neurogenic claudication       pantoprazole 40 MG EC tablet    PROTONIX    90 tablet    TAKE ONE TABLET BY MOUTH EVERY MORNING    Gastroesophageal reflux disease, esophagitis presence not specified       tamsulosin 0.4 MG capsule    FLOMAX    90 capsule    TAKE ONE CAPSULE BY MOUTH ONCE DAILY    Benign prostatic hyperplasia with urinary retention       traMADol 50 MG tablet    ULTRAM     Take 50 mg by mouth 3 times daily Up to 4 times a day        TYLENOL 325 MG tablet   Generic drug:  acetaminophen      Take 325 mg by mouth 3 times daily as needed for mild pain

## 2017-10-10 NOTE — TELEPHONE ENCOUNTER
Patient called to let dr aguilar know patient heard from the pain clinic call patient with any questions or concerns

## 2017-10-10 NOTE — TELEPHONE ENCOUNTER
Received call from Dr Fox requesting that I call the patient's wife today. I spoke with Mrs Fox who stated that she had been calling and requesting to speak with me directly but had not left any messages from me to call back. Reviewed with her that the best way to get ahold of me is to leave a message and I will call back, usually within 24 hours.     Reviewed LESI vs RFa procedures. Jada is scheduled for LESI on 10/18. I would like him to go through with that since he has not had an injection in quite sometime. If he does not get long lasting relief, we will proceed with MBB/RFA process which was explained to Mrs Soler.     Chaz Jennifer will f/u with me 2 weeks after LESI to review options.     CELIA Boyer, NP-C  [facility]

## 2017-10-10 NOTE — PROGRESS NOTES
SUBJECTIVE:   Chaz Soler is a 89 year old male who presents to clinic today for the following health issues:    Chief Complaint   Patient presents with     FU Hospitalization        Hospital Follow-up Visit:    Hospital/Nursing Home/IP Rehab Facility: [facility]  Date of Admission: 10/01/2017  Date of Discharge: 10/02/2017  Reason(s) for Admission: Chest Pain  Tele contact 10/3/17            Problems taking medications regularly:  None       Medication changes since discharge: None       Problems adhering to non-medication therapy:  None    Summary of hospitalization:  [facility] discharge summary reviewed  Diagnostic Tests/Treatments reviewed.  Follow up needed: none  Other Healthcare Providers Involved in Patient s Care:     pulmonary  Update since discharge: improved.     Post Discharge Medication Reconciliation: discharge medications reconciled, continue medications without change.  Plan of care communicated with patient and qife     Coding guidelines for this visit:  Type of Medical   Decision Making Face-to-Face Visit       within 7 Days of discharge Face-to-Face Visit        within 14 days of discharge   Moderate Complexity [id number]   High Complexity 25785 10046          Pt's past medical history, family history, habits, medications and allergies were reviewed with the patient today.  See snap shot for  HCM status. Most recent lab results reviewed with pt. Problem list and histories reviewed & adjusted, as indicated.  Additional history as below:    Discharge summary reviewed. Part of the summary as below:    Date of Admission:                   10/1/2017  Date of Discharge:                    10/2/2017  Admitting Physician:                 Heidi Moreno DO  Discharge Physician:                Tracey Beltran PA-C  Discharging Service:                Hospitalist       Primary Provider: Alirio Fox  Primary Care Physician Phone Number: [phone]               Primary Discharge Diagnoses:     Natty Man is 89 y/o male with PMH significant for pulmonary fibrosis on chronic O2, h/o buoprosthetic aortic valve replacement, abdominal aortic aneurysm s/p endovascular repair, CAD with moderate disease showing on catheterization in 2014 who was admitted on 10/1/2017 for concerns of acute chest pain.      1. Chest pain: ruled out ACS. Suspect multifactorial in setting of deconditioning and ongoing shortness of breath from pulmonary fibrosis, and possibly musculoskeletal strain.      2. Shortness of breath in setting of pulmonary fibrosis: recently seen by PCP for ongoing symptoms and has upcoming appointment with pulmonology later this week. CXR did show increasing left sided infiltrate from previous x-ray performed 5 days ago. Normal WBC, afebrile, and procalcitonin negative. Prophylactically started on Levaquin, will discontinue this since there is no other supporting evidence of underlying bacterial infection. Due to increased shortness of breath prior to discharge did trial nebulizer treatment which minimally improved patient's symptoms. Discussed with patient and wife prior to discharge the importance of pulmonology f/u for continued management of pulmonary fibrosis which appears to be the cause of his symptoms.      Stress test results as below:    GATED MYOCARDIAL PERFUSION SCINTIGRAPHY WITH INTRAVENOUS PHARMACOLOGIC  VASODILATATION LEXISCAN -ONE DAY STUDY      10/2/2017 11:47 AM  NATTY MAN  89 years  Male  5/21/1928.     Indication/Clinical History: Chest pain and shortness of breath     Impression  1.  Myocardial perfusion imaging using single isotope technique  demonstrated a small essentially fixed apical defect, probably due to  apical thinning. There is no significant ischemia identified on this  study.   2. Gated images demonstrated normal wall motion and normal left  ventricular chamber size.  The left ventricular systolic function is  normal, with an  "ejection fraction measured at 63%.  3. Compared to the prior study from 10/24/2011, the apical defect was  thought to involve the distal inferior wall on the previous study and  reported to be due to nontransmural infarction. There is likely no  significant difference in the perfusion images; although I cannot  entirely exclude a small area of nontransmural infarction, there is  fixed apical defect appears to be more likely due to apical thinning  due to preserved wall motion .     Currently, sats 91% at rest. Drops with 4 liters/min and activity to mid 70s. Had overnight oximetry study done since discharge and results pending.    Patient has seen pulmonary clinic and told no further treatment available for his  pulmonary fibrosis besides oxygen therapy.   Moderate pain control for chronic low back pain. Has appointment with pain clinic to discuss other treatment options.  No radiation of pain from the low back into the extremities. Back pain varies throughout the day. Using Lidoderm and tramadol  Denies recurrent chest pain since home      Additional ROS:   Constitutional, HEENT, Cardiovascular, Pulmonary, GI and , Neuro, MSK and Psych review of systems/symptoms are otherwise negative or unchanged from previous, except as noted above.      OBJECTIVE:  /78  Pulse 75  Temp 97.6  F (36.4  C) (Oral)  Wt 158 lb (71.7 kg)  SpO2 91%  BMI 24.02 kg/m2   Estimated body mass index is 24.02 kg/(m^2) as calculated from the following:    Height as of 10/1/17: 5' 8\" (1.727 m).    Weight as of this encounter: 158 lb (71.7 kg).  Eye: PERRL, EOMI  HENT: ear canals and TM's normal and nose and mouth without ulcers or lesions   Neck: no adenopathy. Thyroid normal to palpation. No bruits  Pulm:  Crackles bilateral lower lung fields which are chronic. No wheezes.  CV: Regular rates and rhythm  GI: Soft, nontender, Normal active bowel sounds, No hepatosplenomegaly or masses palpable  Ext: Peripheral pulses intact. No " edema.  Neuro: Normal strength and tone, sensory exam grossly normal. Slower but stable antalgic gait  Back: Tenderness to palpation bilateral paralumbar area. Neg SLRT bilaterally.      Assessment/Plan: (See plan discussion below for further details)  1. Pulmonary fibrosis (H)   Continue current oxygen therapy    2. Other chronic pain   continue current medications. Follow-up with pain clinic discuss other options            Alirio Fox MD  Internal Medicine Department  [facility]

## 2017-10-10 NOTE — NURSING NOTE
"Chief Complaint   Patient presents with     FU Hospitalization       Initial /78  Pulse 75  Temp 97.6  F (36.4  C) (Oral)  Wt 158 lb (71.7 kg)  SpO2 91%  BMI 24.02 kg/m2 Estimated body mass index is 24.02 kg/(m^2) as calculated from the following:    Height as of 10/1/17: 5' 8\" (1.727 m).    Weight as of this encounter: 158 lb (71.7 kg).  Medication Reconciliation: complete  "

## 2017-10-16 NOTE — PROGRESS NOTES
[facility] - Procedure Note    Date of Visit: 10/18/2017    Pre procedure Diagnosis: facet arthropathy   Post procedure Diagnosis: Same  Procedure performed: Bilateral L4-5, L5-S1 facet joint injections  Anesthesia: none  Complications: none  Operators: Talisha Beal MD    Indications:   Chaz Soler is a 89 year old male was sent by Andreina Brewster CNP for facet joint injections.  They have a history of axial low back pain that does not radiate.  Exam shows pain with extension/rotation and they have tried conservative treatment including previous injections, PT and medications.    Previous lumbar FRANCK's provided some pain relief for a period of one month.     Options/alternatives, benefits and risks were discussed with the patient including bleeding, infection, flared pain, tissue trauma, exposure to radiation, reaction to medications including seizure, spinal cord injury, paralysis, weakness, numbness and headache.   Questions were answered to his satisfaction and he agrees to proceed. Voluntary informed consent was obtained and signed.     Vitals were reviewed: Yes  Allergies were reviewed:  Yes   Medications were reviewed:  Yes   Pre-procedure pain score: 8/10    Imaging: MRI lumbar spine 5/11/2017  FINDINGS: Moderate scoliotic curve concave to the right centered at  L2-L3. Mild compression of the superior endplate of L3 with moderately  extensive bone edema on STIR images. This indicates this is a subacute  compression fracture that has not yet healed. No retropulsion. This  would be amenable to vertebroplasty if indicated. There is subtle high  T2 signal in the anterosuperior portion of the L2 vertebral body which  could be bone bruising or microfractures. I do not see a well-defined  area of compression. I would favor treatment of the L3 vertebral body  and not L2 at this time.     Degenerative changes as follows:     L1-L2: Mild annular bulge. No central or lateral stenosis.     L2-L3:  No disc protrusion. No central or lateral stenosis.     L3-L4: Moderate bilateral facet joint disease worse on the left. No  central or lateral stenosis.     L4-L5: Moderate degenerative narrowing of the interspace. Schmorl's  node in the inferior endplate of L4. Advanced facet joint disease.  Mild central stenosis. Moderate to severe right-sided foraminal  stenosis and mild left-sided foraminal stenosis.     L5-S1: Moderate narrowing of the interspace. Minimal annular bulge. No  central or lateral stenosis. Moderate facet joint disease on the left.       IMPRESSION:  1. Mild compression fracture of the superior endplate of L3 but  considerable edema throughout most of the L3 vertebral body. This  would be amenable to vertebroplasty. No retropulsion.  2. There may be minimal bone edema in the anterosuperior margin of L2  but no definite fracture line identified.  3. Degenerative changes as described.  4. The degenerative disease was present on 11/4/2016. The compression  fracture of L3 is new.    Procedure:  After getting informed consent, patient was brought into the procedure suite and was placed in a prone position on the procedure table.   A Pause for the Cause was performed.  Patient was prepped and draped in sterile fashion.     Under AP fluoroscopic guidance the bilateral L4-5, L5-S1 facet joints were identified, and the C-arm was rotated obliquely to the affected side to open the joint space. A total of 2 ml of 1% lidocaine was injected at each needle entry point and needle tract. Then a 22 gauge 3.5 inch quincke type spinal needle was inserted and advanced under fluoroscopic guidance targeting the superior articular pillar of each joint. Once the needle made a contact with SAP, it was rotated and was then advanced into the joint.    AP fluoroscopic views were obtained to confirm the needle placement. Then,  Gadodiamide contrast dye was injected after negative aspiration for heme and CSF in each joint,  confirming appropriate placement.  A total of 0.5mL of  was used and 4.5mL was wasted.    The injection was then accomplished using a solution containing 1ml of 0.5% bupivacaine mixed with 1ml of 1% Lidocaine and 80mg of kenolog, divided between the 4 joints. The needles were removed..     Hemostasis was achieved, the area was cleaned, and bandaids were placed when appropriate.  The patient tolerated the procedure well, and was taken to the recovery room.    Images were saved to PACS.    Post-procedure pain score: 5/10  Follow-up includes:   -f/u phone call in one week  -f/u with Andreina Brewster, [facility]

## 2017-10-18 NOTE — LETTER
[facility]  [address] [address]  [phone]      10/18/2017       Chaz Kingous  [address] [facility] [address]  [place] [address]        Dear Chaz,    It was a pleasure to speak with your wife on Wednesday .  I would like to provide you with the enclosed Hear Failure Action Plan to reference with any increased symptoms of concern .  As part of care coordination, we are developing care plans to assist in accomplishing your health care goals.  When we speak next, please feel free to let me know if you want to add or change any information on your care plans.    As always, feel free to contact me if you have any questions or concerns.  I look forward to working with you in the effort to achieve your health care and wellness goals .        Sincerely,        Pebbles Leija RN, Care Coordinator  [facility]   [email or web address]   [phone]

## 2017-10-18 NOTE — PROGRESS NOTES
Clinic Care Coordination Contact  OUTREACH    Referral Information:  Referral Source: Self-patient/Caregiver  Reason for Contact:    Hospitalization 1/29-2/1/2017-    DISCHARGE DIAGNOSIS:   1. Compression fracture L2-3    2. Chronic low back pain, unspecified back pain laterality, with sciatica presence unspecified    3. Pulmonary fibrosis (H)    4. CAP (community acquired pneumonia)    5. Dementia with behavioral disturbance, unspecified dementia type    6. Essential hypertension    7. Coronary artery disease involving native coronary artery of native heart without angina pectoris    8. Congestive heart failure, unspecified congestive heart failure chronicity, unspecified congestive heart failure type (H)    9. S/P TAVR (transcatheter aortic valve replacement)    10. CKD (chronic kidney disease) stage 3, GFR 30-59 ml/min    11. Physical deconditioning           10/1-10/2/2017 Observation Shortness of breath in setting of pulmonary fibrosis    Care Conference: No     Universal Utilization:   ED Visits in last year: 0  Hospital visits in last year: 2  Last PCP appointment: 11/22/16  Missed Appointments: 0  Concerns:  (none)  Multiple Providers or Specialists:  (cardiology, vascular, pain, ortho, neurology, spine surgeon)    Clinical Concerns:  Current Medical Concerns: CC spoke to the patients wife ( consent to communicate form signed by patient ) and she reports the patient wet into Observation 10/1 with chest pain and found out it was caused by his lungs.  Patient saw the Pulmonologist and  The liter flow was increased to 4 liters .  Patient needs to pace his activity . Pulse Oximetry shows 70% at times with activity and then goes back up to 90 % with rest  The increase in Tramadol didn't help much with back pain.  Patient is scheduled today to have an injection to his back .    Clinical Pathway Name: CHF  Clinical Pathway: Clinic Care Coordination Heart Failure Follow Up Assessment:       Heart Failure Zones sheet  "on refrigerator or available: No  Mailed Heart Failure Action Plan today   What Heart Failure Zone currently in:Green  Any increased SOB since last contacted :Yes  Any increased edema since last contacted :No  Is your activity more limited since last contact:Yes  Have you had any chest pain since last contact:No  What number to call for YELLOW zones: [phone]   Medications:   \"How many of your current medicines changed (dose, timing, name, etc.)  Since last contacted ?\" 0 - 1  \"Do you have questions about your medications?\"No    Medication Management:  Discussed update on Tramadol for pain     Functional Status:  Mobility Status: Independent w/Device     Psychosocial:  Current living arrangement:: I live in a private home with spouse (TCU)       Resources and Interventions:  Current Resources: Skilled Nursing Facility;  (NA)  PAS Number: [id number]  Senior Linkage Line Referral Placed:  (NA)  Advanced Care Plans/Directives on file:: No  Referrals Placed:  (NA)     Goals:   Goal 1 Statement: I will decrease back pain   Goal 1 Progression Percent: 90%  Goal 1 Progression Date: 10/18/17  Goal 2 Statement: I will follow the Hear Failure Action Plan   Goal 2 Progression Percent: 10%  Goal 2 Progression Date: 10/18/17      Barriers: SOB  Strengths: Oxygen increased to 4 liters and had a recent appointment with the Pulmonologist     Frequency of Care Coordination: PRN       Plan: CC will follow up in 2-4 weeks     Pebbles Leija RN / Clinical Care Coordinator     [address] [address]  [email or web address]  Office :  [phone] / Fax :  [phone]  "

## 2017-10-18 NOTE — Clinical Note
I spoke with them about the option of doing a medial branch block followed by RFA if he gets good pain relief from todays injection but it is short lived.  If he gets 3 months would recommend just repeating.  It was really difficult to do the injections because of abdominal mesh in the way of my picture.  It would be almost impossible to do an RFA so I would like to reserve this for last ditch effort.

## 2017-10-18 NOTE — LETTER
[facility]  Complex Care Plan  About Me  Patient Name:  Chaz Soler    YOB: 1928  Age:   89 year old   Mehnaz MRN: [id number] Telephone Information:     Home Phone [phone]   Mobile NONE       Address:    [address] [address] Email address:  [email or web address]      Emergency Contact(s)  Name Relationship Lgl Grd Work Phone Home Phone Mobile Phone   1. MARYJANE SOLER Spouse No none [phone] none   2. MODE SOLER Son No [phone]   3. PATRICIA SOLER Daughter   [phone]   4. CATHI CROSS Daughter No none [phone]           Primary language:  English     needed? No   [facility]:  [phone] op. 1  Other communication barriers:  (dementia)  Preferred Method of Communication:   (unknown)  Current living arrangement: I live in a private home with spouse (TCU)  Mobility Status/ Medical Equipment: Independent w/Device  Other information to know about me:    Health Maintenance  Health Maintenance Reviewed: Due/Overdue Not assessed     My Access Plan  Medical Emergency 911   Primary Clinic Line [facility]- [phone]   24 Hour Appointment Line [phone] or  [phone]) (toll-free)   24 Hour Nurse Line [phone] (toll-free)   Preferred Urgent Care [facility], [phone]   Preferred Hospital [facility]  [phone]   Preferred Pharmacy [facility] [address] [place] [address] [address] [place] [address]     Behavioral Health Crisis Line The National Suicide Prevention Lifeline at 1-534.521.7340 or 911     My Care Team Members  Patient Care Team       Relationship Specialty Notifications Start End    Veum, Alirio MEEHAN MD PCP - General   2/15/02     Phone: [phone] Fax: [phone]         [address] [place] [address]    Pebbles Leija, RN Clinic Care  Coordinator  Admissions 10/18/17     Phone: [phone] Fax: [phone]             My Care Plans  Self Management and Treatment Plan  Goals and (Comments)  Goal #1: I will decrease back pain   I will follow the Pain Clinic and massage charge and Tramadol    90% of goal reached    Goal #2: I will follow the Heart Failure Action Plan---I will cough deep breathe drink plenty of water and seek medical help if expereincing increased symptoms of concern        10% of goal reached      Action Plans on File: CHF  Advance Care Plans/Directives Type:   Type Advanced Care Plans/Directives: Other (n/a)    My Medical and Care Information  Problem List   Patient Active Problem List   Diagnosis     Essential hypertension     Other specified disorders of prostate     Pulmonary fibrosis (H)     Advance Care Planning     CAD (coronary artery disease)     CKD (chronic kidney disease) stage 3, GFR 30-59 ml/min     Proteinuria     Hyperlipidemia with target LDL less than 70     Lumbar radiculopathy     Status post lumbar discectomy     S/P TAVR (transcatheter aortic valve replacement)     Physical deconditioning     CHF (congestive heart failure) (H)     Anemia     SAI (obstructive sleep apnea)     Hyperparathyroidism (H)     Dementia     Hypothyroidism     Health Care Home     Edema, unspecified edema     Other chronic pain     Compression fracture L2-3      Current Medications and Allergies:  See printed Medication Report.    Care Coordination Start Date: 02/24/17   Frequency of Care Coordination: PRN   Form Last Updated: 10/18/2017

## 2017-10-18 NOTE — NURSING NOTE
Pre-procedure Intake    Have you been fasting? NA    If yes, for how long?     Are you taking a prescribed blood thinner such as coumadin, Plavix, Xarelto?    No    If yes, when did you take your last dose?     Do you take aspirin?  Yes -   ASA    If cervical procedure, have you held aspirin for 6 days?   NA    Do you have any allergies to contrast dye, iodine, steroid and/or numbing medications?  NO    Are you currently taking antibiotics or have an active infection?  NO    Have you had a fever/elevated temperature within the past week? NO    Are you currently taking oral steroids? NO    Do you have a ? Yes       Are you pregnant or breastfeeding?  Not Applicable    Are the vital signs normal?  Yes

## 2017-10-18 NOTE — PATIENT INSTRUCTIONS
[facility] Procedure Discharge Instructions    Today you saw:   Dr. Talisha Beal         Your procedure:  Lumbar facet joint injection      Medications used:  Lidocaine (anesthetic)   Bupivacaine (anesthetic)     Kenalog (steroid)     Gadolinium (contrast)               Be cautious when walking as numbness and/or weakness in the legs may occur up to 6-8 hours after the procedure due to effect of the local anesthetic    Do not drive for 6 hours. The effect of the local anesthetic could slow your reflexes.     Avoid strenuous activity for the first 24 hours. You may resume your regular activities after that.     You may shower, however avoid swimming, tub baths or hot tubs for 24 hours following your procedure    You may have a mild to moderate increase in pain for several days following the injection.      You may use ice packs for 10-15 minutes, 3 to 4 times a day at the injection site for comfort    Do not use heat to painful areas for 6 to 8 hours. This will give the local anesthetic time to wear off and prevent you from accidentally burning your skin.    You may use anti-inflammatory medications (such as Ibuprofen/Advil or Aleve) or Tylenol for pain control if necessary    It may take up to 14 days for the steroid medication to start working although you may feel the effect as early as a few days after the procedure.     Follow up with your referring provider in 2-3 weeks      If you experience any of the following, call the pain center line during work hours at [phone] or on-call physician after hours at [phone]:  -Fever over 100 degree F  -Swelling, bleeding, redness, drainage, warmth at the injection site  -Progressive weakness or numbness in your legs   -Unusual new onset of pain that is not improving    Phone #s:  Nurse triage line for general questions: [phone]

## 2017-10-18 NOTE — NURSING NOTE
Discharge Information    IV Discontiued Time:  NA    Amount of Fluid Infused:  NA    Discharge Criteria = When patient returns to baseline or as per MD order    Consciousness:  Pt is fully awake    Circulation:  BP +/- 20% of pre-procedure level    Respiration:  Patient is able to breathe deeply    O2 Sat:  Patient is able to maintain O2 Sat >92% on room air    Activity:  Moves 4 extremities on command    Ambulation:  Patient is able to stand and walk     Dressing:  Clean/dry or No Dressing    Notes:   Discharge instructions and AVS given to patient    Patient meets criteria for discharge?  YES    Admitted to PCU?  No    Responsible adult present to accompany patient home?  Yes    Signature/Title:    Tracey Orourke RN Care Coordinator  [facility]

## 2017-10-18 NOTE — MR AVS SNAPSHOT
After Visit Summary   10/18/2017    Chaz Soler    MRN: [id number]           Patient Information     Date Of Birth          5/21/1928        Visit Information        Provider Department      10/18/2017 1:15 PM Talisha Beal MD [facility]        Care Instructions    [facility] Procedure Discharge Instructions    Today you saw:   Dr. Talisha Beal         Your procedure:  Lumbar facet joint injection      Medications used:  Lidocaine (anesthetic)   Bupivacaine (anesthetic)     Kenalog (steroid)     Gadolinium (contrast)               Be cautious when walking as numbness and/or weakness in the legs may occur up to 6-8 hours after the procedure due to effect of the local anesthetic    Do not drive for 6 hours. The effect of the local anesthetic could slow your reflexes.     Avoid strenuous activity for the first 24 hours. You may resume your regular activities after that.     You may shower, however avoid swimming, tub baths or hot tubs for 24 hours following your procedure    You may have a mild to moderate increase in pain for several days following the injection.      You may use ice packs for 10-15 minutes, 3 to 4 times a day at the injection site for comfort    Do not use heat to painful areas for 6 to 8 hours. This will give the local anesthetic time to wear off and prevent you from accidentally burning your skin.    You may use anti-inflammatory medications (such as Ibuprofen/Advil or Aleve) or Tylenol for pain control if necessary    It may take up to 14 days for the steroid medication to start working although you may feel the effect as early as a few days after the procedure.     Follow up with your referring provider in 2-3 weeks      If you experience any of the following, call the pain center line during work hours at [phone] or on-call physician after hours at [phone]:  -Fever over 100 degree F  -Swelling, bleeding, redness, drainage, warmth at the  injection site  -Progressive weakness or numbness in your legs   -Unusual new onset of pain that is not improving    Phone #s:  Nurse triage line for general questions: [phone]              Follow-ups after your visit        Who to contact     If you have questions or need follow up information about today's clinic visit or your schedule please contact [facility] directly at [phone].  Normal or non-critical lab and imaging results will be communicated to you by People[employer], letter or phone within 4 business days after the clinic has received the results. If you do not hear from us within 7 days, please contact the clinic through People[employer] or phone. If you have a critical or abnormal lab result, we will notify you by phone as soon as possible.  Submit refill requests through [employer] or call your pharmacy and they will forward the refill request to us. Please allow 3 business days for your refill to be completed.          Additional Information About Your Visit        PeopleJamhart Information     [employer] gives you secure access to your electronic health record. If you see a primary care provider, you can also send messages to your care team and make appointments. If you have questions, please call your primary care clinic.  If you do not have a primary care provider, please call [phone] and they will assist you.        Care EveryWhere ID     This is your Care EveryWhere ID. This could be used by other organizations to access your [place] medical records  [id number]        Your Vitals Were     Pulse Pulse Oximetry                69 97%           Blood Pressure from Last 3 Encounters:   10/18/17 135/69   10/10/17 138/78   10/02/17 162/71    Weight from Last 3 Encounters:   10/10/17 71.7 kg (158 lb)   10/01/17 70.2 kg (154 lb 11.2 oz)   06/15/17 70.8 kg (156 lb)              Today, you had the following     No orders found for display       Primary Care Provider Office Phone # Fax #    Alirio Fox  [phone]       [address] [facility] [address]        Equal Access to Services     NELIDA PHILLIPS : Hadii aad ku hadhanyivan Oliver, wajoanda lujillian, qaybta kaalmada orin, johnny jonin hayaan faheemdemar cabral lashantellsu nataly. So [facility] [phone].    ATENCIÓN: Si habla español, tiene a denise disposición servicios gratuitos de asistencia lingüística. Llame al [phone].    We comply with applicable federal civil rights laws and [place] laws. We do not discriminate on the basis of race, color, national origin, age, disability, sex, sexual orientation, or gender identity.            Thank you!     Thank you for choosing [facility]  for your care. Our goal is always to provide you with excellent care. Hearing back from our patients is one way we can continue to improve our services. Please take a few minutes to complete the written survey that you may receive in the mail after your visit with us. Thank you!             Your Updated Medication List - Protect others around you: Learn how to safely use, store and throw away your medicines at www.disposemymeds.org.          This list is accurate as of: 10/18/17  1:40 PM.  Always use your most recent med list.                   Brand Name Dispense Instructions for use Diagnosis    amLODIPine 10 MG tablet    NORVASC    90 tablet    TAKE ONE TABLET BY MOUTH ONCE DAILY    Essential hypertension       aspirin 81 MG tablet     30 tablet    Take 1 tablet (81 mg) by mouth daily    Coronary artery disease involving native coronary artery of native heart without angina pectoris       atorvastatin 20 MG tablet    LIPITOR    90 tablet    Take 1 tablet (20 mg) by mouth daily    Coronary artery disease involving native coronary artery of native heart without angina pectoris       donepezil 10 MG tablet    ARICEPT    90 tablet    TAKE ONE TABLET (10 MG) BY MOUTH AT BEDTIME    Memory loss       gabapentin 100 MG capsule    NEURONTIN    90 capsule    TAKE ONE  CAPSULE BY MOUTH THREE TIMES DAILY FOR  PAIN    Compression fracture       levothyroxine 88 MCG tablet    SYNTHROID/LEVOTHROID    90 tablet    Take 1 tablet (88 mcg) by mouth daily    Hypothyroidism, unspecified type       lidocaine-prilocaine cream    EMLA          losartan 100 MG tablet    COZAAR    90 tablet    Take 1 tablet (100 mg) by mouth daily    Essential hypertension       metoprolol 25 MG 24 hr tablet    TOPROL-XL    90 tablet    TAKE ONE TABLET BY MOUTH DAILY    Essential hypertension       MIRALAX Packet   Generic drug:  polyethylene glycol      Take 1 packet by mouth daily as needed for constipation        MULTIVITAL Tabs      Take 1 tablet by mouth daily        nortriptyline 25 MG capsule    PAMELOR          order for DME     1 each    Equipment being ordered: TENS and supplies.    Other chronic pain, Lumbar radiculopathy, DDD (degenerative disc disease), lumbar, Spinal stenosis of lumbar region without neurogenic claudication       pantoprazole 40 MG EC tablet    PROTONIX    90 tablet    TAKE ONE TABLET BY MOUTH EVERY MORNING    Gastroesophageal reflux disease, esophagitis presence not specified       tamsulosin 0.4 MG capsule    FLOMAX    90 capsule    TAKE ONE CAPSULE BY MOUTH ONCE DAILY    Benign prostatic hyperplasia with urinary retention       traMADol 50 MG tablet    ULTRAM     Take 50 mg by mouth 3 times daily Up to 4 times a day        TYLENOL 325 MG tablet   Generic drug:  acetaminophen      Take 325 mg by mouth 3 times daily as needed for mild pain

## 2017-10-25 NOTE — TELEPHONE ENCOUNTER
Patient had a Bilateral L4-5, L5-S1 facet joint injections on 10/18/17.  Called patient for an update.      Let phone ring 15 times, no answer or voice mail, unable to leave message.

## 2017-11-02 NOTE — PATIENT INSTRUCTIONS
After Visit Instructions:     Thank you for coming to [facility] for your care. It is my goal to partner with you to help you reach your optimal state of health.     I am recommending multidisciplinary care at this time.  The focus of care will be to continue gradual rehabilitation and pain management with medication adjustments as needed.    Continue daily self-care, identifying contributing factors, and monitoring variations in pain level. Continue to integrate self-care into your life.      1. Schedule follow-up with CELIA Benitez NP-C in 2-3 weeks after radio frequency ablation. If you do not end up going through with the RFA, schedule an appt with Andreina to discuss other options.   2. Procedures recommended: Radio Frequency Ablation   Interventional procedures  are done at our [place] and [place] locations.   3. Medication recommendations: No changes      CELIA Boyer NP-C  [facility]  [facility]    Contact information: [facility]    Please call if any side effects, questions, or concerns arise.    Nurse Triage line:  [phone]   Call this number with any questions or concerns. You may leave a detailed message anytime. Calls are typically returned Monday through Friday between 8 AM and 4:30 PM. We usually get back to you within 2 business days depending on the issue/request.    Scheduling number: [phone].  Call this number to schedule or change appointments.          Medication Refills Policy:    For non-narcotic medications, please your pharmacy directly to request a refill and the pharmacy will call the Pain Management Center for authorization. Please allow 3-4 days for these refills.    For narcotic refills, call the nurse triage line and leave a message requesting your refill along with the name of the pharmacy that you use. Narcotic prescriptions will be mailed to your pharmacy or you may pick them up at the  clinic.  Please call 7-10 days before your refill is due  The above policy allows adequate time so that you do not run out of medication.    No Show - Late Cancellation - Late Arrival Policy  We believe regular attendance is key to your success in our program.    Any time you are unable to keep your appointment we ask that you call us at  least 24 hours in advance to let us know. This will allow us to offer the appointment time to another patient. The following is our policy for missed appointments. This also applies to appointments cancelled with less than 24 hours notice.    You will receive a letter after missing your 1st and 2nd appointments without contacting the clinic before your scheduled appointment time.     After missing 3 appointments without calling first, we will cancel all of your future appointments at [facility].    At that point, you will not be able to resume services unless approved by your care team  We understand that unforseen circumstances arise, however, out of respect for all concerned and to provide this appointment to another patient, this policy will be enforced.    Please note that most follow up appointments are 30 minutes long. If you arrive late, your provider may not be able to see you for the entire 30 minutes. Please also note that if you arrive more than 15 minutes for any appointment, it may be rescheduled.

## 2017-11-02 NOTE — MR AVS SNAPSHOT
After Visit Summary   11/2/2017    Chaz Soler    MRN: [id number]           Patient Information     Date Of Birth          5/21/1928        Visit Information        Provider Department      11/2/2017 2:00 PM Andreina Brewster APRN CNP [facility]        Care Instructions    After Visit Instructions:     Thank you for coming to [facility] for your care. It is my goal to partner with you to help you reach your optimal state of health.     I am recommending multidisciplinary care at this time.  The focus of care will be to continue gradual rehabilitation and pain management with medication adjustments as needed.    Continue daily self-care, identifying contributing factors, and monitoring variations in pain level. Continue to integrate self-care into your life.      1. Schedule follow-up with CELIA Benitez NP-C in 2-3 weeks after radio frequency ablation. If you do not end up going through with the RFA, schedule an appt with Andreina to discuss other options.   2. Procedures recommended: Radio Frequency Ablation   Interventional procedures  are done at our [place] and [place] locations.   3. Medication recommendations: No changes      CELIA Boyer, NP-C  [facility]    Contact information: [facility]    Please call if any side effects, questions, or concerns arise.    Nurse Triage line:  [phone]   Call this number with any questions or concerns. You may leave a detailed message anytime. Calls are typically returned Monday through Friday between 8 AM and 4:30 PM. We usually get back to you within 2 business days depending on the issue/request.    Scheduling number: [phone].  Call this number to schedule or change appointments.          Medication Refills Policy:    For non-narcotic medications, please your pharmacy directly to request a refill and the pharmacy will call the  [facility] for authorization. Please allow 3-4 days for these refills.    For narcotic refills, call the nurse triage line and leave a message requesting your refill along with the name of the pharmacy that you use. Narcotic prescriptions will be mailed to your pharmacy or you may pick them up at the clinic.  Please call 7-10 days before your refill is due  The above policy allows adequate time so that you do not run out of medication.    No Show - Late Cancellation - Late Arrival Policy  We believe regular attendance is key to your success in our program.    Any time you are unable to keep your appointment we ask that you call us at  least 24 hours in advance to let us know. This will allow us to offer the appointment time to another patient. The following is our policy for missed appointments. This also applies to appointments cancelled with less than 24 hours notice.    You will receive a letter after missing your 1st and 2nd appointments without contacting the clinic before your scheduled appointment time.     After missing 3 appointments without calling first, we will cancel all of your future appointments at [facility].    At that point, you will not be able to resume services unless approved by your care team  We understand that unforseen circumstances arise, however, out of respect for all concerned and to provide this appointment to another patient, this policy will be enforced.    Please note that most follow up appointments are 30 minutes long. If you arrive late, your provider may not be able to see you for the entire 30 minutes. Please also note that if you arrive more than 15 minutes for any appointment, it may be rescheduled.                                     Follow-ups after your visit        Who to contact     If you have questions or need follow up information about today's clinic visit or your schedule please contact [facility] directly at  [phone].  Normal or non-critical lab and imaging results will be communicated to you by MyChart, letter or phone within 4 business days after the clinic has received the results. If you do not hear from us within 7 days, please contact the clinic through [employer] or phone. If you have a critical or abnormal lab result, we will notify you by phone as soon as possible.  Submit refill requests through [employer] or call your pharmacy and they will forward the refill request to us. Please allow 3 business days for your refill to be completed.          Additional Information About Your Visit        [employer] Information     [employer] gives you secure access to your electronic health record. If you see a primary care provider, you can also send messages to your care team and make appointments. If you have questions, please call your primary care clinic.  If you do not have a primary care provider, please call [phone] and they will assist you.        Care EveryWhere ID     This is your Care EveryWhere ID. This could be used by other organizations to access your [facility] medical records  [id number]         Blood Pressure from Last 3 Encounters:   10/18/17 159/81   10/10/17 138/78   10/02/17 162/71    Weight from Last 3 Encounters:   10/10/17 71.7 kg (158 lb)   10/01/17 70.2 kg (154 lb 11.2 oz)   06/15/17 70.8 kg (156 lb)              Today, you had the following     No orders found for display       Primary Care Provider Office Phone # Fax #    Alirio Fox [phone]       [address] [place] [id number]        Equal Access to Services     [facility]: Hadii aad ku hadasho Soomaali, waaxda luqadaha, qaybta kaalmada adeegyadaniela, johnny dinh . So [facility] [phone].    ATENCIÓN: Si habla español, tiene a denise disposición servicios gratuitos de asistencia lingüística. Llame al [phone].    We comply with applicable federal civil rights laws and [place] laws. We do not  discriminate on the basis of race, color, national origin, age, disability, sex, sexual orientation, or gender identity.            Thank you!     Thank you for choosing [facility]  for your care. Our goal is always to provide you with excellent care. Hearing back from our patients is one way we can continue to improve our services. Please take a few minutes to complete the written survey that you may receive in the mail after your visit with us. Thank you!             Your Updated Medication List - Protect others around you: Learn how to safely use, store and throw away your medicines at www.disposemymeds.org.          This list is accurate as of: 11/2/17  2:37 PM.  Always use your most recent med list.                   Brand Name Dispense Instructions for use Diagnosis    amLODIPine 10 MG tablet    NORVASC    90 tablet    TAKE ONE TABLET BY MOUTH ONCE DAILY    Essential hypertension       aspirin 81 MG tablet     30 tablet    Take 1 tablet (81 mg) by mouth daily    Coronary artery disease involving native coronary artery of native heart without angina pectoris       atorvastatin 20 MG tablet    LIPITOR    90 tablet    Take 1 tablet (20 mg) by mouth daily    Coronary artery disease involving native coronary artery of native heart without angina pectoris       donepezil 10 MG tablet    ARICEPT    90 tablet    TAKE ONE TABLET (10 MG) BY MOUTH AT BEDTIME    Memory loss       gabapentin 100 MG capsule    NEURONTIN    90 capsule    TAKE ONE CAPSULE BY MOUTH THREE TIMES DAILY FOR  PAIN    Compression fracture       levothyroxine 88 MCG tablet    SYNTHROID/LEVOTHROID    90 tablet    Take 1 tablet (88 mcg) by mouth daily    Hypothyroidism, unspecified type       lidocaine-prilocaine cream    EMLA          losartan 100 MG tablet    COZAAR    90 tablet    Take 1 tablet (100 mg) by mouth daily    Essential hypertension       metoprolol 25 MG 24 hr tablet    TOPROL-XL    90 tablet    TAKE ONE TABLET BY  MOUTH DAILY    Essential hypertension       MIRALAX Packet   Generic drug:  polyethylene glycol      Take 1 packet by mouth daily as needed for constipation        MULTIVITAL Tabs      Take 1 tablet by mouth daily        nortriptyline 25 MG capsule    PAMELOR          order for DME     1 each    Equipment being ordered: TENS and supplies.    Other chronic pain, Lumbar radiculopathy, DDD (degenerative disc disease), lumbar, Spinal stenosis of lumbar region without neurogenic claudication       pantoprazole 40 MG EC tablet    PROTONIX    90 tablet    TAKE ONE TABLET BY MOUTH EVERY MORNING    Gastroesophageal reflux disease, esophagitis presence not specified       tamsulosin 0.4 MG capsule    FLOMAX    90 capsule    TAKE ONE CAPSULE BY MOUTH ONCE DAILY    Benign prostatic hyperplasia with urinary retention       traMADol 50 MG tablet    ULTRAM     Take 50 mg by mouth 3 times daily Up to 4 times a day        TYLENOL 325 MG tablet   Generic drug:  acetaminophen      Take 325 mg by mouth 3 times daily as needed for mild pain

## 2017-11-03 NOTE — TELEPHONE ENCOUNTER
Per [employer] medical policy:      MEDICA RFA REQUIREMENTS- NO PA REQUIRED    MEDICA PRIME SOLUTIONS (MEDICARE REPLACEMENT)  o Dx of arthropathy, spondylosis, or sprain;   o 6 months failed conservative treatment (medication, injections, etc.) This includes 4 weeks of PT or an unfavorable evaluation;   o 2 successful workups resulting in >80-90% pain relief      Patient needs to do MBB's prior to RFA

## 2017-11-03 NOTE — TELEPHONE ENCOUNTER
Pre-screening questions for Radiology Injections:    Injection to be done at which interventional clinic site? [facility]    Procedure ordered by Dr. RAY    Procedure ordered? Lumbar Medial Branch Block    What insurance would patient like us to bill for this procedure? MEDICA      Worker's comp- Any injection DO NOT SCHEDULE and route to Mariama Garcia.      [employer] insurance - If scheduling an SI joint injection DO NOT SCHEDULE and route to Selena Kumar.     [employer]- MBB's must be scheduled at LEAST two weeks apart      [employer] - Any injection besides hip/shoulder/knee joint DO NOT SCHEDULE and route to Selena Kumar. She will obtain PA and call pt back to schedule procedure or notify pt of denial.     [employer]- PA required for all MBB's    Any chance of pregnancy? NO   If YES, do NOT schedule and route to RN pool    Is an  needed? No     Patient has a drive home? (mandatory) Yes     Is patient taking any blood thinners (plavix, coumadin, jantoven, warfarin, heparin, pradaxa or dabigatran )? No    If hold needed, do NOT schedule, route to RN pool     Is patient taking any aspirin products? Yes - Pt takes 81mg daily; instructed to hold 0 day(s) prior to procedure.      If more than 325mg/day do NOT schedule; route to RN pool     For CERVICAL procedures, hold all aspirin products for 6 days.      Does the patient have a bleeding or clotting disorder? No    If YES, okay to schedule AND route to RN nurse pool    **For any patients with platelet count <100, must be forwarded to provider**    Is patient diabetic? NO If YES, have them bring their glucometer.    Does patient have an active infection or treated for one within the past week? No    Is patient currently taking any antibiotics?  No    For patients on chronic, preventative, or prophylacti antibiotics, procedures can be scheduled.     For patients on antibiotics for active or recent infection:    Tamra Gastelum, Josselin Brito,  Birseyda Beal-antibiotic course must have been completed for 4 days     Dr. Drummond-antibiotic course must have been completed for 7 days    Is patient currently taking any steroid medications? (i.e. Prednisone, Medrol)  No     For patients on steroid medications:    Josselin Lyn Burton, Snitzer-steroid course must have been completed for 4 days    Dr. Drummond-steroid course must have been completed for 7 days    Review with patient:  If you are started on any steroids or antibiotics between now and your appointment, you must contact us because it may affect our ability to perform your procedure     Is patient actively being treated for cancer or immunocompromised? No   If YES, do NOT schedule and route to RN    Are you able to get on and off an exam table with minimal or no assistance? Yes   If NO, do NOT schedule and route to RN    Are you able to roll over and lay on your stomach with minimal or no assistance? Yes   If NO, do NOT schedule and route to RN    Any allergies to contrast dye, iodine, shellfish, or numbing and steroid medications? Yes - CONTRAST DYE  (If so, inform nursing and note in scheduling comments.)    Allergies: Contrast dye; Lisinopril; and Oxycodone     Has the patient had a flu shot or any other vaccinations within 7 days before or after the procedure.  No     Does patient have an MRI/CT?  YES:   (SI joint, hip injections, lumbar sympathetic blocks, and stellate ganglion blocks do not require an MRI)    Was the MRI done w/in the last 3 years?  Yes    Was MRI done at [place]? Yes      If not, where was it done? N/A       If MRI was not done at [place], [facility] do NOT schedule and route to nursing.  If pt has an imaging disc, the injection may be scheduled but pt has to bring disc to appt. If they show up w/out disc the injection cannot be done    **Must be scheduled with elapsed time interval of at least 2 weeks and not more than 6 months between the First MBB and the  Second MBB**       Medial Branch Block Pre-Procedure Instructions    It is okay to take long acting pain medications (if you are on them) the day of the procedure but try not to take any short acting medications unless absolutely necessary. INFORMED        Long acting meds would include: Gabapentin (Neurontin), MS Contin, Oxycontin        Short acting meds would include:  Percocet, Oxycodone, Vicodin, Ibuprofen     The day of the procedure, you should try to do things that provoke your pain, since the injection is being done to see if it will relieve your pain . INFORMED    If your pain level is a 4 out of 10 or less on the day of the procedure, please call [phone] to reschedule.  INFORMED  Reminders (please tell patient if applicable):      Instructed pt to arrive 30 minutes early for IV start if this is for a cervical procedure, ALL sympathetic (stellate ganglion, hypogastric, or lumbar sympathetic block) and all sedation procedures (RFA, spinal cord stimulation trials).         -IVs are not routinely placed for Dr. Beal cervical cases        -Dr. Rain: no IV needed for CMBB       If NPO for sedation, it is okay to take medications with sips of water (except if they are to hold blood thinners).    *DO take blood pressure medication if it is prescribed*      If this is for a cervical MBB aspirin needs to be held for 6 days.        Do not schedule procedures requiring IV placement in the first appointment after lunch       For patients 85 or older we recommend having an adult stay w/ them for the remainder of the day.      Does the patient have any questions?

## 2017-11-05 NOTE — PROGRESS NOTES
Attempted to contact patient, left voicemail for patient to return call to clinic.    Please put patient through to RN at [phone] thank you   [facility]    CHIEF COMPLAINT: Back pain    INTERVAL HISTORY:  Last seen on 9/14/17.        Recommendations/plan at the last visit included:  Medication: As Mr Moore does not manage his own medications, a small increase in Tramadol is appropriate. He currently takes Tramadol 50 mg 1 tab TID PRN. I recommend allowing Tramadol 50 mg 1 tab QID PRN. Medication management to remain with current prescriber.      Physical therapy: Mr Moore is quite deconditioned which may be contributing to his overall pain. While long term rehab potential is poor given his age and overall health, I would recommend a limited term of in home PT to increase strength, stability and activity level. He currently does exercises with his wife's supervision but I would recommend working with PT directly for additional guidance.      Interventional Procedures: Given recent vertebral fracture, we can proceed cautiously with a steroid injection for facet arthropathy. Would inject at levels L4-5, L5-S1 pending interventionalist recommendation on the day of injections. Order is written and patient will be contacted by our schedulers to make this appt. Mr Soler can repeat this injection once more yearly. Although usually up to four injections is allowed per year, given his history of vertebral fracture we will be a bit more cautious.      I have reviewed these recommendations with Mrs Soler and answered all questions.      F/U with Pain Center as needed. Future injections can be ordered by primary care provider.     Since his last visit, Chazsaritha Rodriguez Ryder reports:  - Chaz Easone had interventional injection. He noted good pain relief while numb following the procedure however the steroids did not provide any relief beyond the first day    Current Pain Relevant Medications:    Tylenol 500 mg 1 tab PO PRN pain, average three tabs daily  Gabapentin 100 mg TID  Tramadol 50 mg 1 tab TID PRN pain      Previous Pain Relevant Medications:  (H--helped; HI--Helped initially; SWH--Somewhat helpful; NH--No help; W--worse; SE--side effects; ?--Unsure if helpful)   NOTE: This medication information taken from patient's intake form, not medical records.                         Opiates: Hydrocodone: SE confusion, Oxycodone:SE confusion                        NSAIDS: Ibuprofen:H, cannot take due to CKD                        Muscle Relaxants: none noted                        Anti-migraine mediations: none noted                        Anti-depressants: Nortriptyline: SE, memory loss                        Sleep aids:none noted                        Anxiolytics: none noted                        Anti-convulsants: none noted                                   Topicals: none noted                        Other medications not covered above: Tylenol: S[facility]     Any illicit drug use: no  EtOH use: no  Caffeine use: no  Nicotine use: no  Any use of prescriptions other than how they were prescribed:no        [place] Board of Pharmacy Data Base Reviewed:    YES; No concern for abuse or misuse of controlled medications based on this report.        Medications:  Current Outpatient Prescriptions   Medication Sig Dispense Refill     lidocaine-prilocaine (EMLA) cream        nortriptyline (PAMELOR) 25 MG capsule        donepezil (ARICEPT) 10 MG tablet TAKE ONE TABLET (10 MG) BY MOUTH AT BEDTIME 90 tablet 0     acetaminophen (TYLENOL) 325 MG tablet Take 325 mg by mouth 3 times daily as needed for mild pain       Multiple Vitamins-Minerals (MULTIVITAL) TABS Take 1 tablet by mouth daily       traMADol (ULTRAM) 50 MG tablet Take 50 mg by mouth 3 times daily Up to 4 times a day       polyethylene glycol (MIRALAX) Packet Take 1 packet by mouth daily as needed for constipation       pantoprazole (PROTONIX) 40 MG EC tablet TAKE ONE TABLET BY MOUTH EVERY MORNING 90 tablet 3     metoprolol (TOPROL-XL) 25 MG 24 hr tablet TAKE ONE TABLET BY MOUTH DAILY 90 tablet 2     aspirin 81 MG  tablet Take 1 tablet (81 mg) by mouth daily 30 tablet 11     tamsulosin (FLOMAX) 0.4 MG capsule TAKE ONE CAPSULE BY MOUTH ONCE DAILY 90 capsule 2     amLODIPine (NORVASC) 10 MG tablet TAKE ONE TABLET BY MOUTH ONCE DAILY 90 tablet 3     gabapentin (NEURONTIN) 100 MG capsule TAKE ONE CAPSULE BY MOUTH THREE TIMES DAILY FOR  PAIN 90 capsule 11     losartan (COZAAR) 100 MG tablet Take 1 tablet (100 mg) by mouth daily 90 tablet 3     levothyroxine (SYNTHROID/LEVOTHROID) 88 MCG tablet Take 1 tablet (88 mcg) by mouth daily 90 tablet 3     order for DME Equipment being ordered: TENS and supplies. 1 each 0     atorvastatin (LIPITOR) 20 MG tablet Take 1 tablet (20 mg) by mouth daily 90 tablet 3       Review of Systems: A 10-point review of systems was negative, with the exception of chronic pain issues.      Social History: Reviewed; unchanged from initial consultation.      Family history: Reviewed; unchanged from initial consultation.     PHYSICAL EXAM no physical exam completed at this visit. Discussion only.    There were no vitals filed for this visit.      DIRE Score for ongoing opioid management is calculated as follows:    Diagnosis = 3 pts (advanced condition; severe pain/objective findings)    Intractability = 1 pt (few therapies tried; passive patient role)    Risk        Psych = 2 pts (personality dysfunction/mental illness that moderately interferes with care)  Dementia       Chem Hlth = 3 pts (no history of chemical dependency; not drug-focused)       Reliability = 3 pts (highly reliable with meds, appointments, treatments)       Social = 3 pts (supportive family/close relationships; involved in work/school; no isolation)       (Psych + Chem hlth + Reliability + Social) = 15    Efficacy = 3 pts (good improvement/function; good quality of life; stable med doses)    DIRE Score = 18        7-13: likely NOT suitable candidate for long-term opioid analgesia       14-21: may be a suitable candidate for long-term opioid  analgesia     Previous Diagnostic Tests:   Imaging Studies:   MR LUMBAR SPINE WITHOUT CONTRAST  5/11/2017 3:21 PM      HISTORY: Recent fall. MR for consideration of vertebroplasty  appropriateness.      COMPARISON: Plain films 4/27/2017. MR 11/4/2016.      TECHNIQUE: Routine MR lumbar spine mid T12 through the sacrum.      FINDINGS: Moderate scoliotic curve concave to the right centered at  L2-L3. Mild compression of the superior endplate of L3 with moderately  extensive bone edema on STIR images. This indicates this is a subacute  compression fracture that has not yet healed. No retropulsion. This  would be amenable to vertebroplasty if indicated. There is subtle high  T2 signal in the anterosuperior portion of the L2 vertebral body which  could be bone bruising or microfractures. I do not see a well-defined  area of compression. I would favor treatment of the L3 vertebral body  and not L2 at this time.      Degenerative changes as follows:      L1-L2: Mild annular bulge. No central or lateral stenosis.      L2-L3: No disc protrusion. No central or lateral stenosis.      L3-L4: Moderate bilateral facet joint disease worse on the left. No  central or lateral stenosis.      L4-L5: Moderate degenerative narrowing of the interspace. Schmorl's  node in the inferior endplate of L4. Advanced facet joint disease.  Mild central stenosis. Moderate to severe right-sided foraminal  stenosis and mild left-sided foraminal stenosis.      L5-S1: Moderate narrowing of the interspace. Minimal annular bulge. No  central or lateral stenosis. Moderate facet joint disease on the left.          IMPRESSION:  1. Mild compression fracture of the superior endplate of L3 but  considerable edema throughout most of the L3 vertebral body. This  would be amenable to vertebroplasty. No retropulsion.  2. There may be minimal bone edema in the anterosuperior margin of L2  but no definite fracture line identified.  3. Degenerative changes as  described.  4. The degenerative disease was present on 11/4/2016. The compression  fracture of L3 is new.     ASSESSMENT:   1.  Low back pain: DDD, recent vertebral fracture with vertebroplasty, lumbar stenosis, facet arthropathy.   2.  Dementia  3.  Chronic kidney disease, stage 3  4.  Physical deconditioning.     Chaz Rodriguez presents in the company of his wife and 2 adult children. As noted above he had good relief well numb from the lumbar steroid injection. I would like to proceed with medial branch block and have explained the process to the patient and his family members. All questions are answered. He can follow-up with me after the RFA is completed or sooner as needed. If the medial branch blocks were not successful and the RFA does not happen then I will see him to treatment plan other options.    Plan:    Diagnosis reviewed, treatment option addressed, and risk/benifits discussed.  Self-care instructions given.  I am recommending a multidisciplinary treatment plan to help this patient better manage pain.        1. Schedule follow-up with CELIA Benitez NP-C in 2-3 weeks after radio frequency ablation. If you do not end up going through with the RFA, schedule an appt with Andreina to discuss other options.   2. Procedures recommended: Radio Frequency Ablation   Interventional procedures  are done at our [place] and [place] locations.   3. Medication recommendations: No changes       Total time spent face to face was 30 minutes and more than 50% of face to face time was spent in counseling and/or coordination of care regarding the diagnosis and recommendations above.      CELIA Boyer, NP-C   [facility]

## 2017-11-14 NOTE — PROGRESS NOTES
[facility] - Procedure Note    Date of Service: 11/15/2017    Procedure performed: Bilateral L3,4,5 medial branch blocks  Diagnosis: Lumbar spondylosis; Lumbar facet arthropathy  : Talisha Beal MD    Indications: Chaz Soler is a 89 year old male who is seen at the request of Andreina Brewster CNP for lumbar medial branch blocks. The patient describes pain with extension/rotation. The patient reports minimal improvement with conservative treatment, including PT and medications.    Previous bilateral L4-5 and L5-S1 facet joint injections done by myself on 10/18/2017 did not provide prolonged pain relief.     MRI was done on 5/11/2017 which showed   FINDINGS: Moderate scoliotic curve concave to the right centered at  L2-L3. Mild compression of the superior endplate of L3 with moderately  extensive bone edema on STIR images. This indicates this is a subacute  compression fracture that has not yet healed. No retropulsion. This  would be amenable to vertebroplasty if indicated. There is subtle high  T2 signal in the anterosuperior portion of the L2 vertebral body which  could be bone bruising or microfractures. I do not see a well-defined  area of compression. I would favor treatment of the L3 vertebral body  and not L2 at this time.     Degenerative changes as follows:     L1-L2: Mild annular bulge. No central or lateral stenosis.     L2-L3: No disc protrusion. No central or lateral stenosis.     L3-L4: Moderate bilateral facet joint disease worse on the left. No  central or lateral stenosis.     L4-L5: Moderate degenerative narrowing of the interspace. Schmorl's  node in the inferior endplate of L4. Advanced facet joint disease.  Mild central stenosis. Moderate to severe right-sided foraminal  stenosis and mild left-sided foraminal stenosis.     L5-S1: Moderate narrowing of the interspace. Minimal annular bulge. No  central or lateral stenosis. Moderate facet joint disease on the  left.       IMPRESSION:  1. Mild compression fracture of the superior endplate of L3 but  considerable edema throughout most of the L3 vertebral body. This  would be amenable to vertebroplasty. No retropulsion.  2. There may be minimal bone edema in the anterosuperior margin of L2  but no definite fracture line identified.  3. Degenerative changes as described.  4. The degenerative disease was present on 11/4/2016. The compression  fracture of L3 is new.    Allergies:      Allergies   Allergen Reactions     Contrast Dye      SOB, increased Bp, difficulty swallowing. Date 6/3/96 (ipaque contrast)  Was premedicated prior to FRANCK and had no reaction at all (solumedrol and benadryl) 2016  Was premedicated with Methylprednisolone protocol, no reaction 1/25/17     Lisinopril      cough     Oxycodone      Delirium        Vitals:  BP (P) 157/83 (BP Location: Right arm, Patient Position: Chair, Cuff Size: Adult Regular)  Pulse 76  SpO2 (P) 94%    Review of Systems: The patient denies recent fever, chills, illness, use of antibiotics or anticoagulants. All other 10-point review of systems negative.     Procedure:   Options/alternatives, benefits and risks were discussed with the patient including bleeding, infection, tissue trauma, exposure to radiation, reaction to medications, spinal cord injury, weakness, numbness and paralysis.  Questions were answered to his satisfaction and he agrees to proceed. Voluntary informed consent was obtained and signed.     After getting informed consent, patient was brought into the procedure suite and was placed in a prone position on the procedure table.   A Pause for the Cause was performed.  Patient was prepped and draped in sterile fashion.     Under AP fluoroscopic guidance the L3, L4, L5 vertebral bodies were identified. The C-arm was rotated to the oblique view to afford optimal visualization the pedicles.  Lidocaine 1% was used to anesthetize the skin at each level.  Under  intermittent fluoroscopy, 22 gauge 5 inch Quinke spinal needles were positioned inferior and lateral to the intersection of the transverse process and pedicle at the Bilateral L4 & L5 levels, as well as the corresponding sacral alar notch. The needle positions were verified and optimized from the AP and lateral views.    The anatomic targets for the L3 & L4 medial nerve and L5 dorsal ramus (which functionally incorporates the medial branch) were the  L4 & L5 transverse processes and sacral alar notch, with laterality as described above, resulting in blockade of the L4/5 and L5/S1 facet joints.    After negative aspiration, Bupivacaine 0.5% 0.5 ml was injected at each location. Contrast was not used secondary to allergy. The needles were removed. Hemostasis was achieved, the area was cleaned, and bandaids were placed when appropriate. The patient tolerated the procedure well, and was taken to the recovery room. Images were saved to PACS.    Assessment/Plan: Chaz Soler is a 89 year old male s/p Bilateral L3,4,5 medial branch block today for lumbar spondylosis, facet arthropathy.     Pre procecedure pain score: 6/10   Post procedure pain score: 7/10.   The patient will continue to monitor progress, and they were given a pain diary to complete at home.  They will either fax or mail this back to us or bring it to their next appointment. We will determine the treatment plan after we review the diary.      1. The patient was advised to contact the [facility] for any of the following:   Fever, chills, or night sweats   New onset of pain, numbness, or weakness   Any questions/concerns regarding the procedure  If unable to contact the Pain Center, the patient was instructed to go to a local Emergency Room for any complications.   2. The patient will receive a follow-up call in 1 week.  3. We will await the pain diary to determent next step of the treatment plan and call patient to schedule.    Talisha  [facility]

## 2017-11-15 NOTE — MR AVS SNAPSHOT
After Visit Summary   11/15/2017    Chaz Soler    MRN: [id number]           Patient Information     Date Of Birth          5/21/1928        Visit Information        Provider Department      11/15/2017 1:15 PM Talisha Beal MD [facility]        Care Instructions    [facility]   Medial Branch Block Discharge Instructions  Nurse line:  [phone]  Appointment line:  [phone]    Your procedure was performed by:  Dr. Talisha Beal     Medications used: bupivicaine, omnipaque, normal saline     You will need to complete the Pain Scale Log form and return it to us as soon as possible.  Once we have received the form, we will review it and call you to determine the next steps.     The form can be faxed to [phone] or mailed to:   [facility] - [address], [facility] [address] [address]      You may resume your regular activity after the injection.    Be cautious with walking since you may have numbness and/or weakness in the lower extremities for up to 6-8 hours after the procedure due to the effects of the local anesthetic.    Avoid driving for 6 hours. The local anesthetic could slow your reflexes    You may shower, however no swimming or tub baths or hot tubs for 24 hours following your procedure.    Your pain will return after the numbing medications have worn off.  You may use your current pain medications as needed.    You may use anti-inflammatory medications (such as ibuprofen, aleve, or advil) or Tylenol for pain control if necessary.  Some people find it helpful to alternate ibuprofen and Tylenol every 3 hours for a couple of days.    You may use ice packs 10-15 minutes three to four times a day at the injection site for comfort.     Do not use heat to painful areas for 6 to 8 hours. This will give the local anesthetic time to wear off and prevent you from accidentally burning  your skin.     If you experience any of the following, call the pain center nursing line during work hours at [phone] or the on-call after hours physician line is [phone]:  -Fever over 100 degree F  -Swelling, bleeding, redness, drainage, warmth at the injection site  -Progressive weakness or numbness on your legs  -If lumbar, call if you have a loss of bowel or bladder function  -Unusual new onset of pain that is not improving              Follow-ups after your visit        Who to contact     If you have questions or need follow up information about today's clinic visit or your schedule please contact [facility] directly at [phone].  Normal or non-critical lab and imaging results will be communicated to you by [employer], letter or phone within 4 business days after the clinic has received the results. If you do not hear from us within 7 days, please contact the clinic through [employer] or phone. If you have a critical or abnormal lab result, we will notify you by phone as soon as possible.  Submit refill requests through [employer] or call your pharmacy and they will forward the refill request to us. Please allow 3 business days for your refill to be completed.          Additional Information About Your Visit        [employer] Information     [employer] gives you secure access to your electronic health record. If you see a primary care provider, you can also send messages to your care team and make appointments. If you have questions, please call your primary care clinic.  If you do not have a primary care provider, please call [phone] and they will assist you.        Care EveryWhere ID     This is your Care EveryWhere ID. This could be used by other organizations to access your [place] medical records  [id number]        Your Vitals Were     Pulse Pulse Oximetry                76 95%           Blood Pressure from Last 3 Encounters:   11/15/17 143/76   10/18/17 159/81   10/10/17 138/78     Weight from Last 3 Encounters:   10/10/17 71.7 kg (158 lb)   10/01/17 70.2 kg (154 lb 11.2 oz)   06/15/17 70.8 kg (156 lb)              Today, you had the following     No orders found for display       Primary Care Provider Office Phone # Fax #    Ailrio Fox [phone]       [address] [facility] [address]        Equal Access to Services     NELIDA PHILLIPS : Hadii aad ku hadasho Soomaali, waaxda luqadaha, qaybta kaalmada adeegyada, waxay idiin hayaan adeeg ernestoarapk la'jackson . So [facility] [phone].    ATENCIÓN: Si habla español, tiene a denise disposición servicios gratuitos de asistencia lingüística. [facility] [phone].    We comply with applicable federal civil rights laws and [place] laws. We do not discriminate on the basis of race, color, national origin, age, disability, sex, sexual orientation, or gender identity.            Thank you!     Thank you for choosing [facility]  for your care. Our goal is always to provide you with excellent care. Hearing back from our patients is one way we can continue to improve our services. Please take a few minutes to complete the written survey that you may receive in the mail after your visit with us. Thank you!             Your Updated Medication List - Protect others around you: Learn how to safely use, store and throw away your medicines at www.disposemymeds.org.          This list is accurate as of: 11/15/17  1:31 PM.  Always use your most recent med list.                   Brand Name Dispense Instructions for use Diagnosis    amLODIPine 10 MG tablet    NORVASC    90 tablet    TAKE ONE TABLET BY MOUTH ONCE DAILY    Essential hypertension       aspirin 81 MG tablet     30 tablet    Take 1 tablet (81 mg) by mouth daily    Coronary artery disease involving native coronary artery of native heart without angina pectoris       atorvastatin 20 MG tablet    LIPITOR    90 tablet    Take 1 tablet (20 mg) by mouth daily    Coronary artery  disease involving native coronary artery of native heart without angina pectoris       donepezil 10 MG tablet    ARICEPT    90 tablet    TAKE ONE TABLET (10 MG) BY MOUTH AT BEDTIME    Memory loss       gabapentin 100 MG capsule    NEURONTIN    90 capsule    TAKE ONE CAPSULE BY MOUTH THREE TIMES DAILY FOR  PAIN    Compression fracture       levothyroxine 88 MCG tablet    SYNTHROID/LEVOTHROID    90 tablet    Take 1 tablet (88 mcg) by mouth daily    Hypothyroidism, unspecified type       lidocaine-prilocaine cream    EMLA          losartan 100 MG tablet    COZAAR    90 tablet    Take 1 tablet (100 mg) by mouth daily    Essential hypertension       metoprolol 25 MG 24 hr tablet    TOPROL-XL    90 tablet    TAKE ONE TABLET BY MOUTH DAILY    Essential hypertension       MIRALAX Packet   Generic drug:  polyethylene glycol      Take 1 packet by mouth daily as needed for constipation        MULTIVITAL Tabs      Take 1 tablet by mouth daily        nortriptyline 25 MG capsule    PAMELOR          order for DME     1 each    Equipment being ordered: TENS and supplies.    Other chronic pain, Lumbar radiculopathy, DDD (degenerative disc disease), lumbar, Spinal stenosis of lumbar region without neurogenic claudication       pantoprazole 40 MG EC tablet    PROTONIX    90 tablet    TAKE ONE TABLET BY MOUTH EVERY MORNING    Gastroesophageal reflux disease, esophagitis presence not specified       tamsulosin 0.4 MG capsule    FLOMAX    90 capsule    TAKE ONE CAPSULE BY MOUTH ONCE DAILY    Benign prostatic hyperplasia with urinary retention       traMADol 50 MG tablet    ULTRAM     Take 50 mg by mouth 3 times daily Up to 4 times a day        TYLENOL 325 MG tablet   Generic drug:  acetaminophen      Take 325 mg by mouth 3 times daily as needed for mild pain

## 2017-11-15 NOTE — PATIENT INSTRUCTIONS
[facility]   Medial Branch Block Discharge Instructions  Nurse line:  [phone]  Appointment line:  [phone]    Your procedure was performed by:  Dr. Talisha Beal     Medications used: bupivicaine, omnipaque, normal saline     You will need to complete the Pain Scale Log form and return it to us as soon as possible.  Once we have received the form, we will review it and call you to determine the next steps.     The form can be faxed to [phone] or mailed to:   [facility]    [address], [address] [address]      You may resume your regular activity after the injection.    Be cautious with walking since you may have numbness and/or weakness in the lower extremities for up to 6-8 hours after the procedure due to the effects of the local anesthetic.    Avoid driving for 6 hours. The local anesthetic could slow your reflexes    You may shower, however no swimming or tub baths or hot tubs for 24 hours following your procedure.    Your pain will return after the numbing medications have worn off.  You may use your current pain medications as needed.    You may use anti-inflammatory medications (such as ibuprofen, aleve, or advil) or Tylenol for pain control if necessary.  Some people find it helpful to alternate ibuprofen and Tylenol every 3 hours for a couple of days.    You may use ice packs 10-15 minutes three to four times a day at the injection site for comfort.     Do not use heat to painful areas for 6 to 8 hours. This will give the local anesthetic time to wear off and prevent you from accidentally burning your skin.     If you experience any of the following, call the pain center nursing line during work hours at [phone] or the on-call after hours physician line is [phone]:  -Fever over 100 degree F  -Swelling, bleeding, redness, drainage, warmth at the injection site  -Progressive weakness or  numbness on your legs  -If lumbar, call if you have a loss of bowel or bladder function  -Unusual new onset of pain that is not improving

## 2017-11-15 NOTE — NURSING NOTE
Discharge Information    IV Discontiued Time:  NA    Amount of Fluid Infused:  NA    Discharge Criteria = When patient returns to baseline or as per MD order    Consciousness:  Pt is fully awake    Circulation:  BP +/- 20% of pre-procedure level    Respiration:  Patient is able to breathe deeply    O2 Sat:  Patient is able to maintain O2 Sat >92% on room air    Activity:  Moves 4 extremities on command    Ambulation:  Patient is able to stand and walk or stand and pivot into wheelchair    Dressing:  Clean/dry or No Dressing    Notes:   Discharge instructions and AVS given to patient    Patient meets criteria for discharge?  YES    Admitted to PCU?  No    Responsible adult present to accompany patient home?  Yes    Signature/Title:    DONATO MARIN RN Care Coordinator  [facility]

## 2017-11-15 NOTE — NURSING NOTE
Pre-procedure Intake    Have you been fasting? NA    If yes, for how long?     Are you taking a prescribed blood thinner such as coumadin, Plavix, Xarelto?    No    If yes, when did you take your last dose?     Do you take aspirin?  Yes -   ASA    If cervical procedure, have you held aspirin for 6 days?   NA    Do you have any allergies to contrast dye, iodine, steroid and/or numbing medications?  YES: contrast dye, iodine?    Are you currently taking antibiotics or have an active infection?  NO    Have you had a fever/elevated temperature within the past week? NO    Are you currently taking oral steroids? NO    Do you have a ? Yes       Are you pregnant or breastfeeding?  Not Applicable    Are the vital signs normal?  Yes

## 2017-11-17 NOTE — PROGRESS NOTES
"Clinic Care Coordination Contact  OUTREACH    Referral Source: Self-patient/Caregiver  Reason for Contact:    Hospitalization 1/29-2/1/2017-     DISCHARGE DIAGNOSIS:   1. Compression fracture L2-3    2. Chronic low back pain, unspecified back pain laterality, with sciatica presence unspecified    3. Pulmonary fibrosis (H)    4. CAP (community acquired pneumonia)    5. Dementia with behavioral disturbance, unspecified dementia type    6. Essential hypertension    7. Coronary artery disease involving native coronary artery of native heart without angina pectoris    8. Congestive heart failure, unspecified congestive heart failure chronicity, unspecified congestive heart failure type (H)    9. S/P TAVR (transcatheter aortic valve replacement)    10. CKD (chronic kidney disease) stage 3, GFR 30-59 ml/min    11. Physical deconditioning       Care Conference: No     Universal Utilization:   ED Visits in last year: 0  Hospital visits in last year: 2  Last PCP appointment: 11/22/16  Missed Appointments: 0  Concerns:  (none)  Multiple Providers or Specialists:  (cardiology, vascular, pain, ortho, neurology, spine surgeon)    Clinical Concerns:  Current Medical Concerns: CC spoke to the patients wife and she reports the patient had previous  lumbar injection  through the Pain Clinic. Today had an \" ablation \" per wife and his  pain is a #9 .     Patient and wife are in close contact with the Pain Clinic.     Patient is taking Gabapentin,Tramadol and Tylenol as directed       No change in the Pulmonary Fibrosis  Using oxygen continuously      Clinical Pathway Name: None      Medication Management:  Discussed pain medications      Functional Status:  Mobility Status: Independent w/Device     Psychosocial:  Current living arrangement:: I live in a private home with spouse (TCU)     Resources and Interventions:  Current Resources:  (NA);  (NA)  PAS Number: [id number]  Senior Linkage Line Referral Placed:  (NA)  Advanced Care " Plans/Directives on file:: No  Referrals Placed:  (NA)     Goals:   Goal 1 Statement: I will decrease back pain   Goal 1 Supportive Steps: I will use Tramadol as directed ,massage chair and home exer  Goal 1 Progression Percent: 90%  Goal 1 Progression Date: 11/17/17  Goal 2 Statement: I will follow the Hear Failure Action Plan   Goal 2 Supportive Steps: I will cough deep breath and drink plenty of water and call with increased symptoms   Goal 2 Progression Percent: 50%  Goal 2 Progression Date: 11/17/17         Barriers: Back pain continues   Strengths: Back injection /followed by the Pain Clinic   Patient/Caregiver understanding: Patient will call the pain clinic with any questions or concerns   Frequency of Care Coordination: PRN  Upcoming appointment: 03/06/17     Plan: CC will follow up in 3-5 business days     Pebbles Leija RN / Clinical Care Coordinator     [address] [address]  [email or web address]  Office :  [phone] / Fax :  [phone]

## 2017-11-20 NOTE — TELEPHONE ENCOUNTER
Spoke with wife regarding her report of still having back pain after procedure 11/15/17. They did not understand that the MBB was a test procedure only, complaining of no relief. Explained process of test injections and measuring relief he receives from them. That we expect the pain to return once the medication wore off and it's part of the normal process. She reports she has mailed back the pain diary, but that he got no relief from it, his scores were all 9/10. Let her know we would watch for that and let Andreina Brewster know in the meantime that no relief was obtained.     After discussing the injection, she described increasing weakness in the legs. Reports they've slowly been getting worse, doesn't think related to the timing of the MBB procedure.  He is asking to go back to bed more, has to use a wheelchair to get him to activities. She also reports seeing behavioral changes as well as physical. Sitting in chair last night, seemed to be mumbling about things he could not then explain. Let her know that they needed to let Dr. Fox know these things as well.    Routing encounter to both Andreina Brewster CNP, regarding MBB process and to Dr. Fox for awareness of concerns of increasing leg weakness.     Rocio Orourke, MSN, RN-BC  Care Coordinator  [facility]

## 2017-11-20 NOTE — TELEPHONE ENCOUNTER
Chart review shows Jada had bilateral LMBB done 11/15/17. Message left for patient to call triage and let us know the best time to be reached to discuss concern regarding procedure last week and how he's feeling now. Number given.    Rocio Orourke, MSN, RN-BC  Care Coordinator  [facility]

## 2017-11-20 NOTE — TELEPHONE ENCOUNTER
The process and expectations were explained several times to patient, his wife and their two children. Thank you for further explanation to the patient's wife.     CELIA Boyer, NP-C  [facility]

## 2017-11-20 NOTE — TELEPHONE ENCOUNTER
LMBB#1 pain data reviewed. Max relief obtained:     At rest:                    14%  Right facet load:      No relief, original score 8/10 and it was either 8/10 or 9/10 throughout testing hours  Left facet load:         No relief, original score 8/10 and it was either 8/10 or 9/10 throughout testing hours  Normal activity:        No relief, original score 8/10 and it was either 8/10 or 9/10 throughout testing hours     Routing to provider to review. Patient's wife had already called in and discussed - see other telephone encounter of today, 11/20/17. Routing to ordering provider, Andreina Brewster CNP, as well for follow up.

## 2017-11-20 NOTE — TELEPHONE ENCOUNTER
Patient's spouse LM at 0942 stating that he had a procedure done last week. She states that he's not doing well as his legs are weak and pain is back up. Requesting call back. Phone #[phone]    Kiana Gaines    [facility]

## 2017-11-20 NOTE — TELEPHONE ENCOUNTER
Kemi (wife) left  at 11:59 am      Returning call regarding Chaz Rodriguez. Please call her at [phone]      Selena KAISER    [facility]

## 2017-11-21 NOTE — PROGRESS NOTES
Clinic Care Coordination RN :   Incoming call from Violet/wife .  Wife reports the patient fell this morning scraping both of elbows and wife called 911.      Patient refused to go to the hospital .  Wife is concerns that the patient is getting more confused.m  Patient has been on Tylenol,Gabapentin and Tramadol Three times a day for awhile for back pain. Patient found outside the house this morning and he said he needed to go to the bathroom .  Second  confusion episode in a short period of time .  At times the patients conversation doesn't make any sense to his wife  .    Taking under shorts off and was found sitting in the chair .  CC expressed concern of the patients safety and encouraged 24/7 supervision.  Wife agrees not to leave the patient alone .      Family plans to come into town for Thanksgiving and they will have a family conference and make some decisions about the patient .  CC explained to the wife the patient might need to be placed in a memory care bunch .  Wife will call CC back Friday 11/24/2017 and give an update on the family conference     Pebbles Leija RN / Clinical Care Coordinator     [address] [address]  [email or web address]  Office :  [phone] / Fax :  [phone]

## 2017-11-22 NOTE — PROGRESS NOTES
Spoke with patient's wife. Patient is more alert today. Eating and drinking better. Making good urine and actually in the bathroom as a spoke with the patient's wife. Tramadol had been held while still continuing Tylenol and gabapentin. Patient's pain little worse. Therefore patient's wife will keep him eating and drinking well and may try adding back tramadol one half tablet twice a day. If still having quite a lot of pain, will try increasing to 1 tablet twice a day as long as patient does not have increasing sedation again. If mental status worsens again, patient's wife will contact the clinic. Medlist updated for now

## 2017-11-22 NOTE — PROGRESS NOTES
" Spoke with pt's wife. Pt refused to go to hospital. Has had part of a pot pie for dinner and she has been able to get him to drink  A little lemonade (doesn't like water).  HAs urinated only a tiny amount once today. Confused. Pain leve of back a \"14 of 10\" per pt report though appears more comfortable than that per wife. HAs had 3 tabs tramadol today. Instructed to hold tramadol for now since pain doesn't appear as bad as pt's \"14\" number. Encourage him to get more fluids in and monitor how urination goes along with behavior. If improved hydration leads to more urination and improves mentally tomorrow AM,then OK to stay at home and I will call again tomorrow afternoon. If not able to get more fluids in tonight or not urinating more or worsening confusion, then pt to be brought to the ER for further eval, probable IVF and probable admission. Pt's daughter will be coming over later tonight also.  "

## 2017-11-27 NOTE — TELEPHONE ENCOUNTER
Unknown called left  at 12:22 pm    Call regarding the most recent pain treatment, he had a MBB and the shot did not have any effect. Did send form in and have not heard anything back.       Selena KAISER    [facility]

## 2017-11-27 NOTE — PROGRESS NOTES
I am not able to add on to my schedule today. Please schedule pt to see me tomorrow at 11:00.  Currently on on Gabapentin 100mg TID. WOuld try increase ing Gabapentin to 200mg ( 2 of the 100mg capsules TID and try to stay off of Tramadol for now. See me tomorrow to check labs for any sign infection/electrolyte abnormalities that could be contributing to delirium. Be sure still drinking fluids well  To keep kidneys working well. If has acute worsning of delirium between now and appt tomorrow with me, then be seen in ER. Please schedule appt for tomorrow and inform pt's wife Kylee

## 2017-11-27 NOTE — PROGRESS NOTES
Left an detailed message stating that Patient has appointment for Tuesday, November 28, 2017 at 11:00am per .

## 2017-11-27 NOTE — TELEPHONE ENCOUNTER
Called and LM to call triage. Please also see TE from 11/20-this has been discussed several times already.     Gretta Ellis  BSN-RN Care Coordinator  [facility]

## 2017-11-27 NOTE — PROGRESS NOTES
Clinic Care Coordination Contact  Incoming call from the patients wife Violet.  Family did not have a family conference about the patient .  Violet reports the patient was alert on Thanksgiving day playing cribbage without any problems .  Last night and this morning the confusion is back .crying and states have I played this game before -in regards to wife trying to start a game .   Patient  complains of hip and back pain.  Patient is only on the Gabapentin and Tylenol   Wife was afraid to start tramadol due to confusion .  Wife is trying to get a hold of the Pain Clinic this morning .  Please advise .    Pebbles Leija RN / Clinical Care Coordinator     [address] [address]  [email or web address]  Office :  [phone] / Fax :  [phone]

## 2017-11-28 NOTE — PROGRESS NOTES
Clinic Care Coordination Contact  CC met the patient his wife and son after PCP visit today.  Patient appears pleasant using a rolled walker .   Patient had a fall last week and has a red sharda on right forehead.  Dr Fox ordered a CT scan of the head urine and blood work .    CC will follow up in 3-5  business days     Pebbles Leija RN / Clinical Care Coordinator     [address] [address]  [email or web address]  Office :  [phone] / Fax :  [phone]

## 2017-11-28 NOTE — PATIENT INSTRUCTIONS
At your visit today, we discussed your risk for falls and preventive options.    Fall Prevention  Falls often occur due to slipping, tripping or losing your balance. Millions of people fall every year and injure themselves. Here are ways to reduce your risk of falling again.    Think about your fall, was there anything that caused your fall that can be fixed, removed, or replaced?    Make your home safe by keeping walkways clear of objects you may trip over.    Use non-slip pads under rugs. Do not use area rugs or small throw rugs.    Use non-slip mats in bathtubs and showers.    Install handrails and lights on staircases.    Do not walk in poorly lit areas.    Do not stand on chairs or wobbly ladders.    Use caution when reaching overhead or looking upward. This position can cause a loss of balance.    Be sure your shoes fit properly, have non-slip bottoms and are in good condition.     Wear shoes both inside and out. Avoid going barefoot or wearing slippers.    Be cautious when going up and down stairs, curbs, and when walking on uneven sidewalks.    If your balance is poor, consider using a cane or walker.    If your fall was related to alcohol use, stop or limit alcohol intake.     If your fall was related to use of sleeping medicines, talk to your doctor about this. You may need to reduce your dosage at bedtime if you awaken during the night to go to the bathroom.      To reduce the need for nighttime bathroom trips:    Avoid drinking fluids for several hours before going to bed    Empty your bladder before going to bed    Men can keep a urinal at the bedside    Stay as active as you can. Balance, flexibility, strength, and endurance all come from exercise. They all play a role in preventing falls. Ask your healthcare provider which types of activity are right for you.    Get your vision checked on a regular basis.    If you have pets, know where they are before you stand up or walk so you don't trip over  them.    Use night lights.  Date Last Reviewed: 11/5/2015 2000-2017 [employer]. [address] [place] [address], [place] [address]. All rights reserved. This information is not intended as a substitute for professional medical care. Always follow your healthcare professional's instructions.

## 2017-11-28 NOTE — PROGRESS NOTES
"  SUBJECTIVE:   Chaz Soler is a 89 year old male who presents to clinic today for the following health issues:    Chief Complaint   Patient presents with     Follow up     Questions, Results,      Fall     Injury to head and elbows     Confusion       Pt's past medical history, family history, habits, medications and allergies were reviewed with the patient today.  See snap shot for  HCM status. Most recent lab results reviewed with pt. Problem list and histories reviewed & adjusted, as indicated.  Additional history as below:    Patient seen with his wife and son. Has had increasing confusion recently. Fell about a week ago scraping his elbows and forehead. Confusion had improved around Thanksgiving time when stopping tramadol and increasing fluid intake but has worsened some again despite being off of tramadol. Patient denies headache, vision changes, chest pain, abdominal pain, dysuria. Chronic shortness of breath with pulmonary fibrosis. Admits to mild depression. Chronic low back pain without radicular symptoms. Has been followed by the pain clinic. Underwent a trial MBB  to see if he was a candidate for RFA but failed to get any response with the trial injection.  Family waiting to hear back from the pain clinic regarding next steps for pain control. HAs been off of tramadol for the past 1 week. On Gabapentin and Tylenol. Pain varies from 5 to a 10 of 10     Additional ROS:   Constitutional, HEENT, Cardiovascular, Pulmonary, GI and , Neuro, MSK and Psych review of systems/symptoms are otherwise negative or unchanged from previous, except as noted above.      OBJECTIVE:  /74  Pulse 70  Temp 97.9  F (36.6  C) (Oral)  Wt 148 lb (67.1 kg)  SpO2 94%  BMI 22.5 kg/m2   Estimated body mass index is 22.5 kg/(m^2) as calculated from the following:    Height as of 10/1/17: 5' 8\" (1.727 m).    Weight as of this encounter: 148 lb (67.1 kg).  Eye: PERRL, EOMI  HENT: ear canals and TM's normal and nose and " mouth without ulcers or lesions   Neck: no adenopathy. Thyroid normal to palpation. No bruits  Pulm:  Mild  crackles base of lungs bilaterally (chronic). On O2  CV: Regular rates and rhythm  GI: Soft, nontender, Normal active bowel sounds, No hepatosplenomegaly or masses palpable  Ext: Peripheral pulses intact. No edema.  Neuro: Normal strength and tone, sensory exam grossly normal. Oriented to person and place but not year.  Does not remember what he ate for lunch earlier today.  Knows who I am.  Slow but stable gait with walker today  Skin:  1 cm skin tears bilateral elbow areas with surrounding abrasion of the skin. Patient wearing Band-Aids. No evidence of pus or other sign infection.  Right forehead shows mild abrasion without ecchymosis.  Back: Tenderness to palpation bilateral paralumbar area. Neg SLRT bilaterally.      Assessment/Plan: (See plan discussion below for further details)  1. Delirium  Head CT to r/o SDH. Labs as ordered. Empiric trial of Levaquin to cover possible UTI since pt was not able to give urine sample today despite order and to cover any possible infectious source hidden in lungs that would be mqasked by pulm fibrosis findings. Family is with pt 24 hrs/day  - CT Head w/o Contrast; Future  - Comprehensive metabolic panel  - TSH with free T4 reflex  - CBC with platelets  - UA with Microscopic reflex to Culture; Future  - levofloxacin (LEVAQUIN) 250 MG tablet; Take 1 tablet (250 mg) by mouth daily for 7 days  Dispense: 7 tablet; Refill: 0    2. Traumatic injury of head, initial encounter  See #1 above  - CT Head w/o Contrast; Future    3. Leukocytosis, unspecified type  See #1 above  - levofloxacin (LEVAQUIN) 250 MG tablet; Take 1 tablet (250 mg) by mouth daily for 7 days  Dispense: 7 tablet; Refill: 0    4. Other chronic pain  Will await response to above empiric abx to see if delirium clears. If so, then will add back some tramadol. If not, will consider  Trial of Cymbalta and, if not  helping, then start using Celebrex despite CKD as pain reduction and quality of life will outweigh risk of speeding up  GFR decline. Pt and family in agreement with plan. Spoke with  Pain clinic NP Andreina Brewster and she doesn't have other procedure options to offer pt since did not respond to test dose with RICHARD Fox MD  Internal Medicine Department  [facility]

## 2017-11-28 NOTE — NURSING NOTE
"Chief Complaint   Patient presents with     Follow up     Questions, Results,      Fall     Feet     Discoloration       Initial /74  Pulse 70  Temp 97.9  F (36.6  C) (Oral)  Wt 148 lb (67.1 kg)  SpO2 94%  BMI 22.5 kg/m2 Estimated body mass index is 22.5 kg/(m^2) as calculated from the following:    Height as of 10/1/17: 5' 8\" (1.727 m).    Weight as of this encounter: 148 lb (67.1 kg).  Medication Reconciliation: complete  "

## 2017-11-28 NOTE — MR AVS SNAPSHOT
After Visit Summary   11/28/2017    Chaz Soler    MRN: [id number]           Patient Information     Date Of Birth          5/21/1928        Visit Information        Provider Department      11/28/2017 11:00 AM Alirio Fox MD [facility]        Today's Diagnoses     Traumatic injury of head, initial encounter    -  1    Delirium        Leukocytosis, unspecified type        Other chronic pain          Care Instructions      At your visit today, we discussed your risk for falls and preventive options.    Fall Prevention  Falls often occur due to slipping, tripping or losing your balance. Millions of people fall every year and injure themselves. Here are ways to reduce your risk of falling again.    Think about your fall, was there anything that caused your fall that can be fixed, removed, or replaced?    Make your home safe by keeping walkways clear of objects you may trip over.    Use non-slip pads under rugs. Do not use area rugs or small throw rugs.    Use non-slip mats in bathtubs and showers.    Install handrails and lights on staircases.    Do not walk in poorly lit areas.    Do not stand on chairs or wobbly ladders.    Use caution when reaching overhead or looking upward. This position can cause a loss of balance.    Be sure your shoes fit properly, have non-slip bottoms and are in good condition.     Wear shoes both inside and out. Avoid going barefoot or wearing slippers.    Be cautious when going up and down stairs, curbs, and when walking on uneven sidewalks.    If your balance is poor, consider using a cane or walker.    If your fall was related to alcohol use, stop or limit alcohol intake.     If your fall was related to use of sleeping medicines, talk to your doctor about this. You may need to reduce your dosage at bedtime if you awaken during the night to go to the bathroom.      To reduce the need for nighttime bathroom trips:    Avoid drinking fluids for  several hours before going to bed    Empty your bladder before going to bed    Men can keep a urinal at the bedside    Stay as active as you can. Balance, flexibility, strength, and endurance all come from exercise. They all play a role in preventing falls. Ask your healthcare provider which types of activity are right for you.    Get your vision checked on a regular basis.    If you have pets, know where they are before you stand up or walk so you don't trip over them.    Use night lights.  Date Last Reviewed: 11/5/2015 2000-2017 [employer]. [address] [place] [address]. All rights reserved. This information is not intended as a substitute for professional medical care. Always follow your healthcare professional's instructions.                Follow-ups after your visit        Future tests that were ordered for you today     Open Future Orders        Priority Expected Expires Ordered    UA with Microscopic reflex to Culture Routine  11/28/2018 11/28/2017    CT Head w/o Contrast Routine  11/28/2018 11/28/2017            Who to contact     If you have questions or need follow up information about today's clinic visit or your schedule please contact [facility] directly at [phone].  Normal or non-critical lab and imaging results will be communicated to you by [employer], letter or phone within 4 business days after the clinic has received the results. If you do not hear from us within 7 days, please contact the clinic through [employer] or phone. If you have a critical or abnormal lab result, we will notify you by phone as soon as possible.  Submit refill requests through [email or web address] or call your pharmacy and they will forward the refill request to us. Please allow 3 business days for your refill to be completed.          Additional Information About Your Visit        [employer] Information     [email or web address] gives you secure access to your electronic health record. If you see a  primary care provider, you can also send messages to your care team and make appointments. If you have questions, please call your primary care clinic.  If you do not have a primary care provider, please call [phone] and they will assist you.        Care EveryWhere ID     This is your Care EveryWhere ID. This could be used by other organizations to access your [facility] medical records  [id number]        Your Vitals Were     Pulse Temperature Pulse Oximetry BMI (Body Mass Index)          70 97.9  F (36.6  C) (Oral) 94% 22.5 kg/m2         Blood Pressure from Last 3 Encounters:   11/28/17 160/74   11/15/17 (P) 157/83   10/18/17 159/81    Weight from Last 3 Encounters:   11/28/17 148 lb (67.1 kg)   10/10/17 158 lb (71.7 kg)   10/01/17 154 lb 11.2 oz (70.2 kg)              We Performed the Following     CBC with platelets     Comprehensive metabolic panel     TSH with free T4 reflex          Today's Medication Changes          These changes are accurate as of: 11/28/17 11:59 PM.  If you have any questions, ask your nurse or doctor.               Start taking these medicines.        Dose/Directions    levofloxacin 250 MG tablet   Commonly known as:  LEVAQUIN   Used for:  Leukocytosis, unspecified type, Delirium   Started by:  Alirio Fox MD        Dose:  250 mg   Take 1 tablet (250 mg) by mouth daily for 7 days   Quantity:  7 tablet   Refills:  0         Stop taking these medicines if you haven't already. Please contact your care team if you have questions.     traMADol 50 MG tablet   Commonly known as:  ULTRAM   Stopped by:  Alirio Fox MD                Where to get your medicines      These medications were sent to [facility] [address] [place], [place] [address] [facility] [address]     Phone:  [phone]     levofloxacin 250 MG tablet                Primary Care Provider Office Phone # Fax #    Alirio Fox [phone]       [address]  [facility] [address]        Equal Access to Services     NELIDA PHILLIPS : Hadii aad ku hadpetrona Sobrittanyali, waaxda luqadaha, qaybta kaalmajohnny irizarry. So [facility] [phone].    ATENCIÓN: Si habla español, tiene a denise disposición servicios gratuitos de asistencia lingüística. Dharmeshame al [phone].    We comply with applicable federal civil rights laws and [place] laws. We do not discriminate on the basis of race, color, national origin, age, disability, sex, sexual orientation, or gender identity.            Thank you!     Thank you for choosing [facility]  for your care. Our goal is always to provide you with excellent care. Hearing back from our patients is one way we can continue to improve our services. Please take a few minutes to complete the written survey that you may receive in the mail after your visit with us. Thank you!             Your Updated Medication List - Protect others around you: Learn how to safely use, store and throw away your medicines at www.disposemymeds.org.          This list is accurate as of: 11/28/17 11:59 PM.  Always use your most recent med list.                   Brand Name Dispense Instructions for use Diagnosis    amLODIPine 10 MG tablet    NORVASC    90 tablet    TAKE ONE TABLET BY MOUTH ONCE DAILY    Essential hypertension       aspirin 81 MG tablet     30 tablet    Take 1 tablet (81 mg) by mouth daily    Coronary artery disease involving native coronary artery of native heart without angina pectoris       atorvastatin 20 MG tablet    LIPITOR    90 tablet    Take 1 tablet (20 mg) by mouth daily    Coronary artery disease involving native coronary artery of native heart without angina pectoris       donepezil 10 MG tablet    ARICEPT    90 tablet    TAKE ONE TABLET (10 MG) BY MOUTH AT BEDTIME    Memory loss       gabapentin 100 MG capsule    NEURONTIN    90 capsule    TAKE ONE CAPSULE BY MOUTH THREE TIMES DAILY  FOR  PAIN    Compression fracture       levofloxacin 250 MG tablet    LEVAQUIN    7 tablet    Take 1 tablet (250 mg) by mouth daily for 7 days    Leukocytosis, unspecified type, Delirium       levothyroxine 88 MCG tablet    SYNTHROID/LEVOTHROID    90 tablet    Take 1 tablet (88 mcg) by mouth daily    Hypothyroidism, unspecified type       lidocaine-prilocaine cream    EMLA          losartan 100 MG tablet    COZAAR    90 tablet    Take 1 tablet (100 mg) by mouth daily    Essential hypertension       metoprolol 25 MG 24 hr tablet    TOPROL-XL    90 tablet    TAKE ONE TABLET BY MOUTH DAILY    Essential hypertension       MIRALAX Packet   Generic drug:  polyethylene glycol      Take 1 packet by mouth daily as needed for constipation        MULTIVITAL Tabs      Take 1 tablet by mouth daily        nortriptyline 25 MG capsule    PAMELOR          order for DME     1 each    Equipment being ordered: TENS and supplies.    Other chronic pain, Lumbar radiculopathy, DDD (degenerative disc disease), lumbar, Spinal stenosis of lumbar region without neurogenic claudication       pantoprazole 40 MG EC tablet    PROTONIX    90 tablet    TAKE ONE TABLET BY MOUTH EVERY MORNING    Gastroesophageal reflux disease, esophagitis presence not specified       tamsulosin 0.4 MG capsule    FLOMAX    90 capsule    TAKE ONE CAPSULE BY MOUTH ONCE DAILY    Benign prostatic hyperplasia with urinary retention       TYLENOL 325 MG tablet   Generic drug:  acetaminophen      Take 325 mg by mouth 3 times daily as needed for mild pain

## 2017-11-29 NOTE — TELEPHONE ENCOUNTER
Tried calling pt 3 times but NA and no voice mail. Family had also wanted clarification from Pain clinic re: treatment plan going forward so spoke with Andreina Brewster today also from  Pain clinic. Since pt did not get any response from test dose of MBB, she does not feel other interventional procedure would be helpful. CT neg for bleed. WBC minimally elevated. Other labs without cause for delirium. Pt did not give urine sample yesterday as requested. Ordered to screen for occult UTI as cause for sx, especially with  Mild WBC elevation. WIll try calling wife/pt again later this afternoon to discuss everything. Once know if infectious issue present, can make other decisions re:: pain control (possible trial of Cymbalta rather than Nortriptyline for pain and possible mild depression) vs NSAIDS despite CKD since pain relief would outweigh risk of worsening GFR given age/other disabilities

## 2017-11-29 NOTE — TELEPHONE ENCOUNTER
Patient called wanting results from lab work and ct scan wants to know what to do from here please call pt asap phone [phone]

## 2017-12-04 NOTE — TELEPHONE ENCOUNTER
The patient's spouse has been notified of this information and all questions answered. Is requesting that Rx for Cymbalta be sent to [facility] in  [place].

## 2017-12-05 NOTE — PROGRESS NOTES
Clinic Care Coordination Contact  [facility]/Voicemail    Referral Source: Self-patient/Caregiver  Clinical Data: Care Coordinator Outreach  Follow up on confusion and back pain   Outreach attempted x 2.  Unable to leave a message  Recording says your call can not be completed as dialed please check the number and dial again .   Plan:  Care Coordinator will try to reach patient again in 1-2 business days.    Pebbles Leija RN / Clinical Care Coordinator     [address] [address]  [email or web address]  Office :  [phone] / Fax :  [phone]

## 2017-12-05 NOTE — TELEPHONE ENCOUNTER
Pt saw PCP on 11/28 for head injury after a fall. Plan RT chronic pain is as follows:    4. Other chronic pain  Will await response to above empiric abx to see if delirium clears. If so, then will add back some tramadol. If not, will consider  Trial of Cymbalta and, if not helping, then start using Celebrex despite CKD as pain reduction and quality of life will outweigh risk of speeding up  GFR decline. Pt and family in agreement with plan. Spoke with [facility] NP Andreina Brewster and she doesn't have other procedure options to offer pt since did not respond to test dose with MBB  -------------  Will close encounter.     Yumi Douglas, YENNYN, RN-BC  Patient Care Supervisor/Care Coordinator  [facility]

## 2017-12-07 NOTE — PROGRESS NOTES
Incoming call from the patients wife Violet.  Left a message on CC VM that the patient has finished the antibiotic and is back on the Tramadol.  Wondering what the next step is.     Pebbles Leija RN / Clinical Care Coordinator     [address] [address]  [email or web address]  Office :  [phone] / Fax :  [phone]

## 2017-12-07 NOTE — PROGRESS NOTES
Clinic Care Coordination Contact  [facility]/Voicemail    Referral Source: Self-patient/Caregiver  Clinical Data: Care Coordinator Outreach  Outreach attempted x 1.  Left message on voicemail with call back information and requested return call.  Plan: Care Coordinator will await a return call     Pebbles Leija RN / Clinical Care Coordinator     [address] [address]  [email or web address]  Office :  [phone] / Fax :  [phone]

## 2017-12-08 NOTE — PROGRESS NOTES
CC spoke to Violet/wife and she reports she received a VM from Dr Fox ---- telephone message copied below :  left message that the patient is to stop nortriptyline and start duloxetine 30 mg capsule, 1 capsule daily in the morning with food.  Instructed that the patient and his wife [facility] weeks if doing better or earlier if having any side effects with the new medication.    Violet reports the patient continue intermittent confusion.  Violet will call CC if any side effects from new medication     Pebbles Leija RN / Clinical Care Coordinator     [address] [address]  [email or web address]  Office :  [phone] / Fax :  [phone]

## 2017-12-08 NOTE — TELEPHONE ENCOUNTER
Tried to call patient and his wife.  No answer.  Per signed consent to communicate, left message that the patient is to stop nortriptyline and start duloxetine 30 mg capsule, 1 capsule daily in the morning with food.  Instructed that the patient and his wife [facility] weeks if doing better or earlier if having any side effects with the new medication.

## 2017-12-11 NOTE — PROGRESS NOTES
Clinic Care Coordination Contact  Incoming call from Violet/wife     Patient had an hour of twitching for 1 hour on Saturday night .  Wondering if this could be a side effect from Cymbalta.  Patient continues to have back pain .  Wife is wondering if Marijuana would be an option for back pain.  Please advise and have care team call wife back with recommendations     Pebbles Leija RN / Clinical Care Coordinator     [address] [address]  [email or web address]  Office :  [phone] / Fax :  [phone]

## 2017-12-11 NOTE — PROGRESS NOTES
Patient has had a history of some twitching intermittently in the past.  Cymbalta/duloxetine can theoretically cause that side effect but less likely and per note, only occurred for 1 hour 2 days ago.  Would therefore continue Cymbalta/duloxetine 30mg daily  For now and update me regarding patient's pain symptoms in 3 weeks.  This is not something that will immediately kick in with pain control as a narcotic would.  Regarding marijuana question, patient would have to be certified before could try a medical marijuana and [facility] physicians are not registered by the state to do this certification.  Patient would have to seek out a provider who has been  registered by the [facility].  Would also have concern however that medical  Marijuana would likely contribute to patient's fatigue symptoms also.

## 2018-01-01 ENCOUNTER — NURSING HOME VISIT (OUTPATIENT)
Dept: GERIATRICS | Facility: CLINIC | Age: 83
End: 2018-01-01
Payer: COMMERCIAL

## 2018-01-01 ENCOUNTER — CARE COORDINATION (OUTPATIENT)
Dept: CARE COORDINATION | Facility: CLINIC | Age: 83
End: 2018-01-01

## 2018-01-01 ENCOUNTER — TELEPHONE (OUTPATIENT)
Dept: INTERNAL MEDICINE | Facility: CLINIC | Age: 83
End: 2018-01-01

## 2018-01-01 ENCOUNTER — HOSPITAL ENCOUNTER (OUTPATIENT)
Dept: ULTRASOUND IMAGING | Facility: CLINIC | Age: 83
Discharge: HOME | End: 2018-01-11
Attending: SURGERY | Admitting: SURGERY
Payer: MEDICARE

## 2018-01-01 ENCOUNTER — DOCUMENTATION ONLY (OUTPATIENT)
Dept: CARE COORDINATION | Facility: CLINIC | Age: 83
End: 2018-01-01

## 2018-01-01 ENCOUNTER — TELEPHONE (OUTPATIENT)
Dept: NURSING | Facility: CLINIC | Age: 83
End: 2018-01-01

## 2018-01-01 ENCOUNTER — RECORDS - HEALTHEAST (OUTPATIENT)
Dept: LAB | Facility: CLINIC | Age: 83
End: 2018-01-01

## 2018-01-01 ENCOUNTER — PATIENT OUTREACH (OUTPATIENT)
Dept: CARE COORDINATION | Facility: CLINIC | Age: 83
End: 2018-01-01

## 2018-01-01 ENCOUNTER — APPOINTMENT (OUTPATIENT)
Dept: CARDIOLOGY | Facility: CLINIC | Age: 83
DRG: 196 | End: 2018-01-01
Attending: INTERNAL MEDICINE
Payer: MEDICARE

## 2018-01-01 ENCOUNTER — APPOINTMENT (OUTPATIENT)
Dept: CT IMAGING | Facility: CLINIC | Age: 83
DRG: 291 | End: 2018-01-01
Attending: EMERGENCY MEDICINE
Payer: MEDICARE

## 2018-01-01 ENCOUNTER — PATIENT OUTREACH (OUTPATIENT)
Dept: INTERNAL MEDICINE | Facility: CLINIC | Age: 83
End: 2018-01-01

## 2018-01-01 ENCOUNTER — TRANSFERRED RECORDS (OUTPATIENT)
Dept: HEALTH INFORMATION MANAGEMENT | Facility: CLINIC | Age: 83
End: 2018-01-01

## 2018-01-01 ENCOUNTER — OFFICE VISIT (OUTPATIENT)
Dept: OTHER | Facility: CLINIC | Age: 83
End: 2018-01-01
Attending: SURGERY
Payer: MEDICARE

## 2018-01-01 ENCOUNTER — APPOINTMENT (OUTPATIENT)
Dept: GENERAL RADIOLOGY | Facility: CLINIC | Age: 83
DRG: 196 | End: 2018-01-01
Attending: EMERGENCY MEDICINE
Payer: MEDICARE

## 2018-01-01 ENCOUNTER — TELEPHONE (OUTPATIENT)
Dept: GERIATRICS | Facility: CLINIC | Age: 83
End: 2018-01-01

## 2018-01-01 ENCOUNTER — APPOINTMENT (OUTPATIENT)
Dept: PHYSICAL THERAPY | Facility: CLINIC | Age: 83
End: 2018-01-01
Attending: INTERNAL MEDICINE
Payer: MEDICARE

## 2018-01-01 ENCOUNTER — APPOINTMENT (OUTPATIENT)
Dept: CT IMAGING | Facility: CLINIC | Age: 83
DRG: 196 | End: 2018-01-01
Attending: EMERGENCY MEDICINE
Payer: MEDICARE

## 2018-01-01 ENCOUNTER — OFFICE VISIT (OUTPATIENT)
Dept: CARDIOLOGY | Facility: CLINIC | Age: 83
End: 2018-01-01
Attending: INTERNAL MEDICINE
Payer: COMMERCIAL

## 2018-01-01 ENCOUNTER — APPOINTMENT (OUTPATIENT)
Dept: PHYSICAL THERAPY | Facility: CLINIC | Age: 83
DRG: 291 | End: 2018-01-01
Attending: INTERNAL MEDICINE
Payer: MEDICARE

## 2018-01-01 ENCOUNTER — APPOINTMENT (OUTPATIENT)
Dept: GENERAL RADIOLOGY | Facility: CLINIC | Age: 83
End: 2018-01-01
Attending: PHYSICIAN ASSISTANT
Payer: MEDICARE

## 2018-01-01 ENCOUNTER — APPOINTMENT (OUTPATIENT)
Dept: GENERAL RADIOLOGY | Facility: CLINIC | Age: 83
DRG: 177 | End: 2018-01-01
Attending: EMERGENCY MEDICINE
Payer: MEDICARE

## 2018-01-01 ENCOUNTER — OFFICE VISIT (OUTPATIENT)
Dept: INTERNAL MEDICINE | Facility: CLINIC | Age: 83
End: 2018-01-01
Payer: COMMERCIAL

## 2018-01-01 ENCOUNTER — APPOINTMENT (OUTPATIENT)
Dept: PHYSICAL THERAPY | Facility: CLINIC | Age: 83
DRG: 291 | End: 2018-01-01
Payer: MEDICARE

## 2018-01-01 ENCOUNTER — APPOINTMENT (OUTPATIENT)
Dept: MRI IMAGING | Facility: CLINIC | Age: 83
End: 2018-01-01
Attending: EMERGENCY MEDICINE
Payer: MEDICARE

## 2018-01-01 ENCOUNTER — APPOINTMENT (OUTPATIENT)
Dept: GENERAL RADIOLOGY | Facility: CLINIC | Age: 83
DRG: 291 | End: 2018-01-01
Attending: INTERNAL MEDICINE
Payer: MEDICARE

## 2018-01-01 ENCOUNTER — HOSPITAL ENCOUNTER (EMERGENCY)
Facility: CLINIC | Age: 83
Discharge: HOME | End: 2018-01-02
Attending: EMERGENCY MEDICINE | Admitting: EMERGENCY MEDICINE
Payer: MEDICARE

## 2018-01-01 ENCOUNTER — HOSPITAL ENCOUNTER (INPATIENT)
Facility: CLINIC | Age: 83
LOS: 3 days | Discharge: NURSING, REHAB OR LONG-TERM CARE | DRG: 291 | End: 2018-06-06
Attending: EMERGENCY MEDICINE | Admitting: INTERNAL MEDICINE
Payer: MEDICARE

## 2018-01-01 ENCOUNTER — RADIANT APPOINTMENT (OUTPATIENT)
Dept: GENERAL RADIOLOGY | Facility: CLINIC | Age: 83
End: 2018-01-01
Attending: INTERNAL MEDICINE
Payer: COMMERCIAL

## 2018-01-01 ENCOUNTER — TELEPHONE (OUTPATIENT)
Dept: OTHER | Facility: CLINIC | Age: 83
End: 2018-01-01

## 2018-01-01 ENCOUNTER — APPOINTMENT (OUTPATIENT)
Dept: ULTRASOUND IMAGING | Facility: CLINIC | Age: 83
DRG: 196 | End: 2018-01-01
Attending: INTERNAL MEDICINE
Payer: MEDICARE

## 2018-01-01 ENCOUNTER — APPOINTMENT (OUTPATIENT)
Dept: GENERAL RADIOLOGY | Facility: CLINIC | Age: 83
DRG: 196 | End: 2018-01-01
Attending: ANESTHESIOLOGY
Payer: MEDICARE

## 2018-01-01 ENCOUNTER — HOSPITAL ENCOUNTER (INPATIENT)
Facility: CLINIC | Age: 83
LOS: 5 days | Discharge: NURSING, REHAB OR LONG-TERM CARE | DRG: 177 | End: 2018-07-07
Attending: EMERGENCY MEDICINE | Admitting: INTERNAL MEDICINE
Payer: MEDICARE

## 2018-01-01 ENCOUNTER — APPOINTMENT (OUTPATIENT)
Dept: GENERAL RADIOLOGY | Facility: CLINIC | Age: 83
DRG: 291 | End: 2018-01-01
Attending: EMERGENCY MEDICINE
Payer: MEDICARE

## 2018-01-01 ENCOUNTER — APPOINTMENT (OUTPATIENT)
Dept: PHYSICAL THERAPY | Facility: CLINIC | Age: 83
DRG: 177 | End: 2018-01-01
Attending: HOSPITALIST
Payer: MEDICARE

## 2018-01-01 ENCOUNTER — DOCUMENTATION ONLY (OUTPATIENT)
Dept: HOME HOSPICE | Facility: OTHER | Age: 83
End: 2018-01-01

## 2018-01-01 ENCOUNTER — HOSPITAL ENCOUNTER (OUTPATIENT)
Facility: CLINIC | Age: 83
Setting detail: OBSERVATION
Discharge: HOME | End: 2018-01-09
Attending: EMERGENCY MEDICINE | Admitting: INTERNAL MEDICINE
Payer: MEDICARE

## 2018-01-01 ENCOUNTER — HOSPITAL ENCOUNTER (INPATIENT)
Facility: CLINIC | Age: 83
LOS: 11 days | Discharge: HOSPICE | DRG: 196 | End: 2018-07-25
Attending: EMERGENCY MEDICINE | Admitting: INTERNAL MEDICINE
Payer: MEDICARE

## 2018-01-01 VITALS
DIASTOLIC BLOOD PRESSURE: 74 MMHG | TEMPERATURE: 97.9 F | HEIGHT: 68 IN | OXYGEN SATURATION: 95 % | RESPIRATION RATE: 17 BRPM | WEIGHT: 129 LBS | BODY MASS INDEX: 19.55 KG/M2 | SYSTOLIC BLOOD PRESSURE: 128 MMHG | HEART RATE: 68 BPM

## 2018-01-01 VITALS
OXYGEN SATURATION: 98 % | DIASTOLIC BLOOD PRESSURE: 72 MMHG | SYSTOLIC BLOOD PRESSURE: 138 MMHG | TEMPERATURE: 97.6 F | HEIGHT: 68 IN | HEART RATE: 68 BPM | RESPIRATION RATE: 20 BRPM | BODY MASS INDEX: 20.46 KG/M2 | WEIGHT: 135 LBS

## 2018-01-01 VITALS
WEIGHT: 140 LBS | TEMPERATURE: 98.2 F | OXYGEN SATURATION: 98 % | SYSTOLIC BLOOD PRESSURE: 164 MMHG | BODY MASS INDEX: 21.29 KG/M2 | DIASTOLIC BLOOD PRESSURE: 83 MMHG | RESPIRATION RATE: 16 BRPM

## 2018-01-01 VITALS
HEART RATE: 67 BPM | TEMPERATURE: 96.8 F | HEIGHT: 68 IN | OXYGEN SATURATION: 96 % | RESPIRATION RATE: 16 BRPM | DIASTOLIC BLOOD PRESSURE: 80 MMHG | BODY MASS INDEX: 21.34 KG/M2 | SYSTOLIC BLOOD PRESSURE: 153 MMHG | WEIGHT: 140.8 LBS

## 2018-01-01 VITALS
WEIGHT: 147 LBS | TEMPERATURE: 97.1 F | SYSTOLIC BLOOD PRESSURE: 120 MMHG | DIASTOLIC BLOOD PRESSURE: 56 MMHG | RESPIRATION RATE: 22 BRPM | WEIGHT: 151.6 LBS | HEIGHT: 68 IN | HEIGHT: 68 IN | HEART RATE: 72 BPM | SYSTOLIC BLOOD PRESSURE: 125 MMHG | BODY MASS INDEX: 22.28 KG/M2 | DIASTOLIC BLOOD PRESSURE: 57 MMHG | BODY MASS INDEX: 22.97 KG/M2 | HEART RATE: 86 BPM | OXYGEN SATURATION: 91 %

## 2018-01-01 VITALS
HEART RATE: 88 BPM | WEIGHT: 133.3 LBS | TEMPERATURE: 98.2 F | SYSTOLIC BLOOD PRESSURE: 110 MMHG | RESPIRATION RATE: 16 BRPM | BODY MASS INDEX: 20.2 KG/M2 | OXYGEN SATURATION: 96 % | DIASTOLIC BLOOD PRESSURE: 82 MMHG | HEIGHT: 68 IN

## 2018-01-01 VITALS
HEIGHT: 68 IN | HEART RATE: 73 BPM | TEMPERATURE: 98.6 F | BODY MASS INDEX: 21.67 KG/M2 | WEIGHT: 143 LBS | SYSTOLIC BLOOD PRESSURE: 157 MMHG | DIASTOLIC BLOOD PRESSURE: 74 MMHG | OXYGEN SATURATION: 92 % | RESPIRATION RATE: 20 BRPM

## 2018-01-01 VITALS
RESPIRATION RATE: 24 BRPM | HEART RATE: 77 BPM | OXYGEN SATURATION: 95 % | DIASTOLIC BLOOD PRESSURE: 76 MMHG | WEIGHT: 140 LBS | SYSTOLIC BLOOD PRESSURE: 160 MMHG | BODY MASS INDEX: 21.22 KG/M2 | HEIGHT: 68 IN | TEMPERATURE: 97.8 F

## 2018-01-01 VITALS
TEMPERATURE: 98.8 F | OXYGEN SATURATION: 96 % | SYSTOLIC BLOOD PRESSURE: 156 MMHG | HEIGHT: 68 IN | WEIGHT: 143 LBS | BODY MASS INDEX: 21.67 KG/M2 | HEART RATE: 81 BPM | RESPIRATION RATE: 24 BRPM | DIASTOLIC BLOOD PRESSURE: 69 MMHG

## 2018-01-01 VITALS
SYSTOLIC BLOOD PRESSURE: 118 MMHG | DIASTOLIC BLOOD PRESSURE: 58 MMHG | BODY MASS INDEX: 22.96 KG/M2 | HEART RATE: 76 BPM | TEMPERATURE: 98.5 F | WEIGHT: 151 LBS | RESPIRATION RATE: 18 BRPM | OXYGEN SATURATION: 92 %

## 2018-01-01 VITALS
WEIGHT: 145.5 LBS | DIASTOLIC BLOOD PRESSURE: 79 MMHG | BODY MASS INDEX: 22.05 KG/M2 | HEIGHT: 68 IN | OXYGEN SATURATION: 92 % | RESPIRATION RATE: 15 BRPM | TEMPERATURE: 96.7 F | SYSTOLIC BLOOD PRESSURE: 143 MMHG

## 2018-01-01 VITALS
TEMPERATURE: 99.2 F | SYSTOLIC BLOOD PRESSURE: 162 MMHG | RESPIRATION RATE: 18 BRPM | DIASTOLIC BLOOD PRESSURE: 70 MMHG | HEART RATE: 60 BPM | WEIGHT: 147 LBS | BODY MASS INDEX: 22.35 KG/M2 | OXYGEN SATURATION: 97 %

## 2018-01-01 VITALS
WEIGHT: 142.2 LBS | BODY MASS INDEX: 21.55 KG/M2 | HEART RATE: 74 BPM | TEMPERATURE: 96.1 F | DIASTOLIC BLOOD PRESSURE: 62 MMHG | RESPIRATION RATE: 20 BRPM | HEIGHT: 68 IN | OXYGEN SATURATION: 99 % | SYSTOLIC BLOOD PRESSURE: 123 MMHG

## 2018-01-01 VITALS
TEMPERATURE: 98.5 F | SYSTOLIC BLOOD PRESSURE: 134 MMHG | OXYGEN SATURATION: 99 % | WEIGHT: 140 LBS | RESPIRATION RATE: 16 BRPM | BODY MASS INDEX: 21.22 KG/M2 | HEART RATE: 79 BPM | DIASTOLIC BLOOD PRESSURE: 62 MMHG | HEIGHT: 68 IN

## 2018-01-01 VITALS
WEIGHT: 136.8 LBS | RESPIRATION RATE: 18 BRPM | HEIGHT: 68 IN | TEMPERATURE: 97.1 F | HEART RATE: 66 BPM | OXYGEN SATURATION: 98 % | SYSTOLIC BLOOD PRESSURE: 162 MMHG | DIASTOLIC BLOOD PRESSURE: 81 MMHG | BODY MASS INDEX: 20.73 KG/M2

## 2018-01-01 VITALS
RESPIRATION RATE: 24 BRPM | TEMPERATURE: 98.2 F | WEIGHT: 141 LBS | HEIGHT: 68 IN | BODY MASS INDEX: 21.37 KG/M2 | SYSTOLIC BLOOD PRESSURE: 130 MMHG | DIASTOLIC BLOOD PRESSURE: 64 MMHG | OXYGEN SATURATION: 93 % | HEART RATE: 75 BPM

## 2018-01-01 VITALS
SYSTOLIC BLOOD PRESSURE: 124 MMHG | DIASTOLIC BLOOD PRESSURE: 81 MMHG | WEIGHT: 150.2 LBS | TEMPERATURE: 99 F | BODY MASS INDEX: 22.84 KG/M2 | HEART RATE: 78 BPM | RESPIRATION RATE: 18 BRPM | OXYGEN SATURATION: 94 %

## 2018-01-01 VITALS
WEIGHT: 129.41 LBS | SYSTOLIC BLOOD PRESSURE: 153 MMHG | DIASTOLIC BLOOD PRESSURE: 85 MMHG | TEMPERATURE: 97.4 F | OXYGEN SATURATION: 91 % | HEART RATE: 74 BPM | RESPIRATION RATE: 30 BRPM | BODY MASS INDEX: 19.61 KG/M2 | HEIGHT: 68 IN

## 2018-01-01 VITALS
OXYGEN SATURATION: 95 % | BODY MASS INDEX: 22.81 KG/M2 | DIASTOLIC BLOOD PRESSURE: 70 MMHG | HEART RATE: 70 BPM | RESPIRATION RATE: 22 BRPM | WEIGHT: 150 LBS | TEMPERATURE: 97.8 F | SYSTOLIC BLOOD PRESSURE: 118 MMHG

## 2018-01-01 VITALS
BODY MASS INDEX: 21.34 KG/M2 | HEIGHT: 68 IN | SYSTOLIC BLOOD PRESSURE: 161 MMHG | TEMPERATURE: 98.8 F | HEART RATE: 76 BPM | RESPIRATION RATE: 16 BRPM | WEIGHT: 140.8 LBS | OXYGEN SATURATION: 95 % | DIASTOLIC BLOOD PRESSURE: 75 MMHG

## 2018-01-01 VITALS
BODY MASS INDEX: 21.37 KG/M2 | OXYGEN SATURATION: 99 % | TEMPERATURE: 98.2 F | HEART RATE: 75 BPM | SYSTOLIC BLOOD PRESSURE: 130 MMHG | HEIGHT: 68 IN | RESPIRATION RATE: 24 BRPM | WEIGHT: 141 LBS | DIASTOLIC BLOOD PRESSURE: 64 MMHG

## 2018-01-01 VITALS — SYSTOLIC BLOOD PRESSURE: 164 MMHG | DIASTOLIC BLOOD PRESSURE: 79 MMHG | HEART RATE: 65 BPM

## 2018-01-01 DIAGNOSIS — Z99.81 OXYGEN DEPENDENT: ICD-10-CM

## 2018-01-01 DIAGNOSIS — E03.9 HYPOTHYROIDISM, UNSPECIFIED TYPE: ICD-10-CM

## 2018-01-01 DIAGNOSIS — K62.89 RECTAL PAIN: ICD-10-CM

## 2018-01-01 DIAGNOSIS — J96.11 CHRONIC RESPIRATORY FAILURE WITH HYPOXIA (H): ICD-10-CM

## 2018-01-01 DIAGNOSIS — N18.30 CKD (CHRONIC KIDNEY DISEASE) STAGE 3, GFR 30-59 ML/MIN (H): ICD-10-CM

## 2018-01-01 DIAGNOSIS — J84.10 PULMONARY FIBROSIS (H): Chronic | ICD-10-CM

## 2018-01-01 DIAGNOSIS — G30.9 ALZHEIMER'S DEMENTIA WITHOUT BEHAVIORAL DISTURBANCE, UNSPECIFIED TIMING OF DEMENTIA ONSET: ICD-10-CM

## 2018-01-01 DIAGNOSIS — R53.81 PHYSICAL DECONDITIONING: ICD-10-CM

## 2018-01-01 DIAGNOSIS — M54.16 LUMBAR RADICULOPATHY: ICD-10-CM

## 2018-01-01 DIAGNOSIS — J18.9 HCAP (HEALTHCARE-ASSOCIATED PNEUMONIA): Primary | ICD-10-CM

## 2018-01-01 DIAGNOSIS — F32.A DEPRESSION, UNSPECIFIED DEPRESSION TYPE: ICD-10-CM

## 2018-01-01 DIAGNOSIS — E03.9 HYPOTHYROIDISM, UNSPECIFIED TYPE: Chronic | ICD-10-CM

## 2018-01-01 DIAGNOSIS — J84.10 PULMONARY FIBROSIS (H): ICD-10-CM

## 2018-01-01 DIAGNOSIS — F03.90 DEMENTIA WITHOUT BEHAVIORAL DISTURBANCE, UNSPECIFIED DEMENTIA TYPE: ICD-10-CM

## 2018-01-01 DIAGNOSIS — R41.82 ALTERED MENTAL STATUS, UNSPECIFIED ALTERED MENTAL STATUS TYPE: ICD-10-CM

## 2018-01-01 DIAGNOSIS — I50.9 CONGESTIVE HEART FAILURE, UNSPECIFIED CONGESTIVE HEART FAILURE CHRONICITY, UNSPECIFIED CONGESTIVE HEART FAILURE TYPE: Chronic | ICD-10-CM

## 2018-01-01 DIAGNOSIS — G89.29 CHRONIC MIDLINE LOW BACK PAIN WITH LEFT-SIDED SCIATICA: ICD-10-CM

## 2018-01-01 DIAGNOSIS — I50.32 CHRONIC DIASTOLIC HEART FAILURE (H): ICD-10-CM

## 2018-01-01 DIAGNOSIS — J96.21 ACUTE ON CHRONIC RESPIRATORY FAILURE WITH HYPOXIA (H): ICD-10-CM

## 2018-01-01 DIAGNOSIS — I25.10 CORONARY ARTERY DISEASE INVOLVING NATIVE HEART, ANGINA PRESENCE UNSPECIFIED, UNSPECIFIED VESSEL OR LESION TYPE: ICD-10-CM

## 2018-01-01 DIAGNOSIS — Z95.828 HISTORY OF ENDOVASCULAR STENT GRAFT FOR ABDOMINAL AORTIC ANEURYSM: Primary | ICD-10-CM

## 2018-01-01 DIAGNOSIS — I65.23 CAROTID STENOSIS, ASYMPTOMATIC, BILATERAL: ICD-10-CM

## 2018-01-01 DIAGNOSIS — R33.8 BENIGN PROSTATIC HYPERPLASIA WITH URINARY RETENTION: ICD-10-CM

## 2018-01-01 DIAGNOSIS — I50.31 ACUTE DIASTOLIC CONGESTIVE HEART FAILURE (H): ICD-10-CM

## 2018-01-01 DIAGNOSIS — J84.10 PULMONARY FIBROSIS (H): Primary | ICD-10-CM

## 2018-01-01 DIAGNOSIS — F41.9 ANXIETY: ICD-10-CM

## 2018-01-01 DIAGNOSIS — F02.80 ALZHEIMER'S DEMENTIA WITHOUT BEHAVIORAL DISTURBANCE, UNSPECIFIED TIMING OF DEMENTIA ONSET: ICD-10-CM

## 2018-01-01 DIAGNOSIS — I25.10 CORONARY ARTERY DISEASE INVOLVING NATIVE CORONARY ARTERY OF NATIVE HEART WITHOUT ANGINA PECTORIS: ICD-10-CM

## 2018-01-01 DIAGNOSIS — J18.9 HOSPITAL-ACQUIRED PNEUMONIA: ICD-10-CM

## 2018-01-01 DIAGNOSIS — M62.81 GENERALIZED MUSCLE WEAKNESS: ICD-10-CM

## 2018-01-01 DIAGNOSIS — R52 MODERATE PAIN: Primary | ICD-10-CM

## 2018-01-01 DIAGNOSIS — I10 BENIGN ESSENTIAL HYPERTENSION: ICD-10-CM

## 2018-01-01 DIAGNOSIS — R06.02 SOB (SHORTNESS OF BREATH): ICD-10-CM
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
## 2018-01-01 DIAGNOSIS — J18.9 PNEUMONIA DUE TO INFECTIOUS ORGANISM, UNSPECIFIED LATERALITY, UNSPECIFIED PART OF LUNG: ICD-10-CM

## 2018-01-01 DIAGNOSIS — K59.03 DRUG-INDUCED CONSTIPATION: ICD-10-CM

## 2018-01-01 DIAGNOSIS — R06.02 SOB (SHORTNESS OF BREATH): Primary | ICD-10-CM

## 2018-01-01 DIAGNOSIS — Z51.5 END OF LIFE CARE: Primary | ICD-10-CM

## 2018-01-01 DIAGNOSIS — I35.9 AORTIC VALVE DISORDER: ICD-10-CM

## 2018-01-01 DIAGNOSIS — N40.1 BENIGN PROSTATIC HYPERPLASIA WITH URINARY RETENTION: ICD-10-CM

## 2018-01-01 DIAGNOSIS — E78.5 HYPERLIPIDEMIA WITH TARGET LDL LESS THAN 70: ICD-10-CM

## 2018-01-01 DIAGNOSIS — M54.9 CHRONIC BACK PAIN, UNSPECIFIED BACK LOCATION, UNSPECIFIED BACK PAIN LATERALITY: ICD-10-CM

## 2018-01-01 DIAGNOSIS — J96.21 ACUTE ON CHRONIC RESPIRATORY FAILURE WITH HYPOXIA (H): Primary | ICD-10-CM

## 2018-01-01 DIAGNOSIS — G89.29 CHRONIC BACK PAIN, UNSPECIFIED BACK LOCATION, UNSPECIFIED BACK PAIN LATERALITY: ICD-10-CM

## 2018-01-01 DIAGNOSIS — M54.50 LOW BACK PAIN WITHOUT SCIATICA, UNSPECIFIED BACK PAIN LATERALITY, UNSPECIFIED CHRONICITY: ICD-10-CM

## 2018-01-01 DIAGNOSIS — R41.3 MEMORY LOSS: ICD-10-CM

## 2018-01-01 DIAGNOSIS — Z51.5 HOSPICE CARE PATIENT: ICD-10-CM

## 2018-01-01 DIAGNOSIS — R04.0 EPISTAXIS: ICD-10-CM

## 2018-01-01 DIAGNOSIS — J18.9 PNEUMONIA DUE TO INFECTIOUS ORGANISM, UNSPECIFIED LATERALITY, UNSPECIFIED PART OF LUNG: Primary | ICD-10-CM

## 2018-01-01 DIAGNOSIS — R04.0 EPISTAXIS: Primary | ICD-10-CM

## 2018-01-01 DIAGNOSIS — I50.33 ACUTE ON CHRONIC DIASTOLIC CONGESTIVE HEART FAILURE (H): ICD-10-CM

## 2018-01-01 DIAGNOSIS — I25.10 CORONARY ARTERY DISEASE INVOLVING NATIVE CORONARY ARTERY OF NATIVE HEART WITHOUT ANGINA PECTORIS: Chronic | ICD-10-CM

## 2018-01-01 DIAGNOSIS — Z95.2 S/P TAVR (TRANSCATHETER AORTIC VALVE REPLACEMENT): ICD-10-CM

## 2018-01-01 DIAGNOSIS — I50.33 ACUTE ON CHRONIC DIASTOLIC CONGESTIVE HEART FAILURE (H): Primary | ICD-10-CM

## 2018-01-01 DIAGNOSIS — I65.23 CAROTID STENOSIS, BILATERAL: ICD-10-CM

## 2018-01-01 DIAGNOSIS — I10 ESSENTIAL HYPERTENSION: Chronic | ICD-10-CM

## 2018-01-01 DIAGNOSIS — M54.16 LUMBAR RADICULOPATHY: Primary | ICD-10-CM

## 2018-01-01 DIAGNOSIS — I71.21 ASCENDING AORTIC ANEURYSM (H): ICD-10-CM

## 2018-01-01 DIAGNOSIS — G93.40 ENCEPHALOPATHY: ICD-10-CM

## 2018-01-01 DIAGNOSIS — I71.22 AORTIC ARCH ANEURYSM (H): ICD-10-CM

## 2018-01-01 DIAGNOSIS — I50.32 CHRONIC DIASTOLIC CONGESTIVE HEART FAILURE (H): ICD-10-CM

## 2018-01-01 DIAGNOSIS — I50.9 CHF (CONGESTIVE HEART FAILURE) (H): Primary | ICD-10-CM

## 2018-01-01 DIAGNOSIS — R53.1 WEAKNESS: Primary | ICD-10-CM

## 2018-01-01 DIAGNOSIS — R41.82 ALTERED MENTAL STATUS: ICD-10-CM

## 2018-01-01 DIAGNOSIS — M54.42 CHRONIC MIDLINE LOW BACK PAIN WITH LEFT-SIDED SCIATICA: ICD-10-CM

## 2018-01-01 DIAGNOSIS — Z91.041 CONTRAST MEDIA ALLERGY: ICD-10-CM

## 2018-01-01 DIAGNOSIS — J96.01 ACUTE RESPIRATORY FAILURE WITH HYPOXIA (H): Primary | ICD-10-CM

## 2018-01-01 DIAGNOSIS — I35.0 AORTIC VALVE STENOSIS, ETIOLOGY OF CARDIAC VALVE DISEASE UNSPECIFIED: ICD-10-CM

## 2018-01-01 LAB
ALBUMIN SERPL-MCNC: 2.9 G/DL (ref 3.4–5)
ALBUMIN SERPL-MCNC: 3.9 G/DL (ref 3.4–5)
ALBUMIN UR-MCNC: 100 MG/DL
ALBUMIN UR-MCNC: 30 MG/DL
ALBUMIN UR-MCNC: NEGATIVE MG/DL
ALP SERPL-CCNC: 129 U/L (ref 40–150)
ALP SERPL-CCNC: 134 U/L (ref 40–150)
ALT SERPL W P-5'-P-CCNC: 20 U/L (ref 0–70)
ALT SERPL W P-5'-P-CCNC: 20 U/L (ref 0–70)
ALT SERPL W P-5'-P-CCNC: 25 U/L (ref 0–70)
ANION GAP SERPL CALCULATED.3IONS-SCNC: 10 MMOL/L (ref 5–18)
ANION GAP SERPL CALCULATED.3IONS-SCNC: 10 MMOL/L (ref 5–18)
ANION GAP SERPL CALCULATED.3IONS-SCNC: 11 MMOL/L (ref 5–18)
ANION GAP SERPL CALCULATED.3IONS-SCNC: 11 MMOL/L (ref 5–18)
ANION GAP SERPL CALCULATED.3IONS-SCNC: 13 MMOL/L (ref 5–18)
ANION GAP SERPL CALCULATED.3IONS-SCNC: 16 MMOL/L (ref 5–18)
ANION GAP SERPL CALCULATED.3IONS-SCNC: 16 MMOL/L (ref 5–18)
ANION GAP SERPL CALCULATED.3IONS-SCNC: 4 MMOL/L (ref 3–14)
ANION GAP SERPL CALCULATED.3IONS-SCNC: 5 MMOL/L (ref 3–14)
ANION GAP SERPL CALCULATED.3IONS-SCNC: 5 MMOL/L (ref 6–17)
ANION GAP SERPL CALCULATED.3IONS-SCNC: 6 MMOL/L (ref 3–14)
ANION GAP SERPL CALCULATED.3IONS-SCNC: 7 MMOL/L (ref 3–14)
ANION GAP SERPL CALCULATED.3IONS-SCNC: 7 MMOL/L (ref 3–14)
ANION GAP SERPL CALCULATED.3IONS-SCNC: 8 MMOL/L (ref 3–14)
ANION GAP SERPL CALCULATED.3IONS-SCNC: 8 MMOL/L (ref 5–18)
ANION GAP SERPL CALCULATED.3IONS-SCNC: 8 MMOL/L (ref 5–18)
ANION GAP SERPL CALCULATED.3IONS-SCNC: 9 MMOL/L (ref 3–14)
ANION GAP SERPL CALCULATED.3IONS-SCNC: 9 MMOL/L (ref 5–18)
ANION GAP SERPL CALCULATED.3IONS-SCNC: 9 MMOL/L (ref 5–18)
APPEARANCE UR: CLEAR
AST SERPL W P-5'-P-CCNC: 12 U/L (ref 0–45)
AST SERPL W P-5'-P-CCNC: 23 U/L (ref 0–45)
BACTERIA SPEC CULT: NO GROWTH
BASE EXCESS BLDA CALC-SCNC: 5.8 MMOL/L
BASE EXCESS BLDV CALC-SCNC: 5.3 MMOL/L
BASE EXCESS BLDV CALC-SCNC: 5.7 MMOL/L
BASE EXCESS BLDV CALC-SCNC: 6 MMOL/L
BASE EXCESS BLDV CALC-SCNC: 6.8 MMOL/L
BASE EXCESS BLDV CALC-SCNC: 6.8 MMOL/L
BASE EXCESS BLDV CALC-SCNC: 8.2 MMOL/L
BASE EXCESS BLDV CALC-SCNC: 8.6 MMOL/L
BASE EXCESS BLDV CALC-SCNC: 9.9 MMOL/L
BASOPHILS # BLD AUTO: 0 10E9/L (ref 0–0.2)
BASOPHILS # BLD AUTO: 0 THOU/UL (ref 0–0.2)
BASOPHILS # BLD AUTO: 0.1 10E9/L (ref 0–0.2)
BASOPHILS # BLD AUTO: 0.1 10E9/L (ref 0–0.2)
BASOPHILS # BLD AUTO: 0.1 THOU/UL (ref 0–0.2)
BASOPHILS NFR BLD AUTO: 0 %
BASOPHILS NFR BLD AUTO: 0 % (ref 0–2)
BASOPHILS NFR BLD AUTO: 0.1 %
BASOPHILS NFR BLD AUTO: 0.2 %
BASOPHILS NFR BLD AUTO: 0.2 %
BASOPHILS NFR BLD AUTO: 0.3 %
BASOPHILS NFR BLD AUTO: 0.4 %
BASOPHILS NFR BLD AUTO: 0.4 %
BASOPHILS NFR BLD AUTO: 0.5 %
BASOPHILS NFR BLD AUTO: 1 % (ref 0–2)
BILIRUB DIRECT SERPL-MCNC: 0.1 MG/DL (ref 0–0.2)
BILIRUB SERPL-MCNC: 0.7 MG/DL (ref 0.2–1.3)
BILIRUB SERPL-MCNC: 0.8 MG/DL (ref 0.2–1.3)
BILIRUB UR QL STRIP: NEGATIVE
BUN SERPL-MCNC: 21 MG/DL (ref 7–30)
BUN SERPL-MCNC: 22 MG/DL (ref 8–28)
BUN SERPL-MCNC: 23 MG/DL (ref 7–30)
BUN SERPL-MCNC: 23 MG/DL (ref 7–30)
BUN SERPL-MCNC: 28 MG/DL (ref 7–30)
BUN SERPL-MCNC: 28 MG/DL (ref 7–30)
BUN SERPL-MCNC: 31 MG/DL (ref 7–30)
BUN SERPL-MCNC: 31 MG/DL (ref 7–30)
BUN SERPL-MCNC: 31 MG/DL (ref 8–28)
BUN SERPL-MCNC: 31 MG/DL (ref 8–28)
BUN SERPL-MCNC: 33 MG/DL (ref 7–30)
BUN SERPL-MCNC: 33 MG/DL (ref 8–28)
BUN SERPL-MCNC: 33 MG/DL (ref 8–28)
BUN SERPL-MCNC: 37 MG/DL (ref 7–30)
BUN SERPL-MCNC: 38 MG/DL (ref 8–28)
BUN SERPL-MCNC: 38 MG/DL (ref 8–28)
BUN SERPL-MCNC: 43 MG/DL (ref 7–30)
BUN SERPL-MCNC: 44 MG/DL (ref 7–30)
BUN SERPL-MCNC: 44 MG/DL (ref 8–28)
BUN SERPL-MCNC: 44 MG/DL (ref 8–28)
BUN SERPL-MCNC: 45 MG/DL (ref 8–28)
BUN SERPL-MCNC: 45 MG/DL (ref 8–28)
BUN SERPL-MCNC: 47 MG/DL (ref 7–30)
BUN SERPL-MCNC: 48 MG/DL (ref 7–30)
BUN SERPL-MCNC: 48 MG/DL (ref 8–28)
BUN SERPL-MCNC: 48 MG/DL (ref 8–28)
BUN SERPL-MCNC: 49 MG/DL (ref 7–30)
BUN SERPL-MCNC: 50 MG/DL (ref 7–30)
BUN SERPL-MCNC: 52 MG/DL (ref 7–30)
BUN SERPL-MCNC: 53 MG/DL (ref 7–30)
BUN SERPL-MCNC: 58 MG/DL (ref 7–30)
BUN SERPL-MCNC: 65 MG/DL (ref 7–30)
BUN SERPL-MCNC: 67 MG/DL (ref 7–30)
BUN SERPL-MCNC: 81 MG/DL (ref 7–30)
CA-I BLD-SCNC: 4.8 MG/DL (ref 4.4–5.2)
CALCIUM SERPL-MCNC: 10.2 MG/DL (ref 8.5–10.1)
CALCIUM SERPL-MCNC: 10.2 MG/DL (ref 8.5–10.1)
CALCIUM SERPL-MCNC: 10.2 MG/DL (ref 8.5–10.5)
CALCIUM SERPL-MCNC: 10.3 MG/DL (ref 8.5–10.1)
CALCIUM SERPL-MCNC: 10.3 MG/DL (ref 8.5–10.5)
CALCIUM SERPL-MCNC: 10.3 MG/DL (ref 8.5–10.5)
CALCIUM SERPL-MCNC: 10.5 MG/DL (ref 8.5–10.5)
CALCIUM SERPL-MCNC: 10.5 MG/DL (ref 8.5–10.5)
CALCIUM SERPL-MCNC: 10.7 MG/DL (ref 8.5–10.5)
CALCIUM SERPL-MCNC: 10.7 MG/DL (ref 8.5–10.5)
CALCIUM SERPL-MCNC: 10.8 MG/DL (ref 8.5–10.5)
CALCIUM SERPL-MCNC: 8.7 MG/DL (ref 8.5–10.1)
CALCIUM SERPL-MCNC: 9 MG/DL (ref 8.5–10.1)
CALCIUM SERPL-MCNC: 9.1 MG/DL (ref 8.5–10.1)
CALCIUM SERPL-MCNC: 9.2 MG/DL (ref 8.5–10.1)
CALCIUM SERPL-MCNC: 9.3 MG/DL (ref 8.5–10.1)
CALCIUM SERPL-MCNC: 9.4 MG/DL (ref 8.5–10.1)
CALCIUM SERPL-MCNC: 9.5 MG/DL (ref 8.5–10.1)
CALCIUM SERPL-MCNC: 9.6 MG/DL (ref 8.5–10.1)
CALCIUM SERPL-MCNC: 9.6 MG/DL (ref 8.5–10.1)
CALCIUM SERPL-MCNC: 9.6 MG/DL (ref 8.5–10.5)
CALCIUM SERPL-MCNC: 9.6 MG/DL (ref 8.5–10.5)
CALCIUM SERPL-MCNC: 9.7 MG/DL (ref 8.5–10.1)
CALCIUM SERPL-MCNC: 9.7 MG/DL (ref 8.5–10.1)
CALCIUM SERPL-MCNC: 9.9 MG/DL (ref 8.5–10.1)
CHLORIDE BLD-SCNC: 100 MMOL/L (ref 98–107)
CHLORIDE BLD-SCNC: 100 MMOL/L (ref 98–107)
CHLORIDE BLD-SCNC: 101 MMOL/L (ref 98–107)
CHLORIDE BLD-SCNC: 102 MMOL/L (ref 98–107)
CHLORIDE BLD-SCNC: 97 MMOL/L (ref 98–107)
CHLORIDE BLD-SCNC: 98 MMOL/L (ref 94–109)
CHLORIDE BLD-SCNC: 99 MMOL/L (ref 98–107)
CHLORIDE BLD-SCNC: 99 MMOL/L (ref 98–107)
CHLORIDE SERPL-SCNC: 100 MMOL/L (ref 94–109)
CHLORIDE SERPL-SCNC: 100 MMOL/L (ref 94–109)
CHLORIDE SERPL-SCNC: 101 MMOL/L (ref 94–109)
CHLORIDE SERPL-SCNC: 102 MMOL/L (ref 94–109)
CHLORIDE SERPL-SCNC: 103 MMOL/L (ref 94–109)
CHLORIDE SERPL-SCNC: 105 MMOL/L (ref 94–109)
CHLORIDE SERPL-SCNC: 105 MMOL/L (ref 94–109)
CHLORIDE SERPL-SCNC: 106 MMOL/L (ref 94–109)
CHLORIDE SERPL-SCNC: 99 MMOL/L (ref 94–109)
CHLORIDE SERPL-SCNC: 99 MMOL/L (ref 94–109)
CHLORIDE SERPLBLD-SCNC: 100 MMOL/L (ref 98–107)
CHLORIDE SERPLBLD-SCNC: 100 MMOL/L (ref 98–107)
CHLORIDE SERPLBLD-SCNC: 102 MMOL/L (ref 98–107)
CHLORIDE SERPLBLD-SCNC: 97 MMOL/L (ref 98–107)
CHLORIDE SERPLBLD-SCNC: 99 MMOL/L (ref 98–107)
CHLORIDE SERPLBLD-SCNC: 99 MMOL/L (ref 98–107)
CHOLEST SERPL-MCNC: 172 MG/DL
CO2 BLD-SCNC: 34 MMOL/L (ref 20–32)
CO2 BLDCOV-SCNC: 35 MMOL/L (ref 21–28)
CO2 SERPL-SCNC: 26 MMOL/L (ref 22–31)
CO2 SERPL-SCNC: 26 MMOL/L (ref 22–31)
CO2 SERPL-SCNC: 29 MMOL/L (ref 20–32)
CO2 SERPL-SCNC: 29 MMOL/L (ref 20–32)
CO2 SERPL-SCNC: 29 MMOL/L (ref 22–31)
CO2 SERPL-SCNC: 30 MMOL/L (ref 20–32)
CO2 SERPL-SCNC: 30 MMOL/L (ref 20–32)
CO2 SERPL-SCNC: 30 MMOL/L (ref 22–31)
CO2 SERPL-SCNC: 30 MMOL/L (ref 22–31)
CO2 SERPL-SCNC: 31 MMOL/L (ref 20–32)
CO2 SERPL-SCNC: 32 MMOL/L (ref 20–32)
CO2 SERPL-SCNC: 33 MMOL/L (ref 20–32)
CO2 SERPL-SCNC: 33 MMOL/L (ref 22–31)
CO2 SERPL-SCNC: 33 MMOL/L (ref 22–31)
CO2 SERPL-SCNC: 34 MMOL/L (ref 20–32)
CO2 SERPL-SCNC: 34 MMOL/L (ref 22–31)
CO2 SERPL-SCNC: 34 MMOL/L (ref 22–31)
CO2 SERPL-SCNC: 35 MMOL/L (ref 20–32)
CO2 SERPL-SCNC: 35 MMOL/L (ref 20–32)
COLOR UR AUTO: YELLOW
CREAT BLD-MCNC: 2 MG/DL (ref 0.66–1.25)
CREAT SERPL-MCNC: 1.25 MG/DL (ref 0.66–1.25)
CREAT SERPL-MCNC: 1.32 MG/DL (ref 0.66–1.25)
CREAT SERPL-MCNC: 1.35 MG/DL (ref 0.7–1.3)
CREAT SERPL-MCNC: 1.4 MG/DL (ref 0.66–1.25)
CREAT SERPL-MCNC: 1.49 MG/DL (ref 0.66–1.25)
CREAT SERPL-MCNC: 1.51 MG/DL (ref 0.66–1.25)
CREAT SERPL-MCNC: 1.51 MG/DL (ref 0.7–1.3)
CREAT SERPL-MCNC: 1.51 MG/DL (ref 0.7–1.3)
CREAT SERPL-MCNC: 1.56 MG/DL (ref 0.66–1.25)
CREAT SERPL-MCNC: 1.56 MG/DL (ref 0.66–1.25)
CREAT SERPL-MCNC: 1.59 MG/DL (ref 0.66–1.25)
CREAT SERPL-MCNC: 1.63 MG/DL (ref 0.66–1.25)
CREAT SERPL-MCNC: 1.63 MG/DL (ref 0.66–1.25)
CREAT SERPL-MCNC: 1.63 MG/DL (ref 0.7–1.3)
CREAT SERPL-MCNC: 1.63 MG/DL (ref 0.7–1.3)
CREAT SERPL-MCNC: 1.66 MG/DL (ref 0.66–1.25)
CREAT SERPL-MCNC: 1.68 MG/DL (ref 0.7–1.3)
CREAT SERPL-MCNC: 1.68 MG/DL (ref 0.7–1.3)
CREAT SERPL-MCNC: 1.7 MG/DL (ref 0.66–1.25)
CREAT SERPL-MCNC: 1.72 MG/DL (ref 0.66–1.25)
CREAT SERPL-MCNC: 1.73 MG/DL (ref 0.66–1.25)
CREAT SERPL-MCNC: 1.79 MG/DL (ref 0.66–1.25)
CREAT SERPL-MCNC: 1.8 MG/DL (ref 0.7–1.3)
CREAT SERPL-MCNC: 1.8 MG/DL (ref 0.7–1.3)
CREAT SERPL-MCNC: 1.81 MG/DL (ref 0.66–1.25)
CREAT SERPL-MCNC: 1.82 MG/DL (ref 0.66–1.25)
CREAT SERPL-MCNC: 1.83 MG/DL (ref 0.7–1.3)
CREAT SERPL-MCNC: 1.83 MG/DL (ref 0.7–1.3)
CREAT SERPL-MCNC: 1.84 MG/DL (ref 0.66–1.25)
CREAT SERPL-MCNC: 1.87 MG/DL (ref 0.66–1.25)
CREAT SERPL-MCNC: 1.92 MG/DL (ref 0.66–1.25)
CREAT SERPL-MCNC: 1.93 MG/DL (ref 0.66–1.25)
CREAT SERPL-MCNC: 1.99 MG/DL (ref 0.7–1.3)
CREAT SERPL-MCNC: 1.99 MG/DL (ref 0.7–1.3)
CREAT SERPL-MCNC: 2.11 MG/DL (ref 0.66–1.25)
DIFFERENTIAL METHOD BLD: ABNORMAL
DIFFERENTIAL: ABNORMAL
DIFFERENTIAL: ABNORMAL
EOSINOPHIL # BLD AUTO: 0 10E9/L (ref 0–0.7)
EOSINOPHIL # BLD AUTO: 0 THOU/UL (ref 0–0.4)
EOSINOPHIL # BLD AUTO: 0.2 10E9/L (ref 0–0.7)
EOSINOPHIL # BLD AUTO: 0.3 10E9/L (ref 0–0.7)
EOSINOPHIL # BLD AUTO: 0.3 10E9/L (ref 0–0.7)
EOSINOPHIL # BLD AUTO: 0.4 10E9/L (ref 0–0.7)
EOSINOPHIL # BLD AUTO: 0.7 10E9/L (ref 0–0.7)
EOSINOPHIL # BLD AUTO: 0.8 THOU/UL (ref 0–0.4)
EOSINOPHIL NFR BLD AUTO: 0 %
EOSINOPHIL NFR BLD AUTO: 0 % (ref 0–6)
EOSINOPHIL NFR BLD AUTO: 0.1 %
EOSINOPHIL NFR BLD AUTO: 0.3 %
EOSINOPHIL NFR BLD AUTO: 2.2 %
EOSINOPHIL NFR BLD AUTO: 2.5 %
EOSINOPHIL NFR BLD AUTO: 3.1 %
EOSINOPHIL NFR BLD AUTO: 3.6 %
EOSINOPHIL NFR BLD AUTO: 8.2 %
EOSINOPHIL NFR BLD AUTO: 9 % (ref 0–6)
ERYTHROCYTE [DISTWIDTH] IN BLOOD BY AUTOMATED COUNT: 12.9 % (ref 10–15)
ERYTHROCYTE [DISTWIDTH] IN BLOOD BY AUTOMATED COUNT: 12.9 % (ref 11–14.5)
ERYTHROCYTE [DISTWIDTH] IN BLOOD BY AUTOMATED COUNT: 12.9 % (ref 11–14.5)
ERYTHROCYTE [DISTWIDTH] IN BLOOD BY AUTOMATED COUNT: 13.2 % (ref 10–15)
ERYTHROCYTE [DISTWIDTH] IN BLOOD BY AUTOMATED COUNT: 13.2 % (ref 11–14.5)
ERYTHROCYTE [DISTWIDTH] IN BLOOD BY AUTOMATED COUNT: 13.2 % (ref 11–14.5)
ERYTHROCYTE [DISTWIDTH] IN BLOOD BY AUTOMATED COUNT: 13.3 % (ref 10–15)
ERYTHROCYTE [DISTWIDTH] IN BLOOD BY AUTOMATED COUNT: 13.3 % (ref 11–14.5)
ERYTHROCYTE [DISTWIDTH] IN BLOOD BY AUTOMATED COUNT: 13.3 % (ref 11–14.5)
ERYTHROCYTE [DISTWIDTH] IN BLOOD BY AUTOMATED COUNT: 13.5 % (ref 10–15)
ERYTHROCYTE [DISTWIDTH] IN BLOOD BY AUTOMATED COUNT: 13.6 % (ref 10–15)
ERYTHROCYTE [DISTWIDTH] IN BLOOD BY AUTOMATED COUNT: 13.7 % (ref 10–15)
ERYTHROCYTE [DISTWIDTH] IN BLOOD BY AUTOMATED COUNT: 13.7 % (ref 10–15)
ERYTHROCYTE [DISTWIDTH] IN BLOOD BY AUTOMATED COUNT: 13.8 % (ref 10–15)
ERYTHROCYTE [DISTWIDTH] IN BLOOD BY AUTOMATED COUNT: 14 % (ref 10–15)
ERYTHROCYTE [DISTWIDTH] IN BLOOD BY AUTOMATED COUNT: 14.3 % (ref 10–15)
ERYTHROCYTE [DISTWIDTH] IN BLOOD BY AUTOMATED COUNT: 14.4 % (ref 10–15)
ERYTHROCYTE [DISTWIDTH] IN BLOOD BY AUTOMATED COUNT: 14.4 % (ref 10–15)
ERYTHROCYTE [DISTWIDTH] IN BLOOD BY AUTOMATED COUNT: 14.5 % (ref 10–15)
ERYTHROCYTE [DISTWIDTH] IN BLOOD BY AUTOMATED COUNT: 14.5 % (ref 10–15)
ERYTHROCYTE [DISTWIDTH] IN BLOOD BY AUTOMATED COUNT: 14.5 % (ref 11–14.5)
ERYTHROCYTE [DISTWIDTH] IN BLOOD BY AUTOMATED COUNT: 14.5 % (ref 11–14.5)
ERYTHROCYTE [DISTWIDTH] IN BLOOD BY AUTOMATED COUNT: 14.6 % (ref 10–15)
FLUAV+FLUBV RNA SPEC QL NAA+PROBE: NEGATIVE
FLUAV+FLUBV RNA SPEC QL NAA+PROBE: NEGATIVE
GFR SERPL CREATININE-BSD FRML MDRD: 30 ML/MIN/1.7M2
GFR SERPL CREATININE-BSD FRML MDRD: 32 ML/MIN/1.73M2
GFR SERPL CREATININE-BSD FRML MDRD: 32 ML/MIN/1.73M2
GFR SERPL CREATININE-BSD FRML MDRD: 32 ML/MIN/1.7M2
GFR SERPL CREATININE-BSD FRML MDRD: 33 ML/MIN/1.7M2
GFR SERPL CREATININE-BSD FRML MDRD: 33 ML/MIN/1.7M2
GFR SERPL CREATININE-BSD FRML MDRD: 34 ML/MIN/1.7M2
GFR SERPL CREATININE-BSD FRML MDRD: 35 ML/MIN/1.73M2
GFR SERPL CREATININE-BSD FRML MDRD: 35 ML/MIN/1.73M2
GFR SERPL CREATININE-BSD FRML MDRD: 35 ML/MIN/1.7M2
GFR SERPL CREATININE-BSD FRML MDRD: 36 ML/MIN/1.73M2
GFR SERPL CREATININE-BSD FRML MDRD: 36 ML/MIN/1.73M2
GFR SERPL CREATININE-BSD FRML MDRD: 36 ML/MIN/1.7M2
GFR SERPL CREATININE-BSD FRML MDRD: 37 ML/MIN/1.7M2
GFR SERPL CREATININE-BSD FRML MDRD: 38 ML/MIN/1.7M2
GFR SERPL CREATININE-BSD FRML MDRD: 38 ML/MIN/1.7M2
GFR SERPL CREATININE-BSD FRML MDRD: 39 ML/MIN/1.73M2
GFR SERPL CREATININE-BSD FRML MDRD: 39 ML/MIN/1.73M2
GFR SERPL CREATININE-BSD FRML MDRD: 39 ML/MIN/1.7M2
GFR SERPL CREATININE-BSD FRML MDRD: 40 ML/MIN/1.73M2
GFR SERPL CREATININE-BSD FRML MDRD: 40 ML/MIN/1.73M2
GFR SERPL CREATININE-BSD FRML MDRD: 40 ML/MIN/1.7M2
GFR SERPL CREATININE-BSD FRML MDRD: 40 ML/MIN/1.7M2
GFR SERPL CREATININE-BSD FRML MDRD: 41 ML/MIN/1.7M2
GFR SERPL CREATININE-BSD FRML MDRD: 42 ML/MIN/1.7M2
GFR SERPL CREATININE-BSD FRML MDRD: 42 ML/MIN/1.7M2
GFR SERPL CREATININE-BSD FRML MDRD: 44 ML/MIN/1.73M2
GFR SERPL CREATININE-BSD FRML MDRD: 44 ML/MIN/1.73M2
GFR SERPL CREATININE-BSD FRML MDRD: 44 ML/MIN/1.7M2
GFR SERPL CREATININE-BSD FRML MDRD: 44 ML/MIN/1.7M2
GFR SERPL CREATININE-BSD FRML MDRD: 48 ML/MIN/1.7M2
GFR SERPL CREATININE-BSD FRML MDRD: 50 ML/MIN/1.73M2
GFR SERPL CREATININE-BSD FRML MDRD: 51 ML/MIN/1.7M2
GFR SERPL CREATININE-BSD FRML MDRD: 54 ML/MIN/1.7M2
GLUCOSE BLD-MCNC: 102 MG/DL (ref 70–125)
GLUCOSE BLD-MCNC: 105 MG/DL (ref 70–125)
GLUCOSE BLD-MCNC: 118 MG/DL (ref 70–125)
GLUCOSE BLD-MCNC: 125 MG/DL (ref 70–99)
GLUCOSE BLD-MCNC: 155 MG/DL (ref 70–125)
GLUCOSE BLD-MCNC: 87 MG/DL (ref 70–125)
GLUCOSE BLD-MCNC: 90 MG/DL (ref 70–125)
GLUCOSE BLD-MCNC: 93 MG/DL (ref 70–125)
GLUCOSE BLDC GLUCOMTR-MCNC: 133 MG/DL (ref 70–99)
GLUCOSE BLDC GLUCOMTR-MCNC: 142 MG/DL (ref 70–99)
GLUCOSE BLDC GLUCOMTR-MCNC: 146 MG/DL (ref 70–99)
GLUCOSE BLDC GLUCOMTR-MCNC: 152 MG/DL (ref 70–99)
GLUCOSE BLDC GLUCOMTR-MCNC: 157 MG/DL (ref 70–99)
GLUCOSE BLDC GLUCOMTR-MCNC: 157 MG/DL (ref 70–99)
GLUCOSE BLDC GLUCOMTR-MCNC: 161 MG/DL (ref 70–99)
GLUCOSE BLDC GLUCOMTR-MCNC: 166 MG/DL (ref 70–99)
GLUCOSE BLDC GLUCOMTR-MCNC: 174 MG/DL (ref 70–99)
GLUCOSE BLDC GLUCOMTR-MCNC: 186 MG/DL (ref 70–99)
GLUCOSE BLDC GLUCOMTR-MCNC: 213 MG/DL (ref 70–99)
GLUCOSE BLDC GLUCOMTR-MCNC: 213 MG/DL (ref 70–99)
GLUCOSE BLDC GLUCOMTR-MCNC: 225 MG/DL (ref 70–99)
GLUCOSE BLDC GLUCOMTR-MCNC: 231 MG/DL (ref 70–99)
GLUCOSE BLDC GLUCOMTR-MCNC: 265 MG/DL (ref 70–99)
GLUCOSE SERPL-MCNC: 102 MG/DL (ref 70–125)
GLUCOSE SERPL-MCNC: 105 MG/DL (ref 70–125)
GLUCOSE SERPL-MCNC: 105 MG/DL (ref 70–99)
GLUCOSE SERPL-MCNC: 106 MG/DL (ref 70–99)
GLUCOSE SERPL-MCNC: 108 MG/DL (ref 70–99)
GLUCOSE SERPL-MCNC: 112 MG/DL (ref 70–99)
GLUCOSE SERPL-MCNC: 117 MG/DL (ref 70–99)
GLUCOSE SERPL-MCNC: 118 MG/DL (ref 70–125)
GLUCOSE SERPL-MCNC: 119 MG/DL (ref 70–99)
GLUCOSE SERPL-MCNC: 120 MG/DL (ref 70–99)
GLUCOSE SERPL-MCNC: 121 MG/DL (ref 70–99)
GLUCOSE SERPL-MCNC: 122 MG/DL (ref 70–99)
GLUCOSE SERPL-MCNC: 132 MG/DL (ref 70–99)
GLUCOSE SERPL-MCNC: 137 MG/DL (ref 70–99)
GLUCOSE SERPL-MCNC: 140 MG/DL (ref 70–99)
GLUCOSE SERPL-MCNC: 140 MG/DL (ref 70–99)
GLUCOSE SERPL-MCNC: 144 MG/DL (ref 70–99)
GLUCOSE SERPL-MCNC: 147 MG/DL (ref 70–99)
GLUCOSE SERPL-MCNC: 154 MG/DL (ref 70–99)
GLUCOSE SERPL-MCNC: 155 MG/DL (ref 70–125)
GLUCOSE SERPL-MCNC: 158 MG/DL (ref 70–99)
GLUCOSE SERPL-MCNC: 87 MG/DL (ref 70–125)
GLUCOSE SERPL-MCNC: 87 MG/DL (ref 70–99)
GLUCOSE SERPL-MCNC: 90 MG/DL (ref 70–125)
GLUCOSE SERPL-MCNC: 90 MG/DL (ref 70–99)
GLUCOSE SERPL-MCNC: 91 MG/DL (ref 70–99)
GLUCOSE SERPL-MCNC: 93 MG/DL (ref 70–99)
GLUCOSE SERPL-MCNC: 99 MG/DL (ref 70–99)
GLUCOSE UR STRIP-MCNC: NEGATIVE MG/DL
HCO3 BLD-SCNC: 31 MMOL/L (ref 21–28)
HCO3 BLDV-SCNC: 30 MMOL/L (ref 21–28)
HCO3 BLDV-SCNC: 31 MMOL/L (ref 21–28)
HCO3 BLDV-SCNC: 33 MMOL/L (ref 21–28)
HCO3 BLDV-SCNC: 33 MMOL/L (ref 21–28)
HCO3 BLDV-SCNC: 35 MMOL/L (ref 21–28)
HCO3 BLDV-SCNC: 36 MMOL/L (ref 21–28)
HCT VFR BLD AUTO: 31.9 % (ref 40–53)
HCT VFR BLD AUTO: 32.6 % (ref 40–53)
HCT VFR BLD AUTO: 32.7 % (ref 40–53)
HCT VFR BLD AUTO: 33.1 % (ref 40–53)
HCT VFR BLD AUTO: 34.2 % (ref 40–53)
HCT VFR BLD AUTO: 34.3 % (ref 40–53)
HCT VFR BLD AUTO: 34.5 % (ref 40–53)
HCT VFR BLD AUTO: 34.5 % (ref 40–53)
HCT VFR BLD AUTO: 34.8 % (ref 40–53)
HCT VFR BLD AUTO: 35.1 % (ref 40–54)
HCT VFR BLD AUTO: 35.1 % (ref 40–54)
HCT VFR BLD AUTO: 35.2 % (ref 40–53)
HCT VFR BLD AUTO: 35.2 % (ref 40–54)
HCT VFR BLD AUTO: 35.2 % (ref 40–54)
HCT VFR BLD AUTO: 35.4 % (ref 40–53)
HCT VFR BLD AUTO: 35.4 % (ref 40–53)
HCT VFR BLD AUTO: 36.2 % (ref 40–53)
HCT VFR BLD AUTO: 36.2 % (ref 40–53)
HCT VFR BLD AUTO: 36.7 % (ref 40–53)
HCT VFR BLD AUTO: 36.9 % (ref 40–53)
HCT VFR BLD AUTO: 37.4 % (ref 40–54)
HCT VFR BLD AUTO: 37.4 % (ref 40–54)
HCT VFR BLD AUTO: 37.5 % (ref 40–53)
HCT VFR BLD AUTO: 39 % (ref 40–54)
HCT VFR BLD AUTO: 39 % (ref 40–54)
HCT VFR BLD AUTO: 39.2 % (ref 40–53)
HCT VFR BLD AUTO: 39.4 % (ref 40–53)
HCT VFR BLD CALC: 31 %PCV (ref 40–53)
HDLC SERPL-MCNC: 81 MG/DL
HEMOGLOBIN: 11.6 G/DL (ref 14–18)
HEMOGLOBIN: 11.7 G/DL (ref 14–18)
HEMOGLOBIN: 12.2 G/DL (ref 14–18)
HEMOGLOBIN: 13.1 G/DL (ref 14–18)
HGB BLD CALC-MCNC: 10.5 G/DL (ref 13.3–17.7)
HGB BLD-MCNC: 10.3 G/DL (ref 13.3–17.7)
HGB BLD-MCNC: 10.7 G/DL (ref 13.3–17.7)
HGB BLD-MCNC: 10.7 G/DL (ref 13.3–17.7)
HGB BLD-MCNC: 10.8 G/DL (ref 13.3–17.7)
HGB BLD-MCNC: 11 G/DL (ref 13.3–17.7)
HGB BLD-MCNC: 11 G/DL (ref 13.3–17.7)
HGB BLD-MCNC: 11.1 G/DL (ref 13.3–17.7)
HGB BLD-MCNC: 11.1 G/DL (ref 13.3–17.7)
HGB BLD-MCNC: 11.2 G/DL (ref 13.3–17.7)
HGB BLD-MCNC: 11.2 G/DL (ref 13.3–17.7)
HGB BLD-MCNC: 11.3 G/DL (ref 13.3–17.7)
HGB BLD-MCNC: 11.6 G/DL (ref 14–18)
HGB BLD-MCNC: 11.7 G/DL (ref 14–18)
HGB BLD-MCNC: 11.8 G/DL (ref 13.3–17.7)
HGB BLD-MCNC: 11.9 G/DL (ref 13.3–17.7)
HGB BLD-MCNC: 12 G/DL (ref 13.3–17.7)
HGB BLD-MCNC: 12.1 G/DL (ref 13.3–17.7)
HGB BLD-MCNC: 12.2 G/DL (ref 13.3–17.7)
HGB BLD-MCNC: 12.2 G/DL (ref 14–18)
HGB BLD-MCNC: 12.3 G/DL (ref 13.3–17.7)
HGB BLD-MCNC: 12.8 G/DL (ref 13.3–17.7)
HGB BLD-MCNC: 12.9 G/DL (ref 13.3–17.7)
HGB BLD-MCNC: 13.1 G/DL (ref 14–18)
HGB UR QL STRIP: NEGATIVE
HYALINE CASTS #/AREA URNS LPF: 1 /LPF (ref 0–2)
IMM GRANULOCYTES # BLD: 0 10E9/L (ref 0–0.4)
IMM GRANULOCYTES # BLD: 0.1 10E9/L (ref 0–0.4)
IMM GRANULOCYTES # BLD: 0.2 10E9/L (ref 0–0.4)
IMM GRANULOCYTES # BLD: 0.5 10E9/L (ref 0–0.4)
IMM GRANULOCYTES NFR BLD: 0.1 %
IMM GRANULOCYTES NFR BLD: 0.1 %
IMM GRANULOCYTES NFR BLD: 0.2 %
IMM GRANULOCYTES NFR BLD: 0.5 %
IMM GRANULOCYTES NFR BLD: 0.5 %
IMM GRANULOCYTES NFR BLD: 0.6 %
IMM GRANULOCYTES NFR BLD: 0.7 %
IMM GRANULOCYTES NFR BLD: 0.9 %
IMM GRANULOCYTES NFR BLD: 1.5 %
IMM GRANULOCYTES NFR BLD: 1.5 %
IMM GRANULOCYTES NFR BLD: 1.7 %
IMM GRANULOCYTES NFR BLD: 2 %
INR PPP: 0.95 (ref 0.86–1.14)
INTERPRETATION ECG - MUSE: NORMAL
KETONES UR STRIP-MCNC: NEGATIVE MG/DL
LACTATE BLD-SCNC: 1.1 MMOL/L (ref 0.7–2)
LACTATE BLD-SCNC: 1.2 MMOL/L (ref 0.4–1.9)
LACTATE BLD-SCNC: 1.7 MMOL/L (ref 0.7–2.1)
LACTATE BLD-SCNC: 2.1 MMOL/L (ref 0.7–2)
LACTATE BLD-SCNC: 3.4 MMOL/L (ref 0.4–1.9)
LDLC SERPL CALC-MCNC: 67 MG/DL
LEUKOCYTE ESTERASE UR QL STRIP: NEGATIVE
LYMPHOCYTES # BLD AUTO: 0.6 10E9/L (ref 0.8–5.3)
LYMPHOCYTES # BLD AUTO: 0.7 10E9/L (ref 0.8–5.3)
LYMPHOCYTES # BLD AUTO: 0.9 10E9/L (ref 0.8–5.3)
LYMPHOCYTES # BLD AUTO: 0.9 THOU/UL (ref 0.8–4.4)
LYMPHOCYTES # BLD AUTO: 1.1 10E9/L (ref 0.8–5.3)
LYMPHOCYTES # BLD AUTO: 1.3 10E9/L (ref 0.8–5.3)
LYMPHOCYTES # BLD AUTO: 1.5 10E9/L (ref 0.8–5.3)
LYMPHOCYTES # BLD AUTO: 1.6 10E9/L (ref 0.8–5.3)
LYMPHOCYTES # BLD AUTO: 1.8 10E9/L (ref 0.8–5.3)
LYMPHOCYTES # BLD AUTO: 1.8 10E9/L (ref 0.8–5.3)
LYMPHOCYTES # BLD AUTO: 2.1 10E9/L (ref 0.8–5.3)
LYMPHOCYTES # BLD AUTO: 2.8 THOU/UL (ref 0.8–4.4)
LYMPHOCYTES NFR BLD AUTO: 11 %
LYMPHOCYTES NFR BLD AUTO: 11.5 %
LYMPHOCYTES NFR BLD AUTO: 13 % (ref 20–40)
LYMPHOCYTES NFR BLD AUTO: 16.3 %
LYMPHOCYTES NFR BLD AUTO: 19.4 %
LYMPHOCYTES NFR BLD AUTO: 22.9 %
LYMPHOCYTES NFR BLD AUTO: 24.5 %
LYMPHOCYTES NFR BLD AUTO: 31 % (ref 20–40)
LYMPHOCYTES NFR BLD AUTO: 4.3 %
LYMPHOCYTES NFR BLD AUTO: 4.7 %
LYMPHOCYTES NFR BLD AUTO: 5.1 %
LYMPHOCYTES NFR BLD AUTO: 5.7 %
LYMPHOCYTES NFR BLD AUTO: 6.7 %
LYMPHOCYTES NFR BLD AUTO: 7 %
LYMPHOCYTES NFR BLD AUTO: 7.4 %
LYMPHOCYTES NFR BLD AUTO: 8.1 %
Lab: NORMAL
MAGNESIUM SERPL-MCNC: 2.4 MG/DL (ref 1.6–2.3)
MCH RBC QN AUTO: 29.1 PG (ref 26.5–33)
MCH RBC QN AUTO: 29.3 PG (ref 26.5–33)
MCH RBC QN AUTO: 29.4 PG (ref 26.5–33)
MCH RBC QN AUTO: 29.5 PG (ref 26.5–33)
MCH RBC QN AUTO: 29.5 PG (ref 26.5–33)
MCH RBC QN AUTO: 29.5 PG (ref 27–34)
MCH RBC QN AUTO: 29.5 PG (ref 27–34)
MCH RBC QN AUTO: 29.6 PG (ref 26.5–33)
MCH RBC QN AUTO: 29.6 PG (ref 27–34)
MCH RBC QN AUTO: 29.6 PG (ref 27–34)
MCH RBC QN AUTO: 29.7 PG (ref 26.5–33)
MCH RBC QN AUTO: 29.8 PG (ref 26.5–33)
MCH RBC QN AUTO: 30 PG (ref 26.5–33)
MCH RBC QN AUTO: 30 PG (ref 26.5–33)
MCH RBC QN AUTO: 30.1 PG (ref 26.5–33)
MCH RBC QN AUTO: 30.1 PG (ref 26.5–33)
MCH RBC QN AUTO: 30.2 PG (ref 26.5–33)
MCH RBC QN AUTO: 30.2 PG (ref 27–34)
MCH RBC QN AUTO: 30.2 PG (ref 27–34)
MCH RBC QN AUTO: 30.3 PG (ref 26.5–33)
MCH RBC QN AUTO: 30.4 PG (ref 26.5–33)
MCH RBC QN AUTO: 30.4 PG (ref 26.5–33)
MCH RBC QN AUTO: 30.5 PG (ref 26.5–33)
MCH RBC QN AUTO: 30.5 PG (ref 27–34)
MCH RBC QN AUTO: 30.5 PG (ref 27–34)
MCH RBC QN AUTO: 30.6 PG (ref 26.5–33)
MCH RBC QN AUTO: 30.7 PG (ref 26.5–33)
MCHC RBC AUTO-ENTMCNC: 31.4 G/DL (ref 31.5–36.5)
MCHC RBC AUTO-ENTMCNC: 31.6 G/DL (ref 31.5–36.5)
MCHC RBC AUTO-ENTMCNC: 31.6 G/DL (ref 31.5–36.5)
MCHC RBC AUTO-ENTMCNC: 31.9 G/DL (ref 31.5–36.5)
MCHC RBC AUTO-ENTMCNC: 32.2 G/DL (ref 31.5–36.5)
MCHC RBC AUTO-ENTMCNC: 32.3 G/DL (ref 31.5–36.5)
MCHC RBC AUTO-ENTMCNC: 32.3 G/DL (ref 31.5–36.5)
MCHC RBC AUTO-ENTMCNC: 32.4 G/DL (ref 31.5–36.5)
MCHC RBC AUTO-ENTMCNC: 32.5 G/DL (ref 31.5–36.5)
MCHC RBC AUTO-ENTMCNC: 32.6 G/DL (ref 31.5–36.5)
MCHC RBC AUTO-ENTMCNC: 32.6 G/DL (ref 32–36)
MCHC RBC AUTO-ENTMCNC: 32.6 G/DL (ref 32–36)
MCHC RBC AUTO-ENTMCNC: 32.7 G/DL (ref 31.5–36.5)
MCHC RBC AUTO-ENTMCNC: 32.9 G/DL (ref 31.5–36.5)
MCHC RBC AUTO-ENTMCNC: 33 G/DL (ref 31.5–36.5)
MCHC RBC AUTO-ENTMCNC: 33 G/DL (ref 32–36)
MCHC RBC AUTO-ENTMCNC: 33 G/DL (ref 32–36)
MCHC RBC AUTO-ENTMCNC: 33.3 G/DL (ref 32–36)
MCHC RBC AUTO-ENTMCNC: 33.3 G/DL (ref 32–36)
MCHC RBC AUTO-ENTMCNC: 33.6 G/DL (ref 32–36)
MCHC RBC AUTO-ENTMCNC: 33.6 G/DL (ref 32–36)
MCHC RBC AUTO-ENTMCNC: 33.7 G/DL (ref 31.5–36.5)
MCHC RBC AUTO-ENTMCNC: 34 G/DL (ref 31.5–36.5)
MCHC RBC AUTO-ENTMCNC: 34.1 G/DL (ref 31.5–36.5)
MCV RBC AUTO: 88 FL (ref 80–100)
MCV RBC AUTO: 88 FL (ref 80–100)
MCV RBC AUTO: 90 FL (ref 78–100)
MCV RBC AUTO: 91 FL (ref 78–100)
MCV RBC AUTO: 91 FL (ref 80–100)
MCV RBC AUTO: 92 FL (ref 50–100)
MCV RBC AUTO: 92 FL (ref 78–100)
MCV RBC AUTO: 92 FL (ref 78–100)
MCV RBC AUTO: 92 FL (ref 80–100)
MCV RBC AUTO: 93 FL (ref 78–100)
MCV RBC AUTO: 94 FL (ref 78–100)
MONOCYTES # BLD AUTO: 0.1 10E9/L (ref 0–1.3)
MONOCYTES # BLD AUTO: 0.1 THOU/UL (ref 0–0.9)
MONOCYTES # BLD AUTO: 0.2 10E9/L (ref 0–1.3)
MONOCYTES # BLD AUTO: 0.3 10E9/L (ref 0–1.3)
MONOCYTES # BLD AUTO: 0.6 10E9/L (ref 0–1.3)
MONOCYTES # BLD AUTO: 0.8 10E9/L (ref 0–1.3)
MONOCYTES # BLD AUTO: 0.8 10E9/L (ref 0–1.3)
MONOCYTES # BLD AUTO: 0.9 10E9/L (ref 0–1.3)
MONOCYTES # BLD AUTO: 0.9 10E9/L (ref 0–1.3)
MONOCYTES # BLD AUTO: 0.9 THOU/UL (ref 0–0.9)
MONOCYTES # BLD AUTO: 1 10E9/L (ref 0–1.3)
MONOCYTES # BLD AUTO: 1.3 10E9/L (ref 0–1.3)
MONOCYTES # BLD AUTO: 1.6 10E9/L (ref 0–1.3)
MONOCYTES NFR BLD AUTO: 0.8 %
MONOCYTES NFR BLD AUTO: 1 % (ref 2–10)
MONOCYTES NFR BLD AUTO: 1.2 %
MONOCYTES NFR BLD AUTO: 1.6 %
MONOCYTES NFR BLD AUTO: 1.7 %
MONOCYTES NFR BLD AUTO: 1.9 %
MONOCYTES NFR BLD AUTO: 10 % (ref 2–10)
MONOCYTES NFR BLD AUTO: 10.1 %
MONOCYTES NFR BLD AUTO: 11 %
MONOCYTES NFR BLD AUTO: 2.8 %
MONOCYTES NFR BLD AUTO: 4.8 %
MONOCYTES NFR BLD AUTO: 6.2 %
MONOCYTES NFR BLD AUTO: 7.3 %
MONOCYTES NFR BLD AUTO: 8.6 %
MONOCYTES NFR BLD AUTO: 9 %
MONOCYTES NFR BLD AUTO: 9.7 %
MRSA DNA SPEC QL NAA+PROBE: NEGATIVE
MUCOUS THREADS #/AREA URNS LPF: PRESENT /LPF
MUCOUS THREADS #/AREA URNS LPF: PRESENT /LPF
NEUTROPHILS # BLD AUTO: 11 10E9/L (ref 1.6–8.3)
NEUTROPHILS # BLD AUTO: 11.1 10E9/L (ref 1.6–8.3)
NEUTROPHILS # BLD AUTO: 11.8 10E9/L (ref 1.6–8.3)
NEUTROPHILS # BLD AUTO: 12.1 10E9/L (ref 1.6–8.3)
NEUTROPHILS # BLD AUTO: 14.8 10E9/L (ref 1.6–8.3)
NEUTROPHILS # BLD AUTO: 18.6 10E9/L (ref 1.6–8.3)
NEUTROPHILS # BLD AUTO: 18.7 10E9/L (ref 1.6–8.3)
NEUTROPHILS # BLD AUTO: 19.3 10E9/L (ref 1.6–8.3)
NEUTROPHILS # BLD AUTO: 4.4 THOU/UL (ref 2–7.7)
NEUTROPHILS # BLD AUTO: 5.1 10E9/L (ref 1.6–8.3)
NEUTROPHILS # BLD AUTO: 5.2 10E9/L (ref 1.6–8.3)
NEUTROPHILS # BLD AUTO: 5.9 10E9/L (ref 1.6–8.3)
NEUTROPHILS # BLD AUTO: 5.9 THOU/UL (ref 2–7.7)
NEUTROPHILS # BLD AUTO: 6.3 10E9/L (ref 1.6–8.3)
NEUTROPHILS # BLD AUTO: 6.9 10E9/L (ref 1.6–8.3)
NEUTROPHILS # BLD AUTO: 9.5 10E9/L (ref 1.6–8.3)
NEUTROPHILS NFR BLD AUTO: 49 % (ref 50–70)
NEUTROPHILS NFR BLD AUTO: 60.4 %
NEUTROPHILS NFR BLD AUTO: 64.7 %
NEUTROPHILS NFR BLD AUTO: 65.6 %
NEUTROPHILS NFR BLD AUTO: 66.7 %
NEUTROPHILS NFR BLD AUTO: 80.5 %
NEUTROPHILS NFR BLD AUTO: 80.7 %
NEUTROPHILS NFR BLD AUTO: 83.6 %
NEUTROPHILS NFR BLD AUTO: 85 % (ref 50–70)
NEUTROPHILS NFR BLD AUTO: 85.5 %
NEUTROPHILS NFR BLD AUTO: 89.7 %
NEUTROPHILS NFR BLD AUTO: 89.9 %
NEUTROPHILS NFR BLD AUTO: 90.3 %
NEUTROPHILS NFR BLD AUTO: 90.5 %
NEUTROPHILS NFR BLD AUTO: 92.9 %
NEUTROPHILS NFR BLD AUTO: 93.3 %
NITRATE UR QL: NEGATIVE
NONHDLC SERPL-MCNC: 91 MG/DL
NRBC # BLD AUTO: 0 10*3/UL
NRBC # BLD AUTO: 0.1 10*3/UL
NRBC BLD AUTO-RTO: 0 /100
NRBC BLD AUTO-RTO: 1 /100
NT-PROBNP SERPL-MCNC: 1886 PG/ML (ref 0–1800)
NT-PROBNP SERPL-MCNC: 924 PG/ML (ref 0–1800)
NT-PROBNP SERPL-MCNC: 933 PG/ML (ref 0–1800)
O2/TOTAL GAS SETTING VFR VENT: 5 %
O2/TOTAL GAS SETTING VFR VENT: ABNORMAL %
OXYHGB MFR BLD: 99 % (ref 92–100)
OXYHGB MFR BLDV: 42 %
OXYHGB MFR BLDV: 93 %
OXYHGB MFR BLDV: 95 %
OXYHGB MFR BLDV: 96 %
OXYHGB MFR BLDV: 96 %
OXYHGB MFR BLDV: 97 %
OXYHGB MFR BLDV: 97 %
OXYHGB MFR BLDV: 98 %
PCO2 BLD: 48 MM HG (ref 35–45)
PCO2 BLDV: 42 MM HG (ref 40–50)
PCO2 BLDV: 43 MM HG (ref 40–50)
PCO2 BLDV: 46 MM HG (ref 40–50)
PCO2 BLDV: 46 MM HG (ref 40–50)
PCO2 BLDV: 47 MM HG (ref 40–50)
PCO2 BLDV: 48 MM HG (ref 40–50)
PCO2 BLDV: 49 MM HG (ref 40–50)
PCO2 BLDV: 51 MM HG (ref 40–50)
PCO2 BLDV: 62 MM HG (ref 40–50)
PH BLD: 7.42 PH (ref 7.35–7.45)
PH BLDV: 7.38 PH (ref 7.32–7.43)
PH BLDV: 7.42 PH (ref 7.32–7.43)
PH BLDV: 7.43 PH (ref 7.32–7.43)
PH BLDV: 7.43 PH (ref 7.32–7.43)
PH BLDV: 7.44 PH (ref 7.32–7.43)
PH BLDV: 7.47 PH (ref 7.32–7.43)
PH BLDV: 7.47 PH (ref 7.32–7.43)
PH BLDV: 7.48 PH (ref 7.32–7.43)
PH BLDV: 7.49 PH (ref 7.32–7.43)
PH UR STRIP: 6 PH (ref 5–7)
PH UR STRIP: 7 PH (ref 5–7)
PH UR STRIP: 7 PH (ref 5–7)
PHOSPHATE SERPL-MCNC: 4.8 MG/DL (ref 2.5–4.5)
PLATELET # BLD AUTO: 116 10E9/L (ref 150–450)
PLATELET # BLD AUTO: 124 10E9/L (ref 150–450)
PLATELET # BLD AUTO: 125 10E9/L (ref 150–450)
PLATELET # BLD AUTO: 126 10E9/L (ref 150–450)
PLATELET # BLD AUTO: 131 10E9/L (ref 150–450)
PLATELET # BLD AUTO: 135 10E9/L (ref 150–450)
PLATELET # BLD AUTO: 138 10E9/L (ref 150–450)
PLATELET # BLD AUTO: 140 10E9/L (ref 150–450)
PLATELET # BLD AUTO: 148 10E9/L (ref 150–450)
PLATELET # BLD AUTO: 152 THOU/UL (ref 140–440)
PLATELET # BLD AUTO: 152 THOU/UL (ref 140–440)
PLATELET # BLD AUTO: 159 10E9/L (ref 150–450)
PLATELET # BLD AUTO: 160 10E9/L (ref 150–450)
PLATELET # BLD AUTO: 161 10E9/L (ref 150–450)
PLATELET # BLD AUTO: 162 10E9/L (ref 150–450)
PLATELET # BLD AUTO: 168 10E9/L (ref 150–450)
PLATELET # BLD AUTO: 173 10E9/L (ref 150–450)
PLATELET # BLD AUTO: 179 10E9/L (ref 150–450)
PLATELET # BLD AUTO: 185 10E9/L (ref 150–450)
PLATELET # BLD AUTO: 186 THOU/UL (ref 140–440)
PLATELET # BLD AUTO: 186 THOU/UL (ref 140–440)
PLATELET # BLD AUTO: 188 10E9/L (ref 150–450)
PLATELET # BLD AUTO: 191 THOU/UL (ref 140–440)
PLATELET # BLD AUTO: 191 THOU/UL (ref 140–440)
PLATELET # BLD AUTO: 192 10E9/L (ref 150–450)
PLATELET # BLD AUTO: 200 THOU/UL (ref 140–440)
PLATELET # BLD AUTO: 200 THOU/UL (ref 140–440)
PLATELET # BLD AUTO: 216 10E9/L (ref 150–450)
PLATELET # BLD AUTO: 227 10E9/L (ref 150–450)
PLATELET # BLD EST: ABNORMAL 10*3/UL
PMV BLD AUTO: 10.1 FL (ref 8.5–12.5)
PMV BLD AUTO: 10.2 FL (ref 8.5–12.5)
PMV BLD AUTO: 9.5 FL (ref 8.5–12.5)
PMV BLD AUTO: 9.8 FL (ref 8.5–12.5)
PO2 BLD: 163 MM HG (ref 80–105)
PO2 BLDV: 103 MM HG (ref 25–47)
PO2 BLDV: 106 MM HG (ref 25–47)
PO2 BLDV: 128 MM HG (ref 25–47)
PO2 BLDV: 25 MM HG (ref 25–47)
PO2 BLDV: 31 MM HG (ref 25–47)
PO2 BLDV: 68 MM HG (ref 25–47)
PO2 BLDV: 79 MM HG (ref 25–47)
PO2 BLDV: 85 MM HG (ref 25–47)
PO2 BLDV: 86 MM HG (ref 25–47)
POTASSIUM BLD-SCNC: 4 MMOL/L (ref 3.5–5)
POTASSIUM BLD-SCNC: 4.1 MMOL/L (ref 3.5–5)
POTASSIUM BLD-SCNC: 4.2 MMOL/L (ref 3.5–5)
POTASSIUM BLD-SCNC: 4.3 MMOL/L (ref 3.4–5.3)
POTASSIUM BLD-SCNC: 5 MMOL/L (ref 3.5–5)
POTASSIUM SERPL-SCNC: 3.5 MMOL/L (ref 3.4–5.3)
POTASSIUM SERPL-SCNC: 3.6 MMOL/L (ref 3.4–5.3)
POTASSIUM SERPL-SCNC: 3.8 MMOL/L (ref 3.4–5.3)
POTASSIUM SERPL-SCNC: 3.8 MMOL/L (ref 3.4–5.3)
POTASSIUM SERPL-SCNC: 3.9 MMOL/L (ref 3.4–5.3)
POTASSIUM SERPL-SCNC: 4 MMOL/L (ref 3.4–5.3)
POTASSIUM SERPL-SCNC: 4 MMOL/L (ref 3.4–5.3)
POTASSIUM SERPL-SCNC: 4.1 MMOL/L (ref 3.4–5.3)
POTASSIUM SERPL-SCNC: 4.1 MMOL/L (ref 3.5–5)
POTASSIUM SERPL-SCNC: 4.2 MMOL/L (ref 3.4–5.3)
POTASSIUM SERPL-SCNC: 4.2 MMOL/L (ref 3.4–5.3)
POTASSIUM SERPL-SCNC: 4.2 MMOL/L (ref 3.5–5)
POTASSIUM SERPL-SCNC: 4.3 MMOL/L (ref 3.4–5.3)
POTASSIUM SERPL-SCNC: 4.4 MMOL/L (ref 3.4–5.3)
POTASSIUM SERPL-SCNC: 4.6 MMOL/L (ref 3.4–5.3)
POTASSIUM SERPL-SCNC: 4.7 MMOL/L (ref 3.4–5.3)
POTASSIUM SERPL-SCNC: 4.7 MMOL/L (ref 3.4–5.3)
POTASSIUM SERPL-SCNC: 4.8 MMOL/L (ref 3.4–5.3)
POTASSIUM SERPL-SCNC: 4.8 MMOL/L (ref 3.4–5.3)
POTASSIUM SERPL-SCNC: 4.9 MMOL/L (ref 3.4–5.3)
POTASSIUM SERPL-SCNC: 5 MMOL/L (ref 3.5–5)
PROCALCITONIN SERPL-MCNC: 0.1 NG/ML
PROCALCITONIN SERPL-MCNC: 0.11 NG/ML
PROT SERPL-MCNC: 6.2 G/DL (ref 6.8–8.8)
PROT SERPL-MCNC: 7.4 G/DL (ref 6.8–8.8)
RBC # BLD AUTO: 3.43 10E12/L (ref 4.4–5.9)
RBC # BLD AUTO: 3.53 10E12/L (ref 4.4–5.9)
RBC # BLD AUTO: 3.56 10E12/L (ref 4.4–5.9)
RBC # BLD AUTO: 3.6 10E12/L (ref 4.4–5.9)
RBC # BLD AUTO: 3.66 10E12/L (ref 4.4–5.9)
RBC # BLD AUTO: 3.68 10E12/L (ref 4.4–5.9)
RBC # BLD AUTO: 3.73 10E12/L (ref 4.4–5.9)
RBC # BLD AUTO: 3.77 10E12/L (ref 4.4–5.9)
RBC # BLD AUTO: 3.78 10E12/L (ref 4.4–5.9)
RBC # BLD AUTO: 3.79 10E12/L (ref 4.4–5.9)
RBC # BLD AUTO: 3.79 10E12/L (ref 4.4–5.9)
RBC # BLD AUTO: 3.84 MILL/UL (ref 4.4–6.2)
RBC # BLD AUTO: 3.91 10E12/L (ref 4.4–5.9)
RBC # BLD AUTO: 3.96 10E12/L (ref 4.4–5.9)
RBC # BLD AUTO: 3.97 10E12/L (ref 4.4–5.9)
RBC # BLD AUTO: 3.98 10E12/L (ref 4.4–5.9)
RBC # BLD AUTO: 4.03 10E12/L (ref 4.4–5.9)
RBC # BLD AUTO: 4.04 10E12/L (ref 4.4–5.9)
RBC # BLD AUTO: 4.13 MILL/UL (ref 4.4–6.2)
RBC # BLD AUTO: 4.13 MILL/UL (ref 4.4–6.2)
RBC # BLD AUTO: 4.34 10E12/L (ref 4.4–5.9)
RBC # BLD AUTO: 4.36 10E12/L (ref 4.4–5.9)
RBC # BLD AUTO: 4.42 MILL/UL (ref 4.4–6.2)
RBC # BLD AUTO: 4.42 MILL/UL (ref 4.4–6.2)
RBC #/AREA URNS AUTO: 0 /HPF (ref 0–2)
RBC #/AREA URNS AUTO: 1 /HPF (ref 0–2)
RBC #/AREA URNS AUTO: 1 /HPF (ref 0–2)
RBC MORPH BLD: ABNORMAL
RSV RNA SPEC NAA+PROBE: NEGATIVE
SAO2 % BLDV FROM PO2: 63 %
SODIUM BLD-SCNC: 137 MMOL/L (ref 133–144)
SODIUM SERPL-SCNC: 137 MMOL/L (ref 133–144)
SODIUM SERPL-SCNC: 138 MMOL/L (ref 133–144)
SODIUM SERPL-SCNC: 138 MMOL/L (ref 133–144)
SODIUM SERPL-SCNC: 139 MMOL/L (ref 133–144)
SODIUM SERPL-SCNC: 139 MMOL/L (ref 133–144)
SODIUM SERPL-SCNC: 139 MMOL/L (ref 136–145)
SODIUM SERPL-SCNC: 140 MMOL/L (ref 133–144)
SODIUM SERPL-SCNC: 141 MMOL/L (ref 133–144)
SODIUM SERPL-SCNC: 141 MMOL/L (ref 136–145)
SODIUM SERPL-SCNC: 142 MMOL/L (ref 133–144)
SODIUM SERPL-SCNC: 142 MMOL/L (ref 133–144)
SODIUM SERPL-SCNC: 142 MMOL/L (ref 136–145)
SODIUM SERPL-SCNC: 142 MMOL/L (ref 136–145)
SODIUM SERPL-SCNC: 143 MMOL/L (ref 133–144)
SODIUM SERPL-SCNC: 143 MMOL/L (ref 136–145)
SOURCE: ABNORMAL
SP GR UR STRIP: 1.01 (ref 1–1.03)
SPECIMEN SOURCE: NORMAL
SQUAMOUS #/AREA URNS AUTO: <1 /HPF (ref 0–1)
TRIGL SERPL-MCNC: 122 MG/DL
TROPONIN I BLD-MCNC: 0 UG/L (ref 0–0.1)
TROPONIN I SERPL-MCNC: <0.015 UG/L (ref 0–0.04)
UROBILINOGEN UR STRIP-MCNC: 0 MG/DL (ref 0–2)
UROBILINOGEN UR STRIP-MCNC: 2 MG/DL (ref 0–2)
UROBILINOGEN UR STRIP-MCNC: 2 MG/DL (ref 0–2)
WBC # BLD AUTO: 10.7 THOU/UL (ref 4–11)
WBC # BLD AUTO: 11.4 10E9/L (ref 4–11)
WBC # BLD AUTO: 12.3 10E9/L (ref 4–11)
WBC # BLD AUTO: 12.4 10E9/L (ref 4–11)
WBC # BLD AUTO: 12.7 10E9/L (ref 4–11)
WBC # BLD AUTO: 13.3 10E9/L (ref 4–11)
WBC # BLD AUTO: 13.6 10E9/L (ref 4–11)
WBC # BLD AUTO: 13.7 10E9/L (ref 4–11)
WBC # BLD AUTO: 14.6 10E9/L (ref 4–11)
WBC # BLD AUTO: 16.4 10E9/L (ref 4–11)
WBC # BLD AUTO: 16.6 10E9/L (ref 4–11)
WBC # BLD AUTO: 20 10E9/L (ref 4–11)
WBC # BLD AUTO: 20 10E9/L (ref 4–11)
WBC # BLD AUTO: 22.6 10E9/L (ref 4–11)
WBC # BLD AUTO: 7 THOU/UL (ref 4–11)
WBC # BLD AUTO: 7.7 10E9/L (ref 4–11)
WBC # BLD AUTO: 8.1 10E9/L (ref 4–11)
WBC # BLD AUTO: 8.4 10E9/L (ref 4–11)
WBC # BLD AUTO: 8.9 10E9/L (ref 4–11)
WBC # BLD AUTO: 9 THOU/UL (ref 4–11)
WBC # BLD AUTO: 9 THOU/UL (ref 4–11)
WBC # BLD AUTO: 9.5 10E9/L (ref 4–11)
WBC # BLD AUTO: 9.5 10E9/L (ref 4–11)
WBC #/AREA URNS AUTO: 0 /HPF (ref 0–5)
WBC #/AREA URNS AUTO: 1 /HPF (ref 0–2)
WBC #/AREA URNS AUTO: 1 /HPF (ref 0–5)
WBC: 10.7 THOU/UL (ref 4–11)
WBC: 7 THOU/UL (ref 4–11)
WBC: 9 THOU/UL (ref 4–11)
WBC: 9 THOU/UL (ref 4–11)

## 2018-01-01 PROCEDURE — A9270 NON-COVERED ITEM OR SERVICE: HCPCS | Mod: GY | Performed by: NURSE PRACTITIONER

## 2018-01-01 PROCEDURE — 25000128 H RX IP 250 OP 636: Performed by: INTERNAL MEDICINE

## 2018-01-01 PROCEDURE — 80048 BASIC METABOLIC PNL TOTAL CA: CPT | Performed by: INTERNAL MEDICINE

## 2018-01-01 PROCEDURE — 25000125 ZZHC RX 250: Performed by: PHYSICIAN ASSISTANT

## 2018-01-01 PROCEDURE — 93970 EXTREMITY STUDY: CPT

## 2018-01-01 PROCEDURE — 94640 AIRWAY INHALATION TREATMENT: CPT

## 2018-01-01 PROCEDURE — G0463 HOSPITAL OUTPT CLINIC VISIT: HCPCS

## 2018-01-01 PROCEDURE — 25000125 ZZHC RX 250: Performed by: INTERNAL MEDICINE

## 2018-01-01 PROCEDURE — 82805 BLOOD GASES W/O2 SATURATION: CPT | Performed by: EMERGENCY MEDICINE

## 2018-01-01 PROCEDURE — 82803 BLOOD GASES ANY COMBINATION: CPT

## 2018-01-01 PROCEDURE — 40000275 ZZH STATISTIC RCP TIME EA 10 MIN

## 2018-01-01 PROCEDURE — 12000007 ZZH R&B INTERMEDIATE

## 2018-01-01 PROCEDURE — 87641 MR-STAPH DNA AMP PROBE: CPT | Performed by: INTERNAL MEDICINE

## 2018-01-01 PROCEDURE — 84145 PROCALCITONIN (PCT): CPT | Performed by: EMERGENCY MEDICINE

## 2018-01-01 PROCEDURE — 25000132 ZZH RX MED GY IP 250 OP 250 PS 637: Mod: GY | Performed by: HOSPITALIST

## 2018-01-01 PROCEDURE — A9270 NON-COVERED ITEM OR SERVICE: HCPCS | Mod: GY | Performed by: INTERNAL MEDICINE

## 2018-01-01 PROCEDURE — A9270 NON-COVERED ITEM OR SERVICE: HCPCS | Mod: GY | Performed by: SURGERY

## 2018-01-01 PROCEDURE — 40000983 ZZH STATISTIC HFNC ADULT NON-CPAP

## 2018-01-01 PROCEDURE — 81001 URINALYSIS AUTO W/SCOPE: CPT | Performed by: EMERGENCY MEDICINE

## 2018-01-01 PROCEDURE — 25000131 ZZH RX MED GY IP 250 OP 636 PS 637: Mod: GY | Performed by: HOSPITALIST

## 2018-01-01 PROCEDURE — 87631 RESP VIRUS 3-5 TARGETS: CPT | Performed by: PHYSICIAN ASSISTANT

## 2018-01-01 PROCEDURE — 85025 COMPLETE CBC W/AUTO DIFF WBC: CPT | Performed by: PHYSICIAN ASSISTANT

## 2018-01-01 PROCEDURE — 20000003 ZZH R&B ICU

## 2018-01-01 PROCEDURE — 84484 ASSAY OF TROPONIN QUANT: CPT | Performed by: INTERNAL MEDICINE

## 2018-01-01 PROCEDURE — 25000128 H RX IP 250 OP 636

## 2018-01-01 PROCEDURE — 94640 AIRWAY INHALATION TREATMENT: CPT | Mod: 76

## 2018-01-01 PROCEDURE — 83605 ASSAY OF LACTIC ACID: CPT | Performed by: INTERNAL MEDICINE

## 2018-01-01 PROCEDURE — 80048 BASIC METABOLIC PNL TOTAL CA: CPT | Performed by: HOSPITALIST

## 2018-01-01 PROCEDURE — 99233 SBSQ HOSP IP/OBS HIGH 50: CPT | Performed by: NURSE PRACTITIONER

## 2018-01-01 PROCEDURE — A9270 NON-COVERED ITEM OR SERVICE: HCPCS | Mod: GY | Performed by: PHYSICIAN ASSISTANT

## 2018-01-01 PROCEDURE — 83605 ASSAY OF LACTIC ACID: CPT | Performed by: HOSPITALIST

## 2018-01-01 PROCEDURE — 36415 COLL VENOUS BLD VENIPUNCTURE: CPT | Performed by: EMERGENCY MEDICINE

## 2018-01-01 PROCEDURE — 99233 SBSQ HOSP IP/OBS HIGH 50: CPT | Performed by: CLINICAL NURSE SPECIALIST

## 2018-01-01 PROCEDURE — 99233 SBSQ HOSP IP/OBS HIGH 50: CPT | Performed by: HOSPITALIST

## 2018-01-01 PROCEDURE — 84100 ASSAY OF PHOSPHORUS: CPT | Performed by: INTERNAL MEDICINE

## 2018-01-01 PROCEDURE — 82805 BLOOD GASES W/O2 SATURATION: CPT | Performed by: INTERNAL MEDICINE

## 2018-01-01 PROCEDURE — 25000132 ZZH RX MED GY IP 250 OP 250 PS 637: Mod: GY | Performed by: INTERNAL MEDICINE

## 2018-01-01 PROCEDURE — 80076 HEPATIC FUNCTION PANEL: CPT | Performed by: INTERNAL MEDICINE

## 2018-01-01 PROCEDURE — 36415 COLL VENOUS BLD VENIPUNCTURE: CPT | Performed by: INTERNAL MEDICINE

## 2018-01-01 PROCEDURE — 85027 COMPLETE CBC AUTOMATED: CPT | Performed by: HOSPITALIST

## 2018-01-01 PROCEDURE — 25000128 H RX IP 250 OP 636: Performed by: HOSPITALIST

## 2018-01-01 PROCEDURE — 99233 SBSQ HOSP IP/OBS HIGH 50: CPT | Performed by: INTERNAL MEDICINE

## 2018-01-01 PROCEDURE — 96365 THER/PROPH/DIAG IV INF INIT: CPT

## 2018-01-01 PROCEDURE — 94660 CPAP INITIATION&MGMT: CPT

## 2018-01-01 PROCEDURE — 25000132 ZZH RX MED GY IP 250 OP 250 PS 637: Mod: GY | Performed by: PHYSICIAN ASSISTANT

## 2018-01-01 PROCEDURE — 71045 X-RAY EXAM CHEST 1 VIEW: CPT

## 2018-01-01 PROCEDURE — 25000132 ZZH RX MED GY IP 250 OP 250 PS 637: Mod: GY | Performed by: EMERGENCY MEDICINE

## 2018-01-01 PROCEDURE — 99285 EMERGENCY DEPT VISIT HI MDM: CPT | Mod: 25

## 2018-01-01 PROCEDURE — 99217 ZZC OBSERVATION CARE DISCHARGE: CPT | Performed by: PHYSICIAN ASSISTANT

## 2018-01-01 PROCEDURE — 82805 BLOOD GASES W/O2 SATURATION: CPT | Performed by: PHYSICIAN ASSISTANT

## 2018-01-01 PROCEDURE — 99310 SBSQ NF CARE HIGH MDM 45: CPT | Performed by: NURSE PRACTITIONER

## 2018-01-01 PROCEDURE — 84484 ASSAY OF TROPONIN QUANT: CPT | Performed by: EMERGENCY MEDICINE

## 2018-01-01 PROCEDURE — 87086 URINE CULTURE/COLONY COUNT: CPT | Performed by: INTERNAL MEDICINE

## 2018-01-01 PROCEDURE — A9270 NON-COVERED ITEM OR SERVICE: HCPCS | Mod: GY | Performed by: CLINICAL NURSE SPECIALIST

## 2018-01-01 PROCEDURE — 00000146 ZZHCL STATISTIC GLUCOSE BY METER IP

## 2018-01-01 PROCEDURE — 70553 MRI BRAIN STEM W/O & W/DYE: CPT

## 2018-01-01 PROCEDURE — 25000132 ZZH RX MED GY IP 250 OP 250 PS 637: Mod: GY | Performed by: SURGERY

## 2018-01-01 PROCEDURE — 84460 ALANINE AMINO (ALT) (SGPT): CPT | Performed by: INTERNAL MEDICINE

## 2018-01-01 PROCEDURE — 36415 COLL VENOUS BLD VENIPUNCTURE: CPT | Performed by: PHYSICIAN ASSISTANT

## 2018-01-01 PROCEDURE — 99207 ZZC CDG-MDM COMPONENT: MEETS LOW - DOWN CODED: CPT | Performed by: INTERNAL MEDICINE

## 2018-01-01 PROCEDURE — 93880 EXTRACRANIAL BILAT STUDY: CPT

## 2018-01-01 PROCEDURE — 85025 COMPLETE CBC W/AUTO DIFF WBC: CPT | Performed by: INTERNAL MEDICINE

## 2018-01-01 PROCEDURE — 97530 THERAPEUTIC ACTIVITIES: CPT | Mod: GP | Performed by: PHYSICAL THERAPIST

## 2018-01-01 PROCEDURE — 99232 SBSQ HOSP IP/OBS MODERATE 35: CPT | Performed by: INTERNAL MEDICINE

## 2018-01-01 PROCEDURE — 99283 EMERGENCY DEPT VISIT LOW MDM: CPT

## 2018-01-01 PROCEDURE — 99306 1ST NF CARE HIGH MDM 50: CPT | Performed by: INTERNAL MEDICINE

## 2018-01-01 PROCEDURE — 85025 COMPLETE CBC W/AUTO DIFF WBC: CPT | Performed by: EMERGENCY MEDICINE

## 2018-01-01 PROCEDURE — 12000000 ZZH R&B MED SURG/OB

## 2018-01-01 PROCEDURE — 99214 OFFICE O/P EST MOD 30 MIN: CPT | Performed by: INTERNAL MEDICINE

## 2018-01-01 PROCEDURE — 99232 SBSQ HOSP IP/OBS MODERATE 35: CPT | Performed by: CLINICAL NURSE SPECIALIST

## 2018-01-01 PROCEDURE — 96374 THER/PROPH/DIAG INJ IV PUSH: CPT

## 2018-01-01 PROCEDURE — 96375 TX/PRO/DX INJ NEW DRUG ADDON: CPT

## 2018-01-01 PROCEDURE — 36415 COLL VENOUS BLD VENIPUNCTURE: CPT | Performed by: HOSPITALIST

## 2018-01-01 PROCEDURE — 80047 BASIC METABLC PNL IONIZED CA: CPT

## 2018-01-01 PROCEDURE — 96366 THER/PROPH/DIAG IV INF ADDON: CPT

## 2018-01-01 PROCEDURE — 85014 HEMATOCRIT: CPT

## 2018-01-01 PROCEDURE — 40000893 ZZH STATISTIC PT IP EVAL DEFER

## 2018-01-01 PROCEDURE — 25000132 ZZH RX MED GY IP 250 OP 250 PS 637: Mod: GY | Performed by: NURSE PRACTITIONER

## 2018-01-01 PROCEDURE — 80048 BASIC METABOLIC PNL TOTAL CA: CPT | Performed by: PHYSICIAN ASSISTANT

## 2018-01-01 PROCEDURE — 99239 HOSP IP/OBS DSCHRG MGMT >30: CPT | Performed by: HOSPITALIST

## 2018-01-01 PROCEDURE — 25000128 H RX IP 250 OP 636: Performed by: EMERGENCY MEDICINE

## 2018-01-01 PROCEDURE — A9270 NON-COVERED ITEM OR SERVICE: HCPCS | Mod: GY | Performed by: EMERGENCY MEDICINE

## 2018-01-01 PROCEDURE — 40000193 ZZH STATISTIC PT WARD VISIT

## 2018-01-01 PROCEDURE — 25000132 ZZH RX MED GY IP 250 OP 250 PS 637: Mod: GY | Performed by: CLINICAL NURSE SPECIALIST

## 2018-01-01 PROCEDURE — 80048 BASIC METABOLIC PNL TOTAL CA: CPT | Performed by: EMERGENCY MEDICINE

## 2018-01-01 PROCEDURE — 40000281 ZZH STATISTIC TRANSPORT TIME EA 15 MIN

## 2018-01-01 PROCEDURE — 97161 PT EVAL LOW COMPLEX 20 MIN: CPT | Mod: GP

## 2018-01-01 PROCEDURE — 83605 ASSAY OF LACTIC ACID: CPT | Performed by: EMERGENCY MEDICINE

## 2018-01-01 PROCEDURE — 85610 PROTHROMBIN TIME: CPT | Performed by: EMERGENCY MEDICINE

## 2018-01-01 PROCEDURE — A9270 NON-COVERED ITEM OR SERVICE: HCPCS | Mod: GY | Performed by: HOSPITALIST

## 2018-01-01 PROCEDURE — A9270 NON-COVERED ITEM OR SERVICE: HCPCS | Mod: GY | Performed by: ANESTHESIOLOGY

## 2018-01-01 PROCEDURE — 25000125 ZZHC RX 250: Performed by: HOSPITALIST

## 2018-01-01 PROCEDURE — 27210300 ZZH CANNULA HIGH FLOW, ADULT

## 2018-01-01 PROCEDURE — 99223 1ST HOSP IP/OBS HIGH 75: CPT | Performed by: CLINICAL NURSE SPECIALIST

## 2018-01-01 PROCEDURE — 97530 THERAPEUTIC ACTIVITIES: CPT | Mod: GP

## 2018-01-01 PROCEDURE — 99207 ZZC CDG-MDM COMPONENT: MEETS MODERATE - UP CODED: CPT | Performed by: INTERNAL MEDICINE

## 2018-01-01 PROCEDURE — G0378 HOSPITAL OBSERVATION PER HR: HCPCS

## 2018-01-01 PROCEDURE — 85049 AUTOMATED PLATELET COUNT: CPT | Performed by: INTERNAL MEDICINE

## 2018-01-01 PROCEDURE — 99239 HOSP IP/OBS DSCHRG MGMT >30: CPT | Performed by: INTERNAL MEDICINE

## 2018-01-01 PROCEDURE — 12000047 ZZH R&B IMCU

## 2018-01-01 PROCEDURE — 97116 GAIT TRAINING THERAPY: CPT | Mod: GP,XU

## 2018-01-01 PROCEDURE — 99232 SBSQ HOSP IP/OBS MODERATE 35: CPT | Performed by: HOSPITALIST

## 2018-01-01 PROCEDURE — 99291 CRITICAL CARE FIRST HOUR: CPT | Performed by: INTERNAL MEDICINE

## 2018-01-01 PROCEDURE — 40000274 ZZH STATISTIC RCP CONSULT EA 30 MIN

## 2018-01-01 PROCEDURE — 99207 ZZC CDG-MDM COMPONENT: MEETS LOW - DOWN CODED: CPT | Performed by: HOSPITALIST

## 2018-01-01 PROCEDURE — 85049 AUTOMATED PLATELET COUNT: CPT | Performed by: HOSPITALIST

## 2018-01-01 PROCEDURE — 99309 SBSQ NF CARE MODERATE MDM 30: CPT | Performed by: NURSE PRACTITIONER

## 2018-01-01 PROCEDURE — 96361 HYDRATE IV INFUSION ADD-ON: CPT

## 2018-01-01 PROCEDURE — 25000132 ZZH RX MED GY IP 250 OP 250 PS 637: Mod: GY | Performed by: ANESTHESIOLOGY

## 2018-01-01 PROCEDURE — 85027 COMPLETE CBC AUTOMATED: CPT | Performed by: EMERGENCY MEDICINE

## 2018-01-01 PROCEDURE — 25000128 H RX IP 250 OP 636: Performed by: PHYSICIAN ASSISTANT

## 2018-01-01 PROCEDURE — 93005 ELECTROCARDIOGRAM TRACING: CPT

## 2018-01-01 PROCEDURE — 72125 CT NECK SPINE W/O DYE: CPT

## 2018-01-01 PROCEDURE — 99220 ZZC INITIAL OBSERVATION CARE,LEVL III: CPT | Performed by: INTERNAL MEDICINE

## 2018-01-01 PROCEDURE — 83735 ASSAY OF MAGNESIUM: CPT | Performed by: INTERNAL MEDICINE

## 2018-01-01 PROCEDURE — 36600 WITHDRAWAL OF ARTERIAL BLOOD: CPT

## 2018-01-01 PROCEDURE — 71046 X-RAY EXAM CHEST 2 VIEWS: CPT | Mod: FY

## 2018-01-01 PROCEDURE — 83605 ASSAY OF LACTIC ACID: CPT

## 2018-01-01 PROCEDURE — 87640 STAPH A DNA AMP PROBE: CPT | Performed by: INTERNAL MEDICINE

## 2018-01-01 PROCEDURE — 84145 PROCALCITONIN (PCT): CPT | Performed by: INTERNAL MEDICINE

## 2018-01-01 PROCEDURE — 99222 1ST HOSP IP/OBS MODERATE 55: CPT | Performed by: NURSE PRACTITIONER

## 2018-01-01 PROCEDURE — 93306 TTE W/DOPPLER COMPLETE: CPT | Mod: 26 | Performed by: INTERNAL MEDICINE

## 2018-01-01 PROCEDURE — 83880 ASSAY OF NATRIURETIC PEPTIDE: CPT | Performed by: EMERGENCY MEDICINE

## 2018-01-01 PROCEDURE — 97116 GAIT TRAINING THERAPY: CPT | Mod: GP

## 2018-01-01 PROCEDURE — 99214 OFFICE O/P EST MOD 30 MIN: CPT | Mod: ZP | Performed by: SURGERY

## 2018-01-01 PROCEDURE — 99223 1ST HOSP IP/OBS HIGH 75: CPT | Mod: AI | Performed by: INTERNAL MEDICINE

## 2018-01-01 PROCEDURE — 25000125 ZZHC RX 250: Performed by: EMERGENCY MEDICINE

## 2018-01-01 PROCEDURE — A9585 GADOBUTROL INJECTION: HCPCS | Performed by: EMERGENCY MEDICINE

## 2018-01-01 PROCEDURE — 93306 TTE W/DOPPLER COMPLETE: CPT

## 2018-01-01 PROCEDURE — 99309 SBSQ NF CARE MODERATE MDM 30: CPT | Mod: GV | Performed by: NURSE PRACTITIONER

## 2018-01-01 PROCEDURE — 70450 CT HEAD/BRAIN W/O DYE: CPT

## 2018-01-01 PROCEDURE — 87040 BLOOD CULTURE FOR BACTERIA: CPT | Performed by: EMERGENCY MEDICINE

## 2018-01-01 PROCEDURE — 96367 TX/PROPH/DG ADDL SEQ IV INF: CPT

## 2018-01-01 PROCEDURE — 40000193 ZZH STATISTIC PT WARD VISIT: Performed by: PHYSICAL THERAPIST

## 2018-01-01 PROCEDURE — 99232 SBSQ HOSP IP/OBS MODERATE 35: CPT | Mod: 25 | Performed by: NURSE PRACTITIONER

## 2018-01-01 PROCEDURE — 80061 LIPID PANEL: CPT | Performed by: INTERNAL MEDICINE

## 2018-01-01 PROCEDURE — 40000193 ZZH STATISTIC PT WARD VISIT: Performed by: PHYSICAL THERAPY ASSISTANT

## 2018-01-01 PROCEDURE — 84484 ASSAY OF TROPONIN QUANT: CPT

## 2018-01-01 PROCEDURE — 27211040 ZZH CONTINUOUS NEBULIZER MICRO PUMP

## 2018-01-01 PROCEDURE — 80053 COMPREHEN METABOLIC PANEL: CPT | Performed by: EMERGENCY MEDICINE

## 2018-01-01 PROCEDURE — 99207 ZZC CDG-CODE CATEGORY CHANGED: CPT | Performed by: INTERNAL MEDICINE

## 2018-01-01 PROCEDURE — 99495 TRANSJ CARE MGMT MOD F2F 14D: CPT | Performed by: INTERNAL MEDICINE

## 2018-01-01 PROCEDURE — 97116 GAIT TRAINING THERAPY: CPT | Mod: GP | Performed by: PHYSICAL THERAPY ASSISTANT

## 2018-01-01 PROCEDURE — 93010 ELECTROCARDIOGRAM REPORT: CPT | Performed by: INTERNAL MEDICINE

## 2018-01-01 PROCEDURE — 97530 THERAPEUTIC ACTIVITIES: CPT | Mod: GP | Performed by: PHYSICAL THERAPY ASSISTANT

## 2018-01-01 PROCEDURE — 99225 ZZC SUBSEQUENT OBSERVATION CARE,LEVEL II: CPT | Performed by: PHYSICIAN ASSISTANT

## 2018-01-01 PROCEDURE — 97161 PT EVAL LOW COMPLEX 20 MIN: CPT | Mod: GP | Performed by: PHYSICAL THERAPIST

## 2018-01-01 PROCEDURE — 93005 ELECTROCARDIOGRAM TRACING: CPT | Mod: 76

## 2018-01-01 PROCEDURE — 71045 X-RAY EXAM CHEST 1 VIEW: CPT | Mod: 77

## 2018-01-01 RX ORDER — PROCHLORPERAZINE 25 MG
12.5 SUPPOSITORY, RECTAL RECTAL EVERY 12 HOURS PRN
Status: DISCONTINUED | OUTPATIENT
Start: 2018-01-01 | End: 2018-01-01

## 2018-01-01 RX ORDER — SENNOSIDES 8.6 MG
2 TABLET ORAL 2 TIMES DAILY
COMMUNITY
End: 2018-01-01

## 2018-01-01 RX ORDER — AMOXICILLIN 250 MG
2 CAPSULE ORAL 2 TIMES DAILY
Status: DISCONTINUED | OUTPATIENT
Start: 2018-01-01 | End: 2018-01-01 | Stop reason: HOSPADM

## 2018-01-01 RX ORDER — HYDROMORPHONE HYDROCHLORIDE 1 MG/ML
.5-1 SOLUTION ORAL
Status: DISCONTINUED | OUTPATIENT
Start: 2018-01-01 | End: 2018-01-01 | Stop reason: HOSPADM

## 2018-01-01 RX ORDER — ACETAMINOPHEN 650 MG/1
650 SUPPOSITORY RECTAL EVERY 4 HOURS PRN
Qty: 12 SUPPOSITORY | Refills: 0 | Status: SHIPPED | OUTPATIENT
Start: 2018-01-01

## 2018-01-01 RX ORDER — POLYETHYLENE GLYCOL 3350 17 G/17G
17 POWDER, FOR SOLUTION ORAL DAILY
Status: DISCONTINUED | OUTPATIENT
Start: 2018-01-01 | End: 2018-01-01 | Stop reason: HOSPADM

## 2018-01-01 RX ORDER — NALOXONE HYDROCHLORIDE 0.4 MG/ML
.1-.4 INJECTION, SOLUTION INTRAMUSCULAR; INTRAVENOUS; SUBCUTANEOUS
Status: DISCONTINUED | OUTPATIENT
Start: 2018-01-01 | End: 2018-01-01 | Stop reason: HOSPADM

## 2018-01-01 RX ORDER — SODIUM CHLORIDE 9 MG/ML
1000 INJECTION, SOLUTION INTRAVENOUS CONTINUOUS
Status: DISCONTINUED | OUTPATIENT
Start: 2018-01-01 | End: 2018-01-01

## 2018-01-01 RX ORDER — PANTOPRAZOLE SODIUM 40 MG/1
40 TABLET, DELAYED RELEASE ORAL EVERY MORNING
Status: DISCONTINUED | OUTPATIENT
Start: 2018-01-01 | End: 2018-01-01 | Stop reason: HOSPADM

## 2018-01-01 RX ORDER — GABAPENTIN 100 MG/1
100 CAPSULE ORAL 3 TIMES DAILY
Status: DISCONTINUED | OUTPATIENT
Start: 2018-01-01 | End: 2018-01-01 | Stop reason: HOSPADM

## 2018-01-01 RX ORDER — SODIUM CHLORIDE 9 MG/ML
INJECTION, SOLUTION INTRAVENOUS CONTINUOUS
Status: ACTIVE | OUTPATIENT
Start: 2018-01-01 | End: 2018-01-01

## 2018-01-01 RX ORDER — AMOXICILLIN 250 MG
1 CAPSULE ORAL 2 TIMES DAILY
Status: DISCONTINUED | OUTPATIENT
Start: 2018-01-01 | End: 2018-01-01 | Stop reason: HOSPADM

## 2018-01-01 RX ORDER — NALOXONE HYDROCHLORIDE 0.4 MG/ML
INJECTION, SOLUTION INTRAMUSCULAR; INTRAVENOUS; SUBCUTANEOUS
Status: COMPLETED
Start: 2018-01-01 | End: 2018-01-01

## 2018-01-01 RX ORDER — CEFUROXIME AXETIL 500 MG/1
500 TABLET ORAL 2 TIMES DAILY
Qty: 14 TABLET | Refills: 0 | Status: SHIPPED | OUTPATIENT
Start: 2018-01-01 | End: 2018-01-01

## 2018-01-01 RX ORDER — ASPIRIN 81 MG/1
81 TABLET ORAL DAILY
Status: DISCONTINUED | OUTPATIENT
Start: 2018-01-01 | End: 2018-01-01 | Stop reason: HOSPADM

## 2018-01-01 RX ORDER — LORAZEPAM 0.5 MG/1
0.5 TABLET ORAL EVERY 4 HOURS PRN
Status: DISCONTINUED | OUTPATIENT
Start: 2018-01-01 | End: 2018-01-01 | Stop reason: HOSPADM

## 2018-01-01 RX ORDER — METHYLPREDNISOLONE SODIUM SUCCINATE 40 MG/ML
40 INJECTION, POWDER, LYOPHILIZED, FOR SOLUTION INTRAMUSCULAR; INTRAVENOUS EVERY 12 HOURS
Status: DISCONTINUED | OUTPATIENT
Start: 2018-01-01 | End: 2018-01-01

## 2018-01-01 RX ORDER — ONDANSETRON 4 MG/1
4 TABLET, ORALLY DISINTEGRATING ORAL EVERY 6 HOURS PRN
Status: DISCONTINUED | OUTPATIENT
Start: 2018-01-01 | End: 2018-01-01 | Stop reason: HOSPADM

## 2018-01-01 RX ORDER — LORAZEPAM 0.5 MG/1
0.5 TABLET ORAL EVERY 6 HOURS PRN
Status: DISCONTINUED | OUTPATIENT
Start: 2018-01-01 | End: 2018-01-01

## 2018-01-01 RX ORDER — FUROSEMIDE 10 MG/ML
40 INJECTION INTRAMUSCULAR; INTRAVENOUS ONCE
Status: COMPLETED | OUTPATIENT
Start: 2018-01-01 | End: 2018-01-01

## 2018-01-01 RX ORDER — LIDOCAINE 40 MG/G
1 CREAM TOPICAL 4 TIMES DAILY PRN
Status: ON HOLD | COMMUNITY
End: 2018-01-01

## 2018-01-01 RX ORDER — TAMSULOSIN HYDROCHLORIDE 0.4 MG/1
0.4 CAPSULE ORAL DAILY
Status: DISCONTINUED | OUTPATIENT
Start: 2018-01-01 | End: 2018-01-01 | Stop reason: HOSPADM

## 2018-01-01 RX ORDER — FUROSEMIDE 20 MG
20 TABLET ORAL DAILY
Status: DISCONTINUED | OUTPATIENT
Start: 2018-01-01 | End: 2018-01-01

## 2018-01-01 RX ORDER — LORAZEPAM 0.5 MG/1
0.25 TABLET ORAL EVERY 6 HOURS PRN
Status: DISCONTINUED | OUTPATIENT
Start: 2018-01-01 | End: 2018-01-01 | Stop reason: HOSPADM

## 2018-01-01 RX ORDER — AMOXICILLIN 250 MG
1 CAPSULE ORAL 2 TIMES DAILY PRN
Status: DISCONTINUED | OUTPATIENT
Start: 2018-01-01 | End: 2018-01-01 | Stop reason: HOSPADM

## 2018-01-01 RX ORDER — SENNOSIDES 8.6 MG
1-2 TABLET ORAL 2 TIMES DAILY
Qty: 100 TABLET | Refills: 1 | Status: SHIPPED | OUTPATIENT
Start: 2018-01-01 | End: 2018-01-01 | Stop reason: DRUGHIGH

## 2018-01-01 RX ORDER — AMLODIPINE BESYLATE 10 MG/1
10 TABLET ORAL DAILY
Status: DISCONTINUED | OUTPATIENT
Start: 2018-01-01 | End: 2018-01-01 | Stop reason: HOSPADM

## 2018-01-01 RX ORDER — FUROSEMIDE 20 MG
20 TABLET ORAL
Status: DISCONTINUED | OUTPATIENT
Start: 2018-01-01 | End: 2018-01-01

## 2018-01-01 RX ORDER — DONEPEZIL HYDROCHLORIDE 5 MG/1
10 TABLET, FILM COATED ORAL AT BEDTIME
Status: DISCONTINUED | OUTPATIENT
Start: 2018-01-01 | End: 2018-01-01 | Stop reason: HOSPADM

## 2018-01-01 RX ORDER — FUROSEMIDE 20 MG
30 TABLET ORAL DAILY
Status: ON HOLD | COMMUNITY
End: 2018-01-01

## 2018-01-01 RX ORDER — AMOXICILLIN 250 MG
2 CAPSULE ORAL 2 TIMES DAILY PRN
Status: DISCONTINUED | OUTPATIENT
Start: 2018-01-01 | End: 2018-01-01 | Stop reason: HOSPADM

## 2018-01-01 RX ORDER — ACETAMINOPHEN 650 MG/1
650 SUPPOSITORY RECTAL 3 TIMES DAILY
Status: DISCONTINUED | OUTPATIENT
Start: 2018-01-01 | End: 2018-01-01 | Stop reason: HOSPADM

## 2018-01-01 RX ORDER — FUROSEMIDE 10 MG/ML
20 INJECTION INTRAMUSCULAR; INTRAVENOUS
Status: DISCONTINUED | OUTPATIENT
Start: 2018-01-01 | End: 2018-01-01

## 2018-01-01 RX ORDER — DULOXETIN HYDROCHLORIDE 30 MG/1
30 CAPSULE, DELAYED RELEASE ORAL DAILY
COMMUNITY
End: 2018-01-01

## 2018-01-01 RX ORDER — MORPHINE SULFATE 100 MG/5ML
2.5-5 SOLUTION ORAL
Status: DISCONTINUED | OUTPATIENT
Start: 2018-01-01 | End: 2018-01-01 | Stop reason: HOSPADM

## 2018-01-01 RX ORDER — HEPARIN SODIUM 5000 [USP'U]/.5ML
5000 INJECTION, SOLUTION INTRAVENOUS; SUBCUTANEOUS EVERY 12 HOURS
Status: DISCONTINUED | OUTPATIENT
Start: 2018-01-01 | End: 2018-01-01 | Stop reason: HOSPADM

## 2018-01-01 RX ORDER — POLYETHYLENE GLYCOL 3350 17 G/17G
17 POWDER, FOR SOLUTION ORAL DAILY PRN
Status: DISCONTINUED | OUTPATIENT
Start: 2018-01-01 | End: 2018-01-01 | Stop reason: HOSPADM

## 2018-01-01 RX ORDER — LIDOCAINE 50 MG/G
OINTMENT TOPICAL EVERY 4 HOURS PRN
Status: DISCONTINUED | OUTPATIENT
Start: 2018-01-01 | End: 2018-01-01 | Stop reason: HOSPADM

## 2018-01-01 RX ORDER — ALBUTEROL SULFATE 0.83 MG/ML
1 SOLUTION RESPIRATORY (INHALATION) ONCE
Qty: 3 ML | Refills: 0
Start: 2018-01-01 | End: 2018-01-01

## 2018-01-01 RX ORDER — TRAMADOL HYDROCHLORIDE 50 MG/1
50 TABLET ORAL 3 TIMES DAILY PRN
Status: DISCONTINUED | OUTPATIENT
Start: 2018-01-01 | End: 2018-01-01

## 2018-01-01 RX ORDER — DOXYCYCLINE 100 MG/1
100 CAPSULE ORAL 2 TIMES DAILY
Qty: 14 CAPSULE | Refills: 0 | Status: SHIPPED | OUTPATIENT
Start: 2018-01-01 | End: 2018-01-01

## 2018-01-01 RX ORDER — ALBUTEROL SULFATE 0.83 MG/ML
2.5 SOLUTION RESPIRATORY (INHALATION)
COMMUNITY

## 2018-01-01 RX ORDER — LOSARTAN POTASSIUM 100 MG/1
100 TABLET ORAL DAILY
Status: DISCONTINUED | OUTPATIENT
Start: 2018-01-01 | End: 2018-01-01 | Stop reason: HOSPADM

## 2018-01-01 RX ORDER — LEVOTHYROXINE SODIUM 88 UG/1
88 TABLET ORAL DAILY
Status: DISCONTINUED | OUTPATIENT
Start: 2018-01-01 | End: 2018-01-01 | Stop reason: HOSPADM

## 2018-01-01 RX ORDER — ASPIRIN 81 MG/1
81 TABLET ORAL DAILY
Status: DISCONTINUED | OUTPATIENT
Start: 2018-01-01 | End: 2018-01-01

## 2018-01-01 RX ORDER — ATORVASTATIN CALCIUM 20 MG/1
20 TABLET, FILM COATED ORAL DAILY
Qty: 90 TABLET | Refills: 3 | Status: ON HOLD | OUTPATIENT
Start: 2018-01-01 | End: 2018-01-01

## 2018-01-01 RX ORDER — POLYETHYLENE GLYCOL 3350 17 G/17G
1 POWDER, FOR SOLUTION ORAL DAILY
Qty: 85 G | Refills: 0 | Status: SHIPPED | OUTPATIENT
Start: 2018-01-01 | End: 2018-01-01

## 2018-01-01 RX ORDER — ONDANSETRON 2 MG/ML
4 INJECTION INTRAMUSCULAR; INTRAVENOUS EVERY 6 HOURS PRN
Status: DISCONTINUED | OUTPATIENT
Start: 2018-01-01 | End: 2018-01-01 | Stop reason: HOSPADM

## 2018-01-01 RX ORDER — HYDROMORPHONE HYDROCHLORIDE 1 MG/ML
.3-.5 INJECTION, SOLUTION INTRAMUSCULAR; INTRAVENOUS; SUBCUTANEOUS
Status: DISCONTINUED | OUTPATIENT
Start: 2018-01-01 | End: 2018-01-01 | Stop reason: HOSPADM

## 2018-01-01 RX ORDER — LOSARTAN POTASSIUM 100 MG/1
TABLET ORAL
Qty: 90 TABLET | Refills: 3 | Status: ON HOLD | OUTPATIENT
Start: 2018-01-01 | End: 2018-01-01

## 2018-01-01 RX ORDER — FUROSEMIDE 10 MG/ML
40 INJECTION INTRAMUSCULAR; INTRAVENOUS DAILY
Status: DISCONTINUED | OUTPATIENT
Start: 2018-01-01 | End: 2018-01-01

## 2018-01-01 RX ORDER — HYDROMORPHONE HYDROCHLORIDE 1 MG/ML
0.5 SOLUTION ORAL EVERY 4 HOURS PRN
Status: DISCONTINUED | OUTPATIENT
Start: 2018-01-01 | End: 2018-01-01

## 2018-01-01 RX ORDER — TRAMADOL HYDROCHLORIDE 50 MG/1
50 TABLET ORAL 3 TIMES DAILY
Qty: 15 TABLET | Refills: 0 | Status: ON HOLD | OUTPATIENT
Start: 2018-01-01 | End: 2018-01-01

## 2018-01-01 RX ORDER — IPRATROPIUM BROMIDE AND ALBUTEROL SULFATE 2.5; .5 MG/3ML; MG/3ML
3 SOLUTION RESPIRATORY (INHALATION)
Status: DISCONTINUED | OUTPATIENT
Start: 2018-01-01 | End: 2018-01-01

## 2018-01-01 RX ORDER — METHYLPREDNISOLONE SODIUM SUCCINATE 40 MG/ML
40 INJECTION, POWDER, LYOPHILIZED, FOR SOLUTION INTRAMUSCULAR; INTRAVENOUS EVERY 8 HOURS
Status: DISCONTINUED | OUTPATIENT
Start: 2018-01-01 | End: 2018-01-01

## 2018-01-01 RX ORDER — SALIVA STIMULANT COMB. NO.3
1 SPRAY, NON-AEROSOL (ML) MUCOUS MEMBRANE
Status: DISCONTINUED | OUTPATIENT
Start: 2018-01-01 | End: 2018-01-01 | Stop reason: HOSPADM

## 2018-01-01 RX ORDER — HYDRALAZINE HYDROCHLORIDE 20 MG/ML
10 INJECTION INTRAMUSCULAR; INTRAVENOUS EVERY 6 HOURS PRN
Status: DISCONTINUED | OUTPATIENT
Start: 2018-01-01 | End: 2018-01-01

## 2018-01-01 RX ORDER — LORAZEPAM 0.5 MG/1
.25-.5 TABLET ORAL EVERY 6 HOURS PRN
Status: DISCONTINUED | OUTPATIENT
Start: 2018-01-01 | End: 2018-01-01

## 2018-01-01 RX ORDER — ALBUTEROL SULFATE 0.83 MG/ML
2.5 SOLUTION RESPIRATORY (INHALATION)
Status: DISCONTINUED | OUTPATIENT
Start: 2018-01-01 | End: 2018-01-01 | Stop reason: HOSPADM

## 2018-01-01 RX ORDER — ACETAMINOPHEN 500 MG
500 TABLET ORAL 3 TIMES DAILY
Status: DISCONTINUED | OUTPATIENT
Start: 2018-01-01 | End: 2018-01-01 | Stop reason: HOSPADM

## 2018-01-01 RX ORDER — METOPROLOL SUCCINATE 25 MG/1
TABLET, EXTENDED RELEASE ORAL
Qty: 90 TABLET | Refills: 2 | Status: SHIPPED | OUTPATIENT
Start: 2018-01-01

## 2018-01-01 RX ORDER — NALOXONE HYDROCHLORIDE 0.4 MG/ML
0.4 INJECTION, SOLUTION INTRAMUSCULAR; INTRAVENOUS; SUBCUTANEOUS ONCE
Status: DISCONTINUED | OUTPATIENT
Start: 2018-01-01 | End: 2018-01-01

## 2018-01-01 RX ORDER — IPRATROPIUM BROMIDE AND ALBUTEROL SULFATE 2.5; .5 MG/3ML; MG/3ML
1 SOLUTION RESPIRATORY (INHALATION) 4 TIMES DAILY
COMMUNITY

## 2018-01-01 RX ORDER — HALOPERIDOL 5 MG/ML
2 INJECTION INTRAMUSCULAR EVERY 6 HOURS PRN
Status: DISCONTINUED | OUTPATIENT
Start: 2018-01-01 | End: 2018-01-01 | Stop reason: HOSPADM

## 2018-01-01 RX ORDER — LORAZEPAM 2 MG/ML
0.5 CONCENTRATE ORAL EVERY 4 HOURS PRN
Qty: 30 ML | Refills: 1 | Status: SHIPPED | OUTPATIENT
Start: 2018-01-01

## 2018-01-01 RX ORDER — LANOLIN ALCOHOL/MO/W.PET/CERES
1000 CREAM (GRAM) TOPICAL DAILY
Status: ON HOLD | COMMUNITY
End: 2018-01-01

## 2018-01-01 RX ORDER — AMLODIPINE BESYLATE 5 MG/1
10 TABLET ORAL DAILY
Status: DISCONTINUED | OUTPATIENT
Start: 2018-01-01 | End: 2018-01-01 | Stop reason: HOSPADM

## 2018-01-01 RX ORDER — ACETAMINOPHEN 325 MG/1
650 TABLET ORAL 3 TIMES DAILY
Status: DISCONTINUED | OUTPATIENT
Start: 2018-01-01 | End: 2018-01-01 | Stop reason: HOSPADM

## 2018-01-01 RX ORDER — DULOXETIN HYDROCHLORIDE 30 MG/1
30 CAPSULE, DELAYED RELEASE ORAL DAILY
Status: DISCONTINUED | OUTPATIENT
Start: 2018-01-01 | End: 2018-01-01 | Stop reason: HOSPADM

## 2018-01-01 RX ORDER — VANCOMYCIN HYDROCHLORIDE 1 G/200ML
1000 INJECTION, SOLUTION INTRAVENOUS ONCE
Status: DISCONTINUED | OUTPATIENT
Start: 2018-01-01 | End: 2018-01-01

## 2018-01-01 RX ORDER — TAMSULOSIN HYDROCHLORIDE 0.4 MG/1
CAPSULE ORAL
Qty: 90 CAPSULE | Refills: 3 | Status: SHIPPED | OUTPATIENT
Start: 2018-01-01 | End: 2018-01-01

## 2018-01-01 RX ORDER — FUROSEMIDE 20 MG
20 TABLET ORAL DAILY
Status: DISCONTINUED | OUTPATIENT
Start: 2018-01-01 | End: 2018-01-01 | Stop reason: HOSPADM

## 2018-01-01 RX ORDER — IPRATROPIUM BROMIDE AND ALBUTEROL SULFATE 2.5; .5 MG/3ML; MG/3ML
1 SOLUTION RESPIRATORY (INHALATION) 4 TIMES DAILY
Status: DISCONTINUED | OUTPATIENT
Start: 2018-01-01 | End: 2018-01-01 | Stop reason: HOSPADM

## 2018-01-01 RX ORDER — BISACODYL 10 MG
SUPPOSITORY, RECTAL RECTAL
Qty: 4 SUPPOSITORY | Refills: 0 | Status: SHIPPED | OUTPATIENT
Start: 2018-01-01

## 2018-01-01 RX ORDER — DONEPEZIL HYDROCHLORIDE 10 MG/1
10 TABLET, FILM COATED ORAL AT BEDTIME
Status: DISCONTINUED | OUTPATIENT
Start: 2018-01-01 | End: 2018-01-01 | Stop reason: HOSPADM

## 2018-01-01 RX ORDER — LIDOCAINE 40 MG/G
CREAM TOPICAL
Status: DISCONTINUED | OUTPATIENT
Start: 2018-01-01 | End: 2018-01-01

## 2018-01-01 RX ORDER — METOPROLOL SUCCINATE 25 MG/1
25 TABLET, EXTENDED RELEASE ORAL DAILY
Status: DISCONTINUED | OUTPATIENT
Start: 2018-01-01 | End: 2018-01-01

## 2018-01-01 RX ORDER — BISACODYL 10 MG
10 SUPPOSITORY, RECTAL RECTAL DAILY PRN
Status: DISCONTINUED | OUTPATIENT
Start: 2018-01-01 | End: 2018-01-01 | Stop reason: HOSPADM

## 2018-01-01 RX ORDER — LEVOTHYROXINE SODIUM 88 UG/1
88 TABLET ORAL
Status: DISCONTINUED | OUTPATIENT
Start: 2018-01-01 | End: 2018-01-01

## 2018-01-01 RX ORDER — LORAZEPAM 0.5 MG/1
0.25 TABLET ORAL EVERY 6 HOURS PRN
Status: DISCONTINUED | OUTPATIENT
Start: 2018-01-01 | End: 2018-01-01

## 2018-01-01 RX ORDER — LEVOTHYROXINE SODIUM 88 UG/1
TABLET ORAL
Qty: 90 TABLET | Refills: 3 | Status: SHIPPED | OUTPATIENT
Start: 2018-01-01

## 2018-01-01 RX ORDER — METOPROLOL SUCCINATE 25 MG/1
25 TABLET, EXTENDED RELEASE ORAL DAILY
Status: DISCONTINUED | OUTPATIENT
Start: 2018-01-01 | End: 2018-01-01 | Stop reason: HOSPADM

## 2018-01-01 RX ORDER — GABAPENTIN 100 MG/1
100 CAPSULE ORAL 3 TIMES DAILY
Qty: 15 CAPSULE | Refills: 0 | DISCHARGE
Start: 2018-01-01

## 2018-01-01 RX ORDER — ATORVASTATIN CALCIUM 20 MG/1
20 TABLET, FILM COATED ORAL DAILY
Status: DISCONTINUED | OUTPATIENT
Start: 2018-01-01 | End: 2018-01-01

## 2018-01-01 RX ORDER — METOPROLOL SUCCINATE 50 MG/1
50 TABLET, EXTENDED RELEASE ORAL DAILY
Status: DISCONTINUED | OUTPATIENT
Start: 2018-01-01 | End: 2018-01-01 | Stop reason: HOSPADM

## 2018-01-01 RX ORDER — TRAMADOL HYDROCHLORIDE 50 MG/1
TABLET ORAL
Qty: 120 TABLET | Refills: 5 | Status: ON HOLD | OUTPATIENT
Start: 2018-01-01 | End: 2018-01-01

## 2018-01-01 RX ORDER — HYDROMORPHONE HYDROCHLORIDE 2 MG/1
1 TABLET ORAL EVERY 6 HOURS
Status: ON HOLD | COMMUNITY
End: 2018-01-01

## 2018-01-01 RX ORDER — SODIUM CHLORIDE 9 MG/ML
INJECTION, SOLUTION INTRAVENOUS CONTINUOUS
Status: DISCONTINUED | OUTPATIENT
Start: 2018-01-01 | End: 2018-01-01 | Stop reason: HOSPADM

## 2018-01-01 RX ORDER — MAGNESIUM CARB/ALUMINUM HYDROX 105-160MG
148 TABLET,CHEWABLE ORAL
Status: DISCONTINUED | OUTPATIENT
Start: 2018-01-01 | End: 2018-01-01 | Stop reason: HOSPADM

## 2018-01-01 RX ORDER — SODIUM CHLORIDE 9 MG/ML
INJECTION, SOLUTION INTRAVENOUS CONTINUOUS
Status: DISCONTINUED | OUTPATIENT
Start: 2018-01-01 | End: 2018-01-01

## 2018-01-01 RX ORDER — MINERAL OIL/HYDROPHIL PETROLAT
OINTMENT (GRAM) TOPICAL EVERY 8 HOURS PRN
Status: DISCONTINUED | OUTPATIENT
Start: 2018-01-01 | End: 2018-01-01 | Stop reason: HOSPADM

## 2018-01-01 RX ORDER — GADOBUTROL 604.72 MG/ML
6 INJECTION INTRAVENOUS ONCE
Status: COMPLETED | OUTPATIENT
Start: 2018-01-01 | End: 2018-01-01

## 2018-01-01 RX ORDER — DONEPEZIL HYDROCHLORIDE 10 MG/1
10 TABLET, FILM COATED ORAL AT BEDTIME
Status: DISCONTINUED | OUTPATIENT
Start: 2018-01-01 | End: 2018-01-01

## 2018-01-01 RX ORDER — ACETAMINOPHEN 500 MG
500 TABLET ORAL 3 TIMES DAILY
Status: ON HOLD | COMMUNITY
End: 2018-01-01

## 2018-01-01 RX ORDER — ACETAMINOPHEN 500 MG
500 TABLET ORAL 3 TIMES DAILY
Status: DISCONTINUED | OUTPATIENT
Start: 2018-01-01 | End: 2018-01-01

## 2018-01-01 RX ORDER — LEVOFLOXACIN 5 MG/ML
500 INJECTION, SOLUTION INTRAVENOUS ONCE
Status: COMPLETED | OUTPATIENT
Start: 2018-01-01 | End: 2018-01-01

## 2018-01-01 RX ORDER — LIDOCAINE 40 MG/G
CREAM TOPICAL
Status: DISCONTINUED | OUTPATIENT
Start: 2018-01-01 | End: 2018-01-01 | Stop reason: CLARIF

## 2018-01-01 RX ORDER — GABAPENTIN 100 MG/1
CAPSULE ORAL
Qty: 270 CAPSULE | Refills: 3 | Status: ON HOLD | OUTPATIENT
Start: 2018-01-01 | End: 2018-01-01

## 2018-01-01 RX ORDER — LORAZEPAM 2 MG/ML
0.5 INJECTION INTRAMUSCULAR ONCE
Status: COMPLETED | OUTPATIENT
Start: 2018-01-01 | End: 2018-01-01

## 2018-01-01 RX ORDER — PREDNISONE 20 MG/1
40 TABLET ORAL DAILY
DISCHARGE
Start: 2018-01-01 | End: 2018-01-01

## 2018-01-01 RX ORDER — ATORVASTATIN CALCIUM 20 MG/1
20 TABLET, FILM COATED ORAL DAILY
Status: DISCONTINUED | OUTPATIENT
Start: 2018-01-01 | End: 2018-01-01 | Stop reason: HOSPADM

## 2018-01-01 RX ORDER — IPRATROPIUM BROMIDE AND ALBUTEROL SULFATE 2.5; .5 MG/3ML; MG/3ML
3 SOLUTION RESPIRATORY (INHALATION) ONCE
Status: COMPLETED | OUTPATIENT
Start: 2018-01-01 | End: 2018-01-01

## 2018-01-01 RX ORDER — DEXAMETHASONE SODIUM PHOSPHATE 10 MG/ML
10 INJECTION, SOLUTION INTRAMUSCULAR; INTRAVENOUS ONCE
Status: COMPLETED | OUTPATIENT
Start: 2018-01-01 | End: 2018-01-01

## 2018-01-01 RX ORDER — FUROSEMIDE 10 MG/ML
20 INJECTION INTRAMUSCULAR; INTRAVENOUS EVERY 12 HOURS
Status: DISCONTINUED | OUTPATIENT
Start: 2018-01-01 | End: 2018-01-01

## 2018-01-01 RX ORDER — HYDRALAZINE HYDROCHLORIDE 20 MG/ML
10 INJECTION INTRAMUSCULAR; INTRAVENOUS EVERY 4 HOURS PRN
Status: DISCONTINUED | OUTPATIENT
Start: 2018-01-01 | End: 2018-01-01

## 2018-01-01 RX ORDER — PROCHLORPERAZINE MALEATE 5 MG
5 TABLET ORAL EVERY 6 HOURS PRN
Status: DISCONTINUED | OUTPATIENT
Start: 2018-01-01 | End: 2018-01-01

## 2018-01-01 RX ORDER — HALOPERIDOL 2 MG/ML
.5-1 SOLUTION ORAL EVERY 6 HOURS PRN
Qty: 30 ML | Refills: 0 | Status: SHIPPED | OUTPATIENT
Start: 2018-01-01

## 2018-01-01 RX ORDER — PREDNISONE 20 MG/1
20 TABLET ORAL DAILY
Status: DISCONTINUED | OUTPATIENT
Start: 2018-01-01 | End: 2018-01-01 | Stop reason: HOSPADM

## 2018-01-01 RX ORDER — ATROPINE SULFATE 10 MG/ML
1-2 SOLUTION/ DROPS OPHTHALMIC
Status: DISCONTINUED | OUTPATIENT
Start: 2018-01-01 | End: 2018-01-01 | Stop reason: HOSPADM

## 2018-01-01 RX ORDER — LEVOFLOXACIN 500 MG/1
500 TABLET, FILM COATED ORAL DAILY
Qty: 2 TABLET | Refills: 0 | DISCHARGE
Start: 2018-01-01 | End: 2018-01-01

## 2018-01-01 RX ORDER — FUROSEMIDE 10 MG/ML
20 INJECTION INTRAMUSCULAR; INTRAVENOUS ONCE
Status: COMPLETED | OUTPATIENT
Start: 2018-01-01 | End: 2018-01-01

## 2018-01-01 RX ORDER — ACETAMINOPHEN 650 MG/1
650 SUPPOSITORY RECTAL EVERY 4 HOURS PRN
Status: DISCONTINUED | OUTPATIENT
Start: 2018-01-01 | End: 2018-01-01 | Stop reason: HOSPADM

## 2018-01-01 RX ORDER — BISACODYL 10 MG
10 SUPPOSITORY, RECTAL RECTAL
Status: DISCONTINUED | OUTPATIENT
Start: 2018-01-01 | End: 2018-01-01 | Stop reason: HOSPADM

## 2018-01-01 RX ORDER — TAMSULOSIN HYDROCHLORIDE 0.4 MG/1
0.4 CAPSULE ORAL AT BEDTIME
Status: DISCONTINUED | OUTPATIENT
Start: 2018-01-01 | End: 2018-01-01 | Stop reason: HOSPADM

## 2018-01-01 RX ORDER — LIDOCAINE 40 MG/G
CREAM TOPICAL 3 TIMES DAILY
Status: DISCONTINUED | OUTPATIENT
Start: 2018-01-01 | End: 2018-01-01 | Stop reason: ALTCHOICE

## 2018-01-01 RX ORDER — ASPIRIN 81 MG/1
81 TABLET ORAL EVERY EVENING
Status: DISCONTINUED | OUTPATIENT
Start: 2018-01-01 | End: 2018-01-01 | Stop reason: HOSPADM

## 2018-01-01 RX ORDER — PREDNISONE 10 MG/1
TABLET ORAL
Qty: 30 TABLET | Refills: 0 | Status: SHIPPED | OUTPATIENT
Start: 2018-01-01 | End: 2018-01-01

## 2018-01-01 RX ORDER — NALOXONE HYDROCHLORIDE 0.4 MG/ML
.1-.4 INJECTION, SOLUTION INTRAMUSCULAR; INTRAVENOUS; SUBCUTANEOUS
Status: DISCONTINUED | OUTPATIENT
Start: 2018-01-01 | End: 2018-01-01

## 2018-01-01 RX ORDER — LIDOCAINE 40 MG/G
CREAM TOPICAL
Status: DISCONTINUED | OUTPATIENT
Start: 2018-01-01 | End: 2018-01-01 | Stop reason: HOSPADM

## 2018-01-01 RX ORDER — METHYLPREDNISOLONE SODIUM SUCCINATE 40 MG/ML
40 INJECTION, POWDER, LYOPHILIZED, FOR SOLUTION INTRAMUSCULAR; INTRAVENOUS ONCE
Status: COMPLETED | OUTPATIENT
Start: 2018-01-01 | End: 2018-01-01

## 2018-01-01 RX ORDER — LEVOFLOXACIN 5 MG/ML
250 INJECTION, SOLUTION INTRAVENOUS EVERY 24 HOURS
Status: DISCONTINUED | OUTPATIENT
Start: 2018-01-01 | End: 2018-01-01 | Stop reason: HOSPADM

## 2018-01-01 RX ORDER — PREDNISONE 20 MG/1
40 TABLET ORAL DAILY
Status: DISCONTINUED | OUTPATIENT
Start: 2018-01-01 | End: 2018-01-01

## 2018-01-01 RX ORDER — DONEPEZIL HYDROCHLORIDE 10 MG/1
TABLET, FILM COATED ORAL
Qty: 90 TABLET | Refills: 3 | Status: ON HOLD | OUTPATIENT
Start: 2018-01-01 | End: 2018-01-01

## 2018-01-01 RX ORDER — ACETAMINOPHEN 650 MG/1
650 SUPPOSITORY RECTAL 3 TIMES DAILY
Status: DISCONTINUED | OUTPATIENT
Start: 2018-01-01 | End: 2018-01-01

## 2018-01-01 RX ORDER — ATROPINE SULFATE 10 MG/ML
2-4 SOLUTION/ DROPS OPHTHALMIC
Qty: 5 ML | Refills: 1 | Status: SHIPPED | OUTPATIENT
Start: 2018-01-01

## 2018-01-01 RX ORDER — LORAZEPAM 0.5 MG/1
0.25 TABLET ORAL EVERY 6 HOURS PRN
Qty: 20 TABLET | Refills: 0 | Status: ON HOLD | OUTPATIENT
Start: 2018-01-01 | End: 2018-01-01

## 2018-01-01 RX ORDER — METHYLPREDNISOLONE SODIUM SUCCINATE 40 MG/ML
40 INJECTION, POWDER, LYOPHILIZED, FOR SOLUTION INTRAMUSCULAR; INTRAVENOUS EVERY 24 HOURS
Status: DISCONTINUED | OUTPATIENT
Start: 2018-01-01 | End: 2018-01-01

## 2018-01-01 RX ORDER — FUROSEMIDE 20 MG
20 TABLET ORAL ONCE
Status: COMPLETED | OUTPATIENT
Start: 2018-01-01 | End: 2018-01-01

## 2018-01-01 RX ORDER — FUROSEMIDE 10 MG/ML
40 INJECTION INTRAMUSCULAR; INTRAVENOUS EVERY 12 HOURS
Status: DISCONTINUED | OUTPATIENT
Start: 2018-01-01 | End: 2018-01-01

## 2018-01-01 RX ORDER — HYDROMORPHONE HYDROCHLORIDE 1 MG/ML
1-2 SOLUTION ORAL
Qty: 30 ML | Refills: 0 | Status: SHIPPED | OUTPATIENT
Start: 2018-01-01 | End: 2018-01-01 | Stop reason: DRUGHIGH

## 2018-01-01 RX ORDER — HEPARIN SODIUM 5000 [USP'U]/.5ML
5000 INJECTION, SOLUTION INTRAVENOUS; SUBCUTANEOUS EVERY 8 HOURS
Status: DISCONTINUED | OUTPATIENT
Start: 2018-01-01 | End: 2018-01-01

## 2018-01-01 RX ORDER — LABETALOL HYDROCHLORIDE 5 MG/ML
10 INJECTION, SOLUTION INTRAVENOUS
Status: DISCONTINUED | OUTPATIENT
Start: 2018-01-01 | End: 2018-01-01 | Stop reason: HOSPADM

## 2018-01-01 RX ORDER — TRAMADOL HYDROCHLORIDE 50 MG/1
50 TABLET ORAL 3 TIMES DAILY
Status: DISCONTINUED | OUTPATIENT
Start: 2018-01-01 | End: 2018-01-01 | Stop reason: HOSPADM

## 2018-01-01 RX ORDER — HYDRALAZINE HYDROCHLORIDE 20 MG/ML
10 INJECTION INTRAMUSCULAR; INTRAVENOUS EVERY 4 HOURS PRN
Status: DISCONTINUED | OUTPATIENT
Start: 2018-01-01 | End: 2018-01-01 | Stop reason: HOSPADM

## 2018-01-01 RX ORDER — ACETAMINOPHEN 325 MG/1
650 TABLET ORAL EVERY 4 HOURS PRN
Status: DISCONTINUED | OUTPATIENT
Start: 2018-01-01 | End: 2018-01-01 | Stop reason: HOSPADM

## 2018-01-01 RX ADMIN — IPRATROPIUM BROMIDE AND ALBUTEROL SULFATE 3 ML: .5; 3 SOLUTION RESPIRATORY (INHALATION) at 19:37

## 2018-01-01 RX ADMIN — DULOXETINE HYDROCHLORIDE 30 MG: 30 CAPSULE, DELAYED RELEASE ORAL at 08:42

## 2018-01-01 RX ADMIN — IPRATROPIUM BROMIDE AND ALBUTEROL SULFATE 3 ML: .5; 3 SOLUTION RESPIRATORY (INHALATION) at 19:27

## 2018-01-01 RX ADMIN — TRAMADOL HYDROCHLORIDE 50 MG: 50 TABLET, COATED ORAL at 09:11

## 2018-01-01 RX ADMIN — TAZOBACTAM SODIUM AND PIPERACILLIN SODIUM 3.38 G: 375; 3 INJECTION, SOLUTION INTRAVENOUS at 06:19

## 2018-01-01 RX ADMIN — TAZOBACTAM SODIUM AND PIPERACILLIN SODIUM 2.25 G: 250; 2 INJECTION, SOLUTION INTRAVENOUS at 17:54

## 2018-01-01 RX ADMIN — METOPROLOL SUCCINATE 50 MG: 50 TABLET, EXTENDED RELEASE ORAL at 08:43

## 2018-01-01 RX ADMIN — IPRATROPIUM BROMIDE AND ALBUTEROL SULFATE 3 ML: .5; 3 SOLUTION RESPIRATORY (INHALATION) at 11:38

## 2018-01-01 RX ADMIN — ACETAMINOPHEN 500 MG: 500 TABLET, FILM COATED ORAL at 21:24

## 2018-01-01 RX ADMIN — TRAMADOL HYDROCHLORIDE 50 MG: 50 TABLET, COATED ORAL at 09:41

## 2018-01-01 RX ADMIN — DICLOFENAC SODIUM 2 G: 10 GEL TOPICAL at 18:00

## 2018-01-01 RX ADMIN — DICLOFENAC SODIUM 2 G: 10 GEL TOPICAL at 17:11

## 2018-01-01 RX ADMIN — LEVOTHYROXINE SODIUM 88 MCG: 88 TABLET ORAL at 08:35

## 2018-01-01 RX ADMIN — HEPARIN SODIUM 5000 UNITS: 5000 INJECTION, SOLUTION INTRAVENOUS; SUBCUTANEOUS at 23:59

## 2018-01-01 RX ADMIN — IPRATROPIUM BROMIDE AND ALBUTEROL SULFATE 3 ML: .5; 3 SOLUTION RESPIRATORY (INHALATION) at 12:02

## 2018-01-01 RX ADMIN — IPRATROPIUM BROMIDE AND ALBUTEROL SULFATE 3 ML: .5; 3 SOLUTION RESPIRATORY (INHALATION) at 11:13

## 2018-01-01 RX ADMIN — TRAMADOL HYDROCHLORIDE 50 MG: 50 TABLET, COATED ORAL at 21:53

## 2018-01-01 RX ADMIN — DICLOFENAC SODIUM 2 G: 10 GEL TOPICAL at 09:17

## 2018-01-01 RX ADMIN — METOPROLOL SUCCINATE 25 MG: 25 TABLET, EXTENDED RELEASE ORAL at 08:40

## 2018-01-01 RX ADMIN — PANTOPRAZOLE SODIUM 40 MG: 40 TABLET, DELAYED RELEASE ORAL at 08:42

## 2018-01-01 RX ADMIN — VANCOMYCIN HYDROCHLORIDE 1500 MG: 5 INJECTION, POWDER, LYOPHILIZED, FOR SOLUTION INTRAVENOUS at 12:33

## 2018-01-01 RX ADMIN — GABAPENTIN 100 MG: 100 CAPSULE ORAL at 15:36

## 2018-01-01 RX ADMIN — METHYLPREDNISOLONE SODIUM SUCCINATE 40 MG: 40 INJECTION, POWDER, FOR SOLUTION INTRAMUSCULAR; INTRAVENOUS at 16:13

## 2018-01-01 RX ADMIN — ATORVASTATIN CALCIUM 20 MG: 20 TABLET, FILM COATED ORAL at 08:33

## 2018-01-01 RX ADMIN — IPRATROPIUM BROMIDE AND ALBUTEROL SULFATE 3 ML: .5; 3 SOLUTION RESPIRATORY (INHALATION) at 07:53

## 2018-01-01 RX ADMIN — METOPROLOL SUCCINATE 25 MG: 25 TABLET, EXTENDED RELEASE ORAL at 08:58

## 2018-01-01 RX ADMIN — TAZOBACTAM SODIUM AND PIPERACILLIN SODIUM 3.38 G: 375; 3 INJECTION, SOLUTION INTRAVENOUS at 06:21

## 2018-01-01 RX ADMIN — HYDROMORPHONE HYDROCHLORIDE 1 MG: 1 SOLUTION ORAL at 11:04

## 2018-01-01 RX ADMIN — DULOXETINE HYDROCHLORIDE 30 MG: 30 CAPSULE, DELAYED RELEASE ORAL at 08:41

## 2018-01-01 RX ADMIN — IPRATROPIUM BROMIDE AND ALBUTEROL SULFATE 3 ML: .5; 3 SOLUTION RESPIRATORY (INHALATION) at 08:01

## 2018-01-01 RX ADMIN — ATORVASTATIN CALCIUM 20 MG: 20 TABLET, FILM COATED ORAL at 09:11

## 2018-01-01 RX ADMIN — PANTOPRAZOLE SODIUM 40 MG: 40 TABLET, DELAYED RELEASE ORAL at 09:12

## 2018-01-01 RX ADMIN — SENNOSIDES AND DOCUSATE SODIUM 1 TABLET: 8.6; 5 TABLET ORAL at 18:43

## 2018-01-01 RX ADMIN — FUROSEMIDE 20 MG: 10 INJECTION, SOLUTION INTRAVENOUS at 11:13

## 2018-01-01 RX ADMIN — ATORVASTATIN CALCIUM 20 MG: 20 TABLET, FILM COATED ORAL at 08:09

## 2018-01-01 RX ADMIN — ACETAMINOPHEN 500 MG: 500 TABLET, FILM COATED ORAL at 15:31

## 2018-01-01 RX ADMIN — GABAPENTIN 100 MG: 100 CAPSULE ORAL at 08:42

## 2018-01-01 RX ADMIN — GABAPENTIN 100 MG: 100 CAPSULE ORAL at 09:22

## 2018-01-01 RX ADMIN — ACETAMINOPHEN 500 MG: 500 TABLET, FILM COATED ORAL at 22:03

## 2018-01-01 RX ADMIN — IPRATROPIUM BROMIDE AND ALBUTEROL SULFATE 3 ML: .5; 3 SOLUTION RESPIRATORY (INHALATION) at 15:42

## 2018-01-01 RX ADMIN — METHYLPREDNISOLONE SODIUM SUCCINATE 40 MG: 40 INJECTION, POWDER, FOR SOLUTION INTRAMUSCULAR; INTRAVENOUS at 00:13

## 2018-01-01 RX ADMIN — FUROSEMIDE 20 MG: 10 INJECTION, SOLUTION INTRAVENOUS at 00:38

## 2018-01-01 RX ADMIN — DICLOFENAC SODIUM 2 G: 10 GEL TOPICAL at 12:10

## 2018-01-01 RX ADMIN — TAMSULOSIN HYDROCHLORIDE 0.4 MG: 0.4 CAPSULE ORAL at 09:11

## 2018-01-01 RX ADMIN — TAZOBACTAM SODIUM AND PIPERACILLIN SODIUM 2.25 G: 250; 2 INJECTION, SOLUTION INTRAVENOUS at 18:40

## 2018-01-01 RX ADMIN — LEVOTHYROXINE SODIUM 88 MCG: 88 TABLET ORAL at 09:35

## 2018-01-01 RX ADMIN — DICLOFENAC SODIUM 2 G: 10 GEL TOPICAL at 13:22

## 2018-01-01 RX ADMIN — SODIUM CHLORIDE: 9 INJECTION, SOLUTION INTRAVENOUS at 17:13

## 2018-01-01 RX ADMIN — IPRATROPIUM BROMIDE AND ALBUTEROL SULFATE 3 ML: .5; 3 SOLUTION RESPIRATORY (INHALATION) at 15:30

## 2018-01-01 RX ADMIN — ACETAMINOPHEN 500 MG: 500 TABLET, FILM COATED ORAL at 15:45

## 2018-01-01 RX ADMIN — TRAMADOL HYDROCHLORIDE 50 MG: 50 TABLET, COATED ORAL at 08:37

## 2018-01-01 RX ADMIN — GABAPENTIN 100 MG: 100 CAPSULE ORAL at 08:41

## 2018-01-01 RX ADMIN — METOPROLOL SUCCINATE 25 MG: 25 TABLET, EXTENDED RELEASE ORAL at 09:11

## 2018-01-01 RX ADMIN — DULOXETINE HYDROCHLORIDE 30 MG: 30 CAPSULE, DELAYED RELEASE ORAL at 09:16

## 2018-01-01 RX ADMIN — ACETAMINOPHEN 500 MG: 500 TABLET, FILM COATED ORAL at 15:36

## 2018-01-01 RX ADMIN — ACETAMINOPHEN 500 MG: 500 TABLET, FILM COATED ORAL at 21:19

## 2018-01-01 RX ADMIN — IPRATROPIUM BROMIDE AND ALBUTEROL SULFATE 3 ML: .5; 3 SOLUTION RESPIRATORY (INHALATION) at 07:18

## 2018-01-01 RX ADMIN — GABAPENTIN 100 MG: 100 CAPSULE ORAL at 21:38

## 2018-01-01 RX ADMIN — TAZOBACTAM SODIUM AND PIPERACILLIN SODIUM 2.25 G: 250; 2 INJECTION, SOLUTION INTRAVENOUS at 06:39

## 2018-01-01 RX ADMIN — GABAPENTIN 100 MG: 100 CAPSULE ORAL at 17:22

## 2018-01-01 RX ADMIN — Medication 0.25 MG: at 12:08

## 2018-01-01 RX ADMIN — TAZOBACTAM SODIUM AND PIPERACILLIN SODIUM 2.25 G: 250; 2 INJECTION, SOLUTION INTRAVENOUS at 01:08

## 2018-01-01 RX ADMIN — GABAPENTIN 100 MG: 100 CAPSULE ORAL at 15:15

## 2018-01-01 RX ADMIN — TAZOBACTAM SODIUM AND PIPERACILLIN SODIUM 3.38 G: 375; 3 INJECTION, SOLUTION INTRAVENOUS at 00:34

## 2018-01-01 RX ADMIN — GABAPENTIN 100 MG: 100 CAPSULE ORAL at 10:15

## 2018-01-01 RX ADMIN — SENNOSIDES AND DOCUSATE SODIUM 1 TABLET: 8.6; 5 TABLET ORAL at 20:16

## 2018-01-01 RX ADMIN — METHYLPREDNISOLONE SODIUM SUCCINATE 40 MG: 40 INJECTION, POWDER, FOR SOLUTION INTRAMUSCULAR; INTRAVENOUS at 21:12

## 2018-01-01 RX ADMIN — TAMSULOSIN HYDROCHLORIDE 0.4 MG: 0.4 CAPSULE ORAL at 19:38

## 2018-01-01 RX ADMIN — DONEPEZIL HYDROCHLORIDE 10 MG: 5 TABLET ORAL at 21:38

## 2018-01-01 RX ADMIN — ASPIRIN 81 MG: 81 TABLET, COATED ORAL at 09:11

## 2018-01-01 RX ADMIN — LORAZEPAM 0.5 MG: 2 INJECTION INTRAMUSCULAR; INTRAVENOUS at 18:11

## 2018-01-01 RX ADMIN — PANTOPRAZOLE SODIUM 40 MG: 40 TABLET, DELAYED RELEASE ORAL at 08:33

## 2018-01-01 RX ADMIN — DULOXETINE HYDROCHLORIDE 30 MG: 30 CAPSULE, DELAYED RELEASE ORAL at 08:09

## 2018-01-01 RX ADMIN — ASPIRIN 81 MG: 81 TABLET, COATED ORAL at 19:38

## 2018-01-01 RX ADMIN — ASPIRIN 81 MG: 81 TABLET, COATED ORAL at 09:42

## 2018-01-01 RX ADMIN — DICLOFENAC SODIUM 2 G: 10 GEL TOPICAL at 19:27

## 2018-01-01 RX ADMIN — DEXAMETHASONE SODIUM PHOSPHATE 10 MG: 10 INJECTION, SOLUTION INTRAMUSCULAR; INTRAVENOUS at 20:21

## 2018-01-01 RX ADMIN — GABAPENTIN 100 MG: 100 CAPSULE ORAL at 21:07

## 2018-01-01 RX ADMIN — Medication 0.5 MG: at 21:49

## 2018-01-01 RX ADMIN — METOPROLOL SUCCINATE 50 MG: 50 TABLET, EXTENDED RELEASE ORAL at 10:13

## 2018-01-01 RX ADMIN — AMLODIPINE BESYLATE 10 MG: 10 TABLET ORAL at 09:42

## 2018-01-01 RX ADMIN — GABAPENTIN 100 MG: 100 CAPSULE ORAL at 16:42

## 2018-01-01 RX ADMIN — TRAMADOL HYDROCHLORIDE 50 MG: 50 TABLET, COATED ORAL at 08:08

## 2018-01-01 RX ADMIN — LOSARTAN POTASSIUM 100 MG: 100 TABLET ORAL at 09:42

## 2018-01-01 RX ADMIN — IPRATROPIUM BROMIDE AND ALBUTEROL SULFATE 3 ML: .5; 3 SOLUTION RESPIRATORY (INHALATION) at 19:11

## 2018-01-01 RX ADMIN — LORAZEPAM 0.5 MG: 0.5 TABLET ORAL at 04:14

## 2018-01-01 RX ADMIN — IPRATROPIUM BROMIDE AND ALBUTEROL SULFATE 3 ML: .5; 3 SOLUTION RESPIRATORY (INHALATION) at 07:27

## 2018-01-01 RX ADMIN — IPRATROPIUM BROMIDE AND ALBUTEROL SULFATE 3 ML: .5; 3 SOLUTION RESPIRATORY (INHALATION) at 15:10

## 2018-01-01 RX ADMIN — ACETAMINOPHEN 500 MG: 500 TABLET, FILM COATED ORAL at 21:21

## 2018-01-01 RX ADMIN — DONEPEZIL HYDROCHLORIDE 10 MG: 10 TABLET, FILM COATED ORAL at 20:16

## 2018-01-01 RX ADMIN — ASPIRIN 81 MG: 81 TABLET, COATED ORAL at 10:15

## 2018-01-01 RX ADMIN — DULOXETINE HYDROCHLORIDE 30 MG: 30 CAPSULE, DELAYED RELEASE ORAL at 09:12

## 2018-01-01 RX ADMIN — LEVOTHYROXINE SODIUM 88 MCG: 88 TABLET ORAL at 09:15

## 2018-01-01 RX ADMIN — HYDRALAZINE HYDROCHLORIDE 10 MG: 20 INJECTION INTRAMUSCULAR; INTRAVENOUS at 14:47

## 2018-01-01 RX ADMIN — ACETAMINOPHEN 500 MG: 500 TABLET, FILM COATED ORAL at 15:34

## 2018-01-01 RX ADMIN — ASPIRIN 81 MG: 81 TABLET, COATED ORAL at 08:09

## 2018-01-01 RX ADMIN — DICLOFENAC SODIUM 2 G: 10 GEL TOPICAL at 21:07

## 2018-01-01 RX ADMIN — ACETAMINOPHEN 500 MG: 500 TABLET, FILM COATED ORAL at 16:12

## 2018-01-01 RX ADMIN — TAMSULOSIN HYDROCHLORIDE 0.4 MG: 0.4 CAPSULE ORAL at 08:08

## 2018-01-01 RX ADMIN — PREDNISONE 40 MG: 20 TABLET ORAL at 09:35

## 2018-01-01 RX ADMIN — HYDROMORPHONE HYDROCHLORIDE 1 MG: 1 SOLUTION ORAL at 02:15

## 2018-01-01 RX ADMIN — ACETAMINOPHEN 500 MG: 500 TABLET, FILM COATED ORAL at 16:34

## 2018-01-01 RX ADMIN — TAMSULOSIN HYDROCHLORIDE 0.4 MG: 0.4 CAPSULE ORAL at 21:46

## 2018-01-01 RX ADMIN — ASPIRIN 81 MG: 81 TABLET, COATED ORAL at 09:15

## 2018-01-01 RX ADMIN — IPRATROPIUM BROMIDE AND ALBUTEROL SULFATE 3 ML: .5; 3 SOLUTION RESPIRATORY (INHALATION) at 15:14

## 2018-01-01 RX ADMIN — METOPROLOL SUCCINATE 25 MG: 25 TABLET, EXTENDED RELEASE ORAL at 09:17

## 2018-01-01 RX ADMIN — HYDROMORPHONE HYDROCHLORIDE 0.5 MG: 1 SOLUTION ORAL at 23:12

## 2018-01-01 RX ADMIN — IPRATROPIUM BROMIDE AND ALBUTEROL SULFATE 3 ML: .5; 3 SOLUTION RESPIRATORY (INHALATION) at 19:46

## 2018-01-01 RX ADMIN — PANTOPRAZOLE SODIUM 40 MG: 40 TABLET, DELAYED RELEASE ORAL at 08:08

## 2018-01-01 RX ADMIN — GABAPENTIN 100 MG: 100 CAPSULE ORAL at 08:07

## 2018-01-01 RX ADMIN — VANCOMYCIN HYDROCHLORIDE 1500 MG: 10 INJECTION, POWDER, LYOPHILIZED, FOR SOLUTION INTRAVENOUS at 19:11

## 2018-01-01 RX ADMIN — LEVOTHYROXINE SODIUM 88 MCG: 88 TABLET ORAL at 09:13

## 2018-01-01 RX ADMIN — TAMSULOSIN HYDROCHLORIDE 0.4 MG: 0.4 CAPSULE ORAL at 09:18

## 2018-01-01 RX ADMIN — ACETAMINOPHEN 500 MG: 500 TABLET, FILM COATED ORAL at 00:38

## 2018-01-01 RX ADMIN — ACETAMINOPHEN 500 MG: 500 TABLET, FILM COATED ORAL at 20:56

## 2018-01-01 RX ADMIN — GABAPENTIN 100 MG: 100 CAPSULE ORAL at 09:12

## 2018-01-01 RX ADMIN — SODIUM CHLORIDE: 9 INJECTION, SOLUTION INTRAVENOUS at 01:09

## 2018-01-01 RX ADMIN — AMLODIPINE BESYLATE 10 MG: 10 TABLET ORAL at 19:37

## 2018-01-01 RX ADMIN — HEPARIN SODIUM 5000 UNITS: 5000 INJECTION, SOLUTION INTRAVENOUS; SUBCUTANEOUS at 06:24

## 2018-01-01 RX ADMIN — DICLOFENAC SODIUM 2 G: 10 GEL TOPICAL at 21:54

## 2018-01-01 RX ADMIN — IPRATROPIUM BROMIDE AND ALBUTEROL SULFATE 3 ML: .5; 3 SOLUTION RESPIRATORY (INHALATION) at 07:45

## 2018-01-01 RX ADMIN — ACETAMINOPHEN 500 MG: 500 TABLET, FILM COATED ORAL at 08:09

## 2018-01-01 RX ADMIN — ASPIRIN 81 MG: 81 TABLET, COATED ORAL at 21:29

## 2018-01-01 RX ADMIN — ACETAMINOPHEN 500 MG: 500 TABLET, FILM COATED ORAL at 09:17

## 2018-01-01 RX ADMIN — GABAPENTIN 100 MG: 100 CAPSULE ORAL at 08:14

## 2018-01-01 RX ADMIN — NALOXONE HYDROCHLORIDE 0.4 MG: 0.4 INJECTION, SOLUTION INTRAMUSCULAR; INTRAVENOUS; SUBCUTANEOUS at 18:57

## 2018-01-01 RX ADMIN — METOPROLOL SUCCINATE 50 MG: 50 TABLET, EXTENDED RELEASE ORAL at 08:14

## 2018-01-01 RX ADMIN — DICLOFENAC SODIUM 2 G: 10 GEL TOPICAL at 08:34

## 2018-01-01 RX ADMIN — LEVOFLOXACIN 500 MG: 5 INJECTION, SOLUTION INTRAVENOUS at 09:41

## 2018-01-01 RX ADMIN — LOSARTAN POTASSIUM 100 MG: 100 TABLET, FILM COATED ORAL at 08:59

## 2018-01-01 RX ADMIN — LEVOTHYROXINE SODIUM 88 MCG: 88 TABLET ORAL at 08:39

## 2018-01-01 RX ADMIN — FUROSEMIDE 20 MG: 20 TABLET ORAL at 16:29

## 2018-01-01 RX ADMIN — IPRATROPIUM BROMIDE AND ALBUTEROL SULFATE 3 ML: .5; 3 SOLUTION RESPIRATORY (INHALATION) at 11:47

## 2018-01-01 RX ADMIN — PANTOPRAZOLE SODIUM 40 MG: 40 TABLET, DELAYED RELEASE ORAL at 09:42

## 2018-01-01 RX ADMIN — TAMSULOSIN HYDROCHLORIDE 0.4 MG: 0.4 CAPSULE ORAL at 09:12

## 2018-01-01 RX ADMIN — GABAPENTIN 100 MG: 100 CAPSULE ORAL at 21:51

## 2018-01-01 RX ADMIN — DICLOFENAC SODIUM 2 G: 10 GEL TOPICAL at 21:24

## 2018-01-01 RX ADMIN — DONEPEZIL HYDROCHLORIDE 10 MG: 10 TABLET, FILM COATED ORAL at 21:46

## 2018-01-01 RX ADMIN — GABAPENTIN 100 MG: 100 CAPSULE ORAL at 17:34

## 2018-01-01 RX ADMIN — PANTOPRAZOLE SODIUM 40 MG: 40 TABLET, DELAYED RELEASE ORAL at 08:09

## 2018-01-01 RX ADMIN — DULOXETINE HYDROCHLORIDE 30 MG: 30 CAPSULE, DELAYED RELEASE ORAL at 09:42

## 2018-01-01 RX ADMIN — LOSARTAN POTASSIUM 100 MG: 100 TABLET ORAL at 10:02

## 2018-01-01 RX ADMIN — IPRATROPIUM BROMIDE AND ALBUTEROL SULFATE 3 ML: .5; 3 SOLUTION RESPIRATORY (INHALATION) at 19:52

## 2018-01-01 RX ADMIN — DICLOFENAC SODIUM 2 G: 10 GEL TOPICAL at 09:44

## 2018-01-01 RX ADMIN — DULOXETINE HYDROCHLORIDE 30 MG: 30 CAPSULE, DELAYED RELEASE ORAL at 09:11

## 2018-01-01 RX ADMIN — METHYLPREDNISOLONE SODIUM SUCCINATE 40 MG: 40 INJECTION, POWDER, FOR SOLUTION INTRAMUSCULAR; INTRAVENOUS at 07:58

## 2018-01-01 RX ADMIN — SODIUM CHLORIDE, POTASSIUM CHLORIDE, SODIUM LACTATE AND CALCIUM CHLORIDE 250 ML: 600; 310; 30; 20 INJECTION, SOLUTION INTRAVENOUS at 09:43

## 2018-01-01 RX ADMIN — ACETAMINOPHEN 650 MG: 325 TABLET, FILM COATED ORAL at 21:51

## 2018-01-01 RX ADMIN — HYDROMORPHONE HYDROCHLORIDE 1 MG: 1 SOLUTION ORAL at 04:16

## 2018-01-01 RX ADMIN — POLYETHYLENE GLYCOL 3350 17 G: 17 POWDER, FOR SOLUTION ORAL at 08:36

## 2018-01-01 RX ADMIN — ACETAMINOPHEN 500 MG: 500 TABLET, FILM COATED ORAL at 09:35

## 2018-01-01 RX ADMIN — DOCUSATE SODIUM 286 ML: 50 LIQUID ORAL at 22:20

## 2018-01-01 RX ADMIN — IPRATROPIUM BROMIDE AND ALBUTEROL SULFATE 3 ML: .5; 3 SOLUTION RESPIRATORY (INHALATION) at 07:47

## 2018-01-01 RX ADMIN — ACETAMINOPHEN 500 MG: 500 TABLET, FILM COATED ORAL at 17:22

## 2018-01-01 RX ADMIN — ACETAMINOPHEN 500 MG: 500 TABLET, FILM COATED ORAL at 08:42

## 2018-01-01 RX ADMIN — ACETAMINOPHEN 500 MG: 500 TABLET, FILM COATED ORAL at 21:00

## 2018-01-01 RX ADMIN — DULOXETINE HYDROCHLORIDE 30 MG: 30 CAPSULE, DELAYED RELEASE ORAL at 09:35

## 2018-01-01 RX ADMIN — TRAMADOL HYDROCHLORIDE 50 MG: 50 TABLET, COATED ORAL at 21:21

## 2018-01-01 RX ADMIN — DONEPEZIL HYDROCHLORIDE 10 MG: 5 TABLET ORAL at 21:24

## 2018-01-01 RX ADMIN — TAZOBACTAM SODIUM AND PIPERACILLIN SODIUM 2.25 G: 250; 2 INJECTION, SOLUTION INTRAVENOUS at 00:54

## 2018-01-01 RX ADMIN — Medication 0.5 MG: at 13:05

## 2018-01-01 RX ADMIN — ATORVASTATIN CALCIUM 20 MG: 20 TABLET, FILM COATED ORAL at 19:37

## 2018-01-01 RX ADMIN — PANTOPRAZOLE SODIUM 40 MG: 40 TABLET, DELAYED RELEASE ORAL at 08:14

## 2018-01-01 RX ADMIN — IPRATROPIUM BROMIDE AND ALBUTEROL SULFATE 3 ML: .5; 3 SOLUTION RESPIRATORY (INHALATION) at 07:31

## 2018-01-01 RX ADMIN — METHYLPREDNISOLONE SODIUM SUCCINATE 40 MG: 40 INJECTION, POWDER, LYOPHILIZED, FOR SOLUTION INTRAMUSCULAR; INTRAVENOUS at 09:30

## 2018-01-01 RX ADMIN — ACETAMINOPHEN 650 MG: 325 TABLET, FILM COATED ORAL at 08:33

## 2018-01-01 RX ADMIN — HEPARIN SODIUM 5000 UNITS: 5000 INJECTION, SOLUTION INTRAVENOUS; SUBCUTANEOUS at 16:13

## 2018-01-01 RX ADMIN — ASPIRIN 81 MG: 81 TABLET, COATED ORAL at 08:13

## 2018-01-01 RX ADMIN — IPRATROPIUM BROMIDE AND ALBUTEROL SULFATE 3 ML: .5; 3 SOLUTION RESPIRATORY (INHALATION) at 19:54

## 2018-01-01 RX ADMIN — AMLODIPINE BESYLATE 10 MG: 10 TABLET ORAL at 23:20

## 2018-01-01 RX ADMIN — TAZOBACTAM SODIUM AND PIPERACILLIN SODIUM 3.38 G: 375; 3 INJECTION, SOLUTION INTRAVENOUS at 00:25

## 2018-01-01 RX ADMIN — LEVOTHYROXINE SODIUM 88 MCG: 88 TABLET ORAL at 08:08

## 2018-01-01 RX ADMIN — TAZOBACTAM SODIUM AND PIPERACILLIN SODIUM 3.38 G: 375; 3 INJECTION, SOLUTION INTRAVENOUS at 18:40

## 2018-01-01 RX ADMIN — METOPROLOL SUCCINATE 25 MG: 25 TABLET, EXTENDED RELEASE ORAL at 08:08

## 2018-01-01 RX ADMIN — DICLOFENAC SODIUM 2 G: 10 GEL TOPICAL at 18:16

## 2018-01-01 RX ADMIN — TRAMADOL HYDROCHLORIDE 50 MG: 50 TABLET, COATED ORAL at 17:22

## 2018-01-01 RX ADMIN — LEVOTHYROXINE SODIUM 88 MCG: 88 TABLET ORAL at 08:34

## 2018-01-01 RX ADMIN — GABAPENTIN 100 MG: 100 CAPSULE ORAL at 15:42

## 2018-01-01 RX ADMIN — LOSARTAN POTASSIUM 100 MG: 100 TABLET ORAL at 08:35

## 2018-01-01 RX ADMIN — GABAPENTIN 100 MG: 100 CAPSULE ORAL at 16:37

## 2018-01-01 RX ADMIN — DULOXETINE HYDROCHLORIDE 30 MG: 30 CAPSULE, DELAYED RELEASE ORAL at 09:13

## 2018-01-01 RX ADMIN — LOSARTAN POTASSIUM 100 MG: 100 TABLET ORAL at 08:07

## 2018-01-01 RX ADMIN — IPRATROPIUM BROMIDE AND ALBUTEROL SULFATE 3 ML: .5; 3 SOLUTION RESPIRATORY (INHALATION) at 07:36

## 2018-01-01 RX ADMIN — METOPROLOL SUCCINATE 50 MG: 50 TABLET, EXTENDED RELEASE ORAL at 09:12

## 2018-01-01 RX ADMIN — IPRATROPIUM BROMIDE AND ALBUTEROL SULFATE 3 ML: .5; 3 SOLUTION RESPIRATORY (INHALATION) at 09:13

## 2018-01-01 RX ADMIN — IPRATROPIUM BROMIDE AND ALBUTEROL SULFATE 3 ML: .5; 3 SOLUTION RESPIRATORY (INHALATION) at 15:43

## 2018-01-01 RX ADMIN — SALINE NASAL SPRAY 2 SPRAY: 1.5 SOLUTION NASAL at 12:05

## 2018-01-01 RX ADMIN — HYDRALAZINE HYDROCHLORIDE 10 MG: 20 INJECTION INTRAMUSCULAR; INTRAVENOUS at 02:09

## 2018-01-01 RX ADMIN — GABAPENTIN 100 MG: 100 CAPSULE ORAL at 21:46

## 2018-01-01 RX ADMIN — IPRATROPIUM BROMIDE AND ALBUTEROL SULFATE 3 ML: .5; 3 SOLUTION RESPIRATORY (INHALATION) at 15:36

## 2018-01-01 RX ADMIN — TAMSULOSIN HYDROCHLORIDE 0.4 MG: 0.4 CAPSULE ORAL at 08:33

## 2018-01-01 RX ADMIN — ACETAMINOPHEN 500 MG: 500 TABLET, FILM COATED ORAL at 08:07

## 2018-01-01 RX ADMIN — IPRATROPIUM BROMIDE AND ALBUTEROL SULFATE 3 ML: .5; 3 SOLUTION RESPIRATORY (INHALATION) at 20:06

## 2018-01-01 RX ADMIN — IPRATROPIUM BROMIDE AND ALBUTEROL SULFATE 3 ML: .5; 3 SOLUTION RESPIRATORY (INHALATION) at 19:49

## 2018-01-01 RX ADMIN — POLYETHYLENE GLYCOL 3350 17 G: 17 POWDER, FOR SOLUTION ORAL at 17:29

## 2018-01-01 RX ADMIN — GABAPENTIN 100 MG: 100 CAPSULE ORAL at 20:16

## 2018-01-01 RX ADMIN — DONEPEZIL HYDROCHLORIDE 10 MG: 5 TABLET ORAL at 21:53

## 2018-01-01 RX ADMIN — METHYLPREDNISOLONE SODIUM SUCCINATE 40 MG: 40 INJECTION, POWDER, FOR SOLUTION INTRAMUSCULAR; INTRAVENOUS at 23:21

## 2018-01-01 RX ADMIN — ACETAMINOPHEN 500 MG: 500 TABLET, FILM COATED ORAL at 08:43

## 2018-01-01 RX ADMIN — TAMSULOSIN HYDROCHLORIDE 0.4 MG: 0.4 CAPSULE ORAL at 21:29

## 2018-01-01 RX ADMIN — GABAPENTIN 100 MG: 100 CAPSULE ORAL at 21:24

## 2018-01-01 RX ADMIN — DONEPEZIL HYDROCHLORIDE 10 MG: 10 TABLET, FILM COATED ORAL at 01:27

## 2018-01-01 RX ADMIN — HEPARIN SODIUM 5000 UNITS: 5000 INJECTION, SOLUTION INTRAVENOUS; SUBCUTANEOUS at 16:34

## 2018-01-01 RX ADMIN — PREDNISONE 60 MG: 10 TABLET ORAL at 08:07

## 2018-01-01 RX ADMIN — GABAPENTIN 100 MG: 100 CAPSULE ORAL at 08:34

## 2018-01-01 RX ADMIN — TRAMADOL HYDROCHLORIDE 50 MG: 50 TABLET, COATED ORAL at 21:19

## 2018-01-01 RX ADMIN — LEVOFLOXACIN 250 MG: 5 INJECTION, SOLUTION INTRAVENOUS at 08:37

## 2018-01-01 RX ADMIN — PANTOPRAZOLE SODIUM 40 MG: 40 TABLET, DELAYED RELEASE ORAL at 08:44

## 2018-01-01 RX ADMIN — LEVOTHYROXINE SODIUM 88 MCG: 88 TABLET ORAL at 08:42

## 2018-01-01 RX ADMIN — GABAPENTIN 100 MG: 100 CAPSULE ORAL at 10:00

## 2018-01-01 RX ADMIN — METHYLPREDNISOLONE SODIUM SUCCINATE 40 MG: 40 INJECTION, POWDER, FOR SOLUTION INTRAMUSCULAR; INTRAVENOUS at 16:34

## 2018-01-01 RX ADMIN — ACETAMINOPHEN 500 MG: 500 TABLET, FILM COATED ORAL at 21:11

## 2018-01-01 RX ADMIN — TAZOBACTAM SODIUM AND PIPERACILLIN SODIUM 3.38 G: 375; 3 INJECTION, SOLUTION INTRAVENOUS at 17:30

## 2018-01-01 RX ADMIN — FUROSEMIDE 20 MG: 20 TABLET ORAL at 08:39

## 2018-01-01 RX ADMIN — HEPARIN SODIUM 5000 UNITS: 5000 INJECTION, SOLUTION INTRAVENOUS; SUBCUTANEOUS at 05:22

## 2018-01-01 RX ADMIN — GABAPENTIN 100 MG: 100 CAPSULE ORAL at 15:34

## 2018-01-01 RX ADMIN — TAMSULOSIN HYDROCHLORIDE 0.4 MG: 0.4 CAPSULE ORAL at 01:27

## 2018-01-01 RX ADMIN — TAZOBACTAM SODIUM AND PIPERACILLIN SODIUM 2.25 G: 250; 2 INJECTION, SOLUTION INTRAVENOUS at 06:25

## 2018-01-01 RX ADMIN — FUROSEMIDE 20 MG: 10 INJECTION, SOLUTION INTRAVENOUS at 11:28

## 2018-01-01 RX ADMIN — PANTOPRAZOLE SODIUM 40 MG: 40 TABLET, DELAYED RELEASE ORAL at 09:13

## 2018-01-01 RX ADMIN — GADOBUTROL 6 ML: 604.72 INJECTION INTRAVENOUS at 21:01

## 2018-01-01 RX ADMIN — METOPROLOL SUCCINATE 25 MG: 25 TABLET, EXTENDED RELEASE ORAL at 09:42

## 2018-01-01 RX ADMIN — PANTOPRAZOLE SODIUM 40 MG: 40 TABLET, DELAYED RELEASE ORAL at 08:58

## 2018-01-01 RX ADMIN — DONEPEZIL HYDROCHLORIDE 10 MG: 5 TABLET ORAL at 21:01

## 2018-01-01 RX ADMIN — TAZOBACTAM SODIUM AND PIPERACILLIN SODIUM 3.38 G: 375; 3 INJECTION, SOLUTION INTRAVENOUS at 11:50

## 2018-01-01 RX ADMIN — IPRATROPIUM BROMIDE AND ALBUTEROL SULFATE 3 ML: .5; 3 SOLUTION RESPIRATORY (INHALATION) at 07:34

## 2018-01-01 RX ADMIN — ACETAMINOPHEN 500 MG: 500 TABLET, FILM COATED ORAL at 21:53

## 2018-01-01 RX ADMIN — GABAPENTIN 100 MG: 100 CAPSULE ORAL at 15:45

## 2018-01-01 RX ADMIN — IPRATROPIUM BROMIDE AND ALBUTEROL SULFATE 3 ML: .5; 3 SOLUTION RESPIRATORY (INHALATION) at 11:18

## 2018-01-01 RX ADMIN — GABAPENTIN 100 MG: 100 CAPSULE ORAL at 16:12

## 2018-01-01 RX ADMIN — GABAPENTIN 100 MG: 100 CAPSULE ORAL at 21:20

## 2018-01-01 RX ADMIN — TAMSULOSIN HYDROCHLORIDE 0.4 MG: 0.4 CAPSULE ORAL at 08:34

## 2018-01-01 RX ADMIN — LEVOTHYROXINE SODIUM 88 MCG: 88 TABLET ORAL at 09:11

## 2018-01-01 RX ADMIN — ACETAMINOPHEN 500 MG: 500 TABLET, FILM COATED ORAL at 16:27

## 2018-01-01 RX ADMIN — DICLOFENAC SODIUM 2 G: 10 GEL TOPICAL at 08:13

## 2018-01-01 RX ADMIN — GABAPENTIN 100 MG: 100 CAPSULE ORAL at 21:25

## 2018-01-01 RX ADMIN — DICLOFENAC SODIUM 2 G: 10 GEL TOPICAL at 16:27

## 2018-01-01 RX ADMIN — AMLODIPINE BESYLATE 10 MG: 10 TABLET ORAL at 09:11

## 2018-01-01 RX ADMIN — TAZOBACTAM SODIUM AND PIPERACILLIN SODIUM 2.25 G: 250; 2 INJECTION, SOLUTION INTRAVENOUS at 23:45

## 2018-01-01 RX ADMIN — TAZOBACTAM SODIUM AND PIPERACILLIN SODIUM 2.25 G: 250; 2 INJECTION, SOLUTION INTRAVENOUS at 05:48

## 2018-01-01 RX ADMIN — METHYLPREDNISOLONE SODIUM SUCCINATE 40 MG: 40 INJECTION, POWDER, LYOPHILIZED, FOR SOLUTION INTRAMUSCULAR; INTRAVENOUS at 09:18

## 2018-01-01 RX ADMIN — PREDNISONE 20 MG: 20 TABLET ORAL at 09:20

## 2018-01-01 RX ADMIN — TAZOBACTAM SODIUM AND PIPERACILLIN SODIUM 2.25 G: 250; 2 INJECTION, SOLUTION INTRAVENOUS at 13:03

## 2018-01-01 RX ADMIN — DICLOFENAC SODIUM 2 G: 10 GEL TOPICAL at 13:30

## 2018-01-01 RX ADMIN — FUROSEMIDE 20 MG: 20 TABLET ORAL at 10:16

## 2018-01-01 RX ADMIN — DONEPEZIL HYDROCHLORIDE 10 MG: 10 TABLET, FILM COATED ORAL at 21:06

## 2018-01-01 RX ADMIN — TAZOBACTAM SODIUM AND PIPERACILLIN SODIUM 3.38 G: 375; 3 INJECTION, SOLUTION INTRAVENOUS at 18:02

## 2018-01-01 RX ADMIN — ACETAMINOPHEN 500 MG: 500 TABLET, FILM COATED ORAL at 08:34

## 2018-01-01 RX ADMIN — TAMSULOSIN HYDROCHLORIDE 0.4 MG: 0.4 CAPSULE ORAL at 08:43

## 2018-01-01 RX ADMIN — GABAPENTIN 100 MG: 100 CAPSULE ORAL at 16:27

## 2018-01-01 RX ADMIN — GABAPENTIN 100 MG: 100 CAPSULE ORAL at 08:35

## 2018-01-01 RX ADMIN — DULOXETINE HYDROCHLORIDE 30 MG: 30 CAPSULE, DELAYED RELEASE ORAL at 08:35

## 2018-01-01 RX ADMIN — AMLODIPINE BESYLATE 10 MG: 10 TABLET ORAL at 08:40

## 2018-01-01 RX ADMIN — GABAPENTIN 100 MG: 100 CAPSULE ORAL at 21:11

## 2018-01-01 RX ADMIN — LOSARTAN POTASSIUM 100 MG: 100 TABLET, FILM COATED ORAL at 14:26

## 2018-01-01 RX ADMIN — GABAPENTIN 100 MG: 100 CAPSULE ORAL at 21:29

## 2018-01-01 RX ADMIN — HYDRALAZINE HYDROCHLORIDE 10 MG: 20 INJECTION INTRAMUSCULAR; INTRAVENOUS at 06:52

## 2018-01-01 RX ADMIN — METOPROLOL SUCCINATE 25 MG: 25 TABLET, EXTENDED RELEASE ORAL at 09:12

## 2018-01-01 RX ADMIN — HEPARIN SODIUM 5000 UNITS: 5000 INJECTION, SOLUTION INTRAVENOUS; SUBCUTANEOUS at 06:19

## 2018-01-01 RX ADMIN — METOPROLOL SUCCINATE 50 MG: 50 TABLET, EXTENDED RELEASE ORAL at 09:20

## 2018-01-01 RX ADMIN — IPRATROPIUM BROMIDE AND ALBUTEROL SULFATE 3 ML: .5; 3 SOLUTION RESPIRATORY (INHALATION) at 23:35

## 2018-01-01 RX ADMIN — GABAPENTIN 100 MG: 100 CAPSULE ORAL at 21:21

## 2018-01-01 RX ADMIN — DONEPEZIL HYDROCHLORIDE 10 MG: 10 TABLET, FILM COATED ORAL at 22:07

## 2018-01-01 RX ADMIN — TAMSULOSIN HYDROCHLORIDE 0.4 MG: 0.4 CAPSULE ORAL at 19:37

## 2018-01-01 RX ADMIN — METHYLPREDNISOLONE SODIUM SUCCINATE 40 MG: 40 INJECTION, POWDER, FOR SOLUTION INTRAMUSCULAR; INTRAVENOUS at 09:42

## 2018-01-01 RX ADMIN — ACETAMINOPHEN 500 MG: 500 TABLET, FILM COATED ORAL at 09:12

## 2018-01-01 RX ADMIN — ACETAMINOPHEN 500 MG: 500 TABLET, FILM COATED ORAL at 10:16

## 2018-01-01 RX ADMIN — IPRATROPIUM BROMIDE AND ALBUTEROL SULFATE 3 ML: .5; 3 SOLUTION RESPIRATORY (INHALATION) at 19:31

## 2018-01-01 RX ADMIN — TAZOBACTAM SODIUM AND PIPERACILLIN SODIUM 3.38 G: 375; 3 INJECTION, SOLUTION INTRAVENOUS at 12:06

## 2018-01-01 RX ADMIN — Medication 0.25 MG: at 09:38

## 2018-01-01 RX ADMIN — GABAPENTIN 100 MG: 100 CAPSULE ORAL at 08:44

## 2018-01-01 RX ADMIN — SODIUM CHLORIDE: 9 INJECTION, SOLUTION INTRAVENOUS at 14:26

## 2018-01-01 RX ADMIN — HEPARIN SODIUM 5000 UNITS: 5000 INJECTION, SOLUTION INTRAVENOUS; SUBCUTANEOUS at 23:21

## 2018-01-01 RX ADMIN — ATORVASTATIN CALCIUM 20 MG: 20 TABLET, FILM COATED ORAL at 19:38

## 2018-01-01 RX ADMIN — PREDNISONE 60 MG: 10 TABLET ORAL at 09:42

## 2018-01-01 RX ADMIN — PREDNISONE 40 MG: 20 TABLET ORAL at 10:15

## 2018-01-01 RX ADMIN — DICLOFENAC SODIUM 2 G: 10 GEL TOPICAL at 21:12

## 2018-01-01 RX ADMIN — TAMSULOSIN HYDROCHLORIDE 0.4 MG: 0.4 CAPSULE ORAL at 09:20

## 2018-01-01 RX ADMIN — IPRATROPIUM BROMIDE AND ALBUTEROL SULFATE 3 ML: .5; 3 SOLUTION RESPIRATORY (INHALATION) at 15:58

## 2018-01-01 RX ADMIN — HEPARIN SODIUM 5000 UNITS: 5000 INJECTION, SOLUTION INTRAVENOUS; SUBCUTANEOUS at 05:57

## 2018-01-01 RX ADMIN — LEVOTHYROXINE SODIUM 88 MCG: 88 TABLET ORAL at 10:00

## 2018-01-01 RX ADMIN — DONEPEZIL HYDROCHLORIDE 10 MG: 5 TABLET ORAL at 00:18

## 2018-01-01 RX ADMIN — LOSARTAN POTASSIUM 100 MG: 100 TABLET ORAL at 08:40

## 2018-01-01 RX ADMIN — IPRATROPIUM BROMIDE AND ALBUTEROL SULFATE 3 ML: .5; 3 SOLUTION RESPIRATORY (INHALATION) at 11:36

## 2018-01-01 RX ADMIN — GABAPENTIN 100 MG: 100 CAPSULE ORAL at 19:38

## 2018-01-01 RX ADMIN — ACETAMINOPHEN 500 MG: 500 TABLET, FILM COATED ORAL at 15:21

## 2018-01-01 RX ADMIN — DULOXETINE HYDROCHLORIDE 30 MG: 30 CAPSULE, DELAYED RELEASE ORAL at 08:33

## 2018-01-01 RX ADMIN — TRAMADOL HYDROCHLORIDE 50 MG: 50 TABLET, COATED ORAL at 09:13

## 2018-01-01 RX ADMIN — HEPARIN SODIUM 5000 UNITS: 5000 INJECTION, SOLUTION INTRAVENOUS; SUBCUTANEOUS at 18:00

## 2018-01-01 RX ADMIN — ACETAMINOPHEN 500 MG: 500 TABLET, FILM COATED ORAL at 21:51

## 2018-01-01 RX ADMIN — METOPROLOL SUCCINATE 25 MG: 25 TABLET, EXTENDED RELEASE ORAL at 10:01

## 2018-01-01 RX ADMIN — DICLOFENAC SODIUM 2 G: 10 GEL TOPICAL at 12:09

## 2018-01-01 RX ADMIN — DONEPEZIL HYDROCHLORIDE 10 MG: 5 TABLET ORAL at 21:29

## 2018-01-01 RX ADMIN — IPRATROPIUM BROMIDE AND ALBUTEROL SULFATE 3 ML: .5; 3 SOLUTION RESPIRATORY (INHALATION) at 19:41

## 2018-01-01 RX ADMIN — DICLOFENAC SODIUM 2 G: 10 GEL TOPICAL at 08:35

## 2018-01-01 RX ADMIN — TAZOBACTAM SODIUM AND PIPERACILLIN SODIUM 3.38 G: 375; 3 INJECTION, SOLUTION INTRAVENOUS at 11:41

## 2018-01-01 RX ADMIN — IPRATROPIUM BROMIDE AND ALBUTEROL SULFATE 3 ML: .5; 3 SOLUTION RESPIRATORY (INHALATION) at 12:10

## 2018-01-01 RX ADMIN — TAZOBACTAM SODIUM AND PIPERACILLIN SODIUM 2.25 G: 250; 2 INJECTION, SOLUTION INTRAVENOUS at 11:57

## 2018-01-01 RX ADMIN — IPRATROPIUM BROMIDE AND ALBUTEROL SULFATE 3 ML: .5; 3 SOLUTION RESPIRATORY (INHALATION) at 15:35

## 2018-01-01 RX ADMIN — TRAMADOL HYDROCHLORIDE 50 MG: 50 TABLET, COATED ORAL at 15:44

## 2018-01-01 RX ADMIN — POLYETHYLENE GLYCOL 3350 17 G: 17 POWDER, FOR SOLUTION ORAL at 09:12

## 2018-01-01 RX ADMIN — TAZOBACTAM SODIUM AND PIPERACILLIN SODIUM 2.25 G: 250; 2 INJECTION, SOLUTION INTRAVENOUS at 06:53

## 2018-01-01 RX ADMIN — TAZOBACTAM SODIUM AND PIPERACILLIN SODIUM 2.25 G: 250; 2 INJECTION, SOLUTION INTRAVENOUS at 12:10

## 2018-01-01 RX ADMIN — DICLOFENAC SODIUM 2 G: 10 GEL TOPICAL at 11:59

## 2018-01-01 RX ADMIN — DICLOFENAC SODIUM 2 G: 10 GEL TOPICAL at 21:51

## 2018-01-01 RX ADMIN — FUROSEMIDE 40 MG: 10 INJECTION, SOLUTION INTRAVENOUS at 20:17

## 2018-01-01 RX ADMIN — DICLOFENAC SODIUM 2 G: 10 GEL TOPICAL at 08:44

## 2018-01-01 RX ADMIN — ACETAMINOPHEN 500 MG: 500 TABLET, FILM COATED ORAL at 20:16

## 2018-01-01 RX ADMIN — ACETAMINOPHEN 500 MG: 500 TABLET, FILM COATED ORAL at 09:11

## 2018-01-01 RX ADMIN — TAZOBACTAM SODIUM AND PIPERACILLIN SODIUM 2.25 G: 250; 2 INJECTION, SOLUTION INTRAVENOUS at 18:19

## 2018-01-01 RX ADMIN — METOPROLOL SUCCINATE 50 MG: 50 TABLET, EXTENDED RELEASE ORAL at 08:33

## 2018-01-01 RX ADMIN — LEVOFLOXACIN 250 MG: 5 INJECTION, SOLUTION INTRAVENOUS at 09:19

## 2018-01-01 RX ADMIN — DULOXETINE HYDROCHLORIDE 30 MG: 30 CAPSULE, DELAYED RELEASE ORAL at 09:23

## 2018-01-01 RX ADMIN — DONEPEZIL HYDROCHLORIDE 10 MG: 10 TABLET, FILM COATED ORAL at 22:03

## 2018-01-01 RX ADMIN — SENNOSIDES AND DOCUSATE SODIUM 1 TABLET: 8.6; 5 TABLET ORAL at 08:33

## 2018-01-01 RX ADMIN — ASPIRIN 81 MG: 81 TABLET, COATED ORAL at 01:27

## 2018-01-01 RX ADMIN — SODIUM CHLORIDE: 9 INJECTION, SOLUTION INTRAVENOUS at 05:05

## 2018-01-01 RX ADMIN — TAZOBACTAM SODIUM AND PIPERACILLIN SODIUM 3.38 G: 375; 3 INJECTION, SOLUTION INTRAVENOUS at 00:00

## 2018-01-01 RX ADMIN — TRAMADOL HYDROCHLORIDE 50 MG: 50 TABLET, COATED ORAL at 21:01

## 2018-01-01 RX ADMIN — HYDRALAZINE HYDROCHLORIDE 10 MG: 20 INJECTION INTRAMUSCULAR; INTRAVENOUS at 02:56

## 2018-01-01 RX ADMIN — DULOXETINE HYDROCHLORIDE 30 MG: 30 CAPSULE, DELAYED RELEASE ORAL at 08:07

## 2018-01-01 RX ADMIN — ASPIRIN 81 MG: 81 TABLET, COATED ORAL at 08:34

## 2018-01-01 RX ADMIN — ATORVASTATIN CALCIUM 20 MG: 20 TABLET, FILM COATED ORAL at 08:44

## 2018-01-01 RX ADMIN — PREDNISONE 20 MG: 20 TABLET ORAL at 08:34

## 2018-01-01 RX ADMIN — TAZOBACTAM SODIUM AND PIPERACILLIN SODIUM 2.25 G: 250; 2 INJECTION, SOLUTION INTRAVENOUS at 18:33

## 2018-01-01 RX ADMIN — TAZOBACTAM SODIUM AND PIPERACILLIN SODIUM 3.38 G: 375; 3 INJECTION, SOLUTION INTRAVENOUS at 17:45

## 2018-01-01 RX ADMIN — LEVOTHYROXINE SODIUM 88 MCG: 88 TABLET ORAL at 10:16

## 2018-01-01 RX ADMIN — ACETAMINOPHEN 500 MG: 500 TABLET, FILM COATED ORAL at 22:08

## 2018-01-01 RX ADMIN — ACETAMINOPHEN 500 MG: 500 TABLET, FILM COATED ORAL at 19:37

## 2018-01-01 RX ADMIN — HYDROMORPHONE HYDROCHLORIDE 1 MG: 1 SOLUTION ORAL at 07:04

## 2018-01-01 RX ADMIN — TAZOBACTAM SODIUM AND PIPERACILLIN SODIUM 3.38 G: 375; 3 INJECTION, SOLUTION INTRAVENOUS at 12:03

## 2018-01-01 RX ADMIN — TAZOBACTAM SODIUM AND PIPERACILLIN SODIUM 2.25 G: 250; 2 INJECTION, SOLUTION INTRAVENOUS at 00:50

## 2018-01-01 RX ADMIN — TAZOBACTAM SODIUM AND PIPERACILLIN SODIUM 3.38 G: 375; 3 INJECTION, SOLUTION INTRAVENOUS at 23:32

## 2018-01-01 RX ADMIN — TAZOBACTAM SODIUM AND PIPERACILLIN SODIUM 2.25 G: 250; 2 INJECTION, SOLUTION INTRAVENOUS at 19:26

## 2018-01-01 RX ADMIN — ATORVASTATIN CALCIUM 20 MG: 20 TABLET, FILM COATED ORAL at 08:07

## 2018-01-01 RX ADMIN — PREDNISONE 40 MG: 20 TABLET ORAL at 08:15

## 2018-01-01 RX ADMIN — GABAPENTIN 100 MG: 100 CAPSULE ORAL at 15:21

## 2018-01-01 RX ADMIN — GABAPENTIN 100 MG: 100 CAPSULE ORAL at 09:35

## 2018-01-01 RX ADMIN — METHYLPREDNISOLONE SODIUM SUCCINATE 40 MG: 40 INJECTION, POWDER, FOR SOLUTION INTRAMUSCULAR; INTRAVENOUS at 08:08

## 2018-01-01 RX ADMIN — METOPROLOL SUCCINATE 25 MG: 25 TABLET, EXTENDED RELEASE ORAL at 08:42

## 2018-01-01 RX ADMIN — DULOXETINE HYDROCHLORIDE 30 MG: 30 CAPSULE, DELAYED RELEASE ORAL at 19:37

## 2018-01-01 RX ADMIN — IPRATROPIUM BROMIDE AND ALBUTEROL SULFATE 3 ML: .5; 3 SOLUTION RESPIRATORY (INHALATION) at 15:25

## 2018-01-01 RX ADMIN — TAZOBACTAM SODIUM AND PIPERACILLIN SODIUM 3.38 G: 375; 3 INJECTION, SOLUTION INTRAVENOUS at 05:57

## 2018-01-01 RX ADMIN — SODIUM CHLORIDE: 9 INJECTION, SOLUTION INTRAVENOUS at 14:04

## 2018-01-01 RX ADMIN — TAZOBACTAM SODIUM AND PIPERACILLIN SODIUM 2.25 G: 250; 2 INJECTION, SOLUTION INTRAVENOUS at 13:21

## 2018-01-01 RX ADMIN — DICLOFENAC SODIUM 2 G: 10 GEL TOPICAL at 17:10

## 2018-01-01 RX ADMIN — TAZOBACTAM SODIUM AND PIPERACILLIN SODIUM 3.38 G: 375; 3 INJECTION, SOLUTION INTRAVENOUS at 05:31

## 2018-01-01 RX ADMIN — ASPIRIN 81 MG: 81 TABLET, COATED ORAL at 21:46

## 2018-01-01 RX ADMIN — TRAMADOL HYDROCHLORIDE 50 MG: 50 TABLET, COATED ORAL at 21:38

## 2018-01-01 RX ADMIN — LEVOTHYROXINE SODIUM 88 MCG: 88 TABLET ORAL at 09:23

## 2018-01-01 RX ADMIN — AMLODIPINE BESYLATE 10 MG: 5 TABLET ORAL at 14:26

## 2018-01-01 RX ADMIN — ATORVASTATIN CALCIUM 20 MG: 20 TABLET, FILM COATED ORAL at 10:13

## 2018-01-01 RX ADMIN — LORAZEPAM 0.5 MG: 0.5 TABLET ORAL at 02:29

## 2018-01-01 RX ADMIN — TRAMADOL HYDROCHLORIDE 50 MG: 50 TABLET, COATED ORAL at 15:42

## 2018-01-01 RX ADMIN — TAMSULOSIN HYDROCHLORIDE 0.4 MG: 0.4 CAPSULE ORAL at 09:42

## 2018-01-01 RX ADMIN — TAMSULOSIN HYDROCHLORIDE 0.4 MG: 0.4 CAPSULE ORAL at 08:15

## 2018-01-01 RX ADMIN — GABAPENTIN 100 MG: 100 CAPSULE ORAL at 09:41

## 2018-01-01 RX ADMIN — HYDROMORPHONE HYDROCHLORIDE 1 MG: 1 SOLUTION ORAL at 19:16

## 2018-01-01 RX ADMIN — IPRATROPIUM BROMIDE AND ALBUTEROL SULFATE 3 ML: .5; 3 SOLUTION RESPIRATORY (INHALATION) at 11:29

## 2018-01-01 RX ADMIN — TAMSULOSIN HYDROCHLORIDE 0.4 MG: 0.4 CAPSULE ORAL at 09:13

## 2018-01-01 RX ADMIN — SENNOSIDES AND DOCUSATE SODIUM 1 TABLET: 8.6; 5 TABLET ORAL at 10:08

## 2018-01-01 RX ADMIN — GABAPENTIN 100 MG: 100 CAPSULE ORAL at 09:16

## 2018-01-01 RX ADMIN — PANTOPRAZOLE SODIUM 40 MG: 40 TABLET, DELAYED RELEASE ORAL at 10:15

## 2018-01-01 RX ADMIN — GABAPENTIN 100 MG: 100 CAPSULE ORAL at 08:58

## 2018-01-01 RX ADMIN — IPRATROPIUM BROMIDE AND ALBUTEROL SULFATE 3 ML: .5; 3 SOLUTION RESPIRATORY (INHALATION) at 07:04

## 2018-01-01 RX ADMIN — ACETAMINOPHEN 500 MG: 500 TABLET, FILM COATED ORAL at 16:37

## 2018-01-01 RX ADMIN — PANTOPRAZOLE SODIUM 40 MG: 40 TABLET, DELAYED RELEASE ORAL at 09:20

## 2018-01-01 RX ADMIN — HYDRALAZINE HYDROCHLORIDE 10 MG: 20 INJECTION INTRAMUSCULAR; INTRAVENOUS at 01:08

## 2018-01-01 RX ADMIN — TAZOBACTAM SODIUM AND PIPERACILLIN SODIUM 2.25 G: 250; 2 INJECTION, SOLUTION INTRAVENOUS at 00:13

## 2018-01-01 RX ADMIN — IPRATROPIUM BROMIDE AND ALBUTEROL SULFATE 3 ML: .5; 3 SOLUTION RESPIRATORY (INHALATION) at 19:48

## 2018-01-01 RX ADMIN — DICLOFENAC SODIUM 2 G: 10 GEL TOPICAL at 00:39

## 2018-01-01 RX ADMIN — SENNOSIDES AND DOCUSATE SODIUM 1 TABLET: 8.6; 5 TABLET ORAL at 21:51

## 2018-01-01 RX ADMIN — LOSARTAN POTASSIUM 100 MG: 100 TABLET ORAL at 08:42

## 2018-01-01 RX ADMIN — PANTOPRAZOLE SODIUM 40 MG: 40 TABLET, DELAYED RELEASE ORAL at 09:11

## 2018-01-01 RX ADMIN — PANTOPRAZOLE SODIUM 40 MG: 40 TABLET, DELAYED RELEASE ORAL at 08:36

## 2018-01-01 RX ADMIN — GABAPENTIN 100 MG: 100 CAPSULE ORAL at 08:09

## 2018-01-01 RX ADMIN — GABAPENTIN 100 MG: 100 CAPSULE ORAL at 16:34

## 2018-01-01 RX ADMIN — GABAPENTIN 100 MG: 100 CAPSULE ORAL at 14:27

## 2018-01-01 RX ADMIN — DICLOFENAC SODIUM 2 G: 10 GEL TOPICAL at 18:10

## 2018-01-01 RX ADMIN — ATORVASTATIN CALCIUM 20 MG: 20 TABLET, FILM COATED ORAL at 08:13

## 2018-01-01 RX ADMIN — ASPIRIN 81 MG: 81 TABLET, COATED ORAL at 09:13

## 2018-01-01 RX ADMIN — DONEPEZIL HYDROCHLORIDE 10 MG: 5 TABLET ORAL at 21:20

## 2018-01-01 RX ADMIN — IPRATROPIUM BROMIDE AND ALBUTEROL SULFATE 3 ML: .5; 3 SOLUTION RESPIRATORY (INHALATION) at 15:26

## 2018-01-01 RX ADMIN — ACETAMINOPHEN 500 MG: 500 TABLET, FILM COATED ORAL at 08:37

## 2018-01-01 RX ADMIN — TAMSULOSIN HYDROCHLORIDE 0.4 MG: 0.4 CAPSULE ORAL at 08:09

## 2018-01-01 RX ADMIN — POLYETHYLENE GLYCOL 3350 17 G: 17 POWDER, FOR SOLUTION ORAL at 09:41

## 2018-01-01 RX ADMIN — ACETAMINOPHEN 650 MG: 325 TABLET, FILM COATED ORAL at 17:34

## 2018-01-01 RX ADMIN — TAZOBACTAM SODIUM AND PIPERACILLIN SODIUM 3.38 G: 375; 3 INJECTION, SOLUTION INTRAVENOUS at 23:21

## 2018-01-01 RX ADMIN — TRAMADOL HYDROCHLORIDE 50 MG: 50 TABLET, COATED ORAL at 15:36

## 2018-01-01 RX ADMIN — ACETAMINOPHEN 500 MG: 500 TABLET, FILM COATED ORAL at 09:20

## 2018-01-01 RX ADMIN — SODIUM CHLORIDE: 9 INJECTION, SOLUTION INTRAVENOUS at 09:41

## 2018-01-01 RX ADMIN — ASPIRIN 81 MG: 81 TABLET, COATED ORAL at 08:07

## 2018-01-01 RX ADMIN — TAMSULOSIN HYDROCHLORIDE 0.4 MG: 0.4 CAPSULE ORAL at 09:35

## 2018-01-01 RX ADMIN — GABAPENTIN 100 MG: 100 CAPSULE ORAL at 22:08

## 2018-01-01 RX ADMIN — ASPIRIN 81 MG: 81 TABLET, COATED ORAL at 08:41

## 2018-01-01 RX ADMIN — METOPROLOL SUCCINATE 50 MG: 50 TABLET, EXTENDED RELEASE ORAL at 09:35

## 2018-01-01 RX ADMIN — AMLODIPINE BESYLATE 10 MG: 10 TABLET ORAL at 19:38

## 2018-01-01 RX ADMIN — GABAPENTIN 100 MG: 100 CAPSULE ORAL at 00:37

## 2018-01-01 RX ADMIN — METOPROLOL SUCCINATE 25 MG: 25 TABLET, EXTENDED RELEASE ORAL at 19:37

## 2018-01-01 RX ADMIN — TAMSULOSIN HYDROCHLORIDE 0.4 MG: 0.4 CAPSULE ORAL at 10:15

## 2018-01-01 RX ADMIN — PANTOPRAZOLE SODIUM 40 MG: 40 TABLET, DELAYED RELEASE ORAL at 09:17

## 2018-01-01 RX ADMIN — DONEPEZIL HYDROCHLORIDE 10 MG: 10 TABLET, FILM COATED ORAL at 00:37

## 2018-01-01 RX ADMIN — PREDNISONE 40 MG: 20 TABLET ORAL at 08:34

## 2018-01-01 RX ADMIN — IPRATROPIUM BROMIDE AND ALBUTEROL SULFATE 3 ML: .5; 3 SOLUTION RESPIRATORY (INHALATION) at 19:29

## 2018-01-01 RX ADMIN — ACETAMINOPHEN 500 MG: 500 TABLET, FILM COATED ORAL at 09:13

## 2018-01-01 RX ADMIN — LEVOTHYROXINE SODIUM 88 MCG: 88 TABLET ORAL at 08:43

## 2018-01-01 RX ADMIN — DICLOFENAC SODIUM 2 G: 10 GEL TOPICAL at 16:00

## 2018-01-01 RX ADMIN — ACETAMINOPHEN 500 MG: 500 TABLET, FILM COATED ORAL at 08:35

## 2018-01-01 RX ADMIN — PREDNISONE 20 MG: 20 TABLET ORAL at 09:13

## 2018-01-01 RX ADMIN — TAZOBACTAM SODIUM AND PIPERACILLIN SODIUM 2.25 G: 250; 2 INJECTION, SOLUTION INTRAVENOUS at 11:47

## 2018-01-01 RX ADMIN — PANTOPRAZOLE SODIUM 40 MG: 40 TABLET, DELAYED RELEASE ORAL at 10:01

## 2018-01-01 RX ADMIN — ASPIRIN 81 MG: 81 TABLET, COATED ORAL at 19:37

## 2018-01-01 RX ADMIN — ACETAMINOPHEN 500 MG: 500 TABLET, FILM COATED ORAL at 15:28

## 2018-01-01 RX ADMIN — FUROSEMIDE 40 MG: 10 INJECTION, SOLUTION INTRAVENOUS at 13:32

## 2018-01-01 RX ADMIN — ATORVASTATIN CALCIUM 20 MG: 20 TABLET, FILM COATED ORAL at 09:42

## 2018-01-01 RX ADMIN — ACETAMINOPHEN 500 MG: 500 TABLET, FILM COATED ORAL at 08:11

## 2018-01-01 RX ADMIN — ACETAMINOPHEN 500 MG: 500 TABLET, FILM COATED ORAL at 15:15

## 2018-01-01 RX ADMIN — METHYLPREDNISOLONE SODIUM SUCCINATE 40 MG: 40 INJECTION, POWDER, FOR SOLUTION INTRAMUSCULAR; INTRAVENOUS at 23:59

## 2018-01-01 RX ADMIN — PANTOPRAZOLE SODIUM 40 MG: 40 TABLET, DELAYED RELEASE ORAL at 09:35

## 2018-01-01 RX ADMIN — HYDROMORPHONE HYDROCHLORIDE 1 MG: 1 SOLUTION ORAL at 15:09

## 2018-01-01 RX ADMIN — DONEPEZIL HYDROCHLORIDE 10 MG: 10 TABLET, FILM COATED ORAL at 21:24

## 2018-01-01 RX ADMIN — AMLODIPINE BESYLATE 10 MG: 10 TABLET ORAL at 09:13

## 2018-01-01 RX ADMIN — IPRATROPIUM BROMIDE AND ALBUTEROL SULFATE 3 ML: .5; 3 SOLUTION RESPIRATORY (INHALATION) at 07:16

## 2018-01-01 RX ADMIN — METOPROLOL SUCCINATE 25 MG: 25 TABLET, EXTENDED RELEASE ORAL at 08:09

## 2018-01-01 RX ADMIN — LEVOTHYROXINE SODIUM 88 MCG: 88 TABLET ORAL at 09:42

## 2018-01-01 RX ADMIN — IPRATROPIUM BROMIDE AND ALBUTEROL SULFATE 3 ML: .5; 3 SOLUTION RESPIRATORY (INHALATION) at 08:32

## 2018-01-01 RX ADMIN — LEVOTHYROXINE SODIUM 88 MCG: 88 TABLET ORAL at 08:14

## 2018-01-01 RX ADMIN — DULOXETINE HYDROCHLORIDE 30 MG: 30 CAPSULE, DELAYED RELEASE ORAL at 10:15

## 2018-01-01 RX ADMIN — AMLODIPINE BESYLATE 10 MG: 5 TABLET ORAL at 08:59

## 2018-01-01 RX ADMIN — DICLOFENAC SODIUM 2 G: 10 GEL TOPICAL at 13:13

## 2018-01-01 RX ADMIN — DULOXETINE HYDROCHLORIDE 30 MG: 30 CAPSULE, DELAYED RELEASE ORAL at 08:43

## 2018-01-01 RX ADMIN — HEPARIN SODIUM 5000 UNITS: 5000 INJECTION, SOLUTION INTRAVENOUS; SUBCUTANEOUS at 18:02

## 2018-01-01 RX ADMIN — GUAIFENESIN 10 ML: 100 SOLUTION ORAL at 10:32

## 2018-01-01 RX ADMIN — ACETAMINOPHEN 500 MG: 500 TABLET, FILM COATED ORAL at 10:01

## 2018-01-01 RX ADMIN — SENNOSIDES AND DOCUSATE SODIUM 1 TABLET: 8.6; 5 TABLET ORAL at 09:12

## 2018-01-01 RX ADMIN — IPRATROPIUM BROMIDE AND ALBUTEROL SULFATE 3 ML: .5; 3 SOLUTION RESPIRATORY (INHALATION) at 21:43

## 2018-01-01 RX ADMIN — GABAPENTIN 100 MG: 100 CAPSULE ORAL at 15:28

## 2018-01-01 RX ADMIN — DICLOFENAC SODIUM 2 G: 10 GEL TOPICAL at 10:43

## 2018-01-01 RX ADMIN — ASPIRIN 81 MG: 81 TABLET, COATED ORAL at 08:44

## 2018-01-01 RX ADMIN — METOPROLOL SUCCINATE 25 MG: 25 TABLET, EXTENDED RELEASE ORAL at 08:34

## 2018-01-01 RX ADMIN — FUROSEMIDE 20 MG: 20 TABLET ORAL at 04:17

## 2018-01-01 RX ADMIN — SENNOSIDES AND DOCUSATE SODIUM 2 TABLET: 8.6; 5 TABLET ORAL at 19:57

## 2018-01-01 RX ADMIN — DULOXETINE HYDROCHLORIDE 30 MG: 30 CAPSULE, DELAYED RELEASE ORAL at 08:14

## 2018-01-01 RX ADMIN — HEPARIN SODIUM 5000 UNITS: 5000 INJECTION, SOLUTION INTRAVENOUS; SUBCUTANEOUS at 08:08

## 2018-01-01 RX ADMIN — ALBUTEROL SULFATE 2.5 MG: 2.5 SOLUTION RESPIRATORY (INHALATION) at 02:30

## 2018-01-01 RX ADMIN — ATORVASTATIN CALCIUM 20 MG: 20 TABLET, FILM COATED ORAL at 21:29

## 2018-01-01 RX ADMIN — ATORVASTATIN CALCIUM 20 MG: 20 TABLET, FILM COATED ORAL at 09:13

## 2018-01-01 RX ADMIN — METHYLPREDNISOLONE SODIUM SUCCINATE 40 MG: 40 INJECTION, POWDER, FOR SOLUTION INTRAMUSCULAR; INTRAVENOUS at 11:28

## 2018-01-01 RX ADMIN — IPRATROPIUM BROMIDE AND ALBUTEROL SULFATE 3 ML: .5; 3 SOLUTION RESPIRATORY (INHALATION) at 07:48

## 2018-01-01 RX ADMIN — GABAPENTIN 100 MG: 100 CAPSULE ORAL at 21:53

## 2018-01-01 RX ADMIN — DONEPEZIL HYDROCHLORIDE 10 MG: 10 TABLET, FILM COATED ORAL at 21:21

## 2018-01-01 RX ADMIN — ATORVASTATIN CALCIUM 20 MG: 20 TABLET, FILM COATED ORAL at 09:17

## 2018-01-01 RX ADMIN — VANCOMYCIN HYDROCHLORIDE 1500 MG: 10 INJECTION, POWDER, LYOPHILIZED, FOR SOLUTION INTRAVENOUS at 12:27

## 2018-01-01 RX ADMIN — HYDROMORPHONE HYDROCHLORIDE 0.5 MG: 1 SOLUTION ORAL at 11:26

## 2018-01-01 RX ADMIN — GABAPENTIN 100 MG: 100 CAPSULE ORAL at 18:10

## 2018-01-01 RX ADMIN — TRAMADOL HYDROCHLORIDE 50 MG: 50 TABLET, COATED ORAL at 21:05

## 2018-01-01 RX ADMIN — HEPARIN SODIUM 5000 UNITS: 5000 INJECTION, SOLUTION INTRAVENOUS; SUBCUTANEOUS at 17:22

## 2018-01-01 RX ADMIN — DICLOFENAC SODIUM 2 G: 10 GEL TOPICAL at 13:05

## 2018-01-01 RX ADMIN — METHYLPREDNISOLONE SODIUM SUCCINATE 40 MG: 40 INJECTION, POWDER, FOR SOLUTION INTRAMUSCULAR; INTRAVENOUS at 21:57

## 2018-01-01 RX ADMIN — Medication 0.5 MG: at 12:08

## 2018-01-01 RX ADMIN — ATORVASTATIN CALCIUM 20 MG: 20 TABLET, FILM COATED ORAL at 08:38

## 2018-01-01 RX ADMIN — HEPARIN SODIUM 5000 UNITS: 5000 INJECTION, SOLUTION INTRAVENOUS; SUBCUTANEOUS at 07:49

## 2018-01-01 RX ADMIN — HYDROMORPHONE HYDROCHLORIDE 1 MG: 1 SOLUTION ORAL at 21:51

## 2018-01-01 RX ADMIN — DONEPEZIL HYDROCHLORIDE 10 MG: 5 TABLET ORAL at 21:22

## 2018-01-01 RX ADMIN — PANTOPRAZOLE SODIUM 40 MG: 40 TABLET, DELAYED RELEASE ORAL at 08:34

## 2018-01-01 RX ADMIN — HEPARIN SODIUM 5000 UNITS: 5000 INJECTION, SOLUTION INTRAVENOUS; SUBCUTANEOUS at 18:43

## 2018-01-01 RX ADMIN — DONEPEZIL HYDROCHLORIDE 10 MG: 10 TABLET, FILM COATED ORAL at 21:11

## 2018-01-01 RX ADMIN — IPRATROPIUM BROMIDE AND ALBUTEROL SULFATE 3 ML: .5; 3 SOLUTION RESPIRATORY (INHALATION) at 11:25

## 2018-01-01 RX ADMIN — ACETAMINOPHEN 500 MG: 500 TABLET, FILM COATED ORAL at 21:28

## 2018-01-01 RX ADMIN — TAZOBACTAM SODIUM AND PIPERACILLIN SODIUM 3.38 G: 375; 3 INJECTION, SOLUTION INTRAVENOUS at 17:26

## 2018-01-01 RX ADMIN — ACETAMINOPHEN 500 MG: 500 TABLET, FILM COATED ORAL at 09:41

## 2018-01-01 RX ADMIN — ACETAMINOPHEN 500 MG: 500 TABLET, FILM COATED ORAL at 21:38

## 2018-01-01 RX ADMIN — METOPROLOL SUCCINATE 25 MG: 25 TABLET, EXTENDED RELEASE ORAL at 12:43

## 2018-01-01 RX ADMIN — IPRATROPIUM BROMIDE AND ALBUTEROL SULFATE 3 ML: .5; 3 SOLUTION RESPIRATORY (INHALATION) at 12:05

## 2018-01-01 RX ADMIN — PREDNISONE 60 MG: 10 TABLET ORAL at 08:38

## 2018-01-01 RX ADMIN — IPRATROPIUM BROMIDE AND ALBUTEROL SULFATE 3 ML: .5; 3 SOLUTION RESPIRATORY (INHALATION) at 15:48

## 2018-01-01 RX ADMIN — LEVOTHYROXINE SODIUM 88 MCG: 88 TABLET ORAL at 19:37

## 2018-01-01 RX ADMIN — HYDRALAZINE HYDROCHLORIDE 10 MG: 20 INJECTION INTRAMUSCULAR; INTRAVENOUS at 00:14

## 2018-01-01 RX ADMIN — ACETAMINOPHEN 500 MG: 500 TABLET, FILM COATED ORAL at 16:41

## 2018-01-01 RX ADMIN — ACETAMINOPHEN 500 MG: 500 TABLET, FILM COATED ORAL at 15:42

## 2018-01-01 RX ADMIN — AMLODIPINE BESYLATE 10 MG: 10 TABLET ORAL at 08:08

## 2018-01-01 RX ADMIN — LOSARTAN POTASSIUM 100 MG: 100 TABLET ORAL at 09:12

## 2018-01-01 RX ADMIN — FUROSEMIDE 40 MG: 10 INJECTION, SOLUTION INTRAVENOUS at 09:24

## 2018-01-01 RX ADMIN — LOSARTAN POTASSIUM 100 MG: 100 TABLET ORAL at 09:10

## 2018-01-01 RX ADMIN — LEVOTHYROXINE SODIUM 88 MCG: 88 TABLET ORAL at 09:12

## 2018-01-01 RX ADMIN — LOSARTAN POTASSIUM 100 MG: 100 TABLET ORAL at 19:38

## 2018-01-01 RX ADMIN — TAZOBACTAM SODIUM AND PIPERACILLIN SODIUM 2.25 G: 250; 2 INJECTION, SOLUTION INTRAVENOUS at 07:05

## 2018-01-01 RX ADMIN — PREDNISONE 40 MG: 20 TABLET ORAL at 08:44

## 2018-01-01 RX ADMIN — GABAPENTIN 100 MG: 100 CAPSULE ORAL at 22:03

## 2018-01-01 RX ADMIN — TAZOBACTAM SODIUM AND PIPERACILLIN SODIUM 2.25 G: 250; 2 INJECTION, SOLUTION INTRAVENOUS at 06:58

## 2018-01-01 RX ADMIN — IPRATROPIUM BROMIDE AND ALBUTEROL SULFATE 3 ML: .5; 3 SOLUTION RESPIRATORY (INHALATION) at 16:01

## 2018-01-01 RX ADMIN — HYDROMORPHONE HYDROCHLORIDE 1 MG: 1 SOLUTION ORAL at 15:29

## 2018-01-01 RX ADMIN — FUROSEMIDE 20 MG: 20 TABLET ORAL at 11:57

## 2018-01-01 RX ADMIN — POLYETHYLENE GLYCOL 3350 17 G: 17 POWDER, FOR SOLUTION ORAL at 08:10

## 2018-01-01 RX ADMIN — HYDROMORPHONE HYDROCHLORIDE 1 MG: 1 SOLUTION ORAL at 10:31

## 2018-01-01 RX ADMIN — GABAPENTIN 100 MG: 100 CAPSULE ORAL at 09:13

## 2018-01-01 ASSESSMENT — ACTIVITIES OF DAILY LIVING (ADL)
ADLS_ACUITY_SCORE: 26
ADLS_ACUITY_SCORE: 26
ADLS_ACUITY_SCORE: 28
ADLS_ACUITY_SCORE: 28
ADLS_ACUITY_SCORE: 26
ADLS_ACUITY_SCORE: 22
ADLS_ACUITY_SCORE: 26
ADLS_ACUITY_SCORE: 22
ADLS_ACUITY_SCORE: 17
ADLS_ACUITY_SCORE: 21
ADLS_ACUITY_SCORE: 17
ADLS_ACUITY_SCORE: 17
ADLS_ACUITY_SCORE: 22
ADLS_ACUITY_SCORE: 26
ADLS_ACUITY_SCORE: 21
ADLS_ACUITY_SCORE: 22
ADLS_ACUITY_SCORE: 21
ADLS_ACUITY_SCORE: 17
ADLS_ACUITY_SCORE: 20
ADLS_ACUITY_SCORE: 21
ADLS_ACUITY_SCORE: 17
ADLS_ACUITY_SCORE: 21
ADLS_ACUITY_SCORE: 28
ADLS_ACUITY_SCORE: 22
ADLS_ACUITY_SCORE: 21
ADLS_ACUITY_SCORE: 20
ADLS_ACUITY_SCORE: 20
ADLS_ACUITY_SCORE: 22
ADLS_ACUITY_SCORE: 17
ADLS_ACUITY_SCORE: 28
ADLS_ACUITY_SCORE: 17
WHICH_OF_THE_ABOVE_FUNCTIONAL_RISKS_HAD_A_RECENT_ONSET_OR_CHANGE?: AMBULATION
ADLS_ACUITY_SCORE: 22
ADLS_ACUITY_SCORE: 21
ADLS_ACUITY_SCORE: 20
ADLS_ACUITY_SCORE: 28
ADLS_ACUITY_SCORE: 22
ADLS_ACUITY_SCORE: 28
ADLS_ACUITY_SCORE: 22
ADLS_ACUITY_SCORE: 17
ADLS_ACUITY_SCORE: 22
ADLS_ACUITY_SCORE: 26
ADLS_ACUITY_SCORE: 22
ADLS_ACUITY_SCORE: 21
ADLS_ACUITY_SCORE: 28
ADLS_ACUITY_SCORE: 21
ADLS_ACUITY_SCORE: 21
ADLS_ACUITY_SCORE: 28
ADLS_ACUITY_SCORE: 20
ADLS_ACUITY_SCORE: 28
DEPENDENT_IADLS:: CLEANING;COOKING;LAUNDRY;SHOPPING;MEAL PREPARATION;MEDICATION MANAGEMENT;MONEY MANAGEMENT;TRANSPORATION
ADLS_ACUITY_SCORE: 21
ADLS_ACUITY_SCORE: 21
ADLS_ACUITY_SCORE: 22
ADLS_ACUITY_SCORE: 17
ADLS_ACUITY_SCORE: 28
ADLS_ACUITY_SCORE: 22
ADLS_ACUITY_SCORE: 28
ADLS_ACUITY_SCORE: 21
ADLS_ACUITY_SCORE: 17
ADLS_ACUITY_SCORE: 22
ADLS_ACUITY_SCORE: 17
ADLS_ACUITY_SCORE: 28
ADLS_ACUITY_SCORE: 26
ADLS_ACUITY_SCORE: 21
ADLS_ACUITY_SCORE: 17
ADLS_ACUITY_SCORE: 22
ADLS_ACUITY_SCORE: 22
ADLS_ACUITY_SCORE: 26
ADLS_ACUITY_SCORE: 28
ADLS_ACUITY_SCORE: 21
ADLS_ACUITY_SCORE: 26
ADLS_ACUITY_SCORE: 22
ADLS_ACUITY_SCORE: 17
ADLS_ACUITY_SCORE: 28
ADLS_ACUITY_SCORE: 28
ADLS_ACUITY_SCORE: 22
ADLS_ACUITY_SCORE: 26
ADLS_ACUITY_SCORE: 28
ADLS_ACUITY_SCORE: 21
ADLS_ACUITY_SCORE: 17
ADLS_ACUITY_SCORE: 26
ADLS_ACUITY_SCORE: 28
ADLS_ACUITY_SCORE: 26
ADLS_ACUITY_SCORE: 22
ADLS_ACUITY_SCORE: 26
ADLS_ACUITY_SCORE: 28
ADLS_ACUITY_SCORE: 21
ADLS_ACUITY_SCORE: 22
ADLS_ACUITY_SCORE: 21
ADLS_ACUITY_SCORE: 26
ADLS_ACUITY_SCORE: 22
ADLS_ACUITY_SCORE: 16
ADLS_ACUITY_SCORE: 26
ADLS_ACUITY_SCORE: 22

## 2018-01-01 ASSESSMENT — PAIN DESCRIPTION - DESCRIPTORS
DESCRIPTORS: ACHING
DESCRIPTORS: SHOOTING
DESCRIPTORS: ACHING
DESCRIPTORS: ACHING

## 2018-01-01 ASSESSMENT — NEW YORK HEART ASSOCIATION (NYHA) CLASSIFICATION: NYHA FUNCTIONAL CLASS: II - HEART FAILURE SYMPTOMS ON ORDINARY EXERTION

## 2018-01-01 ASSESSMENT — ENCOUNTER SYMPTOMS
CONSTIPATION: 0
BACK PAIN: 1
ACTIVITY CHANGE: 0
ANAL BLEEDING: 0
ABDOMINAL PAIN: 0
VOMITING: 0
VOMITING: 0
HEADACHES: 0
DIARRHEA: 0
CONFUSION: 1
NAUSEA: 0
WOUND: 0
ROS GI COMMENTS: RECTAL PAIN
BLOOD IN STOOL: 0
NAUSEA: 0

## 2018-01-01 ASSESSMENT — PAIN SCALES - GENERAL: PAINLEVEL: SEVERE PAIN (6)

## 2018-01-02 NOTE — ED AVS SNAPSHOT
[facility] Emergency Department    [address]    [facility] [address]    Phone:  [phone]    Fax:  [phone]                                       Chaz Soler   MRN: [id number]    Department:  [facility] Emergency Department   Date of Visit:  1/2/2018           Patient Information     Date Of Birth          5/21/1928        Your diagnoses for this visit were:     Rectal pain     Low back pain without sciatica, unspecified back pain laterality, unspecified chronicity     Drug-induced constipation        You were seen by Victoriano Diaz MD.      Follow-up Information     Follow up with Alirio Fox MD. Schedule an appointment as soon as possible for a visit in 3 days.    Specialty:  Internal Medicine    Contact information:    [address] [place] [id number] [phone]          Discharge Instructions       Discharge Instructions  Constipation  Your doctor has diagnosed you with constipation. Constipation can cause severe cramping pain and your physician thinks this is the cause of your abdominal pain today.  People usually recognize that they are constipated because they have difficulty having bowel movements, are not having bowel movements frequently enough, or are not having large enough bowel movements. Sometimes, especially in children or older people, you do not recognize that you are constipated until it becomes severe. The most common cause of constipation is a combination of lack of exercise and not eating enough fruits, vegetables and whole grains. Constipation can also be a side effect of medications, such as narcotics, or may be caused by a disease of the digestive system.    Return to the Emergency Department if:    Your abdominal pain worsens or does not improve after a bowel movement.    You become very weak.    You get an oral temperature above 102oF or as directed by your doctor.    You have blood in your stools (bright red or black,  tarry stools).    You keep throwing up or can t drink liquids.    Your see blood when you throw up.    Your stomach gets bloated or bigger.    You have new symptoms or anything that worries you.    What can I do to help myself?    If your doctor gave you a cathartic medication, like magnesium citrate or GoLytely  (polyethylene glycol), you can expect to have cramps and gas pains after taking it. You can expect to have a number of bowel movements and even diarrhea in the course of clearing your bowels.  You will know your bowels have been cleaned out after you pass clear liquid. The cramps and gas should let up after you have emptied your bowels. You may want to wait until morning to take this type of medication so you aren t up in the night.     Sometimes instead of cathartics, we recommend laxatives like milk of magnesia to move your bowels more slowly, or an enema to help the bowels to move. Read and follow the package directions, or follow your physician s instructions.    Once you have become very constipated, it takes time for your bowels to return to normal and you need to be very careful to prevent becoming constipated again. Take a laxative if you don t move your bowels at least every two days.       Eat foods that have a lot of fiber. Good choices are fruits, vegetables, prune juice, apple juice and high fiber cereal. Limit dairy products such as milk and cheese, since these can make constipation worse.     Drink plenty of water and other fluids.     When you feel the need to go to the bathroom, go to the bathroom. Don t hold it.    Miralax , Metamucil , Colace , Senna or fiber supplements can be used daily.  Miralax  daily is often the best choice for children.    FOLLOW UP WITH YOUR REGULAR DOCTOR IF YOUR CONSTIPATION IS NOT IMPROVING.  Sometimes, chronic constipation requires further testing to determine the cause. If you are over 50 years old, you may need a colonoscopy if you have not had one before.    If you were given a prescription for medicine here today, be sure to read all of the information (including the package insert) that comes with your prescription.  This will include important information about the medicine, its side effects, and any warnings that you need to know about.  The pharmacist who fills the prescription can provide more information and answer questions you may have about the medicine.  If you have questions or concerns that the pharmacist cannot address, please call or return to the Emergency Department.   Opioid Medication Information    Pain medications are among the most commonly prescribed medicines, so we are including this information for all our patients. If you did not receive pain medication or get a prescription for pain medicine, you can ignore it.     You may have been given a prescription for an opioid (narcotic) pain medicine and/or have received a pain medicine while here in the Emergency Department. These medicines can make you drowsy or impaired. You must not drive, operate dangerous equipment, or engage in any other dangerous activities while taking these medications. If you drive while taking these medications, you could be arrested for DUI, or driving under the influence. Do not drink any alcohol while you are taking these medications.     Opioid pain medications can cause addiction. If you have a history of chemical dependency of any type, you are at a higher risk of becoming addicted to pain medications.  Only take these prescribed medications to treat your pain when all other options have been tried. Take it for as short a time and as few doses as possible. Store your pain pills in a secure place, as they are frequently stolen and provide a dangerous opportunity for children or visitors in your house to start abusing these powerful medications. We will not replace any lost or stolen medicine.  As soon as your pain is better, you should flush all your remaining  medication.     Many prescription pain medications contain Tylenol  (acetaminophen), including Vicodin , Tylenol #3 , Norco , Lortab , and Percocet .  You should not take any extra pills of Tylenol  if you are using these prescription medications or you can get very sick.  Do not ever take more than 3000 mg of acetaminophen in any 24 hour period.    All opioids tend to cause constipation. Drink plenty of water and eat foods that have a lot of fiber, such as fruits, vegetables, prune juice, apple juice and high fiber cereal.  Take a laxative if you don t move your bowels at least every other day. Miralax , Milk of Magnesia, Colace , or Senna  can be used to keep you regular.      Remember that you can always come back to the Emergency Department if you are not able to see your regular doctor in the amount of time listed above, if you get any new symptoms, or if there is anything that worries you.        Future Appointments        Provider Department Dept Phone Center    1/11/2018 11:00 AM [facility]TR[facility] [phone] [facility]    1/11/2018 11:45 AM Suhail Camacho MD [facility] V[facility] Center [phone] [facility]    1/15/2018 11:45 AM [facility] Heart Lab [facility] PHYS[facility] HEART AT [place] [address] [phone] [facility] CLIN    1/15/2018 12:45 PM Tyrell Jackson MD, MD [facility] Heart [place] [phone] [facility] PSA CLIN      24 Hour Appointment Hotline       To make an appointment at any [facility], call [phone] ([phone]). If you don't have a family doctor or clinic, we will help you find one. [facility] clinics are conveniently located to serve the needs of you and your family.             Review of your medicines      START taking        Dose / Directions Last dose taken    polyethylene glycol powder   Commonly known as:  MIRALAX   Dose:  1 capful   Quantity:  85 g   Replaces:   MIRALAX Packet        Take 17 g (1 capful) by mouth daily for 5 days   Refills:  0          Our records show that you are taking the medicines listed below. If these are incorrect, please call your family doctor or clinic.        Dose / Directions Last dose taken    amLODIPine 10 MG tablet   Commonly known as:  NORVASC   Quantity:  90 tablet        TAKE ONE TABLET BY MOUTH ONCE DAILY   Refills:  3        aspirin 81 MG tablet   Dose:  81 mg   Quantity:  30 tablet        Take 1 tablet (81 mg) by mouth daily   Refills:  11        atorvastatin 20 MG tablet   Commonly known as:  LIPITOR   Dose:  20 mg   Quantity:  30 tablet        Take 1 tablet (20 mg) by mouth daily   Refills:  0        donepezil 10 MG tablet   Commonly known as:  ARICEPT   Quantity:  90 tablet        TAKE ONE TABLET BY MOUTH AT BEDTIME   Refills:  3        DULoxetine 30 MG EC capsule   Commonly known as:  CYMBALTA   Dose:  30 mg   Quantity:  30 capsule        Take 1 capsule (30 mg) by mouth daily   Refills:  11        gabapentin 100 MG capsule   Commonly known as:  NEURONTIN   Quantity:  90 capsule        TAKE ONE CAPSULE BY MOUTH THREE TIMES DAILY FOR  PAIN   Refills:  11        levothyroxine 88 MCG tablet   Commonly known as:  SYNTHROID/LEVOTHROID   Quantity:  90 tablet        TAKE ONE TABLET BY MOUTH ONCE DAILY   Refills:  3        lidocaine-prilocaine cream   Commonly known as:  EMLA        Refills:  0        losartan 100 MG tablet   Commonly known as:  COZAAR   Dose:  100 mg   Quantity:  90 tablet        Take 1 tablet (100 mg) by mouth daily   Refills:  3        metoprolol 25 MG 24 hr tablet   Commonly known as:  TOPROL-XL   Quantity:  90 tablet        TAKE ONE TABLET BY MOUTH DAILY   Refills:  2        MULTIVITAL Tabs   Dose:  1 tablet        Take 1 tablet by mouth daily   Refills:  0        order for DME   Quantity:  1 each        Equipment being ordered: TENS and supplies.   Refills:  0        pantoprazole 40 MG EC tablet   Commonly known as:   PROTONIX   Quantity:  90 tablet        TAKE ONE TABLET BY MOUTH EVERY MORNING   Refills:  3        tamsulosin 0.4 MG capsule   Commonly known as:  FLOMAX   Quantity:  90 capsule        TAKE ONE CAPSULE BY MOUTH ONCE DAILY   Refills:  2        TYLENOL 325 MG tablet   Dose:  325 mg   Generic drug:  acetaminophen        Take 325 mg by mouth 3 times daily as needed for mild pain   Refills:  0          STOP taking        Dose Reason for stopping Comments    MIRALAX Packet   Generic drug:  polyethylene glycol   Replaced by:  polyethylene glycol powder                      Prescriptions were sent or printed at these locations (1 Prescription)                   Other Prescriptions                Printed at Department/Unit printer (1 of 1)         polyethylene glycol (MIRALAX) powder                Orders Needing Specimen Collection     None      Pending Results     No orders found from 12/31/2017 to 1/3/2018.            Pending Culture Results     No orders found from 12/31/2017 to 1/3/2018.            Pending Results Instructions     If you had any lab results that were not finalized at the time of your Discharge, you can call the ED Lab Result RN at [phone]. You will be contacted by this team for any positive Lab results or changes in treatment. The nurses are available 7 days a week from 10A to 6:30P.  You can leave a message 24 hours per day and they will return your call.        Test Results From Your Hospital Stay               Clinical Quality Measure: Blood Pressure Screening     Your blood pressure was checked while you were in the emergency department today. The last reading we obtained was  BP: (!) 171/92 . Please read the guidelines below about what these numbers mean and what you should do about them.  If your systolic blood pressure (the top number) is less than 120 and your diastolic blood pressure (the bottom number) is less than 80, then your blood pressure is normal. There is nothing more that you  need to do about it.  If your systolic blood pressure (the top number) is 120-139 or your diastolic blood pressure (the bottom number) is 80-89, your blood pressure may be higher than it should be. You should have your blood pressure rechecked within a year by a primary care provider.  If your systolic blood pressure (the top number) is 140 or greater or your diastolic blood pressure (the bottom number) is 90 or greater, you may have high blood pressure. High blood pressure is treatable, but if left untreated over time it can put you at risk for heart attack, stroke, or kidney failure. You should have your blood pressure rechecked by a primary care provider within the next 4 weeks.  If your provider in the emergency department today gave you specific instructions to follow-up with your doctor or provider even sooner than that, you should follow that instruction and not wait for up to 4 weeks for your follow-up visit.        Thank you for choosing [facility]       Thank you for choosing [facility] for your care. Our goal is always to provide you with excellent care. Hearing back from our patients is one way we can continue to improve our services. Please take a few minutes to complete the written survey that you may receive in the mail after you visit with us. Thank you!        [employer] Information     [employer] gives you secure access to your electronic health record. If you see a primary care provider, you can also send messages to your care team and make appointments. If you have questions, please call your primary care clinic.  If you do not have a primary care provider, please call [phone] and they will assist you.        Care EveryWhere ID     This is your Care EveryWhere ID. This could be used by other organizations to access your [facility] medical records  [id number]        Equal Access to Services     NELIDA PHILLIPS : sandhya Rothman, johnny lacey  lore dinh ah. So [facility] [phone].    ATENCIÓN: Si habla español, tiene a denise disposición servicios gratuitos de asistencia lingüística. Llame al [phone].    We comply with applicable federal civil rights laws and [place] laws. We do not discriminate on the basis of race, color, national origin, age, disability, sex, sexual orientation, or gender identity.            After Visit Summary       This is your record. Keep this with you and show to your community pharmacist(s) and doctor(s) at your next visit.

## 2018-01-02 NOTE — ED AVS SNAPSHOT
[facility] Emergency Department    [address]    [facility] [address]    Phone:  [phone]    Fax:  [phone]                                       Chaz Soler   MRN: [id number]    Department:  [facility] Emergency Department   Date of Visit:  1/2/2018           After Visit Summary Signature Page     I have received my discharge instructions, and my questions have been answered. I have discussed any challenges I see with this plan with the nurse or doctor.    ..........................................................................................................................................  Patient/Patient Representative Signature      ..........................................................................................................................................  Patient Representative Print Name and Relationship to Patient    ..................................................               ................................................  Date                                            Time    ..........................................................................................................................................  Reviewed by Signature/Title    ...................................................              ..............................................  Date                                                            Time

## 2018-01-02 NOTE — PROGRESS NOTES
Clinic Carre Coordination RN :    Incoming call from Violet /wife.  Patient was started on Cymbalta 3 weeks ago and she was to report on how the patient back pain .  Violet reports the patient continues to have back pain to the point of intermittent crying.  Patient is in a vibrating recliner using heat and doing his exercises. Violet prefers to consult with Dr Fox only.  CC informed wife Dr Fox is not in the office until 1/8 and she prefers to wait for Dr Fox's advice .     Violet plans to bring the patient to ED in the next few days if not any better . Message routed to Dr Fox.    Pebbles Leija RN / Clinical Care Coordinator     [address] [address]  [email or web address]  Office :  [phone] / Fax :  [phone]

## 2018-01-03 NOTE — PROGRESS NOTES
Clinic Care Coordination Contact  OUTREACH    Referral Information:  Referral Source: Self-patient/Caregiver  Reason for Contact: ED visit 1/2/2017-Constipation from pain medication     Care Conference: No     Universal Utilization:   ED Visits in last year: 0  Hospital visits in last year: 2  Last PCP appointment: 11/22/16  Missed Appointments: 0  Concerns:  (none)  Multiple Providers or Specialists:  (cardiology, vascular, pain, ortho, neurology, spine surgeon)    Clinical Concerns:  Current Medical Concerns: CC spoke to Violet/wife and she reports the patient was constipated from the pain medication.  Patient had an enema and today is feeling much better.  Patient was instructed to take Miralax daily for 7 days, drink plenty of water and to see PCP within 3 days     Clinical Pathway Name: None  Clinical Pathway: None    Medication Management:  Started Miralax     Functional Status:  Mobility Status: Independent w/Device  Equipment Currently Used at Home: walker, rolling     Psychosocial:  Current living arrangement:: I live in a private home with spouse (TCU)     Resources and Interventions:  Current Resources:  (NA);  (NA)  PAS Number: [id number]  [facility] Linkage Line Referral Placed:  (NA)  Advanced Care Plans/Directives on file:: No  Referrals Placed:  (NA)     Goals:   Goal 1 Statement: I will decrease back pain   Goal 1 Supportive Steps: I will use Tramadol as directed ,massage chair and home exer  Goal 1 Progression Percent: 100%  Goal 2 Statement: I will follow the Hear Failure Action Plan   Goal 2 Supportive Steps: I will cough deep breath and drink plenty of water and call with increased symptoms   Goal 2 Progression Percent: 100%  Goal 2 Progression Date: 11/27/17  Goal 3 Statement: I will start Duloxecine as directed   Goal 3 Supportive Steps:  (wife  will call if experiencing any side effects )  Goal 3 Progression Percent: 80%  Goal 3 Progression Date: 01/02/18  Goal 4 Statement: I will prevent  constipation   Goal 4 Supportive Steps: I will take Miralax daily and drink plenty of water   Goal 4 Progression Percent: 10%  Goal 4 Progression Date: 01/03/18      Barriers: Chronic back pain   Strengths: supportive wife   Patient/Caregiver understanding: Wife expresses understanding of discharge instructions   Frequency of Care Coordination: PRN       Plan:   CC will request  to fit into Dr Fox's schedule within 3 days   CC will follow up in 3-5 business days     Pebbles Leija RN / Clinical Care Coordinator     [address] [address]  [email or web address]  Office :  [phone] / Fax :  [phone]

## 2018-01-03 NOTE — ED NOTES
Pt reports that he woke up this morning with rectal and back pain. He states that it comes and goes. Right now, reports that the pain is 4/10, but he has had moments throughout the day where it becomes more intense. ABC intact.

## 2018-01-03 NOTE — ED PROVIDER NOTES
History     Chief Complaint:  Rectal pain    HPI   Chaz Soler is a 89 year old male with a complex past medical history significant for COPD, CHF, CAD, CKD, AAA, hyperlipidemia, other chronic pain, and dementia who presents to the emergency department today via EMS for evaluation of rectal pain. The patient reports earlier this afternoon he developed rectal pain while using the restroom. Wife notes he was crying while in the bathroom. He was not trying to push a stool out, and describes it as a squeezing like. The patient's wife thinks the patient had a bowel movement earlier today, and is more irregular than he used to me. Patient states he thinks he usually has one bowel movement a day. Due to persistent pain, EMS was called and he presents to the ED for further evaluation. Upon presentation, he rectal pain 4/10, but overall better than earlier since he is now resting. Furthermore, the patient also reports his usual back pain near his 4-5th lumbar spine. He fell a year ago and had a compression fracture. He also had back problems prior to this, and has never had any surgeries. He is currently taking gabapentin, tylenol, and tramadol for this pain. No additional interventions today. The patient denies abdominal pain, recent trauma or fall, and recent heavy lifting.    Allergies:  Contrast Dye  Lisinopril  Oxycodone     Medications:    levothyroxine (SYNTHROID/LEVOTHROID) 88 MCG tablet  donepezil (ARICEPT) 10 MG tablet  atorvastatin (LIPITOR) 20 MG tablet  DULoxetine (CYMBALTA) 30 MG EC capsule  acetaminophen (TYLENOL) 325 MG tablet  polyethylene glycol (MIRALAX) Packet  pantoprazole (PROTONIX) 40 MG EC tablet  metoprolol (TOPROL-XL) 25 MG 24 hr tablet  aspirin 81 MG tablet  tamsulosin (FLOMAX) 0.4 MG capsule  amLODIPine (NORVASC) 10 MG tablet  gabapentin (NEURONTIN) 100 MG capsule  losartan (COZAAR) 100 MG tablet    Past Medical History:    AAA (abdominal aortic aneurysm)   Anemia   Aortic stenosis   CAD  (coronary artery disease)   CHF (congestive heart failure)   CKD (chronic kidney disease)   COPD (chronic obstructive pulmonary disease)   Dementia   Edema  Essential hypertension   Gout   Hypercalcemia   Hyperlipidemia LDL goal < 70   Hyperparathyroidism,  Hyperplasia of prostate   Left lung granuloma   Lumbago   Other chronic pain   Proteinuria   Pulmonary fibrosis    PVD (peripheral vascular disease)  Thrombocytopenia   Unspecified hypothyroidism   Vitamin D deficiency     Past Surgical History:    Colonoscopy  Disectomy lumbar posterior microscopic one level  Endovascular repair aneurysm abdominal aorta  Laparoscopic cholecystectomy  Repair aneurysm abdominal aorta  Right cataract extraction  Transcatheter aortic valve implant anesthesia  Vasectomy     Family History:    Hypertension    Social History:  The patient was accompanied to the ED by his wife.  Smoking Status: Never  Smokeless Tobacco: Never  Alcohol Use: Yes  Marital Status:        Review of Systems   Constitutional: Negative for activity change.   Gastrointestinal: Negative for abdominal pain, anal bleeding, blood in stool, constipation, diarrhea, nausea and vomiting.        Rectal pain   Musculoskeletal: Positive for back pain.   Skin: Negative for wound.   All other systems reviewed and are negative.      Physical Exam   First Vitals:  BP: (!) 171/92  Heart Rate: 65  Temp: 98.2  F (36.8  C)  Resp: 16  Weight: 63.5 kg (140 lb)  SpO2: 99 %    Physical Exam  General: The patient is alert, in no respiratory distress.    HENT: Mucous membranes moist.    Cardiovascular: Regular rate and rhythm. Good pulses in all four extremities. Normal capillary refill and skin turgor.     Respiratory: Lungs are clear. No nasal flaring. No retractions. No wheezing, no crackles.    Gastrointestinal: Abdomen soft. No guarding, no rebound. No palpable hernias.     Rectal: Stool just outside the rectal vault, disimpaction performed.     Musculoskeletal: No gross  deformity.     Skin: No rashes or petechiae.     Neurologic: The patient is alert and oriented x3. GCS 15. No testable cranial nerve deficit. Follows commands with clear and appropriate speech. Gives appropriate answers. Good strength in all extremities. No gross neurologic deficit. Gross sensation intact. Pupils are round and reactive. No meningismus.     Lymphatic: No cervical adenopathy. No lower extremity swelling.    Psychiatric: The patient is non-tearful.    Stool just outside rectal vault, disimpaction performed     Emergency Department Course     Interventions:  2220 Pink Lady Enema 286mL Rectal      Emergency Department Course:  Nursing notes and vitals reviewed.  2013: I performed an exam of the patient as documented above.   2158: Patient rechecked and updated. The patient urinated and plan for enema.   2248: Per RN, patient passed a large sized stool.   2305: Patient rechecked and updated. Patient feels better after passing the stool and ready for discharge.   Findings and plan explained to the Patient and spouse. Patient discharged home with instructions regarding supportive care, medications, and reasons to return. The importance of close follow-up was reviewed. The patient was prescribed Miralax.     Impression & Plan      Medical Decision Making:  The patient complained of low back pain which he is on chronic pain medication, but also rectal pain. He said he's been having daily bowel movements, and I did questions this with his bowel regimen. The patient in fact on exam had a piece of stool that was caught in the rectal vault. I did perform a disimpaction on him and after an enema he was able to pass a large amount of stool. I think this likely is the cause of his symptoms. His abdominal exam is benign. I did not feel this was likely an obstruction, incarcerated hernia, diverticulitis, or other cause. He was discharged home in good condition.      Diagnosis:    ICD-10-CM    1. Rectal pain K62.89     2. Low back pain without sciatica, unspecified back pain laterality, unspecified chronicity M54.5    3. Drug-induced constipation K59.03      Disposition:  Discharged to home    Discharge Medications:  New Prescriptions    POLYETHYLENE GLYCOL (MIRALAX) POWDER    Take 17 g (1 capful) by mouth daily for 5 days     Scribe Disclosure:  I, Ying Childs, am serving as a scribe at 8:13 PM on 1/2/2018 to document services personally performed by Victoriano Diaz MD based on my observations and the provider's statements to me.     1/2/2018   [facility] EMERGENCY DEPARTMENT       Victoriano Diaz MD  01/03/18 0156

## 2018-01-03 NOTE — LETTER
[facility]  Complex Care Plan  About Me  Patient Name:  Chaz Soler    YOB: 1928  Age:     89 year old   Mehnaz MRN:   [id number] Telephone Information:    Home Phone [phone]   Mobile NONE       Address:    [address] [facility] [address]  [facility] [address] Email address:  [email or web address]      Emergency Contact(s)  Name Relationship Lgl Grd Work Phone Home Phone Mobile Phone   1. MARYJANE SOLER Spouse No none [phone] none   2. MODE SOLER Son No [phone]   3. PATRICIA SOLER Daughter   [phone]   4. CATHI CROSS Daughter No none [phone]           Primary language:  English     needed? No   [employer]:  [phone] op. 1  Other communication barriers:  (dementia)  Preferred Method of Communication:   (unknown)  Current living arrangement: I live in a private home with spouse (TCU)  Mobility Status/ Medical Equipment: Independent w/Device  Other information to know about me:    Health Maintenance  Health Maintenance Reviewed: Not assessed    My Access Plan  Medical Emergency 911   Primary Clinic Line [facility]- [phone]   24 Hour Appointment Line [phone] or  [phone] ([phone]) (toll-free)   24 Hour Nurse Line [phone] (toll-free)   Preferred Urgent Care [facility], [phone]   Preferred Hospital [facility]  [phone]   Preferred Pharmacy [facility] [address] [place] [address] [address] [place] [address]     Behavioral Health Crisis Line The National Suicide Prevention Lifeline at 1-150.109.2496 or 911     My Care Team Members  Patient Care Team       Relationship Specialty Notifications Start End    Veum, Alirio MEEHAN MD PCP - General   2/15/02     Phone: [phone] Fax: [phone]         [address] [facility] [address]    Pebbles Leija, RN Clinic Care Coordinator   Admissions 10/18/17     Phone: [phone] Fax: [phone]             My Care Plans  Self Management and Treatment Plan  Goals and (Comments)  Goal #1: I will decrease back pain       100% of goal reached    Goal #2: I will follow the Hear Failure Action Plan       100% of goal reached    Goal #3: I will start Duloxecine as directed       80% of goal reached    Goal #4: I will prevent constipation   I will take Miralax daily and drink plenty of water throughout the day     10% of goal reached       Action Plans on File: CHF  Advance Care Plans/Directives Type:   Type Advanced Care Plans/Directives: Other (n/a)    My Medical and Care Information  Problem List   Patient Active Problem List   Diagnosis     Essential hypertension     Other specified disorders of prostate     Pulmonary fibrosis (H)     Advance Care Planning     CAD (coronary artery disease)     CKD (chronic kidney disease) stage 3, GFR 30-59 ml/min     Proteinuria     Hyperlipidemia with target LDL less than 70     Lumbar radiculopathy     Status post lumbar discectomy     S/P TAVR (transcatheter aortic valve replacement)     Physical deconditioning     CHF (congestive heart failure) (H)     Anemia     SAI (obstructive sleep apnea)     Hyperparathyroidism (H)     Dementia     Hypothyroidism     Health Care Home     Edema, unspecified edema     Other chronic pain     Compression fracture L2-3      Current Medications and Allergies:  See printed Medication Report.    Care Coordination Start Date: 02/24/17   Frequency of Care Coordination: PRN   Form Last Updated: 01/03/2018

## 2018-01-03 NOTE — PROGRESS NOTES
MD out of the clinic this week.  Per note, pt feeling better after enema and started on Miralax daily. Would continue Miralax daily beyond the 7 days unless started to have loose stools. Then back off to every 2-3 days if the loose stools occurred with daily use.  If continues to do well, then see me in a couple weeks when MD back an schedule open. If  starts to have constipation again this week, then see  PA  Or partner in same day slot in my absence

## 2018-01-07 NOTE — IP AVS SNAPSHOT
[facility] Observation Unit    [address] [place] [address]    Phone:  [phone]                                       After Visit Summary   1/7/2018    Chaz Soler    MRN: [id number]           After Visit Summary Signature Page     I have received my discharge instructions, and my questions have been answered. I have discussed any challenges I see with this plan with the nurse or doctor.    ..........................................................................................................................................  Patient/Patient Representative Signature      ..........................................................................................................................................  Patient Representative Print Name and Relationship to Patient    ..................................................               ................................................  Date                                            Time    ..........................................................................................................................................  Reviewed by Signature/Title    ...................................................              ..............................................  Date                                                            Time

## 2018-01-07 NOTE — IP AVS SNAPSHOT
MRN:[id number]                      After Visit Summary   1/7/2018    Chaz Soler    MRN: [id number]           Thank you!     Thank you for choosing [facility] for your care. Our goal is always to provide you with excellent care. Hearing back from our patients is one way we can continue to improve our services. Please take a few minutes to complete the written survey that you may receive in the mail after you visit with us. Thank you!        Patient Information     Date Of Birth          5/21/1928        About your hospital stay     You were admitted on:  January 8, 2018 [facility]    You were discharged on:  January 9, 2018        Reason for your hospital stay       You were admitted due to weakness. Your workup was not indicative of clear cause, you improved with IV hydration and rest. You were seen by physical therapy who recommended ongoing therapy and assistance at home for continued strengthening.                  Who to Call     For medical emergencies, please call 911.  For non-urgent questions about your medical care, please call your primary care provider or clinic, [phone]          Attending Provider     Provider Specialty    Reina Rodriguez MD Emergency Medicine    [place], Alfonso Dale MD Internal Medicine       Primary Care Provider Office Phone # Fax #    Alirio Fox [phone]      After Care Instructions     Activity       Your activity upon discharge: activity as tolerated            Diet       Follow this diet upon discharge: Orders Placed This Encounter      Regular Diet Adult                  Follow-up Appointments     Follow-up and recommended labs and tests        Follow up with primary care provider, Alirio Fox, within 7 days for hospital follow- up.  No follow up labs or test are needed.                  Your next 10 appointments already scheduled     Jan 11, 2018 11:00 AM CST   US CAROTID  BILATERAL with SHVUS2   [facility] ([facility])    [address]  [facility] [address]   [phone]           Please bring a list of your medicines (including vitamins, minerals and over-the-counter drugs). Also, tell your doctor about any allergies you may have. Wear comfortable clothes and leave your valuables at home.  You do not need to do anything special to prepare for your exam.  Please call the Imaging Department at your exam site with any questions.            Jan 11, 2018 11:45 AM CST   Return Visit with Suhail Camacho MD   [facility] ([facility])    [address]  [facility] [address] [phone]            Jan 12, 2018  8:00 AM CST   LAB with RU LAB   [facility] ([facility])    [address]  [facility] [id number] [phone]           Please do not eat 10-12 hours before your appointment if you are coming in fasting for labs on lipids, cholesterol, or glucose (sugar). This does not apply to pregnant women. Water, hot tea and black coffee (with nothing added) are okay. Do not drink other fluids, diet soda or chew gum.            Froy 15, 2018 12:45 PM CST   Return Visit with Tyrell Jackson MD   [facility] ([facility])    [address]  [facility] [id number] [phone]            Jan 18, 2018  3:00 PM CST   Office Visit with Alirio Fox MD   [facility] ([facility])    [address] [address] [phone]           Bring a current list of meds and any records pertaining to this visit. For Physicals, please bring immunization records and any forms needing to be filled out. Please arrive 10 minutes early to  complete paperwork.              Additional Services     Home Care OT Referral for Hospital Discharge       OT to eval and treat    Your provider has ordered home care - occupational therapy. If you have not been contacted within 2 days of your discharge please call the department phone number listed on the top of this document.            Home Care PT Referral for Hospital Discharge       PT to eval and treat    Your provider has ordered home care - physical therapy. If you have not been contacted within 2 days of your discharge please call the department phone number listed on the top of this document.            Home care nursing referral       RN extended hours visit. RN to assess vital signs and weight, respiratory and cardiac status, pain level and activity tolerance, incision for signs/symptoms of infection, hydration, nutrition and bowel status and home safety.  Home health aide to assist with ADLs    Your provider has ordered home care nursing services. If you have not been contacted within 2 days of your discharge please call the inpatient department phone number at [phone] .                  Pending Results     No orders found from 1/5/2018 to 1/8/2018.            Statement of Approval     Ordered          01/09/18 1117  I have reviewed and agree with all the recommendations and orders detailed in this document.  EFFECTIVE NOW     Approved and electronically signed by:  Chas Mccallum MD           01/09/18 1111  I have reviewed and agree with all the recommendations and orders detailed in this document.  EFFECTIVE NOW     Approved and electronically signed by:  Guero Ortiz PA-C             Admission Information     Date & Time Provider Department Dept. Phone    1/7/2018 Alfonso Villafuerte MD [facility] [phone]      Your Vitals Were     Blood Pressure Pulse Temperature Respirations Weight Pulse Oximetry    124/81 78 99  F (37.2  C) (Oral) 18 68.1 kg (150 lb 3.2 oz)  94%    BMI (Body Mass Index)                   22.84 kg/m2           [facility] Information     [facility] gives you secure access to your electronic health record. If you see a primary care provider, you can also send messages to your care team and make appointments. If you have questions, please call your primary care clinic.  If you do not have a primary care provider, please call [phone] and they will assist you.        Care EveryWhere ID     This is your Care EveryWhere ID. This could be used by other organizations to access your [facility] medical records  [id number]        Equal Access to Services     [facility]: Hadii laura vargas Soyvonne, warayo luqadaha, qayoselin kaalmadaniela sr, johnny dinh . So [facility] [phone].    ATENCIÓN: Si habla español, tiene a denise disposición servicios gratuitos de asistencia lingüística. Llame al [phone].    We comply with applicable federal civil rights laws and [place] laws. We do not discriminate on the basis of race, color, national origin, age, disability, sex, sexual orientation, or gender identity.               Review of your medicines      CONTINUE these medicines which have NOT CHANGED        Dose / Directions    amLODIPine 10 MG tablet   Commonly known as:  NORVASC   Used for:  Essential hypertension        TAKE ONE TABLET BY MOUTH ONCE DAILY   Quantity:  90 tablet   Refills:  3       aspirin 81 MG tablet   Used for:  Coronary artery disease involving native coronary artery of native heart without angina pectoris   Notes to Patient:  Resume taking as you do at home          Dose:  81 mg   Take 1 tablet (81 mg) by mouth daily   Quantity:  30 tablet   Refills:  11       atorvastatin 20 MG tablet   Commonly known as:  LIPITOR   Used for:  Coronary artery disease involving native coronary artery of native heart without angina pectoris   Notes to Patient:  Resume taking as you do at home          Dose:  20 mg   Take 1 tablet (20 mg)  by mouth daily   Quantity:  30 tablet   Refills:  0       cyanocobalamin 1000 MCG tablet   Commonly known as:  vitamin  B-12   Notes to Patient:  Resume taking as you do at home        Dose:  1000 mcg   Take 1,000 mcg by mouth daily   Refills:  0       donepezil 10 MG tablet   Commonly known as:  ARICEPT   Used for:  Memory loss        TAKE ONE TABLET BY MOUTH AT BEDTIME   Quantity:  90 tablet   Refills:  3       gabapentin 100 MG capsule   Commonly known as:  NEURONTIN   Used for:  Compression fracture        TAKE ONE CAPSULE BY MOUTH THREE TIMES DAILY FOR  PAIN   Quantity:  90 capsule   Refills:  11       LASIX PO   Notes to Patient:  Resume taking as you do at home        Dose:  20 mg   Take 20 mg by mouth daily   Refills:  0       levothyroxine 88 MCG tablet   Commonly known as:  SYNTHROID/LEVOTHROID   Used for:  Hypothyroidism, unspecified type   Notes to Patient:  Resume taking as you do at home        TAKE ONE TABLET BY MOUTH ONCE DAILY   Quantity:  90 tablet   Refills:  3       losartan 100 MG tablet   Commonly known as:  COZAAR   Used for:  Essential hypertension        Dose:  100 mg   Take 1 tablet (100 mg) by mouth daily   Quantity:  90 tablet   Refills:  3       metoprolol 25 MG 24 hr tablet   Commonly known as:  TOPROL-XL   Used for:  Essential hypertension        TAKE ONE TABLET BY MOUTH DAILY   Quantity:  90 tablet   Refills:  2       MULTIVITAL Tabs   Notes to Patient:  Resume taking as you do at home        Dose:  1 tablet   Take 1 tablet by mouth daily   Refills:  0       order for DME   Used for:  Other chronic pain, Lumbar radiculopathy, DDD (degenerative disc disease), lumbar, Spinal stenosis of lumbar region without neurogenic claudication        Equipment being ordered: TENS and supplies.   Quantity:  1 each   Refills:  0       pantoprazole 40 MG EC tablet   Commonly known as:  PROTONIX   Used for:  Gastroesophageal reflux disease, esophagitis presence not specified        TAKE ONE TABLET BY  MOUTH EVERY MORNING   Quantity:  90 tablet   Refills:  3       tamsulosin 0.4 MG capsule   Commonly known as:  FLOMAX   Used for:  Benign prostatic hyperplasia with urinary retention        TAKE ONE CAPSULE BY MOUTH ONCE DAILY   Quantity:  90 capsule   Refills:  2       TRAMADOL HCL PO   Notes to Patient:  Resume taking as you do at home        Dose:  50 mg   Take 50 mg by mouth 3 times daily   Refills:  0       TYLENOL PO   Notes to Patient:  Resume taking as you do at home        Dose:  500 mg   Take 500 mg by mouth 3 times daily   Refills:  0                Protect others around you: Learn how to safely use, store and throw away your medicines at www.disposemymeds.org.             Medication List: This is a list of all your medications and when to take them. Check marks below indicate your daily home schedule. Keep this list as a reference.      Medications           Morning Afternoon Evening Bedtime As Needed    amLODIPine 10 MG tablet   Commonly known as:  NORVASC   TAKE ONE TABLET BY MOUTH ONCE DAILY   Last time this was given:  10 mg on 1/9/2018  8:59 AM            Tomorrow 1/10                       aspirin 81 MG tablet   Take 1 tablet (81 mg) by mouth daily   Notes to Patient:  Resume taking as you do at home                                  atorvastatin 20 MG tablet   Commonly known as:  LIPITOR   Take 1 tablet (20 mg) by mouth daily   Notes to Patient:  Resume taking as you do at home                                  cyanocobalamin 1000 MCG tablet   Commonly known as:  vitamin  B-12   Take 1,000 mcg by mouth daily   Notes to Patient:  Resume taking as you do at home                                donepezil 10 MG tablet   Commonly known as:  ARICEPT   TAKE ONE TABLET BY MOUTH AT BEDTIME   Last time this was given:  10 mg on 1/8/2018  9:46 PM                                   gabapentin 100 MG capsule   Commonly known as:  NEURONTIN   TAKE ONE CAPSULE BY MOUTH THREE TIMES DAILY FOR  PAIN   Last time this  was given:  100 mg on 1/9/2018  8:58 AM                                      LASIX PO   Take 20 mg by mouth daily   Notes to Patient:  Resume taking as you do at home                                levothyroxine 88 MCG tablet   Commonly known as:  SYNTHROID/LEVOTHROID   TAKE ONE TABLET BY MOUTH ONCE DAILY   Notes to Patient:  Resume taking as you do at home                                losartan 100 MG tablet   Commonly known as:  COZAAR   Take 1 tablet (100 mg) by mouth daily   Last time this was given:  100 mg on 1/9/2018  8:59 AM            Tomorrow 1/10                         metoprolol 25 MG 24 hr tablet   Commonly known as:  TOPROL-XL   TAKE ONE TABLET BY MOUTH DAILY   Last time this was given:  25 mg on 1/9/2018  8:58 AM            Tomorrow 1/10                         MULTIVITAL Tabs   Take 1 tablet by mouth daily   Notes to Patient:  Resume taking as you do at home                                order for DME   Equipment being ordered: TENS and supplies.                                pantoprazole 40 MG EC tablet   Commonly known as:  PROTONIX   TAKE ONE TABLET BY MOUTH EVERY MORNING   Last time this was given:  40 mg on 1/9/2018  8:58 AM            Tomorrow 1/10                         tamsulosin 0.4 MG capsule   Commonly known as:  FLOMAX   TAKE ONE CAPSULE BY MOUTH ONCE DAILY   Last time this was given:  0.4 mg on 1/8/2018  9:46 PM                        Today 1/9           TRAMADOL HCL PO   Take 50 mg by mouth 3 times daily   Notes to Patient:  Resume taking as you do at home                                TYLENOL PO   Take 500 mg by mouth 3 times daily   Notes to Patient:  Resume taking as you do at home

## 2018-01-08 PROBLEM — R53.1 WEAKNESS: Status: ACTIVE | Noted: 2018-01-01

## 2018-01-08 NOTE — PLAN OF CARE
Problem: Patient Care Overview  Goal: Plan of Care/Patient Progress Review  Outcome: Improving  diagnostic tests and consults completed and resulted - Partially met  -vital signs normal or at patient baseline - Partially met  -returns to baseline functional status- Not met   -safe disposition plan has been identified - Not met  Nurse to notify provider when observation goals have been met and patient is ready for discharge.

## 2018-01-08 NOTE — PROGRESS NOTES
[facility]    Hospitalist Progress Note    Date of Service (when I saw the patient): 01/08/2018    Assessment & Plan   Chaz Soler is a 89 year old male with PMHx of HTN, dementia, hypothyroidism, BPH, CKD III, aortic stenosis s/p TAVR, GERD, and pulmonary fibrosis requiring 4 LPM supplemental oxygen via NC at baseline who was admitted on 1/7/2018 for further evaluation of generalized weakness. Pt slightly hypertensive, all other vitals WNL. Pt currently stable.     Generalized weakness: Decreased strength reported by wife. Baseline able to ambulate with walker, lives at home with wife. Oriented to self, does not always know time or place. Seemed more lethargic over the past few days and requiring assistance getting out of chair prompting ED presentation. WBC WNL. Afebrile. Troponin negative X2. ABG 7.42/48/163/31. UA unremarkable. MR brain negative for acute pathology. EKG without overt ST depression, elevation, or T wave abnormalities. TSH checked in November 2017 was normal.  Creatinine 1.51 (around baseline). CXR without acute cardiopulmonary process. Taking Tramadol PTA for pain, not for a few days. Etiology of symptoms unclear, but differential includes influenza vs dehydration vs ?  -- Continue with IVF hydration  -- Influenza PCR pending   -- Follow blood cultures   -- PT to assess, SW for discharge planning   -- Advance diet as tolerated   -- Continue to hold PTA Tramadol     Chronic hypoxic respiratory failure in the setting of pulmonary fibrosis: Requiring supplemental oxygen at 4 LPM at baseline. ABG 7.42/48/163/31. Followed by Dr. Aly of [facility].   -- Continue with home oxygen at 4 LPM     Hypertension, uncontrolled: BP uncontrolled since admission. PTA Losartan and Norvasc had been on hold.   -- Resume PTA Losartan, Toprol XL, and Norvasc with parameters in place   -- PRN Hydralazine and Labetalol available      Dementia: Continue his Aricept     Hypothyroidism: This above his  "TSH was checked in November was normal.    -- Hold Synthroid given observation status      BPH Continue prior to admission Flomax     CKD III: Baseline 1.3-1.5. Creatinine on admission 1.51>1.32.   -- Avoid nephrotoxic agents   -- Gentle IVF as above      History of aortic stenosis with TAVR with bioprosthetic valve (2015)  Diastolic CHF: Followed by Dr. Jackson. Last echo from 6/2017 with EF 55-60% and grade 1 or early diastolic dysfunction. Noted to have moderate CAD on pre-TAVR cath on medical management. Lexiscan from 10/2017 with a small, fixed apical defect likely reflecting apical thinning, no significant ischemia identified.   -- Monitor for evidence of fluid overload   -- Continue PTA Losartan, Toprol XL and Norvasc with parameters in place   -- Hold PTA Lasix 20 mg po qd while on IVF, resume once tolerating PO and off IVF   -- Hold statin given observation status      GERD  Continue prior to admission Protonix    History of abdominal aortic aneurysm: Stable    DVT Prophylaxis: Ambulate every shift  Code Status: DNI    Disposition: Expected discharge pending clinical course     Shirley Winters PA-C    This patient was discussed with Dr. Mccallum of the Hospitalist Service who agrees with current plans as outlined above.     Interval History   Lethargic this AM. Falling asleep while eating breakfast. \"Not normal\" per wife. Decreased strength reported by wife. Baseline able to ambulate with walker, lives at home with wife. Oriented to self, does not always know time or place. Seemed more lethargic over the past few days and requiring assistance getting out of chair prompting ED presentation.     When rechecked later this afternoon, more alert, sitting upright in chair, follows conversation, eating lunch.     -Data reviewed today: See below.     Physical Exam   Temp: 97  F (36.1  C) Temp src: Oral BP: 173/84 Pulse: 78 Heart Rate: 69 Resp: 20 SpO2: 96 % O2 Device: Nasal cannula Oxygen Delivery: 2 " LPM  Vitals:    01/08/18 0048   Weight: 67.7 kg (149 lb 4.8 oz)     Vital Signs with Ranges  Temp:  [97  F (36.1  C)-98.2  F (36.8  C)] 97  F (36.1  C)  Pulse:  [78] 78  Heart Rate:  [60-80] 69  Resp:  [13-24] 20  BP: (131-175)/() 173/84  SpO2:  [94 %-99 %] 96 %  I/O last 3 completed shifts:  In: -   Out: 650 [Urine:650]    CONSTITUTIONAL: Pt laying in bed, dressed in hospital garb. Appears comfortable. Cooperative with interview. Accompanied by wife, Kylee at bedside.   HEENT: Normocephalic, atraumatic. Negative for conjunctival redness or scleral icterus.    CARDIOVASCULAR: RRR, no murmurs, rubs, or extra heart sounds appreciated. Pulses +2/4 and regular in upper and lower extremities, bilaterally.   RESPIRATORY: No increased work of breathing.  Supplemental oxygenation via NC at 4 LPM. CTA, bilat; no wheezes, rales, or rhonchi appreciated.  GASTROINTESTINAL:  Abdomen soft, non-distended. BS auscultated in all four quadrants. Negative for tenderness to palpation.  No masses or organomegaly noted.  MUSCULOSKELETAL: No gross deformities noted. Normal muscle tone.   HEMATOLOGIC/LYMPHATIC/IMMUNOLOGIC: Negative for lower extremity edema, bilaterally.  NEUROLOGIC: Alert and oriented to person, place, and time.  strength intact. No focal neuro deficits appreciated. Occasional tremor noted in upper extremities, bilat.   SKIN: Warm, dry, intact. No jaundice noted. Negative for suspicious lesions, rashes, bruising, open sores or abrasions.     Medications     NaCl 75 mL/hr at 01/08/18 0109       aspirin EC  81 mg Oral QPM     donepezil  10 mg Oral At Bedtime     gabapentin  100 mg Oral TID     levothyroxine  88 mcg Oral QAM AC     metoprolol  25 mg Oral Daily     pantoprazole  40 mg Oral QAM     tamsulosin  0.4 mg Oral At Bedtime       Data     Recent Labs  Lab 01/08/18  1221 01/08/18  0659 01/07/18  1853   WBC 9.5  --  8.1   HGB 12.3*  --  12.9*   MCV 90  --  90     --  173   NA  --  138 137   POTASSIUM  --   3.8 4.6   CHLORIDE  --  102 99   CO2  --  32 32   BUN  --  23 28   CR  --  1.32* 1.51*   ANIONGAP  --  4 6   LEXIS  --  9.6 9.9   GLC  --  91 117*   ALBUMIN  --   --  3.9   PROTTOTAL  --   --  7.4   BILITOTAL  --   --  0.8   ALKPHOS  --   --  134   ALT  --   --  25   AST  --   --  23   TROPI  --  <0.015 <0.015       Recent Results (from the past 24 hour(s))   MR Brain w/o & w Contrast    Narrative    MRI OF THE BRAIN WITHOUT AND WITH CONTRAST 1/7/2018 9:06 PM     COMPARISON: Head CT 11/28/2017.    HISTORY:  Confusion.     TECHNIQUE: Axial diffusion-weighted with ADC map, axial T2-weighted  with fat saturation, axial T1-weighted, axial turboFLAIR and coronal  T1-weighted images of the brain were acquired without intravenous  contrast.  Following intravenous administration of gadolinium (6 mL  Gadavist), axial T1-weighted images of the brain were acquired.     FINDINGS: There is moderate diffuse cerebral volume loss. There are  extensive confluent periventricular areas of abnormal T2 signal  hyperintensity in the cerebral white matter bilaterally that are  consistent with sequela of chronic small vessel ischemic disease.    The ventricles and basal cisterns are within normal limits in  configuration given the degree of cerebral volume loss.  There is no  midline shift.  There are no extra-axial fluid collections.  There is  no evidence for stroke or acute intracranial hemorrhage.  There is no  abnormal contrast enhancement in the brain or its coverings.    There is no sinusitis or mastoiditis.      Impression    IMPRESSION: Diffuse cerebral volume loss and cerebral white matter  changes consistent with chronic small vessel ischemic disease. No  evidence for acute intracranial pathology.      GAGE GARRETT MD   XR Chest Port 1 View    Narrative    XR CHEST PORT 1 VW 1/8/2018 11:36 AM    COMPARISON: 10/1/2017    HISTORY: Pulmonary fibrosis.      Impression    IMPRESSION: Fibrotic changes again seen in both lungs. No new  focal  airspace disease. No pneumothorax.    CHADD MONTALVO MD

## 2018-01-08 NOTE — PLAN OF CARE
Problem: Patient Care Overview  Goal: Plan of Care/Patient Progress Review  Outcome: Improving  diagnostic tests and consults completed and resulted - partially met  -vital signs normal or at patient baseline - not met, elevated bp in 160s  -returns to baseline functional status- Not met   -safe disposition plan has been identified - Not met  Nurse to notify provider when observation goals have been met and patient is ready for discharge.

## 2018-01-08 NOTE — ED NOTES
Bed: ED29  Expected date:   Expected time:   Means of arrival:   Comments:  594  89 M increased confusion/fatigue  1840

## 2018-01-08 NOTE — PLAN OF CARE
Problem: Patient Care Overview  Goal: Plan of Care/Patient Progress Review  Outcome: Improving  diagnostic tests and consults completed and resulted - Partially met  -vital signs normal or at patient baseline - Not met  -returns to baseline functional status- Not met   -safe disposition plan has been identified - Not met  Nurse to notify provider when observation goals have been met and patient is ready for discharge.

## 2018-01-08 NOTE — PLAN OF CARE
Problem: Patient Care Overview  Goal: Plan of Care/Patient Progress Review  Outcome: Improving  Tele Sinus rhythm. A&O to self only. VSS,Ex BP in 160s, on 3 L Oxygen baseline. Was impulsive at a time. Took Tele out several times and attempted take IV out. Voiding in urinal. Assist of 1-2 with transfer. C/O some lower back. Regular diet. IV infusing. PT, OT consult in AM. Continue to monitor.

## 2018-01-08 NOTE — H&P
[facility]    History and Physical  Hospitalist       Date of Admission:  1/7/2018  Date of Service (when I saw the patient): 1/7/18    Assessment & Plan   Chaz Soler is a 89 year old male who presents with weakness. Please note that the patient was alone at the time of my visit is limited history was obtained    Weakness  Report this appears to be an intermittent chronic issue for the patient.  However, today, he had persistent weakness that required transfer to the emergency department.  His head CT was negative.  Troponin was normal.  His EKG was unremarkable.  His urinalysis is bland.  He has an elevated creatinine but this is at baseline.  TSH checked in November 2017 was normal.  CT of his head was negative for acute changes  -Admit to observation  -Gentle hydration  -Telemetry  -PT/OT in the morning  -Troponins negative  -No other cause is weakness is obvious.  His white count was normal, there is no evidence of infection, his vitals are stable, EKG was checked and was unremarkable.    Hypertension  Mental hold his hypertensives in the short-term given his weakness.  Likely will need to be restarted hold off for now.    Dementia  Continue his Aricept    Hypothyroidism  This above his TSH was checked in November was normal.  Continue his prior to admission levothyroxine.    BPH  Continue prior to admission Flomax    History of CKD  Creatinine appears to base  -Avoid nephrotoxins    History of aortic stenosis with TAVR with bioprosthetic valve  Did not appear to be any issues at this time for this.  -If concerns arise, consider echocardiogram although will hold off at this time    GERD  Continue prior to admission Protonix    DVT Prophylaxis: Low Risk/Ambulatory with no VTE prophylaxis indicated  Code Status: DNI    Disposition: Expected discharge in 1-2 days once the above assessments are complete.    Alfonso Villafuerte MD  [phone] (P)  Text Page     Primary Care Physician   Dr Amezquita  THELMA Fox    Chief Complaint   Weakness    History is obtained from the patient and medical records    History of Present Illness   Chaz Soler is a 89 year old male who presents with weakness.  Unfortunately at the time of my visit with the patient his wife has left and the patient was unable to provide much history.  Apparently he has history of occasional weakness that waxes and wanes.  However, today he sat in his chair and his wife could not get him to stand up.  Ultimately EMS was called and patient was transported to the emergency department.  At the time of my visit the patient thought he was at home.  He denied any complaints including chest pain shortness of breath or GI symptoms.  His biggest complaint was back pain.    Past Medical History    I have reviewed this patient's medical history and updated it with pertinent information if needed.   Past Medical History:   Diagnosis Date     AAA (abdominal aortic aneurysm) (H) 10/5/2011    6.1 cm     Anemia 11/30/2011     Aortic stenosis 10/26/2012     CAD (coronary artery disease)     MI - distal inferior/apex. Non transmural     Carotid artery disease (H)      CHF (congestive heart failure) (H) 12/31/2014     CKD (chronic kidney disease) stage 3, GFR 30-59 ml/min 11/30/2011     COPD (chronic obstructive pulmonary disease) (H)      Dementia 8/4/2015     Edema, unspecified edema 1/17/2016     Essential hypertension      Gout 1/06    knee     Hypercalcemia 1/2/2015     Hyperlipidemia LDL goal < 70      Hyperparathyroidism, unspecified 4/22/2015     Hyperplasia of prostate      LEFT LUNG GRANULOMA      Lumbago      Other chronic pain 10/11/2016     Proteinuria 2/8/2012     Pulmonary fibrosis (H)      PVD (peripheral vascular disease) (H)      Thrombocytopenia (H) 11/30/2011     Unspecified hypothyroidism      Vitamin D deficiency        Past Surgical History   I have reviewed this patient's surgical history and updated it with pertinent information if  needed.  Past Surgical History:   Procedure Laterality Date     COLONOSCOPY  9/02 per patient     DISCECTOMY LUMBAR POSTERIOR MICROSCOPIC ONE LEVEL  8/11/2014    Procedure: DISCECTOMY LUMBAR POSTERIOR MICROSCOPIC ONE LEVEL;  Surgeon: Jone Carvalho MD;  Location: [facility] OR     ENDOVASCULAR REPAIR ANEURYSM ABDOMINAL AORTA  11/18/2011    Procedure:ENDOVASCULAR REPAIR ANEURYSM ABDOMINAL AORTA; ENDOVASCULAR AAA,RIGHT FEMORAL       LAPAROSCOPIC CHOLECYSTECTOMY  Nov 2011     LAPAROSCOPIC CHOLECYSTECTOMY  11/18/2011    Procedure:LAPAROSCOPIC CHOLECYSTECTOMY; LAPAROSCOPIC CHOLECYSTECTOMY ; Surgeon:DARRYL DORADO; Location:[facility] OR     REPAIR ANEURYSM ABDOMINAL AORTA  Nov 2011     right cataract extraction/IOL  12/03     TRANSCATHETER AORTIC VALVE IMPLANT ANESTHESIA N/A 11/12/2014    Bioprosthetic. Procedure: TRANSCATHETER AORTIC VALVE IMPLANT ANESTHESIA;  Surgeon: Generic Anesthesia Provider;  Location: [facility] OR     VASECTOMY         Prior to Admission Medications   Prior to Admission Medications   Prescriptions Last Dose Informant Patient Reported? Taking?   Acetaminophen (TYLENOL PO) 1/7/2018 at Unknown time  Yes Yes   Sig: Take 500 mg by mouth 3 times daily   Furosemide (LASIX PO) 1/7/2018 at Unknown time  Yes Yes   Sig: Take 20 mg by mouth daily   Multiple Vitamins-Minerals (MULTIVITAL) TABS 1/7/2018 at Unknown time  Yes Yes   Sig: Take 1 tablet by mouth daily   TRAMADOL HCL PO 1/7/2018 at Unknown time  Yes Yes   Sig: Take 50 mg by mouth 3 times daily   amLODIPine (NORVASC) 10 MG tablet 1/6/2018 at pm  No Yes   Sig: TAKE ONE TABLET BY MOUTH ONCE DAILY   aspirin 81 MG tablet 1/6/2018 at pm  No Yes   Sig: Take 1 tablet (81 mg) by mouth daily   atorvastatin (LIPITOR) 20 MG tablet 1/6/2018 at pm  No Yes   Sig: Take 1 tablet (20 mg) by mouth daily   cyanocobalamin (VITAMIN  B-12) 1000 MCG tablet 1/7/2018 at Unknown time  Yes Yes   Sig: Take 1,000 mcg by mouth daily   donepezil (ARICEPT) 10 MG tablet 1/6/2018 at pm  No  Yes   Sig: TAKE ONE TABLET BY MOUTH AT BEDTIME   gabapentin (NEURONTIN) 100 MG capsule 1/7/2018 at Unknown time  No Yes   Sig: TAKE ONE CAPSULE BY MOUTH THREE TIMES DAILY FOR  PAIN   levothyroxine (SYNTHROID/LEVOTHROID) 88 MCG tablet 1/7/2018 at Unknown time  No Yes   Sig: TAKE ONE TABLET BY MOUTH ONCE DAILY   losartan (COZAAR) 100 MG tablet 1/7/2018 at Unknown time Spouse/Significant Other No Yes   Sig: Take 1 tablet (100 mg) by mouth daily   metoprolol (TOPROL-XL) 25 MG 24 hr tablet 1/7/2018 at Unknown time  No Yes   Sig: TAKE ONE TABLET BY MOUTH DAILY   order for DME  Spouse/Significant Other No No   Sig: Equipment being ordered: TENS and supplies.   pantoprazole (PROTONIX) 40 MG EC tablet 1/7/2018 at Unknown time  No Yes   Sig: TAKE ONE TABLET BY MOUTH EVERY MORNING   tamsulosin (FLOMAX) 0.4 MG capsule 1/6/2018 at hs  No Yes   Sig: TAKE ONE CAPSULE BY MOUTH ONCE DAILY      Facility-Administered Medications: None     Allergies   Allergies   Allergen Reactions     Contrast Dye      SOB, increased Bp, difficulty swallowing. Date 6/3/96 (ipaque contrast)  Was premedicated prior to FRANCK and had no reaction at all (solumedrol and benadryl) 2016  Was premedicated with Methylprednisolone protocol, no reaction 1/25/17     Lisinopril      cough     Oxycodone      Delirium       Social History   I have reviewed this patient's social history and updated it with pertinent information if needed. Chaz Soler  reports that he has never smoked. He has never used smokeless tobacco. He reports that he drinks alcohol. He reports that he does not use illicit drugs.    Family History   I have reviewed this patient's family history and updated it with pertinent information if needed.   Family History   Problem Relation Age of Onset     Hypertension Mother      Hypertension Father        Review of Systems   The 10 point Review of Systems is negative other than noted in the HPI or here.  Again, of note, patient is a very poor  historian at this time    Physical Exam   Temp: 97.1  F (36.2  C) Temp src: Oral BP: 175/86 Pulse: 78 Heart Rate: 72 Resp: 16 SpO2: 94 % O2 Device: Nasal cannula Oxygen Delivery: 3 LPM  Vital Signs with Ranges  149 lbs 4.8 oz    Constitutional: alert, disoriented  Eyes: EOMI, PERRL  HEENT: OP clear  Respiratory: CTA B without w/c  Cardiovascular: RRR without m/r/g  GI: soft, nontender, nondistended, no HSM  Lymph/Hematologic: no cervical LAD  Genitourinary: deferred  Skin: no rashes or lesions grossly  Musculoskeletal: no deformities or arthritis  Neurologic: CN II-XII, SOLOMON, sensation grossly intact  Psychiatric: mood and affect wnl    Data   Data reviewed today:  I personally reviewed the EKG which was unremarkable..    Recent Labs  Lab 01/07/18  1853   WBC 8.1   HGB 12.9*   MCV 90         POTASSIUM 4.6   CHLORIDE 99   CO2 32   BUN 28   CR 1.51*   ANIONGAP 6   LEXIS 9.9   *   ALBUMIN 3.9   PROTTOTAL 7.4   BILITOTAL 0.8   ALKPHOS 134   ALT 25   AST 23   TROPI <0.015       Recent Results (from the past 24 hour(s))   MR Brain w/o & w Contrast    Narrative    MRI OF THE BRAIN WITHOUT AND WITH CONTRAST 1/7/2018 9:06 PM     COMPARISON: Head CT 11/28/2017.    HISTORY:  Confusion.     TECHNIQUE: Axial diffusion-weighted with ADC map, axial T2-weighted  with fat saturation, axial T1-weighted, axial turboFLAIR and coronal  T1-weighted images of the brain were acquired without intravenous  contrast.  Following intravenous administration of gadolinium (6 mL  Gadavist), axial T1-weighted images of the brain were acquired.     FINDINGS: There is moderate diffuse cerebral volume loss. There are  extensive confluent periventricular areas of abnormal T2 signal  hyperintensity in the cerebral white matter bilaterally that are  consistent with sequela of chronic small vessel ischemic disease.    The ventricles and basal cisterns are within normal limits in  configuration given the degree of cerebral volume loss.   There is no  midline shift.  There are no extra-axial fluid collections.  There is  no evidence for stroke or acute intracranial hemorrhage.  There is no  abnormal contrast enhancement in the brain or its coverings.    There is no sinusitis or mastoiditis.      Impression    IMPRESSION: Diffuse cerebral volume loss and cerebral white matter  changes consistent with chronic small vessel ischemic disease. No  evidence for acute intracranial pathology.      GAGE GARRETT MD

## 2018-01-08 NOTE — PHARMACY-ADMISSION MEDICATION HISTORY
Admission medication history interview status for the 1/7/2018  admission is complete. See EPIC admission navigator for prior to admission medications     Medication history source reliability:Good    Actions taken by pharmacist (provider contacted, etc):None     Additional medication history information not noted on PTA med list :None    Medication reconciliation/reorder completed by provider prior to medication history? No    Time spent in this activity: 20min    Prior to Admission medications    Medication Sig Last Dose Taking? Auth Provider   TRAMADOL HCL PO Take 50 mg by mouth 3 times daily 1/7/2018 at Unknown time Yes Unknown, Entered By History   Acetaminophen (TYLENOL PO) Take 500 mg by mouth 3 times daily 1/7/2018 at Unknown time Yes Unknown, Entered By History   cyanocobalamin (VITAMIN  B-12) 1000 MCG tablet Take 1,000 mcg by mouth daily 1/7/2018 at Unknown time Yes Unknown, Entered By History   Furosemide (LASIX PO) Take 20 mg by mouth daily 1/7/2018 at Unknown time Yes Unknown, Entered By History   levothyroxine (SYNTHROID/LEVOTHROID) 88 MCG tablet TAKE ONE TABLET BY MOUTH ONCE DAILY 1/7/2018 at Unknown time Yes Alirio Fox MD   donepezil (ARICEPT) 10 MG tablet TAKE ONE TABLET BY MOUTH AT BEDTIME 1/6/2018 at pm Yes Alirio Fox MD   atorvastatin (LIPITOR) 20 MG tablet Take 1 tablet (20 mg) by mouth daily 1/6/2018 at pm Yes Tyrell Jackson MD   Multiple Vitamins-Minerals (MULTIVITAL) TABS Take 1 tablet by mouth daily 1/7/2018 at Unknown time Yes Unknown, Entered By History   pantoprazole (PROTONIX) 40 MG EC tablet TAKE ONE TABLET BY MOUTH EVERY MORNING 1/7/2018 at Unknown time Yes Alirio Fox MD   metoprolol (TOPROL-XL) 25 MG 24 hr tablet TAKE ONE TABLET BY MOUTH DAILY 1/7/2018 at Unknown time Yes Alirio Fox MD   aspirin 81 MG tablet Take 1 tablet (81 mg) by mouth daily 1/6/2018 at pm Yes lAirio Fox MD   tamsulosin (FLOMAX) 0.4 MG capsule TAKE ONE CAPSULE BY MOUTH ONCE DAILY  1/6/2018 at hs Yes Alirio Fox MD   amLODIPine (NORVASC) 10 MG tablet TAKE ONE TABLET BY MOUTH ONCE DAILY 1/6/2018 at pm Yes Alirio Fox MD   gabapentin (NEURONTIN) 100 MG capsule TAKE ONE CAPSULE BY MOUTH THREE TIMES DAILY FOR  PAIN 1/7/2018 at Unknown time Yes Alirio Fox MD   losartan (COZAAR) 100 MG tablet Take 1 tablet (100 mg) by mouth daily 1/7/2018 at Unknown time Yes Alirio Fox MD   order for DME Equipment being ordered: TENS and supplies.   Lissy Galeana, CELIA CNP

## 2018-01-08 NOTE — ED NOTES
"[facility]  ED Nurse Handoff Report    ED Chief complaint: Altered Mental Status (wife stateds he has had increased confusion starting yesterday)      ED Diagnosis:   Final diagnoses:   None       Code Status: DNI    Allergies:   Allergies   Allergen Reactions     Contrast Dye      SOB, increased Bp, difficulty swallowing. Date 6/3/96 (ipaque contrast)  Was premedicated prior to FRANCK and had no reaction at all (solumedrol and benadryl) 2016  Was premedicated with Methylprednisolone protocol, no reaction 1/25/17     Lisinopril      cough     Oxycodone      Delirium       Activity level - Baseline/Home:  Stand with Assist    Activity Level - Current:   Stand with Assist     Needed?: No    Isolation: No  Infection: Not Applicable    Bariatric?: No    Vital Signs:   Vitals:    01/07/18 2015 01/07/18 2130 01/07/18 2145 01/07/18 2200   BP: 158/77      Pulse:       Resp: 17 21 21 19   Temp:       TempSrc:       SpO2: 96% 96% 97% 97%       Cardiac Rhythm: ,        Pain level: 0-10 Pain Scale: 3    Is this patient confused?: Yes    Patient Report: Initial Complaint: Pt presents from home d/t altered mental status. He is A&Ox2, knowing his name and that he's in a hospital. When asked the year he states \"1972\" and intermittently able to name basic objects and colors when questioned. He does not try to get out of bed and is redirectable. Wife states this is not baseline for him. He is NSR; slightly hypertensive. Desats occasionally to 88% while sleeping; placed on 3L O2 NC. No influenza or cold-like symptoms this past week and no UTI symptoms per pt. Only complaint of pain is back pain 3/10.   Tests Performed: labs drawn, ekg, UA, MRI of head done  Abnormal Results: kidney function is decreased, but everything else is normal.  Treatments provided: 3L O2 NC    Family Comments: wife will be going home shortly; she will be returning tomorrow morning.     OBS brochure/video discussed/provided to patient: " Yes    ED Medications:   Medications   naloxone (NARCAN) injection 0.4 mg (not administered)   naloxone (NARCAN) 0.4 MG/ML injection (0.4 mg  Given 1/7/18 3277)   gadobutrol (GADAVIST) injection 6 mL (6 mLs Intravenous Given 1/7/18 2101)       Drips infusing?:  No      ED NURSE PHONE NUMBER: [phone]

## 2018-01-08 NOTE — PLAN OF CARE
Problem: Patient Care Overview  Goal: Plan of Care/Patient Progress Review  OT: orders rec'd and chart reviewed. Pt admitted under observation, pt with baseline dementia and undergoing tests for possible influenza. Spoke with PT. Will defer OT to next level of care as needed and complete inpt OT orders.

## 2018-01-08 NOTE — ED PROVIDER NOTES
History     Chief Complaint:  Altered Mental Status    HPI   Chaz Soler is a 89 year old male with a history of dementia and chronic shortness of breath on 2L oxygen at baseline who presents with altered mental status. EMS reports that the wife last noted the patient to be normal at 0900 this morning. She states he has becoming progressively more confused throughout the day. He sat down and did not want to get up until 1700 tonight, when she urged him to go to the bathroom. She noted him to be acutely more confused, so she called EMS for transport to the ED and further evaluation and treatment. The patient denies headache, nausea or vomiting.     Allergies:  Contrast Dye  Lisinopril  Oxycodone      Medications:    Levothyroxine  Aricept  Lipitor  Cymbalta  Emla cream  Tylenol  Protonix  Metoprolol  Aspirin  Flomax  Norvasc  Neurontin  Losartan     Past Medical History:    AAA  Anemia  Aortic stenosis  CAD  Carotid artery disease  CHF  CKD  COPD  Dementia  Hypertension  Hypercalcemia  Hyperlipidemia  Hyperparathyroidism  Hyperplasia of prostate  Left lung granuloma  Lumbago  Chronic pain  Proteinuria  Pulmonary fibrosis  PVD  Thrombocytopenia  Hypothyroidism    Past Surgical History:    Colonoscopy  Lumbar discectomy  Endovascular AAA repair x2  Cholecystectomy x2  Aortic valve implant  Vasectomy    Family History:    Hypertension - mother, father    Social History:  Smoking status: no  Alcohol use: yes   PCP: Alirio Fox   Patient presents via EMS.   Marital Status:       Review of Systems   Gastrointestinal: Negative for nausea and vomiting.   Neurological: Negative for headaches.   Psychiatric/Behavioral: Positive for confusion.     10 point review of systems performed and is negative except as above and in HPI.     Physical Exam     Patient Vitals for the past 24 hrs:   BP Temp Temp src Pulse Heart Rate Resp SpO2   01/07/18 2245 131/82 - - - 62 16 -   01/07/18 2230 150/77 - - - 80 22 98 %    01/07/18 2200 - - - - 70 19 97 %   01/07/18 2145 - - - - 73 21 97 %   01/07/18 2130 - - - - 71 21 96 %   01/07/18 2015 158/77 - - - 69 17 96 %   01/07/18 2000 (!) 162/119 - - - 72 22 99 %   01/07/18 1945 (!) 136/95 - - - 72 13 97 %   01/07/18 1934 154/77 - - - 74 24 97 %   01/07/18 1930 - - - - 71 20 96 %   01/07/18 1915 - - - - 76 18 98 %   01/07/18 1853 169/62 98.2  F (36.8  C) Oral 78 - 16 94 %      Physical Exam  General: Resting on the gurney, appears comfortable  Head:  The scalp, face, and head appear normal  Mouth/Throat: Mucus membranes are slightly dry  CV:  Regular rate    Normal S1 and S2  No pathological murmur   Resp:  Breath sounds clear and equal bilaterally    Non-labored, no retractions or accessory muscle use    No coarseness    No wheezing   GI:  Abdomen is soft, no rigidity    No tenderness to palpation  MS:  Normal motor assessment of all extremities.    Good capillary refill noted.  Skin:   No rash or lesions noted.  Neuro:  Speech is normal and fluent. No apparent deficit. Cranial nerves 2-12 intact. Sensation intact x4. Strength intact x4. Able to name basic objects but has difficulty answering most questions or naming colors. Not oriented to time, oriented to place.   Psych:  Awake. Alert.  Normal affect.      Appropriate interactions.     Emergency Department Course   ECG (19:03):  Rate 77 bpm. AK interval 2034. QT/QTc 402/454.   Normal sinus rhythm. Left axis deviation. Moderate voltage criteria for LVH, may be normal variant. Cannot rule out septal infarct, age undetermined. Abnormal ECG.  Interpreted by Victoriano Aldana MD.     Imaging:  Radiographic findings were communicated with the patient and family who voiced understanding of the findings.    MR Brain w/o & w Contrast  Diffuse cerebral volume loss and cerebral white matter  changes consistent with chronic small vessel ischemic disease. No  evidence for acute intracranial pathology.    As read per  radiology.    Laboratory:  CBC: HGB 12.9 (L), o/w WNL (WBC 8.1, )   CMP: Glucose 117 (H), Creatinine 1.51 (H), GFR 44 (L), o/w WNL  Troponin: <0.015    UA: Protein albumin 100, o/w WNL    Interventions:  1857: Narcan 0.4 mg injection  2101: Gadavist 6 mL IV    Emergency Department Course:  Past medical records, nursing notes, and vitals reviewed.  1850: I performed an exam of the patient and obtained history, as documented above. GCS 15.   IV inserted and blood drawn.   The patient was taken for MRI, see imaging results above.    2203: I rechecked the patient and updated the family. The wife is concerned for managing his care alone. Findings and plan explained to the Patient and spouse who consents to admission.     2212: Discussed the patient with Dr. Villafuerte, who will admit the patient to an observation bed for further monitoring, evaluation, and treatment.      Impression & Plan      Medical Decision Making:  Chaz Soler is a 89 year old male presents to the ED with increasing confusion. Patient today sat in the chair all day and was unable to stand up for his wife. Patient has had worsening symptoms consistent with dementia for the past 6 months at least, but today was worse. Lab work was undertaken and unremarkable. UA was obtained without signs of infection. Patient has been largely at his baseline other than being unable to move from a chair. His wife is concerned for his safety at home as she is unable to lift and move him and therefore requests admission. I did not see a dangerous etiology for his worsening weakness, he moves all extremities equally, and has decent strength. He will be placed on observation status for further evaluation and treatment.    Diagnosis:    ICD-10-CM    1. Altered mental status R41.82 UA with Microscopic       Disposition:  Admitted to observation      Evelina Muñoz  1/7/2018    EMERGENCY DEPARTMENT  I, Evelina Muñoz, am serving as a scribe at 6:52 PM on 1/7/2018 to  document services personally performed by Dr. Rodriguez  based on my observations and the provider's statements to me.        Reina Rodriguez MD  01/09/18 2084

## 2018-01-08 NOTE — PLAN OF CARE
Problem: Patient Care Overview  Goal: Plan of Care/Patient Progress Review  PT: order received; Per Observation rounds patient is weak and currently needing assist of 2; At baseline patient has dementia and  lives with his spouse; Patient undergoing testing this am for possible Influenza. Hold evaluation today per rounds recommendations.

## 2018-01-08 NOTE — PLAN OF CARE
Problem: Patient Care Overview  Goal: Plan of Care/Patient Progress Review  Outcome: Improving  diagnostic tests and consults completed and resulted - partially met  -vital signs normal or at patient baseline - not met, elevated bp in 170s  -returns to baseline functional status- Not met   -safe disposition plan has been identified - Not met  Nurse to notify provider when observation goals have been met and patient is ready for discharge.

## 2018-01-09 NOTE — PROGRESS NOTES
01/09/18 1034   Quick Adds   Type of Visit Initial PT Evaluation   Living Environment   Lives With spouse   Living Arrangements apartment  (senior co-op)   Home Accessibility no concerns   Number of Stairs to Enter Home 0   Number of Stairs Within Home 0   Self-Care   Usual Activity Tolerance moderate   Current Activity Tolerance fair   Regular Exercise no   Equipment Currently Used at Home walker, rolling   Functional Level Prior   Ambulation 1-->assistive equipment   Transferring 1-->assistive equipment   Fall history within last six months yes   Number of times patient has fallen within last six months 1   Which of the above functional risks had a recent onset or change? ambulation;transferring   General Information   Onset of Illness/Injury or Date of Surgery - Date 01/07/18   Referring Physician Alfonso Villafuerte MD   Patient/Family Goals Statement return home   Pertinent History of Current Problem (include personal factors and/or comorbidities that impact the POC) Admitted under observation status with weakness. PMH: COPD, dementia, CKD, TAVR.   Precautions/Limitations fall precautions   Weight-Bearing Status - LLE full weight-bearing   Weight-Bearing Status - RLE full weight-bearing   General Observations spouse present   Cognitive Status Examination   Orientation person   Level of Consciousness alert   Follows Commands and Answers Questions 100% of the time;able to follow single-step instructions   Personal Safety and Judgment impaired   Memory impaired   Pain Assessment   Patient Currently in Pain No   Posture    Posture Forward head position;Protracted shoulders   Range of Motion (ROM)   ROM Quick Adds No deficits were identified   Strength   Strength Comments B hip flex 3+/5, knee ext 4/5, DF 3+/5   Bed Mobility   Bed Mobility Comments NT pt in chair   Transfer Skills   Transfer Comments Sit to stand with SBA and FWW   Gait   Gait Comments Pt amb 10 ft with FWW and CGA   Balance   Balance Comments  "Balance dec with gait   General Therapy Interventions   Planned Therapy Interventions gait training;transfer training   Clinical Impression   Criteria for Skilled Therapeutic Intervention yes, treatment indicated   PT Diagnosis Difficulty ambulating   Influenced by the following impairments Dec strength, balance, activity tolerance   Functional limitations due to impairments Diffiuclty ambulating and transferring   Clinical Presentation Stable/Uncomplicated   Clinical Presentation Rationale medically stable   Clinical Decision Making (Complexity) Low complexity   Therapy Frequency` daily   Predicted Duration of Therapy Intervention (days/wks) 1 day   Anticipated Discharge Disposition Home with Home Therapy   Risk & Benefits of therapy have been explained Yes   Patient, Family & other staff in agreement with plan of care Yes   [facility] TM \"6 Clicks\"   2016, Trustees of [facility], under license to [employer].  All rights reserved.   6 Clicks Short Forms Basic Mobility Inpatient Short Form   [facility]  \"6 Clicks\" V.2 Basic Mobility Inpatient Short Form   1. Turning from your back to your side while in a flat bed without using bedrails? 4 - None   2. Moving from lying on your back to sitting on the side of a flat bed without using bedrails? 3 - A Little   3. Moving to and from a bed to a chair (including a wheelchair)? 3 - A Little   4. Standing up from a chair using your arms (e.g., wheelchair, or bedside chair)? 3 - A Little   5. To walk in hospital room? 3 - A Little   6. Climbing 3-5 steps with a railing? 2 - A Lot   Basic Mobility Raw Score (Score out of 24.Lower scores equate to lower levels of function) 18   Total Evaluation Time   Total Evaluation Time (Minutes) 10     "

## 2018-01-09 NOTE — PLAN OF CARE
Problem: Patient Care Overview  Goal: Plan of Care/Patient Progress Review  Outcome: No Change  A&Ox1- somnolent this am but arouse to gentle touch. Chest xray (-), arterial blood gases mildly elevated- PA aware. Up w/1 to chair this afternoon and ate a Regular diet. BP elevated, given scheduled meds but prn meds available is SBP >180. Otherwise VSS on 4L NC (baseline). Tele NSR. IVF @ 75ml/hr. Incontinent at times. PT to saritha. Wife Violet here today, has left for the evening and will be back tomorrow.

## 2018-01-09 NOTE — PROGRESS NOTES
Care Transition Initial Assessment - PETER  Reason For Consult: discharge planning  Met with: Patient and Family    Active Problems:    Weakness         DATA  Lives With: spouse  Living Arrangements: apartment  Description of Support System: Supportive, Involved  Who is your support system?: Wife, Children  Support Assessment: Adequate family and caregiver support.   Identified issues/concerns regarding health management:    SW received request for consult to assist with discharge planning. Patient admitted Observation Status on 1/8/18 due to Weakness. Tentative discharge date 1/9/18. SW met with patient and patient's wife Violet. Patient and his wife currently reside in an apartment at a senior co-op. Patient is mostly independent at baseline, and uses an assistive device for ambulation. Patient uses home oxygen at baseline as well. SW discussed recommendation of home RN/PT/OT/HHA services at discharge. Patient and patient's wife are in agreement with this. Patient's wife feels patient has returned to his baseline with mobility, and is more alert than yesterday. Patient and patient's wife stated they have used [facility] in the past, and this would be their preference again. Patient and patient's wife discussed that they also really enjoyed having Tiff from home PT, and would prefer to have her again if possible. SW sent referral per protocol, and updated [facility] regarding patient's request. Patient and patient's wife have no other questions or concerns at this time, and are in agreement with the discharge plan.     Resources List: Home Care     Quality Of Family Relationships: supportive, helpful, involved  Transportation Available: family or friend will provide      ASSESSMENT  Cognitive Status:  awake, alert and oriented  Concerns to be addressed: discharge planning.       PLAN  Financial costs for the patient includes: N/A.  Patient given options and choices for discharge: Discussed  recommendation of home care.  Patient/family is agreeable to the plan?  Yes  Patient Goals and Preferences: return home with home care.  Patient anticipates discharging to: Home with [facility] RN/PT/OT/HHA.    ALYSHA Deluca, LGSW

## 2018-01-09 NOTE — PLAN OF CARE
Problem: Patient Care Overview  Goal: Plan of Care/Patient Progress Review  Outcome: Improving  diagnostic tests and consults completed and resulted - Partially met  -vital signs normal or at patient baseline - Partially met  -returns to baseline functional status- Partially met   -safe disposition plan has been identified - Not met  Nurse to notify provider when observation goals have been met and patient is ready for discharge.

## 2018-01-09 NOTE — PLAN OF CARE
Problem: Patient Care Overview  Goal: Plan of Care/Patient Progress Review  PT: Orders received, eval completed, treatment initiated. Pt admitted under observation status with weakness. Prior to admit pt was living with his spouse in a senior co-op apartment, uses a 4ww, independent with mobility but is not very steady per spouse. Pt is a household ambulator for distances due to his COPD and needs to rest in the home at times too. Pt demonstrates dec strength, balance, activity tolerance and difficulty ambulating and transferring and would benefit from skilled PT services in order to improve this.    Discharge Planner PT   Patient plan for discharge: Return home  Current status: Currently requires SBA sit to/from stand with FWW from chair and toilet, CGA for gait of 10 ft and 50 ft with FWW on 4L O2 (baseline) with flexed posture and knees and dec safety with walker noted.  Barriers to return to prior living situation: None  Recommendations for discharge: Return home with home PT, OT. Wife feels pt is at baseline and she can assist as she normally does.  Rationale for recommendations: Pt would benefit from continued PT to improve strength, balance, mobility to increase independence, reduce falls risk and increase safety before returning home.       Entered by: Bernadette Banegas 01/09/2018 11:06 AM

## 2018-01-09 NOTE — PLAN OF CARE
Problem: Patient Care Overview  Goal: Plan of Care/Patient Progress Review  Outcome: Adequate for Discharge Date Met: 01/09/18    A&Ox2. Up w/1. BP elevated, given scheduled meds but prn meds available is SBP >180. Otherwise VSS on 4L NC (baseline). Tele NSR. IVF @ 75ml/hr. Incontinent at times. PT eval complete.     Pt given discharge instructions. Questions answered. Discharge medications given and reviewed with patient. Follow up appointment scheduled for 1/18, pt aware. Pt to DC home with wife.

## 2018-01-09 NOTE — PROGRESS NOTES
"\"Medicare Outpatient Observation Notice\" brochure was given & explained to the patient and wife. Patient and wife verbalized understanding and denies any questions at this time.    "

## 2018-01-09 NOTE — PLAN OF CARE
Problem: Patient Care Overview  Goal: Plan of Care/Patient Progress Review  Outcome: Improving  diagnostic tests and consults completed and resulted - Met  -vital signs normal or at patient baseline - Met  -returns to baseline functional status- Met   -safe disposition plan has been identified - Met  Nurse to notify provider when observation goals have been met and patient is ready for discharge.    Plan for discharge

## 2018-01-10 NOTE — TELEPHONE ENCOUNTER
See care coordination order.  This encounter closed     Pebbles Leija RN / Clinical Care Coordinator     [address] [address]  [email or web address]  Office :  [phone] / Fax :  [phone]

## 2018-01-10 NOTE — PROGRESS NOTES
Clinic Care Coordination Contact  Care Coordination Communication    Referral Source: IP/TCU Report    Clinical Data: Patient was hospitalized at [facility]  from 1/7 to 1/9/2017 with diagnosis of   Altered mental status  Weakness    Home Care Contact:              Home Care Agency: [facility]               Contact name () and phone number: Fatimah Lozano [phone]              Care Coordination contacted home care: Yes              Anticipated start of care date: Today 1/10/2017    Patient Contact:               Introduced self and role of care coordination.               Discharge instructions were reviewed with patient/caregiver.               Do you have any questions about your medications? No              Follow up appointment is scheduled for 1/18/2017.              Provided 24 Hour Nurse Line and/or 24 Hour Appointment Scheduling: Yes              Home care has contacted patient: Yes              Patient questions/concerns: CC spoke to wife and she reports Fatimah Lozano [facility] RN just left and reviewed medications and discharge instructions .  Patients back pain is well controlled today .    Plan: RN/SW Care Coordinator will await notification from home care staff informing RN/SW Care Coordinator of patients discharge plans/needs. RN/SW Care Coordinator will review chart and outreach to home care every 4 weeks and as needed.      Pebbles Leija RN / Clinical Care Coordinator     [facility]  [address] [place] [address]  [email or web address]  Office :  [phone] / Fax :  [phone]

## 2018-01-10 NOTE — TELEPHONE ENCOUNTER
Fatimah, [facility] calling for additional home care orders.  Okayed per nursing standing orders.    Skilled nurse 1x week for one week, 2x week for 3 weeks, 1x week for 2 weeks, 2 PRN and PT to eval and treat.       Gifty Chi RN

## 2018-01-11 NOTE — PROGRESS NOTES
Spoke with pt's wife. Nose bleeding stopped. Was never seen in ER. Saw Dr Camacho earlier today and no bleeding issues when seen. If recurrence, wife to call clinic and will get pt into a ENT clinic for possible cauterization/other

## 2018-01-11 NOTE — MR AVS SNAPSHOT
After Visit Summary   1/11/2018    Chaz Soler    MRN: [id number]           Patient Information     Date Of Birth          5/21/1928        Visit Information        Provider Department      1/11/2018 11:45 AM Suhail Camacho MD [facility]  [facility] Center      Today's Diagnoses     History of endovascular stent graft for abdominal aortic aneurysm    -  1    Carotid stenosis, asymptomatic, bilateral           Follow-ups after your visit        Follow-up notes from your care team     Return in about 1 year (around 1/11/2019).      Your next 10 appointments already scheduled     Jan 12, 2018  8:00 AM CST   LAB with RU LAB   [facility] [place] ([facility])    [address]  [facility] [id number] [phone]           Please do not eat 10-12 hours before your appointment if you are coming in fasting for labs on lipids, cholesterol, or glucose (sugar). This does not apply to pregnant women. Water, hot tea and black coffee (with nothing added) are okay. Do not drink other fluids, diet soda or chew gum.            Froy 15, 2018 12:45 PM CST   Return Visit with Tyrell Jackson MD   [facility] ([facility])    [address]  [place] [id number] [phone]            Jan 18, 2018  3:00 PM CST   Office Visit with Alirio Fox MD   [facility] ([facility])    [address] [address] [phone]           Bring a current list of meds and any records pertaining to this visit. For Physicals, please bring immunization records and any forms needing to be filled out. Please arrive 10 minutes early to complete paperwork.              Who to contact     If you have questions or need follow up information about today's clinic visit or your  schedule please contact [facility] directly at [phone].  Normal or non-critical lab and imaging results will be communicated to you by MyChart, letter or phone within 4 business days after the clinic has received the results. If you do not hear from us within 7 days, please contact the clinic through MyChart or phone. If you have a critical or abnormal lab result, we will notify you by phone as soon as possible.  Submit refill requests through [employer] or call your pharmacy and they will forward the refill request to us. Please allow 3 business days for your refill to be completed.          Additional Information About Your Visit        [employer] Information     [employer] gives you secure access to your electronic health record. If you see a primary care provider, you can also send messages to your care team and make appointments. If you have questions, please call your primary care clinic.  If you do not have a primary care provider, please call [phone] and they will assist you.        Care EveryWhere ID     This is your Care EveryWhere ID. This could be used by other organizations to access your [facility] medical records  [id number]        Your Vitals Were     Pulse                   65            Blood Pressure from Last 3 Encounters:   01/11/18 164/79   01/09/18 124/81   01/02/18 164/83    Weight from Last 3 Encounters:   01/09/18 150 lb 3.2 oz (68.1 kg)   01/02/18 140 lb (63.5 kg)   11/28/17 148 lb (67.1 kg)              Today, you had the following     No orders found for display       Primary Care Provider Office Phone # Fax #    Alirio Fox [phone]       [address] [place] [address]        Equal Access to Services     JUSTIN [facility] : Hadii laura Boyd, sandhya ricketts, qajohnny bernard. So [facility] [phone].    ATENCIÓN: Si habla español, tiene a denise disposición servicios gratuitos de  asistencia lingüística. Coleen al [phone].    We comply with applicable federal civil rights laws and [place] laws. We do not discriminate on the basis of race, color, national origin, age, disability, sex, sexual orientation, or gender identity.            Thank you!     Thank you for choosing [facility]  for your care. Our goal is always to provide you with excellent care. Hearing back from our patients is one way we can continue to improve our services. Please take a few minutes to complete the written survey that you may receive in the mail after your visit with us. Thank you!             Your Updated Medication List - Protect others around you: Learn how to safely use, store and throw away your medicines at www.disposemymeds.org.          This list is accurate as of: 1/11/18 12:31 PM.  Always use your most recent med list.                   Brand Name Dispense Instructions for use Diagnosis    amLODIPine 10 MG tablet    NORVASC    90 tablet    TAKE ONE TABLET BY MOUTH ONCE DAILY    Essential hypertension       aspirin 81 MG tablet     30 tablet    Take 1 tablet (81 mg) by mouth daily    Coronary artery disease involving native coronary artery of native heart without angina pectoris       atorvastatin 20 MG tablet    LIPITOR    30 tablet    Take 1 tablet (20 mg) by mouth daily    Coronary artery disease involving native coronary artery of native heart without angina pectoris       cyanocobalamin 1000 MCG tablet    vitamin  B-12     Take 1,000 mcg by mouth daily        donepezil 10 MG tablet    ARICEPT    90 tablet    TAKE ONE TABLET BY MOUTH AT BEDTIME    Memory loss       gabapentin 100 MG capsule    NEURONTIN    90 capsule    TAKE ONE CAPSULE BY MOUTH THREE TIMES DAILY FOR  PAIN    Compression fracture       LASIX PO      Take 20 mg by mouth daily        levothyroxine 88 MCG tablet    SYNTHROID/LEVOTHROID    90 tablet    TAKE ONE TABLET BY MOUTH ONCE DAILY    Hypothyroidism,  unspecified type       losartan 100 MG tablet    COZAAR    90 tablet    Take 1 tablet (100 mg) by mouth daily    Essential hypertension       metoprolol 25 MG 24 hr tablet    TOPROL-XL    90 tablet    TAKE ONE TABLET BY MOUTH DAILY    Essential hypertension       MULTIVITAL Tabs      Take 1 tablet by mouth daily        order for DME     1 each    Equipment being ordered: TENS and supplies.    Other chronic pain, Lumbar radiculopathy, DDD (degenerative disc disease), lumbar, Spinal stenosis of lumbar region without neurogenic claudication       pantoprazole 40 MG EC tablet    PROTONIX    90 tablet    TAKE ONE TABLET BY MOUTH EVERY MORNING    Gastroesophageal reflux disease, esophagitis presence not specified       tamsulosin 0.4 MG capsule    FLOMAX    90 capsule    TAKE ONE CAPSULE BY MOUTH ONCE DAILY    Benign prostatic hyperplasia with urinary retention       TRAMADOL HCL PO      Take 50 mg by mouth 3 times daily        TYLENOL PO      Take 500 mg by mouth 3 times daily

## 2018-01-11 NOTE — PROGRESS NOTES
[facility]    Chaz Soler along with his wife and daughter return to see us today for follow-up of his asymptomatic right greater than left carotid stenosis.    He also has a history of the AAA and underwent EVAR repair on 11/19/2011 by Dr. Aguilar.  Last CTA performed on 6/15/2017 revealed a stable endograft in aneurysm sac measuring 5.2 x 3.6 cm.    Patient lives with his wife.  He is oxygen dependency.  Amatory status is limited.  He has no problems with his feet.  He wears excellent foot wear to prevent pressure ulcerations.    PMH: Medications: Cozaar, Aricept, Toprol-XL, Norvasc, Lasix, aspirin, Synthroid, Flomax, Protonix, Neurontin.            Medical problems include: Hypertension, GERD, hypothyroidism, BPH,                            CHF.           He has  undergone the endovascular repair of his AAA and also a TAVAR.             Non-smoker.  On oxygen at home.    Patient was noted to have some confusion.  He was seen in the emergency department had an MRI performed on 1/7/2018 that was reviewed.  This revealed no acute changes.    EXAM: Very pleasant alert talkative gentleman.  Quite comfortable.  Blood pressure 164/79.  Pulse 65 regular.  Normal affect.  Soft right carotid bruit.  No left bruit.  No cervical adenopathy.  Chest= clear.  Cardiovascular  = regular rate  Abdomen= soft and nontender  Extremities= no edema, no ulcerations.  +3 palpable popliteal pulses bilaterally.    Carotid duplex was reviewed which revealed calcified plaque and shadowing of the proximal ICA.  Velocities are elevated to 300 cm/s but by visual left stenosis is appreciated.  On the left the velocity is 151 cm/s.                This was compared to the study from 6/2/2017 are essentially unchanged.      Recent laboratory: Potassium= 4.1 serum creatinine = 1.25   Hemoglobin= 12.0      Impression: #1   Moderately severe right greater than left carotid bifurcation disease.  There is calcification making the  velocity somewhat unreliable.  With the patient's multiple medical problems and lack of symptoms and also stability compared to the study a year and a half ago I would not recommend any surgical treatment and discussed the reasons for this with the patient and his family and they concur.  Repeat exam in 1 year.                           #2  Prior AAA EVAR repair in 2011.  Study last June revealed no complications.  This would be repeated again in June 2018.      Suhail Camacho MD     Please route or send letter to:  Primary Care Provider (PCP)

## 2018-01-11 NOTE — NURSING NOTE
"Chief Complaint   Patient presents with     RECHECK     History of bilateral carotid stenosis; 6 months follow up to 7/6/2017 appointment with Dr. Camacho - (11:00 [facility]; 11:45 [facility])        Initial /79 (BP Location: Right arm, Patient Position: Chair, Cuff Size: Adult Regular)  Pulse 65 Estimated body mass index is 22.84 kg/(m^2) as calculated from the following:    Height as of 10/1/17: 5' 8\" (1.727 m).    Weight as of 1/9/18: 150 lb 3.2 oz (68.1 kg).  Medication Reconciliation: complete     Aylin Diggs MA   "

## 2018-01-11 NOTE — LETTER
[facility] [address]  [address]  [place] [address]  Phone: [phone]  Fax: [phone]    2018    Re: Chaz Soler, : 1928    Chaz Soler along with his wife and daughter return to see us today for follow-up of his asymptomatic right greater than left carotid stenosis.     He also has a history of the AAA and underwent EVAR repair on 2011 by Dr. Aguilar.  Last CTA performed on 6/15/2017 revealed a stable endograft in aneurysm sac measuring 5.2 x 3.6 cm.     Patient lives with his wife.  He is oxygen dependency.  Amatory status is limited.  He has no problems with his feet.  He wears excellent foot wear to prevent pressure ulcerations.     PMH: Medications: Cozaar, Aricept, Toprol-XL, Norvasc, Lasix, aspirin, Synthroid, Flomax, Protonix, Neurontin.            Medical problems include: Hypertension, GERD, hypothyroidism, BPH,                            CHF.           He has  undergone the endovascular repair of his AAA and also a TAVAR.              Non-smoker.  On oxygen at home.     Patient was noted to have some confusion.  He was seen in the emergency department had an MRI performed on 2018 that was reviewed.  This revealed no acute changes.     EXAM: Very pleasant alert talkative gentleman.  Quite comfortable.  Blood pressure 164/79. Pulse 65 regular.  Normal affect.  Soft right carotid bruit.  No left bruit.  No cervical adenopathy.  Chest= clear.  Cardiovascular  = regular rate  Abdomen= soft and nontender  Extremities= no edema, no ulcerations.  +3 palpable popliteal pulses bilaterally.     Carotid duplex was reviewed which revealed calcified plaque and shadowing of the proximal ICA.  Velocities are elevated to 300 cm/s but by visual left stenosis is appreciated.  On the left the velocity is 151 cm/s.     This was compared to the study from 2017 are essentially unchanged.     Recent laboratory: Potassium= 4.1 serum creatinine =  1.25   Hemoglobin= 12.0     Impression: #1   Moderately severe right greater than left carotid bifurcation disease.  There is calcification making the velocity somewhat unreliable.  With the patient's multiple medical problems and lack of symptoms and also stability compared to the study a year and a half ago I would not recommend any surgical treatment and discussed the reasons for this with the patient and his family and they concur.  Repeat exam in 1 year.                          #2  Prior AAA EVAR repair in 2011.  Study last June revealed no complications.  This would be repeated again in June 2018.     Suhail Camacho MD

## 2018-01-11 NOTE — PROGRESS NOTES
Clinic Care Coordination RN :    Incoming call from Violet.  Violet states the patient has an uncontrolled nose bleed all morning and unable to use his oxygen .  Wife will continue to pinch nose apply pressure .   CC advised a ED visit for possible cautery or nose packing  . Violet agrees with the plan     Pebbles Leija RN / Clinical Care Coordinator     [address] [address]  [email or web address]  Office :  [phone] / Fax :  [phone]

## 2018-01-12 NOTE — TELEPHONE ENCOUNTER
Received note from CC that pt's wife called that pt having nose bleeds again today. Will have pt seen by ENT today and will have CC contact pt/wife and call ENT to see about getting pt in acutely today.  Please call [facility] or Arleen [phone] to get pt in today. Referral placed

## 2018-01-12 NOTE — TELEPHONE ENCOUNTER
CC made an appointment with the [facility] office this morning at 9:30 . Violet/wife informed     Pebbles Leija RN / Clinical Care Coordinator     [address] [address]  [email or web address]  Office :  [phone] / Fax :  [phone]

## 2018-01-15 NOTE — PROGRESS NOTES
REFERRING PHYSICIAN:  Dr. Alirio Fox      HISTORY OF PRESENT ILLNESS:  It is my pleasure to see your patient, Chaz Soler, who is a very pleasant 89-year-old gentleman who as you know had severe symptomatic aortic stenosis.  He underwent TAVR aortic valve replacement in 11/2014.  This was very successful.  The last echocardiogram that was performed was 6 months ago.  This showed that the mean gradient across the aortic valve was normal at 9 mmHg.  The patient's ejection fraction is normal at 55%-60%.  A transapical approach for the valve replacement was employed in this patient.  The patient has mild coronary artery disease with no lesion greater than 50%.  He is on high-intensity statin therapy.  His LDL is 67, HDL 81 and triglycerides 122 with a total cholesterol of 172.  Liver function tests were normal with an ALT of 20.  His blood pressure today is excellent at 120/56.  This patient has idiopathic pulmonary fibrosis and is on home oxygen.  Of course, the family has noticed that he has reduced levels of consciousness, sometimes with marked sleepiness and not being able to eat.  They think that there is a significant problem with his oxygen concentrator.  When they plug his oxygen into the wall, his oxygen saturations are in the 90s.  When he is on his oxygen concentrator, his O2 sats are in the 70s.  O2 sats in 70s obviously could cause a significant amount of mental depression.      IMPRESSION:   1.  Status post TAVR.  The aortic valve is functioning normally and he is using antibiotic prophylaxis for any dental or surgical procedures.   2.  Mild coronary artery disease which is asymptomatic.   3.  Excellent lipid profile as described above.  He is well within secondary prevention guidelines.   4.  Essential hypertension.  His blood pressure is well controlled.   5.  Idiopathic pulmonary fibrosis and is on home oxygen.  It appears that his oxygen concentrator may not be working correctly which is reducing his  mentation.  They have somebody coming over today, I believe, to look at the concentrator.   6.  Bilateral carotid artery disease which appears to be stable and is followed by Dr. Klever Camacho.      PLAN:  We will continue him on his present medications.  I will see him in 6 months' time and at that stage, we will repeat the echocardiogram which will be 1 year out from the previous echo.  As always, he has been told to contact me if any questions or any concerns.  It is my pleasure to be involved in the care of this very nice patient.      cc:   Alirio Fox MD    [facility] ([facility]3]    [address] [address]         JONATHAN BHATTI MD, Located within [facility]             D: 01/15/2018 13:05   T: 01/15/2018 13:47   MT: KARIN      Name:     NATTY MAN   MRN:      [id number]        Account:      [id number]   :      1928           Service Date: 01/15/2018      Document: [id number]

## 2018-01-15 NOTE — LETTER
1/15/2018      Alirio Fox MD  [address] [facility] [address]      RE: Chaz Soler       Dear Colleague,    I had the pleasure of seeing Chaz Soler in the [facility].    REFERRING PHYSICIAN:  Dr. Alirio Fox      HISTORY OF PRESENT ILLNESS:  It is my pleasure to see your patient, Chaz Soler, who is a very pleasant 89-year-old gentleman who as you know had severe symptomatic aortic stenosis.  He underwent TAVR aortic valve replacement in 11/2014.  This was very successful.  The last echocardiogram that was performed was 6 months ago.  This showed that the mean gradient across the aortic valve was normal at 9 mmHg.  The patient's ejection fraction is normal at 55%-60%.  A transapical approach for the valve replacement was employed in this patient.  The patient has mild coronary artery disease with no lesion greater than 50%.  He is on high-intensity statin therapy.  His LDL is 67, HDL 81 and triglycerides 122 with a total cholesterol of 172.  Liver function tests were normal with an ALT of 20.  His blood pressure today is excellent at 120/56.  This patient has idiopathic pulmonary fibrosis and is on home oxygen.  Of course, the family has noticed that he has reduced levels of consciousness, sometimes with marked sleepiness and not being able to eat.  They think that there is a significant problem with his oxygen concentrator.  When they plug his oxygen into the wall, his oxygen saturations are in the 90s.  When he is on his oxygen concentrator, his O2 sats are in the 70s.  O2 sats in 70s obviously could cause a significant amount of mental depression.      IMPRESSION:   1.  Status post TAVR.  The aortic valve is functioning normally and he is using antibiotic prophylaxis for any dental or surgical procedures.   2.  Mild coronary artery disease which is asymptomatic.   3.  Excellent lipid profile as described above.  He is well within secondary prevention guidelines.   4.   Essential hypertension.  His blood pressure is well controlled.   5.  Idiopathic pulmonary fibrosis and is on home oxygen.  It appears that his oxygen concentrator may not be working correctly which is reducing his mentation.  They have somebody coming over today, I believe, to look at the concentrator.   6.  Bilateral carotid artery disease which appears to be stable and is followed by Dr. Klever Camacho.      PLAN:  We will continue him on his present medications.  I will see him in 6 months' time and at that stage, we will repeat the echocardiogram which will be 1 year out from the previous echo.  As always, he has been told to contact me if any questions or any concerns.  It is my pleasure to be involved in the care of this very nice patient.     Outpatient Encounter Prescriptions as of 1/15/2018   Medication Sig Dispense Refill     DULoxetine (CYMBALTA) 30 MG EC capsule Take 30 mg by mouth daily       atorvastatin (LIPITOR) 20 MG tablet Take 1 tablet (20 mg) by mouth daily 90 tablet 3     TRAMADOL HCL PO Take 50 mg by mouth 3 times daily       Acetaminophen (TYLENOL PO) Take 500 mg by mouth 3 times daily       cyanocobalamin (VITAMIN  B-12) 1000 MCG tablet Take 1,000 mcg by mouth daily       Furosemide (LASIX PO) Take 20 mg by mouth daily       levothyroxine (SYNTHROID/LEVOTHROID) 88 MCG tablet TAKE ONE TABLET BY MOUTH ONCE DAILY 90 tablet 3     donepezil (ARICEPT) 10 MG tablet TAKE ONE TABLET BY MOUTH AT BEDTIME 90 tablet 3     Multiple Vitamins-Minerals (MULTIVITAL) TABS Take 1 tablet by mouth daily       pantoprazole (PROTONIX) 40 MG EC tablet TAKE ONE TABLET BY MOUTH EVERY MORNING 90 tablet 3     metoprolol (TOPROL-XL) 25 MG 24 hr tablet TAKE ONE TABLET BY MOUTH DAILY 90 tablet 2     aspirin 81 MG tablet Take 1 tablet (81 mg) by mouth daily 30 tablet 11     tamsulosin (FLOMAX) 0.4 MG capsule TAKE ONE CAPSULE BY MOUTH ONCE DAILY 90 capsule 2     amLODIPine (NORVASC) 10 MG tablet TAKE ONE TABLET BY MOUTH ONCE  DAILY 90 tablet 3     gabapentin (NEURONTIN) 100 MG capsule TAKE ONE CAPSULE BY MOUTH THREE TIMES DAILY FOR  PAIN 90 capsule 11     losartan (COZAAR) 100 MG tablet Take 1 tablet (100 mg) by mouth daily 90 tablet 3     [DISCONTINUED] atorvastatin (LIPITOR) 20 MG tablet Take 1 tablet (20 mg) by mouth daily 30 tablet 0     order for DME Equipment being ordered: TENS and supplies. (Patient not taking: Reported on 1/15/2018) 1 each 0     No facility-administered encounter medications on file as of 1/15/2018.        Again, thank you for allowing me to participate in the care of your patient.      Sincerely,    Tyrell Jackson MD, MD     [facility]

## 2018-01-15 NOTE — PROGRESS NOTES
HPI and Plan:   See dictation    Orders Placed This Encounter   Procedures     Follow-Up with Cardiologist     Echocardiogram       Orders Placed This Encounter   Medications     DULoxetine (CYMBALTA) 30 MG EC capsule     Sig: Take 30 mg by mouth daily     atorvastatin (LIPITOR) 20 MG tablet     Sig: Take 1 tablet (20 mg) by mouth daily     Dispense:  90 tablet     Refill:  3       Medications Discontinued During This Encounter   Medication Reason     atorvastatin (LIPITOR) 20 MG tablet Reorder         Encounter Diagnoses   Name Primary?     Aortic valve disorder      Ascending aortic aneurysm (H)      Coronary artery disease involving native coronary artery of native heart without angina pectoris      Contrast media allergy      S/P TAVR (transcatheter aortic valve replacement)      Aortic arch aneurysm (H)        CURRENT MEDICATIONS:  Current Outpatient Prescriptions   Medication Sig Dispense Refill     DULoxetine (CYMBALTA) 30 MG EC capsule Take 30 mg by mouth daily       atorvastatin (LIPITOR) 20 MG tablet Take 1 tablet (20 mg) by mouth daily 90 tablet 3     TRAMADOL HCL PO Take 50 mg by mouth 3 times daily       Acetaminophen (TYLENOL PO) Take 500 mg by mouth 3 times daily       cyanocobalamin (VITAMIN  B-12) 1000 MCG tablet Take 1,000 mcg by mouth daily       Furosemide (LASIX PO) Take 20 mg by mouth daily       levothyroxine (SYNTHROID/LEVOTHROID) 88 MCG tablet TAKE ONE TABLET BY MOUTH ONCE DAILY 90 tablet 3     donepezil (ARICEPT) 10 MG tablet TAKE ONE TABLET BY MOUTH AT BEDTIME 90 tablet 3     Multiple Vitamins-Minerals (MULTIVITAL) TABS Take 1 tablet by mouth daily       pantoprazole (PROTONIX) 40 MG EC tablet TAKE ONE TABLET BY MOUTH EVERY MORNING 90 tablet 3     metoprolol (TOPROL-XL) 25 MG 24 hr tablet TAKE ONE TABLET BY MOUTH DAILY 90 tablet 2     aspirin 81 MG tablet Take 1 tablet (81 mg) by mouth daily 30 tablet 11     tamsulosin (FLOMAX) 0.4 MG capsule TAKE ONE CAPSULE BY MOUTH ONCE DAILY 90 capsule  2     amLODIPine (NORVASC) 10 MG tablet TAKE ONE TABLET BY MOUTH ONCE DAILY 90 tablet 3     gabapentin (NEURONTIN) 100 MG capsule TAKE ONE CAPSULE BY MOUTH THREE TIMES DAILY FOR  PAIN 90 capsule 11     losartan (COZAAR) 100 MG tablet Take 1 tablet (100 mg) by mouth daily 90 tablet 3     [DISCONTINUED] atorvastatin (LIPITOR) 20 MG tablet Take 1 tablet (20 mg) by mouth daily 30 tablet 0     order for DME Equipment being ordered: TENS and supplies. (Patient not taking: Reported on 1/15/2018) 1 each 0       ALLERGIES     Allergies   Allergen Reactions     Contrast Dye      SOB, increased Bp, difficulty swallowing. Date 6/3/96 (ipaque contrast)  Was premedicated prior to FRANCK and had no reaction at all (solumedrol and benadryl) 2016  Was premedicated with Methylprednisolone protocol, no reaction 1/25/17     Lisinopril      cough     Oxycodone      Delirium       PAST MEDICAL HISTORY:  Past Medical History:   Diagnosis Date     AAA (abdominal aortic aneurysm) (H) 10/5/2011    6.1 cm     Anemia 11/30/2011     Aortic stenosis 10/26/2012     CAD (coronary artery disease)     MI - distal inferior/apex. Non transmural     Carotid artery disease (H)      CHF (congestive heart failure) (H) 12/31/2014     CKD (chronic kidney disease) stage 3, GFR 30-59 ml/min 11/30/2011     COPD (chronic obstructive pulmonary disease) (H)      Dementia 8/4/2015     Edema, unspecified edema 1/17/2016     Essential hypertension      Gout 1/06    knee     Hypercalcemia 1/2/2015     Hyperlipidemia LDL goal < 70      Hyperparathyroidism, unspecified 4/22/2015     Hyperplasia of prostate      LEFT LUNG GRANULOMA      Lumbago      Other chronic pain 10/11/2016     Proteinuria 2/8/2012     Pulmonary fibrosis (H)      PVD (peripheral vascular disease) (H)      Thrombocytopenia (H) 11/30/2011     Unspecified hypothyroidism      Vitamin D deficiency        PAST SURGICAL HISTORY:  Past Surgical History:   Procedure Laterality Date     COLONOSCOPY  9/02 per  patient     DISCECTOMY LUMBAR POSTERIOR MICROSCOPIC ONE LEVEL  8/11/2014    Procedure: DISCECTOMY LUMBAR POSTERIOR MICROSCOPIC ONE LEVEL;  Surgeon: Jone Carvalho MD;  Location: [facility] OR     ENDOVASCULAR REPAIR ANEURYSM ABDOMINAL AORTA  11/18/2011    Procedure:ENDOVASCULAR REPAIR ANEURYSM ABDOMINAL AORTA; ENDOVASCULAR AAA,RIGHT FEMORAL       LAPAROSCOPIC CHOLECYSTECTOMY  Nov 2011     LAPAROSCOPIC CHOLECYSTECTOMY  11/18/2011    Procedure:LAPAROSCOPIC CHOLECYSTECTOMY; LAPAROSCOPIC CHOLECYSTECTOMY ; Surgeon:DARRYL DORADO; Location:[facility] OR     REPAIR ANEURYSM ABDOMINAL AORTA  Nov 2011     right cataract extraction/IOL  12/03     TRANSCATHETER AORTIC VALVE IMPLANT ANESTHESIA N/A 11/12/2014    Bioprosthetic. Procedure: TRANSCATHETER AORTIC VALVE IMPLANT ANESTHESIA;  Surgeon: Generic Anesthesia Provider;  Location: [facility] OR     VASECTOMY         FAMILY HISTORY:  Family History   Problem Relation Age of Onset     Hypertension Mother      Hypertension Father        SOCIAL HISTORY:  Social History     Social History     Marital status:      Spouse name: N/A     Number of children: 4     Years of education: 18     Occupational History      Retired     Social History Main Topics     Smoking status: Never Smoker     Smokeless tobacco: Never Used     Alcohol use 0.0 oz/week     0 Standard drinks or equivalent per week      Comment: 1 glass wine/day     Drug use: No     Sexual activity: No     Other Topics Concern     Caffeine Concern Yes     1 day      Sleep Concern Yes     too much      Weight Concern Yes     appetite reduced      Special Diet No     Back Care Yes     Exercise Yes     daily 5 x per week.  recummbant bike      Seat Belt Yes     Social History Narrative    Lives in  [facility] co[facility] in [place] for the past 13 years.     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            Review of Systems:  Skin:  Positive for bruising     Eyes:  Positive for glasses hx of cataract surgery  ENT:  Positive for  "epistaxis;hearing loss    Respiratory:  Positive for shortness of breath;cough on oxygen 24/7 , a little cough   Cardiovascular:    Positive for;fatigue    Gastroenterology: Positive for constipation constipation under control   Genitourinary:  Negative      Musculoskeletal:  Positive for back pain a lot of back pain   Neurologic:  Positive for memory problems    Psychiatric:  Negative      Heme/Lymph/Imm:  Positive for allergies Iodine , lisinopril , Oxicodone   Endocrine:  Positive for thyroid disorder      Physical Exam:  Vitals: /56 (BP Location: Right arm, Patient Position: Sitting, Cuff Size: Adult Regular)  Pulse 72  Ht 1.727 m (5' 8\")  Wt 68.8 kg (151 lb 9.6 oz)  BMI 23.05 kg/m2    Constitutional:  cooperative;well developed frail      Skin:  warm and dry to the touch;warm and dry to the touch, no apparent skin lesions or masses noted          Head:  normocephalic, no masses or lesions        Eyes:  pupils equal and round;conjunctivae and lids unremarkable        Lymph:No Cervical lymphadenopathy present     ENT:  no pallor or cyanosis        Neck:  carotid pulses are full and equal bilaterally, JVP normal, no carotid bruit right carotid bruit      Respiratory:  healed surgical scar fine rales       Cardiac: regular rhythm;normal S1 and S2;apical impulse not displaced       grade 1;systolic ejection murmur   diastolic murmur;grade 1    pulses full and equal                                        GI:  abdomen soft;non-tender        Extremities and Muscular Skeletal:  no edema;no deformities, clubbing, cyanosis, erythema observed              Neurological:  no gross motor deficits;affect appropriate        Psych:  Alert and Oriented x 3        CC  Tyrell Jackson MD  [address]  [place] [address]              "

## 2018-01-15 NOTE — MR AVS SNAPSHOT
After Visit Summary   1/15/2018    Chaz Soler    MRN: [id number]           Patient Information     Date Of Birth          5/21/1928        Visit Information        Provider Department      1/15/2018 12:45 PM Tyrell Jackson MD [facility]        Today's Diagnoses     Aortic valve disorder        Ascending aortic aneurysm (H)        Coronary artery disease involving native coronary artery of native heart without angina pectoris        Contrast media allergy        S/P TAVR (transcatheter aortic valve replacement)        Aortic arch aneurysm (H)           Follow-ups after your visit        Additional Services     Follow-Up with Cardiologist                 Your next 10 appointments already scheduled     Jan 18, 2018  3:00 PM CST   [facility]io Fox [facility]   [facility]    [address] [place] [id number] [phone]           Bring a current list of meds and any records pertaining to this visit. For Physicals, please bring immunization records and any forms needing to be filled out. Please arrive 10 minutes early to complete paperwork.              Future tests that were ordered for you today     Open Future Orders        Priority Expected Expires Ordered    Echocardiogram Routine 7/14/2018 1/15/2019 1/15/2018    Follow-Up with Cardiologist Routine 7/14/2018 1/15/2019 1/15/2018            Who to contact     If you have questions or need follow up information about today's clinic visit or your schedule please contact [facility] directly at [phone].  Normal or non-critical lab and imaging results will be communicated to you by MyChart, letter or phone within 4 business days after the clinic has received the results. If you do not hear from us within 7 days, please contact the clinic through MyChart or phone. If  "you have a critical or abnormal lab result, we will notify you by phone as soon as possible.  Submit refill requests through mechatronic systemtechnik or call your pharmacy and they will forward the refill request to us. Please allow 3 business days for your refill to be completed.          Additional Information About Your Visit        Phunwarehart Information     mechatronic systemtechnik gives you secure access to your electronic health record. If you see a primary care provider, you can also send messages to your care team and make appointments. If you have questions, please call your primary care clinic.  If you do not have a primary care provider, please call [phone] and they will assist you.        Care EveryWhere ID     This is your Care EveryWhere ID. This could be used by other organizations to access your [facility] medical records  [id number]        Your Vitals Were     Pulse Height BMI (Body Mass Index)             72 1.727 m (5' 8\") 23.05 kg/m2          Blood Pressure from Last 3 Encounters:   01/15/18 120/56   01/11/18 164/79   01/09/18 124/81    Weight from Last 3 Encounters:   01/15/18 68.8 kg (151 lb 9.6 oz)   01/09/18 68.1 kg (150 lb 3.2 oz)   01/02/18 63.5 kg (140 lb)              We Performed the Following     Follow-Up with Cardiologist          Where to get your medicines      These medications were sent to [facility] [address] [place] [address]     Phone:  [phone]     atorvastatin 20 MG tablet          Primary Care Provider Office Phone # Fax #    Alirio Fox [phone]       [address] [facility] [address]        Equal Access to Services     [facility] : Hadii laura Boyd, wajoanda lujohnadaha, qaybta kaalmajohnny irizarry. So [facility] [phone].    ATENCIÓN: Si habla español, tiene a denise disposición servicios gratuitos de asistencia lingüística. Llame al [phone].    We comply with " applicable federal civil rights laws and [place] laws. We do not discriminate on the basis of race, color, national origin, age, disability, sex, sexual orientation, or gender identity.            Thank you!     Thank you for choosing [facility]  for your care. Our goal is always to provide you with excellent care. Hearing back from our patients is one way we can continue to improve our services. Please take a few minutes to complete the written survey that you may receive in the mail after your visit with us. Thank you!             Your Updated Medication List - Protect others around you: Learn how to safely use, store and throw away your medicines at www.disposemymeds.org.          This list is accurate as of: 1/15/18  1:06 PM.  Always use your most recent med list.                   Brand Name Dispense Instructions for use Diagnosis    amLODIPine 10 MG tablet    NORVASC    90 tablet    TAKE ONE TABLET BY MOUTH ONCE DAILY    Essential hypertension       aspirin 81 MG tablet     30 tablet    Take 1 tablet (81 mg) by mouth daily    Coronary artery disease involving native coronary artery of native heart without angina pectoris       atorvastatin 20 MG tablet    LIPITOR    90 tablet    Take 1 tablet (20 mg) by mouth daily    Coronary artery disease involving native coronary artery of native heart without angina pectoris       cyanocobalamin 1000 MCG tablet    vitamin  B-12     Take 1,000 mcg by mouth daily        donepezil 10 MG tablet    ARICEPT    90 tablet    TAKE ONE TABLET BY MOUTH AT BEDTIME    Memory loss       DULoxetine 30 MG EC capsule    CYMBALTA     Take 30 mg by mouth daily        gabapentin 100 MG capsule    NEURONTIN    90 capsule    TAKE ONE CAPSULE BY MOUTH THREE TIMES DAILY FOR  PAIN    Compression fracture       LASIX PO      Take 20 mg by mouth daily        levothyroxine 88 MCG tablet    SYNTHROID/LEVOTHROID    90 tablet    TAKE ONE TABLET BY MOUTH  ONCE DAILY    Hypothyroidism, unspecified type       losartan 100 MG tablet    COZAAR    90 tablet    Take 1 tablet (100 mg) by mouth daily    Essential hypertension       metoprolol succinate 25 MG 24 hr tablet    TOPROL-XL    90 tablet    TAKE ONE TABLET BY MOUTH DAILY    Essential hypertension       MULTIVITAL Tabs      Take 1 tablet by mouth daily        order for DME     1 each    Equipment being ordered: TENS and supplies.    Other chronic pain, Lumbar radiculopathy, DDD (degenerative disc disease), lumbar, Spinal stenosis of lumbar region without neurogenic claudication       pantoprazole 40 MG EC tablet    PROTONIX    90 tablet    TAKE ONE TABLET BY MOUTH EVERY MORNING    Gastroesophageal reflux disease, esophagitis presence not specified       tamsulosin 0.4 MG capsule    FLOMAX    90 capsule    TAKE ONE CAPSULE BY MOUTH ONCE DAILY    Benign prostatic hyperplasia with urinary retention       TRAMADOL HCL PO      Take 50 mg by mouth 3 times daily        TYLENOL PO      Take 500 mg by mouth 3 times daily

## 2018-01-15 NOTE — PROGRESS NOTES
HPI and Plan:   See dictation    Orders Placed This Encounter   Procedures     Follow-Up with Cardiologist     Echocardiogram       Orders Placed This Encounter   Medications     DULoxetine (CYMBALTA) 30 MG EC capsule     Sig: Take 30 mg by mouth daily     atorvastatin (LIPITOR) 20 MG tablet     Sig: Take 1 tablet (20 mg) by mouth daily     Dispense:  90 tablet     Refill:  3       Medications Discontinued During This Encounter   Medication Reason     atorvastatin (LIPITOR) 20 MG tablet Reorder         Encounter Diagnoses   Name Primary?     Aortic valve disorder      Ascending aortic aneurysm (H)      Coronary artery disease involving native coronary artery of native heart without angina pectoris      Contrast media allergy      S/P TAVR (transcatheter aortic valve replacement)      Aortic arch aneurysm (H)        CURRENT MEDICATIONS:  Current Outpatient Prescriptions   Medication Sig Dispense Refill     DULoxetine (CYMBALTA) 30 MG EC capsule Take 30 mg by mouth daily       atorvastatin (LIPITOR) 20 MG tablet Take 1 tablet (20 mg) by mouth daily 90 tablet 3     TRAMADOL HCL PO Take 50 mg by mouth 3 times daily       Acetaminophen (TYLENOL PO) Take 500 mg by mouth 3 times daily       cyanocobalamin (VITAMIN  B-12) 1000 MCG tablet Take 1,000 mcg by mouth daily       Furosemide (LASIX PO) Take 20 mg by mouth daily       levothyroxine (SYNTHROID/LEVOTHROID) 88 MCG tablet TAKE ONE TABLET BY MOUTH ONCE DAILY 90 tablet 3     donepezil (ARICEPT) 10 MG tablet TAKE ONE TABLET BY MOUTH AT BEDTIME 90 tablet 3     Multiple Vitamins-Minerals (MULTIVITAL) TABS Take 1 tablet by mouth daily       pantoprazole (PROTONIX) 40 MG EC tablet TAKE ONE TABLET BY MOUTH EVERY MORNING 90 tablet 3     metoprolol (TOPROL-XL) 25 MG 24 hr tablet TAKE ONE TABLET BY MOUTH DAILY 90 tablet 2     aspirin 81 MG tablet Take 1 tablet (81 mg) by mouth daily 30 tablet 11     tamsulosin (FLOMAX) 0.4 MG capsule TAKE ONE CAPSULE BY MOUTH ONCE DAILY 90 capsule  2     amLODIPine (NORVASC) 10 MG tablet TAKE ONE TABLET BY MOUTH ONCE DAILY 90 tablet 3     gabapentin (NEURONTIN) 100 MG capsule TAKE ONE CAPSULE BY MOUTH THREE TIMES DAILY FOR  PAIN 90 capsule 11     losartan (COZAAR) 100 MG tablet Take 1 tablet (100 mg) by mouth daily 90 tablet 3     [DISCONTINUED] atorvastatin (LIPITOR) 20 MG tablet Take 1 tablet (20 mg) by mouth daily 30 tablet 0     order for DME Equipment being ordered: TENS and supplies. (Patient not taking: Reported on 1/15/2018) 1 each 0       ALLERGIES     Allergies   Allergen Reactions     Contrast Dye      SOB, increased Bp, difficulty swallowing. Date 6/3/96 (ipaque contrast)  Was premedicated prior to FRANCK and had no reaction at all (solumedrol and benadryl) 2016  Was premedicated with Methylprednisolone protocol, no reaction 1/25/17     Lisinopril      cough     Oxycodone      Delirium       PAST MEDICAL HISTORY:  Past Medical History:   Diagnosis Date     AAA (abdominal aortic aneurysm) (H) 10/5/2011    6.1 cm     Anemia 11/30/2011     Aortic stenosis 10/26/2012     CAD (coronary artery disease)     MI - distal inferior/apex. Non transmural     Carotid artery disease (H)      CHF (congestive heart failure) (H) 12/31/2014     CKD (chronic kidney disease) stage 3, GFR 30-59 ml/min 11/30/2011     COPD (chronic obstructive pulmonary disease) (H)      Dementia 8/4/2015     Edema, unspecified edema 1/17/2016     Essential hypertension      Gout 1/06    knee     Hypercalcemia 1/2/2015     Hyperlipidemia LDL goal < 70      Hyperparathyroidism, unspecified 4/22/2015     Hyperplasia of prostate      LEFT LUNG GRANULOMA      Lumbago      Other chronic pain 10/11/2016     Proteinuria 2/8/2012     Pulmonary fibrosis (H)      PVD (peripheral vascular disease) (H)      Thrombocytopenia (H) 11/30/2011     Unspecified hypothyroidism      Vitamin D deficiency        PAST SURGICAL HISTORY:  Past Surgical History:   Procedure Laterality Date     COLONOSCOPY  9/02 per  patient     DISCECTOMY LUMBAR POSTERIOR MICROSCOPIC ONE LEVEL  8/11/2014    Procedure: DISCECTOMY LUMBAR POSTERIOR MICROSCOPIC ONE LEVEL;  Surgeon: Jone Carvalho MD;  Location: [facility] OR     ENDOVASCULAR REPAIR ANEURYSM ABDOMINAL AORTA  11/18/2011    Procedure:ENDOVASCULAR REPAIR ANEURYSM ABDOMINAL AORTA; ENDOVASCULAR AAA,RIGHT FEMORAL       LAPAROSCOPIC CHOLECYSTECTOMY  Nov 2011     LAPAROSCOPIC CHOLECYSTECTOMY  11/18/2011    Procedure:LAPAROSCOPIC CHOLECYSTECTOMY; LAPAROSCOPIC CHOLECYSTECTOMY ; Surgeon:DARRYL DORADO; Location:[facility] OR     REPAIR ANEURYSM ABDOMINAL AORTA  Nov 2011     right cataract extraction/IOL  12/03     TRANSCATHETER AORTIC VALVE IMPLANT ANESTHESIA N/A 11/12/2014    Bioprosthetic. Procedure: TRANSCATHETER AORTIC VALVE IMPLANT ANESTHESIA;  Surgeon: Generic Anesthesia Provider;  Location: [facility] OR     VASECTOMY         FAMILY HISTORY:  Family History   Problem Relation Age of Onset     Hypertension Mother      Hypertension Father        SOCIAL HISTORY:  Social History     Social History     Marital status:      Spouse name: N/A     Number of children: 4     Years of education: 18     Occupational History      Retired     Social History Main Topics     Smoking status: Never Smoker     Smokeless tobacco: Never Used     Alcohol use 0.0 oz/week     0 Standard drinks or equivalent per week      Comment: 1 glass wine/day     Drug use: No     Sexual activity: No     Other Topics Concern     Caffeine Concern Yes     1 day      Sleep Concern Yes     too much      Weight Concern Yes     appetite reduced      Special Diet No     Back Care Yes     Exercise Yes     daily 5 x per week.  recummbant bike      Seat Belt Yes     Social History Narrative    Lives in  [facility] co[place] in [place] for the past 13 years.     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            Review of Systems:  Skin:  Positive for bruising     Eyes:  Positive for glasses hx of cataract surgery  ENT:  Positive for  "epistaxis;hearing loss    Respiratory:  Positive for shortness of breath;cough on oxygen 24/7 , a little cough   Cardiovascular:    Positive for;fatigue    Gastroenterology: Positive for constipation constipation under control   Genitourinary:  Negative      Musculoskeletal:  Positive for back pain a lot of back pain   Neurologic:  Positive for memory problems    Psychiatric:  Negative      Heme/Lymph/Imm:  Positive for allergies Iodine , lisinopril , Oxicodone   Endocrine:  Positive for thyroid disorder      Physical Exam:  Vitals: /56 (BP Location: Right arm, Patient Position: Sitting, Cuff Size: Adult Regular)  Pulse 72  Ht 1.727 m (5' 8\")  Wt 68.8 kg (151 lb 9.6 oz)  BMI 23.05 kg/m2    Constitutional:           Skin:             Head:           Eyes:           Lymph:      ENT:           Neck:           Respiratory:            Cardiac:                                                           GI:           Extremities and Muscular Skeletal:                 Neurological:           Psych:           CC  Tyrell Jackson MD  [address]  [place] [address]              "

## 2018-01-17 NOTE — PROGRESS NOTES
Clinic Care Coordination Contact  CC spoke to the patients wife and she reports the patient had a small area inside his nose which was cauterized at ENT visit .  No further nose bleeds since ENT visit .  Patient has an appointment tomorrow to see Dr Fox to discuss chronic back pain. CC will follow up in 1-2 weeks      Pebbles Leija RN / Clinical Care Coordinator     [address] [address]  [email or web address]  Office :  [phone] / Fax :  [phone]

## 2018-01-18 NOTE — MR AVS SNAPSHOT
After Visit Summary   1/18/2018    hCaz Soler    MRN: [id number]           Patient Information     Date Of Birth          5/21/1928        Visit Information        Provider Department      1/18/2018 3:00 PM Alirio Fox MD [facility]        Today's Diagnoses     Weakness    -  1    Pulmonary fibrosis (H)           Follow-ups after your visit        Who to contact     If you have questions or need follow up information about today's clinic visit or your schedule please contact [facility] directly at [phone].  Normal or non-critical lab and imaging results will be communicated to you by Fix[employer] Carrier Serviceshart, letter or phone within 4 business days after the clinic has received the results. If you do not hear from us within 7 days, please contact the clinic through [employer] or phone. If you have a critical or abnormal lab result, we will notify you by phone as soon as possible.  Submit refill requests through Kozio or call your pharmacy and they will forward the refill request to us. Please allow 3 business days for your refill to be completed.          Additional Information About Your Visit        MyChart Information     [employer]io gives you secure access to your electronic health record. If you see a primary care provider, you can also send messages to your care team and make appointments. If you have questions, please call your primary care clinic.  If you do not have a primary care provider, please call [phone] and they will assist you.        Care EveryWhere ID     This is your Care EveryWhere ID. This could be used by other organizations to access your [facility] medical records  [id number]        Your Vitals Were     Pulse Temperature Respirations Pulse Oximetry BMI (Body Mass Index)       76 98.5  F (36.9  C) (Oral) 18 92% 22.96 kg/m2        Blood Pressure from Last 3 Encounters:   03/12/18 118/70   01/18/18 118/58   01/15/18 120/56    Weight from  Last 3 Encounters:   03/12/18 150 lb (68 kg)   01/18/18 151 lb (68.5 kg)   01/15/18 151 lb 9.6 oz (68.8 kg)              Today, you had the following     No orders found for display       Primary Care Provider Office Phone # Fax #    Alirio Fox [phone]       [address] [facility] [address]        Goals        General    Medical (pt-stated)     Notes - Note created  4/9/2018 10:33 AM by Pebbles Leija, RN    Goal Statement: I will decrease shortness of breath episodes   Measure of Success: I will have decreased shortness of breath episodes and increase my activity   Supportive Steps to Achieve: I will use my nebulizer as directed and seek medical help if increased symptoms of concern   Barriers: Deconditioned  Strengths: Supportive wife   Date to Achieve By: Ongoing         Equal Access to Services     NELIDA PHILLIPS AH: Hadkayleen vargas Soyvonne, waaxda luqadaha, qaybta kaalmada orin, johnny dinh . So [facility] [phone].    ATENCIÓN: Si habla español, tiene a denise disposición servicios gratuitos de asistencia lingüística. Llame al [phone].    We comply with applicable federal civil rights laws and [place] laws. We do not discriminate on the basis of race, color, national origin, age, disability, sex, sexual orientation, or gender identity.            Thank you!     Thank you for choosing [facility]  for your care. Our goal is always to provide you with excellent care. Hearing back from our patients is one way we can continue to improve our services. Please take a few minutes to complete the written survey that you may receive in the mail after your visit with us. Thank you!             Your Updated Medication List - Protect others around you: Learn how to safely use, store and throw away your medicines at www.disposemymeds.org.          This list is accurate as of 1/18/18 11:59 PM.  Always use your most recent med list.                    Brand Name Dispense Instructions for use Diagnosis    amLODIPine 10 MG tablet    NORVASC    90 tablet    TAKE ONE TABLET BY MOUTH ONCE DAILY    Essential hypertension       aspirin 81 MG tablet     30 tablet    Take 1 tablet (81 mg) by mouth daily    Coronary artery disease involving native coronary artery of native heart without angina pectoris       atorvastatin 20 MG tablet    LIPITOR    90 tablet    Take 1 tablet (20 mg) by mouth daily    Coronary artery disease involving native coronary artery of native heart without angina pectoris       cyanocobalamin 1000 MCG tablet    vitamin  B-12     Take 1,000 mcg by mouth daily        donepezil 10 MG tablet    ARICEPT    90 tablet    TAKE ONE TABLET BY MOUTH AT BEDTIME    Memory loss       DULoxetine 30 MG EC capsule    CYMBALTA     Take 30 mg by mouth daily        gabapentin 100 MG capsule    NEURONTIN    90 capsule    TAKE ONE CAPSULE BY MOUTH THREE TIMES DAILY FOR  PAIN    Compression fracture       LASIX PO      Take 20 mg by mouth daily        levothyroxine 88 MCG tablet    SYNTHROID/LEVOTHROID    90 tablet    TAKE ONE TABLET BY MOUTH ONCE DAILY    Hypothyroidism, unspecified type       metoprolol succinate 25 MG 24 hr tablet    TOPROL-XL    90 tablet    TAKE ONE TABLET BY MOUTH DAILY    Essential hypertension       MULTIVITAL Tabs      Take 1 tablet by mouth daily        order for DME     1 each    Equipment being ordered: TENS and supplies.    Other chronic pain, Lumbar radiculopathy, DDD (degenerative disc disease), lumbar, Spinal stenosis of lumbar region without neurogenic claudication       pantoprazole 40 MG EC tablet    PROTONIX    90 tablet    TAKE ONE TABLET BY MOUTH EVERY MORNING    Gastroesophageal reflux disease, esophagitis presence not specified       tamsulosin 0.4 MG capsule    FLOMAX    90 capsule    TAKE ONE CAPSULE BY MOUTH ONCE DAILY    Benign prostatic hyperplasia with urinary retention       TYLENOL PO      Take 500 mg by mouth 3  times daily

## 2018-01-18 NOTE — PROGRESS NOTES
SUBJECTIVE:   Chaz Soler is a 89 year old male who presents to clinic today for the following health issues:  Chief Complaint   Patient presents with     FU Hospitalization         Hospital Follow-up Visit:    Hospital/Nursing Home/IP Rehab Facility: [facility]  Date of Admission: 01/07/2018  Date of Discharge: 01/09/2018  Reason(s) for Admission: Weakness  Tele contact 1/10/18 by CC            Problems taking medications regularly:  None       Medication changes since discharge: None       Problems adhering to non-medication therapy:  None    Summary of hospitalization:  [facility] discharge summary reviewed  Diagnostic Tests/Treatments reviewed.  Follow up needed:  None. Labs done through cardiology at visit 1/12/18  Other Healthcare Providers Involved in Patient s Care:         pulmonary  Update since discharge: improved.     Post Discharge Medication Reconciliation: discharge medications reconciled, continue medications without change.  Plan of care communicated with patient and wife     Coding guidelines for this visit:  Type of Medical   Decision Making Face-to-Face Visit       within 7 Days of discharge Face-to-Face Visit        within 14 days of discharge   Moderate Complexity [id number]   High Complexity [id number]              Hospital discharge summary reviewed.  Part of the summary as below:    Discharge Summary  Hospitalist     Date of Admission:  1/7/2018  Date of Discharge:  1/9/2018  Discharging Provider: Guero Ortiz PA-C      Discharge Diagnoses   Altered mental status  Weakness      History of Present Illness     Chaz Soler is an 89 year old male who presented with generalized weakness     HPI from admission H&P:  Chaz Soler is a 89 year old male who presents with weakness.  Unfortunately at the time of my visit with the patient his wife has left and the patient was unable to provide much history.  Apparently he has history of occasional weakness that  waxes and wanes.  However, today he sat in his chair and his wife could not get him to stand up.  Ultimately EMS was called and patient was transported to the emergency department.  At the time of my visit the patient thought he was at home.  He denied any complaints including chest pain shortness of breath or GI symptoms.  His biggest complaint was back pain.        Hospital Course     Chaz Soler was admitted on 1/7/2018.  The following problems were addressed during his hospitalization:     Generalized weakness  Decreased strength reported by wife. Baseline able to ambulate with walker, lives at home with wife. Oriented to self, does not always know time or place. Seemed more lethargic over the past few days and requiring assistance getting out of chair prompting ED presentation. Infectious and metabolic workup negative in ED on admission. MR brain negative. Influenza swab negative. Pt was hydrated with IV fluids and markedly improved, was able to work with PT and wife felt he was near baseline, felt comfortable discharging home. Etiology remains unclear, possible underlying viral process versus transient delirium from recent tramadol use; wife strongly encouraged to limit its further usage and consider discontinuing alltogether. At time of discharge patient was near baseline, able to ambulate with walker and stand by assistance. Discharged home in care of wife.      Chronic Medical conditions that remained stable:  Chronic hypoxic respiratory failure in the setting of pulmonary fibrosis: Requiring supplemental oxygen at 4 LPM at baseline. Followed by Dr. Aly of [facility].   Hypertension, uncontrolled: Resume PTA Losartan, Toprol XL, and Norvasc with parameters in place   Dementia: Continue his Aricept  Hypothyroidism: This above his TSH was checked in November was normal.  Resume synthroid at PTA dose.  BPH: Continue prior to admission Flomax  CKD III: Baseline 1.3-1.5.   History of aortic stenosis with TAVR  "with bioprosthetic valve (2015)/Diastolic CHF: Resumed on PTA losartan, toprol XL, norvasc, lasix, statin at discharge.  GERD: Continue prior to admission Protonix  History of abdominal aortic aneurysm: Stable     Guero Ortiz PA-C    Pt's past medical history, family history, habits, medications and allergies were reviewed with the patient today.  See snap shot for  HCM status. Most recent lab results reviewed with pt. Problem list and histories reviewed & adjusted, as indicated.  Additional history as below:       Patient is seen with his wife today. Because of memory issues, patient somewhat of a poor historian.  Mental status back to baseline.  Patient eating and drinking well.  Chronic shortness of breath from pulmonary fibrosis.  No coughing.  Remains on oxygen therapy 24 hours a day gait stable with use of a walker.  No recent falls at home.  Denies current fevers or chills.  Normal wall movements and urination.  Gets up a couple times per night to urinate but this is baseline and falls right back to sleep  Chronic back pain stable with current medications.  No bowel or bladder incontinence.  Recently saw vascular surgery and cardiology. Notes reviewed  Cardiology labs from 1/12/18 reviewed     Additional ROS:   Constitutional, HEENT, Cardiovascular, Pulmonary, GI and , Neuro, MSK and Psych review of systems/symptoms are otherwise negative or unchanged from previous, except as noted above.      OBJECTIVE:  /58  Pulse 76  Temp 98.5  F (36.9  C) (Oral)  Resp 18  Wt 151 lb (68.5 kg)  SpO2 92%  BMI 22.96 kg/m2   Estimated body mass index is 22.96 kg/(m^2) as calculated from the following:    Height as of 1/15/18: 5' 8\" (1.727 m).    Weight as of this encounter: 151 lb (68.5 kg).  Eye: PERRL, EOMI  HENT: ear canals and TM's normal and nose and mouth without ulcers or lesions   Neck: no adenopathy. Thyroid normal to palpation. No bruit left and  faint bruit right  Pulm: Chronic crackles at the base " of the lungs bilaterally.  No wheezes or rhonchi.  No accessory muscle use  CV: Regular rates and rhythm  GI: Soft, nontender, Normal active bowel sounds, No hepatosplenomegaly or masses palpable  Ext: Peripheral pulses intact. Trace BLE edema.  Neuro: Normal  tone, sensory exam grossly normal. Strength globally 4+/5 in BUE and BLE. Stable gait with walker. Pt alert and answers questions appropriately re: current issues. 1/3 recall 5 min  Back: Chronic tenderness to palpation bilateral paralumbar area. Neg SLRT bilaterally.      Assessment/Plan: (See plan discussion below for further details)  1. Weakness  Related to resolving viral illness. Strength nearly back to baseline. Eating and drinking well. Discussed exercises to do daily for UE and LE for strengthening    2. Pulmonary fibrosis (H)  Chronic and not reversible. Continue supportive oxygen therapy    Continue current meds for other medical issues       Alirio Fxo MD  Internal Medicine Department  [facility]

## 2018-01-29 NOTE — TELEPHONE ENCOUNTER
"Requested Prescriptions   Pending Prescriptions Disp Refills     losartan (COZAAR) 100 MG tablet [Pharmacy Med Name: LOSARTAN 100MG   TAB]  Last Written Prescription Date:  12/23/16  Last Fill Quantity: 90 TABLET,  # refills: 3   Last Office Visit with [facility] provider:  1/18/18   Future Office Visit:      90 tablet 3     Sig: TAKE ONE TABLET BY MOUTH  DAILY    Angiotensin-II Receptors Passed    1/28/2018  4:36 PM       Passed - Blood pressure under 140/90 in past 12 months.    BP Readings from Last 3 Encounters:   01/18/18 118/58   01/15/18 120/56   01/11/18 164/79                Passed - Recent or future visit with authorizing provider's specialty    Patient had office visit in the last year or has a visit in the next 30 days with authorizing provider.  See \"Patient Info\" tab in inbasket, or \"Choose Columns\" in Meds & Orders section of the refill encounter.            Passed - Patient is age 18 or older       Passed - Normal serum creatinine on file in past 12 months    Recent Labs   Lab Test  01/09/18   0615   CR  1.25            Passed - Normal serum potassium on file in past 12 months    Recent Labs   Lab Test  01/09/18   0615   POTASSIUM  4.1                      "

## 2018-01-30 NOTE — TELEPHONE ENCOUNTER
[facility] calling for additional home care orders.  Okayed per nursing standing orders.    Social work to eval and treat for community services.      Gifty Chi RN

## 2018-02-01 NOTE — PROGRESS NOTES
Clinic Care Coordination Contact  OUTREACH      Referral Source: IP/TCU Report     Clinical Data: Patient was hospitalized at [facility]  from 1/7 to 1/9/2017 with diagnosis of   Altered mental status  Weakness    Care Conference: No     Universal Utilization:   ED Visits in last year: 0  Hospital visits in last year: 2  Last PCP appointment: 11/22/16  Missed Appointments: 0  Concerns:  (none)  Multiple Providers or Specialists:  (cardiology, vascular, pain, ortho, neurology, spine surgeon)    Clinical Concerns:  Current Medical Concerns: CC spoke to wife and she reports no further nosebleeds.  Patients chronic back pain is about the same.  Tomorrow the home care SW is making a home visit to discuss additional services /assistance for the wife      Clinical Pathway Name: None  Clinical Pathway: None    Medication Management:  Not discussed      Functional Status:  Mobility Status: Independent w/Device  Equipment Currently Used at Home: walker, rolling     Psychosocial:  Current living arrangement:: I live in a private home with spouse (TCU)     Resources and Interventions:  Current Resources: Home Care (NA); Home Care (NA)  PAS Number: [id number]  Senior Linkage Line Referral Placed:  (NA)  Advanced Care Plans/Directives on file:: No  Referrals Placed:  (NA)     Goals:   Goal 1 Statement: I will decrease back pain   Goal 1 Supportive Steps: I will use Tramadol as directed ,massage chair and home exer  Goal 1 Progression Percent: 100%  Goal 2 Statement: I will follow the Hear Failure Action Plan   Goal 2 Supportive Steps: I will cough deep breath and drink plenty of water and call with increased symptoms   Goal 2 Progression Percent: 100%  Goal 2 Progression Date: 11/27/17  Goal 3 Statement: I will start Duloxecine as directed   Goal 3 Supportive Steps:  (wife  will call if experiencing any side effects )  Goal 3 Progression Percent: 100%  Goal 3 Progression Date: 01/02/18  Goal 4 Statement: I will  prevent constipation   Goal 4 Supportive Steps: i will take Miralax daily and drink plenty of water   Goal 4 Progression Percent: 100%  Goal 4 Progression Date: 01/03/18  Goal 5 Statement: I will prevent dehydration  Goal 5 Supportive Steps: I will drink 6-8 glasses of water daily   Goal 5 Progression Percent: 100%  Goal 5 Progression Date: 01/10/18        Barriers: Chronic back pain   Strengths: Continues home care services     Frequency of Care Coordination: PRN  Upcoming appointment: 03/06/17     Plan: Wife will call CC back after SW visit tomorrow and give an update     Pebbles Leija RN / Clinical Care Coordinator     [address] [address]  [email or web address]  Office :  [phone] / Fax :  [phone]

## 2018-02-01 NOTE — LETTER
[facility] Home  Complex Care Plan  About Me  Patient Name:  Chaz Soler    YOB: 1928  Age:     89 year old   Mehnaz MRN:   [id number] Telephone Information:    Home Phone [phone]   Mobile NONE       Address:    [address] [facility] [address]  [place] [address] Email address:  [email or web address]      Emergency Contact(s)  Name Relationship Lgl Grd Work Phone Home Phone Mobile Phone   1. MARYJANE SOLER Spouse No none [phone] none   2. MODE SOLER Son No [phone]   3. PATRICIA SOLER Daughter   [phone]   4. CATHI CROSS Daughter No none [phone]           Primary language:  English     needed? No   [employer]:  [phone] op. 1  Other communication barriers:  (dementia)  Preferred Method of Communication:   (unknown)  Current living arrangement: I live in a private home with spouse (TCU)  Mobility Status/ Medical Equipment: Independent w/Device  Other information to know about me:    Health Maintenance  Health Maintenance Reviewed: Not assessed    My Access Plan  Medical Emergency 911   Primary Clinic Line [facility]- [phone]   24 Hour Appointment Line [phone] or  [phone]) (toll-free)   24 Hour Nurse Line [phone] (toll-free)   Preferred Urgent Care [facility], [phone]   Preferred Hospital [facility]  [phone]   Preferred Pharmacy [facility] [address] [place] [address] [address] [place] [address]     Behavioral Health Crisis Line The National Suicide Prevention Lifeline at 1-175.479.9972 or 911     My Care Team Members  Patient Care Team       Relationship Specialty Notifications Start End    Veum, Alirio MEEHAN MD PCP - General   2/15/02     Phone: [phone] Fax: [phone]         [address] [facility] [address]    Pebbles Leija, RN Clinic Care Coordinator   Admissions 10/18/17     Phone: [phone] Fax: [phone]             My Care Plans  Self Management and Treatment Plan  Goals and (Comments)  Goal #1: I will decrease back pain  I will use the massage chair and take tramadol as directed      100% of goal reached    Goal #2: I will follow the Heart Failure Action Plan   -I will seek medical help with increased symptoms     100% of goal reached    Goal #3: I will start Duloxecine as directed       100% of goal reached    Goal #4: I will prevent constipation I will take Miralax as directed       100% of goal reached     Goal #5: I will prevent dehydration   -wife will push fluids    100% of goal reached  -    Action Plans on File: CHF  Advance Care Plans/Directives Type:   Type Advanced Care Plans/Directives: Other (n/a)    My Medical and Care Information  Problem List   Patient Active Problem List   Diagnosis     Essential hypertension     Other specified disorders of prostate     Pulmonary fibrosis (H)     Advance Care Planning     CAD (coronary artery disease)     CKD (chronic kidney disease) stage 3, GFR 30-59 ml/min     Proteinuria     Hyperlipidemia with target LDL less than 70     Lumbar radiculopathy     Status post lumbar discectomy     S/P TAVR (transcatheter aortic valve replacement)     Physical deconditioning     CHF (congestive heart failure) (H)     Anemia     SAI (obstructive sleep apnea)     Hyperparathyroidism (H)     Dementia     Hypothyroidism     Health Care Home     Edema, unspecified edema     Other chronic pain     Compression fracture L2-3     Weakness      Current Medications and Allergies:  See printed Medication Report.    Care Coordination Start Date: 02/24/17   Frequency of Care Coordination: PRN   Form Last Updated: 02/01/2018

## 2018-02-02 NOTE — TELEPHONE ENCOUNTER
Chaz Soler is a 89 year old male who calls with tremors.    NURSING ASSESSMENT:  Description:  shaking of hand and legs intermittent  Onset/duration:  A few weeks per wife  Precip. factors:  None ? Med sx  Associated symptoms:  None   Improves/worsens symptoms:  na  Pain scale (0-10)   0/10  LMP/preg/breast feeding:  na  Last exam/Treatment:    Allergies:   Allergies   Allergen Reactions     Contrast Dye      SOB, increased Bp, difficulty swallowing. Date 6/3/96 (ipaque contrast)  Was premedicated prior to FRANCK and had no reaction at all (solumedrol and benadryl) 2016  Was premedicated with Methylprednisolone protocol, no reaction 1/25/17     Lisinopril      cough     Oxycodone      Delirium       MEDICATIONS:   Taking medication(s) as prescribed? Yes  Taking over the counter medication(s?) No  Any medication side effects? No significant side effects    Any barriers to taking medication(s) as prescribed?  No  Medication(s) improving/managing symptoms?  No  Medication reconciliation completed: Yes      NURSING PLAN: Routed to provider Yes      Guideline used:  Telephone Triage Protocols for Nurses, Fifth Edition, Mandi Holly RN

## 2018-02-06 NOTE — TELEPHONE ENCOUNTER
Orders 1 x 1  SW for community resources /revisit and to help fill out [employer] form with family . Approved.Mary Holly RN

## 2018-02-06 NOTE — TELEPHONE ENCOUNTER
HHA 1 x a week for 1 week , 2 x a week for 3 weeks , 1 x a week for 1 week . . SNV 1 x a week for 1 week to extend through 2/10/18 . Spouse struggles with caregiver burnout  Looking into community wellness private pay HHA . Approved.Mary Holly RN

## 2018-02-07 NOTE — TELEPHONE ENCOUNTER
Freida RN with [facility] reporting patient has been c/o generalized low back pain.  Ongoing issue for patient, history of chronic pain.  Pain described as dull constant ache.  Been using heat for pain relief.  Taking Tramadol TID, gabapentin TID, and Tylenol TID.  Home care RN wondering if patient would benefit from a pain patch?  Please advise.      Freida # [phone]

## 2018-02-13 NOTE — TELEPHONE ENCOUNTER
Insurance does not cover Lidoderm patches for chronic musculoskeletal back pain. Only cover that med for neuropathic pain like Shingles. Too expensive to pay out of pocket . Has been through Pain clinic and failed to respond to procedures. Had discussed with pt/wife and son at last appt that only other option I had was to start pt on daily anti-inflammatory medication for pain but that would  likely worsen his baseline  reduced kidney function and shorten life expectancy for possible better pain control and pt did not wish to do that at that time. If pain issues worsening in future so that pain benefit outweighs harm of potentially worsening kidneys and life expentancy, then pt was to let me know . That was the plan then and remains. Otherwise continue what is currently being used. Don't have other options

## 2018-02-28 NOTE — TELEPHONE ENCOUNTER
[facility] calling to report a fall.      Happened Monday 2:30am.  Pt got up to go to the bathroom and the wheelchair got ahead of him.  Fell on knees.  No injury.  Vitals all stable today.

## 2018-03-01 NOTE — PROGRESS NOTES
Clinic Care Coordination Contact  OUTREACH    Referral Information:  Referral Source: IP/TCU Report  Reason for Contact: follow up on home care services     Care Conference: No     Universal Utilization:   ED Visits in last year: 0  Hospital visits in last year: 2  Last PCP appointment: 11/22/16  Missed Appointments: 0  Concerns:  (none)  Multiple Providers or Specialists:  (cardiology, vascular, pain, ortho, neurology, spine surgeon)    Clinical Concerns:  Current Medical Concerns: CC spoke to patients wife and she reports the home care services are going to stop soon .  Patient continues his chronic back pain and no change .  Wife is going to research providers in [place] who prescribe Marijuana for pain   Clinical Pathway Name: None  Clinical Pathway: None    Medication Management:  Not discussed     Functional Status:  Mobility Status: Independent w/Device  Equipment Currently Used at Home: walker, rolling  Transportation: [employer]          Psychosocial:  Current living arrangement:: I live in a private home with spouse (TCU)     Resources and Interventions:  Current Resources: Home Care (NA); Home Care (NA)  PAS Number: [id number]  Senior Linkage Line Referral Placed:  (NA)  Advanced Care Plans/Directives on file:: No  Referrals Placed:  (NA)     Goals:   Goal 1 Statement: I will decrease back pain   Goal 1 Supportive Steps: I will use Tramadol as directed ,massage chair and home exercises   Goal 1 Progression Percent: 100%  Goal 2 Statement: I will follow the Hear Failure Action Plan   Goal 2 Supportive Steps: I will cough deep breath and drink plenty of water and call with increased symptoms   Goal 2 Progression Percent: 100%  Goal 2 Progression Date: 11/27/17  Goal 3 Statement: I will start Duloxecine as directed   Goal 3 Supportive Steps:  (wife  will call if experiencing any side effects )  Goal 3 Progression Percent: 100%  Goal 3 Progression Date: 01/02/18  Goal 4 Statement: I will prevent  constipation   Goal 4 Supportive Steps: i will take Miralax daily and drink plenty of water   Goal 4 Progression Percent: 100%  Goal 4 Progression Date: 01/03/18  Goal 5 Statement: I will prevent dehydration  Goal 5 Supportive Steps: I will drink 6-8 glasses of water daily   Goal 5 Progression Percent: 100%  Goal 5 Progression Date: 01/10/18           Barriers: Back pain continues   Strengths: wife was given community resources from home care  if needed   Patient/Caregiver understanding: Will call CC with future questions or concerns      Plan: No unmet needs, no further care coordination needed at this time     Pebbles Leija RN / Clinical Care Coordinator     [address] [address]  [email or web address]  Office :  [phone] / Fax :  [phone]

## 2018-03-01 NOTE — LETTER
[facility]  Complex Care Plan  About Me  Patient Name:  Chaz Soler    YOB: 1928  Age:     89 year old   Mehnaz MRN:   [id number] Telephone Information:    Home Phone [phone]   Mobile NONE       Address:    [address] [facility] [address]  [facility] [address] Email address:  [email or web address]      Emergency Contact(s)  Name Relationship Lgl Grd Work Phone Home Phone Mobile Phone   1. MARYJANE SOLER Spouse No none [phone] none   2. MODE SOLER Son No [phone]   3. PATRICIA SOLER Daughter   [phone]   4. CATHI CROSS Daughter No none [phone]           Primary language:  English     needed? No   [facility]:  [phone] op. 1  Other communication barriers:  (dementia)  Preferred Method of Communication:   (unknown)  Current living arrangement: I live in a private home with spouse (TCU)  Mobility Status/ Medical Equipment: Independent w/Device  Other information to know about me:    Health Maintenance  Health Maintenance Reviewed: Not assessed    My Access Plan  Medical Emergency 911   Primary Clinic Line [facility]- [phone]   24 Hour Appointment Line [phone] or  [phone]) (toll-free)   24 Hour Nurse Line [phone] (toll-free)   Preferred Urgent Care [facility], [phone]   Preferred Hospital [facility]  [phone]   Preferred Pharmacy [facility] [address] [place] [address] [address] [place] [address]     Behavioral Health Crisis Line The National Suicide Prevention Lifeline at 1-226.833.5038 or 911     My Care Team Members  Patient Care Team       Relationship Specialty Notifications Start End    Veum, Alirio MEEHAN MD PCP - General   2/15/02     Phone: [phone] Fax: [phone]         [address] [facility] [address]    Pebbles Leija, RN Clinic Care Coordinator   Admissions 10/18/17     Phone: [phone] Fax: [phone]             My Care Plans  Self Management and Treatment Plan  Goals and (Comments)  Goal #1: I will decrease back pain       100% of goal reached    Goal #2: I will follow the Hear Failure Action Plan       100% of goal reached    Goal #3: I will start Duloxecine as directed       100% of goal reached    Goal #4: I will prevent constipation      100% of goal reached     Goal #5: I will prevent dehydration      100% of goal reached  -    Action Plans on File: CHF  Advance Care Plans/Directives Type:   Type Advanced Care Plans/Directives: Other (n/a)    My Medical and Care Information  Problem List   Patient Active Problem List   Diagnosis     Essential hypertension     Other specified disorders of prostate     Pulmonary fibrosis (H)     Advance Care Planning     CAD (coronary artery disease)     CKD (chronic kidney disease) stage 3, GFR 30-59 ml/min     Proteinuria     Hyperlipidemia with target LDL less than 70     Lumbar radiculopathy     Status post lumbar discectomy     S/P TAVR (transcatheter aortic valve replacement)     Physical deconditioning     CHF (congestive heart failure) (H)     Anemia     SAI (obstructive sleep apnea)     Hyperparathyroidism (H)     Dementia     Hypothyroidism     Health Care Home     Edema, unspecified edema     Other chronic pain     Compression fracture L2-3     Weakness      Current Medications and Allergies:  See printed Medication Report.    Care Coordination Start Date: 02/24/17   Frequency of Care Coordination: PRN   Form Last Updated: 03/01/2018

## 2018-03-01 NOTE — PROGRESS NOTES
Dear Dr. Alirio Fox  Medicare Home Health regulations requires [facility] to notify the Physician when the plan for visits has been altered.  We have provided fewer visits than ordered.  We are notifying you of a Missed Visit.  Chaz Soler; MRN [id number]  Missed Visit  Is A  Dates of missed services  2/27/18  Reason: Patient cancelled   Sincerely [facility]  Aniya Owens  [phone]

## 2018-03-07 NOTE — TELEPHONE ENCOUNTER
Tish RN with [facility] requesting nurse consult for private pay HHA services 0-24 hours PRN.  Approved per standing orders.    Tish's # [phone]

## 2018-03-12 NOTE — NURSING NOTE
The following nebulizer treatment was given:     MEDICATION: Albuterol Sulfate 2.5 mg  : [employer]  LOT #: [id number]  EXPIRATION DATE:  05/2019  NDC # 7978-6312-36

## 2018-03-12 NOTE — PATIENT INSTRUCTIONS
Prednisone 10mg tab, 4 tabs daily in AM for 3 days, then 3 tabs daily in AM for 3 days, then 2 tabs daily in AM for 3 days, the 1 tab daily in AM for 3 days  Cefuroxime 500mg tab, 1 tab twice a day for 7 days  Doxycycline 100mg capsule, 1 capsule twice a day for 7 days  Stop Multivitamin tab for the next 1 week while on antibiotics. Then OK to restart  Oxygen 4 liters at rest. 5 liters with activity  Follow-up with Dr Taylor from Pulmonary next week as scheduled  If increasing shortness of breath despite above between now and then, then be seen in ER

## 2018-03-12 NOTE — MR AVS SNAPSHOT
After Visit Summary   3/12/2018    Chaz Soler    MRN: [id number]           Patient Information     Date Of Birth          5/21/1928        Visit Information        Provider Department      3/12/2018 11:00 AM Alirio Fox MD [facility]        Today's Diagnoses     SOB (shortness of breath)    -  1      Care Instructions    Prednisone 10mg tab, 4 tabs daily in AM for 3 days, then 3 tabs daily in AM for 3 days, then 2 tabs daily in AM for 3 days, the 1 tab daily in AM for 3 days  Cefuroxime 500mg tab, 1 tab twice a day for 7 days  Doxycycline 100mg capsule, 1 capsule twice a day for 7 days  Stop Multivitamin tab for the next 1 week while on antibiotics. Then OK to restart  Oxygen 4 liters at rest. 5 liters with activity  Follow-up with Dr Taylor from Pulmonary next week as scheduled  If increasing shortness of breath despite above between now and then, then be seen in ER          Follow-ups after your visit        Future tests that were ordered for you today     Open Future Orders        Priority Expected Expires Ordered    XR Chest 2 Views Routine 3/12/2018 3/12/2019 3/12/2018            Who to contact     If you have questions or need follow up information about today's clinic visit or your schedule please contact [facility] directly at [phone].  Normal or non-critical lab and imaging results will be communicated to you by MyChart, letter or phone within 4 business days after the clinic has received the results. If you do not hear from us within 7 days, please contact the clinic through MyChart or phone. If you have a critical or abnormal lab result, we will notify you by phone as soon as possible.  Submit refill requests through [employer] or call your pharmacy and they will forward the refill request to us. Please allow 3 business days for your refill to be completed.          Additional Information About Your Visit        MyChart Information      [employer] gives you secure access to your electronic health record. If you see a primary care provider, you can also send messages to your care team and make appointments. If you have questions, please call your primary care clinic.  If you do not have a primary care provider, please call [phone] and they will assist you.        Care EveryWhere ID     This is your Care EveryWhere ID. This could be used by other organizations to access your [facility] medical records  [id number]        Your Vitals Were     Pulse Temperature Respirations Pulse Oximetry BMI (Body Mass Index)       70 97.8  F (36.6  C) (Oral) 22 83% 22.81 kg/m2        Blood Pressure from Last 3 Encounters:   03/12/18 118/70   01/18/18 118/58   01/15/18 120/56    Weight from Last 3 Encounters:   03/12/18 150 lb (68 kg)   01/18/18 151 lb (68.5 kg)   01/15/18 151 lb 9.6 oz (68.8 kg)                 Today's Medication Changes          These changes are accurate as of 3/12/18 12:09 PM.  If you have any questions, ask your nurse or doctor.               Start taking these medicines.        Dose/Directions    albuterol (2.5 MG/3ML) 0.083% neb solution   Used for:  SOB (shortness of breath)   Started by:  Alirio Fox MD        Dose:  1 vial   Take 1 vial (2.5 mg) by nebulization once for 1 dose   Quantity:  3 mL   Refills:  0       cefuroxime 500 MG tablet   Commonly known as:  CEFTIN   Used for:  SOB (shortness of breath)   Started by:  Alirio Fox MD        Dose:  500 mg   Take 1 tablet (500 mg) by mouth 2 times daily for 7 days   Quantity:  14 tablet   Refills:  0       doxycycline 100 MG capsule   Commonly known as:  VIBRAMYCIN   Used for:  SOB (shortness of breath)   Started by:  Alirio Fox MD        Dose:  100 mg   Take 1 capsule (100 mg) by mouth 2 times daily for 7 days   Quantity:  14 capsule   Refills:  0       predniSONE 10 MG tablet   Commonly known as:  DELTASONE   Used for:  SOB (shortness of breath)   Started by:  Alirio Fox MD         4 tabs daily in AM for 3 days, then 3 tabs daily in AM for 3 days, then 2 tabs daily in AM for 3 days, the 1 tab daily in AM for 3 days   Quantity:  30 tablet   Refills:  0            Where to get your medicines      Some of these will need a paper prescription and others can be bought over the counter.  Ask your nurse if you have questions.     Bring a paper prescription for each of these medications     cefuroxime 500 MG tablet    doxycycline 100 MG capsule    predniSONE 10 MG tablet       You don't need a prescription for these medications     albuterol (2.5 MG/3ML) 0.083% neb solution                Primary Care Provider Office Phone # Fax #    Alirio Fox [phone]       [address] [place] [address]        Equal Access to Services     NELIDA PHILLIPS : Nickolas Boyd, waaxdaniela luqadaha, qaybta kaalmada adedemaryadaniela, johnny dinh . So [facility] [phone].    ATENCIÓN: Si habla español, tiene a denise disposición servicios gratuitos de asistencia lingüística. Ll[facility] [phone].    We comply with applicable federal civil rights laws and [place] laws. We do not discriminate on the basis of race, color, national origin, age, disability, sex, sexual orientation, or gender identity.            Thank you!     Thank you for choosing [facility]  for your care. Our goal is always to provide you with excellent care. Hearing back from our patients is one way we can continue to improve our services. Please take a few minutes to complete the written survey that you may receive in the mail after your visit with us. Thank you!             Your Updated Medication List - Protect others around you: Learn how to safely use, store and throw away your medicines at www.disposemymeds.org.          This list is accurate as of 3/12/18 12:09 PM.  Always use your most recent med list.                   Brand Name Dispense Instructions for use  Diagnosis    albuterol (2.5 MG/3ML) 0.083% neb solution     3 mL    Take 1 vial (2.5 mg) by nebulization once for 1 dose    SOB (shortness of breath)       amLODIPine 10 MG tablet    NORVASC    90 tablet    TAKE ONE TABLET BY MOUTH ONCE DAILY    Essential hypertension       aspirin 81 MG tablet     30 tablet    Take 1 tablet (81 mg) by mouth daily    Coronary artery disease involving native coronary artery of native heart without angina pectoris       atorvastatin 20 MG tablet    LIPITOR    90 tablet    Take 1 tablet (20 mg) by mouth daily    Coronary artery disease involving native coronary artery of native heart without angina pectoris       cefuroxime 500 MG tablet    CEFTIN    14 tablet    Take 1 tablet (500 mg) by mouth 2 times daily for 7 days    SOB (shortness of breath)       cyanocobalamin 1000 MCG tablet    vitamin  B-12     Take 1,000 mcg by mouth daily        donepezil 10 MG tablet    ARICEPT    90 tablet    TAKE ONE TABLET BY MOUTH AT BEDTIME    Memory loss       doxycycline 100 MG capsule    VIBRAMYCIN    14 capsule    Take 1 capsule (100 mg) by mouth 2 times daily for 7 days    SOB (shortness of breath)       DULoxetine 30 MG EC capsule    CYMBALTA     Take 30 mg by mouth daily        gabapentin 100 MG capsule    NEURONTIN    90 capsule    TAKE ONE CAPSULE BY MOUTH THREE TIMES DAILY FOR  PAIN    Compression fracture       LASIX PO      Take 20 mg by mouth daily        levothyroxine 88 MCG tablet    SYNTHROID/LEVOTHROID    90 tablet    TAKE ONE TABLET BY MOUTH ONCE DAILY    Hypothyroidism, unspecified type       losartan 100 MG tablet    COZAAR    90 tablet    TAKE ONE TABLET BY MOUTH  DAILY    Essential hypertension       metoprolol succinate 25 MG 24 hr tablet    TOPROL-XL    90 tablet    TAKE ONE TABLET BY MOUTH DAILY    Essential hypertension       MULTIVITAL Tabs      Take 1 tablet by mouth daily        order for DME     1 each    Equipment being ordered: TENS and supplies.    Other chronic pain,  Lumbar radiculopathy, DDD (degenerative disc disease), lumbar, Spinal stenosis of lumbar region without neurogenic claudication       pantoprazole 40 MG EC tablet    PROTONIX    90 tablet    TAKE ONE TABLET BY MOUTH EVERY MORNING    Gastroesophageal reflux disease, esophagitis presence not specified       predniSONE 10 MG tablet    DELTASONE    30 tablet    4 tabs daily in AM for 3 days, then 3 tabs daily in AM for 3 days, then 2 tabs daily in AM for 3 days, the 1 tab daily in AM for 3 days    SOB (shortness of breath)       tamsulosin 0.4 MG capsule    FLOMAX    90 capsule    TAKE ONE CAPSULE BY MOUTH ONCE DAILY    Benign prostatic hyperplasia with urinary retention       TRAMADOL HCL PO      Take 50 mg by mouth 3 times daily        TYLENOL PO      Take 500 mg by mouth 3 times daily

## 2018-03-12 NOTE — PROGRESS NOTES
"  SUBJECTIVE:   Chaz Soler is a 89 year old male who presents to clinic today for the following health issues:    Chief Complaint   Patient presents with     Shortness of Breath     Pt's past medical history, family history, habits, medications and allergies were reviewed with the patient today.  See snap shot for  HCM status. Most recent lab results reviewed with pt. Problem list and histories reviewed & adjusted, as indicated.  Additional history as below:    Patient seen with his wife today.  Denies fevers or chills lately.  No sore throat.  Normally on 3-4 L of oxygen.  Wife states that they had trouble getting his O2 sats up into the 90s a few days ago.  Patient's initial O2 sats with ambulation today on 4 L was 83%.  This rises to 95% after the patient is resting for a period of time.  History of pulmonary fibrosis.  Previous history of heart failure though the patient's weight is down a pound from what it was 2 months ago and patient denies edema issues.  Occasional mild clear rhinorrhea.  Rare nonproductive cough.  Patient denies chest pains.  Stable chronic back pain.  History of chronic anemia with a hemoglobin level running in the low 12s. Last checked  Jan 2018.  Pt denies seeing blood in stools.   Patient denies seeing any blood in his stools  Additional ROS:   Constitutional, HEENT, Cardiovascular, Pulmonary, GI and , Neuro, MSK and Psych review of systems/symptoms are otherwise negative or unchanged from previous, except as noted above.      OBJECTIVE:  /70  Pulse 70  Temp 97.8  F (36.6  C) (Oral)  Resp 22  Wt 150 lb (68 kg)  SpO2 95%  BMI 22.81 kg/m2   Estimated body mass index is 22.81 kg/(m^2) as calculated from the following:    Height as of 1/15/18: 5' 8\" (1.727 m).    Weight as of this encounter: 150 lb (68 kg).     O2 sats originally 82% with ambulation. Climbed to 95% at rest on 4 liters O2 before the neb was given and were 94% after the neb  Eye: PERRL, EOMI  HENT: ear " canals and TM's normal and nose and mouth without ulcers or lesions   Neck: no adenopathy. Thyroid normal to palpation. No bruits  Pulm: Crackles in the mid and lower lung fields bilaterally.  No rhonchi or wheezes  CV: Regular rates and rhythm  GI: Soft, nontender, Normal active bowel sounds, No hepatosplenomegaly or masses palpable  Ext: Peripheral pulses intact. No edema.  Neuro: Normal strength and tone, sensory exam grossly normal    Assessment/Plan: (See plan discussion below for further details)  1. SOB (shortness of breath)  Pt as given Albuterol neb in clinic today. No response to neb. No change weight/CP/edema issues to suggest CHF issue. Will cover  For underlying pneumonia/other infectious issue ?  hidden under pulmn fibrosis scarring and will treat with prednisone for any possible reversible component. Pt to see Pulmonary  - albuterol (2.5 MG/3ML) 0.083% neb solution; Take 1 vial (2.5 mg) by nebulization once for 1 dose  Dispense: 3 mL; Refill: 0  - XR Chest 2 Views; Future  - predniSONE (DELTASONE) 10 MG tablet; 4 tabs daily in AM for 3 days, then 3 tabs daily in AM for 3 days, then 2 tabs daily in AM for 3 days, the 1 tab daily in AM for 3 days  Dispense: 30 tablet; Refill: 0  - doxycycline (VIBRAMYCIN) 100 MG capsule; Take 1 capsule (100 mg) by mouth 2 times daily for 7 days  Dispense: 14 capsule; Refill: 0  - cefuroxime (CEFTIN) 500 MG tablet; Take 1 tablet (500 mg) by mouth 2 times daily for 7 days  Dispense: 14 tablet; Refill: 0    Plan discussion:  Patient was given an albuterol nebulizer treatment in clinic today without much response  Prednisone 10mg tab, 4 tabs daily in AM for 3 days, then 3 tabs daily in AM for 3 days, then 2 tabs daily in AM for 3 days, the 1 tab daily in AM for 3 days  Cefuroxime 500mg tab, 1 tab twice a day for 7 days  Doxycycline 100mg capsule, 1 capsule twice a day for 7 days  Stop Multivitamin tab for the next 1 week while on antibiotics. Then OK to restart  Oxygen 4  liters at rest. 5 liters with activity  Follow-up with Dr Taylor from Pulmonary next week as scheduled  If increasing shortness of breath despite above between now and then, then be seen in ER       Alirio Fox MD  Internal Medicine Department  [facility]

## 2018-03-12 NOTE — TELEPHONE ENCOUNTER
"Chaz Soler is a 89 year old male who calls with SOB.  Spouse (consent to communicate) calling on behalf of patient.      NURSING ASSESSMENT:  Description:  Spouse stated, \"His breathing is really bad.\"  SOB with any exertion.  \"His oxygen drops into the 70's.\"  Described breathing as \"gasping to breath.\"  Normally, uses oxygen 3-4 Lpm via NC has needed 5 Lpm to maintain O2 sats.  Reports O2 saturation at 97% at rest now on 5 Lpm.    Onset/duration:  Onset - 2-3 days   Precip. factors:  History of COPD and pulmonary fibrosis.      Improves/worsens symptoms:  Improves with rest and worsens with exertion/activity.        Last exam/Treatment:  1/18/18  Allergies:   Allergies   Allergen Reactions     Contrast Dye      SOB, increased Bp, difficulty swallowing. Date 6/3/96 (ipaque contrast)  Was premedicated prior to FRANCK and had no reaction at all (solumedrol and benadryl) 2016  Was premedicated with Methylprednisolone protocol, no reaction 1/25/17     Lisinopril      cough     Oxycodone      Delirium       MEDICATIONS:   Taking medication(s) as prescribed? Yes  Taking over the counter medication(s?) No  Any medication side effects? Not Applicable    Any barriers to taking medication(s) as prescribed?  No  Medication(s) improving/managing symptoms?  N/A  Medication reconciliation completed: N/A      NURSING PLAN: Nursing advice to patient to seek emergency care now.    RECOMMENDED DISPOSITION:  To ED/call 911, another person to drive - spouse stated understanding  Will comply with recommendation: Yes  If further questions/concerns or if symptoms do not improve, worsen or new symptoms develop, call your PCP or [facility] as soon as possible.      Guideline used:  Telephone Triage Protocols for Nurses, Fifth Edition, Mandi Bui RN    "

## 2018-03-12 NOTE — PROGRESS NOTES
Clinic Care Coordination RN:     Incoming call from wife stating patient is having increased SOB .  Patient has an appointment with a Pulmonologist a week from Friday .  CC transferred to Triage to get into the clinic for an appointment today   Triage advised to go to ED but wife refuses .  Dr Fox will see the patient at 11:00 today     Pebbles Leija RN / Clinical Care Coordinator     [address] [address]  [email or web address]  Office :  [phone] / Fax :  [phone]

## 2018-03-15 NOTE — PROGRESS NOTES
Clinic Care Coordination Contact  [facility]/Voicemail       Clinical Data: Care Coordinator Outreach  Follow up from office visit 3/12:  Plan for that visit copied below:  Plan discussion:  Patient was given an albuterol nebulizer treatment in clinic today without much response  Prednisone 10mg tab, 4 tabs daily in AM for 3 days, then 3 tabs daily in AM for 3 days, then 2 tabs daily in AM for 3 days, the 1 tab daily in AM for 3 days  Cefuroxime 500mg tab, 1 tab twice a day for 7 days  Doxycycline 100mg capsule, 1 capsule twice a day for 7 days  Stop Multivitamin tab for the next 1 week while on antibiotics. Then OK to restart  Oxygen 4 liters at rest. 5 liters with activity  Follow-up with Dr Taylor from Pulmonary next week as scheduled  If increasing shortness of breath despite above between now and then, then be seen in ER      Outreach attempted x 1.  Left message on voicemail with call back information and requested return call.    Plan: . Care Coordinator will try to reach patient again in 3-5 business days.    Pebbles Leija RN / Clinical Care Coordinator     [address] [address]  [email or web address]  Office :  [phone] / Fax :  [phone]

## 2018-03-15 NOTE — PROGRESS NOTES
Incoming call from the patients wife.  Patient is about the same.  Gets very anxious with SOB episodes  Daughter is teaching him to deep breathe which is helping a little bit  Patient agrees to finish antibiotic and Prednisone course and keep Pulmonology appointment 3/23  CC will follow up after Pulmonology appointment     Pebbles Leija RN / Clinical Care Coordinator     [address] [address]  [email or web address]  Office :  [phone] / Fax :  [phone]

## 2018-03-27 NOTE — TELEPHONE ENCOUNTER
Requested Prescriptions   Pending Prescriptions Disp Refills     traMADol (ULTRAM) 50 MG tablet [Pharmacy Med Name: TRAMADOL HCL 50MG   TAB]      Last Written Prescription Date:  n/a  Last Fill Quantity: n/a,   # refills: n/a  Last Office Visit: 03/12/2018  Future Office visit:       Routing refill request to provider for review/approval because:  Medication is reported/historical   120 tablet 5     Sig: TAKE ONE TABLET BY MOUTH 4 TIMES DAILY    There is no refill protocol information for this order

## 2018-04-02 NOTE — PROGRESS NOTES
Clinic Care Coordination Contact  OUTREACH    Referral Information:  Referral Source: Self-patient/Caregiver    Primary Diagnosis: COPD       Universal Utilization: 1/7-1/9/2018  Utilization    Last refreshed: 4/2/2018  3:34 PM:  No Show Count (past year) 0       Last refreshed: 4/2/2018  3:34 PM:  ED visits 1       Last refreshed: 4/2/2018  3:34 PM:  Hospital admissions 2          Current as of: 4/2/2018  3:34 PM             Clinical Concerns:  Current Medical Concerns:  Incoming call from the patients wife.  Patient saw the Pulmonologist last week and a Nebulizer was ordered .  Wife picked up the Nebulizer Solution and is trying to get a hold of [facility] to get the Nebulizer   Patient continues to have Daniel SEGURA RN and a HHA    Clinical Pathway Name: Heart Failure  Health Maintenance Reviewed: Not assessed    Medication Management:  Nebulizer solution and Nebulizer discussed     Functional Status:        Transportation:  Transportation means:: Family        Resources and Interventions:  Current Resources: Skilled Nursing, Home Health Aid, Physicial Therapy;        Patient/Caregiver understanding: CC  called [facility]       ( [phone]) and they will call the patients wife         Plan: CC will follow up when discharged from home care     Pebbles Leija RN / Clinical Care Coordinator     [address] [address]  [email or web address]  Office :  [phone] / Fax :  [phone]

## 2018-04-09 NOTE — PROGRESS NOTES
Clinic Care Coordination Contact  Caregiver  Conversations    Incoming call from patients wife stating Pepper never returned her call in regards to the Nebulizer machine order.  Patients wife found a used one and they are just staring to use the Nebulizer.  CC will follow up in 2-4  weeks     Pebbles Leija RN / Clinical Care Coordinator     [address] [address]  [email or web address]  Office :  [phone] / Fax :  [phone]

## 2018-04-23 NOTE — TELEPHONE ENCOUNTER
"Requested Prescriptions   Pending Prescriptions Disp Refills     tamsulosin (FLOMAX) 0.4 MG capsule [Pharmacy Med Name: TAMSULOSIN 0.4MG    CAP]  Last Written Prescription Date:  7/27/17  Last Fill Quantity: 90 CAPSULE,  # refills: 2   Last office visit: 3/12/2018 with prescribing provider:  BRENNA   Future Office Visit:     90 capsule 2     Sig: TAKE ONE CAPSULE BY MOUTH ONCE DAILY    Alpha Blockers Passed    4/22/2018  2:33 PM       Passed - Blood pressure under 140/90 in past 12 months    BP Readings from Last 3 Encounters:   03/12/18 118/70   01/18/18 118/58   01/15/18 120/56                Passed - Recent (12 mo) or future (30 days) visit within the authorizing provider's specialty    Patient had office visit in the last 12 months or has a visit in the next 30 days with authorizing provider or within the authorizing provider's specialty.  See \"Patient Info\" tab in inbasket, or \"Choose Columns\" in Meds & Orders section of the refill encounter.           Passed - Patient does not have Tadalafil, Vardenafil, or Sildenafil on their medication list       Passed - Patient is 18 years of age or older          "

## 2018-05-03 NOTE — PROGRESS NOTES
[facility] now requests orders and shares plan of care/discharge summaries for some patients through [employer].  Please REPLY TO THIS MESSAGE in order to give authorization for orders when needed.  This is considered a verbal order, you will still receive a faxed copy of orders for signature.  Thank you for your assistance in improving collaboration for our patients.    ORDER 0 to 24 hr/day per patient/family request for hourly nursing and hourly home health aide. Supervision per agency protocol. Private pay.    MD SUMMARY  Situation patient lives with his wife in apartment coop building. Patient currently has a HHA coming twice a week for 2 hours to assist with bathing, clothing, some topical med application, any needed assistance with oxygen equipment and pt exercises. Patient often is sob with walking and exercises. Today this writer witnessed lower back pain event that elevated patients respirations acutely and caused patient to be greatly sob and to writhe in pain. This writer would like to know if there is any further form of pain management that can be provided. Will call pcp clinic and also leave message with clinic nurse. Patients bp was 150/60, HR 67, RR 24, T 97.4 F, 92 percent on 5 li continuous oxygen.  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 0 to 24 hr/day per patient/family request for hourly nursing and hourly home health aide. Supervision per agency protocol. Private pay.

## 2018-05-03 NOTE — TELEPHONE ENCOUNTER
If pt is being prescribed prednisone by pulmonary, that is going to have an anti-inflammatory effect in the joints of the back so if the pain is related to arthritis, it should improve with the steroid treatment. Therefore would wait to see response to that prednisone. If not improving with that, then left with trying low dose narcotics again but has gotten increasily sleepy and occ confused in past when moderate dose narcotics used so get leary about using that type of pain med in this pt unless absolutely needed. Pt's wife Kylee to update us early next week if chronic back issues not improved or earlier if significantly worsen

## 2018-05-03 NOTE — TELEPHONE ENCOUNTER
Called, left message to call back.   [facility]  Called back, relayed Dr. Fox's message below.   Called wife Kemi, to get status on back pain. She states that the back pain is a 5, and no relief. For the SOB, Dr. Taylor is Rx's Prednisone and an Antibiotic. Wife is requesting suggestions for the major back pain patient C/O.

## 2018-05-03 NOTE — TELEPHONE ENCOUNTER
HHA and nursing orders approved per nursing protocol.     Homecare also reporting concerns. Says patient had back pain episode that exacerbated SOB today. Has chronic low back pain. Writhing in chair, homecare nurse was concerned patient was going to fall out of chair. Is wondering if patient should have additonal pain management? Does not think any new symptoms with the back pain, but patient poor historian. Was breathing rapidly, but then slept and was ok. SOB with any exertion, sats drop to low 60s and 70s and comes back up with deep breathing- 94% today after deep breathing. Homecare does not think this is new. Is also wondering if patient should have Longer acting inhaled steroid or if should use albuterol neb more often/before doing therapies with aid. Thinks neb is new, got machine a couple weeks ago. [facility] prescribed albuterol, not on current med list. Nurse just there bi-monthly for assessments.     Please advise. Do you want patient to make appt? Do you want them to call/follow-up with pulmonology?

## 2018-05-03 NOTE — TELEPHONE ENCOUNTER
" Pt as worsening pulm fibrosis so inhaler steroid will not help in this case. This is not asthma/COPD.  Re: back pain, this is chronic and pt reacts poorly to narcotics. Have discussed using NSAIDS but will ruin his already poor kidney function. No change in past with higher  Duloxetine  Back pain usually varies. Note says pt was writing with pain but then later says pt slept and \"OK\".  If back  Is now at baseline, then would not change things. If back still quite painful and staying that way, then route message back to me      "

## 2018-05-08 NOTE — PROGRESS NOTES
Clinic Care Coordination Contact    OUTREACH    Referral Information:   Incoming call from the patients wife with any update          Chief Complaint   Patient presents with     Clinic Care Coordination - Follow-up     Clinic Care Coordination        Universal Utilization: Hospitalization 1/7-1/9/2018 -Altered mental status  Weakness  Utilization    Last refreshed: 5/8/2018  8:49 AM:  No Show Count (past year) 0       Last refreshed: 5/8/2018  8:49 AM:  ED visits 1       Last refreshed: 5/8/2018  8:49 AM:  Hospital admissions 2          Current as of: 5/8/2018  8:49 AM             Clinical Concerns:  Current Medical Concerns:  Wife calling CC with an update on the patient back pain and SOB.  Dr Taylor/Pulmonologist prescribed Prednisone 20 mg daily and Doxycycline 100 twice a day until May 11.  Patient continues to have some SOB but is relieved by Nebulizer treatments 3 times a day  Patient has a non productive cough and no wheezing noticed .  Patient has a follow up with the Pulmonologist May 18 until   Patient continues to have private pay HHA visit twice a week to assist with showers and wife is thinking of increasing visit 4 times a week          Health Maintenance Reviewed:      Medication Management:  Not discussed     Functional Status:  Uses walker         Transportation:  Family provides              Goals:   Goals        General    Medical (pt-stated)     Notes - Note created  4/9/2018 10:33 AM by Pebbles Leija RN    Goal Statement: I will decrease shortness of breath episodes   Measure of Success: I will have decreased shortness of breath episodes and increase my activity   Supportive Steps to Achieve: I will use my nebulizer as directed and seek medical help if increased symptoms of concern   Barriers: Deconditioned  Strengths: Supportive wife   Date to Achieve By: Ongoing               Patient/Caregiver understanding: Patient understands to follow Heart Failure Action Plan and to seek medical help with  increased symptoms of concern         Plan: CC will follow up in 1-2 weeks     Pebbles Leija RN / Clinical Care Coordinator     [address] [address]  [email or web address]  Office :  [phone] / Fax :  [phone]

## 2018-05-08 NOTE — LETTER
[facility]  Complex Care Plan  About Me  Patient Name:  Chaz Soler    YOB: 1928  Age:     89 year old   Mehnaz MRN:   [id number] Telephone Information:    Home Phone [phone]   Mobile NONE       Address:    [address] [facility] [address]  [place] [address] Email address:  [email or web address]      Emergency Contact(s)  Name Relationship Lgl Grd Work Phone Home Phone Mobile Phone   1. MARYJANE SOLER Spouse No none [phone] none   2. MODE SOLER Son No [phone]   3. PATRICIA SOLER Daughter   [phone]   4. CATHI CROSS Daughter No none [phone]           Primary language:  English     needed? No   [employer] [employer]:  [phone] op. 1  Other communication barriers:    Preferred Method of Communication:  Shantanu  Current living arrangement:    Mobility Status/ Medical Equipment:      Health Maintenance  Health Maintenance Reviewed:      My Access Plan  Medical Emergency 911   Primary Clinic Line [facility] - [phone]   24 Hour Appointment Line [phone] or  [phone]) (toll-free)   24 Hour Nurse Line [phone] (toll-free)   Preferred Urgent Care     Preferred Hospital     Preferred Pharmacy [facility] [address] [place], [place] [address] [address] [place] [address]     Behavioral Health Crisis Line The National Suicide Prevention Lifeline at 1-232.315.8099 or 911     My Care Team Members    Patient Care Team       Relationship Specialty Notifications Start End    VeAlirio navarro MD PCP - General   2/15/02     Phone: [phone] Fax: [phone]         [address] [facility] [address]    Joi Laurent, RN Lead Care Coordinator Primary Care - CC  5/8/18     Phone: [phone] Fax: [phone]                My Care Plans  Self Management and Treatment Plan  Goals and (Comments)  Goals        General    Medical (pt-stated)     Notes -  Note created  4/9/2018 10:33 AM by Pebbles Leija, RN    Goal Statement: I will decrease shortness of breath episodes   Measure of Success: I will have decreased shortness of breath episodes and increase my activity   Supportive Steps to Achieve: I will use my nebulizer as directed and seek medical help if increased symptoms of concern   Barriers: Deconditioned  Strengths: Supportive wife   Date to Achieve By: Ongoing              Action Plans on File: Heart Failure Action Plan                       Advance Care Plans/Directives Type: POLST and Health Care Directive        My Medical and Care Information  Problem List   Patient Active Problem List   Diagnosis     Essential hypertension     Other specified disorders of prostate     Pulmonary fibrosis (H)     Advance Care Planning     CAD (coronary artery disease)     CKD (chronic kidney disease) stage 3, GFR 30-59 ml/min     Proteinuria     Hyperlipidemia with target LDL less than 70     Lumbar radiculopathy     Status post lumbar discectomy     S/P TAVR (transcatheter aortic valve replacement)     Physical deconditioning     CHF (congestive heart failure) (H)     Anemia     SAI (obstructive sleep apnea)     Hyperparathyroidism (H)     Dementia     Hypothyroidism     Health Care Home     Edema, unspecified edema     Other chronic pain     Compression fracture L2-3     Weakness      Current Medications and Allergies:  See printed Medication Report.    Care Coordination Start Date: 5/8/5018   Frequency of Care Coordination:  2-4 weeks    Form Last Updated: 05/08/2018

## 2018-05-08 NOTE — TELEPHONE ENCOUNTER
"Requested Prescriptions   Pending Prescriptions Disp Refills     metoprolol succinate (TOPROL-XL) 25 MG 24 hr tablet [Pharmacy Med Name: METOPROLOL ER 25MG  TAB] 90 tablet 2     Sig: TAKE ONE TABLET BY MOUTH DAILY    Beta-Blockers Protocol Passed    5/6/2018  3:43 PM       Passed - Blood pressure under 140/90 in past 12 months    BP Readings from Last 3 Encounters:   03/12/18 118/70   01/18/18 118/58   01/15/18 120/56                Passed - Patient is age 6 or older       Passed - Recent (12 mo) or future (30 days) visit within the authorizing provider's specialty    Patient had office visit in the last 12 months or has a visit in the next 30 days with authorizing provider or within the authorizing provider's specialty.  See \"Patient Info\" tab in inbasket, or \"Choose Columns\" in Meds & Orders section of the refill encounter.            Prescription approved per [facility] Refill Protocol.    "

## 2018-05-08 NOTE — LETTER
[facility]  [address] [address]  [phone]      5/8/2018       Chaz Soler  [address] [facility] [address]  [place] [address]        Dear Chaz,      It was a pleasure to speak with you over the phone on Tuesday .  I would like to provide you with the enclosed information for your records.  As part of care coordination, we are developing care plans to assist in accomplishing your health care goals.  When we speak next, please feel free to let me know if you want to add or change any information on your care plans.    As always, feel free to contact Joi Laurent RN care coordinator  if you have any questions or concerns.  We look forward to working with you in the effort to achieve your health care and wellness goals .        Sincerely,        Pebbles Leija RN, Care Coordinator  [facility]   [email or web address]   [phone]

## 2018-05-22 NOTE — TELEPHONE ENCOUNTER
gabapentin      Last Written Prescription Date:  5/3/17  Last Fill Quantity: 90,   # refills: 11  Last Office Visit: 3/12/18  Future Office visit:       Routing refill request to provider for review/approval because:  Drug not on the [facility], P or [facility] refill protocol or controlled substance

## 2018-06-03 NOTE — ED PROVIDER NOTES
History     Chief Complaint:  Fall    The history is provided by the EMS personnel. History limited by: history of dementia.      Chaz Soler is a 89 year old male who presents with a fall.  Patient is brought in via EMS, who was called by patient's wife in response to patient suffering a reported fall at his living facility at [facility].  Patient has a history of dementia and pulmonary fibrosis, and wife was helping patient get dressed this morning and he was sitting on side of the bed, and subsequently slid off.  Wife called for EMS at this time.  On EMS arrival, patient was unable to stand and was reportedly resistant to wife's direction.  EMS are concerned for patient's living situation.  Patient is on 5 L oxygen at home chronically for known pulmonary fibrosis.  EMS report patient was vitally stable, had unremarkable EKG, and placed in c-collar for precaution.  No complaints on presentation by patient.  No other symptoms reported by EMS.    Allergies:  Contrast Dye  Lisinopril  Oxycodone     Medications:    Tylenol  Albuterol  Amlodipine  Aspirin  Lipitor  Aricept  Cymbalta  Lasix  Neurontin  Levothyroxine  Losartan  Metoprolol  Protonix  Flomax  Tramadol     Past Medical History:    Abdominal aortic aneurysm  Anemia  Aortic stenosis  CAD  CHF  CKD  COPD  Dementia  Essential hypertension  Gout  Hypercalcemia  Hyperlipidemia  Hyperparathyroidism  Hyperplasia prostate  Left lung granuloma  Lumbago  Proteinuria  Pulmonary fibrosis  PVD  Thrombocytopenia  Vitamin D deficiency    Past Surgical History:    Colonoscopy  Discectomy, lumbar posterior  Endovascular repair aneurysm, abdominal aortic  Cholecystectomy  Repair AAA  Vasectomy    Family History:    Father-hypertension  Mother-hypertension    Social History:  Smoking status: Never smoker  Alcohol use: Yes (1 glass wine/day)  Marital Status:        Review of Systems   Unable to perform ROS: Dementia     Physical Exam     Patient  Vitals for the past 24 hrs:   BP Temp Temp src Pulse Heart Rate SpO2   06/03/18 1345 144/67 - - - 70 -   06/03/18 1315 172/83 - - - 77 -   06/03/18 1300 172/76 - - - - -   06/03/18 1215 183/83 - - - - 92 %   06/03/18 1200 - - - - - 100 %   06/03/18 1145 184/76 - - - - 100 %   06/03/18 1123 161/75 98  F (36.7  C) Temporal 70 - 92 %         Physical Exam    GENERAL:  Pleasant, age appropriate demented male resting comfortably in the bed.   HEENT:   No scalp hematoma or defect to the bony calvarium.      Tafoya's and Racoon's sign negative.      No hemotympanum or septal hematoma.    Midface is stable.     Oropharynx is moist, without lesions or trismus.  EYES:  Conjunctiva normal, PERRL  NECK:   C-spine non-tender; cervical collar in place     No bony step-off to cervical spine.   CV:    Regular rate and rhythm.     No murmurs, rubs or gallops.    PULM:  Inspiratory crackles bilateral    No respiratory distress.      No subcutaneous emphysema or crepitus.    No wheezing.  ABD:   Soft, non-tender, non-distended.      No pulsatile masses.  No rebound or guarding.  MSK:    No focal bony tenderness to the extremities.      Upper and lower extremities taken through full ROM without significant pain or limited ROM.  LYMPH:  No cervical lymphadenopathy.  NEURO:  Alert and oriented to person, place but not time.     GCS: E4 M6 V4 = 14    CN II-XII intact, speech is clear with no aphasia.      Strength is equal and symmetric.    Sensation intact  SKIN:   Warm, dry and intact.    PSYCH:   Mood is good and affect is appropriate.      Emergency Department Course   ECG:  @ 1145  Indication: fall  Vent. Rate 67 bpm. DC interval 192 ms. QRS duration 122 ms. QT/QTc 402/424 ms. P-R-T axis 37 -40 46.   Normal; sinus rhythm. Left axis deviation. Left ventricular hypertrophy with QRS widening and repolarization abnormality. 1mm ST elevation in V1-V4. 0.5mm ST elevation in lead 2 and aVF. T wave inversion in V1-V4.  Abnormal ECG.    Read  @ 1149 by Dr. Ngo.    @ 1156  Indication: repeat  Vent. Rate 70 bpm. CO interval 186 ms. QRS duration 120 ms. QT/QTc 460/496 ms. P-R-T axis 33 -41 42.   Sinus rhythm with occasional premature ventricular complexes. Left axis deviation. Left ventricular hypertrophy with QRS widening and repolarization abnormality. 1mm ST elevation in V1-V4. 0.5 mm ST elevation in 2-aVF. T wave inversion in V1-V4. Abnormal ECG.  No significant change when compared to previous ECG from previous.  Read @ 1203 by Dr. Ngo.    Imaging:  Head CT without contrast:  IMPRESSION: Diffuse cerebral volume loss and cerebral white matter changes consistent with chronic small vessel ischemic disease. No evidence for acute intracranial pathology.  Report per radiology.     CT-scan of the cervical spine  IMPRESSION: No evidence for fracture or traumatic malalignment of the cervical spine.  Report per radiology.    XR Chest 2 views  IMPRESSION: Diffuse peripheral predominant mixed interstitial and airspace opacities over both lungs, edema versus atypical infection. No pleural effusion seen on either side. No pneumothorax. Valvular prosthesis again seen.  Report per radiology.     Laboratory:  CBC:  WBC 9.5, HGB 12.8 (L), ,  BMP: Carbon Dioxide 33 (H), Glucose 112 (H), Creatinine 1.40 (H), GFR 48 (L), Calcium 10.2 (H), otherwise WNL  Pro-BNP: 933  (1200) Troponin I: <0.015  (1200) Blood gas venous: pH: 7.38; pCO2: 62 (H); pO2: 25; Bicarb: 36 (H; FIO2: 5L/minute; Oxyhemoglobin: 42; Base deficit: 8.6;  (1201) Troponin POCT: 0.00    UA: Clear yellow urine, Albumin 30, Mucous Present, otherwise WNL    Interventions:  (1332) Lasix, 40 mg, IV injection    Emergency Department Course:  Nursing notes and vitals reviewed.  (115) I performed an exam of the patient as documented above.    EKG was done, interpretation as above.  Blood was drawn from the patient. This was sent for laboratory testing, findings above.   The patient was sent for CT  scans and an x-ray while in the emergency department, findings above.     Findings and plan explained to the patient and wife who consents to admission.   (5088) I discussed the patient with Dr. Rodriguez of the hospitalist service, who will admit the patient to a cardiac telemetry bed for further monitoring, evaluation, and treatment.    Impression & Plan    Medical Decision Makin-year-old male presented to the ED after generalized weakness and a fall to the floor.  It was uncertain the exact mechanism and whether he struck anything on the way to the ground.  Head CT and cervical spine CT undertaken given the limited history which is unremarkable.  He has no other traumatic findings on examination.  Wife later presented to the ED and declined any significant traumatic injury.    Evaluation for his generalized weakness revealed no evidence of electrolyte disturbance, obvious infectious pathology.  Initial EKG showed 1 mm ST elevation in V1-V4 with novel T-wave inversion compared to previous.  Repeat EKG is adynamic.  Troponin is within normal limits.  Patient has no chest pain or shortness of breath to draw concern for acute coronary syndrome.  He was noted to be hypoxic on his typical 5 L of nasal cannula at 85%.  At 6 L he was 93%.  Chest x-ray was read as interstitial edema versus atypical infection.  Patient has no infectious symptoms to indicate pneumonia thus no antibiotics.  He does have pulmonary fibrosis but his chest x-ray looks remarkably different than 3 months prior likely indicating diastolic heart failure and pulmonary edema in the face of pulmonary fibrosis.  Patient has no distress and does not require noninvasive ventilation.  He was given Lasix to assist with presumed pulmonary edema and will be transferred to a telemetry bed for further evaluation and management.    Diagnosis:    ICD-10-CM   1. Acute diastolic congestive heart failure (H) I50.31   2. Pulmonary fibrosis (H) J84.10   3.  Generalized muscle weakness M62.81       Disposition:  Patient is admitted to a cardiac telemetry bed under the care of Dr. Rodriguez.                 I, Maxime Mccoy, am serving as a scribe on 6/3/2018 at 11:15 AM to personally document services performed by Dr. Ngo based on my observations and the provider's statements to me.         Maxime Mccoy  6/3/2018   [facility] EMERGENCY DEPARTMENT       Rishi Ngo MD  06/03/18 1424

## 2018-06-03 NOTE — Clinical Note
Admitting Physician: ZAY ROQUE [[id number]]   [id number]   Bed Type: Cardiac Telemetry [44]   Special needs: Fall Risk [8]   Special needs: Recommend Lift [6]   Bed request comments: Pt on o2, needs room near desk

## 2018-06-03 NOTE — ED NOTES
[facility]  ED Nurse Handoff Report    Chaz Soler is a 89 year old male   ED Chief complaint: Fall  . ED Diagnosis:   Final diagnoses:   Acute diastolic congestive heart failure (H)   Pulmonary fibrosis (H)   Generalized muscle weakness     Allergies:   Allergies   Allergen Reactions     Contrast Dye      SOB, increased Bp, difficulty swallowing. Date 6/3/96 (ipaque contrast)  Was premedicated prior to FRANCK and had no reaction at all (solumedrol and benadryl) 2016  Was premedicated with Methylprednisolone protocol, no reaction 1/25/17     Lisinopril      cough     Oxycodone      Delirium       Code Status: DNR / DNI  Activity level - Baseline/Home:  Stand with Assist of 2. Activity Level - Current:   Total Care. Lift room needed: Yes. Bariatric: No   Needed: No   Isolation: No. Infection: Not Applicable.     Vital Signs:   Vitals:    06/03/18 1215 06/03/18 1300 06/03/18 1315 06/03/18 1345   BP: 183/83 172/76 172/83 144/67   Pulse:       Temp:       TempSrc:       SpO2: 92%          Cardiac Rhythm:  ,   Cardiac  Cardiac Rhythm: Normal sinus rhythm (PVC's freq)  Pain level:    Patient confused: Yes. Patient Falls Risk: Yes.   Elimination Status: Has voided   Patient Report - Initial Complaint: fall. Focused Assessment:   Cardiac Cardiac Monitoring - EKG Monitoring: Yes Cardiac Rhythm: NSR (PVC's freq) TD    11:45 Neurological Cognitive/Perceptual/Neuro - Cognitive/Neuro/Behavioral WDL: WDL (per baseline reported per wife)        Tests Performed: lab/imaging. Abnormal Results:   Labs Ordered and Resulted from Time of ED Arrival Up to the Time of Departure from the ED   CBC WITH PLATELETS - Abnormal; Notable for the following:        Result Value    RBC Count 4.34 (*)     Hemoglobin 12.8 (*)     Hematocrit 39.4 (*)     All other components within normal limits   BASIC METABOLIC PANEL - Abnormal; Notable for the following:     Carbon Dioxide 33 (*)     Glucose 112 (*)     Creatinine 1.40 (*)      GFR Estimate 48 (*)     GFR Estimate If Black 58 (*)     Calcium 10.2 (*)     All other components within normal limits   ROUTINE UA WITH MICROSCOPIC - Abnormal; Notable for the following:     Protein Albumin Urine 30 (*)     Mucous Urine Present (*)     All other components within normal limits   BLOOD GAS VENOUS AND OXYHGB - Abnormal; Notable for the following:     PCO2 Venous 62 (*)     Bicarbonate Venous 36 (*)     All other components within normal limits   NT PROBNP INPATIENT   TROPONIN I   PERIPHERAL IV CATHETER   ISTAT TROPONIN NURSING POCT   STRICT INTAKE AND OUTPUT   TROPONIN POCT       .   Treatments provided: see MAR  Family Comments: wife was present-left now  OBS brochure/video discussed/provided to patient:  N/A  ED Medications:   Medications   furosemide (LASIX) injection 40 mg (40 mg Intravenous Given 6/3/18 1332)     Drips infusing:  No  For the majority of the shift, the patient's behavior Green. Interventions performed were .     Severe Sepsis OR Septic Shock Diagnosis Present: No      ED Nurse Name/Phone Number: Loretta Poole,   4:28 PM    RECEIVING UNIT ED HANDOFF REVIEW    Above ED Nurse Handoff Report was reviewed: Yes  Reviewed by: Magalis Armendariz on Anne 3, 2018 at 4:58 PM

## 2018-06-03 NOTE — IP AVS SNAPSHOT
"          [place] [address] [facility]: [phone]                                              INTERAGENCY TRANSFER FORM - LAB / IMAGING / EKG / EMG RESULTS   6/3/2018                    Hospital Admission Date: 6/3/2018  NATTY MAN   : 1928  Sex: Male        Attending Provider: Macario Rodriguez MD     Allergies:  Contrast Dye, Lisinopril, Oxycodone    Infection:  None   Service:  GENERAL MEDI    Ht:  1.727 m (5' 8\")   Wt:  66.7 kg (147 lb)   Admission Wt:  61.3 kg (135 lb 4 oz)    BMI:  22.35 kg/m 2   BSA:  1.79 m 2            Patient PCP Information     Provider PCP Type    Alirio Fox MD General         Lab Results - 3 Days      Basic metabolic panel [[id number]] (Abnormal)  Resulted: 18 0736, Result status: Final result    Ordering provider: Macario Rodriguez MD  18 0001 Resulting lab: [facility]    Specimen Information    Type Source Collected On   Blood  18 0631          Components       Value Reference Range Flag Lab   Sodium 141 133 - 144 mmol/L  FrRdHs   Potassium 3.9 3.4 - 5.3 mmol/L  FrRdHs   Chloride 106 94 - 109 mmol/L  FrRdHs   Carbon Dioxide 31 20 - 32 mmol/L  FrRdHs   Anion Gap 4 3 - 14 mmol/L  FrRdHs   Glucose 87 70 - 99 mg/dL  FrRdHs   Urea Nitrogen 31 7 - 30 mg/dL H FrRdHs   Creatinine 1.49 0.66 - 1.25 mg/dL H FrRdHs   GFR Estimate 44 >60 mL/min/1.7m2 L FrRdHs   Comment:  Non  GFR Calc   GFR Estimate If Black 54 >60 mL/min/1.7m2 L FrRdHs   Comment:  African American GFR Calc   Calcium 9.0 8.5 - 10.1 mg/dL  FrRdHs            Basic metabolic panel [[id number]] (Abnormal)  Resulted: 18 0737, Result status: Final result    Ordering provider: Macario Rodriguez MD  18 0001 Resulting lab: [facility]    Specimen Information    Type Source Collected On   Blood  18 0703          Components       Value Reference Range Flag Lab   Sodium 141 133 - 144 mmol/L  FrRdHs   Potassium 4.0 3.4 - 5.3 " mmol/L  FrRdHs   Chloride 102 94 - 109 mmol/L  FrRdHs   Carbon Dioxide 33 20 - 32 mmol/L H FrRdHs   Anion Gap 6 3 - 14 mmol/L  FrRdHs   Glucose 99 70 - 99 mg/dL  FrRdHs   Urea Nitrogen 33 7 - 30 mg/dL H FrRdHs   Creatinine 1.81 0.66 - 1.25 mg/dL H FrRdHs   GFR Estimate 35 >60 mL/min/1.7m2 L FrRdHs   Comment:  Non  GFR Calc   GFR Estimate If Black 43 >60 mL/min/1.7m2 L FrRdHs   Comment:  African American GFR Calc   Calcium 9.4 8.5 - 10.1 mg/dL  FrRdHs            Basic metabolic panel [[id number]] (Abnormal)  Resulted: 06/04/18 0730, Result status: Final result    Ordering provider: Macario Rodriguez MD  06/04/18 0000 Resulting lab: [facility] [place]    Specimen Information    Type Source Collected On   Blood  06/04/18 0658          Components       Value Reference Range Flag Lab   Sodium 139 133 - 144 mmol/L  FrRdHs   Potassium 3.6 3.4 - 5.3 mmol/L  FrRdHs   Chloride 100 94 - 109 mmol/L  FrRdHs   Carbon Dioxide 34 20 - 32 mmol/L H FrRdHs   Anion Gap 5 3 - 14 mmol/L  FrRdHs   Glucose 106 70 - 99 mg/dL H FrRdHs   Urea Nitrogen 28 7 - 30 mg/dL  FrRdHs   Creatinine 1.72 0.66 - 1.25 mg/dL H FrRdHs   GFR Estimate 38 >60 mL/min/1.7m2 L FrRdHs   Comment:  Non  GFR Calc   GFR Estimate If Black 45 >60 mL/min/1.7m2 L FrRdHs   Comment:  African American GFR Calc   Calcium 10.3 8.5 - 10.1 mg/dL H FrRdHs            UA with Microscopic [[id number]] (Abnormal)  Resulted: 06/03/18 1358, Result status: Final result    Ordering provider: Rishi gNo MD  06/03/18 1124 Resulting lab: [facility] [place]    Specimen Information    Type Source Collected On   Midstream Urine Urine clean catch 06/03/18 1320          Components       Value Reference Range Flag Lab   Color Urine Yellow   FrRdHs   Appearance Urine Clear   FrRdHs   Glucose Urine Negative NEG^Negative mg/dL  FrRdHs   Bilirubin Urine Negative NEG^Negative  FrRdHs   Ketones Urine Negative NEG^Negative mg/dL  FrRdHs   Specific  Gravity Urine 1.011 1.003 - 1.035  FrRdHs   Blood Urine Negative NEG^Negative  FrRdHs   pH Urine 7.0 5.0 - 7.0 pH  FrRdHs   Protein Albumin Urine 30 NEG^Negative mg/dL A FrRdHs   Urobilinogen mg/dL 2.0 0.0 - 2.0 mg/dL  FrRdHs   Nitrite Urine Negative NEG^Negative  FrRdHs   Leukocyte Esterase Urine Negative NEG^Negative  FrRdHs   Source Midstream Urine   FrRdHs   WBC Urine 0 0 - 5 /HPF  FrRdHs   RBC Urine 1 0 - 2 /HPF  FrRdHs   Squamous Epithelial /HPF Urine <1 0 - 1 /HPF  FrRdHs   Mucous Urine Present NEG^Negative /LPF A FrRdHs            Basic metabolic panel (BMP) [[id number]] (Abnormal)  Resulted: 06/03/18 1236, Result status: Final result    Ordering provider: Rishi Ngo MD  06/03/18 1124 Resulting lab: [facility]    Specimen Information    Type Source Collected On   Blood  06/03/18 1200          Components       Value Reference Range Flag Lab   Sodium 141 133 - 144 mmol/L  FrRdHs   Potassium 4.3 3.4 - 5.3 mmol/L  FrRdHs   Chloride 103 94 - 109 mmol/L  FrRdHs   Carbon Dioxide 33 20 - 32 mmol/L H FrRdHs   Anion Gap 5 3 - 14 mmol/L  FrRdHs   Glucose 112 70 - 99 mg/dL H FrRdHs   Urea Nitrogen 23 7 - 30 mg/dL  FrRdHs   Creatinine 1.40 0.66 - 1.25 mg/dL H FrRdHs   GFR Estimate 48 >60 mL/min/1.7m2 L FrRdHs   Comment:  Non  GFR Calc   GFR Estimate If Black 58 >60 mL/min/1.7m2 L FrRd   Comment:  African American GFR Calc   Calcium 10.2 8.5 - 10.1 mg/dL H FrRdHs            BNP [[id number]]  Resulted: 06/03/18 1236, Result status: Final result    Ordering provider: Rishi Ngo MD  06/03/18 1125 Resulting lab: [facility]    Specimen Information    Type Source Collected On   Blood  06/03/18 1200          Components       Value Reference Range Flag Lab   N-Terminal Pro BNP Inpatient 933 0 - 1800 pg/mL  Rd   Comment:            Reference range shown and results flagged as abnormal are suggested inpatient   cut points for confirming diagnosis if CHF in an  acute setting. Establishing a   baseline value for each individual patient is useful for follow-up. An   inpatient or emergency department NT-proPBNP <300 pg/mL effectively rules out   acute CHF, with 99% negative predictive value.  The outpatient non-acute reference range for ruling out CHF is:   0-125 pg/mL (age 18 to less than 75)   0-450 pg/mL (age 75 yrs and older)              Troponin I [[id number]]  Resulted: 06/03/18 1236, Result status: Final result    Ordering provider: Rishi Ngo MD  06/03/18 1150 Resulting lab: [facility]    Specimen Information    Type Source Collected On   Blood  06/03/18 1200          Components       Value Reference Range Flag Lab   Troponin I ES <0.015 0.000 - 0.045 ug/L  FrRdHs   Comment:         The 99th percentile for upper reference range is 0.045 ug/L.  Troponin values   in the range of 0.045 - 0.120 ug/L may be associated with risks of adverse   clinical events.              Blood gas venous and oxyhgb [[id number]] (Abnormal)  Resulted: 06/03/18 1217, Result status: Final result    Ordering provider: Rishi Ngo MD  06/03/18 1125 Resulting lab: [facility]    Specimen Information    Type Source Collected On   Blood  06/03/18 1200          Components       Value Reference Range Flag Lab   Ph Venous 7.38 7.32 - 7.43 pH  FrRdHs   PCO2 Venous 62 40 - 50 mm Hg H FrRdHs   PO2 Venous 25 25 - 47 mm Hg  FrRdHs   Bicarbonate Venous 36 21 - 28 mmol/L H FrRdHs   FIO2 5   FrRdHs   Comment:  Liters/minute  NC     Oxyhemoglobin Venous 42 %  FrRdHs   Base Excess Venous 8.6 mmol/L  FrRdHs   Comment:  Abnormal Result, Ref range: -7.7 to 1.9            CBC (platelets, no diff) [[id number]] (Abnormal)  Resulted: 06/03/18 1216, Result status: Final result    Ordering provider: Rishi Ngo MD  06/03/18 1124 Resulting lab: [facility]    Specimen Information    Type Source Collected On   Blood  06/03/18 1200          Components        Value Reference Range Flag Lab   WBC 9.5 4.0 - 11.0 10e9/L  FrRdHs   RBC Count 4.34 4.4 - 5.9 10e12/L L FrRdHs   Hemoglobin 12.8 13.3 - 17.7 g/dL L FrRdHs   Hematocrit 39.4 40.0 - 53.0 % L FrRdHs   MCV 91 78 - 100 fl  FrRdHs   MCH 29.5 26.5 - 33.0 pg  FrRdHs   MCHC 32.5 31.5 - 36.5 g/dL  FrRdHs   RDW 12.9 10.0 - 15.0 %  FrRdHs   Platelet Count 227 150 - 450 10e9/L  FrRdHs            Testing Performed By     Lab - Abbreviation Name Director Address Valid Date Range    12 - FrRdHs [facility] Unknown [address]  [facility] [address] 05/08/15 1057 - Present            Unresulted Labs     None         Imaging Results - 3 Days      XR Chest Port 1 View [[id number]]  Resulted: 06/03/18 2145, Result status: Final result    Ordering provider: Macario Rodriguez MD  06/03/18 1804 Resulted by: Zoie Drummond MD    Performed: 06/03/18 1808 - 06/03/18 1822 Resulting lab: RADIOLOGY RESULTS    Narrative:       CHEST PORTABLE ONE VIEW   6/3/2018 6:22 PM     HISTORY: Shortness of breath.     COMPARISON: 6/3/2018.      Impression:       IMPRESSION: Interval decreased vascular congestion, that still  persists. Patchy bilateral airspace opacities again noted at the left  lung base , and lateral right mid chest. Stable cardiac silhouette.    ZOIE DRUMMOND MD      Cervical spine CT w/o contrast [[id number]]  Resulted: 06/03/18 1509, Result status: Final result    Ordering provider: Rishi Ngo MD  06/03/18 1124 Resulted by: Tristan Baker MD    Performed: 06/03/18 1247 - 06/03/18 1250 Resulting lab: RADIOLOGY RESULTS    Narrative:       CT OF THE CERVICAL SPINE WITHOUT CONTRAST   6/3/2018 12:50 PM     COMPARISON: None.    HISTORY: Dementia, fall.     TECHNIQUE: Axial images of the cervical spine were acquired without  intravenous contrast. Multiplanar reformations were created.      FINDINGS: There is normal alignment of the cervical vertebrae.  Vertebral body heights of the cervical spine  are normal.  Craniocervical alignment is normal. There are no fractures of the  cervical spine.  There is degenerative endplate spurring at the C3-C4  level. There is moderate facet arthropathy throughout cervical spine.  There is no degenerative spinal canal narrowing of the cervical spine.  There is no prevertebral soft tissue swelling.      Impression:       IMPRESSION: No evidence for fracture or traumatic malalignment of the  cervical spine.    Radiation dose for this scan was reduced using automated exposure  control, adjustment of the mA and/or kV according to patient size, or  iterative reconstruction technique    GAGE BAKER MD      Head CT w/o contrast [[id number]]  Resulted: 06/03/18 1509, Result status: Final result    Ordering provider: Rishi Ngo MD  06/03/18 1124 Resulted by: Gage Baker MD    Performed: 06/03/18 1235 - 06/03/18 1252 Resulting lab: RADIOLOGY RESULTS    Narrative:       CT OF THE HEAD WITHOUT CONTRAST 6/3/2018 12:52 PM     COMPARISON: Brain MR 1/7/2018.    HISTORY: Fall, dementia; possible head injury.    TECHNIQUE: 5 mm thick axial CT images of the head were acquired  without IV contrast material.    FINDINGS: There is moderate diffuse cerebral volume loss. There are  extensive confluent areas of decreased density in the cerebral white  matter bilaterally that are consistent with sequela of chronic small  vessel ischemic disease.    The ventricles and basal cisterns are within normal limits in  configuration given the degree of cerebral volume loss.  There is no  midline shift. There are no extra-axial fluid collections.    No intracranial hemorrhage, mass or recent infarct.    The visualized paranasal sinuses are well-aerated. There is no  mastoiditis. There are no fractures of the visualized bones.      Impression:       IMPRESSION: Diffuse cerebral volume loss and cerebral white matter  changes consistent with chronic small vessel ischemic disease. No  evidence  for acute intracranial pathology.    Radiation dose for this scan was reduced using automated exposure  control, adjustment of the mA and/or kV according to patient size, or  iterative reconstruction technique    GAGE GARRETT MD      XR Chest 1 View [[id number]]  Resulted: 06/03/18 1305, Result status: Final result    Ordering provider: Rishi Ngo MD  06/03/18 1124 Resulted by: Lonnie Montalvo MD    Performed: 06/03/18 1247 - 06/03/18 1300 Resulting lab: RADIOLOGY RESULTS    Narrative:       XR CHEST 1 VW 6/3/2018 1:00 PM    COMPARISON: 3/12/2018    HISTORY: Hypoxia.      Impression:       IMPRESSION: Diffuse peripheral predominant mixed interstitial and  airspace opacities over both lungs, edema versus atypical infection.  No pleural effusion seen on either side. No pneumothorax. Valvular  prosthesis again seen.    LONNIE MONTALVO MD      Testing Performed By     Lab - Abbreviation Name Director Address Valid Date Range    104 - Rad Rslts RADIOLOGY RESULTS Unknown Unknown 02/16/05 1553 - Present            Encounter-Level Documents:     There are no encounter-level documents.      Order-Level Documents:     There are no order-level documents.

## 2018-06-03 NOTE — IP AVS SNAPSHOT
[place] [address] [facility]    [address]    [facility] [address]    Phone:  [phone]    Fax:  [phone]                                       After Visit Summary   6/3/2018    Chaz Soler    MRN: [id number]           After Visit Summary Signature Page     I have received my discharge instructions, and my questions have been answered. I have discussed any challenges I see with this plan with the nurse or doctor.    ..........................................................................................................................................  Patient/Patient Representative Signature      ..........................................................................................................................................  Patient Representative Print Name and Relationship to Patient    ..................................................               ................................................  Date                                            Time    ..........................................................................................................................................  Reviewed by Signature/Title    ...................................................              ..............................................  Date                                                            Time

## 2018-06-03 NOTE — ED TRIAGE NOTES
Pt presents via EMS after having refused to stand when pt's wife attempted to get him out of bed. Pt denies any injuries and no obvious injuries noted. EMS reported pt was not cooperating with wife's requests and felt pt was not able to receive adequate/safe care from wife at home. ABC's intact, baseline orientation reported per wife whom is present at this time.

## 2018-06-03 NOTE — IP AVS SNAPSHOT
MRN:[id number]                      After Visit Summary   6/3/2018    Chaz Soler    MRN: [id number]           Thank you!     Thank you for choosing [facility] for your care. Our goal is always to provide you with excellent care. Hearing back from our patients is one way we can continue to improve our services. Please take a few minutes to complete the written survey that you may receive in the mail after you visit. If you would like to speak to someone directly about your visit please contact Patient Relations at [phone]. Thank you!          Patient Information     Date Of Birth          5/21/1928        Designated Caregiver       Most Recent Value    Caregiver    Will someone help with your care after discharge? yes    Name of designated caregiver Violet    Phone number of caregiver see chart    Caregiver address see chart      About your hospital stay     You were admitted on:  Anne 3, 2018 You last received care in the:  [place] [address] [facility]    You were discharged on:  June 6, 2018       Who to Call     For medical emergencies, please call 911.  For non-urgent questions about your medical care, please call your primary care provider or clinic, [phone]          Attending Provider     Provider Specialty    Rishi Ngo MD Emergency Medicine    [facility]       Primary Care Provider Office Phone # Fax #    Alirio Fox [phone]      After Care Instructions     Activity - Up with nursing assistance           Advance Diet as Tolerated       Follow this diet upon discharge: regular diet            General info for SNF       Length of Stay Estimate: Short Term Care: Estimated # of Days <30  Condition at Discharge: Improving  Level of care:skilled   Rehabilitation Potential: Fair  Admission H&P remains valid and up-to-date: Yes  Recent Chemotherapy: N/A  Use Nursing Home Standing Orders: Yes             Mantoux instructions       Give two-step Mantoux (PPD) Per Facility Policy Yes            Oxygen - Nasal cannula       3-5 Lpm by nasal cannula to keep O2 sats 90-94%.                  Follow-up Appointments     Follow Up and recommended labs and tests       See primary MD 1-2 weeks after discharge from rehabilitation center                  Your next 10 appointments already scheduled     Jun 22, 2018  9:00 AM CDT   (Arrive by 8:45 AM)   US AORTA/IVC/ILIAC DUPLEX LIMITED with RSUS1   [facility] ([facility])    [address]  [place] [id number] [phone]           Please bring a list of your medicines (including vitamins, minerals and over-the-counter drugs). Also, tell your doctor about any allergies you may have. Wear comfortable clothes and leave your valuables at home.  Adults: No eating or drinking for 8 hours before the exam. You may take medicine with a small sip of water.  Children: - Children 6+ years: No food or drink for 6 hours before exam. - Children 1-5 years: No food or drink for 4 hours before exam. - Infants, breast-fed: may have breast milk up to 2 hours before exam. - Infants, formula: may have bottle until 4 hours before exam.  Please call the Imaging Department at your exam site with any questions.            Aug 28, 2018 12:30 PM CDT   Ech Complete with [id number]   [facility] ([facility])    [address]  [place] [id number] [phone]           1. Please bring or wear a comfortable two-piece outfit. 2. You may eat, drink and take your normal medicines. 3. For any questions that cannot be answered, please contact the ordering physician ***Please check-in at the [place] Registration Office located in [address] in the [facility] building. When you are finished registering, please go to [address] and have a seat. The technician will call  your name for the test.            Sep 04, 2018  3:45 PM CDT   Return Visit with Tyrell Jackson MD   [facility] ([facility])    [address] [address] [address] [phone]              Additional Services     Occupational Therapy Adult Consult       Evaluate and treat as clinically indicated.    Reason:  weakness            Physical Therapy Adult Consult       Evaluate and treat as clinically indicated.    Reason:  weakness                  Pending Results     No orders found from 6/1/2018 to 6/4/2018.            Statement of Approval     Ordered          06/06/18 1304  I have reviewed and agree with all the recommendations and orders detailed in this document.  EFFECTIVE NOW     Approved and electronically signed by:  Macario Rodriguze MD           06/06/18 130  I have reviewed and agree with all the recommendations and orders detailed in this document.  EFFECTIVE NOW     Approved and electronically signed by:  Macario Rodriguez MD             Admission Information     Date & Time Provider Department Dept. Phone    6/3/2018 Macario Rodriguez MD [place] [address] [facility] [phone]      Your Vitals Were     Blood Pressure Pulse Temperature Respirations Weight Pulse Oximetry    162/70 (BP Location: Right arm) 60 99.2  F (37.3  C) (Oral) 18 66.7 kg (147 lb) 97%    BMI (Body Mass Index)                   22.35 kg/m2           MyChart Information     [employer] gives you secure access to your electronic health record. If you see a primary care provider, you can also send messages to your care team and make appointments. If you have questions, please call your primary care clinic.  If you do not have a primary care provider, please call [phone] and they will assist you.        Care EveryWhere ID     This is your Care EveryWhere ID. This could be used by other organizations to access your [facility]  records  [id number]        Equal Access to Services     [facility] UCLAESTUARDO : Hadii aad ku hadhanyivan Soyvonne, waaxda luqadaha, qaybta kaalsedrick sr, johnny ingram. So [facility] [phone].    ATENCIÓN: Si habla español, tiene a denise disposición servicios gratuitos de asistencia lingüística. [facility] [phone].    We comply with applicable federal civil rights laws and [place] laws. We do not discriminate on the basis of race, color, national origin, age, disability, sex, sexual orientation, or gender identity.               Review of your medicines      CONTINUE these medicines which have NOT CHANGED        Dose / Directions    acetaminophen 500 MG tablet   Commonly known as:  TYLENOL        Dose:  500 mg   Take 500 mg by mouth 3 times daily   Refills:  0       amLODIPine 10 MG tablet   Commonly known as:  NORVASC   Used for:  Essential hypertension        TAKE ONE TABLET BY MOUTH ONCE DAILY   Quantity:  90 tablet   Refills:  3       aspirin 81 MG tablet   Used for:  Coronary artery disease involving native coronary artery of native heart without angina pectoris        Dose:  81 mg   Take 1 tablet (81 mg) by mouth daily   Quantity:  30 tablet   Refills:  11       atorvastatin 20 MG tablet   Commonly known as:  LIPITOR   Used for:  Coronary artery disease involving native coronary artery of native heart without angina pectoris        Dose:  20 mg   Take 1 tablet (20 mg) by mouth daily   Quantity:  90 tablet   Refills:  3       cyanocobalamin 1000 MCG tablet   Commonly known as:  vitamin  B-12        Dose:  1000 mcg   Take 1,000 mcg by mouth daily   Refills:  0       donepezil 10 MG tablet   Commonly known as:  ARICEPT   Used for:  Memory loss        TAKE ONE TABLET BY MOUTH AT BEDTIME   Quantity:  90 tablet   Refills:  3       DULoxetine 30 MG EC capsule   Commonly known as:  CYMBALTA        Dose:  30 mg   Take 30 mg by mouth daily   Refills:  0       furosemide 20 MG tablet   Commonly  known as:  LASIX        Dose:  20 mg   Take 20 mg by mouth daily   Refills:  0       gabapentin 100 MG capsule   Commonly known as:  NEURONTIN   Used for:  Lumbar radiculopathy        TAKE ONE CAPSULE BY MOUTH THREE TIMES DAILY FOR  PAIN   Quantity:  270 capsule   Refills:  3       levothyroxine 88 MCG tablet   Commonly known as:  SYNTHROID/LEVOTHROID   Used for:  Hypothyroidism, unspecified type        TAKE ONE TABLET BY MOUTH ONCE DAILY   Quantity:  90 tablet   Refills:  3       losartan 100 MG tablet   Commonly known as:  COZAAR   Used for:  Essential hypertension        TAKE ONE TABLET BY MOUTH  DAILY   Quantity:  90 tablet   Refills:  3       metoprolol succinate 25 MG 24 hr tablet   Commonly known as:  TOPROL-XL   Used for:  Essential hypertension        TAKE ONE TABLET BY MOUTH DAILY   Quantity:  90 tablet   Refills:  2       MULTIVITAL Tabs        Dose:  1 tablet   Take 1 tablet by mouth daily   Refills:  0       pantoprazole 40 MG EC tablet   Commonly known as:  PROTONIX   Used for:  Gastroesophageal reflux disease, esophagitis presence not specified        TAKE ONE TABLET BY MOUTH EVERY MORNING   Quantity:  90 tablet   Refills:  3       tamsulosin 0.4 MG capsule   Commonly known as:  FLOMAX   Used for:  Benign prostatic hyperplasia with urinary retention        TAKE ONE CAPSULE BY MOUTH ONCE DAILY   Quantity:  90 capsule   Refills:  3         STOP taking     traMADol 50 MG tablet   Commonly known as:  ULTRAM                    Protect others around you: Learn how to safely use, store and throw away your medicines at www.disposemymeds.org.             Medication List: This is a list of all your medications and when to take them. Check marks below indicate your daily home schedule. Keep this list as a reference.      Medications           Morning Afternoon Evening Bedtime As Needed    acetaminophen 500 MG tablet   Commonly known as:  TYLENOL   Take 500 mg by mouth 3 times daily   Last time this was given:   500 mg on 6/6/2018  8:35 AM                                amLODIPine 10 MG tablet   Commonly known as:  NORVASC   TAKE ONE TABLET BY MOUTH ONCE DAILY   Last time this was given:  10 mg on 6/5/2018  7:38 PM                                aspirin 81 MG tablet   Take 1 tablet (81 mg) by mouth daily                                atorvastatin 20 MG tablet   Commonly known as:  LIPITOR   Take 1 tablet (20 mg) by mouth daily   Last time this was given:  20 mg on 6/5/2018  7:38 PM                                cyanocobalamin 1000 MCG tablet   Commonly known as:  vitamin  B-12   Take 1,000 mcg by mouth daily                                donepezil 10 MG tablet   Commonly known as:  ARICEPT   TAKE ONE TABLET BY MOUTH AT BEDTIME   Last time this was given:  10 mg on 6/5/2018  9:24 PM                                DULoxetine 30 MG EC capsule   Commonly known as:  CYMBALTA   Take 30 mg by mouth daily   Last time this was given:  30 mg on 6/6/2018  8:35 AM                                furosemide 20 MG tablet   Commonly known as:  LASIX   Take 20 mg by mouth daily   Last time this was given:  20 mg on 6/6/2018  8:39 AM                                gabapentin 100 MG capsule   Commonly known as:  NEURONTIN   TAKE ONE CAPSULE BY MOUTH THREE TIMES DAILY FOR  PAIN   Last time this was given:  100 mg on 6/6/2018  8:35 AM                                levothyroxine 88 MCG tablet   Commonly known as:  SYNTHROID/LEVOTHROID   TAKE ONE TABLET BY MOUTH ONCE DAILY   Last time this was given:  88 mcg on 6/6/2018  8:35 AM                                losartan 100 MG tablet   Commonly known as:  COZAAR   TAKE ONE TABLET BY MOUTH  DAILY   Last time this was given:  100 mg on 6/6/2018  8:35 AM                                metoprolol succinate 25 MG 24 hr tablet   Commonly known as:  TOPROL-XL   TAKE ONE TABLET BY MOUTH DAILY   Last time this was given:  25 mg on 6/6/2018  8:34 AM                                MULTIVITAL Tabs   Take 1  tablet by mouth daily                                pantoprazole 40 MG EC tablet   Commonly known as:  PROTONIX   TAKE ONE TABLET BY MOUTH EVERY MORNING   Last time this was given:  40 mg on 6/6/2018  8:36 AM                                tamsulosin 0.4 MG capsule   Commonly known as:  FLOMAX   TAKE ONE CAPSULE BY MOUTH ONCE DAILY   Last time this was given:  0.4 mg on 6/5/2018  7:38 PM

## 2018-06-03 NOTE — PHARMACY-ADMISSION MEDICATION HISTORY
Admission medication history interview status for this patient is complete. See [facility] admission navigator for allergy information, prior to admission medications and immunization status.     Medication history interview source(s):Patient  Medication history resources (including written lists, pill bottles, clinic record):Med list  Primary pharmacy:[facility]    Changes made to PTA medication list:  Added: None  Deleted: albuterol neb  Changed: None    Actions taken by pharmacist (provider contacted, etc):None     Additional medication history information:None    Medication reconciliation/reorder completed by provider prior to medication history? No    Do you take OTC medications (eg tylenol, ibuprofen, fish oil, eye/ear drops, etc)? Yes    For patients on insulin therapy: No    Prior to Admission medications    Medication Sig Last Dose Taking? Auth Provider   acetaminophen (TYLENOL) 500 MG tablet Take 500 mg by mouth 3 times daily 6/2/2018 at PM Yes Unknown, Entered By History   amLODIPine (NORVASC) 10 MG tablet TAKE ONE TABLET BY MOUTH ONCE DAILY 6/2/2018 at PM Yes Alirio Fox MD   aspirin 81 MG tablet Take 1 tablet (81 mg) by mouth daily 6/2/2018 at PM Yes Alirio Fox MD   atorvastatin (LIPITOR) 20 MG tablet Take 1 tablet (20 mg) by mouth daily 6/2/2018 at PM Yes Tyrell Jackson MD   cyanocobalamin (VITAMIN  B-12) 1000 MCG tablet Take 1,000 mcg by mouth daily 6/2/2018 at AM Yes Unknown, Entered By History   donepezil (ARICEPT) 10 MG tablet TAKE ONE TABLET BY MOUTH AT BEDTIME 6/2/2018 at Bedtime Yes Alirio Fox MD   DULoxetine (CYMBALTA) 30 MG EC capsule Take 30 mg by mouth daily 6/2/2018 at AM Yes Reported, Patient   furosemide (LASIX) 20 MG tablet Take 20 mg by mouth daily 6/2/2018 at AM Yes Unknown, Entered By History   gabapentin (NEURONTIN) 100 MG capsule TAKE ONE CAPSULE BY MOUTH THREE TIMES DAILY FOR  PAIN 6/2/2018 at PM Yes Alirio Fox MD   levothyroxine (SYNTHROID/LEVOTHROID)  88 MCG tablet TAKE ONE TABLET BY MOUTH ONCE DAILY 6/2/2018 at AM Yes Alirio Fox MD   losartan (COZAAR) 100 MG tablet TAKE ONE TABLET BY MOUTH  DAILY 6/2/2018 at AM Yes Alirio Fox MD   metoprolol succinate (TOPROL-XL) 25 MG 24 hr tablet TAKE ONE TABLET BY MOUTH DAILY 6/2/2018 at AM Yes Alirio Fox MD   Multiple Vitamins-Minerals (MULTIVITAL) TABS Take 1 tablet by mouth daily 6/2/2018 at AM Yes Unknown, Entered By History   pantoprazole (PROTONIX) 40 MG EC tablet TAKE ONE TABLET BY MOUTH EVERY MORNING 6/2/2018 at AM Yes Alirio Fox MD   tamsulosin (FLOMAX) 0.4 MG capsule TAKE ONE CAPSULE BY MOUTH ONCE DAILY 6/2/2018 at PM Yes Alirio Fox MD   traMADol (ULTRAM) 50 MG tablet TAKE ONE TABLET BY MOUTH 4 TIMES DAILY 6/2/2018 at PM Yes Maria Fernanda Valencia MD

## 2018-06-03 NOTE — IP AVS SNAPSHOT
"    [place] [address] MEDICAL SURGICAL: [phone]                                              INTERAGENCY TRANSFER FORM - PHYSICIAN ORDERS   6/3/2018                    Hospital Admission Date: 6/3/2018  NATTY MAN   : 1928  Sex: Male        Attending Provider: Macario Rodriguez MD     Allergies:  Contrast Dye, Lisinopril, Oxycodone    Infection:  None   Service:  GENERAL MEDI    Ht:  1.727 m (5' 8\")   Wt:  66.7 kg (147 lb)   Admission Wt:  61.3 kg (135 lb 4 oz)    BMI:  22.35 kg/m 2   BSA:  1.79 m 2            Patient PCP Information     Provider PCP Type    Alirio Fox MD General      ED Clinical Impression     Diagnosis Description Comment Added By Time Added    Acute diastolic congestive heart failure (H) [I50.31] Acute diastolic congestive heart failure (H) [I50.31]  Rishi Ngo MD 6/3/2018  1:50 PM    Pulmonary fibrosis (H) [J84.10] Pulmonary fibrosis (H) [J84.10]  Rishi Ngo MD 6/3/2018  1:50 PM    Generalized muscle weakness [M62.81] Generalized muscle weakness [M62.81]  Rishi Ngo MD 6/3/2018  1:50 PM      Hospital Problems as of 2018              Priority Class Noted POA    CHF (congestive heart failure) (H) Medium  2014 Yes      Non-Hospital Problems as of 2018              Priority Class Noted    Essential hypertension Medium  2005    Other specified disorders of prostate Medium  2006    Pulmonary fibrosis (H) Medium  Unknown    Advance Care Planning Medium  2011    CAD (coronary artery disease) Medium  Unknown    CKD (chronic kidney disease) stage 3, GFR 30-59 ml/min Medium  2011    Proteinuria Medium  2012    Hyperlipidemia with target LDL less than 70 Medium  Unknown    Lumbar radiculopathy Medium  2014    Status post lumbar discectomy Medium  2014    S/P TAVR (transcatheter aortic valve replacement) Medium  2014    Physical deconditioning Medium  2014    Anemia Medium  2015    SAI " (obstructive sleep apnea) Medium  2/24/2015    Hyperparathyroidism (H) Medium  4/22/2015    Dementia Medium  8/4/2015    Hypothyroidism Medium  10/27/2015    Health Care Home Medium  11/5/2015    Edema, unspecified edema Medium  1/17/2016    Other chronic pain Medium  10/11/2016    Compression fracture L2-3 Medium  2/2/2017    Weakness Medium  1/8/2018      Code Status History     Date Active Date Inactive Code Status Order ID Comments User Context    6/6/2018 12:22 PM  DNR/DNI [id number]  Macario Rodriguez MD Outpatient    6/3/2018  5:35 PM 6/6/2018 12:22 PM DNR/DNI [id number]  Macario Rodriguez MD Inpatient    1/8/2018 12:46 AM 1/9/2018  4:40 PM DNI [id number]  Alfonso Villafuerte MD Inpatient    10/2/2017  4:07 PM 1/8/2018 12:46 AM DNI [id number]  Tracey Beltran PA-C Outpatient    10/1/2017  5:06 PM 10/2/2017  4:07 PM DNI [id number]  Dayami Guillory PA-C Inpatient    2/1/2017  9:49 AM 10/1/2017  5:06 PM Full Code [id number]  Alejo Lopez MD Outpatient    1/29/2017 10:10 PM 2/1/2017  9:49 AM Full Code [id number]  Carie Bowers MD Inpatient    11/15/2016 11:02 AM 1/29/2017 10:10 PM Full Code [id number]  Yudelka Castillo MD Outpatient    11/14/2016  6:32 PM 11/15/2016 11:02 AM Full Code [id number]  Yudelka Castillo MD Inpatient    11/20/2014 10:49 AM 11/14/2016  6:32 PM Full Code [id number]  Martínez Larson MD Outpatient    11/19/2014  4:47 AM 11/20/2014 10:49 AM Full Code [id number]  Lokesh Walls MD Inpatient    11/17/2014  3:16 PM 11/19/2014  4:47 AM Full Code [id number]  Joie Caballero PA-C Outpatient    11/12/2014  3:48 PM 11/17/2014  3:16 PM Full Code [id number]  Ruby Monteiro MD Inpatient    10/8/2014  2:52 PM 10/10/2014  8:06 PM Full Code [id number]  Carie Bowers MD Inpatient    8/15/2014 12:40 PM 10/8/2014  2:52 PM Full Code [id number]  Melissa Isaac NP Outpatient    8/14/2014  9:55 AM 8/15/2014 12:40 PM Full Code [id number]  Melissa Isaac  CURLY Paige Outpatient    8/11/2014 12:20 PM 8/14/2014  9:55 AM Full Code [id number]  Jone Carvalho MD Inpatient    11/21/2011  9:22 AM 8/11/2014 12:20 PM Full Code [id number]  Jose Antonio Shin PA-C Outpatient    11/18/2011  6:50 PM 11/21/2011  9:22 AM Full Code [id number]  Niya Persaud RN Inpatient    11/18/2011  3:48 PM 11/18/2011  6:49 PM Full Code [id number]  Niya Persaud, ELIOT Inpatient         Medication Review      CONTINUE these medications which have NOT CHANGED        Dose / Directions Comments    acetaminophen 500 MG tablet   Commonly known as:  TYLENOL        Dose:  500 mg   Take 500 mg by mouth 3 times daily   Refills:  0        amLODIPine 10 MG tablet   Commonly known as:  NORVASC   Used for:  Essential hypertension        TAKE ONE TABLET BY MOUTH ONCE DAILY   Quantity:  90 tablet   Refills:  3        aspirin 81 MG tablet   Used for:  Coronary artery disease involving native coronary artery of native heart without angina pectoris        Dose:  81 mg   Take 1 tablet (81 mg) by mouth daily   Quantity:  30 tablet   Refills:  11        atorvastatin 20 MG tablet   Commonly known as:  LIPITOR   Used for:  Coronary artery disease involving native coronary artery of native heart without angina pectoris        Dose:  20 mg   Take 1 tablet (20 mg) by mouth daily   Quantity:  90 tablet   Refills:  3        cyanocobalamin 1000 MCG tablet   Commonly known as:  vitamin  B-12        Dose:  1000 mcg   Take 1,000 mcg by mouth daily   Refills:  0        donepezil 10 MG tablet   Commonly known as:  ARICEPT   Used for:  Memory loss        TAKE ONE TABLET BY MOUTH AT BEDTIME   Quantity:  90 tablet   Refills:  3        DULoxetine 30 MG EC capsule   Commonly known as:  CYMBALTA        Dose:  30 mg   Take 30 mg by mouth daily   Refills:  0        furosemide 20 MG tablet   Commonly known as:  LASIX        Dose:  20 mg   Take 20 mg by mouth daily   Refills:  0        gabapentin 100 MG capsule   Commonly known as:   NEURONTIN   Used for:  Lumbar radiculopathy        TAKE ONE CAPSULE BY MOUTH THREE TIMES DAILY FOR  PAIN   Quantity:  270 capsule   Refills:  3    Please consider 90 day supplies to promote better adherence       levothyroxine 88 MCG tablet   Commonly known as:  SYNTHROID/LEVOTHROID   Used for:  Hypothyroidism, unspecified type        TAKE ONE TABLET BY MOUTH ONCE DAILY   Quantity:  90 tablet   Refills:  3        losartan 100 MG tablet   Commonly known as:  COZAAR   Used for:  Essential hypertension        TAKE ONE TABLET BY MOUTH  DAILY   Quantity:  90 tablet   Refills:  3        metoprolol succinate 25 MG 24 hr tablet   Commonly known as:  TOPROL-XL   Used for:  Essential hypertension        TAKE ONE TABLET BY MOUTH DAILY   Quantity:  90 tablet   Refills:  2        MULTIVITAL Tabs        Dose:  1 tablet   Take 1 tablet by mouth daily   Refills:  0        pantoprazole 40 MG EC tablet   Commonly known as:  PROTONIX   Used for:  Gastroesophageal reflux disease, esophagitis presence not specified        TAKE ONE TABLET BY MOUTH EVERY MORNING   Quantity:  90 tablet   Refills:  3        tamsulosin 0.4 MG capsule   Commonly known as:  FLOMAX   Used for:  Benign prostatic hyperplasia with urinary retention        TAKE ONE CAPSULE BY MOUTH ONCE DAILY   Quantity:  90 capsule   Refills:  3          STOP taking     traMADol 50 MG tablet   Commonly known as:  ULTRAM                   After Care     Activity - Up with nursing assistance           Advance Diet as Tolerated       Follow this diet upon discharge: regular diet       General info for SNF       Length of Stay Estimate: Short Term Care: Estimated # of Days <30  Condition at Discharge: Improving  Level of care:skilled   Rehabilitation Potential: Fair  Admission H&P remains valid and up-to-date: Yes  Recent Chemotherapy: N/A  Use Nursing Home Standing Orders: Yes       Mantoux instructions       Give two-step Mantoux (PPD) Per Facility Policy Yes             Procedures      Oxygen - Nasal cannula       3-5 Lpm by nasal cannula to keep O2 sats 90-94%.             Referrals     Occupational Therapy Adult Consult       Evaluate and treat as clinically indicated.    Reason:  weakness       Physical Therapy Adult Consult       Evaluate and treat as clinically indicated.    Reason:  weakness             Your next 10 appointments already scheduled     Jun 22, 2018  9:00 AM CDT   (Arrive by 8:45 AM)    AORTA/IVC/ILIAC DUPLEX LIMITED with RSUS1   [facility] ([facility])    [address]  [place] [address] [phone]           Please bring a list of your medicines (including vitamins, minerals and over-the-counter drugs). Also, tell your doctor about any allergies you may have. Wear comfortable clothes and leave your valuables at home.  Adults: No eating or drinking for 8 hours before the exam. You may take medicine with a small sip of water.  Children: - Children 6+ years: No food or drink for 6 hours before exam. - Children 1-5 years: No food or drink for 4 hours before exam. - Infants, breast-fed: may have breast milk up to 2 hours before exam. - Infants, formula: may have bottle until 4 hours before exam.  Please call the Imaging Department at your exam site with any questions.            Aug 28, 2018 12:30 PM CDT   Ech Complete with RSECHO1   [facility] ([facility])    [address]  [facility] [id number] [phone]           1. Please bring or wear a comfortable two-piece outfit. 2. You may eat, drink and take your normal medicines. 3. For any questions that cannot be answered, please contact the ordering physician ***Please check-in at the [facility] located in [address] in the [facility] building. When you are finished registering, please go to [address] and have a seat. The technician will call your name  for the test.            Sep 04, 2018  3:45 PM CDT   Return Visit with Tyrell Jackson MD   [facility] ([facility])    [address] [address] [address] [phone]              Follow-Up Appointment Instructions     Future Labs/Procedures    Follow Up and recommended labs and tests     Comments:    See primary MD 1-2 weeks after discharge from rehabilitation center      Follow-Up Appointment Instructions     Follow Up and recommended labs and tests       See primary MD 1-2 weeks after discharge from rehabilitation center             Statement of Approval     Ordered          06/06/18 [id number]  I have reviewed and agree with all the recommendations and orders detailed in this document.  EFFECTIVE NOW     Approved and electronically signed by:  Macario Rodriguez MD           06/06/18 [id number]  I have reviewed and agree with all the recommendations and orders detailed in this document.  EFFECTIVE NOW     Approved and electronically signed by:  Macario Rodriguez MD

## 2018-06-03 NOTE — LETTER
Transition Communication Hand-off for Care Transitions to Next Level of Care Provider    Hand-off for Care Transitions to Next Level of Care Provider  Name: Chaz Soler  : 1928  MRN #: [id number]  Reason for Hospitalization:  Pulmonary fibrosis (H) [J84.10]  Generalized muscle weakness [M62.81]  Acute diastolic congestive heart failure (H) [I50.31]  Admit Date/Time: 6/3/2018 11:16 AM  Discharge Date: 2018    Reason for Communication Hand-off Referral: Admission diagnoses: CHF    Discharge Plan:  Discharged to: TCU: [facility]                   Patient agreeable to post-hospital support suggestions:  Yes    Patient is on new medications:   No    MTM follow up recommended: No    Tel-Assurance program:  Declined    Patient will receive  TCU  at:  Discharge to [facility]    Follow-up specialty is recommended: Yes. Follow up with Occupational and Physical Therapy while at TCU.     Follow-up plan:  Future Appointments  Date Time Provider Department Center   2018 9:00 AM [email or web address] [facility]   2018 12:30 PM [email or web address] [facility]   2018 3:45 PM Tyrell Jackson MD [facility]     Any outstanding tests or procedures:   No. Recommend give two-step Mantoux (PPD) Per Facility Policy      Procedures     Future Labs/Procedures    Oxygen - Nasal cannula     Comments:    3-5 Lpm by nasal cannula to keep O2 sats 90-94%.          Referrals     Future Labs/Procedures    Occupational Therapy Adult Consult     Comments:    Evaluate and treat as clinically indicated.    Reason:  weakness    Physical Therapy Adult Consult     Comments:    Evaluate and treat as clinically indicated.    Reason:  weakness          Key Recommendations:  Pt was admitted with CHF, fall. His BNP was 933, he was given on dose of IV lasix and resumed on his PO diuretic. He is currently a total care and confused pt. Spoke to wife about current plan of care and dc recommendations. She states they live together in [place] in  a Senior [employer]. He has a private pay RN/HHA through [facility] for assistance. He uses O2 at 4-5 L with supplies through [facility]. He also uses home nebulizers. At baseline he gets himself up and uses a walker for short distances and a wheelchair for longer distances. He has some baseline confusion. Wife does all of the cooking, cleaning, shopping. Per PT, recommendation is for TCU on dc. Wife is agreeable. She states pt has been in and out of [facility] and she would like referral sent there for dc. [facility]       Evangelina Valentin    Communicated handoff via EPIC Comm Mgt to Dr. Alirio Fox's CC at [facility].      Sent by Merry Aceves, RN, BSN, CTS  [facility]  [phone]  AVS/Discharge Summary is the source of truth; this is a helpful guide for improved communication of patient story

## 2018-06-03 NOTE — LETTER
Key Recommendations:  Pt was admitted with CHF, fall. His BNP was 933, he was given on dose of IV lasix and resumed on his PO diuretic. He is currently a total care and confused pt. Spoke to wife about current plan of care and dc recommendations. She states they live together in [place] in a [place] [employer]. He has a private pay RN/HHA through [facility] for assistance. He uses O2 at 4-5 L with supplies through [facility]. He also uses home nebulizers. At baseline he gets himself up and uses a walker for short distances and a wheelchair for longer distances. He has some baseline confusion. Wife does all of the cooking, cleaning, shopping. Per PT, recommendation is for TCU on dc. Wife is agreeable. She states pt has been in and out of [facility] and she would like referral sent there for dc.            Evangelina Valentin    AVS/Discharge Summary is the source of truth; this is a helpful guide for improved communication of patient story

## 2018-06-03 NOTE — IP AVS SNAPSHOT
` [place] [address] MEDICAL SURGICAL: [phone]                 INTERAGENCY TRANSFER FORM - NOTES (H&P, Discharge Summary, Consults, Procedures, Therapies)   6/3/2018                    Hospital Admission Date: 6/3/2018  NATTY MAN   : 1928  Sex: Male        Patient PCP Information     Provider PCP Type    Alirio Fox MD General         History & Physicals      H&P by Macario Rodriguez MD at 6/3/2018  3:15 PM     Author:  Macario Rodriguez MD Service:  Hospitalist Author Type:  Physician    Filed:  6/3/2018  3:15 PM Date of Service:  6/3/2018  3:15 PM Creation Time:  6/3/2018  3:15 PM    Status:  Signed :  Macario Rodriguez MD (Physician)         H&P dictated[SL1.1]     Revision History        User Key Date/Time User Provider Type Action    > SL1.1 6/3/2018  3:15 PM Macario Rodriguez MD Physician Sign                  Discharge Summaries     No notes of this type exist for this encounter.         Consult Notes      Consults by Evangelina Valentin RN at 2018 11:31 AM     Author:  Evangelina Valentin RN Service:  (none) Author Type:      Filed:  2018 11:31 AM Date of Service:  2018 11:31 AM Creation Time:  2018 11:20 AM    Status:  Signed :  Evangelina Valentin RN ()     Consult Orders:    1. Care Coordinator IP Consult [[id number]] ordered by Macario Rodriguez MD at 18 1114                Care Transition Initial Assessment - RN    Reason For Consult: care coordination/care conference, discharge planning   Met with: Family spoke to Wife Violet MELGOZA[AH1.1]   Active Problems:    CHF (congestive heart failure) (H)[AH1.2]       Cognitive Status: sleeping, int confusion.  Primary Care Clinic Name: [facility]  Primary Care MD Name: Dr Fox  Contact information and PCP information verified: yes  Lives With: significant other  Living Arrangements: apartment ([employer])  Quality Of Family Relationships: supportive, helpful,  involved  Description of Support System: Involved, Supportive   Who is your support system?: Wife   Support Assessment: Adequate family and caregiver support   Insurance concerns: No Insurance issues identified  ASSESSMENT  Patient currently receives the following services:  [facility] RN/HHA (private pay), will need resumption of HC if appropriate, O2 4-5 L through [employer]  Met with wife at bedside to discuss plan of care. Pt was admitted with CHF, fall. He is currently a total care, confused pt. Spoke to wife about current plan of care and dc recommendations. She states they live together in [place] in a Senior Coop. He has a private pay RN/HHA through [facility] for assistance. He uses O2 at 4-5 L with supplies through [employer]. He also uses home nebulizers. At baseline he gets himself up and uses a walker for short distances and a wheelchair for longer distances. He has some baseline confusion. Wife does all of the cooking, cleaning, shopping. Per PT, recommendation is for TCU on dc. Discussed this recommendation and wife is agreeable. She states pt has been in and [facility] of [facility] and she would like referral sent there for dc. SW updated and referral sent.       Identified issues/concerns regarding health management: Pt is needing assist of 2 with a walker for transfers. Pt recommending TCU on dc. Referral sent to [facility].    PLAN  Patient/family is agreeable to the plan?  Yes  Patient anticipates discharging to TCU. Referral sent to [facility].        Patient anticipates needs for home equipment: No  Plan/Disposition: TCU   Appointments: No appts made as pt is discharging to TCU at this time      Care  (CTS) will continue to follow as needed. SW updated. Handoff will be sent to CTS for PCP on dc.    Evangelina Valentin RN CTS  Care Transitions  [phone][AH1.1]                 Revision History        User Key Date/Time User Provider Type Action    > AH1.2 6/5/2018 11:31 AM Evangelina Vlaentin, RN Case Manager Sign      AH1.1 6/5/2018 11:20 AM Evangelina Valentin RN Case Manager             Consults by Sadie Arceo LSW at 6/5/2018 11:28 AM     Author:  Sadie Arceo LSW Service:  (none) Author Type:      Filed:  6/5/2018 11:28 AM Date of Service:  6/5/2018 11:28 AM Creation Time:  6/5/2018 11:26 AM    Status:  Signed :  Sadie Arceo LSW ()     Consult Orders:    1. Social Work IP Consult [[id number]] ordered by Macario Rodriguez MD at 06/04/18 1352                SWS:  D: Discharge planning  A: SW consult to assist with rehab placement at [place]. CTS- RN met with pt wife and she is agreeable to TCU and requests SW send TCU referral to [facility] [place]. Referral sent.    P: Anticipate pt may be ready for dc tomorrow. SW will continue to follow.[LH1.1]     Revision History        User Key Date/Time User Provider Type Action    > LH1.1 6/5/2018 11:28 AM Sadie Arceo LSW  Sign                     Progress Notes - Physician (Notes from 06/03/18 through 06/06/18)      Progress Notes by Lenora Montes at 6/6/2018  1:40 PM     Author:  Lenora Montes Service:  (none) Author Type:  Coordinator    Filed:  6/6/2018  1:40 PM Date of Service:  6/6/2018  1:40 PM Creation Time:  6/6/2018  1:40 PM    Status:  Signed :  Lenora Montes (Coordinator)         Your information has been submitted on June 06th, 2018 at 01:40:13 PM CDT. The confirmation number is [id number][KO1.1]       Revision History        User Key Date/Time User Provider Type Action    > KO1.1 6/6/2018  1:40 PM Lenora Montes Coordinator Sign            Progress Notes by Sadie Arceo LSW at 6/6/2018  1:15 PM     Author:  Sadie Arceo LSW Service:  (none) Author Type:      Filed:  6/6/2018  1:17 PM Date of Service:  6/6/2018  1:15 PM Creation Time:  6/6/2018  1:15 PM    Status:  Signed :  Sadie Arceo LSW ()         SWS:  D: discharge planning  A/P: SW notified that pt is  able to dc to TCU today. SW contacted [facility] and they have accepted. SW spoke with pt and wife and wife plans to transport and pt will dc around 4:00pm. PETER updated RN, Facility and MD.  PETER faxed dc orders to [facility].[LH1.1]     Revision History        User Key Date/Time User Provider Type Action    > LH1.1 6/6/2018  1:17 PM Sadie Arceo LSW  Sign            Progress Notes by Macario Rodriguez MD at 6/6/2018 12:15 PM     Author:  Macario Rodriguez MD Service:  Hospitalist Author Type:  Physician    Filed:  6/6/2018 12:17 PM Date of Service:  6/6/2018 12:15 PM Creation Time:  6/6/2018 12:15 PM    Status:  Signed :  Macario Rodriguez MD (Physician)           [facility]  Hospitalist Progress Note  Macario Rodriguez MD 06/06/2018    Reason for Stay (Diagnosis): ARF, pulm fibrosis         Assessment and Plan:      Summary of Stay: Chaz Soler is a 89-year-old male with a history of aortic stenosis, status post TAVR procedure, history of diastolic dysfunction, history of chronic hypoxic respiratory failure secondary to underlying pulmonary fibrosis on chronic home oxygen at 5 liters via nasal cannula, and chronic obstructive pulmonary disease.  He presents to the hospital after a fall.  On presentation in the ER, he was felt to be volume overloaded based on chest x-ray results and increased need for oxygen.  He was being admitted to the Hospitalist Service after receiving a dose of IV Lasix in the ER     Course complicated by worsening encephalopathy and ARF; feel this is likely due to dehydration related to diuretic.  Sx improved today after IVF hydration    Pt and wife remain interested in TCU on discharge.  Appears medically stable for discharge     1.  Acute on chronic diastolic heart failure exacerbation:  resolved.  received single dose of IV Lasix in ER.  Given rise in creatinine and normal BNP, would not give further IV Lasix.  PO lasix resumed  2.  Fall and  underlying chronic weakness:  PT, OT appreciated.    3.  Pulmonary fibrosis, on chronic home oxygen 5 liters via nasal cannula.     4.  Dementia history.   5.  ARF related to diuresis.  resolved  6.  Encephalopathy - improved/resolved with hydration    # Pain Assessment:  Current Pain Score 6/6/2018   Patient currently in pain? denies   Pain score (0-10) 0   Pain location -   Pain descriptors -   CPOT pain score -   Chaz miller pain level was assessed and he currently denies pain.        DVT Prophylaxis: Low Risk/Ambulatory with no VTE prophylaxis indicated  Code Status: DNR / DNI  Discharge Dispo: TCU  Estimated Disch Date / # of Days until Disch: when bed found        Interval History (Subjective):      No complaints                  Physical Exam:      Last Vital Signs:  /70 (BP Location: Right arm)  Pulse 60  Temp 99.2  F (37.3  C) (Oral)  Resp 18  Wt 66.7 kg (147 lb)  SpO2 97%  BMI 22.35 kg/m2[SL1.1]      Intake/Output Summary (Last 24 hours) at 06/06/18 1216  Last data filed at 06/06/18 1057   Gross per 24 hour   Intake            505.2 ml   Output              500 ml   Net              5.2 ml[SL1.2]       Constitutional: Awake, alert, cooperative, no apparent distress.  Eating breakfast.  Wife present   Respiratory: Clear to auscultation bilaterally, no crackles or wheezing   Cardiovascular: Regular rate and rhythm, normal S1 and S2, and no murmur noted   Abdomen: Normal bowel sounds, soft, non-distended, non-tender   Skin: No rashes, no cyanosis, dry to touch   Neuro: Alert and confused c/w dementia hx, no weakness, numbness, + memory loss   Extremities: No edema, normal range of motion   Other(s):        All other systems: Negative          Medications:      All current medications were reviewed with changes reflected in problem list.         Data:      All new lab and imaging data was reviewed.   Labs:[SL1.1]    Recent Labs  Lab 06/03/18  1200   WBC 9.5   HGB 12.8*   HCT 39.4*   MCV 91   PLT  227[SL1.3]      Imaging:[SL1.1]   No results found for this or any previous visit (from the past 24 hour(s)).[SL1.3]      Revision History        User Key Date/Time User Provider Type Action    > SL1.3 6/6/2018 12:17 PM Macario Rodriguez MD Physician Sign     SL1.2 6/6/2018 12:16 PM Macario Rodriguez MD Physician      SL1.1 6/6/2018 12:15 PM Macario Rodriguez MD Physician             Progress Notes by Macario Rodriguez MD at 6/5/2018 12:53 PM     Author:  Macario Rodriguez MD Service:  Hospitalist Author Type:  Physician    Filed:  6/5/2018 12:58 PM Date of Service:  6/5/2018 12:53 PM Creation Time:  6/5/2018 12:53 PM    Status:  Signed :  Macario Rodriguez MD (Physician)           [facility]  Hospitalist Progress Note  Macario Rodriguez MD 06/05/2018    Reason for Stay (Diagnosis): pulm fibrosis, ARF         Assessment and Plan:      Summary of Stay: Chaz Soler is a 89-year-old male with a history of aortic stenosis, status post TAVR procedure, history of diastolic dysfunction, history of chronic hypoxic respiratory failure secondary to underlying pulmonary fibrosis on chronic home oxygen at 5 liters via nasal cannula, and chronic obstructive pulmonary disease.  He presents to the hospital after a fall.  On presentation in the ER, he was felt to be volume overloaded based on chest x-ray results and increased need for oxygen.  He was being admitted to the Hospitalist Service after receiving a dose of IV Lasix in the ER    Course complicated by worsening encephalopathy and ARF; feel this is likely due to dehydration related to diuretic.  Sx improved today after IVF hydration yesterday    1.  Acute on chronic diastolic heart failure exacerbation:  resolved.  received single dose of IV Lasix in ER.  Given rise in creatinine and normal BNP, would not give further IV Lasix.  2.  Fall and underlying chronic weakness:  PT, OT appreciated.  Anticipate TCU need   3.   Pulmonary fibrosis, on chronic home oxygen 5 liters via nasal cannula.     4.  Dementia history.   5.  ARF related to diuresis.  Wendy IVF again today; BMP in AM  6.  Encephalopathy - improved with hydration    # Pain Assessment:  Current Pain Score 6/5/2018   Patient currently in pain? yes   Pain score (0-10) 4   Pain location Back   Pain descriptors -   CPOT pain score -   Chaz miller pain level was assessed and he currently denies pain when I saw the patient earlier today.        DVT Prophylaxis: ambulate  Code Status: dnr/dni  Discharge Dispo: TCU  Estimated Disch Date / # of Days until Disch: tomorrow        Interval History (Subjective):      No new complaints.  Much more awake/alert today                  Physical Exam:      Last Vital Signs:  /68 (BP Location: Right arm)  Pulse 60  Temp 97.7  F (36.5  C) (Oral)  Resp 16  Wt 62.1 kg (137 lb)  SpO2 98%  BMI 20.83 kg/m2[SL1.1]      Intake/Output Summary (Last 24 hours) at 06/05/18 1257  Last data filed at 06/05/18 0800   Gross per 24 hour   Intake              400 ml   Output                0 ml   Net              400 ml[SL1.2]       Constitutional: Awake, alert, cooperative, no apparent distress.  Spouse present at bedside.  Working with PT; up marching in place in room   Respiratory: Diffuse crackles c/w pulm fibrosis hx   Cardiovascular: Regular rate and rhythm, normal S1 and S2, and no murmur noted   Abdomen: Normal bowel sounds, soft, non-distended, non-tender   Skin: No rashes, no cyanosis, dry to touch   Neuro: Alert and awake, no weakness, numbness, +++memory loss   Extremities: No edema, normal range of motion   Other(s):        All other systems: Negative          Medications:      All current medications were reviewed with changes reflected in problem list.         Data:      All new lab and imaging data was reviewed.   Labs:[SL1.1]    Recent Labs  Lab 06/03/18  1200   WBC 9.5   HGB 12.8*   HCT 39.4*   MCV 91   [SL1.3]       Imaging:[SL1.1]   No results found for this or any previous visit (from the past 24 hour(s)).[SL1.3]      Revision History        User Key Date/Time User Provider Type Action    > SL1.3 6/5/2018 12:58 PM Macario Rodriguez MD Physician Sign     SL1.2 6/5/2018 12:57 PM Macario Rodriguez MD Physician      SL1.1 6/5/2018 12:53 PM Macario Rodriguez MD Physician             Progress Notes by Ariana Navarro RN at 6/4/2018  2:38 PM     Author:  Ariana Navarro RN Service:  (none) Author Type:  Registered Nurse    Filed:  6/4/2018  2:40 PM Date of Service:  6/4/2018  2:38 PM Creation Time:  6/4/2018  2:38 PM    Status:  Signed :  Ariana Navarro RN (Registered Nurse)         [facility]  Patient is currently open to home care services with [facility].  The patient is currently receiving RN/HHA services.  [facility]  and team have been notified of patient admission.  [facility] liaison will continue to follow patient during stay.  If appropriate provide orders to resume home care at time of discharge.[CL1.1]         Revision History        User Key Date/Time User Provider Type Action    > CL1.1 6/4/2018  2:40 PM Ariana Navarro RN Registered Nurse Sign            Progress Notes by Macario Rodriguez MD at 6/4/2018  2:26 PM     Author:  Macario Rodriguez MD Service:  Hospitalist Author Type:  Physician    Filed:  6/4/2018  2:31 PM Date of Service:  6/4/2018  2:26 PM Creation Time:  6/4/2018  2:26 PM    Status:  Signed :  Macario Rodriguez MD (Physician)           [facility]  Hospitalist Progress Note  Macario Rodriguez MD 06/04/2018    Reason for Stay (Diagnosis): CHF         Assessment and Plan:      Summary of Stay: Chaz Soler is a 89-year-old male with a history of aortic stenosis, status post TAVR procedure, history of diastolic dysfunction, history of chronic hypoxic respiratory failure secondary to underlying pulmonary fibrosis on chronic  home oxygen at 5 liters via nasal cannula, and chronic obstructive pulmonary disease.  He presents to the hospital today after a fall.  On presentation in the ER, he was felt to be volume overloaded based on chest x-ray results and increased need for oxygen.  He is being admitted to the Hospitalist Service.   1.  Acute on chronic diastolic heart failure exacerbation:  resolved.  received dose of IV Lasix in ER.  Given rise in creatinine and normal BNP, would not give further IV Lasix.  Resume usual PO diuretic dose  2.  Fall and underlying chronic weakness:  PT, OT to assess.  anticipate TCU need.   3.  Pulmonary fibrosis, on chronic home oxygen 5 liters via nasal cannula.     4.  Dementia history.   5.  ARF related to diuresis  6.  Encephalopathy - was confused and anxious yesterday; better today.  Required dose of IV Ativan yesterday which seemed to help his sx          # Pain Assessment:  Current Pain Score 6/4/2018   Patient currently in pain? no   Pain score (0-10) -   Pain location -   Pain descriptors -   CPOT pain score -   Chaz miller pain level was assessed and he currently denies pain.        DVT Prophylaxis: Pneumatic Compression Devices  Code Status: DNR / DNI  Discharge Dispo: TCU  Estimated Disch Date / # of Days until Disch: tomorrow possible        Interval History (Subjective):      No complaints                  Physical Exam:      Last Vital Signs:  /55 (BP Location: Right arm)  Pulse 60  Temp 96  F (35.6  C) (Axillary)  Resp 18  Wt 62.1 kg (137 lb)  SpO2 99%  BMI 20.83 kg/m2[SL1.1]      Intake/Output Summary (Last 24 hours) at 06/04/18 1431  Last data filed at 06/04/18 1100   Gross per 24 hour   Intake              670 ml   Output              300 ml   Net              370 ml[SL1.2]       Constitutional: Awake, alert, cooperative, no apparent distress.  Spouse present   Respiratory: Clear to auscultation bilaterally, no crackles or wheezing   Cardiovascular: Regular rate and rhythm, normal  S1 and S2, and no murmur noted   Abdomen: Normal bowel sounds, soft, non-distended, non-tender   Skin: No rashes, no cyanosis, dry to touch   Neuro: Alert and awake, no weakness, numbness, +++ memory loss   Extremities: No edema, normal range of motion   Other(s):        All other systems: Negative          Medications:      All current medications were reviewed with changes reflected in problem list.         Data:      All new lab and imaging data was reviewed.   Labs:[SL1.1]    Recent Labs  Lab 06/03/18  1200   WBC 9.5   HGB 12.8*   HCT 39.4*   MCV 91   [SL1.2]      Imaging:[SL1.1]   Recent Results (from the past 24 hour(s))   XR Chest Port 1 View    Narrative    CHEST PORTABLE ONE VIEW   6/3/2018 6:22 PM     HISTORY: Shortness of breath.     COMPARISON: 6/3/2018.      Impression    IMPRESSION: Interval decreased vascular congestion, that still  persists. Patchy bilateral airspace opacities again noted at the left  lung base , and lateral right mid chest. Stable cardiac silhouette.    ZOIE VAZQUEZ MD[SL1.2]         Revision History        User Key Date/Time User Provider Type Action    > SL1.2 6/4/2018  2:31 PM Macario Rodriguez MD Physician Sign     SL1.1 6/4/2018  2:26 PM Macario Rodriguez MD Physician             Progress Notes by Alena Smith PT at 6/4/2018  1:51 PM     Author:  Alena Smith PT Service:  (none) Author Type:  Physical Therapist    Filed:  6/4/2018  1:51 PM Date of Service:  6/4/2018  1:51 PM Creation Time:  6/4/2018  1:51 PM    Status:  Signed :  Alena Smith PT (Physical Therapist)          06/04/18 1300   Quick Adds   Type of Visit Initial PT Evaluation   Living Environment   Lives With spouse   Living Arrangements apartment   Home Accessibility no concerns   Number of Stairs to Enter Home 0   Number of Stairs Within Home 0   Stair Railings at Home none   Living Environment Comment Pt and wife live on 1st floor.    Self-Care   Usual Activity Tolerance fair   Current  Activity Tolerance poor   Equipment Currently Used at Home walker, rolling;wheelchair, manual   Activity/Exercise/Self-Care Comment Pt is able to walk around apartment with FWW.   Functional Level Prior   Ambulation 1-->assistive equipment   Transferring 1-->assistive equipment   Toileting 1-->assistive equipment   Bathing 3-->assistive equipment and person   Dressing 0-->independent   Eating 0-->independent   Communication 0-->understands/communicates without difficulty   Swallowing 0-->swallows foods/liquids without difficulty   Cognition 1 - attention or memory deficits   Fall history within last six months yes   Number of times patient has fallen within last six months 1   Prior Functional Level Comment Pt's wife does IADLs. Pt has a home health aide 4/week for 2 hours, helps with showers and exercises   General Information   Onset of Illness/Injury or Date of Surgery - Date 06/03/18   Referring Physician Jennifer WHALEY   Patient/Family Goals Statement none stated   Pertinent History of Current Problem (include personal factors and/or comorbidities that impact the POC) Pt has history of aortic stenosis, status post TAVR procedure, history of diastolic dysfunction, history of chronic hypoxic respiratory failure secondary to underlying pulmonary fibrosis on chronic home oxygen at 5 liters via nasal cannula, and chronic obstructive pulmonary disease.  He presents to the hospital today after a fall. Pt found to have acute on chronic CHF.    Precautions/Limitations oxygen therapy device and L/min   General Info Comments Pt able to arouse with verbal stimulation, but still very lethargic   Cognitive Status Examination   Orientation person   Level of Consciousness lethargic/somnolent   Follows Commands and Answers Questions able to follow single-step instructions;50% of the time   Personal Safety and Judgment impaired   Memory impaired   Pain Assessment   Patient Currently in Pain No   Posture    Posture Forward head  "position;Protracted shoulders   Range of Motion (ROM)   ROM Comment B hamstring tightness apparent   Strength   Strength Comments limited functional strength with sit to stand, knees buckling B   Bed Mobility   Bed Mobility Comments min/mod A    Transfer Skills   Transfer Comments min A x 2   Gait   Gait Comments min A x 2 for side steps, declining further activity   Balance   Balance Comments needing heavy B UE support on FWW   General Therapy Interventions   Planned Therapy Interventions balance training;bed mobility training;gait training;strengthening;transfer training;risk factor education;progressive activity/exercise   Clinical Impression   Criteria for Skilled Therapeutic Intervention yes, treatment indicated   PT Diagnosis decreased functional mobility   Influenced by the following impairments decreased strength, balance, cognition   Functional limitations due to impairments decreased transfers, ambulation, bed mob   Clinical Presentation Stable/Uncomplicated   Clinical Presentation Rationale improving from admit   Clinical Decision Making (Complexity) Low complexity   Therapy Frequency` 5 times/week   Predicted Duration of Therapy Intervention (days/wks) 5 days   Anticipated Discharge Disposition Transitional Care Facility   Risk & Benefits of therapy have been explained Yes   Patient, Family & other staff in agreement with plan of care Yes   [facility] [employer] TM \"6 Clicks\"   2016, Trustees of [facility], under license to [employer].  All rights reserved.   6 Clicks Short Forms Basic Mobility Inpatient Short Form   [facility] [employer]  \"6 Clicks\" V.2 Basic Mobility Inpatient Short Form   1. Turning from your back to your side while in a flat bed without using bedrails? 3 - A Little   2. Moving from lying on your back to sitting on the side of a flat bed without using bedrails? 3 - A Little   3. Moving to and from a bed to a chair (including a wheelchair)? 2 - A Lot   4. Standing up " from a chair using your arms (e.g., wheelchair, or bedside chair)? 2 - A Lot   5. To walk in hospital room? 2 - A Lot   6. Climbing 3-5 steps with a railing? 1 - Total   Basic Mobility Raw Score (Score out of 24.Lower scores equate to lower levels of function) 13   Total Evaluation Time   Total Evaluation Time (Minutes) 10[MP1.1]        Revision History        User Key Date/Time User Provider Type Action    > MP1.1 6/4/2018  1:51 PM Alena Smith, PT Physical Therapist Sign            Progress Notes by Macario Rodriguez MD at 6/3/2018  6:21 PM     Author:  Macario Rodriguez MD Service:  Hospitalist Author Type:  Physician    Filed:  6/3/2018  6:32 PM Date of Service:  6/3/2018  6:21 PM Creation Time:  6/3/2018  6:21 PM    Status:  Addendum :  Macario Rodriguez MD (Physician)         Pt seen in ER several hours ago.  See dictated H&P.  On arrival to floor pt was tachypnic with RR 30s.  Unable to provide any accurate information. Denies pain.  Not answering questions accurately.  Stat repeat CXR shows no PNTX or any significant change from earlier by my read.  Will also obtain stat ECG.  Was given 0.5 mg IV ativan with good response and much more calm with RR now normal.  Suspect anxiety component to sx.  Will order haldol prn[SL1.1]    Addendum:  Repeat ECG shows sinus rhythm, similar to previous without significant changes.  No ST elevation.  Pt much more calm after Ativan administered and RR normalized[SL1.2]       Revision History        User Key Date/Time User Provider Type Action    > SL1.2 6/3/2018  6:32 PM Macario Rodriguez MD Physician Addend     SL1.1 6/3/2018  6:26 PM Macario Rodriguez MD Physician Sign            ED Notes by Magalis Armendariz RN at 6/3/2018  4:28 PM     Author:  Magalis Armendariz RN Service:  (none) Author Type:  Registered Nurse    Filed:  6/3/2018  4:58 PM Date of Service:  6/3/2018  4:28 PM Creation Time:  6/3/2018  4:30 PM    Status:  Addendum :   Magalis Armendariz, RN (Registered Nurse)         [facility]  ED Nurse Handoff Report    Chaz Soler is a 89 year old male   ED Chief complaint: Fall  . ED Diagnosis:   Final diagnoses:   Acute diastolic congestive heart failure (H)   Pulmonary fibrosis (H)   Generalized muscle weakness     Allergies:   Allergies   Allergen Reactions     Contrast Dye      SOB, increased Bp, difficulty swallowing. Date 6/3/96 (ipaque contrast)  Was premedicated prior to FRANCK and had no reaction at all (solumedrol and benadryl) 2016  Was premedicated with Methylprednisolone protocol, no reaction 1/25/17     Lisinopril      cough     Oxycodone      Delirium       Code Status: DNR / DNI  Activity level - Baseline/Home:  Stand with Assist of 2. Activity Level - Current:   Total Care. Lift room needed: Yes. Bariatric: No   Needed: No   Isolation: No. Infection: Not Applicable.     Vital Signs:   Vitals:    06/03/18 1215 06/03/18 1300 06/03/18 1315 06/03/18 1345   BP: 183/83 172/76 172/83 144/67   Pulse:       Temp:       TempSrc:       SpO2: 92%          Cardiac Rhythm:  ,   Cardiac  Cardiac Rhythm: Normal sinus rhythm (PVC's freq)  Pain level:    Patient confused: Yes. Patient Falls Risk: Yes.   Elimination Status: Has voided   Patient Report - Initial Complaint: fall. Focused Assessment:   Cardiac Cardiac Monitoring - EKG Monitoring: Yes Cardiac Rhythm: NSR (PVC's freq) TD    11:45 Neurological Cognitive/Perceptual/Neuro - Cognitive/Neuro/Behavioral WDL: WDL (per baseline reported per wife)        Tests Performed: lab/imaging. Abnormal Results:[TD1.1]   Labs Ordered and Resulted from Time of ED Arrival Up to the Time of Departure from the ED   CBC WITH PLATELETS - Abnormal; Notable for the following:        Result Value    RBC Count 4.34 (*)     Hemoglobin 12.8 (*)     Hematocrit 39.4 (*)     All other components within normal limits   BASIC METABOLIC PANEL - Abnormal; Notable for the following:     Carbon  Dioxide 33 (*)     Glucose 112 (*)     Creatinine 1.40 (*)     GFR Estimate 48 (*)     GFR Estimate If Black 58 (*)     Calcium 10.2 (*)     All other components within normal limits   ROUTINE UA WITH MICROSCOPIC - Abnormal; Notable for the following:     Protein Albumin Urine 30 (*)     Mucous Urine Present (*)     All other components within normal limits   BLOOD GAS VENOUS AND OXYHGB - Abnormal; Notable for the following:     PCO2 Venous 62 (*)     Bicarbonate Venous 36 (*)     All other components within normal limits   NT PROBNP INPATIENT   TROPONIN I   PERIPHERAL IV CATHETER   ISTAT TROPONIN NURSING POCT   STRICT INTAKE AND OUTPUT   TROPONIN POCT[TD1.2]       .   Treatments provided: see MAR  Family Comments: wife was present-left now  OBS brochure/video discussed/provided to patient:  N/A  ED Medications:   Medications   furosemide (LASIX) injection 40 mg (40 mg Intravenous Given 6/3/18 1332)     Drips infusing:  No  For the majority of the shift, the patient's behavior Green. Interventions performed were .     Severe Sepsis OR Septic Shock Diagnosis Present: No      ED Nurse Name/Phone Number: Loretta Poole,   4:28 PM[TD1.1]    RECEIVING UNIT ED HANDOFF REVIEW    Above ED Nurse Handoff Report was reviewed: Yes  Reviewed by: Magalis Armendariz on Anne 3, 2018 at 4:58 PM[KS1.1]        Revision History        User Key Date/Time User Provider Type Action    > KS1.1 6/3/2018  4:58 PM Magalis Armendariz RN Registered Nurse Addend     TD1.2 6/3/2018  4:30 PM Loretta Poole RN Registered Nurse Sign     TD1.1 6/3/2018  4:28 PM Loretta Poole, RN Registered Nurse             ED Provider Notes by Rishi Ngo MD at 6/3/2018 11:16 AM     Author:  Rishi Ngo MD Service:  Emergency Medicine Author Type:  Physician    Filed:  6/3/2018  2:24 PM Date of Service:  6/3/2018 11:16 AM Creation Time:  6/3/2018 12:03 PM    Status:  Signed :  Rishi Ngo MD (Physician)           History      Chief Complaint:  Fall    The history is provided by the EMS personnel. History limited by: history of dementia.      Chaz Soler is a 89 year old male who presents with a fall.  Patient is brought in via EMS, who was called by patient's wife in response to patient suffering a reported fall at his living facility at [facility].  Patient has a history of dementia and pulmonary fibrosis, and wife was helping patient get dressed this morning and he was sitting on side of the bed, and subsequently slid off.  Wife called for EMS at this time.  On EMS arrival, patient was unable to stand and was reportedly resistant to wife's direction.  EMS are concerned for patient's living situation.  Patient is on 5 L oxygen at home chronically for known pulmonary fibrosis.  EMS report patient was vitally stable, had unremarkable EKG, and placed in c-collar for precaution.  No complaints on presentation by patient.  No other symptoms reported by EMS.    Allergies:  Contrast Dye  Lisinopril  Oxycodone     Medications:    Tylenol  Albuterol  Amlodipine  Aspirin  Lipitor  Aricept  Cymbalta  Lasix  Neurontin  Levothyroxine  Losartan  Metoprolol  Protonix  Flomax  Tramadol     Past Medical History:    Abdominal aortic aneurysm  Anemia  Aortic stenosis  CAD  CHF  CKD  COPD  Dementia  Essential hypertension  Gout  Hypercalcemia  Hyperlipidemia  Hyperparathyroidism  Hyperplasia prostate  Left lung granuloma  Lumbago  Proteinuria  Pulmonary fibrosis  PVD  Thrombocytopenia  Vitamin D deficiency    Past Surgical History:    Colonoscopy  Discectomy, lumbar posterior  Endovascular repair aneurysm, abdominal aortic  Cholecystectomy  Repair AAA  Vasectomy    Family History:    Father-hypertension  Mother-hypertension    Social History:  Smoking status: Never smoker  Alcohol use: Yes (1 glass wine/day)  Marital Status:        Review of Systems   Unable to perform ROS: Dementia     Physical Exam[BW1.1]     Patient  Vitals for the past 24 hrs:   BP Temp Temp src Pulse Heart Rate SpO2   06/03/18 1345 144/67 - - - 70 -   06/03/18 1315 172/83 - - - 77 -   06/03/18 1300 172/76 - - - - -   06/03/18 1215 183/83 - - - - 92 %   06/03/18 1200 - - - - - 100 %   06/03/18 1145 184/76 - - - - 100 %   06/03/18 1123 161/75 98  F (36.7  C) Temporal 70 - 92 %[BW1.2]         Physical Exam[BW1.1]    GENERAL:  Pleasant, age appropriate demented male resting comfortably in the bed.   HEENT:   No scalp hematoma or defect to the bony calvarium.      Tafoya's and Racoon's sign negative.      No hemotympanum or septal hematoma.    Midface is stable.     Oropharynx is moist, without lesions or trismus.  EYES:  Conjunctiva normal, PERRL  NECK:   C-spine non-tender[JM1.1]; cervical collar in place[JM1.2]     No bony step-off to cervical spine.   CV:    Regular rate and rhythm.     No murmurs, rubs or gallops.    PULM:[JM1.1]  Inspiratory crackles bilateral[JM1.2]    No respiratory distress.      No subcutaneous emphysema or crepitus.[JM1.1]    No wheezing.[JM1.2]  ABD:   Soft, non-tender, non-distended.      No pulsatile masses.  No rebound or guarding.  MSK:    No focal bony tenderness to the extremities.      Upper and lower extremities taken through full ROM without significant pain or limited ROM.  LYMPH:  No cervical lymphadenopathy.  NEURO:  Alert and oriented[JM1.1] to person, place but not time[JM1.2].     GCS[JM1.1]: E4 M6 V4 = 14    CN II-XII intact, speech is clear with no aphasia.[JM1.2]      Strength is equal and symmetric.[JM1.1]    Sensation intact[JM1.2]  SKIN:   Warm, dry and intact.    PSYCH:   Mood is good and affect is appropriate.[JM1.1]      Emergency Department Course   ECG:  @ 1145  Indication: fall  Vent. Rate 67 bpm. WI interval 192 ms. QRS duration 122 ms. QT/QTc 402/424 ms. P-R-T axis 37 -40 46.   Normal; sinus rhythm. Left axis deviation. Left ventricular hypertrophy with QRS widening and repolarization abnormality. 1mm ST  elevation in V1-V4. 0.5mm ST elevation in lead 2 and aVF. T wave inversion in V1-V4.  Abnormal ECG.    Read @ 1149 by Dr. Ngo.    @ 1158  Indication: repeat  Vent. Rate 70 bpm. FL interval 186 ms. QRS duration 120 ms. QT/QTc 460/496 ms. P-R-T axis 33 -41 42.   Sinus rhythm with occasional premature ventricular complexes. Left axis deviation. Left ventricular hypertrophy with QRS widening and repolarization abnormality. 1mm ST elevation in V1-V4. 0.5 mm ST elevation in 2-aVF. T wave inversion in V1-V4. Abnormal ECG.  No significant change when compared to previous ECG from previous.  Read @ 1203 by Dr. Ngo.    Imaging:  Head CT without contrast:[BW1.1]  IMPRESSION: Diffuse cerebral volume loss and cerebral white matter changes consistent with chronic small vessel ischemic disease. No evidence for acute intracranial pathology.[BW1.3]  Report per radiology.     CT-scan of the cervical spine[BW1.1]  IMPRESSION: No evidence for fracture or traumatic malalignment of the cervical spine.[BW1.3]  Report per radiology.    XR Chest 2 views[BW1.1]  IMPRESSION: Diffuse peripheral predominant mixed interstitial and airspace opacities over both lungs, edema versus atypical infection. No pleural effusion seen on either side. No pneumothorax. Valvular prosthesis again seen.[BW1.3]  Report per radiology.     Laboratory:  CBC:  WBC[BW1.1] 9.5[BW1.3], HGB[BW1.1] 12.8 (L)[BW1.3], PLT[BW1.1] 227[BW1.3],  BMP:[BW1.1] Carbon Dioxide 33 (H), Glucose 112 (H), Creatinine 1.40 (H), GFR 48 (L), Calcium 10.2 (H)[BW1.3], otherwise WNL  Pro-BNP:[BW1.1] 933[BW1.3]  ([BW1.1]1200[BW1.3]) Troponin I:[BW1.1] <0.015[BW1.3]  ([BW1.1]1200[BW1.3]) Blood gas venous: pH:[BW1.1] 7.38[BW1.3]; pCO2:[BW1.1] 62 (H)[BW1.3]; pO2:[BW1.1] 25[BW1.3]; Bicarb:[BW1.1] 36 (H[BW1.3]; FIO2:[BW1.1] 5L/minute[BW1.3]; Oxyhemoglobin:[BW1.1] 42[BW1.3]; Base deficit:[BW1.1] 8.6[BW1.3];  (1201) Troponin POCT: 0.00    UA: Clear yellow urine,[BW1.1] Albumin 30, Mucous  Present,[BW1.4] otherwise WNL    Interventions:[BW1.1]  (1332) Lasix, 40 mg, IV injection[BW1.3]    Emergency Department Course:  Nursing notes and vitals reviewed.  (115) I performed an exam of the patient as documented above.    EKG was done, interpretation as above.  Blood was drawn from the patient. This was sent for laboratory testing, findings above.   The patient was sent for CT scans and an x-ray while in the emergency department, findings above.[BW1.1]     Findings and plan explained to the patient and wife who consents to admission.   ([BW1.3]1346[BW1.4]) I discussed the patient with [BW1.3] Jennifer[BW1.4] of the hospitalist service, who will admit the patient to a[BW1.3] cardiac telemetry[BW1.4] bed for further monitoring, evaluation, and treatment.[BW1.3]    Impression & Plan    Medical Decision Making:[BW1.1]  89-year-old male presented to the ED after generalized weakness and a fall to the floor.  It was uncertain the exact mechanism and whether he struck anything on the way to the ground.  Head CT and cervical spine CT undertaken given the limited history which is unremarkable.  He has no other traumatic findings on examination.  Wife later presented to the ED and declined any significant traumatic injury.    Evaluation for his generalized weakness revealed no evidence of electrolyte disturbance, obvious infectious pathology.  Initial EKG showed 1 mm ST elevation in V1-V4 with novel T-wave inversion compared to previous.  Repeat EKG is adynamic.  Troponin is within normal limits.  Patient has no chest pain or shortness of breath to draw concern for acute coronary syndrome.  He was noted to be hypoxic on his typical 5 L of nasal cannula at 85%.  At 6 L he was 93%.  Chest x-ray was read as interstitial edema versus atypical infection.  Patient has no infectious symptoms to indicate pneumonia thus no antibiotics.  He does have pulmonary fibrosis but his chest x-ray looks remarkably different than 3  months prior likely indicating diastolic heart failure and pulmonary edema in the face of pulmonary fibrosis.  Patient has no distress and does not require noninvasive ventilation.  He was given Lasix to assist with presumed pulmonary edema and will be transferred to a telemetry bed for further evaluation and management.[JM1.3]    Diagnosis:[BW1.1]    ICD-10-CM   1. Acute diastolic congestive heart failure (H) I50.31   2. Pulmonary fibrosis (H) J84.10   3. Generalized muscle weakness M62.81[BW1.2]       Disposition:[BW1.1]  Patient is admitted to a[BW1.3] cardiac telemetry[BW1.4] bed under the care of [BW1.3] Jennifer[BW1.4].[BW1.3]                 Maxime NEGRETE am serving as a scribe on 6/3/2018 at 11:15 AM to personally document services performed by Dr. Ngo based on my observations and the provider's statements to me.         Maxime Mccoy  6/3/2018   [facility] EMERGENCY DEPARTMENT[BW1.1]       Rishi Ngo MD  06/03/18 1424  [JM1.4]     Revision History        User Key Date/Time User Provider Type Action    > JM1.4 6/3/2018  2:24 PM Rishi Ngo MD Physician Sign     JM1.3 6/3/2018  2:21 PM Rishi Ngo MD Physician      BW1.2 6/3/2018  1:59 PM Maxime Mccoy Scribe Share     BW1.4 6/3/2018  1:47 PM Maxime Mccoy Scribe      BW1.3 6/3/2018  1:38 PM Maxime Mccoy Scribe Share     JM1.2 6/3/2018 12:52 PM Rishi Ngo MD Physician Share     JM1.1 6/3/2018 12:22 PM Rishi Ngo MD Physician Share     BW1.1 6/3/2018 12:16 PM Maxime Mccoy Scribe Share            ED Notes by Evangelina Root RN at 6/3/2018 11:16 AM     Author:  Evangelina Root RN Service:  (none) Author Type:  Registered Nurse    Filed:  6/3/2018 11:16 AM Date of Service:  6/3/2018 11:16 AM Creation Time:  6/3/2018 11:16 AM    Status:  Signed :  Evangelina Root RN (Registered Nurse)         Bed: ED24  Expected date: 6/3/18  Expected time: 11:04 AM  Means of  arrival: Ambulance  Comments:  [id number]     Revision History        User Key Date/Time User Provider Type Action    > AB1.1 6/3/2018 11:16 AM Evangelina Root RN Registered Nurse Sign                  Procedure Notes     No notes of this type exist for this encounter.         Progress Notes - Therapies (Notes from 06/03/18 through 06/06/18)      Progress Notes by Alena Smith PT at 6/4/2018  1:51 PM     Author:  Alena Smith PT Service:  (none) Author Type:  Physical Therapist    Filed:  6/4/2018  1:51 PM Date of Service:  6/4/2018  1:51 PM Creation Time:  6/4/2018  1:51 PM    Status:  Signed :  Alena Smith PT (Physical Therapist)          06/04/18 1300   Quick Adds   Type of Visit Initial PT Evaluation   Living Environment   Lives With spouse   Living Arrangements apartment   Home Accessibility no concerns   Number of Stairs to Enter Home 0   Number of Stairs Within Home 0   Stair Railings at Home none   Living Environment Comment Pt and wife live on 1st floor.    Self-Care   Usual Activity Tolerance fair   Current Activity Tolerance poor   Equipment Currently Used at Home walker, rolling;wheelchair, manual   Activity/Exercise/Self-Care Comment Pt is able to walk around apartment with FWW.   Functional Level Prior   Ambulation 1-->assistive equipment   Transferring 1-->assistive equipment   Toileting 1-->assistive equipment   Bathing 3-->assistive equipment and person   Dressing 0-->independent   Eating 0-->independent   Communication 0-->understands/communicates without difficulty   Swallowing 0-->swallows foods/liquids without difficulty   Cognition 1 - attention or memory deficits   Fall history within last six months yes   Number of times patient has fallen within last six months 1   Prior Functional Level Comment Pt's wife does IADLs. Pt has a home health aide 4/week for 2 hours, helps with showers and exercises   General Information   Onset of Illness/Injury or Date of Surgery - Date 06/03/18   Referring  Physician Jennifer WHALEY   Patient/Family Goals Statement none stated   Pertinent History of Current Problem (include personal factors and/or comorbidities that impact the POC) Pt has history of aortic stenosis, status post TAVR procedure, history of diastolic dysfunction, history of chronic hypoxic respiratory failure secondary to underlying pulmonary fibrosis on chronic home oxygen at 5 liters via nasal cannula, and chronic obstructive pulmonary disease.  He presents to the hospital today after a fall. Pt found to have acute on chronic CHF.    Precautions/Limitations oxygen therapy device and L/min   General Info Comments Pt able to arouse with verbal stimulation, but still very lethargic   Cognitive Status Examination   Orientation person   Level of Consciousness lethargic/somnolent   Follows Commands and Answers Questions able to follow single-step instructions;50% of the time   Personal Safety and Judgment impaired   Memory impaired   Pain Assessment   Patient Currently in Pain No   Posture    Posture Forward head position;Protracted shoulders   Range of Motion (ROM)   ROM Comment B hamstring tightness apparent   Strength   Strength Comments limited functional strength with sit to stand, knees buckling B   Bed Mobility   Bed Mobility Comments min/mod A    Transfer Skills   Transfer Comments min A x 2   Gait   Gait Comments min A x 2 for side steps, declining further activity   Balance   Balance Comments needing heavy B UE support on FWW   General Therapy Interventions   Planned Therapy Interventions balance training;bed mobility training;gait training;strengthening;transfer training;risk factor education;progressive activity/exercise   Clinical Impression   Criteria for Skilled Therapeutic Intervention yes, treatment indicated   PT Diagnosis decreased functional mobility   Influenced by the following impairments decreased strength, balance, cognition   Functional limitations due to impairments decreased transfers,  "ambulation, bed mob   Clinical Presentation Stable/Uncomplicated   Clinical Presentation Rationale improving from admit   Clinical Decision Making (Complexity) Low complexity   Therapy Frequency` 5 times/week   Predicted Duration of Therapy Intervention (days/wks) 5 days   Anticipated Discharge Disposition Transitional Care Facility   Risk & Benefits of therapy have been explained Yes   Patient, Family & other staff in agreement with plan of care Yes   [facility]-PAC TM \"6 Clicks\"   2016, Trustees of [facility], under license to [employer].  All rights reserved.   6 Clicks Short Forms Basic Mobility Inpatient Short Form   [facility] AM-PAC  \"6 Clicks\" V.2 Basic Mobility Inpatient Short Form   1. Turning from your back to your side while in a flat bed without using bedrails? 3 - A Little   2. Moving from lying on your back to sitting on the side of a flat bed without using bedrails? 3 - A Little   3. Moving to and from a bed to a chair (including a wheelchair)? 2 - A Lot   4. Standing up from a chair using your arms (e.g., wheelchair, or bedside chair)? 2 - A Lot   5. To walk in hospital room? 2 - A Lot   6. Climbing 3-5 steps with a railing? 1 - Total   Basic Mobility Raw Score (Score out of 24.Lower scores equate to lower levels of function) 13   Total Evaluation Time   Total Evaluation Time (Minutes) 10[MP1.1]        Revision History        User Key Date/Time User Provider Type Action    > MP1.1 6/4/2018  1:51 PM Alena Smith, PT Physical Therapist Sign            "

## 2018-06-03 NOTE — IP AVS SNAPSHOT
` `     [place] [address] MEDICAL SURGICAL: [phone]            Medication Administration Report for Chaz Soler as of 06/06/18 1444   Legend:    Given Hold Not Given Due Canceled Entry Other Actions    Time Time (Time) Time  Time-Action       Inactive    Active    Linked        Medications 05/31/18 06/01/18 06/02/18 06/03/18 06/04/18 06/05/18 06/06/18    acetaminophen (TYLENOL) tablet 500 mg  Dose: 500 mg  Freq: 3 TIMES DAILY Route: PO  Start: 06/03/18 1745   Admin Instructions: Maximum acetaminophen dose from all sources = 75 mg/kg/day not to exceed 4 gram    Admin. Amount: 1 tablet (1 × 500 mg tablet)  Last Admin: 06/06/18 0835  Dispense Loc: RH ADS MS3S        1937 (500 mg)-Given               1001 (500 mg)-Given       1534 (500 mg)-Given       2128 (500 mg)-Given        0842 (500 mg)-Given       1612 (500 mg)-Given       2124 (500 mg)-Given        0835 (500 mg)-Given       [ ] 1600       [ ] 2200           amLODIPine (NORVASC) tablet 10 mg  Dose: 10 mg  Freq: DAILY Route: PO  Start: 06/03/18 2000   Admin. Amount: 1 tablet (1 × 10 mg tablet)  Last Admin: 06/05/18 1938  Dispense Loc: RH ADS MS3S        1937 (10 mg)-Given        2320 (10 mg)-Given        1938 (10 mg)-Given        [ ] 2000           aspirin EC tablet 81 mg  Dose: 81 mg  Freq: DAILY Route: PO  Start: 06/03/18 2000   Admin. Amount: 1 tablet (1 × 81 mg tablet)  Last Admin: 06/05/18 1938  Dispense Loc: RH ADS MS3S        1937 (81 mg)-Given        2129 (81 mg)-Given        1938 (81 mg)-Given        [ ] 2000           atorvastatin (LIPITOR) tablet 20 mg  Dose: 20 mg  Freq: DAILY Route: PO  Start: 06/03/18 2000   Admin. Amount: 1 tablet (1 × 20 mg tablet)  Last Admin: 06/05/18 1938  Dispense Loc: RH ADS MS3S        1937 (20 mg)-Given        2129 (20 mg)-Given        1938 (20 mg)-Given        [ ] 2000           donepezil (ARIcept) tablet 10 mg  Dose: 10 mg  Freq: AT BEDTIME Route: PO  Start: 06/03/18 2200   Admin. Amount: 2 tablet (2 × 5 mg  tablet)  Last Admin: 06/05/18 2124  Dispense Loc: RH ADS MS3S         0018 (10 mg)-Given       2129 (10 mg)-Given        2124 (10 mg)-Given        [ ] 2200           DULoxetine (CYMBALTA) EC capsule 30 mg  Dose: 30 mg  Freq: DAILY Route: PO  Start: 06/03/18 1745   Admin. Amount: 1 capsule (1 × 30 mg capsule)  Last Admin: 06/06/18 0835  Dispense Loc: RH ADS MS3S        1937 (30 mg)-Given        (1004)-Not Given [C]        0842 (30 mg)-Given        0835 (30 mg)-Given           furosemide (LASIX) tablet 20 mg  Dose: 20 mg  Freq: DAILY Route: PO  Start: 06/06/18 0900   Admin. Amount: 1 tablet (1 × 20 mg tablet)  Last Admin: 06/06/18 0839  Dispense Loc: RH ADS MS3S           0839 (20 mg)-Given           gabapentin (NEURONTIN) capsule 100 mg  Dose: 100 mg  Freq: 3 TIMES DAILY Route: PO  Start: 06/03/18 1745   Admin. Amount: 1 capsule (1 × 100 mg capsule)  Last Admin: 06/06/18 0835  Dispense Loc: RH ADS MS3S        1938 (100 mg)-Given               1000 (100 mg)-Given       1534 (100 mg)-Given       2129 (100 mg)-Given        0842 (100 mg)-Given       1612 (100 mg)-Given       2124 (100 mg)-Given        0835 (100 mg)-Given       [ ] 1600       [ ] 2200           haloperidol lactate (HALDOL) injection 2 mg  Dose: 2 mg  Freq: EVERY 6 HOURS PRN Route: IV  PRN Reason: agitation  Start: 06/03/18 1827   Admin Instructions: For ordered doses up to 5 mg, drug can be give IV Push undiluted over 1 minute.    Admin. Amount: 2 mg = 0.4 mL Conc: 5 mg/mL  Dispense Loc: RH ADS MS3S  Infused Over: 1 Minutes  Volume: 1 mL               levothyroxine (SYNTHROID/LEVOTHROID) tablet 88 mcg  Dose: 88 mcg  Freq: DAILY Route: PO  Start: 06/03/18 1745   Admin Instructions: Separate oral administration of iron- or calcium-containing products and levothyroxine by at least 4 hours.    Admin. Amount: 1 tablet (1 × 88 mcg tablet)  Last Admin: 06/06/18 0835  Dispense Loc:  ADS MS3S        1937 (88 mcg)-Given        1000 (88 mcg)-Given        0842 (88  mcg)-Given        0835 (88 mcg)-Given           losartan (COZAAR) tablet 100 mg  Dose: 100 mg  Freq: DAILY Route: PO  Start: 06/03/18 1745   Admin. Amount: 1 tablet (1 × 100 mg tablet)  Last Admin: 06/06/18 0835  Dispense Loc: RH ADS MS3S        1938 (100 mg)-Given        1002 (100 mg)-Given        0840 (100 mg)-Given        0835 (100 mg)-Given           melatonin tablet 1 mg  Dose: 1 mg  Freq: AT BEDTIME PRN Route: PO  PRN Reason: sleep  Start: 06/03/18 1735   Admin Instructions: Do not give unless at least 6 hours of uninterrupted sleep is expected.    Admin. Amount: 1 tablet (1 × 1 mg tablet)  Dispense Loc: RH ADS MS3S               metoprolol succinate (TOPROL-XL) 24 hr tablet 25 mg  Dose: 25 mg  Freq: DAILY Route: PO  Start: 06/03/18 1745   Admin Instructions: DO NOT CRUSH. Tablet may be split in half along score line.    Admin. Amount: 1 tablet (1 × 25 mg tablet)  Last Admin: 06/06/18 0834  Dispense Loc: RH ADS MS3S        1937 (25 mg)-Given        1001 (25 mg)-Given        0842 (25 mg)-Given        0834 (25 mg)-Given           naloxone (NARCAN) injection 0.1-0.4 mg  Dose: 0.1-0.4 mg  Freq: EVERY 2 MIN PRN Route: IV  PRN Reason: opioid reversal  Start: 06/03/18 1735   Admin Instructions: For respiratory rate LESS than or EQUAL to 8.  Partial reversal dose:  0.1 mg titrated q 2 minutes for Analgesia Side Effects Monitoring Sedation Level of 3 (frequently drowsy, arousable, drifts to sleep during conversation).Full reversal dose:  0.4 mg bolus for Analgesia Side Effects Monitoring Sedation Level of 4 (somnolent, minimal or no response to stimulation).  For ordered doses up to 2mg give IVP. Give each 0.4mg over 15 seconds in emergency situations. For non-emergent situations further dilute in 9mL of NS to facilitate titration of response.    Admin. Amount: 0.1-0.4 mg = 0.25-1 mL Conc: 0.4 mg/mL  Dispense Loc: RH ADS MS3S  Volume: 1 mL               ondansetron (ZOFRAN-ODT) ODT tab 4 mg  Dose: 4 mg  Freq: EVERY 6  HOURS PRN Route: PO  PRN Reasons: nausea,vomiting  Start: 06/03/18 1735   Admin Instructions: This is Step 1 of nausea and vomiting management.  If nausea not resolved in 15 minutes, go to Step 2 prochlorperazine (COMPAZINE). Do not push through foil backing. Peel back foil and gently remove. Place on tongue immediately. Administration with liquid unnecessary  With dry hands, peel back foil backing and gently remove tablet; do not push oral disintegrating tablet through foil backing; administer immediately on tongue and oral disintegrating tablet dissolves in seconds; then swallow with saliva; liquid not required.    Admin. Amount: 1 tablet (1 × 4 mg tablet)  Dispense Loc: RH ADS MS3S              Or  ondansetron (ZOFRAN) injection 4 mg  Dose: 4 mg  Freq: EVERY 6 HOURS PRN Route: IV  PRN Reasons: nausea,vomiting  Start: 06/03/18 1735   Admin Instructions: This is Step 1 of nausea and vomiting management.  If nausea not resolved in 15 minutes, go to Step 2 prochlorperazine (COMPAZINE).  Irritant. For ordered doses up to 4 mg, give IV Push undiluted over 2-5 minutes.    Admin. Amount: 4 mg = 2 mL Conc: 4 mg/2 mL  Dispense Loc: RH ADS MS3S  Infused Over: 2-5 Minutes  Volume: 2 mL               pantoprazole (PROTONIX) EC tablet 40 mg  Dose: 40 mg  Freq: EVERY MORNING Route: PO  Start: 06/04/18 0900   Admin Instructions: DO NOT CRUSH.    Admin. Amount: 1 tablet (1 × 40 mg tablet)  Last Admin: 06/06/18 0836  Dispense Loc: RH ADS MS3S         1001 (40 mg)-Given        0842 (40 mg)-Given        0836 (40 mg)-Given           polyethylene glycol (MIRALAX/GLYCOLAX) Packet 17 g  Dose: 17 g  Freq: DAILY PRN Route: PO  PRN Reason: constipation  Start: 06/03/18 1735   Admin Instructions: Give in 8oz of  water, juice, or soda. Hold for loose stools.  This is the second step of a three step constipation treatment.  1 Packet = 17 grams. Mixed prescribed dose in 8 ounces of water. Follow with 8 oz. of water.    Admin. Amount: 17  g  Dispense Loc: RH ADS MS3S               senna-docusate (SENOKOT-S;PERICOLACE) 8.6-50 MG per tablet 1 tablet  Dose: 1 tablet  Freq: 2 TIMES DAILY Route: PO  Start: 06/03/18 2100   Admin Instructions: If no bowel movement in 24 hours, increase to 2 tablets PO.  Hold for loose stools.    Admin. Amount: 1 tablet  Last Admin: 06/04/18 1008  Dispense Loc: RH ADS MS3S        (2244)-Not Given        1008 (1 tablet)-Given       (2128)-Not Given [C]        (0806)-Not Given [C]       (2006)-Not Given [C]        (0835)-Not Given       [ ] 2100          Or  senna-docusate (SENOKOT-S;PERICOLACE) 8.6-50 MG per tablet 2 tablet  Dose: 2 tablet  Freq: 2 TIMES DAILY Route: PO  Start: 06/03/18 2100   Admin Instructions: Hold for loose stools.    Admin. Amount: 2 tablet  Dispense Loc: RH ADS MS3S                                                     [ ] 2100             Rate: 50 mL/hr   Freq: CONTINUOUS Route: IV  Last Dose: Stopped (06/05/18 2256)  Start: 06/05/18 1300   End: 06/05/18 2259   Last Admin: 06/05/18 1611  Dispense Loc: [facility] Floor Stock  Volume: 1,000 mL   Current Line: Peripheral IV 06/03/18 Right         1404 ( )-Started       1611 ( )-Rate/Dose Verify       2256-Stopped       2259-Med Discontinued        tamsulosin (FLOMAX) capsule 0.4 mg  Dose: 0.4 mg  Freq: DAILY Route: PO  Start: 06/03/18 2000   Admin Instructions: Administer 30 minutes after the same meal each day.  Capsules should be swallowed whole; do not crush chew or open.    Admin. Amount: 1 capsule (1 × 0.4 mg capsule)  Last Admin: 06/05/18 1938  Dispense Loc: RH ADS MS3S        1937 (0.4 mg)-Given        2129 (0.4 mg)-Given        1938 (0.4 mg)-Given        [ ] 2000          Completed Medications  Medications 05/31/18 06/01/18 06/02/18 06/03/18 06/04/18 06/05/18 06/06/18         Dose: 0.5 mg  Freq: ONCE Route: IV  Start: 06/03/18 1815   End: 06/03/18 1811   Admin Instructions: For IV PUSH: Dilute with equal volume of NS. For ordered doses up to 4 mg give IV  Push. Administer each 2mg over 1-5 minutes.    Admin. Amount: 0.5 mg = 0.25 mL Conc: 2 mg/mL  Last Admin: 18 181  Dispense Loc: RH ADS MS3S  Administrations Remainin  Volume: 1 mL   Current Line: Peripheral IV 18 Right       1811 (0.5 mg)-Given             Discontinued Medications  Medications 18         Dose: 20 mg  Freq: EVERY 12 HOURS Route: IV  Start: 18 0800   End: 18 0839   Admin Instructions: For ordered doses up to 40 mg, give IV Push undiluted over 1-2 minutes.    Admin. Amount: 20 mg = 2 mL Conc: 10 mg/mL  Dispense Loc: RH ADS MS3S  Infused Over: 1-2 Minutes  Volume: 2 mL         0839-Med Discontinued                 Rate: 75 mL/hr   Freq: CONTINUOUS Route: IV  Last Dose: Stopped (18 0658)  Start: 18 1645   End: 18 0744   Last Admin: 18 0000  Dispense Loc: [facility] Floor Stock  Volume: 1,000 mL   Current Line: Peripheral IV 18 Right        1713 ( )-New Bag        0000 ( )-Rate/Dose Verify       58-Stopped [C]       0744-Med Discontinued

## 2018-06-03 NOTE — H&P
Admitted:     06/03/2018      CHIEF COMPLAINT:  Fall, weakness.      HISTORY OF PRESENT ILLNESS:  Mr. Soler is a 89-year-old male with a history of chronic respiratory failure secondary to pulmonary fibrosis.  He is chronically on 5 liters of oxygen.  He also has a history of dementia, hypertension, hypothyroidism, history of aortic stenosis, status post TAVR procedure, presents to the hospital today after a fall at home.  The patient lives in a senior apartment with his wife.  Apparently the patient's wife was helping him getting dressed this morning, he was sitting on the side of the bed and subsequently slid off.  She was unable to get him upright.  His wife called EMS who arrived.  The patient was unable to stand.  EMS also, per chart review, had concerns about the patient's living situation.  He is brought to the hospital for evaluation.      On arrival in the ER, vital signs included a blood pressure of 185/75 with a heart rate of 70.  Afebrile.  Saturation 100% on with 6 liters of oxygen.  The patient currently is now on 7 liters of oxygen  in the ER.        His workup included labs and imaging.  Hemoglobin 12.8.  White cell count and platelet count normal.  Troponin is negative.  VBG shows a normal pH with a pCO2 of 62 in the setting of chronic respiratory failure.  Troponin is negative.  BNP is 930.  Creatinine 1.4 with a BUN of 23.  Imaging included a C-spine CT scan showing no fracture.  Head CT scan shows no acute findings.  Chest x-ray shows diffuse interstitial opacities, which appear worse than the previous chest x-ray from March 2018.      The patient has chronic shortness of breath related to his underlying lung disease.  He also has chronic back pain.      I discussed the case with Dr. Ngo of the ER.  He is requesting admission of this patient to the hospital for concerns about possible CHF exacerbation in the setting of worsening hypoxia, and increasing interstitial infiltrates on chest  x-ray.  He was given a single dose of IV Lasix in the ER and is being admitted to the hospital.      PAST MEDICAL HISTORY:   1.  Dementia.   2.  Pulmonary fibrosis on chronic oxygen 5 liters via nasal cannula.   3.  Chronic obstructive pulmonary disease.   4.  History of diastolic heart failure.  Most recent echocardiogram performed June 2017 showed normal left ventricular wall thickness with ejection fraction that is normal at 55% -60%.  Grade I or early diastolic dysfunction noted with no wall motion abnormalities.   5.  History of aortic stenosis, status post TAVR procedure with bioprosthetic valve.      6.  Hypothyroidism.   7.  BPH.   8.  History of chronic kidney disease.  Baseline creatinine appears to be in the 1.3-1.5 range on chart review.      OUTPATIENT MEDICATIONS:   1.  Acetaminophen.   2.  Amlodipine.   3.  Aspirin.   4.  Atorvastatin.   5.  Omeprazole.   6.  Cymbalta.   7.  Gabapentin.   8.  Levothyroxine.   9.  Losartan.   10.  Metoprolol XL.   11.  Pantoprazole.   12.  Tamsulosin.   13.  Vitamin B12.   14.  Lasix.   15.  Multivitamin.   16.  Tramadol.      ALLERGIES:   1.  CONTRAST DYE.   2.  LISINOPRIL.   3.  OXYCODONE.        FAMILY HISTORY:  Reviewed.  Nothing contributory to this admission.      SOCIAL HISTORY:  The patient does not smoke or drink.  Lives with his wife at a senior apartment.      CODE STATUS.  According to the patient's wife, is DNR/DNI.  The patient is unable to address this, given his dementia.      REVIEW OF SYSTEMS:  Difficult to obtain in this patient given his underlying dementia.  I am not sure about the accuracy of his answers.  As able, comprehensive greater than 10-point review of systems is negative besides that detailed above.      PHYSICAL EXAMINATION:   VITAL SIGNS:  Blood pressure is currently 180/80 with heart rate of 65.  Afebrile, saturation 99% on 7 liters of oxygen.   GENERAL:  The patient was seen in the ER prior to admission.     HEENT:  Head is  atraumatic.  Sclerae are white.  Eyelids are normal.  Conjunctivae normal.  External movements are intact.   NECK:  Supple.  No cervical or supraclavicular lymphadenopathy.   HEART:  Regular rate and rhythm.  No significant murmurs.  Trace lower extremity edema bilaterally.   LUNGS:  Notable for diffuse crackles throughout both lungs.  No intercostal retractions.  No conversational dyspnea.   ABDOMEN:  Nontender, nondistended.  Soft.  No masses.  No organomegaly.   EXTREMITIES:  Show edema as above.   SKIN:  Reveals no rash.  No jaundice.  Skin is dry to touch.   NEUROLOGIC:  Cranial nerves II-XII are intact.  Moves extremities appropriately.  Sensation intact to light touch in the lower extremities bilaterally.   PSYCHIATRIC:  The patient is awake and alert.  Not oriented, consistent with dementia.  He  does not look anxious.  He is pleasant and cooperative.      LABORATORY AND IMAGING DATA:  Reviewed above in HPI.      I personally reviewed the EKG which shows a sinus rhythm with a heart rate of 67.  Biphasic T waves in leads V2 through V4 noted.      IMPRESSION AND PLAN:  Mr. Soler is an 89-year-old male with a history of aortic stenosis, status post TAVR procedure, history of diastolic dysfunction, history of chronic hypoxic respiratory failure secondary to underlying pulmonary fibrosis on chronic home oxygen at 5 liters via nasal cannula, and chronic obstructive pulmonary disease.  He presents to the hospital today after a fall.  On presentation in the ER, he was felt to be volume overloaded based on chest x-ray results and increased need for oxygen.  He is being admitted to the Hospitalist Service.   1.  Acute on chronic diastolic heart failure exacerbation:  Despite BNP, chest x-ray does show worsening infiltrates compared to x-ray several months ago.  I suspect this represents pulmonary edema.  Received Lasix 40 mg IV in the ER.  We will follow his response to this, and plan on  starting Lasix 20 mg IV  q.12 hours starting tomorrow morning, pending his response to IV diuresis he received in the ER.   2.  Fall and underlying chronic weakness:  PT, OT to assess.  May require TCU.   3.  Pulmonary fibrosis, on chronic home oxygen 5 liters via nasal cannula.     4.  Dementia history.      PLAN:   1.  Admit to inpatient status.  Anticipate greater than 2 midnights in the hospital.   2.  Diuresis as above.   3.  PT, OT as above.   3.  Resume home medications as clarified by pharmacy.         ZAY ROQUE MD             D: 2018   T: 2018   MT: KATTY      Name:     NATTY MAN   MRN:      [id number]        Account:      [id number]   :      1928        Admitted:     2018                   Document: [id number]

## 2018-06-03 NOTE — ED NOTES
Bed: ED24  Expected date: 6/3/18  Expected time: 11:04 AM  Means of arrival: Ambulance  Comments:  [id number]

## 2018-06-03 NOTE — PROGRESS NOTES
Pt seen in ER several hours ago.  See dictated H&P.  On arrival to floor pt was tachypnic with RR 30s.  Unable to provide any accurate information. Denies pain.  Not answering questions accurately.  Stat repeat CXR shows no PNTX or any significant change from earlier by my read.  Will also obtain stat ECG.  Was given 0.5 mg IV ativan with good response and much more calm with RR now normal.  Suspect anxiety component to sx.  Will order haldol prn    Addendum:  Repeat ECG shows sinus rhythm, similar to previous without significant changes.  No ST elevation.  Pt much more calm after Ativan administered and RR normalized

## 2018-06-03 NOTE — IP AVS SNAPSHOT
` ` Patient Information     Patient Name Sex     Chaz Soler ([id number]) Male 1928       Room Bed    [facility] [address]      Patient Demographics     Address Phone E-mail Address    [address] [address] [phone] (Home)  NONE (Mobile) [email or web address]      Patient Ethnicity & Race     Ethnic Group Patient Race    American White      Emergency Contact(s)     Name Relation Home Work Mobile    Violet Soler Spouse [phone] none none    Kwame Soler Son [phone]    Maria Isabel Soler Daughter [phone]    Jannie Johnson Daughter [phone] none [phone]      Documents on File        Status Date Received Description       Documents for the Patient    Privacy Notice - [place] Received 11     Face Sheet Received () 08     Insurance Card  () 03     Insurance Card  () 07/15/04     Insurance Card  () 06     Insurance Card  () 08     Insurance Card  () 09     External Medication Information Consent Accepted () 09     Face Sheet Received () 09     Patient ID Received () 10/10/11     Consent for Services - Hospital/Clinic Received () 11     External Medication Information Consent Accepted () 11     Consent for Services - Hospital/Clinic Received () 11     Insurance Card Received () 11     Consent for Services - Hospital/Clinic Received () 11     Consent to Communicate Received () 11     Patient ID Received () 11     CMS IM for Patient Signature Received 14     External Medication Information Consent Accepted () 12     Insurance Card Received () 12     Insurance Card Received () 10/26/12 Medicare,Medica    Consent to Communicate Received 10/26/12 Auth to Discuss PHI    Consent for Services -  Hospital/Clinic Received () 12     Consent for EHR Access  13 Copied from existing Consent for services - C/HOD collected on 2012    [facility] Specified Other       External Medication Information Consent Accepted 13 Lifetime    Insurance Card Received () 13 Medicare    Insurance Card Received () 13 Medica    Consent to Communicate Received 13 Auth for E-Mail Comm    Consent for Services - Hospital/Clinic  () 13 CONSENT FOR SERVICE    Consent for Services - Hospital/Clinic Received () 14     Insurance Card Received 05/15/17 Medicare Part A & B    Insurance Card Received () 14 Medica -    Patient ID Received () 14 DL - 2016    Physical Therapy Certification Sent 14 Eval and treat OP status     HIM CARLOS Authorization  14 [facility]    Consent for Services - [facility] Received 14 [facility]    Consent for Services - [facility]  14 GENERAL CONSENT FORM: SHARED EHR - ENGLISH    Consent for Services - Hospital/Clinic Received () 04/22/15     Insurance Card Received () 05/14/15 Med[employer]    Consent for Services/Privacy Notice - Hospital/Clinic Received () 16     Insurance Card Received () 16 [employer] [id number] - issued 2015    Patient ID Received 17 MN ID - Lifetime    Advance Directives and Living Will Received 16 POLST 16 -     Advance Directives and Living Will Not Received  VALIDATION OF AD 12/15/2016    Advance Directives and Living Will Received 17 HEALTH CARE DIRECTIVE 12/15/2016    Consent for Services/Privacy Notice - Hospital/Clinic Received () 17     Consent for Services - Geriatrics Received 17     Advance Directives and Living Will Received 17 POLST 2017    Consent to Communicate Received 17     Care Everywhere Prospective Auth Received 10/18/17     Insurance Card  Received 10/18/17 [employer] 2017    Insurance Card Received 10/18/17 Medicare Part A & B    Consent for Services - Hospital and Clinic Received 06/03/18     HIE Auth Received 06/03/18     HIM CARLOS Authorization  (Deleted) 08/14/14 [facility]    Consent for Services - Informed Accepted (Deleted) 03/23/17        Documents for the Encounter    CMS IM for Patient Signature Received 06/04/18       Admission Information     Attending Provider Admitting Provider Admission Type Admission Date/Time    Macario Rodriguez MD Loecken, Scott Peter, MD Emergency 06/03/18  1116    Discharge Date Hospital Service Auth/Cert Status Service Area     General Medicine Incomplete [facility]    Unit Room/Bed Admission Status        3 MEDICAL SURGICAL 0331/0331-01 Admission (Confirmed)       Admission     Complaint    CHF (congestive heart failure) (H)      Hospital Account     Name Acct ID Class Status Primary Coverage    Chaz Soler [id number] Inpatient Open MEDICARE - MEDICARE FOR HB SUPPLEMENT            Guarantor Account (for Hospital Account #[id number])     Name Relation to Pt Service Area Active? Acct Type    Chaz Soler  FCS Yes Personal/Family    Address Phone          [address] [employer] [address] [address] [phone](H)              Coverage Information (for Hospital Account #[id number])     1. MEDICARE/MEDICARE FOR HB SUPPLEMENT     F/O Payor/Plan Precert #    MEDICARE/MEDICARE FOR HB SUPPLEMENT     Subscriber Subscriber #    Chaz Soler [id number]    Address Phone    [employer]  [address]  [place] [address] [phone]          2. MEDICA/MEDICA PRIME SOLUTION     F/O Payor/Plan Precert #    MEDICA/MEDICA PRIME SOLUTION     Subscriber Subscriber #    Chaz Soler [id number]    Address Phone    [address] [address]  [place] [address] [phone]

## 2018-06-04 NOTE — PLAN OF CARE
Problem: Patient Care Overview  Goal: Plan of Care/Patient Progress Review  PT: PT saritha completed. Here with acute on chronic CHF.   Discharge Planner PT   Patient plan for discharge: Home with wife  Current status: Pt limited activity tolerance today due to lethargy/motivation and weakness. Pt was able to stand and take lateral steps at bedside, poor tolerance, A x 2.   Barriers to return to prior living situation: decreased tolerance to OOB activity  Recommendations for discharge: TCU  Rationale for recommendations: Pt currently is A x 2 for transfers, lethargic and weak. Pt would benefit from continued PT to progress mobility skills.        Entered by: Alena Smith 06/04/2018 1:59 PM

## 2018-06-04 NOTE — PROGRESS NOTES
[facility]  Patient is currently open to home care services with [facility].  The patient is currently receiving RN/HHA services.  [facility]  and team have been notified of patient admission.  [facility] liaison will continue to follow patient during stay.  If appropriate provide orders to resume home care at time of discharge.

## 2018-06-04 NOTE — PLAN OF CARE
Problem: Patient Care Overview  Goal: Plan of Care/Patient Progress Review  PT: Orders received. PT evaluation attempted. Upon attempt pt was in and out of sleep. When attempted education regarding PT session pt simply grunts/moans and closes eyes. No family/friends to assist to direct to task. Not able to complete PT evaluation at this time, will attempt back as time/schedule allows. Nursing updated.

## 2018-06-04 NOTE — PLAN OF CARE
Problem: Patient Care Overview  Goal: Plan of Care/Patient Progress Review  Outcome: Improving  Disorientated x4, speech is clear but illogical, unable to respond to questions appropriately, VSS - afebrile, 5L O2 oxymask - removing frequently needs reminders to wear O2, pt calm but restless this shift,  Mechanical lift for transfers, oral medications given with applesauce, incontinent, low saturated fat diet, no caffeine, IV lasix,  Continue with plan of care.

## 2018-06-04 NOTE — PROGRESS NOTES
06/04/18 1300   Quick Adds   Type of Visit Initial PT Evaluation   Living Environment   Lives With spouse   Living Arrangements apartment   Home Accessibility no concerns   Number of Stairs to Enter Home 0   Number of Stairs Within Home 0   Stair Railings at Home none   Living Environment Comment Pt and wife live on 1st floor.    Self-Care   Usual Activity Tolerance fair   Current Activity Tolerance poor   Equipment Currently Used at Home walker, rolling;wheelchair, manual   Activity/Exercise/Self-Care Comment Pt is able to walk around apartment with FWW.   Functional Level Prior   Ambulation 1-->assistive equipment   Transferring 1-->assistive equipment   Toileting 1-->assistive equipment   Bathing 3-->assistive equipment and person   Dressing 0-->independent   Eating 0-->independent   Communication 0-->understands/communicates without difficulty   Swallowing 0-->swallows foods/liquids without difficulty   Cognition 1 - attention or memory deficits   Fall history within last six months yes   Number of times patient has fallen within last six months 1   Prior Functional Level Comment Pt's wife does IADLs. Pt has a home health aide 4/week for 2 hours, helps with showers and exercises   General Information   Onset of Illness/Injury or Date of Surgery - Date 06/03/18   Referring Physician Jennifer WHALEY   Patient/Family Goals Statement none stated   Pertinent History of Current Problem (include personal factors and/or comorbidities that impact the POC) Pt has history of aortic stenosis, status post TAVR procedure, history of diastolic dysfunction, history of chronic hypoxic respiratory failure secondary to underlying pulmonary fibrosis on chronic home oxygen at 5 liters via nasal cannula, and chronic obstructive pulmonary disease.  He presents to the hospital today after a fall. Pt found to have acute on chronic CHF.    Precautions/Limitations oxygen therapy device and L/min   General Info Comments Pt able to arouse with  "verbal stimulation, but still very lethargic   Cognitive Status Examination   Orientation person   Level of Consciousness lethargic/somnolent   Follows Commands and Answers Questions able to follow single-step instructions;50% of the time   Personal Safety and Judgment impaired   Memory impaired   Pain Assessment   Patient Currently in Pain No   Posture    Posture Forward head position;Protracted shoulders   Range of Motion (ROM)   ROM Comment B hamstring tightness apparent   Strength   Strength Comments limited functional strength with sit to stand, knees buckling B   Bed Mobility   Bed Mobility Comments min/mod A    Transfer Skills   Transfer Comments min A x 2   Gait   Gait Comments min A x 2 for side steps, declining further activity   Balance   Balance Comments needing heavy B UE support on FWW   General Therapy Interventions   Planned Therapy Interventions balance training;bed mobility training;gait training;strengthening;transfer training;risk factor education;progressive activity/exercise   Clinical Impression   Criteria for Skilled Therapeutic Intervention yes, treatment indicated   PT Diagnosis decreased functional mobility   Influenced by the following impairments decreased strength, balance, cognition   Functional limitations due to impairments decreased transfers, ambulation, bed mob   Clinical Presentation Stable/Uncomplicated   Clinical Presentation Rationale improving from admit   Clinical Decision Making (Complexity) Low complexity   Therapy Frequency` 5 times/week   Predicted Duration of Therapy Intervention (days/wks) 5 days   Anticipated Discharge Disposition Transitional Care Facility   Risk & Benefits of therapy have been explained Yes   Patient, Family & other staff in agreement with plan of care Yes   [facility] AM-PAC TM \"6 Clicks\"   2016, Trustees of [facility], under license to [employer].  All rights reserved.   6 Clicks Short Forms Basic Mobility Inpatient Short Form " "  [facility] AM-PAC  \"6 Clicks\" V.2 Basic Mobility Inpatient Short Form   1. Turning from your back to your side while in a flat bed without using bedrails? 3 - A Little   2. Moving from lying on your back to sitting on the side of a flat bed without using bedrails? 3 - A Little   3. Moving to and from a bed to a chair (including a wheelchair)? 2 - A Lot   4. Standing up from a chair using your arms (e.g., wheelchair, or bedside chair)? 2 - A Lot   5. To walk in hospital room? 2 - A Lot   6. Climbing 3-5 steps with a railing? 1 - Total   Basic Mobility Raw Score (Score out of 24.Lower scores equate to lower levels of function) 13   Total Evaluation Time   Total Evaluation Time (Minutes) 10     "

## 2018-06-04 NOTE — PROGRESS NOTES
[facility]  Hospitalist Progress Note  Macario Rodriguez MD 06/04/2018    Reason for Stay (Diagnosis): CHF         Assessment and Plan:      Summary of Stay: Chaz Soler is a 89-year-old male with a history of aortic stenosis, status post TAVR procedure, history of diastolic dysfunction, history of chronic hypoxic respiratory failure secondary to underlying pulmonary fibrosis on chronic home oxygen at 5 liters via nasal cannula, and chronic obstructive pulmonary disease.  He presents to the hospital today after a fall.  On presentation in the ER, he was felt to be volume overloaded based on chest x-ray results and increased need for oxygen.  He is being admitted to the Hospitalist Service.   1.  Acute on chronic diastolic heart failure exacerbation:  resolved.  received dose of IV Lasix in ER.  Given rise in creatinine and normal BNP, would not give further IV Lasix.  Resume usual PO diuretic dose  2.  Fall and underlying chronic weakness:  PT, OT to assess.  anticipate TCU need.   3.  Pulmonary fibrosis, on chronic home oxygen 5 liters via nasal cannula.     4.  Dementia history.   5.  ARF related to diuresis  6.  Encephalopathy - was confused and anxious yesterday; better today.  Required dose of IV Ativan yesterday which seemed to help his sx          # Pain Assessment:  Current Pain Score 6/4/2018   Patient currently in pain? no   Pain score (0-10) -   Pain location -   Pain descriptors -   CPOT pain score -   Chaz miller pain level was assessed and he currently denies pain.        DVT Prophylaxis: Pneumatic Compression Devices  Code Status: DNR / DNI  Discharge Dispo: TCU  Estimated Disch Date / # of Days until Disch: tomorrow possible        Interval History (Subjective):      No complaints                  Physical Exam:      Last Vital Signs:  /55 (BP Location: Right arm)  Pulse 60  Temp 96  F (35.6  C) (Axillary)  Resp 18  Wt 62.1 kg (137 lb)  SpO2 99%  BMI 20.83  kg/m2      Intake/Output Summary (Last 24 hours) at 06/04/18 1431  Last data filed at 06/04/18 1100   Gross per 24 hour   Intake              670 ml   Output              300 ml   Net              370 ml       Constitutional: Awake, alert, cooperative, no apparent distress.  Spouse present   Respiratory: Clear to auscultation bilaterally, no crackles or wheezing   Cardiovascular: Regular rate and rhythm, normal S1 and S2, and no murmur noted   Abdomen: Normal bowel sounds, soft, non-distended, non-tender   Skin: No rashes, no cyanosis, dry to touch   Neuro: Alert and awake, no weakness, numbness, +++ memory loss   Extremities: No edema, normal range of motion   Other(s):        All other systems: Negative          Medications:      All current medications were reviewed with changes reflected in problem list.         Data:      All new lab and imaging data was reviewed.   Labs:    Recent Labs  Lab 06/03/18  1200   WBC 9.5   HGB 12.8*   HCT 39.4*   MCV 91         Imaging:   Recent Results (from the past 24 hour(s))   XR Chest Port 1 View    Narrative    CHEST PORTABLE ONE VIEW   6/3/2018 6:22 PM     HISTORY: Shortness of breath.     COMPARISON: 6/3/2018.      Impression    IMPRESSION: Interval decreased vascular congestion, that still  persists. Patchy bilateral airspace opacities again noted at the left  lung base , and lateral right mid chest. Stable cardiac silhouette.    ZOIE VAZQUEZ MD

## 2018-06-04 NOTE — PLAN OF CARE
Problem: Patient Care Overview  Goal: Plan of Care/Patient Progress Review  Outcome: No Change  Pt alert/disoriented x 4; VSS, pt has dementia and is unable to answer questions, pt wife did not accompany him from the ED. On 6L of O2. Pt arrived on the unit very anxious, resp 37, purse-lip breathing, pageperez WHALEY. Dr. Raygoza assessed pt, EKG and Chest X-ray negative, administered 0.5 of Ativan. Pt resting comfortably in bed. Evening meds given with applesauce. Incontinent, bed alarm on. Will continue to monitor pt.

## 2018-06-05 NOTE — PLAN OF CARE
Problem: Patient Care Overview  Goal: Plan of Care/Patient Progress Review  Discharge Planner PT   Patient plan for discharge: TCU hoping for AAVCC  Current status: 1 assist for bed mobility to EOB and 1 assist for transfer to chair O2 sats dropped to 75% on 3L with activity recover to 90% in 3 minutes with cues for breathing technique  Barriers to return to prior living situation: mobility and tolerance to activity  Recommendations for discharge: PT- Per plan established by the Physical Therapist, the discharge recommendation is TCU    Rationale for recommendations: made great gains in mobility this date but is still unable to tolerate activity ( dropping stats) needed for safe return home with spouse support.       Entered by: Evangelina Mcfarland 06/05/2018 11:57 AM

## 2018-06-05 NOTE — PLAN OF CARE
Problem: Patient Care Overview  Goal: Plan of Care/Patient Progress Review  Outcome: Improving  Pt more alert today; awakens to verbal stimuli. Up in chair, transferred with pivot transfer and walker with assist of 1-2. Increased dyspnea with activity. 02 sats weaned to 3 lpm at rest; after transferring, pt's 02 sats dropped to 84% with nursing; pt recovered to 93% after 2 minutes on 4 lpm/NC. (Pt's wife states that they keep his home 02 at 5 lpm due to de-saturation with activity). Pt tolerating PO. Creatinine elevated; IVF ordered. Encouraged PO fluids. Plan for TCU at discharge.

## 2018-06-05 NOTE — PROGRESS NOTES
[facility]  Hospitalist Progress Note  Macario Rodriguez MD 06/05/2018    Reason for Stay (Diagnosis): pulm fibrosis, ARF         Assessment and Plan:      Summary of Stay: Chaz Soler is a 89-year-old male with a history of aortic stenosis, status post TAVR procedure, history of diastolic dysfunction, history of chronic hypoxic respiratory failure secondary to underlying pulmonary fibrosis on chronic home oxygen at 5 liters via nasal cannula, and chronic obstructive pulmonary disease.  He presents to the hospital after a fall.  On presentation in the ER, he was felt to be volume overloaded based on chest x-ray results and increased need for oxygen.  He was being admitted to the Hospitalist Service after receiving a dose of IV Lasix in the ER    Course complicated by worsening encephalopathy and ARF; feel this is likely due to dehydration related to diuretic.  Sx improved today after IVF hydration yesterday    1.  Acute on chronic diastolic heart failure exacerbation:  resolved.  received single dose of IV Lasix in ER.  Given rise in creatinine and normal BNP, would not give further IV Lasix.  2.  Fall and underlying chronic weakness:  PT, OT appreciated.  Anticipate TCU need   3.  Pulmonary fibrosis, on chronic home oxygen 5 liters via nasal cannula.     4.  Dementia history.   5.  ARF related to diuresis.  Wendy IVF again today; BMP in AM  6.  Encephalopathy - improved with hydration    # Pain Assessment:  Current Pain Score 6/5/2018   Patient currently in pain? yes   Pain score (0-10) 4   Pain location Back   Pain descriptors -   CPOT pain score -   Chaz miller pain level was assessed and he currently denies pain when I saw the patient earlier today.        DVT Prophylaxis: ambulate  Code Status: dnr/dni  Discharge Dispo: TCU  Estimated Disch Date / # of Days until Disch: tomorrow        Interval History (Subjective):      No new complaints.  Much more awake/alert today                   Physical Exam:      Last Vital Signs:  /68 (BP Location: Right arm)  Pulse 60  Temp 97.7  F (36.5  C) (Oral)  Resp 16  Wt 62.1 kg (137 lb)  SpO2 98%  BMI 20.83 kg/m2      Intake/Output Summary (Last 24 hours) at 06/05/18 1257  Last data filed at 06/05/18 0800   Gross per 24 hour   Intake              400 ml   Output                0 ml   Net              400 ml       Constitutional: Awake, alert, cooperative, no apparent distress.  Spouse present at bedside.  Working with PT; up marching in place in room   Respiratory: Diffuse crackles c/w pulm fibrosis hx   Cardiovascular: Regular rate and rhythm, normal S1 and S2, and no murmur noted   Abdomen: Normal bowel sounds, soft, non-distended, non-tender   Skin: No rashes, no cyanosis, dry to touch   Neuro: Alert and awake, no weakness, numbness, +++memory loss   Extremities: No edema, normal range of motion   Other(s):        All other systems: Negative          Medications:      All current medications were reviewed with changes reflected in problem list.         Data:      All new lab and imaging data was reviewed.   Labs:    Recent Labs  Lab 06/03/18  1200   WBC 9.5   HGB 12.8*   HCT 39.4*   MCV 91         Imaging:   No results found for this or any previous visit (from the past 24 hour(s)).

## 2018-06-05 NOTE — PLAN OF CARE
Problem: Patient Care Overview  Goal: Plan of Care/Patient Progress Review  Outcome: No Change  Pt lethargic throughout the day; arouses to repeated verbal stimuli. Pupils reactive 2 cm, equal and reactive to light. Oriented to self only. Poor Po intake; 1 liter IV fluids ordered per MD. Creatinine 1.72 today. Sat at edge of bed with PT; unsafe for further transfer due to lethargy. 02 at 4 lpm/NC. Pt takes meds with apple sauce.Incontinent of urine; + BM today. Anticipate TCU at [place].

## 2018-06-05 NOTE — CONSULTS
SWS:  D: Discharge planning  A: SW consult to assist with rehab placement at dc. CTS- RN met with pt wife and she is agreeable to TCU and requests SW send TCU referral to [facility]. Referral sent.    P: Anticipate pt may be ready for dc tomorrow. SW will continue to follow.

## 2018-06-05 NOTE — PLAN OF CARE
Problem: Patient Care Overview  Goal: Plan of Care/Patient Progress Review  Outcome: No Change  Heart Failure Care Pathway  GOALS TO BE MET BEFORE DISCHARGE:    1. Decrease congestion and/or edema with diuretic therapy to achieve near      optimal volume status.            Dyspnea improved:  Yes            Edema improved:     Yes        Net I/O and Weights since admission:          05/06 0700 - 06/05 0659  In: 830 [P.O.:745; I.V.:85]  Out: 500 [Urine:500]  Net: 330            Vitals:    06/03/18 1747 06/04/18 0500   Weight: 61.3 kg (135 lb 4 oz) 62.1 kg (137 lb)       2.  O2 sats > 92% on RA or at prior home O2 therapy level.          Current oxygenation status:       SpO2: 95 %         O2 Device: Nasal cannula,  Oxygen Delivery: 3 LPM         Able to wean O2 this shift to keep sats > 92%:  No, please explain: per report pt. Has baseline O2 at 4-5L, O2 at 3L with sat's at 95%.         Does patient use Home O2? Yes-  4-5L at baseline    3.  Tolerates ambulation and mobility near baseline: Yes        How many times did the patient ambulate with nursing staff this shift? 1    Please review the Heart Failure Care Pathway for additional HF goal outcomes.    Elizabeth Avitia RN  6/5/2018     Pt. confused, alert to self, illogical speech at times, lethargic most of the shift, but this AM more alert and was aware he was in the hospital. Incontinent of urine and stool. Up with lift, IVF infusing without difficulty, Lung sounds diminished, fine crackles, O2 at 3L with sat s at 95% (chronic O2 at 4-5L at baseline). Plan: Safety (bed alarm on), monitor resp. status, TQ2, Pt. denies any needs at this time. Will continue with POC.

## 2018-06-05 NOTE — CONSULTS
Care Transition Initial Assessment - RN    Reason For Consult: care coordination/care conference, discharge planning   Met with: Family spoke to Wife Violet MELGOZA   Active Problems:    CHF (congestive heart failure) (H)       Cognitive Status: sleeping, int confusion.  Primary Care Clinic Name: [facility]  Primary Care MD Name: Dr Fox  Contact information and PCP information verified: yes  Lives With: significant other  Living Arrangements: apartment ([employer])  Quality Of Family Relationships: supportive, helpful, involved  Description of Support System: Involved, Supportive   Who is your support system?: Wife   Support Assessment: Adequate family and caregiver support   Insurance concerns: No Insurance issues identified  ASSESSMENT  Patient currently receives the following services:  [facility] RN/HHA (private pay), will need resumption of HC if appropriate, O2 4-5 L through [employer]  Met with wife at bedside to discuss plan of care. Pt was admitted with CHF, fall. He is currently a total care, confused pt. Spoke to wife about current plan of care and dc recommendations. She states they live together in [place] in a Senior Coop. He has a private pay RN/HHA through [facility] for assistance. He uses O2 at 4-5 L with supplies through [employer]. He also uses home nebulizers. At baseline he gets himself up and uses a walker for short distances and a wheelchair for longer distances. He has some baseline confusion. Wife does all of the cooking, cleaning, shopping. Per PT, recommendation is for TCU on dc. Discussed this recommendation and wife is agreeable. She states pt has been in and out of [facility] and she would like referral sent there for dc. SW updated and referral sent.       Identified issues/concerns regarding health management: Pt is needing assist of 2 with a walker for transfers. Pt recommending TCU on dc. [facility]    PLAN  Patient/family is agreeable to the plan?  Yes  Patient anticipates  discharging to TCU. Referral sent to [facility].        Patient anticipates needs for home equipment: No  Plan/Disposition: TCU   Appointments: No appts made as pt is discharging to TCU at this time      Care  (CTS) will continue to follow as needed. SW updated. Handoff will be sent to CTS for PCP on dc.    Evangelina Valentin RN CTS  Care Transitions  [phone]

## 2018-06-06 NOTE — PROGRESS NOTES
Hand-off for Care Transitions to Next Level of Care Provider  Name: Chaz Soler  : 1928  MRN #: [id number]  Reason for Hospitalization:  Pulmonary fibrosis (H) [J84.10]  Generalized muscle weakness [M62.81]  Acute diastolic congestive heart failure (H) [I50.31]  Admit Date/Time: 6/3/2018 11:16 AM  Discharge Date: 2018    Reason for Communication Hand-off Referral: Admission diagnoses: CHF    Discharge Plan:  Discharged to: TCU: [facility]                   Patient agreeable to post-hospital support suggestions:  Yes    Patient is on new medications:   No    MTM follow up recommended: No    Tel-Assurance program:  Declined    Patient will receive  TCU  at:  Discharge to [facility]    Follow-up specialty is recommended: Yes. Follow up with Occupational and Physical Therapy while at TCU.     Follow-up plan:  Future Appointments  Date Time Provider Department Center   2018 9:00 AM [email or web address] [facility]   2018 12:30 PM [id number] [facility]   2018 3:45 PM [facility]     Any outstanding tests or procedures:   No. Recommend give two-step Mantoux (PPD) Per Facility Policy      Procedures     Future Labs/Procedures    Oxygen - Nasal cannula     Comments:    3-5 Lpm by nasal cannula to keep O2 sats 90-94%.          Referrals     Future Labs/Procedures    Occupational Therapy Adult Consult     Comments:    Evaluate and treat as clinically indicated.    Reason:  weakness    Physical Therapy Adult Consult     Comments:    Evaluate and treat as clinically indicated.    Reason:  weakness          Key Recommendations:  Pt was admitted with CHF, fall. His BNP was 933, he was given on dose of IV lasix and resumed on his PO diuretic. He is currently a total care and confused pt. Spoke to wife about current plan of care and dc recommendations. She states they live together in [place] in a Senior Co[employer]. He has a private pay RN/HHA through [facility] for assistance. He uses O2 at  4-5 L [facility]. He also uses home nebulizers. At baseline he gets himself up and uses a walker for short distances and a wheelchair for longer distances. He has some baseline confusion. Wife does all of the cooking, cleaning, shopping. Per PT, recommendation is for TCU on dc. Wife is agreeable. She states pt has been [facility] and [facility] and she would like referral sent there for dc. Discharged to [facility].       Evangelina Valentin    Communicated handoff via EPIC Comm Mgt to Dr. Alirio Fox's CC at [facility].      Sent by Merry Aceves, RN, BSN, CTS  [facility]  [phone]

## 2018-06-06 NOTE — PLAN OF CARE
Problem: Patient Care Overview  Goal: Plan of Care/Patient Progress Review  Outcome: No Change  Patient slept most of morning, night shift reported patient did not settle down to sleep until after 3 am. Wife said patient does sleep a lot so that is not unusual for patient. Not much of appetite today. Incontinent of urine.

## 2018-06-06 NOTE — PROGRESS NOTES
SWS:  D: discharge planning  A/P: SW notified that pt is able to dc to TCU today. SW contacted [facility] and they have accepted. SW spoke with pt and wife and wife plans to transport and pt will dc around 4:00pm. PETER updated RN, Facility and MD.  PETER faxed dc orders to [facility].

## 2018-06-06 NOTE — PROGRESS NOTES
Your information has been submitted on June 06th, 2018 at 01:40:13 PM CDT. The confirmation number is [id number]

## 2018-06-06 NOTE — PROGRESS NOTES
[facility]  Hospitalist Progress Note  Macario Rodriguez MD 06/06/2018    Reason for Stay (Diagnosis): ARF, pulm fibrosis         Assessment and Plan:      Summary of Stay: Chaz Soler is a 89-year-old male with a history of aortic stenosis, status post TAVR procedure, history of diastolic dysfunction, history of chronic hypoxic respiratory failure secondary to underlying pulmonary fibrosis on chronic home oxygen at 5 liters via nasal cannula, and chronic obstructive pulmonary disease.  He presents to the hospital after a fall.  On presentation in the ER, he was felt to be volume overloaded based on chest x-ray results and increased need for oxygen.  He was being admitted to the Hospitalist Service after receiving a dose of IV Lasix in the ER     Course complicated by worsening encephalopathy and ARF; feel this is likely due to dehydration related to diuretic.  Sx improved today after IVF hydration    Pt and wife remain interested in TCU on discharge.  Appears medically stable for discharge     1.  Acute on chronic diastolic heart failure exacerbation:  resolved.  received single dose of IV Lasix in ER.  Given rise in creatinine and normal BNP, would not give further IV Lasix.  PO lasix resumed  2.  Fall and underlying chronic weakness:  PT, OT appreciated.    3.  Pulmonary fibrosis, on chronic home oxygen 5 liters via nasal cannula.     4.  Dementia history.   5.  ARF related to diuresis.  resolved  6.  Encephalopathy - improved/resolved with hydration    # Pain Assessment:  Current Pain Score 6/6/2018   Patient currently in pain? denies   Pain score (0-10) 0   Pain location -   Pain descriptors -   CPOT pain score -   Chaz miller pain level was assessed and he currently denies pain.        DVT Prophylaxis: Low Risk/Ambulatory with no VTE prophylaxis indicated  Code Status: DNR / DNI  Discharge Dispo: TCU  Estimated Disch Date / # of Days until Disch: when bed found        Interval History  (Subjective):      No complaints                  Physical Exam:      Last Vital Signs:  /70 (BP Location: Right arm)  Pulse 60  Temp 99.2  F (37.3  C) (Oral)  Resp 18  Wt 66.7 kg (147 lb)  SpO2 97%  BMI 22.35 kg/m2      Intake/Output Summary (Last 24 hours) at 06/06/18 1216  Last data filed at 06/06/18 1057   Gross per 24 hour   Intake            505.2 ml   Output              500 ml   Net              5.2 ml       Constitutional: Awake, alert, cooperative, no apparent distress.  Eating breakfast.  Wife present   Respiratory: Clear to auscultation bilaterally, no crackles or wheezing   Cardiovascular: Regular rate and rhythm, normal S1 and S2, and no murmur noted   Abdomen: Normal bowel sounds, soft, non-distended, non-tender   Skin: No rashes, no cyanosis, dry to touch   Neuro: Alert and confused c/w dementia hx, no weakness, numbness, + memory loss   Extremities: No edema, normal range of motion   Other(s):        All other systems: Negative          Medications:      All current medications were reviewed with changes reflected in problem list.         Data:      All new lab and imaging data was reviewed.   Labs:    Recent Labs  Lab 06/03/18  1200   WBC 9.5   HGB 12.8*   HCT 39.4*   MCV 91         Imaging:   No results found for this or any previous visit (from the past 24 hour(s)).

## 2018-06-06 NOTE — DISCHARGE SUMMARY
Admit Date:     06/03/2018   Discharge Date:           PRINCIPAL FINAL DIAGNOSES:   1.  Acute on chronic diastolic heart failure exacerbation, mild.  Resolved with just 1 dose of IV diuretic in the Emergency Department.   2.  Acute renal failure on chronic kidney disease with baseline creatinine near 1.5.  Acute renal failure is related to diuresis.   3.  Pulmonary fibrosis with chronic hypoxic respiratory failure.  On chronic home oxygen 5 liters via nasal cannula.   4.  History of dementia.   5.  Metabolic encephalopathy this admission related to dehydration, resolved.      PRINCIPAL PROCEDURES THIS ADMISSION:   1.  IV diuretic.   2.  IV fluid.   3.  Chest x-ray.   4.  C-spine CT scan.   5.  Head CT scan.      REASON FOR ADMISSION:  Please see dictated history and physical.  In brief, Mr. Chaz Soler is a 89-year-old male who presented to the hospital after a fall.  On presentation in the ER, he was felt to have congestive heart failure exacerbation based on chest x-ray findings and some hypoxia.  He was given a dose of 40 mg IV Lasix in the ER and was admitted to the Hospitalist Service.      HOSPITAL COURSE:   1.  Fall and generalized weakness:  The patient had a fall at home.  His wife was unable to get him upright.  EMS services were called, who brought the patient to the hospital.  He worked with PT, OT this admission.  Plan is for discharge to TCU.   2.  Acute on chronic diastolic heart failure exacerbation:  The patient was felt to be in heart failure based on the ER assessment by chest x-ray.  However, chest x-ray was somewhat difficult to interpret given his pulmonary fibrosis history.  He was given a dose of Lasix IV.  Subsequently, the patient developed acute renal failure.  He may have had a very slight heart failure exacerbation on presentation, and I suspect he walks a very fine line with his respiratory status in the setting of a chronic hypoxia and his pulmonary fibrosis history.  I did give him  some gentle IV fluids after he had developed acute renal failure related to diuresis.  He has recovered nicely from this and his creatinine is now back to his baseline at 1.5.  He is now on his normal baseline dose of Lasix 20 mg daily.  No significant medication changes were made this admission.  Plan is for discharge to a TCU today.      DISCHARGE MEDICATIONS:   1.  Acetaminophen 500 mg 3 times a day.   2.  Amlodipine 10 mg daily.   3.  Aspirin 81 mg daily.   4.  Atorvastatin 20 mg daily.   5.  Vitamin B12 1000 mcg daily.   6.  Donepezil 10 mg at bedtime.   7.  Cymbalta 30 mg daily.   8.  Lasix 20 mg daily.   9.  Gabapentin 100 mg 3 times a day.   10.  Levothyroxine 88 mcg daily.   11.  Losartan 100 mg daily.   12.  Toprol XL 25 mg daily.   13.  Multivitamin daily.   14.  Pantoprazole 40 mg every morning.   15.  Tamsulosin 0.4 mg daily.      MEDICATIONS STOPPED:  Include tramadol.      DISCHARGE INSTRUCTIONS:   1.  See primary MD in 1-2 weeks after discharge from rehabilitation center.   2.  Oxygen on discharge at 3-5 liters via nasal cannula to keep saturations 90% to 94%.      The patient is being discharged to a transitional care unit today.      I saw and examined the patient on the day of discharge.      I spent greater than 30 minutes discharging this patient from the hospital on the day of discharge.      CODE STATUS:  DNR/DNI.         ZAY ROQUE MD             D: 2018   T: 2018   MT: KARIN      Name:     NATTY MAN   MRN:      [id number]        Account:        [id number]   :      1928           Admit Date:     2018                                  Discharge Date:       Document: [id number]

## 2018-06-06 NOTE — PROGRESS NOTES
Patient discharged this afternoon to [facility] TCU. His wife transported him there. All belongings were packed up with the patient and wife verifying they had everything. Information packet printed for TCU. AVS printed and all questions were addressed with the patient and his wife. The patient and family did have questions about stopping the Tramadol, Dr. Rodriguez was paged and communicated to the family that he stopped it due to increased confusion in the hospital and no pain throughout hospitalization. Family and patient agreed to keep it discontinued. They will revaluate with the primary physician if the patient shows increased pain.

## 2018-06-06 NOTE — PLAN OF CARE
Problem: Cardiac: Heart Failure (Adult)  Goal: Signs and Symptoms of Listed Potential Problems Will be Absent, Minimized or Managed (Cardiac: Heart Failure)  Signs and symptoms of listed potential problems will be absent, minimized or managed by discharge/transition of care (reference Cardiac: Heart Failure (Adult) CPG).   Outcome: No Change  Heart Failure Care Pathway  GOALS TO BE MET BEFORE DISCHARGE:    1. Decrease congestion and/or edema with diuretic therapy to achieve near      optimal volume status.            Dyspnea improved:  Yes            Edema improved:     Yes        Net I/O and Weights since admission:          05/07 0700 - 06/06 0659  In: 1525.2 [P.O.:1105; I.V.:420.2]  Out: 1150 [Urine:1150]  Net: 375.2            Vitals:    06/03/18 1747 06/04/18 0500   Weight: 61.3 kg (135 lb 4 oz) 62.1 kg (137 lb)       2.  O2 sats > 92% on RA or at prior home O2 therapy level.          Current oxygenation status:       SpO2: 97 %         O2 Device: Nasal cannula,  Oxygen Delivery: 5 LPM         Able to wean O2 this shift to keep sats > 92%:  No, please explain: at baseline O2       Does patient use Home O2? Yes-  5L NC    3.  Tolerates ambulation and mobility near baseline: Yes        How many times did the patient ambulate with nursing staff this shift? 0    Please review the Heart Failure Care Pathway for additional HF goal outcomes.    Blossom Reed RN  6/6/2018     All VSS, LS with fine crackles in LLL, incontinent x1.  No complaints of pain or nausea, no PRN's given.  Plan is to d/c today to [facility]CU.  PT and SW following.  Will continue with POC.

## 2018-06-06 NOTE — PLAN OF CARE
Problem: Patient Care Overview  Goal: Plan of Care/Patient Progress Review  Outcome: No Change  5L O2 NC, desats to 75-80's with ambulation, NS at 50ml/hr. Dc'd at 2256. Disoriented to time/place, BM this shift, incontinent of BM and urine, LS crackles in bases, Held Senna due to soft stools, Reg diet, PT following, Ax1 walker and gait belt. Denies pain, Plan to d/c to TCU tomorrow.

## 2018-06-07 NOTE — PROGRESS NOTES
[place] [facility]  PRIMARY CARE PROVIDER AND CLINIC:  Alirio Fox [address]  / [facility] [address]  Chief Complaint   Patient presents with     Hospital F/U     [place] Medical Record Number:  [id number]    HPI:    Chaz Soler is a 89 year old  (5/21/1928),admitted to the [facility] from Hospital  [facility].  Hospital stay 6/3/18 through 6/6/18.  Admitted to this facility for  rehab, medical management and nursing care.  HPI information obtained from: facility chart records, facility staff, patient report and [facility] chart review.      HOSPITAL COURSE:   1.  Fall and generalized weakness:  The patient had a fall at home.  His wife was unable to get him upright.  EMS services were called, who brought the patient to the hospital.  He worked with PT, OT this admission.  Plan is for discharge to TCU.   2.  Acute on chronic diastolic heart failure exacerbation:  The patient was felt to be in heart failure based on the ER assessment by chest x-ray.  However, chest x-ray was somewhat difficult to interpret given his pulmonary fibrosis history.  He was given a dose of Lasix IV.  Subsequently, the patient developed acute renal failure.  He may have had a very slight heart failure exacerbation on presentation, and I suspect he walks a very fine line with his respiratory status in the setting of a chronic hypoxia and his pulmonary fibrosis history.  I did give him some gentle IV fluids after he had developed acute renal failure related to diuresis.  He has recovered nicely from this and his creatinine is now back to his baseline at 1.5.  He is now on his normal baseline dose of Lasix 20 mg daily.  No significant medication changes were made this admission.  Plan is for discharge to a TCU today.     PRINCIPAL FINAL DIAGNOSES:   1.  Acute on chronic diastolic heart failure exacerbation, mild.  Resolved with just 1 dose of IV diuretic in the Emergency  Department.   2.  Acute renal failure on chronic kidney disease with baseline creatinine near 1.5.  Acute renal failure is related to diuresis.   3.  Pulmonary fibrosis with chronic hypoxic respiratory failure.  On chronic home oxygen 5 liters via nasal cannula.   4.  History of dementia.   5.  Metabolic encephalopathy this admission related to dehydration, resolved.    Current issues are:      Acute on chronic diastolic congestive heart failure (H)  Xray from 6/3 showed volume overload and patient with increased oxygen need upon admission. Improved after one dose of IV lasix in ER. On metoprolol 25 mg XL, lasix 20 mg daily, losartan 100 mg daily, amlodipine 10 mg daily    Generalized muscle weakness  Physical deconditioning  Presented to ER after patient fell at home and his wife was unable to get him up. Has also had another hospital admission earlier this year for weakness and fall at home. Will work with therapy here. Uses a walker at baseline. Lives with his wife in [facility]. He tells me he manages his own medications at home.     Pulmonary fibrosis (H)  Chronic respiratory failure with hypoxia (H)  Oxygen dependent  Per PCP note patient is usually on 3-4 L at home 24 hours a day via NC. On exam today was on 3L.    CKD (chronic kidney disease) stage 3, GFR 30-59 ml/min  Developed ARF due to diuresis in the hospital. Creatinine back at baseline prior to discharge  Baseline creatinine around 1.5    Alzheimer's dementia without behavioral disturbance, unspecified timing of dementia onset  Not a reliable historian. Unable to tell me much about his medical history or what happened in the hospital. Is oriented x3. On aricept.    Hypertension   On metoprolol 25 mg XL, lasix 20 mg daily, losartan 100 mg daily, amlodipine 10 mg daily    BP: 109-125/57-65 mmHg  P: 73-86 bpm        CODE STATUS/ADVANCE DIRECTIVES DISCUSSION:   DNR / DNI  Patient's living condition: lives with spouse    ALLERGIES:Contrast  dye; Lisinopril; and Oxycodone  PAST MEDICAL HISTORY:  has a past medical history of AAA (abdominal aortic aneurysm) (H) (10/5/2011); Anemia (11/30/2011); Aortic stenosis (10/26/2012); CAD (coronary artery disease); Carotid artery disease (H); CHF (congestive heart failure) (H) (12/31/2014); CKD (chronic kidney disease) stage 3, GFR 30-59 ml/min (11/30/2011); COPD (chronic obstructive pulmonary disease) (H); Dementia (8/4/2015); Edema, unspecified edema (1/17/2016); Essential hypertension; Gout (1/06); Hypercalcemia (1/2/2015); Hyperlipidemia LDL goal < 70; Hyperparathyroidism, unspecified (4/22/2015); Hyperplasia of prostate; LEFT LUNG GRANULOMA; Lumbago; Other chronic pain (10/11/2016); Proteinuria (2/8/2012); Pulmonary fibrosis (H); PVD (peripheral vascular disease) (H); Thrombocytopenia (H) (11/30/2011); Unspecified hypothyroidism; and Vitamin D deficiency.  PAST SURGICAL HISTORY:  has a past surgical history that includes Vasectomy; right cataract extraction/IOL (12/03); Colonoscopy (9/02 per patient); Laparoscopic cholecystectomy (Nov 2011); Repair aneurysm abdominal aorta (Nov 2011); Endovascular repair aneurysm abdominal aorta (11/18/2011); Laparoscopic cholecystectomy (11/18/2011); Discectomy lumbar posterior microscopic one level (8/11/2014); and TRANSCATHETER AORTIC VALVE IMPLANT ANEST (N/A, 11/12/2014).  FAMILY HISTORY: family history includes Hypertension in his father and mother.  SOCIAL HISTORY:  reports that he has never smoked. He has never used smokeless tobacco. He reports that he drinks alcohol. He reports that he does not use illicit drugs.    Post Discharge Medication Reconciliation Status: discharge medications reconciled and changed, per note/orders (see AVS).  Current Outpatient Prescriptions   Medication Sig Dispense Refill     acetaminophen (TYLENOL) 500 MG tablet Take 500 mg by mouth 3 times daily       amLODIPine (NORVASC) 10 MG tablet TAKE ONE TABLET BY MOUTH ONCE DAILY 90 tablet 3      "aspirin 81 MG tablet Take 1 tablet (81 mg) by mouth daily 30 tablet 11     atorvastatin (LIPITOR) 20 MG tablet Take 1 tablet (20 mg) by mouth daily 90 tablet 3     cyanocobalamin (VITAMIN  B-12) 1000 MCG tablet Take 1,000 mcg by mouth daily       donepezil (ARICEPT) 10 MG tablet TAKE ONE TABLET BY MOUTH AT BEDTIME 90 tablet 3     DULoxetine (CYMBALTA) 30 MG EC capsule Take 30 mg by mouth daily       furosemide (LASIX) 20 MG tablet Take 20 mg by mouth daily       gabapentin (NEURONTIN) 100 MG capsule TAKE ONE CAPSULE BY MOUTH THREE TIMES DAILY FOR  PAIN 270 capsule 3     levothyroxine (SYNTHROID/LEVOTHROID) 88 MCG tablet TAKE ONE TABLET BY MOUTH ONCE DAILY 90 tablet 3     losartan (COZAAR) 100 MG tablet TAKE ONE TABLET BY MOUTH  DAILY 90 tablet 3     metoprolol succinate (TOPROL-XL) 25 MG 24 hr tablet TAKE ONE TABLET BY MOUTH DAILY 90 tablet 2     Multiple Vitamins-Minerals (MULTIVITAL) TABS Take 1 tablet by mouth daily       pantoprazole (PROTONIX) 40 MG EC tablet TAKE ONE TABLET BY MOUTH EVERY MORNING 90 tablet 3     tamsulosin (FLOMAX) 0.4 MG capsule TAKE ONE CAPSULE BY MOUTH ONCE DAILY 90 capsule 3       ROS:  10 point ROS of systems including Constitutional, Eyes, Respiratory, Cardiovascular, Gastroenterology, Genitourinary, Integumentary, Muscularskeletal, Psychiatric were all negative except for pertinent positives noted in my HPI.    Exam:  /57  Pulse 86  Temp 97.1  F (36.2  C)  Resp 22  Ht 5' 8\" (1.727 m)  Wt 147 lb (66.7 kg)  SpO2 91%  BMI 22.35 kg/m2   GENERAL APPEARANCE:  Alert, oriented x 3  EYES:  EOM, lids, pupils and irises normal, sclera clear and conjunctiva normal, no discharge or mattering on lids or lashes noted  ENT:  Mouth normal, moist mucous membranes, nose without drainage or crusting, external ears without lesions, hearing acuity [place]  RESP:  respiratory effort and palpation of chest normal, no chest wall tenderness, no respiratory distress, Lung sounds clear, patient is on 3L " O2 via NC   CV:  Palpation and auscultation of heart done, rate and rhythm regular.  RRR no edema  ABDOMEN:  normal bowel sounds, soft, nontender.  M/S:   Gait and station abnormal: uses walker for ambulation  SKIN:  Inspection and palpation of skin and subcutaneous tissue: skin warm, dry without rashes, scattered ecchymosis- with significant one to left hand  NEURO: cranial nerves 2-12 grossly intact, no facial asymmetry, no speech deficits and able to follow directions, moves all extremities symmetrically  PSYCH:  insight and judgement impaired, memory impaired, affect and mood ok      Lab/Diagnostic data:     CBC RESULTS:   Recent Labs   Lab Test  06/03/18   1200  01/09/18   0615   WBC  9.5  8.4   RBC  4.34*  3.91*   HGB  12.8*  12.0*   HCT  39.4*  35.2*   MCV  91  90   MCH  29.5  30.7   MCHC  32.5  34.1   RDW  12.9  13.7   PLT  227  138*       Last Basic Metabolic Panel:  Recent Labs   Lab Test  06/06/18   0631  06/05/18   0703   NA  141  141   POTASSIUM  3.9  4.0   CHLORIDE  106  102   LEXIS  9.0  9.4   CO2  31  33*   BUN  31*  33*   CR  1.49*  1.81*   GLC  87  99       Liver Function Studies -   Recent Labs   Lab Test  01/12/18   0806  01/07/18   1853  11/28/17   1203   PROTTOTAL   --   7.4  7.3   ALBUMIN   --   3.9  3.7   BILITOTAL   --   0.8  0.7   ALKPHOS   --   134  151*   AST   --   23  23   ALT  20  25  33         ASSESSMENT/PLAN:  (I50.33) Acute on chronic diastolic congestive heart failure (H)  (primary encounter diagnosis)  Comment: Acute on chronic- stable since admission  Plan: Continue PTA medications as above. BMP 6/11. Daily weights. 2 gram Na diet. Notify provider with weight gain >2 lbs in a day, >5 lbs in on week.    (M62.81) Generalized muscle weakness  (R53.81) Physical deconditioning  Comment: Acute- related to recent hospitalization, CHF  Plan: Falls precautions. Encourage participation in physical therapy/occupational therapy for strengthening and deconditioning. Discharge planning per  their recommendation. Social work to assist with d/c planning.    (J84.10) Pulmonary fibrosis (H)  (J96.11) Chronic respiratory failure with hypoxia (H)  (Z99.81) Oxygen dependent  Comment: Chronic- on 3L o2 on exam today.   Plan: Continue to wean O2 as tolerated to keep sats >90%. On 3-4 L baseline at home. Monitor respiratory status.    (N18.3) CKD (chronic kidney disease) stage 3, GFR 30-59 ml/min  Comment: Chronic- stable at discharge  Plan: BMP 6/11. Avoid nephrotoxic medications.    (G30.9,  F02.80) Alzheimer's dementia without behavioral disturbance, unspecified timing of dementia onset  Comment: Chronic- stable- poor medical historian, however oriented x3   Plan: Continue aricept. Reorient. Falls precautions    (I10) Benign essential hypertension  Comment: Chronic- well controlled  Plan: Continue medications as above. VS per TCU policy.      Total time spent with patient visit at the skilled nursing facility was 48 minutes including patient visit, review of past records and medication reconciliation, review of admission orders. Greater than 50% of total time spent with counseling and coordinating care due to CHF, generalized weakness, pulmonary fibrosis and other problems as above    Electronically signed by:  CELIA Yoo CNP

## 2018-06-07 NOTE — PROGRESS NOTES
Clinic Care Coordination Contact  Care Coordination Transition Communication    Referral Source: IP Handoff    Clinical Data: Patient was hospitalized at [facility]      Reason for Hospitalization:  Pulmonary fibrosis (H) [J84.10]  Generalized muscle weakness [M62.81]  Acute diastolic congestive heart failure (H) [I50.31]  Admit Date/Time: 6/3/2018 11:16 AM  Discharge Date: 6/5/2018  Transition to Facility:Patient will receive  TCU  at:  Discharge to [facility]     Follow-up specialty is recommended: Yes. Follow up with Occupational and Physical Therapy while at TCU.                           Plan: RN/SW Care Coordinator will await notification from facility staff informing RN/SW Care Coordinator of patient's discharge plans/needs. RN/SW Care Coordinator will review chart and outreach to facility staff every 4 weeks and as needed.

## 2018-06-08 NOTE — LETTER
6/8/2018        RE: Chaz Soler  [address]  [facility] [address]        [place] [facility]  PRIMARY CARE PROVIDER AND CLINIC:  Alirio Fox [address]  / [facility] [address]  Chief Complaint   Patient presents with     Hospital F/U     [place] Medical Record Number:  [id number]    HPI:    Chaz Soler is a 89 year old  (5/21/1928),admitted to the [facility] from [facility]  [facility].  Hospital stay 6/3/18 through 6/6/18.  Admitted to this facility for  rehab, medical management and nursing care.  HPI information obtained from: facility chart records, facility staff, patient report and [facility] chart review.      HOSPITAL COURSE:   1.  Fall and generalized weakness:  The patient had a fall at home.  His wife was unable to get him upright.  EMS services were called, who brought the patient to the hospital.  He worked with PT, OT this admission.  Plan is for discharge to TCU.   2.  Acute on chronic diastolic heart failure exacerbation:  The patient was felt to be in heart failure based on the ER assessment by chest x-ray.  However, chest x-ray was somewhat difficult to interpret given his pulmonary fibrosis history.  He was given a dose of Lasix IV.  Subsequently, the patient developed acute renal failure.  He may have had a very slight heart failure exacerbation on presentation, and I suspect he walks a very fine line with his respiratory status in the setting of a chronic hypoxia and his pulmonary fibrosis history.  I did give him some gentle IV fluids after he had developed acute renal failure related to diuresis.  He has recovered nicely from this and his creatinine is now back to his baseline at 1.5.  He is now on his normal baseline dose of Lasix 20 mg daily.  No significant medication changes were made this admission.  Plan is for discharge to a TCU today.     PRINCIPAL FINAL DIAGNOSES:   1.  Acute on chronic  diastolic heart failure exacerbation, mild.  Resolved with just 1 dose of IV diuretic in the Emergency Department.   2.  Acute renal failure on chronic kidney disease with baseline creatinine near 1.5.  Acute renal failure is related to diuresis.   3.  Pulmonary fibrosis with chronic hypoxic respiratory failure.  On chronic home oxygen 5 liters via nasal cannula.   4.  History of dementia.   5.  Metabolic encephalopathy this admission related to dehydration, resolved.    Current issues are:      Acute on chronic diastolic congestive heart failure (H)  Xray from 6/3 showed volume overload and patient with increased oxygen need upon admission. Improved after one dose of IV lasix in ER. On metoprolol 25 mg XL, lasix 20 mg daily, losartan 100 mg daily, amlodipine 10 mg daily    Generalized muscle weakness  Physical deconditioning  Presented to ER after patient fell at home and his wife was unable to get him up. Has also had another hospital admission earlier this year for weakness and fall at home. Will work with therapy here. Uses a walker at baseline. Lives with his wife in [facility]. He tells me he manages his own medications at home.     Pulmonary fibrosis (H)  Chronic respiratory failure with hypoxia (H)  Oxygen dependent  Per PCP note patient is usually on 3-4 L at home 24 hours a day via NC. On exam today was on 3L.    CKD (chronic kidney disease) stage 3, GFR 30-59 ml/min  Developed ARF due to diuresis in the hospital. Creatinine back at baseline prior to discharge  Baseline creatinine around 1.5    Alzheimer's dementia without behavioral disturbance, unspecified timing of dementia onset  Not a reliable historian. Unable to tell me much about his medical history or what happened in the hospital. Is oriented x3. On aricept.    Hypertension   On metoprolol 25 mg XL, lasix 20 mg daily, losartan 100 mg daily, amlodipine 10 mg daily    BP: 109-125/57-65 mmHg  P: 73-86 bpm        CODE STATUS/ADVANCE  DIRECTIVES DISCUSSION:   DNR / DNI  Patient's living condition: lives with spouse    ALLERGIES:Contrast dye; Lisinopril; and Oxycodone  PAST MEDICAL HISTORY:  has a past medical history of AAA (abdominal aortic aneurysm) (H) (10/5/2011); Anemia (11/30/2011); Aortic stenosis (10/26/2012); CAD (coronary artery disease); Carotid artery disease (H); CHF (congestive heart failure) (H) (12/31/2014); CKD (chronic kidney disease) stage 3, GFR 30-59 ml/min (11/30/2011); COPD (chronic obstructive pulmonary disease) (H); Dementia (8/4/2015); Edema, unspecified edema (1/17/2016); Essential hypertension; Gout (1/06); Hypercalcemia (1/2/2015); Hyperlipidemia LDL goal < 70; Hyperparathyroidism, unspecified (4/22/2015); Hyperplasia of prostate; LEFT LUNG GRANULOMA; Lumbago; Other chronic pain (10/11/2016); Proteinuria (2/8/2012); Pulmonary fibrosis (H); PVD (peripheral vascular disease) (H); Thrombocytopenia (H) (11/30/2011); Unspecified hypothyroidism; and Vitamin D deficiency.  PAST SURGICAL HISTORY:  has a past surgical history that includes Vasectomy; right cataract extraction/IOL (12/03); Colonoscopy (9/02 per patient); Laparoscopic cholecystectomy (Nov 2011); Repair aneurysm abdominal aorta (Nov 2011); Endovascular repair aneurysm abdominal aorta (11/18/2011); Laparoscopic cholecystectomy (11/18/2011); Discectomy lumbar posterior microscopic one level (8/11/2014); and TRANSCATHETER AORTIC VALVE IMPLANT ANEST (N/A, 11/12/2014).  FAMILY HISTORY: family history includes Hypertension in his father and mother.  SOCIAL HISTORY:  reports that he has never smoked. He has never used smokeless tobacco. He reports that he drinks alcohol. He reports that he does not use illicit drugs.    Post Discharge Medication Reconciliation Status: discharge medications reconciled and changed, per note/orders (see AVS).  Current Outpatient Prescriptions   Medication Sig Dispense Refill     acetaminophen (TYLENOL) 500 MG tablet Take 500 mg by mouth 3  "times daily       amLODIPine (NORVASC) 10 MG tablet TAKE ONE TABLET BY MOUTH ONCE DAILY 90 tablet 3     aspirin 81 MG tablet Take 1 tablet (81 mg) by mouth daily 30 tablet 11     atorvastatin (LIPITOR) 20 MG tablet Take 1 tablet (20 mg) by mouth daily 90 tablet 3     cyanocobalamin (VITAMIN  B-12) 1000 MCG tablet Take 1,000 mcg by mouth daily       donepezil (ARICEPT) 10 MG tablet TAKE ONE TABLET BY MOUTH AT BEDTIME 90 tablet 3     DULoxetine (CYMBALTA) 30 MG EC capsule Take 30 mg by mouth daily       furosemide (LASIX) 20 MG tablet Take 20 mg by mouth daily       gabapentin (NEURONTIN) 100 MG capsule TAKE ONE CAPSULE BY MOUTH THREE TIMES DAILY FOR  PAIN 270 capsule 3     levothyroxine (SYNTHROID/LEVOTHROID) 88 MCG tablet TAKE ONE TABLET BY MOUTH ONCE DAILY 90 tablet 3     losartan (COZAAR) 100 MG tablet TAKE ONE TABLET BY MOUTH  DAILY 90 tablet 3     metoprolol succinate (TOPROL-XL) 25 MG 24 hr tablet TAKE ONE TABLET BY MOUTH DAILY 90 tablet 2     Multiple Vitamins-Minerals (MULTIVITAL) TABS Take 1 tablet by mouth daily       pantoprazole (PROTONIX) 40 MG EC tablet TAKE ONE TABLET BY MOUTH EVERY MORNING 90 tablet 3     tamsulosin (FLOMAX) 0.4 MG capsule TAKE ONE CAPSULE BY MOUTH ONCE DAILY 90 capsule 3       ROS:  10 point ROS of systems including Constitutional, Eyes, Respiratory, Cardiovascular, Gastroenterology, Genitourinary, Integumentary, Muscularskeletal, Psychiatric were all negative except for pertinent positives noted in my HPI.    Exam:  /57  Pulse 86  Temp 97.1  F (36.2  C)  Resp 22  Ht 5' 8\" (1.727 m)  Wt 147 lb (66.7 kg)  SpO2 91%  BMI 22.35 kg/m2   GENERAL APPEARANCE:  Alert, oriented x 3  EYES:  EOM, lids, pupils and irises normal, sclera clear and conjunctiva normal, no discharge or mattering on lids or lashes noted  ENT:  Mouth normal, moist mucous membranes, nose without drainage or crusting, external ears without lesions, hearing acuity [place]  RESP:  respiratory effort and palpation " of chest normal, no chest wall tenderness, no respiratory distress, Lung sounds clear, patient is on 3L O2 via NC   CV:  Palpation and auscultation of heart done, rate and rhythm regular.  RRR no edema  ABDOMEN:  normal bowel sounds, soft, nontender.  M/S:   Gait and station abnormal: uses walker for ambulation  SKIN:  Inspection and palpation of skin and subcutaneous tissue: skin warm, dry without rashes, scattered ecchymosis- with significant one to left hand  NEURO: cranial nerves 2-12 grossly intact, no facial asymmetry, no speech deficits and able to follow directions, moves all extremities symmetrically  PSYCH:  insight and judgement impaired, memory impaired, affect and mood ok      Lab/Diagnostic data:     CBC RESULTS:   Recent Labs   Lab Test  06/03/18   1200  01/09/18   0615   WBC  9.5  8.4   RBC  4.34*  3.91*   HGB  12.8*  12.0*   HCT  39.4*  35.2*   MCV  91  90   MCH  29.5  30.7   MCHC  32.5  34.1   RDW  12.9  13.7   PLT  227  138*       Last Basic Metabolic Panel:  Recent Labs   Lab Test  06/06/18   0631  06/05/18   0703   NA  141  141   POTASSIUM  3.9  4.0   CHLORIDE  106  102   LEXIS  9.0  9.4   CO2  31  33*   BUN  31*  33*   CR  1.49*  1.81*   GLC  87  99       Liver Function Studies -   Recent Labs   Lab Test  01/12/18   0806  01/07/18   1853  11/28/17   1203   PROTTOTAL   --   7.4  7.3   ALBUMIN   --   3.9  3.7   BILITOTAL   --   0.8  0.7   ALKPHOS   --   134  151*   AST   --   23  23   ALT  20  25  33         ASSESSMENT/PLAN:  (I50.33) Acute on chronic diastolic congestive heart failure (H)  (primary encounter diagnosis)  Comment: Acute on chronic- stable since admission  Plan: Continue PTA medications as above. BMP 6/11. Daily weights. 2 gram Na diet. Notify provider with weight gain >2 lbs in a day, >5 lbs in on week.    (M62.81) Generalized muscle weakness  (R53.81) Physical deconditioning  Comment: Acute- related to recent hospitalization, CHF  Plan: Falls precautions. Encourage participation in  physical therapy/occupational therapy for strengthening and deconditioning. Discharge planning per their recommendation. Social work to assist with d/c planning.    (J84.10) Pulmonary fibrosis (H)  (J96.11) Chronic respiratory failure with hypoxia (H)  (Z99.81) Oxygen dependent  Comment: Chronic- on 3L o2 on exam today.   Plan: Continue to wean O2 as tolerated to keep sats >90%. On 3-4 L baseline at home. Monitor respiratory status.    (N18.3) CKD (chronic kidney disease) stage 3, GFR 30-59 ml/min  Comment: Chronic- stable at discharge  Plan: BMP 6/11. Avoid nephrotoxic medications.    (G30.9,  F02.80) Alzheimer's dementia without behavioral disturbance, unspecified timing of dementia onset  Comment: Chronic- stable- poor medical historian, however oriented x3   Plan: Continue aricept. Reorient. Falls precautions    (I10) Benign essential hypertension  Comment: Chronic- well controlled  Plan: Continue medications as above. VS per TCU policy.      Total time spent with patient visit at the skilled nursing facility was 48 minutes including patient visit, review of past records and medication reconciliation, review of admission orders. Greater than 50% of total time spent with counseling and coordinating care due to CHF, generalized weakness, pulmonary fibrosis and other problems as above    Electronically signed by:  CELIA Yoo CNP                    Sincerely,        CELIA Yoo CNP

## 2018-06-12 NOTE — PROGRESS NOTES
PRIMARY CARE PROVIDER AND CLINIC RESPONSIBLE:  Alirio Fox, [address]  / [facility] [address]        ADMISSION HISTORY AND PHYSICAL EXAMINATION     Chief Complaint   Patient presents with     Hospital F/U         HISTORY OF PRESENT ILLNESS:  89 year old male, (5/21/1928), [facility] of medical care and rehab.    Pt admitted [facility] 6/3 to 6/6 for fall and weakness due to acute on chronic diastolic HF.    Pt denies any fevers/chills/chest pain/worsening SOB.    Patient is seen and examined by me with Tiff Ricketts CNP. Please see Tiff Ricketts 's admit noted dated 6/8 for details of admission, past medical history, family history, allergies, medication list, social history and other details pertinent with this admission. Hospital admission and dc summary reviewed.      Past Medical History:   Diagnosis Date     AAA (abdominal aortic aneurysm) (H) 10/5/2011    6.1 cm     Anemia 11/30/2011     Aortic stenosis 10/26/2012     CAD (coronary artery disease)     MI - distal inferior/apex. Non transmural     Carotid artery disease (H)      CHF (congestive heart failure) (H) 12/31/2014     CKD (chronic kidney disease) stage 3, GFR 30-59 ml/min 11/30/2011     COPD (chronic obstructive pulmonary disease) (H)      Dementia 8/4/2015     Edema, unspecified edema 1/17/2016     Essential hypertension      Gout 1/06    knee     Hypercalcemia 1/2/2015     Hyperlipidemia LDL goal < 70      Hyperparathyroidism, unspecified 4/22/2015     Hyperplasia of prostate      LEFT LUNG GRANULOMA      Lumbago      Other chronic pain 10/11/2016     Proteinuria 2/8/2012     Pulmonary fibrosis (H)      PVD (peripheral vascular disease) (H)      Thrombocytopenia (H) 11/30/2011     Unspecified hypothyroidism      Vitamin D deficiency        Past Surgical History:   Procedure Laterality Date     COLONOSCOPY  9/02 per patient     DISCECTOMY LUMBAR POSTERIOR MICROSCOPIC ONE LEVEL  8/11/2014    Procedure: DISCECTOMY  LUMBAR POSTERIOR MICROSCOPIC ONE LEVEL;  Surgeon: Jone Carvalho MD;  Location: [facility] OR     ENDOVASCULAR REPAIR ANEURYSM ABDOMINAL AORTA  11/18/2011    Procedure:ENDOVASCULAR REPAIR ANEURYSM ABDOMINAL AORTA; ENDOVASCULAR AAA,RIGHT FEMORAL       LAPAROSCOPIC CHOLECYSTECTOMY  Nov 2011     LAPAROSCOPIC CHOLECYSTECTOMY  11/18/2011    Procedure:LAPAROSCOPIC CHOLECYSTECTOMY; LAPAROSCOPIC CHOLECYSTECTOMY ; Surgeon:DARRYL DORADO; Location:[facility] OR     REPAIR ANEURYSM ABDOMINAL AORTA  Nov 2011     right cataract extraction/IOL  12/03     TRANSCATHETER AORTIC VALVE IMPLANT ANESTHESIA N/A 11/12/2014    Bioprosthetic. Procedure: TRANSCATHETER AORTIC VALVE IMPLANT ANESTHESIA;  Surgeon: Generic Anesthesia Provider;  Location: [facility] OR     VASECTOMY         Current Outpatient Prescriptions   Medication Sig     acetaminophen (TYLENOL) 500 MG tablet Take 500 mg by mouth 3 times daily     amLODIPine (NORVASC) 10 MG tablet TAKE ONE TABLET BY MOUTH ONCE DAILY     aspirin 81 MG tablet Take 1 tablet (81 mg) by mouth daily     atorvastatin (LIPITOR) 20 MG tablet Take 1 tablet (20 mg) by mouth daily     cyanocobalamin (VITAMIN  B-12) 1000 MCG tablet Take 1,000 mcg by mouth daily     donepezil (ARICEPT) 10 MG tablet TAKE ONE TABLET BY MOUTH AT BEDTIME     DULoxetine (CYMBALTA) 30 MG EC capsule Take 30 mg by mouth daily     furosemide (LASIX) 20 MG tablet Take 20 mg by mouth daily     gabapentin (NEURONTIN) 100 MG capsule TAKE ONE CAPSULE BY MOUTH THREE TIMES DAILY FOR  PAIN     levothyroxine (SYNTHROID/LEVOTHROID) 88 MCG tablet TAKE ONE TABLET BY MOUTH ONCE DAILY     losartan (COZAAR) 100 MG tablet TAKE ONE TABLET BY MOUTH  DAILY     metoprolol succinate (TOPROL-XL) 25 MG 24 hr tablet TAKE ONE TABLET BY MOUTH DAILY     Multiple Vitamins-Minerals (MULTIVITAL) TABS Take 1 tablet by mouth daily     pantoprazole (PROTONIX) 40 MG EC tablet TAKE ONE TABLET BY MOUTH EVERY MORNING     tamsulosin (FLOMAX) 0.4 MG capsule TAKE ONE CAPSULE BY  "MOUTH ONCE DAILY     No current facility-administered medications for this visit.        Allergies   Allergen Reactions     Contrast Dye      SOB, increased Bp, difficulty swallowing. Date 6/3/96 (ipaque contrast)  Was premedicated prior to FRANCK and had no reaction at all (solumedrol and benadryl) 2016  Was premedicated with Methylprednisolone protocol, no reaction 1/25/17     Lisinopril      cough     Oxycodone      Delirium       Social History     Social History     Marital status:      Spouse name: N/A     Number of children: 4     Years of education: 18     Occupational History      Retired     Social History Main Topics     Smoking status: Never Smoker     Smokeless tobacco: Never Used     Alcohol use 0.0 oz/week     0 Standard drinks or equivalent per week      Comment: 1 glass wine/day     Drug use: No     Sexual activity: No     Other Topics Concern     Caffeine Concern Yes     1 day      Sleep Concern Yes     too much      Weight Concern Yes     appetite reduced      Special Diet No     Back Care Yes     Exercise Yes     daily 5 x per week.  recummbant bike      Seat Belt Yes     Social History Narrative    Lives in  [facility] co[facility] in [place] for the past 13 years.     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     ROS: All 10 point review of system completed, those pertinent positive, please see H&P, the remaining ROS is negative.    /75  Pulse 76  Temp 98.8  F (37.1  C)  Resp 16  Ht 5' 8\" (1.727 m)  Wt 140 lb 12.8 oz (63.9 kg)  SpO2 95%  BMI 21.41 kg/m2    PHYSICAL EXAMINATION:   GENERAL:  No acute distress. Sitting on WC. Wife visiting. On oxygen via NC.  SKIN:  Dry and warm.  There is no rash, lesions, ulcers or juandice at area of skin examined.  HEENT:  Head without trauma.  Pupils round, reactive. Exam of conjunctiva and lids are normal. Sclera without icterus. " There is no oral thrush.  NECK:  Supple.  There is no cervical adenopathy, no thyromegaly. No jugular venous distension.  CHEST: No reproducible chest tenderness.   LUNGS:  Normal respiratory effort.+ crackles B.  HEART:  Regular rate and rhythm.  No murmur, gallops or rubs auscultated.  ABDOMEN:  Soft, bowel sounds positive.  There is no tenderness or guarding.   EXTREMITIES: No edema.   NEUROLOGIC:  Alert and oriented to self.    Lab/Diagnostic data:  Reviewed    Lab Results   Component Value Date    WBC 10.7 06/11/2018     Lab Results   Component Value Date    RBC 4.42 06/11/2018     Lab Results   Component Value Date    HGB 13.1 06/11/2018     Lab Results   Component Value Date    HCT 39.0 06/11/2018     Lab Results   Component Value Date    MCV 88 06/11/2018     Lab Results   Component Value Date    MCH 29.6 06/11/2018     Lab Results   Component Value Date    MCHC 33.6 06/11/2018     Lab Results   Component Value Date    RDW 12.9 06/11/2018     Lab Results   Component Value Date     06/11/2018       Last Basic Metabolic Panel:  Lab Results   Component Value Date     06/06/2018      Lab Results   Component Value Date    POTASSIUM 3.9 06/06/2018     Lab Results   Component Value Date    CHLORIDE 106 06/06/2018     Lab Results   Component Value Date    LEXIS 9.0 06/06/2018     Lab Results   Component Value Date    CO2 31 06/06/2018     Lab Results   Component Value Date    BUN 31 06/06/2018     Lab Results   Component Value Date    CR 1.49 06/06/2018     Lab Results   Component Value Date    GLC 87 06/06/2018         ASSESSMENT / PLAN:     Acute on chronic diastolic congestive heart failure (H)  - TTE 6/2017 showed grade 1 DD.  - hx of TAVR.  - On metoprolol, lasix and losartan.  - Daily weights.    Alzheimer's dementia without behavioral disturbance, unspecified timing of dementia onset  - On aricept.    Coronary artery disease involving native coronary artery of native heart without angina  pectoris  - On ASA, metoprolol, norvasc and lipitor.    Depression, unspecified depression type  - On cymbalta.    Pulmonary fibrosis (H)  - On home oxygen.    Hypothyroidism, unspecified type  - On synthroid.    BPH, LUTS unspecified.  - On flomax.    CKD stage 3.  - Avoid nephrotoxins.    Physical deconditioning  -Plan: PT/OT, fall precautions. Care conference with patient and family for the progress of rehab and disposition issues will be discussed as planned. Rehab evaluation and other evaluations including CPT are at rehab logs, to be reviewed separately.  Fall risk assessment as well as cognitive evaluation will be formed during rehab stay if indicated.      Other problems with same care. Primary care doctor and other specialists to address those chronic problems in next clinic appointment to be scheduled upon discharge from the TCU.    Total time spent with patient visit was 47 min including patient visit, review of past records, 1/2 time on patients counseling and coordinating care.

## 2018-06-13 NOTE — PROGRESS NOTES
[facility]    Chief Complaint   Patient presents with     long term Acute       [facility] Medical Record Number:  [id number]    HPI:    Chaz Soler is a 89 year old  (5/21/1928), who is being seen today for an episodic care visit at [facility].  HPI information obtained from: facility chart records, facility staff, patient report and [facility] chart review.  Today's concern is:  Pulmonary fibrosis (H)  Chronic respiratory failure with hypoxia (H)  Oxygen dependent  Per PCP note patient is usually on 3-4 L at home 24 hours a day via NC. On exam today was on 3L. He feels his breathing is good and at his baseline. His wife tells me his pulmonologist started him on albuterol nebulizers TID- will get this ordered at facility today as these have been helpful for the patient.     Chronic back pain  Patient with long history of back pain. He has taken tramadol scheduled for many years. While in the hospital his wife tells me they discontinued it. Patient tells me today his pain is 4/10- it is manageable. However he would like his medication scheduled for him, due to his cognitive status he would have a hard time remembering to ask for it.     CKD (chronic kidney disease) stage 3, GFR 30-59 ml/min  Developed ARF due to diuresis in the hospital. Creatinine back at baseline prior to discharge and upon recheck at TCU  Baseline creatinine around 1.5     Acute on chronic diastolic congestive heart failure (H)  Xray from 6/3 showed volume overload and patient with increased oxygen need upon admission. Improved after one dose of IV lasix in ER. On metoprolol 25 mg XL, lasix 20 mg daily, losartan 100 mg daily, amlodipine 10 mg daily  Admission weight: 137.8 lbs  Current weight: 138.8 lbs     Hypertension   On metoprolol 25 mg XL, lasix 20 mg daily, losartan 100 mg daily, amlodipine 10 mg daily    BP: 130-158-/62-84 mmHg, outlier 161/75, 198/80  P: 65-86 bpm      Generalized muscle weakness  Physical deconditioning  Presented to ER after patient fell at home and his wife was unable to get him up. Has also had another hospital admission earlier this year for weakness and fall at home. Is working with therapy and feels he is getting stronger.          ALLERGIES: Contrast dye; Lisinopril; and Oxycodone  Past Medical, Surgical, Family and Social History reviewed and updated in [facility].    Current Outpatient Prescriptions   Medication Sig Dispense Refill     acetaminophen (TYLENOL) 500 MG tablet Take 500 mg by mouth 3 times daily       albuterol (2.5 MG/3ML) 0.083% neb solution Take 2.5 mg by nebulization 3 times daily       amLODIPine (NORVASC) 10 MG tablet TAKE ONE TABLET BY MOUTH ONCE DAILY 90 tablet 3     aspirin 81 MG tablet Take 1 tablet (81 mg) by mouth daily 30 tablet 11     atorvastatin (LIPITOR) 20 MG tablet Take 1 tablet (20 mg) by mouth daily 90 tablet 3     cyanocobalamin (VITAMIN  B-12) 1000 MCG tablet Take 1,000 mcg by mouth daily       donepezil (ARICEPT) 10 MG tablet TAKE ONE TABLET BY MOUTH AT BEDTIME 90 tablet 3     DULoxetine (CYMBALTA) 30 MG EC capsule Take 30 mg by mouth daily       furosemide (LASIX) 20 MG tablet Take 20 mg by mouth daily       gabapentin (NEURONTIN) 100 MG capsule TAKE ONE CAPSULE BY MOUTH THREE TIMES DAILY FOR  PAIN 270 capsule 3     levothyroxine (SYNTHROID/LEVOTHROID) 88 MCG tablet TAKE ONE TABLET BY MOUTH ONCE DAILY 90 tablet 3     losartan (COZAAR) 100 MG tablet TAKE ONE TABLET BY MOUTH  DAILY 90 tablet 3     metoprolol succinate (TOPROL-XL) 25 MG 24 hr tablet TAKE ONE TABLET BY MOUTH DAILY 90 tablet 2     Multiple Vitamins-Minerals (MULTIVITAL) TABS Take 1 tablet by mouth daily       pantoprazole (PROTONIX) 40 MG EC tablet TAKE ONE TABLET BY MOUTH EVERY MORNING 90 tablet 3     tamsulosin (FLOMAX) 0.4 MG capsule TAKE ONE CAPSULE BY MOUTH ONCE DAILY 90 capsule 3     TRAMADOL HCL PO Take 50 mg by mouth every 6 hours as needed for  "moderate to severe pain       Medications reviewed:  Medications reconciled to facility chart and changes were made to reflect current medications as identified as above med list.   REVIEW OF SYSTEMS:    10 point ROS of systems including Constitutional, Eyes, Respiratory, Cardiovascular, Gastroenterology, Genitourinary, Integumentary, Muscularskeletal, Neurologic, Psychiatric were all negative except for pertinent positives noted in my HPI.    Physical Exam:  /62  Pulse 79  Temp 98.5  F (36.9  C)  Resp 16  Ht 5' 8\" (1.727 m)  Wt 140 lb (63.5 kg)  SpO2 99%  BMI 21.29 kg/m2   GENERAL APPEARANCE:  Alert, oriented x 3  EYES:  EOM, lids, pupils and irises normal, sclera clear and conjunctiva normal, no discharge or mattering on lids or lashes noted  ENT:  Mouth normal, moist mucous membranes, nose without drainage or crusting, external ears without lesions, hearing acuity [place]  RESP:  respiratory effort and palpation of chest normal, no chest wall tenderness, no respiratory distress, Lung sounds clear, patient is on 3L O2 via NC   CV:  Palpation and auscultation of heart done, rate and rhythm regular.  RRR no edema  ABDOMEN:  normal bowel sounds, soft, nontender.  M/S:   Gait and station abnormal: uses walker for ambulation  SKIN:  Inspection and palpation of skin and subcutaneous tissue: skin warm, dry without rashes, scattered ecchymosis- with significant one to left hand  NEURO: cranial nerves 2-12 grossly intact, no facial asymmetry, no speech deficits and able to follow directions, moves all extremities symmetrically  PSYCH:  insight and judgement impaired, memory impaired, affect and mood ok      Recent Labs:     CBC RESULTS:   Recent Labs   Lab Test 06/11/18 06/03/18   1200   WBC  10.7  9.5   RBC  4.42  4.34*   HGB  13.1*  12.8*   HCT  39.0*  39.4*   MCV  88  91   MCH  29.6  29.5   MCHC  33.6  32.5   RDW  12.9  12.9   PLT  191  227       Last Basic Metabolic Panel:  Recent Labs   Lab Test  06/06/18   " 0631  06/05/18   0703   NA  141  141   POTASSIUM  3.9  4.0   CHLORIDE  106  102   LEXIS  9.0  9.4   CO2  31  33*   BUN  31*  33*   CR  1.49*  1.81*   GLC  87  99       Liver Function Studies -   Recent Labs   Lab Test  01/12/18   0806  01/07/18   1853  11/28/17   1203   PROTTOTAL   --   7.4  7.3   ALBUMIN   --   3.9  3.7   BILITOTAL   --   0.8  0.7   ALKPHOS   --   134  151*   AST   --   23  23   ALT  20  25  33       Assessment/Plan:  (J84.10) Pulmonary fibrosis (H) (primary encounter diagnosis)  (J96.11) Chronic respiratory failure with hypoxia (H)  (Z99.81) Oxygen dependent  Comment: Chronic- on 3L o2 on exam today.   Plan: Start albuterol nebulizers TID today. Continue to wean O2 as tolerated to keep sats >90%. On 3-4 L baseline at home. Monitor respiratory status.    (M54.9,  G89.29) Chronic back pain, unspecified back location, unspecified back pain laterality  Comment: Chronic, stable  Plan: Schedule tramadol TID 50 mg- as per patient takes at home.      (N18.3) CKD (chronic kidney disease) stage 3, GFR 30-59 ml/min  Comment: Chronic- stable at recheck  Plan: BMP PRN. Avoid nephrotoxic medications.    I50.33) Acute on chronic diastolic congestive heart failure (H)    Comment: Acute on chronic- stable since admission  Plan: Continue PTA medications as above. Daily weights. 2 gram Na diet. Notify provider with weight gain >2 lbs in a day, >5 lbs in on week.     (I10) Benign essential hypertension  Comment: Chronic- well controlled  Plan: Continue medications as above. VS per TCU policy.    (M62.81) Generalized muscle weakness  (R53.81) Physical deconditioning  Comment: Acute- related to recent hospitalization, CHF  Plan: Falls precautions. Encourage participation in physical therapy/occupational therapy for strengthening and deconditioning. Discharge planning per their recommendation. Social work to assist with d/c planning.        Electronically signed by  CELIA Yoo CNP

## 2018-06-13 NOTE — LETTER
6/13/2018        RE: Chaz Soler  [address]  [facility] [address]        [facility]    Chief Complaint   Patient presents with     snf Acute       [facility] Medical Record Number:  [id number]    HPI:    Chaz Soler is a 89 year old  (5/21/1928), who is being seen today for an episodic care visit at [facility]  [place].  HPI information obtained from: facility chart records, facility staff, patient report and [facility] chart review.  Today's concern is:  Pulmonary fibrosis (H)  Chronic respiratory failure with hypoxia (H)  Oxygen dependent  Per PCP note patient is usually on 3-4 L at home 24 hours a day via NC. On exam today was on 3L. He feels his breathing is good and at his baseline. His wife tells me his pulmonologist started him on albuterol nebulizers TID- will get this ordered at facility today as these have been helpful for the patient.     Chronic back pain  Patient with long history of back pain. He has taken tramadol scheduled for many years. While in the hospital his wife tells me they discontinued it. Patient tells me today his pain is 4/10- it is manageable. However he would like his medication scheduled for him, due to his cognitive status he would have a hard time remembering to ask for it.     CKD (chronic kidney disease) stage 3, GFR 30-59 ml/min  Developed ARF due to diuresis in the hospital. Creatinine back at baseline prior to discharge and upon recheck at TCU  Baseline creatinine around 1.5     Acute on chronic diastolic congestive heart failure (H)  Xray from 6/3 showed volume overload and patient with increased oxygen need upon admission. Improved after one dose of IV lasix in ER. On metoprolol 25 mg XL, lasix 20 mg daily, losartan 100 mg daily, amlodipine 10 mg daily  Admission weight: 137.8 lbs  Current weight: 138.8 lbs     Hypertension   On metoprolol 25 mg XL, lasix 20 mg daily, losartan 100 mg daily,  amlodipine 10 mg daily    BP: 130-158-/62-84 mmHg, outlier 161/75, 198/80  P: 65-86 bpm     Generalized muscle weakness  Physical deconditioning  Presented to ER after patient fell at home and his wife was unable to get him up. Has also had another hospital admission earlier this year for weakness and fall at home. Is working with therapy and feels he is getting stronger.          ALLERGIES: Contrast dye; Lisinopril; and Oxycodone  Past Medical, Surgical, Family and Social History reviewed and updated in [facility].    Current Outpatient Prescriptions   Medication Sig Dispense Refill     acetaminophen (TYLENOL) 500 MG tablet Take 500 mg by mouth 3 times daily       albuterol (2.5 MG/3ML) 0.083% neb solution Take 2.5 mg by nebulization 3 times daily       amLODIPine (NORVASC) 10 MG tablet TAKE ONE TABLET BY MOUTH ONCE DAILY 90 tablet 3     aspirin 81 MG tablet Take 1 tablet (81 mg) by mouth daily 30 tablet 11     atorvastatin (LIPITOR) 20 MG tablet Take 1 tablet (20 mg) by mouth daily 90 tablet 3     cyanocobalamin (VITAMIN  B-12) 1000 MCG tablet Take 1,000 mcg by mouth daily       donepezil (ARICEPT) 10 MG tablet TAKE ONE TABLET BY MOUTH AT BEDTIME 90 tablet 3     DULoxetine (CYMBALTA) 30 MG EC capsule Take 30 mg by mouth daily       furosemide (LASIX) 20 MG tablet Take 20 mg by mouth daily       gabapentin (NEURONTIN) 100 MG capsule TAKE ONE CAPSULE BY MOUTH THREE TIMES DAILY FOR  PAIN 270 capsule 3     levothyroxine (SYNTHROID/LEVOTHROID) 88 MCG tablet TAKE ONE TABLET BY MOUTH ONCE DAILY 90 tablet 3     losartan (COZAAR) 100 MG tablet TAKE ONE TABLET BY MOUTH  DAILY 90 tablet 3     metoprolol succinate (TOPROL-XL) 25 MG 24 hr tablet TAKE ONE TABLET BY MOUTH DAILY 90 tablet 2     Multiple Vitamins-Minerals (MULTIVITAL) TABS Take 1 tablet by mouth daily       pantoprazole (PROTONIX) 40 MG EC tablet TAKE ONE TABLET BY MOUTH EVERY MORNING 90 tablet 3     tamsulosin (FLOMAX) 0.4 MG capsule TAKE ONE CAPSULE BY MOUTH ONCE  "DAILY 90 capsule 3     TRAMADOL HCL PO Take 50 mg by mouth every 6 hours as needed for moderate to severe pain       Medications reviewed:  Medications reconciled to facility chart and changes were made to reflect current medications as identified as above med list.   REVIEW OF SYSTEMS:    10 point ROS of systems including Constitutional, Eyes, Respiratory, Cardiovascular, Gastroenterology, Genitourinary, Integumentary, Muscularskeletal, Neurologic, Psychiatric were all negative except for pertinent positives noted in my HPI.    Physical Exam:  /62  Pulse 79  Temp 98.5  F (36.9  C)  Resp 16  Ht 5' 8\" (1.727 m)  Wt 140 lb (63.5 kg)  SpO2 99%  BMI 21.29 kg/m2   GENERAL APPEARANCE:  Alert, oriented x 3  EYES:  EOM, lids, pupils and irises normal, sclera clear and conjunctiva normal, no discharge or mattering on lids or lashes noted  ENT:  Mouth normal, moist mucous membranes, nose without drainage or crusting, external ears without lesions, hearing acuity [place]  RESP:  respiratory effort and palpation of chest normal, no chest wall tenderness, no respiratory distress, Lung sounds clear, patient is on 3L O2 via NC   CV:  Palpation and auscultation of heart done, rate and rhythm regular.  RRR no edema  ABDOMEN:  normal bowel sounds, soft, nontender.  M/S:   Gait and station abnormal: uses walker for ambulation  SKIN:  Inspection and palpation of skin and subcutaneous tissue: skin warm, dry without rashes, scattered ecchymosis- with significant one to left hand  NEURO: cranial nerves 2-12 grossly intact, no facial asymmetry, no speech deficits and able to follow directions, moves all extremities symmetrically  PSYCH:  insight and judgement impaired, memory impaired, affect and mood ok      Recent Labs:     CBC RESULTS:   Recent Labs   Lab Test 06/11/18 06/03/18   1200   WBC  10.7  9.5   RBC  4.42  4.34*   HGB  13.1*  12.8*   HCT  39.0*  39.4*   MCV  88  91   MCH  29.6  29.5   MCHC  33.6  32.5   RDW  12.9  12.9 "   PLT  191  227       Last Basic Metabolic Panel:  Recent Labs   Lab Test  06/06/18   0631  06/05/18   0703   NA  141  141   POTASSIUM  3.9  4.0   CHLORIDE  106  102   LEXIS  9.0  9.4   CO2  31  33*   BUN  31*  33*   CR  1.49*  1.81*   GLC  87  99       Liver Function Studies -   Recent Labs   Lab Test  01/12/18   0806  01/07/18   1853  11/28/17   1203   PROTTOTAL   --   7.4  7.3   ALBUMIN   --   3.9  3.7   BILITOTAL   --   0.8  0.7   ALKPHOS   --   134  151*   AST   --   23  23   ALT  20  25  33       Assessment/Plan:  (J84.10) Pulmonary fibrosis (H) (primary encounter diagnosis)  (J96.11) Chronic respiratory failure with hypoxia (H)  (Z99.81) Oxygen dependent  Comment: Chronic- on 3L o2 on exam today.   Plan: Start albuterol nebulizers TID today. Continue to wean O2 as tolerated to keep sats >90%. On 3-4 L baseline at home. Monitor respiratory status.    (M54.9,  G89.29) Chronic back pain, unspecified back location, unspecified back pain laterality  Comment: Chronic, stable  Plan: Schedule tramadol TID 50 mg- as per patient takes at home.      (N18.3) CKD (chronic kidney disease) stage 3, GFR 30-59 ml/min  Comment: Chronic- stable at recheck  Plan: BMP PRN. Avoid nephrotoxic medications.    I50.33) Acute on chronic diastolic congestive heart failure (H)    Comment: Acute on chronic- stable since admission  Plan: Continue PTA medications as above. Daily weights. 2 gram Na diet. Notify provider with weight gain >2 lbs in a day, >5 lbs in on week.     (I10) Benign essential hypertension  Comment: Chronic- well controlled  Plan: Continue medications as above. VS per TCU policy.    (M62.81) Generalized muscle weakness  (R53.81) Physical deconditioning  Comment: Acute- related to recent hospitalization, CHF  Plan: Falls precautions. Encourage participation in physical therapy/occupational therapy for strengthening and deconditioning. Discharge planning per their recommendation. Social work to assist with d/c  planning.        Electronically signed by  CELIA Yoo CNP                      Sincerely,        CELIA Yoo CNP

## 2018-06-18 NOTE — LETTER
"    6/18/2018        RE: Chaz Soler  [address]  [facility] [address]        [facility]    Chief Complaint   Patient presents with     Nursing Home Acute       HPI:    Chaz Soler is a 89 year old  (5/21/1928), who is being seen today for an episodic care visit at [facility].    HPI information obtained from: facility chart records, facility staff, patient report and [facility] chart review.   Today's concern is:  SOB  Acute on chronic diastolic congestive heart failure (H)  Pulmonary fibrosis (H)  Chronic respiratory failure with hypoxia (H)  Oxygen dependent  Patient with recent admission for CHF exacerbation with symptom of increased need for oxygen. Nursing today tells me his oxygen needs are increasing with movement and exertion. Oxygen sats are dropping to upper 70s, but coming back up with rest and oxygen. Weight up since admission.  Per PCP note patient is usually on 3-4 L at home 24 hours a day via NC. Was also started on TID albuterol nebulizers last week. On metoprolol 25 mg XL, lasix 20 mg daily, losartan 100 mg daily, amlodipine 10 mg daily. VSS, afebrile.  Admission weight: 137.8 lbs  Current weight: 143 lbs    Alzheimer's dementia without behavioral disturbance, unspecified timing of dementia onset  Not a reliable historian. Is oriented x3. On aricept. Monitor for safety      BP: 127-161/62-84 mmHg  P: 68-84 bpm      REVIEW OF SYSTEMS:  4 point ROS of systems including Constitutional, Eyes, Respiratory, Cardiovascular were all negative except for pertinent positives noted in my HPI.    /69  Pulse 81  Temp 98.8  F (37.1  C)  Resp 24  Ht 5' 8\" (1.727 m)  Wt 143 lb (64.9 kg)  SpO2 96%  BMI 21.74 kg/m2  GENERAL APPEARANCE:  Alert, in no distress  EYES:  EOM, lids, pupils and irises normal, sclera clear and conjunctiva normal, no discharge or mattering on lids or lashes noted  ENT:  Mouth normal, moist mucous " membranes, nose without drainage or crusting, external ears without lesions, hearing acuity [place]  RESP:  respiratory effort and palpation of chest normal, no chest wall tenderness, no respiratory distress, Lung sounds with crackles to bilateral bases, patient is on 3L O2 via NC   CV:  Palpation and auscultation of heart done, rate and rhythm regular.  no edema  PSYCH:  insight and judgement impaired, memory impaired, affect and mood ok    ASSESSMENT/PLAN:  (R06.02) SOB (shortness of breath)  (primary encounter diagnosis)  (I50.31) Acute diastolic congestive heart failure (H)  (J84.10) Pulmonary fibrosis (H)  (J96.11) Chronic respiratory failure with hypoxia (H)  (Z99.81) Oxygen dependent  Comment: acute- suspect fluid overload related to CHF due to lungs  Plan: Lasix 20 stat today and then BID on 6/19 and 6/20 Start KCL 10 meq today and continue through 6/20. BMP 6/20. Continue to adjust oxygen to keep oxygen > 90% on NC. Monitor respiratory status for improvement    (G30.9,  F02.80) Alzheimer's dementia without behavioral disturbance, unspecified timing of dementia onset  Comment: Chronic- stable- poor medical historian   Plan: Continue aricept. Reorient. Falls precautions. Monitor for safety      Electronically signed by:  CELIA Yoo CNP      Sincerely,        CELIA Yoo CNP

## 2018-06-18 NOTE — PROGRESS NOTES
"[facility]    Chief Complaint   Patient presents with     Nursing Home Acute       HPI:    Chaz Soler is a 89 year old  (5/21/1928), who is being seen today for an episodic care visit at [facility].    HPI information obtained from: facility chart records, facility staff, patient report and [facility] chart review.   Today's concern is:  SOB  Acute on chronic diastolic congestive heart failure (H)  Pulmonary fibrosis (H)  Chronic respiratory failure with hypoxia (H)  Oxygen dependent  Patient with recent admission for CHF exacerbation with symptom of increased need for oxygen. Nursing today tells me his oxygen needs are increasing with movement and exertion. Oxygen sats are dropping to upper 70s, but coming back up with rest and oxygen. Weight up since admission.  Per PCP note patient is usually on 3-4 L at home 24 hours a day via NC. Was also started on TID albuterol nebulizers last week. On metoprolol 25 mg XL, lasix 20 mg daily, losartan 100 mg daily, amlodipine 10 mg daily. VSS, afebrile.  Admission weight: 137.8 lbs  Current weight: 143 lbs    Alzheimer's dementia without behavioral disturbance, unspecified timing of dementia onset  Not a reliable historian. Is oriented x3. On aricept. Monitor for safety      BP: 127-161/62-84 mmHg  P: 68-84 bpm      REVIEW OF SYSTEMS:  4 point ROS of systems including Constitutional, Eyes, Respiratory, Cardiovascular were all negative except for pertinent positives noted in my HPI.    /69  Pulse 81  Temp 98.8  F (37.1  C)  Resp 24  Ht 5' 8\" (1.727 m)  Wt 143 lb (64.9 kg)  SpO2 96%  BMI 21.74 kg/m2  GENERAL APPEARANCE:  Alert, in no distress  EYES:  EOM, lids, pupils and irises normal, sclera clear and conjunctiva normal, no discharge or mattering on lids or lashes noted  ENT:  Mouth normal, moist mucous membranes, nose without drainage or crusting, external ears without lesions, hearing acuity [place]  RESP: "  respiratory effort and palpation of chest normal, no chest wall tenderness, no respiratory distress, Lung sounds with crackles to bilateral bases, patient is on 3L O2 via NC   CV:  Palpation and auscultation of heart done, rate and rhythm regular.  no edema  PSYCH:  insight and judgement impaired, memory impaired, affect and mood ok    ASSESSMENT/PLAN:  (R06.02) SOB (shortness of breath)  (primary encounter diagnosis)  (I50.31) Acute diastolic congestive heart failure (H)  (J84.10) Pulmonary fibrosis (H)  (J96.11) Chronic respiratory failure with hypoxia (H)  (Z99.81) Oxygen dependent  Comment: acute- suspect fluid overload related to CHF due to lungs  Plan: Lasix 20 stat today and then BID on 6/19 and 6/20 Start KCL 10 meq today and continue through 6/20. BMP 6/20. Continue to adjust oxygen to keep oxygen > 90% on NC. Monitor respiratory status for improvement    (G30.9,  F02.80) Alzheimer's dementia without behavioral disturbance, unspecified timing of dementia onset  Comment: Chronic- stable- poor medical historian   Plan: Continue aricept. Reorient. Falls precautions. Monitor for safety      Electronically signed by:  CELIA Yoo CNP

## 2018-06-19 NOTE — LETTER
6/19/2018        RE: Chaz Soler  [address]  [facility] [address]        [place] [facility]    Chief Complaint   Patient presents with     Nursing Home Acute       HPI:    Chaz Soler is a 89 year old  (5/21/1928), who is being seen today for an episodic care visit at [facility].    HPI information obtained from: facility chart records, facility staff, patient report and [facility] chart review.   Today's concern is:  SOB  Acute on chronic diastolic congestive heart failure (H)  Pulmonary fibrosis (H)  Chronic respiratory failure with hypoxia (H)  Oxygen dependent  Patient with recent admission for CHF exacerbation with symptom of increased need for oxygen. Nursing today tells me his oxygen needs are increasing with movement and exertion. Oxygen sats are dropping to upper 70s, but coming back up with rest and oxygen. Is on 5L O2 today. Weight up since admission.  Per PCP note patient is usually on 3-4 L at home 24 hours a day via NC. Was started on TID albuterol nebulizers last week. On metoprolol 25 mg XL, lasix 20 mg daily, losartan 100 mg daily, amlodipine 10 mg daily. VSS, afebrile.     Patient tells me on exam today that he feels ok. He denies increased SOB and at the time of visit is breathing ok. Was told by nursing staff that when he is SOB he appears very anxious and flaps his arms up and down.   Admission weight: 137.8 lbs  Current weight: 143 lbs     Alzheimer's dementia without behavioral disturbance, unspecified timing of dementia onset  Not a reliable historian. Is oriented x3. On aricept. Monitor for safety        BP: 127-161/62-84 mmHg  P: 68-84 bpm       REVIEW OF SYSTEMS:  10 point ROS of systems including Constitutional, Eyes, Respiratory, Cardiovascular, Gastroenterology, Integumentary, Muscularskeletal, Neurologic, Psychiatric were all negative except for pertinent positives noted in my HPI, although not sure how accurate  "this is due to patient's cognitive status.      /74  Pulse 73  Temp 98.6  F (37  C)  Resp 20  Ht 5' 8\" (1.727 m)  Wt 143 lb (64.9 kg)  SpO2 92%  BMI 21.74 kg/m2  GENERAL APPEARANCE:  Alert, in no distress  EYES:  EOM, lids, pupils and irises normal, sclera clear and conjunctiva normal, no discharge or mattering on lids or lashes noted  ENT:  Mouth normal, moist mucous membranes, nose without drainage or crusting, external ears without lesions, hearing acuity [place]  RESP:  respiratory effort normal, no respiratory distress, Lung sounds with crackles to bilateral bases, patient is on 5L O2 via NC   CV:  Palpation and auscultation of heart done, rate and rhythm regular.  no edema  ABDOMEN:  normal bowel sounds, soft, nontender, no grimacing or guarding with palpation.  M/S:   Gait and station abnormal: uses walker for ambulation; Digits and nails normal, no tenderness or swelling of the joints; able to move all extremities   SKIN:  Inspection and palpation of skin and subcutaneous tissue: skin warm, dry without rashes  PSYCH:  insight and judgement impaired, memory impaired, affect and mood ok      ASSESSMENT/PLAN:  (R06.02) SOB (shortness of breath)  (primary encounter diagnosis)  (I50.31) Acute diastolic congestive heart failure (H)  (J84.10) Pulmonary fibrosis (H)  (J96.11) Chronic respiratory failure with hypoxia (H)  (Z99.81) Oxygen dependent  Comment: acute- with increased oxygen needs, O2 sats dropping with activity-suspect fluid overload related to CHF, also concerned he may have an infection- likely  also due to pulmonary fibrosis  Plan: Chest xray stat today   STAT IM lasix 1x today  Cbc with diff and BMP 6/20  Ativan 0.25 mg q6h PRN for anxiety/SOB  Duoneb 2.5 mg / vial QID  Change albuterol nebulizer q2h PRN. Continue to adjust oxygen to keep oxygen > 90% on NC. Monitor respiratory status for improvement     (G30.9,  F02.80) Alzheimer's dementia without behavioral disturbance, unspecified timing " of dementia onset  Comment: Chronic- stable- poor medical historian   Plan: Continue aricept. Reorient. Falls precautions. Monitor for safety    Total time spent with patient visit at the skilled nursing facility was 37 minutes including patient visit, review of past records and discussion with nursing staff. Greater than 50% of total time spent with counseling and coordinating care due to SOB, CHF, pulmonary fibrosis, and other problems as above.    Electronically signed by:  CEILA Yoo CNP      Sincerely,        CELIA Yoo CNP

## 2018-06-19 NOTE — PROGRESS NOTES
[place] [facility]    Chief Complaint   Patient presents with     Nursing Home Acute       HPI:    Chaz Soler is a 89 year old  (5/21/1928), who is being seen today for an episodic care visit at [facility].    HPI information obtained from: facility chart records, facility staff, patient report and [facility] chart review.   Today's concern is:  SOB  Acute on chronic diastolic congestive heart failure (H)  Pulmonary fibrosis (H)  Chronic respiratory failure with hypoxia (H)  Oxygen dependent  Patient with recent admission for CHF exacerbation with symptom of increased need for oxygen. Nursing today tells me his oxygen needs are increasing with movement and exertion. Oxygen sats are dropping to upper 70s, but coming back up with rest and oxygen. Is on 5L O2 today. Weight up since admission.  Per PCP note patient is usually on 3-4 L at home 24 hours a day via NC. Was started on TID albuterol nebulizers last week. On metoprolol 25 mg XL, lasix 20 mg daily, losartan 100 mg daily, amlodipine 10 mg daily. VSS, afebrile.     Patient tells me on exam today that he feels ok. He denies increased SOB and at the time of visit is breathing ok. Was told by nursing staff that when he is SOB he appears very anxious and flaps his arms up and down.   Admission weight: 137.8 lbs  Current weight: 143 lbs     Alzheimer's dementia without behavioral disturbance, unspecified timing of dementia onset  Not a reliable historian. Is oriented x3. On aricept. Monitor for safety        BP: 127-161/62-84 mmHg  P: 68-84 bpm       REVIEW OF SYSTEMS:  10 point ROS of systems including Constitutional, Eyes, Respiratory, Cardiovascular, Gastroenterology, Integumentary, Muscularskeletal, Neurologic, Psychiatric were all negative except for pertinent positives noted in my HPI, although not sure how accurate this is due to patient's cognitive status.      /74  Pulse 73  Temp 98.6  F (37  C)  Resp 20   "Ht 5' 8\" (1.727 m)  Wt 143 lb (64.9 kg)  SpO2 92%  BMI 21.74 kg/m2  GENERAL APPEARANCE:  Alert, in no distress  EYES:  EOM, lids, pupils and irises normal, sclera clear and conjunctiva normal, no discharge or mattering on lids or lashes noted  ENT:  Mouth normal, moist mucous membranes, nose without drainage or crusting, external ears without lesions, hearing acuity [place]  RESP:  respiratory effort normal, no respiratory distress, Lung sounds with crackles to bilateral bases, patient is on 5L O2 via NC   CV:  Palpation and auscultation of heart done, rate and rhythm regular.  no edema  ABDOMEN:  normal bowel sounds, soft, nontender, no grimacing or guarding with palpation.  M/S:   Gait and station abnormal: uses walker for ambulation; Digits and nails normal, no tenderness or swelling of the joints; able to move all extremities   SKIN:  Inspection and palpation of skin and subcutaneous tissue: skin warm, dry without rashes  PSYCH:  insight and judgement impaired, memory impaired, affect and mood ok      ASSESSMENT/PLAN:  (R06.02) SOB (shortness of breath)  (primary encounter diagnosis)  (I50.31) Acute diastolic congestive heart failure (H)  (J84.10) Pulmonary fibrosis (H)  (J96.11) Chronic respiratory failure with hypoxia (H)  (Z99.81) Oxygen dependent  Comment: acute- with increased oxygen needs, O2 sats dropping with activity-suspect fluid overload related to CHF, also concerned he may have an infection- likely  also due to pulmonary fibrosis  Plan: Chest xray stat today   STAT IM lasix 1x today  Cbc with diff and BMP 6/20  Ativan 0.25 mg q6h PRN for anxiety/SOB  Duoneb 2.5 mg / vial QID  Change albuterol nebulizer q2h PRN. Continue to adjust oxygen to keep oxygen > 90% on NC. Monitor respiratory status for improvement     (G30.9,  F02.80) Alzheimer's dementia without behavioral disturbance, unspecified timing of dementia onset  Comment: Chronic- stable- poor medical historian   Plan: Continue aricept. Reorient. " Falls precautions. Monitor for safety    Total time spent with patient visit at the [facility] nursing facility was 37 minutes including patient visit, review of past records and discussion with nursing staff. Greater than 50% of total time spent with counseling and coordinating care due to SOB, CHF, pulmonary fibrosis, and other problems as above.    Electronically signed by:  CELIA Yoo CNP

## 2018-06-19 NOTE — TELEPHONE ENCOUNTER
Patients wife called. He is in [facility] as of 6/6/18. Please let Dr. Fox know.  Violet at [phone], ok to lv detailed message.

## 2018-06-20 NOTE — PROGRESS NOTES
[place] [facility]    Chief Complaint   Patient presents with     senior living Acute       [facility] Medical Record Number:  [id number]    HPI:    Chaz Soler is a 89 year old  (5/21/1928), who is being seen today for an episodic care visit at [facility].  HPI information obtained from: facility chart records, facility staff, patient report and [facility] chart review.    Today's concern is:  Pneumonia  Pulmonary fibrosis (H)  Acute on chronic diastolic congestive heart failure (H)  Chronic respiratory failure with hypoxia (H)  Oxygen dependent  Patient with recent admission for CHF exacerbation with symptom of increased need for oxygen.  Yesterday was notified by nursing staff that Mr. Soler is requiring increased oxygen with movement and exertion. His wife is present on exam today and she tells me at home that his oxygen drops to the upper 70s with activity. Per PCP note patient is usually on 3-4 L at home 24 hours a day via NC. Is on duonebs QID, albuterol q2h PRN. Has ativan available for anxiety related to breathlessness.     [place] from 6/20 findings: 1) most likely represent chronic interstitial lung disease. Cannot rule out superimposed pulmonary vascular congestion 2) Probable lingular and left upper lung field infiltrates. WBC from today within normal limits. Will treat for pneumonia today.    While working with physical therapy today oxygen sats are dropping to upper 60s and recovering to normal in 2-3 minutes with rest and oxygen. Slightly worse than yesterday. Oxygen continues on 5L O2 today. Patient with some cough per nursing. Afebrile, other VSS.  Weight still up 4 lbs since admission, but down 2 lbs since yesterday.  Admission weight: 137.8 lbs  Current weight: 141 lbs     CKD (chronic kidney disease) stage 3, GFR 30-59 ml/min  Developed ARF due to diuresis in the hospital. Creatinine back at baseline (1.49) prior to discharge from the hospital.    Creatinine 1.68 today.     Hypertension   On metoprolol 25 mg XL, lasix 20 mg daily, losartan 100 mg daily, amlodipine 10 mg daily    BP: 127-161/62-84 mmHg  P: 68-84 bpm    Alzheimer's dementia without behavioral disturbance, unspecified timing of dementia onset  Not a reliable historian. Is oriented x3. On aricept. Monitor for safety     ALLERGIES: Contrast dye; Lisinopril; and Oxycodone  Past Medical, Surgical, Family and Social History reviewed and updated in [facility].    Current Outpatient Prescriptions   Medication Sig Dispense Refill     acetaminophen (TYLENOL) 500 MG tablet Take 500 mg by mouth 3 times daily       albuterol (2.5 MG/3ML) 0.083% neb solution Take 2.5 mg by nebulization every 2 hours as needed        amLODIPine (NORVASC) 10 MG tablet TAKE ONE TABLET BY MOUTH ONCE DAILY 90 tablet 3     aspirin 81 MG tablet Take 1 tablet (81 mg) by mouth daily 30 tablet 11     atorvastatin (LIPITOR) 20 MG tablet Take 1 tablet (20 mg) by mouth daily 90 tablet 3     cyanocobalamin (VITAMIN  B-12) 1000 MCG tablet Take 1,000 mcg by mouth daily       donepezil (ARICEPT) 10 MG tablet TAKE ONE TABLET BY MOUTH AT BEDTIME 90 tablet 3     DULoxetine (CYMBALTA) 30 MG EC capsule Take 30 mg by mouth daily       furosemide (LASIX) 20 MG tablet Take 20 mg by mouth daily        gabapentin (NEURONTIN) 100 MG capsule TAKE ONE CAPSULE BY MOUTH THREE TIMES DAILY FOR  PAIN 270 capsule 3     ipratropium - albuterol 0.5 mg/2.5 mg/3 mL (DUONEB) 0.5-2.5 (3) MG/3ML neb solution Take 1 vial by nebulization 4 times daily       levothyroxine (SYNTHROID/LEVOTHROID) 88 MCG tablet TAKE ONE TABLET BY MOUTH ONCE DAILY 90 tablet 3     LORazepam (ATIVAN PO) Take 0.25 mg by mouth every 6 hours as needed for anxiety       losartan (COZAAR) 100 MG tablet TAKE ONE TABLET BY MOUTH  DAILY 90 tablet 3     metoprolol succinate (TOPROL-XL) 25 MG 24 hr tablet TAKE ONE TABLET BY MOUTH DAILY 90 tablet 2     Multiple Vitamins-Minerals (MULTIVITAL) TABS Take 1  "tablet by mouth daily       pantoprazole (PROTONIX) 40 MG EC tablet TAKE ONE TABLET BY MOUTH EVERY MORNING 90 tablet 3     tamsulosin (FLOMAX) 0.4 MG capsule TAKE ONE CAPSULE BY MOUTH ONCE DAILY 90 capsule 3     TRAMADOL HCL PO Take 50 mg by mouth 3 times daily        LevoFLOXacin (LEVAQUIN PO) Take 250 mg by mouth daily       PREDNISONE PO Take 30 mg on 6/21, 20 mg on 6/22, and 10 mg on 6/23       Medications reviewed:  Medications reconciled to facility chart and changes were made to reflect current medications as identified as above med list.     REVIEW OF SYSTEMS:  10 point ROS of systems including Constitutional, Eyes, Respiratory, Cardiovascular, Gastroenterology, Genitourinary, Integumentary, Muscularskeletal, Neurologic, Psychiatric were all negative except for pertinent positives noted in my HPI, not sure how accurate his ROS is due to cognitive status.    Physical Exam:  /64  Pulse 75  Temp 98.2  F (36.8  C)  Resp 24  Ht 5' 8\" (1.727 m)  Wt 141 lb (64 kg)  SpO2 93%  BMI 21.44 kg/m2   GENERAL APPEARANCE:  Alert, in no distress  EYES:  EOM, lids, pupils and irises normal, sclera clear and conjunctiva normal, no discharge or mattering on lids or lashes noted  ENT:  Mouth normal, moist mucous membranes, nose without drainage or crusting, external ears without lesions, hearing acuity [place]  RESP:  respiratory effort normal, no respiratory distress, Lung sounds with crackles to bilateral bases, not great air movement, patient is on 5L O2 via NC   CV:  Palpation and auscultation of heart done, rate and rhythm regular.  no edema  ABDOMEN:  normal bowel sounds, soft, nontender, no grimacing or guarding with palpation.  M/S:   Gait and station abnormal: uses walker for ambulation; Digits and nails normal, no tenderness or swelling of the joints; able to move all extremities   SKIN:  Inspection and palpation of skin and subcutaneous tissue: skin warm, dry without rashes  PSYCH:  insight and judgement " impaired, memory impaired, affect and mood ok        Recent Labs:     CBC RESULTS:   Recent Labs   Lab Test 06/11/18 06/03/18   1200   WBC  10.7  9.5   RBC  4.42  4.34*   HGB  13.1*  12.8*   HCT  39.0*  39.4*   MCV  88  91   MCH  29.6  29.5   MCHC  33.6  32.5   RDW  12.9  12.9   PLT  191  227       Last Basic Metabolic Panel:  Recent Labs   Lab Test  06/06/18   0631  06/05/18   0703   NA  141  141   POTASSIUM  3.9  4.0   CHLORIDE  106  102   LEXIS  9.0  9.4   CO2  31  33*   BUN  31*  33*   CR  1.49*  1.81*   GLC  87  99       Liver Function Studies -   Recent Labs   Lab Test  01/12/18   0806  01/07/18   1853  11/28/17   1203   PROTTOTAL   --   7.4  7.3   ALBUMIN   --   3.9  3.7   BILITOTAL   --   0.8  0.7   ALKPHOS   --   134  151*   AST   --   23  23   ALT  20  25  33       Assessment/Plan:  (J18.9) Pneumonia due to infectious organism, unspecified laterality, unspecified part of lung  (primary encounter diagnosis)  (J84.10) Pulmonary fibrosis (H)  (I50.31) Acute diastolic congestive heart failure (H)  (J96.11) Chronic respiratory failure with hypoxia (H)  (Z99.81) Oxygen dependent  Comment: acute- with increased oxygen needs, O2 sats dropping with activity-xray consistent with pneumonia. Suspect some component of fluid overload related to CHF as well, both problems compounded with chronic pulmonary fibrosis  Plan: Will treat for pneumonia. Start prednisone 40 mg today, 30 mg tomorrow, 20 mg 6/22 and 10 mg 6/23  Rocephin 1 gram IM stat today- from ekit, then 1 gram IM q12h for a total of 3 doses.  Levaquin 500 mg STAT today, then 250 mg daily (24 h after initial dose) x 10 total doses (last dose 6/29)  Continue Ativan 0.25 mg q6h PRN for anxiety/SOB  Duoneb 2.5 mg / vial QID. Continue albuterol nebulizer q2h PRN. Continue to adjust oxygen to keep oxygen > 90% on NC. Monitor respiratory status for improvement. Notify provider with decline. Continues on PTA lasix 20 mg daily. If no improvement in breathing tomm will  give another dose of IM lasix    (N18.3) CKD (chronic kidney disease) stage 3, GFR 30-59 ml/min  Comment: Chronic- stable   Plan: BMP 6/21. Avoid nephrotoxic medications.    (I10) Benign essential hypertension  Comment: Chronic- control ok  Plan: Continue medications as above. VS per TCU policy.     (G30.9,  F02.80) Alzheimer's dementia without behavioral disturbance, unspecified timing of dementia onset  Comment: Chronic- stable- poor medical historian   Plan: Continue aricept. Reorient. Falls precautions. Monitor for safety      Total time spent with patient visit at the skilled nursing facility was 36 minutes including patient visit, review of past records and discussion with wife. Greater than 50% of total time spent with counseling and coordinating care due to pneumonia, pulmonary fibrosis, CHF, CKD, other problems as above    Electronically signed by  CELIA Yoo CNP

## 2018-06-20 NOTE — PROGRESS NOTES
Clinic Care Coordination Contact  Care Team Conversations    Pt wife, Violet called the clinic on 6/19/18 to notify that pt is in TCU at [facility].  SW called pt wife back on this date to discuss further.  Violet indicates they had a care conference last week that discussed a possible dc in 1 week.  That week expires today.  After discussions with Violet, she explained pt has had a few episodes of low oxygen sats and continued need for therapy d/t generalized weakness. Violet states she wants him to come home, but needs him to get stronger and also to get the oxygen issues under control.      PETER called the TCU  to gain further information on pt dc status and current condition.  SW left a message requesting to be included for dc planning, provided contact information and requested a call back as able.    Guero iWtt RASHAD  Care Coordinator  [phone]  [email or web address]

## 2018-06-20 NOTE — LETTER
6/20/2018        RE: Chaz Soler  [address]  [facility] [address]        [facility]    Chief Complaint   Patient presents with     custodial Acute       [place] Medical Record Number:  [id number]    HPI:    Chaz Soler is a 89 year old  (5/21/1928), who is being seen today for an episodic care visit at [facility]  [place].  HPI information obtained from: facility chart records, facility staff, patient report and [facility] chart review.    Today's concern is:  Pneumonia  Pulmonary fibrosis (H)  Acute on chronic diastolic congestive heart failure (H)  Chronic respiratory failure with hypoxia (H)  Oxygen dependent  Patient with recent admission for CHF exacerbation with symptom of increased need for oxygen.  Yesterday was notified by nursing staff that Mr. Soler is requiring increased oxygen with movement and exertion. His wife is present on exam today and she tells me at home that his oxygen drops to the upper 70s with activity. Per PCP note patient is usually on 3-4 L at home 24 hours a day via NC. Is on duonebs QID, albuterol q2h PRN. Has ativan available for anxiety related to breathlessness.     Jacinto from 6/20 findings: 1) most likely represent chronic interstitial lung disease. Cannot rule out superimposed pulmonary vascular congestion 2) Probable lingular and left upper lung field infiltrates. WBC from today within normal limits. Will treat for pneumonia today.    While working with physical therapy today oxygen sats are dropping to upper 60s and recovering to normal in 2-3 minutes with rest and oxygen. Slightly worse than yesterday. Oxygen continues on 5L O2 today. Patient with some cough per nursing. Afebrile, other VSS.  Weight still up 4 lbs since admission, but down 2 lbs since yesterday.  Admission weight: 137.8 lbs  Current weight: 141 lbs     CKD (chronic kidney disease) stage 3, GFR 30-59 ml/min  Developed ARF due to  diuresis in the hospital. Creatinine back at baseline (1.49) prior to discharge from the hospital.   Creatinine 1.68 today.     Hypertension   On metoprolol 25 mg XL, lasix 20 mg daily, losartan 100 mg daily, amlodipine 10 mg daily    BP: 127-161/62-84 mmHg  P: 68-84 bpm    Alzheimer's dementia without behavioral disturbance, unspecified timing of dementia onset  Not a reliable historian. Is oriented x3. On aricept. Monitor for safety     ALLERGIES: Contrast dye; Lisinopril; and Oxycodone  Past Medical, Surgical, Family and Social History reviewed and updated in [facility].    Current Outpatient Prescriptions   Medication Sig Dispense Refill     acetaminophen (TYLENOL) 500 MG tablet Take 500 mg by mouth 3 times daily       albuterol (2.5 MG/3ML) 0.083% neb solution Take 2.5 mg by nebulization every 2 hours as needed        amLODIPine (NORVASC) 10 MG tablet TAKE ONE TABLET BY MOUTH ONCE DAILY 90 tablet 3     aspirin 81 MG tablet Take 1 tablet (81 mg) by mouth daily 30 tablet 11     atorvastatin (LIPITOR) 20 MG tablet Take 1 tablet (20 mg) by mouth daily 90 tablet 3     cyanocobalamin (VITAMIN  B-12) 1000 MCG tablet Take 1,000 mcg by mouth daily       donepezil (ARICEPT) 10 MG tablet TAKE ONE TABLET BY MOUTH AT BEDTIME 90 tablet 3     DULoxetine (CYMBALTA) 30 MG EC capsule Take 30 mg by mouth daily       furosemide (LASIX) 20 MG tablet Take 20 mg by mouth daily        gabapentin (NEURONTIN) 100 MG capsule TAKE ONE CAPSULE BY MOUTH THREE TIMES DAILY FOR  PAIN 270 capsule 3     ipratropium - albuterol 0.5 mg/2.5 mg/3 mL (DUONEB) 0.5-2.5 (3) MG/3ML neb solution Take 1 vial by nebulization 4 times daily       levothyroxine (SYNTHROID/LEVOTHROID) 88 MCG tablet TAKE ONE TABLET BY MOUTH ONCE DAILY 90 tablet 3     LORazepam (ATIVAN PO) Take 0.25 mg by mouth every 6 hours as needed for anxiety       losartan (COZAAR) 100 MG tablet TAKE ONE TABLET BY MOUTH  DAILY 90 tablet 3     metoprolol succinate (TOPROL-XL) 25 MG 24 hr tablet  "TAKE ONE TABLET BY MOUTH DAILY 90 tablet 2     Multiple Vitamins-Minerals (MULTIVITAL) TABS Take 1 tablet by mouth daily       pantoprazole (PROTONIX) 40 MG EC tablet TAKE ONE TABLET BY MOUTH EVERY MORNING 90 tablet 3     tamsulosin (FLOMAX) 0.4 MG capsule TAKE ONE CAPSULE BY MOUTH ONCE DAILY 90 capsule 3     TRAMADOL HCL PO Take 50 mg by mouth 3 times daily        LevoFLOXacin (LEVAQUIN PO) Take 250 mg by mouth daily       PREDNISONE PO Take 30 mg on 6/21, 20 mg on 6/22, and 10 mg on 6/23       Medications reviewed:  Medications reconciled to facility chart and changes were made to reflect current medications as identified as above med list.     REVIEW OF SYSTEMS:  10 point ROS of systems including Constitutional, Eyes, Respiratory, Cardiovascular, Gastroenterology, Genitourinary, Integumentary, Muscularskeletal, Neurologic, Psychiatric were all negative except for pertinent positives noted in my HPI, not sure how accurate his ROS is due to cognitive status.    Physical Exam:  /64  Pulse 75  Temp 98.2  F (36.8  C)  Resp 24  Ht 5' 8\" (1.727 m)  Wt 141 lb (64 kg)  SpO2 93%  BMI 21.44 kg/m2   GENERAL APPEARANCE:  Alert, in no distress  EYES:  EOM, lids, pupils and irises normal, sclera clear and conjunctiva normal, no discharge or mattering on lids or lashes noted  ENT:  Mouth normal, moist mucous membranes, nose without drainage or crusting, external ears without lesions, hearing acuity [place]  RESP:  respiratory effort normal, no respiratory distress, Lung sounds with crackles to bilateral bases, not great air movement, patient is on 5L O2 via NC   CV:  Palpation and auscultation of heart done, rate and rhythm regular.  no edema  ABDOMEN:  normal bowel sounds, soft, nontender, no grimacing or guarding with palpation.  M/S:   Gait and station abnormal: uses walker for ambulation; Digits and nails normal, no tenderness or swelling of the joints; able to move all extremities   SKIN:  Inspection and palpation " of skin and subcutaneous tissue: skin warm, dry without rashes  PSYCH:  insight and judgement impaired, memory impaired, affect and mood ok        Recent Labs:     CBC RESULTS:   Recent Labs   Lab Test 06/11/18 06/03/18   1200   WBC  10.7  9.5   RBC  4.42  4.34*   HGB  13.1*  12.8*   HCT  39.0*  39.4*   MCV  88  91   MCH  29.6  29.5   MCHC  33.6  32.5   RDW  12.9  12.9   PLT  191  227       Last Basic Metabolic Panel:  Recent Labs   Lab Test  06/06/18   0631  06/05/18   0703   NA  141  141   POTASSIUM  3.9  4.0   CHLORIDE  106  102   LEXIS  9.0  9.4   CO2  31  33*   BUN  31*  33*   CR  1.49*  1.81*   GLC  87  99       Liver Function Studies -   Recent Labs   Lab Test  01/12/18   0806  01/07/18   1853  11/28/17   1203   PROTTOTAL   --   7.4  7.3   ALBUMIN   --   3.9  3.7   BILITOTAL   --   0.8  0.7   ALKPHOS   --   134  151*   AST   --   23  23   ALT  20  25  33       Assessment/Plan:  (J18.9) Pneumonia due to infectious organism, unspecified laterality, unspecified part of lung  (primary encounter diagnosis)  (J84.10) Pulmonary fibrosis (H)  (I50.31) Acute diastolic congestive heart failure (H)  (J96.11) Chronic respiratory failure with hypoxia (H)  (Z99.81) Oxygen dependent  Comment: acute- with increased oxygen needs, O2 sats dropping with activity-xray consistent with pneumonia. Suspect some component of fluid overload related to CHF as well, both problems compounded with chronic pulmonary fibrosis  Plan: Will treat for pneumonia. Start prednisone 40 mg today, 30 mg tomorrow, 20 mg 6/22 and 10 mg 6/23  Rocephin 1 gram IM stat today- from ekit, then 1 gram IM q12h for a total of 3 doses.  Levaquin 500 mg STAT today, then 250 mg daily (24 h after initial dose) x 10 total doses (last dose 6/29)  Continue Ativan 0.25 mg q6h PRN for anxiety/SOB  Duoneb 2.5 mg / vial QID. Continue albuterol nebulizer q2h PRN. Continue to adjust oxygen to keep oxygen > 90% on NC. Monitor respiratory status for improvement. Notify  provider with decline. Continues on PTA lasix 20 mg daily. If no improvement in breathing tomm will give another dose of IM lasix    (N18.3) CKD (chronic kidney disease) stage 3, GFR 30-59 ml/min  Comment: Chronic- stable   Plan: BMP 6/21. Avoid nephrotoxic medications.    (I10) Benign essential hypertension  Comment: Chronic- control ok  Plan: Continue medications as above. VS per TCU policy.     (G30.9,  F02.80) Alzheimer's dementia without behavioral disturbance, unspecified timing of dementia onset  Comment: Chronic- stable- poor medical historian   Plan: Continue aricept. Reorient. Falls precautions. Monitor for safety      Total time spent with patient visit at the skilled nursing facility was 36 minutes including patient visit, review of past records and discussion with wife. Greater than 50% of total time spent with counseling and coordinating care due to pneumonia, pulmonary fibrosis, CHF, CKD, other problems as above    Electronically signed by  CELIA Yoo CNP                      Sincerely,        CELIA Yoo CNP

## 2018-06-21 NOTE — LETTER
6/21/2018        RE: Chaz Soler  [address]  [facility] [address]        [facility]    Chief Complaint   Patient presents with     Nursing Home Acute       HPI:    Chaz Soler is a 89 year old  (5/21/1928), who is being seen today for an episodic care visit at [facility].    HPI information obtained from: facility chart records, facility staff, patient report and [facility] chart review.   Today's concern is:  Pneumonia  Pulmonary fibrosis (H)  Acute on chronic diastolic congestive heart failure (H)  Chronic respiratory failure with hypoxia (H)  Oxygen dependent  Patient with recent admission for CHF exacerbation with symptom of increased need for oxygen.  Is requiring increased oxygen with movement and exertion. Per wife his oxygen drops to the upper 70s with activity. Per PCP note patient is usually on 3-4 L at home 24 hours a day via NC. Is on duonebs QID, albuterol q2h PRN. Has ativan available for anxiety related to breathlessness.     Jacinto from 6/20 findings: 1) most likely represent chronic interstitial lung disease. Cannot rule out superimposed pulmonary vascular congestion 2) Probable lingular and left upper lung field infiltrates. WBC from today within normal limits. Had orders to start rocephin and levaquin yesterday, however per unknown reasons the first levaquin dose was started late morning today. He did receive first dose of rocephin yesterday,    While working with physical therapy today his oxygen sats are dropping to upper 50s and recovering to normal in 2-3 minutes with rest and oxygen. Slightly worse than yesterday. Oxygen continues on 5L O2 today. Patient with some cough per nursing. Afebrile, other VSS.  Weight still up 4 lbs since admission, stable since yesterday- will diurese today  Admission weight: 137.8 lbs  Current weight: 141 lbs     CKD (chronic kidney disease) stage 3, GFR 30-59 ml/min  Developed ARF due  "to diuresis in the hospital. Creatinine back at baseline (1.49) prior to discharge from the hospital.   Creatinine   Date Value Ref Range Status   06/21/2018 1.80 (H) 0.70 - 1.30 mg/dL Final       Alzheimer's dementia without behavioral disturbance, unspecified timing of dementia onset  Not a reliable historian. Is oriented x3. On aricept. Monitor for safety       BP: 127-161/62-84 mmHg  P: 68-84 bpm      REVIEW OF SYSTEMS:  10 point ROS of systems including Constitutional, Eyes, Respiratory, Cardiovascular, Gastroenterology, Genitourinary, Integumentary, Muscularskeletal, Neurologic, Psychiatric were all negative except for pertinent positives noted in my HPI, not sure how accurate his ROS is due to cognitive status.    /64  Pulse 75  Temp 98.2  F (36.8  C)  Resp 24  Ht 5' 8\" (1.727 m)  Wt 141 lb (64 kg)  SpO2 99%  BMI 21.44 kg/m2  GENERAL APPEARANCE:  Alert, in no distress  EYES:  EOM, lids, pupils and irises normal, sclera clear and conjunctiva normal, no discharge or mattering on lids or lashes noted  ENT:  Mouth normal, moist mucous membranes, nose without drainage or crusting, external ears without lesions, hearing acuity [place]  RESP:  respiratory effort normal, no respiratory distress on exam, Lung sounds with crackles to bilateral bases, not great air movement, patient is on 5L O2 via NC   CV:  Palpation and auscultation of heart done, rate and rhythm regular.  no edema  ABDOMEN:  normal bowel sounds, soft, nontender, no grimacing or guarding with palpation.  M/S:   Gait and station abnormal: uses walker for ambulation; Digits and nails normal, no tenderness or swelling of the joints; able to move all extremities   SKIN:  Inspection and palpation of skin and subcutaneous tissue: skin warm, dry without rashes  PSYCH:  insight and judgement impaired, memory impaired, affect and mood ok    ASSESSMENT/PLAN:  (J18.9) Pneumonia due to infectious organism, unspecified laterality, unspecified part of lung "  (primary encounter diagnosis)  (J84.10) Pulmonary fibrosis (H)  (I50.31) Acute diastolic congestive heart failure (H)  (J96.11) Chronic respiratory failure with hypoxia (H)  (Z99.81) Oxygen dependent  Comment: acute- with increased oxygen needs, O2 sats continue to drop even after starting antibiotics. Continue to treat for pneumonia and suspect some component of fluid overload related to CHF as well, both problems compounded with chronic pulmonary fibrosis  Plan: Lasix 30 mg IM today. BMP 6/22. Continue prednisone taper. Continue Rocephin 1 gram IM q12h for a total of 3 doses.  Continue levaquin through 6/29.  Continue Ativan 0.25 mg q6h PRN for anxiety/SOB  Duoneb 2.5 mg / vial QID. Continue albuterol nebulizer q2h PRN. Continue to adjust oxygen to keep oxygen > 90% on NC. Monitor respiratory status for improvement. Notify provider with decline. Continues on PTA lasix 20 mg daily. Follow up tomorrow.    (N18.3) CKD (chronic kidney disease) stage 3, GFR 30-59 ml/min  Comment: Chronic- stable   Plan: BMP 6/22. Avoid nephrotoxic medications.    (G30.9,  F02.80) Alzheimer's dementia without behavioral disturbance, unspecified timing of dementia onset  Comment: Chronic- stable- poor medical historian   Plan: Continue aricept. Reorient. Falls precautions. Monitor for safety    Electronically signed by:  CELIA Yoo CNP      Sincerely,        CELIA Yoo CNP

## 2018-06-21 NOTE — PROGRESS NOTES
[place]    Chief Complaint   Patient presents with     Nursing Home Acute       HPI:    Chaz Soler is a 89 year old  (5/21/1928), who is being seen today for an episodic care visit at [facility].    HPI information obtained from: facility chart records, facility staff, patient report and [facility] chart review.   Today's concern is:  Pneumonia  Pulmonary fibrosis (H)  Acute on chronic diastolic congestive heart failure (H)  Chronic respiratory failure with hypoxia (H)  Oxygen dependent  Patient with recent admission for CHF exacerbation with symptom of increased need for oxygen.  Is requiring increased oxygen with movement and exertion. Per wife his oxygen drops to the upper 70s with activity. Per PCP note patient is usually on 3-4 L at home 24 hours a day via NC. Is on duonebs QID, albuterol q2h PRN. Has ativan available for anxiety related to breathlessness.     Jacinto from 6/20 findings: 1) most likely represent chronic interstitial lung disease. Cannot rule out superimposed pulmonary vascular congestion 2) Probable lingular and left upper lung field infiltrates. WBC from today within normal limits. Had orders to start rocephin and levaquin yesterday, however per unknown reasons the first levaquin dose was started late morning today. He did receive first dose of rocephin yesterday,    While working with physical therapy today his oxygen sats are dropping to upper 50s and recovering to normal in 2-3 minutes with rest and oxygen. Slightly worse than yesterday. Oxygen continues on 5L O2 today. Patient with some cough per nursing. Afebrile, other VSS.  Weight still up 4 lbs since admission, stable since yesterday- will diurese today  Admission weight: 137.8 lbs  Current weight: 141 lbs     CKD (chronic kidney disease) stage 3, GFR 30-59 ml/min  Developed ARF due to diuresis in the hospital. Creatinine back at baseline (1.49) prior to discharge from the hospital.  "  Creatinine   Date Value Ref Range Status   06/21/2018 1.80 (H) 0.70 - 1.30 mg/dL Final       Alzheimer's dementia without behavioral disturbance, unspecified timing of dementia onset  Not a reliable historian. Is oriented x3. On aricept. Monitor for safety       BP: 127-161/62-84 mmHg  P: 68-84 bpm      REVIEW OF SYSTEMS:  10 point ROS of systems including Constitutional, Eyes, Respiratory, Cardiovascular, Gastroenterology, Genitourinary, Integumentary, Muscularskeletal, Neurologic, Psychiatric were all negative except for pertinent positives noted in my HPI, not sure how accurate his ROS is due to cognitive status.    /64  Pulse 75  Temp 98.2  F (36.8  C)  Resp 24  Ht 5' 8\" (1.727 m)  Wt 141 lb (64 kg)  SpO2 99%  BMI 21.44 kg/m2  GENERAL APPEARANCE:  Alert, in no distress  EYES:  EOM, lids, pupils and irises normal, sclera clear and conjunctiva normal, no discharge or mattering on lids or lashes noted  ENT:  Mouth normal, moist mucous membranes, nose without drainage or crusting, external ears without lesions, hearing acuity [place]  RESP:  respiratory effort normal, no respiratory distress on exam, Lung sounds with crackles to bilateral bases, not great air movement, patient is on 5L O2 via NC   CV:  Palpation and auscultation of heart done, rate and rhythm regular.  no edema  ABDOMEN:  normal bowel sounds, soft, nontender, no grimacing or guarding with palpation.  M/S:   Gait and station abnormal: uses walker for ambulation; Digits and nails normal, no tenderness or swelling of the joints; able to move all extremities   SKIN:  Inspection and palpation of skin and subcutaneous tissue: skin warm, dry without rashes  PSYCH:  insight and judgement impaired, memory impaired, affect and mood ok    ASSESSMENT/PLAN:  (J18.9) Pneumonia due to infectious organism, unspecified laterality, unspecified part of lung  (primary encounter diagnosis)  (J84.10) Pulmonary fibrosis (H)  (I50.31) Acute diastolic congestive " heart failure (H)  (J96.11) Chronic respiratory failure with hypoxia (H)  (Z99.81) Oxygen dependent  Comment: acute- with increased oxygen needs, O2 sats continue to drop even after starting antibiotics. Continue to treat for pneumonia and suspect some component of fluid overload related to CHF as well, both problems compounded with chronic pulmonary fibrosis  Plan: Lasix 30 mg IM today. BMP 6/22. Continue prednisone taper. Continue Rocephin 1 gram IM q12h for a total of 3 doses.  Continue levaquin through 6/29.  Continue Ativan 0.25 mg q6h PRN for anxiety/SOB  Duoneb 2.5 mg / vial QID. Continue albuterol nebulizer q2h PRN. Continue to adjust oxygen to keep oxygen > 90% on NC. Monitor respiratory status for improvement. Notify provider with decline. Continues on PTA lasix 20 mg daily. Follow up tomorrow.    (N18.3) CKD (chronic kidney disease) stage 3, GFR 30-59 ml/min  Comment: Chronic- stable   Plan: BMP 6/22. Avoid nephrotoxic medications.    (G30.9,  F02.80) Alzheimer's dementia without behavioral disturbance, unspecified timing of dementia onset  Comment: Chronic- stable- poor medical historian   Plan: Continue aricept. Reorient. Falls precautions. Monitor for safety    Electronically signed by:  CELIA Yoo CNP

## 2018-06-22 NOTE — PROGRESS NOTES
[place] [facility]    Chief Complaint   Patient presents with     Nursing Home Acute       HPI:    Chaz Soler is a 89 year old  (5/21/1928), who is being seen today for an episodic care visit at [facility].    HPI information obtained from: facility chart records, facility staff, patient report and [facility] chart review.    Today's concern is:  Pneumonia  Pulmonary fibrosis (H)  Acute on chronic diastolic congestive heart failure (H)  Chronic respiratory failure with hypoxia (H)  Oxygen dependent  Patient with recent admission for CHF exacerbation with symptom of increased need for oxygen.  Is requiring increased oxygen with movement and exertion. Per wife his oxygen drops to the upper 70s with activity. Per PCP note patient is usually on 3-4 L at home 24 hours a day via NC. Is on duonebs QID, albuterol q2h PRN. Has ativan available for anxiety related to breathlessness.     Jacinto from 6/20 findings: 1) most likely represent chronic interstitial lung disease. Cannot rule out superimposed pulmonary vascular congestion 2) Probable lingular and left upper lung field infiltrates. Is on rocephin and levaquin. Also received one dose IM lasix yesterday in addition to daily PTA lasix.    Improvement in breathing today. While working with physical therapy today his oxygen sats are dropping to low 80s and recovering to normal in 2-3 minutes with rest and oxygen. Oxygen continues on 5L O2 today. Afebrile, other VSS. Still tired. Weight still up slightly since admission 4 lbs since admission, but improved. Admission weight: 137.8 lbs  Current weight: 140 lbs    Physical deconditioning  Daughter and wife present today. physical therapy stating that due to patient's respiratory status they have limited work they can complete with him and he is starting to plateau. However he is not able to walk with walker, as his satuarations drop too low and he does not tolerate it. Family tells me  "that he needs to be using walker to go home, as wheelchair will not fit in their home. I asked them to estimate walking distances required at home (as they are short per family), so physical therapy can assess and see how he does with those distances. Also discussed with family that may need to change focus of care to qol versus treatment. Wife has the name of a palliative care provider from [facility]. She will bring the info in so we can schedule appointment for them.     CKD (chronic kidney disease) stage 3, GFR 30-59 ml/min  Developed ARF due to diuresis in the hospital. Creatinine back at baseline (1.49) prior to discharge from the hospital. Creatinine stable from yesterday- today 1.83    Alzheimer's dementia without behavioral disturbance, unspecified timing of dementia onset  Not a reliable historian. Is oriented x3. On aricept. Monitor for safety       BP: 127-161/62-84 mmHg  P: 68-84 bpm      REVIEW OF SYSTEMS:  10 point ROS of systems including Constitutional, Eyes, Respiratory, Cardiovascular, Gastroenterology, Genitourinary, Integumentary, Muscularskeletal, Neurologic, Psychiatric were all negative except for pertinent positives noted in my HPI, not sure how accurate his ROS is due to cognitive status.    /76  Pulse 77  Temp 97.8  F (36.6  C)  Resp 24  Ht 5' 8\" (1.727 m)  Wt 140 lb (63.5 kg)  SpO2 95%  BMI 21.29 kg/m2  GENERAL APPEARANCE:  Alert, in no distress  EYES:  EOM, lids, pupils and irises normal, sclera clear and conjunctiva normal, no discharge or mattering on lids or lashes noted  ENT:  Mouth normal, moist mucous membranes, nose without drainage or crusting, external ears without lesions, hearing acuity [place]  RESP:  respiratory effort normal, no respiratory distress on exam, Lung sounds improving- no crackles, diminished but clear, patient is on 5L O2 via NC   CV:  Palpation and auscultation of heart done, rate and rhythm regular.  no edema  ABDOMEN:  normal bowel sounds, soft, " nontender, no grimacing or guarding with palpation.  M/S:   Gait and station abnormal: uses walker for ambulation; Digits and nails normal, no tenderness or swelling of the joints; able to move all extremities   SKIN:  Inspection and palpation of skin and subcutaneous tissue: skin warm, dry without rashes  PSYCH:  insight and judgement impaired, memory impaired, affect and mood ok    ASSESSMENT/PLAN:  (J18.9) Pneumonia due to infectious organism, unspecified laterality, unspecified part of lung  (primary encounter diagnosis)  (J84.10) Pulmonary fibrosis (H)  (I50.31) Acute diastolic congestive heart failure (H)  (J96.11) Chronic respiratory failure with hypoxia (H)  (Z99.81) Oxygen dependent  Comment: acute- with increased oxygen needs, O2 sats not dropping as much today as previous few days. Continue to treat for pneumonia and suspect some component of fluid overload related to CHF as well, both problems compounded with chronic pulmonary fibrosis  Plan: BMP 6/25. Continue prednisone taper. Rocephin complete. Continue levaquin through 6/29.  Continue Ativan 0.25 mg q6h PRN for anxiety/SOB  Duoneb 2.5 mg / vial QID. Continue albuterol nebulizer q2h PRN. Continue to adjust oxygen to keep oxygen > 90% on NC. Monitor respiratory status for improvement. Notify provider with decline. Continues on PTA lasix 20 mg daily. Follow up Monday    (R53.81) Physical deconditioning  Comment: Acute- unable to tolerate walking due to oxygen drops  Plan:Discussed with physical therapist that patient needs to use walker in order to return home. Will get distances from family. Consider home safety eval prior to returning home. Will schedule palliative care consult- wife to bring in provider information. Social work updated and will assist with discharge planning.     (N18.3) CKD (chronic kidney disease) stage 3, GFR 30-59 ml/min  Comment: Chronic- stable   Plan: BMP 6/25. Avoid nephrotoxic medications.    (G30.9,  F02.80) Alzheimer's  dementia without behavioral disturbance, unspecified timing of dementia onset  Comment: Chronic- stable- poor medical historian   Plan: Continue aricept. Reorient. Falls precautions. Monitor for safety      Total time spent with patient visit at the skilled nursing facility was 46 minutes including patient visit, review of past records and discussion with family, and coordination with SW, physical therapy. Greater than 50% of total time spent with counseling and coordinating care due to pneumonia, CHF, pulmonary fibrosis, physical deconditioing and other problems as above      Electronically signed by:  CELIA Yoo CNP

## 2018-06-22 NOTE — LETTER
6/22/2018        RE: Chaz Soler  [address]  [facility] [address]        [place] [facility]    Chief Complaint   Patient presents with     Nursing Home Acute       HPI:    Chaz Soler is a 89 year old  (5/21/1928), who is being seen today for an episodic care visit at [facility].    HPI information obtained from: facility chart records, facility staff, patient report and [facility] chart review.    Today's concern is:  Pneumonia  Pulmonary fibrosis (H)  Acute on chronic diastolic congestive heart failure (H)  Chronic respiratory failure with hypoxia (H)  Oxygen dependent  Patient with recent admission for CHF exacerbation with symptom of increased need for oxygen.  Is requiring increased oxygen with movement and exertion. Per wife his oxygen drops to the upper 70s with activity. Per PCP note patient is usually on 3-4 L at home 24 hours a day via NC. Is on duonebs QID, albuterol q2h PRN. Has ativan available for anxiety related to breathlessness.     Jacinto from 6/20 findings: 1) most likely represent chronic interstitial lung disease. Cannot rule out superimposed pulmonary vascular congestion 2) Probable lingular and left upper lung field infiltrates. Is on rocephin and levaquin. Also received one dose IM lasix yesterday in addition to daily PTA lasix.    Improvement in breathing today. While working with physical therapy today his oxygen sats are dropping to low 80s and recovering to normal in 2-3 minutes with rest and oxygen. Oxygen continues on 5L O2 today. Afebrile, other VSS. Still tired. Weight still up slightly since admission 4 lbs since admission, but improved. Admission weight: 137.8 lbs  Current weight: 140 lbs    Physical deconditioning  Daughter and wife present today. physical therapy stating that due to patient's respiratory status they have limited work they can complete with him and he is starting to plateau. However he is not  "able to walk with walker, as his satuarations drop too low and he does not tolerate it. Family tells me that he needs to be using walker to go home, as wheelchair will not fit in their home. I asked them to estimate walking distances required at home (as they are short per family), so physical therapy can assess and see how he does with those distances. Also discussed with family that may need to change focus of care to qol versus treatment. [facility] She will bring the info in so we can schedule appointment for them.     CKD (chronic kidney disease) stage 3, GFR 30-59 ml/min  Developed ARF due to diuresis in the hospital. Creatinine back at baseline (1.49) prior to discharge from the hospital. Creatinine stable from yesterday- today 1.83    Alzheimer's dementia without behavioral disturbance, unspecified timing of dementia onset  Not a reliable historian. Is oriented x3. On aricept. Monitor for safety       BP: 127-161/62-84 mmHg  P: 68-84 bpm      REVIEW OF SYSTEMS:  10 point ROS of systems including Constitutional, Eyes, Respiratory, Cardiovascular, Gastroenterology, Genitourinary, Integumentary, Muscularskeletal, Neurologic, Psychiatric were all negative except for pertinent positives noted in my HPI, not sure how accurate his ROS is due to cognitive status.    /76  Pulse 77  Temp 97.8  F (36.6  C)  Resp 24  Ht 5' 8\" (1.727 m)  Wt 140 lb (63.5 kg)  SpO2 95%  BMI 21.29 kg/m2  GENERAL APPEARANCE:  Alert, in no distress  EYES:  EOM, lids, pupils and irises normal, sclera clear and conjunctiva normal, no discharge or mattering on lids or lashes noted  ENT:  Mouth normal, moist mucous membranes, nose without drainage or crusting, external ears without lesions, hearing acuity [place]  RESP:  respiratory effort normal, no respiratory distress on exam, Lung sounds improving- no crackles, diminished but clear, patient is on 5L O2 via NC   CV:  Palpation " and auscultation of heart done, rate and rhythm regular.  no edema  ABDOMEN:  normal bowel sounds, soft, nontender, no grimacing or guarding with palpation.  M/S:   Gait and station abnormal: uses walker for ambulation; Digits and nails normal, no tenderness or swelling of the joints; able to move all extremities   SKIN:  Inspection and palpation of skin and subcutaneous tissue: skin warm, dry without rashes  PSYCH:  insight and judgement impaired, memory impaired, affect and mood ok    ASSESSMENT/PLAN:  (J18.9) Pneumonia due to infectious organism, unspecified laterality, unspecified part of lung  (primary encounter diagnosis)  (J84.10) Pulmonary fibrosis (H)  (I50.31) Acute diastolic congestive heart failure (H)  (J96.11) Chronic respiratory failure with hypoxia (H)  (Z99.81) Oxygen dependent  Comment: acute- with increased oxygen needs, O2 sats not dropping as much today as previous few days. Continue to treat for pneumonia and suspect some component of fluid overload related to CHF as well, both problems compounded with chronic pulmonary fibrosis  Plan: BMP 6/25. Continue prednisone taper. Rocephin complete. Continue levaquin through 6/29.  Continue Ativan 0.25 mg q6h PRN for anxiety/SOB  Duoneb 2.5 mg / vial QID. Continue albuterol nebulizer q2h PRN. Continue to adjust oxygen to keep oxygen > 90% on NC. Monitor respiratory status for improvement. Notify provider with decline. Continues on PTA lasix 20 mg daily. Follow up Monday    (R53.81) Physical deconditioning  Comment: Acute- unable to tolerate walking due to oxygen drops  Plan:Discussed with physical therapist that patient needs to use walker in order to return home. Will get distances from family. Consider home safety eval prior to returning home. Will schedule palliative care consult- wife to bring in provider information. Social work updated and will assist with discharge planning.     (N18.3) CKD (chronic kidney disease) stage 3, GFR 30-59  ml/min  Comment: Chronic- stable   Plan: BMP 6/25. Avoid nephrotoxic medications.    (G30.9,  F02.80) Alzheimer's dementia without behavioral disturbance, unspecified timing of dementia onset  Comment: Chronic- stable- poor medical historian   Plan: Continue aricept. Reorient. Falls precautions. Monitor for safety      Total time spent with patient visit at the skilled nursing facility was 46 minutes including patient visit, review of past records and discussion with family, and coordination with SW, physical therapy. Greater than 50% of total time spent with counseling and coordinating care due to pneumonia, CHF, pulmonary fibrosis, physical deconditioing and other problems as above      Electronically signed by:  CELIA Yoo CNP      Sincerely,        CELIA Yoo CNP

## 2018-06-25 NOTE — LETTER
"    6/25/2018        RE: Chaz Soler  [address]  [facility] [address]        [place] [facility]    Chief Complaint   Patient presents with     halfway Acute       [place] Medical Record Number:  [id number]    HPI:    Chaz Soler is a 89 year old  (5/21/1928), who is being seen today for an episodic care visit at [facility] [place].  HPI information obtained from: facility chart records, facility staff, patient report and [facility] chart review.Today's concern is: more SOB and desated last week. Today feels \"ok\", did pretty good in therapies per staff.     Pneumonia due to infectious organism, unspecified laterality, unspecified part of lung  Pulmonary fibrosis (H)  Oxygen dependent  Chronic respiratory failure with hypoxia (H)  Physical deconditioning  CKD (chronic kidney disease) stage 3, GFR 30-59 ml/min  Alzheimer's dementia without behavioral disturbance, unspecified timing of dementia onset     HPI/ROS:  No CP, SOB, Cough, dizziness, fevers, chills, HA, N/V, dysuria or Bowel Abnormalities. Appetite is fair.  No pain.  +Cough, on O2 as before    ALLERGIES: Contrast dye; Lisinopril; and Oxycodone  Past Medical, Surgical, Family and Social History reviewed and updated in [facility].    Current Outpatient Prescriptions   Medication Sig Dispense Refill     acetaminophen (TYLENOL) 500 MG tablet Take 500 mg by mouth 3 times daily       albuterol (2.5 MG/3ML) 0.083% neb solution Take 2.5 mg by nebulization every 2 hours as needed        amLODIPine (NORVASC) 10 MG tablet TAKE ONE TABLET BY MOUTH ONCE DAILY 90 tablet 3     aspirin 81 MG tablet Take 1 tablet (81 mg) by mouth daily 30 tablet 11     atorvastatin (LIPITOR) 20 MG tablet Take 1 tablet (20 mg) by mouth daily 90 tablet 3     cyanocobalamin (VITAMIN  B-12) 1000 MCG tablet Take 1,000 mcg by mouth daily       donepezil (ARICEPT) 10 MG tablet TAKE ONE TABLET BY MOUTH AT BEDTIME 90 tablet 3     " "DULoxetine (CYMBALTA) 30 MG EC capsule Take 30 mg by mouth daily       furosemide (LASIX) 20 MG tablet Take 20 mg by mouth daily        gabapentin (NEURONTIN) 100 MG capsule TAKE ONE CAPSULE BY MOUTH THREE TIMES DAILY FOR  PAIN 270 capsule 3     ipratropium - albuterol 0.5 mg/2.5 mg/3 mL (DUONEB) 0.5-2.5 (3) MG/3ML neb solution Take 1 vial by nebulization 4 times daily       LevoFLOXacin (LEVAQUIN PO) Take 250 mg by mouth daily       levothyroxine (SYNTHROID/LEVOTHROID) 88 MCG tablet TAKE ONE TABLET BY MOUTH ONCE DAILY 90 tablet 3     LORazepam (ATIVAN PO) Take 0.25 mg by mouth every 6 hours as needed for anxiety       losartan (COZAAR) 100 MG tablet TAKE ONE TABLET BY MOUTH  DAILY 90 tablet 3     metoprolol succinate (TOPROL-XL) 25 MG 24 hr tablet TAKE ONE TABLET BY MOUTH DAILY 90 tablet 2     Multiple Vitamins-Minerals (MULTIVITAL) TABS Take 1 tablet by mouth daily       pantoprazole (PROTONIX) 40 MG EC tablet TAKE ONE TABLET BY MOUTH EVERY MORNING 90 tablet 3     tamsulosin (FLOMAX) 0.4 MG capsule TAKE ONE CAPSULE BY MOUTH ONCE DAILY 90 capsule 3     TRAMADOL HCL PO Take 50 mg by mouth 3 times daily        Medications reviewed:  Medications reconciled to facility chart and changes were made to reflect current medications as identified as above med list. Below are the changes that were made:   Medications stopped since last EPIC medication reconciliation:   There are no discontinued medications.    Medications started since last [facility] medication reconciliation:  No orders of the defined types were placed in this encounter.         REVIEW OF SYSTEMS:  See under HPI above    Physical Exam:  /80  Pulse 67  Temp 96.8  F (36  C)  Resp 16  Ht 5' 8\" (1.727 m)  Wt 140 lb 12.8 oz (63.9 kg)  SpO2 96%  BMI 21.41 kg/m2   GENERAL APPEARANCE:  Alert, in no distress, cooperative, sitting in WC  ENT:  Mouth with moist mucous membranes, normal hearing acuity  EYES: Conjunctiva and lids normal  RESP:  respiratory effort "   of chest normal, lungs clear to auscultation, though with  diminished breath sounds and fine crackles in bilat bases Right > Left. Wearing O2 per NC  CV: Auscultation of heart done , RRR no murmur, rub, or gallop, no edema, +2 pedal pulses  ABDOMEN:  normal bowel sounds, soft, nontender  M/S:   Seen in WC, SOLOMON equally  SKIN:  Inspection of skin: pale, warm dry  NEURO:   Cranial nerves 2-12 are grossly intact w/o focal deficits noted.   PSYCH:  oriented to person and place.  ?insight and judgement impaired, memory impaired , affect and mood normal         BP Readings from Last 3 Encounters:   06/25/18 153/80   06/22/18 160/76   06/21/18 130/64     Pulse Readings from Last 4 Encounters:   06/25/18 67   06/22/18 77   06/21/18 75   06/20/18 75     Recent Labs:   CBC RESULTS:   Recent Labs   Lab Test 06/21/18 06/20/18   WBC  7.0  9.0   RBC  4.13*  3.84*   HGB  12.2*  11.6*   HCT  37.4*  35.2*   MCV  91  92   MCH  29.5  30.2   MCHC  32.6  33.0   RDW  13.3  13.2   PLT  200  186       Last Basic Metabolic Panel:  Recent Labs   Lab Test 06/21/18 06/20/18   NA  143  141   POTASSIUM  5.0  4.1   CHLORIDE  102  99   LEXIS  10.7*  10.5   CO2  30  26   BUN  38*  33*   CR  1.80*  1.68*   GLC  155*  90       Liver Function Studies -   Recent Labs   Lab Test  01/12/18   0806  01/07/18   1853  11/28/17   1203   PROTTOTAL   --   7.4  7.3   ALBUMIN   --   3.9  3.7   BILITOTAL   --   0.8  0.7   ALKPHOS   --   134  151*   AST   --   23  23   ALT  20  25  33       TSH   Date Value Ref Range Status   11/28/2017 1.01 0.40 - 4.00 mU/L Final   11/14/2016 0.93 0.40 - 4.00 mU/L Final     ASSESSMENT / PLAN:  (J18.9) Pneumonia due to infectious organism, unspecified laterality, unspecified part of lung  (primary encounter diagnosis)  Comment/Plan: improving but overall per review, pt is weaker. Palliative care consult pending    (J84.10) Pulmonary fibrosis (H)   (Z99.81) Oxygen dependent   (J96.11) Chronic respiratory failure with hypoxia  (H)  Comment/Plan: cont current POC, no changes to day, monitor.    (R53.81) Physical deconditioning  Comment/Plan: PT OT    (N18.3) CKD (chronic kidney disease) stage 3, GFR 30-59 ml/min  Comment/Plan: recent labs reasonable, check prn as condition warrants.    (G30.9,  F02.80) Alzheimer's dementia without behavioral disturbance, unspecified timing of dementia onset  Comment/Plan: no acute issues, cont same plan of care.      Electronically signed by  CELIA Vergara CNP                      Sincerely,        CELIA Vergara CNP

## 2018-06-25 NOTE — PROGRESS NOTES
"[place] [facility]    Chief Complaint   Patient presents with     FPC Acute       [place] Medical Record Number:  [id number]    HPI:    Chaz Soler is a 89 year old  (5/21/1928), who is being seen today for an episodic care visit at [facility] (formerly [facility]).  HPI information obtained from: facility chart records, facility staff, patient report and [facility] chart review.Today's concern is: more SOB and desated last week. Today feels \"ok\", did pretty good in therapies per staff.     Pneumonia due to infectious organism, unspecified laterality, unspecified part of lung  Pulmonary fibrosis (H)  Oxygen dependent  Chronic respiratory failure with hypoxia (H)  Physical deconditioning  CKD (chronic kidney disease) stage 3, GFR 30-59 ml/min  Alzheimer's dementia without behavioral disturbance, unspecified timing of dementia onset     HPI/ROS:  No CP, SOB, Cough, dizziness, fevers, chills, HA, N/V, dysuria or Bowel Abnormalities. Appetite is fair.  No pain.  +Cough, on O2 as before    ALLERGIES: Contrast dye; Lisinopril; and Oxycodone  Past Medical, Surgical, Family and Social History reviewed and updated in [facility].    Current Outpatient Prescriptions   Medication Sig Dispense Refill     acetaminophen (TYLENOL) 500 MG tablet Take 500 mg by mouth 3 times daily       albuterol (2.5 MG/3ML) 0.083% neb solution Take 2.5 mg by nebulization every 2 hours as needed        amLODIPine (NORVASC) 10 MG tablet TAKE ONE TABLET BY MOUTH ONCE DAILY 90 tablet 3     aspirin 81 MG tablet Take 1 tablet (81 mg) by mouth daily 30 tablet 11     atorvastatin (LIPITOR) 20 MG tablet Take 1 tablet (20 mg) by mouth daily 90 tablet 3     cyanocobalamin (VITAMIN  B-12) 1000 MCG tablet Take 1,000 mcg by mouth daily       donepezil (ARICEPT) 10 MG tablet TAKE ONE TABLET BY MOUTH AT BEDTIME 90 tablet 3     DULoxetine (CYMBALTA) 30 MG EC capsule Take 30 mg by mouth daily       furosemide (LASIX) 20 MG tablet " "Take 20 mg by mouth daily        gabapentin (NEURONTIN) 100 MG capsule TAKE ONE CAPSULE BY MOUTH THREE TIMES DAILY FOR  PAIN 270 capsule 3     ipratropium - albuterol 0.5 mg/2.5 mg/3 mL (DUONEB) 0.5-2.5 (3) MG/3ML neb solution Take 1 vial by nebulization 4 times daily       LevoFLOXacin (LEVAQUIN PO) Take 250 mg by mouth daily       levothyroxine (SYNTHROID/LEVOTHROID) 88 MCG tablet TAKE ONE TABLET BY MOUTH ONCE DAILY 90 tablet 3     LORazepam (ATIVAN PO) Take 0.25 mg by mouth every 6 hours as needed for anxiety       losartan (COZAAR) 100 MG tablet TAKE ONE TABLET BY MOUTH  DAILY 90 tablet 3     metoprolol succinate (TOPROL-XL) 25 MG 24 hr tablet TAKE ONE TABLET BY MOUTH DAILY 90 tablet 2     Multiple Vitamins-Minerals (MULTIVITAL) TABS Take 1 tablet by mouth daily       pantoprazole (PROTONIX) 40 MG EC tablet TAKE ONE TABLET BY MOUTH EVERY MORNING 90 tablet 3     tamsulosin (FLOMAX) 0.4 MG capsule TAKE ONE CAPSULE BY MOUTH ONCE DAILY 90 capsule 3     TRAMADOL HCL PO Take 50 mg by mouth 3 times daily        Medications reviewed:  Medications reconciled to facility chart and changes were made to reflect current medications as identified as above med list. Below are the changes that were made:   Medications stopped since last EPIC medication reconciliation:   There are no discontinued medications.    Medications started since last [facility] medication reconciliation:  No orders of the defined types were placed in this encounter.         REVIEW OF SYSTEMS:  See under HPI above    Physical Exam:  /80  Pulse 67  Temp 96.8  F (36  C)  Resp 16  Ht 5' 8\" (1.727 m)  Wt 140 lb 12.8 oz (63.9 kg)  SpO2 96%  BMI 21.41 kg/m2   GENERAL APPEARANCE:  Alert, in no distress, cooperative, sitting in WC  ENT:  Mouth with moist mucous membranes, normal hearing acuity  EYES: Conjunctiva and lids normal  RESP:  respiratory effort   of chest normal, lungs clear to auscultation, though with  diminished breath sounds and fine " crackles in bilat bases Right > Left. Wearing O2 per NC  CV: Auscultation of heart done , RRR no murmur, rub, or gallop, no edema, +2 pedal pulses  ABDOMEN:  normal bowel sounds, soft, nontender  M/S:   Seen in WC, SOLOMON equally  SKIN:  Inspection of skin: pale, warm dry  NEURO:   Cranial nerves 2-12 are grossly intact w/o focal deficits noted.   PSYCH:  oriented to person and place.  ?insight and judgement impaired, memory impaired , affect and mood normal         BP Readings from Last 3 Encounters:   06/25/18 153/80   06/22/18 160/76   06/21/18 130/64     Pulse Readings from Last 4 Encounters:   06/25/18 67   06/22/18 77   06/21/18 75   06/20/18 75     Recent Labs:   CBC RESULTS:   Recent Labs   Lab Test 06/21/18 06/20/18   WBC  7.0  9.0   RBC  4.13*  3.84*   HGB  12.2*  11.6*   HCT  37.4*  35.2*   MCV  91  92   MCH  29.5  30.2   MCHC  32.6  33.0   RDW  13.3  13.2   PLT  200  186       Last Basic Metabolic Panel:  Recent Labs   Lab Test 06/21/18 06/20/18   NA  143  141   POTASSIUM  5.0  4.1   CHLORIDE  102  99   LEXIS  10.7*  10.5   CO2  30  26   BUN  38*  33*   CR  1.80*  1.68*   GLC  155*  90       Liver Function Studies -   Recent Labs   Lab Test  01/12/18   0806  01/07/18   1853  11/28/17   1203   PROTTOTAL   --   7.4  7.3   ALBUMIN   --   3.9  3.7   BILITOTAL   --   0.8  0.7   ALKPHOS   --   134  151*   AST   --   23  23   ALT  20  25  33       TSH   Date Value Ref Range Status   11/28/2017 1.01 0.40 - 4.00 mU/L Final   11/14/2016 0.93 0.40 - 4.00 mU/L Final     ASSESSMENT / PLAN:  (J18.9) Pneumonia due to infectious organism, unspecified laterality, unspecified part of lung  (primary encounter diagnosis)  Comment/Plan: improving but overall per review, pt is weaker. Palliative care consult pending    (J84.10) Pulmonary fibrosis (H)   (Z99.81) Oxygen dependent   (J96.11) Chronic respiratory failure with hypoxia (H)  Comment/Plan: cont current POC, no changes to day, monitor.    (R53.81) Physical  deconditioning  Comment/Plan: PT OT    (N18.3) CKD (chronic kidney disease) stage 3, GFR 30-59 ml/min  Comment/Plan: recent labs reasonable, check prn as condition warrants.    (G30.9,  F02.80) Alzheimer's dementia without behavioral disturbance, unspecified timing of dementia onset  Comment/Plan: no acute issues, cont same plan of care.      Electronically signed by  CELIA Vergara CNP

## 2018-06-25 NOTE — LETTER
6/25/2018        RE: Chaz Soler  [address]  [facility] [address]        [place] [facility]    Chief Complaint   Patient presents with     intermediate Acute       [place] Medical Record Number:  [id number]    HPI:    Chaz Soler is a 89 year old  (5/21/1928), who is being seen today for an episodic care visit at [facility].  HPI information obtained from: facility chart records, facility staff, patient report and [facility] chart review.Today's concern is:     Acute diastolic congestive heart failure (H)  Generalized muscle weakness  Physical deconditioning  Pulmonary fibrosis (H)  Chronic respiratory failure with hypoxia (H)  Oxygen dependent  CKD (chronic kidney disease) stage 3, GFR 30-59 ml/min  Alzheimer's dementia without behavioral disturbance, unspecified timing of dementia onset  Benign essential hypertension***    HPI/ROS:  No CP, SOB, Cough, dizziness, fevers, chills, HA, N/V, dysuria or Bowel Abnormalities. Appetite is ***.  No pain except ***    ALLERGIES: Contrast dye; Lisinopril; and Oxycodone  Past Medical, Surgical, Family and Social History reviewed and updated in [facility].    Current Outpatient Prescriptions   Medication Sig Dispense Refill     acetaminophen (TYLENOL) 500 MG tablet Take 500 mg by mouth 3 times daily       albuterol (2.5 MG/3ML) 0.083% neb solution Take 2.5 mg by nebulization every 2 hours as needed        amLODIPine (NORVASC) 10 MG tablet TAKE ONE TABLET BY MOUTH ONCE DAILY 90 tablet 3     aspirin 81 MG tablet Take 1 tablet (81 mg) by mouth daily 30 tablet 11     atorvastatin (LIPITOR) 20 MG tablet Take 1 tablet (20 mg) by mouth daily 90 tablet 3     cyanocobalamin (VITAMIN  B-12) 1000 MCG tablet Take 1,000 mcg by mouth daily       donepezil (ARICEPT) 10 MG tablet TAKE ONE TABLET BY MOUTH AT BEDTIME 90 tablet 3     DULoxetine (CYMBALTA) 30 MG EC capsule Take 30 mg by mouth daily       furosemide (LASIX)  20 MG tablet Take 20 mg by mouth daily        gabapentin (NEURONTIN) 100 MG capsule TAKE ONE CAPSULE BY MOUTH THREE TIMES DAILY FOR  PAIN 270 capsule 3     ipratropium - albuterol 0.5 mg/2.5 mg/3 mL (DUONEB) 0.5-2.5 (3) MG/3ML neb solution Take 1 vial by nebulization 4 times daily       LevoFLOXacin (LEVAQUIN PO) Take 250 mg by mouth daily       levothyroxine (SYNTHROID/LEVOTHROID) 88 MCG tablet TAKE ONE TABLET BY MOUTH ONCE DAILY 90 tablet 3     LORazepam (ATIVAN PO) Take 0.25 mg by mouth every 6 hours as needed for anxiety       losartan (COZAAR) 100 MG tablet TAKE ONE TABLET BY MOUTH  DAILY 90 tablet 3     metoprolol succinate (TOPROL-XL) 25 MG 24 hr tablet TAKE ONE TABLET BY MOUTH DAILY 90 tablet 2     Multiple Vitamins-Minerals (MULTIVITAL) TABS Take 1 tablet by mouth daily       pantoprazole (PROTONIX) 40 MG EC tablet TAKE ONE TABLET BY MOUTH EVERY MORNING 90 tablet 3     PREDNISONE PO Take 30 mg on 6/21, 20 mg on 6/22, and 10 mg on 6/23       tamsulosin (FLOMAX) 0.4 MG capsule TAKE ONE CAPSULE BY MOUTH ONCE DAILY 90 capsule 3     TRAMADOL HCL PO Take 50 mg by mouth 3 times daily        Medications reviewed:  Medications reconciled to facility chart and changes were made to reflect current medications as identified as above med list. Below are the changes that were made:   Medications stopped since last EPIC medication reconciliation:   There are no discontinued medications.    Medications started since last [facility] medication reconciliation:  No orders of the defined types were placed in this encounter.    ***    REVIEW OF SYSTEMS:  {ROS FGS:718959}    Physical Exam:  There were no vitals taken for this visit.  GENERAL APPEARANCE:  {snf GENERAL APPEARANCE:163288}  ENT:  {snf ENT PE:287045}  EYES:  {snf EYES PE :426416}  NECK:  {NURSING HOME NECK PE:025175}  RESP:  {NURSING HOME RESP PE:953579}  CV:  {snf CV PE:837405}  ABDOMEN:  {[facility] HOME GI PE:628965}  M/S:   {NURSING  HOME M/S PE:149163}  SKIN:  {NURSING HOME SKIN PE:292109}  NEURO:   {[facility] NEURO PE:593705}  PSYCH:  {senior care PSYCH PE:750207}    BP Readings from Last 3 Encounters:   06/22/18 160/76   06/21/18 130/64   06/20/18 130/64     Pulse Readings from Last 4 Encounters:   06/22/18 77   06/21/18 75   06/20/18 75   06/19/18 73     Recent Labs:   CBC RESULTS:   Recent Labs   Lab Test 06/21/18 06/20/18   WBC  7.0  9.0   RBC  4.13*  3.84*   HGB  12.2*  11.6*   HCT  37.4*  35.2*   MCV  91  92   MCH  29.5  30.2   MCHC  32.6  33.0   RDW  13.3  13.2   PLT  200  186       Last Basic Metabolic Panel:  Recent Labs   Lab Test 06/21/18 06/20/18   NA  143  141   POTASSIUM  5.0  4.1   CHLORIDE  102  99   LEXIS  10.7*  10.5   CO2  30  26   BUN  38*  33*   CR  1.80*  1.68*   GLC  155*  90       Liver Function Studies -   Recent Labs   Lab Test  01/12/18   0806  01/07/18   1853  11/28/17   1203   PROTTOTAL   --   7.4  7.3   ALBUMIN   --   3.9  3.7   BILITOTAL   --   0.8  0.7   ALKPHOS   --   134  151*   AST   --   23  23   ALT  20  25  33       TSH   Date Value Ref Range Status   11/28/2017 1.01 0.40 - 4.00 mU/L Final   11/14/2016 0.93 0.40 - 4.00 mU/L Final         Assessment/Plan:  {FGS DX:494342}    Orders:  ***    {FGS TIME SPENT:542101}    Electronically signed by  Diana Brito MA                      Sincerely,        CELIA Vergara CNP

## 2018-06-28 NOTE — LETTER
6/28/2018        RE: Chaz Soler  [address]  [facility] [address]        [place] [facility]    Chief Complaint   Patient presents with     [facility] Acute       [place] Medical Record Number:  [id number]    HPI:    Chaz Soler is a 89 year old  (5/21/1928), who is being seen today for an episodic care visit at [facility].  HPI information obtained from: facility chart records, facility staff, patient report and [facility] chart review.  Today's concern is:  Pneumonia  Pulmonary fibrosis (H)  Chronic respiratory failure with hypoxia (H)  Oxygen dependent  Is continuing to need 5L O2 via NC. Per PCP note patient is usually on 3-4 L at home 24 hours a day via NC. Is on duonebs QID, albuterol q2h PRN. Has ativan available for anxiety related to breathlessness. Has been requiring increased oxygen with movement and exertion. Per wife his oxygen drops to the upper 70s with activity. Today with physical therapy oxygen dropping to 80s with 5L, which is improved from last week. Afebrile, other VSS. Still tired.  He denies cough on exam today. Completes levaquin tomorrow. Completed course of steroids and rocephin recently.     Acute on chronic diastolic congestive heart failure (H)  Patient with recent admission for CHF exacerbation. Received increased diuresis last week.  Weight still up today slightly since admission. On lasix 20 mg daily  Admission weight: 137.8 lbs  Current weight: 142.2 lbs    Physical deconditioning  physical therapy has started working on walking this week- able to walk about 10-15 feet. Patient needs to be using walker to go home, as wheelchair will not fit in their home. physical therapy plans to schedule home safety evaluation for early next week.     CKD (chronic kidney disease) stage 3, GFR 30-59 ml/min  Developed ARF due to diuresis in the hospital. Creatinine back at baseline (1.49) prior to discharge from the  hospital. Creatinine stable.  Creatinine   Date Value Ref Range Status   06/22/2018 1.83 (A) 0.70 - 1.30 mg/dL Final     Alzheimer's dementia without behavioral disturbance, unspecified timing of dementia onset  Not a reliable historian. On aricept. Monitor for safety         BP: 123-165/62-80 mmHg  P: 65-77 bpm      ALLERGIES: Contrast dye; Lisinopril; and Oxycodone  Past Medical, Surgical, Family and Social History reviewed and updated in [facility].    Current Outpatient Prescriptions   Medication Sig Dispense Refill     acetaminophen (TYLENOL) 500 MG tablet Take 500 mg by mouth 3 times daily       albuterol (2.5 MG/3ML) 0.083% neb solution Take 2.5 mg by nebulization every 2 hours as needed        amLODIPine (NORVASC) 10 MG tablet TAKE ONE TABLET BY MOUTH ONCE DAILY 90 tablet 3     aspirin 81 MG tablet Take 1 tablet (81 mg) by mouth daily 30 tablet 11     atorvastatin (LIPITOR) 20 MG tablet Take 1 tablet (20 mg) by mouth daily 90 tablet 3     cyanocobalamin (VITAMIN  B-12) 1000 MCG tablet Take 1,000 mcg by mouth daily       donepezil (ARICEPT) 10 MG tablet TAKE ONE TABLET BY MOUTH AT BEDTIME 90 tablet 3     DULoxetine (CYMBALTA) 30 MG EC capsule Take 30 mg by mouth daily       furosemide (LASIX) 20 MG tablet Take 30 mg by mouth daily        gabapentin (NEURONTIN) 100 MG capsule TAKE ONE CAPSULE BY MOUTH THREE TIMES DAILY FOR  PAIN 270 capsule 3     ipratropium - albuterol 0.5 mg/2.5 mg/3 mL (DUONEB) 0.5-2.5 (3) MG/3ML neb solution Take 1 vial by nebulization 4 times daily       LevoFLOXacin (LEVAQUIN PO) Take 250 mg by mouth daily       levothyroxine (SYNTHROID/LEVOTHROID) 88 MCG tablet TAKE ONE TABLET BY MOUTH ONCE DAILY 90 tablet 3     LORazepam (ATIVAN PO) Take 0.25 mg by mouth every 6 hours as needed for anxiety       losartan (COZAAR) 100 MG tablet TAKE ONE TABLET BY MOUTH  DAILY 90 tablet 3     metoprolol succinate (TOPROL-XL) 25 MG 24 hr tablet TAKE ONE TABLET BY MOUTH DAILY 90 tablet 2     Multiple  "Vitamins-Minerals (MULTIVITAL) TABS Take 1 tablet by mouth daily       pantoprazole (PROTONIX) 40 MG EC tablet TAKE ONE TABLET BY MOUTH EVERY MORNING 90 tablet 3     tamsulosin (FLOMAX) 0.4 MG capsule TAKE ONE CAPSULE BY MOUTH ONCE DAILY 90 capsule 3     TRAMADOL HCL PO Take 50 mg by mouth 3 times daily        Medications reviewed:  Medications reconciled to facility chart and changes were made to reflect current medications as identified as above med list.    REVIEW OF SYSTEMS:  10 point ROS of systems including Constitutional, Eyes, Respiratory, Cardiovascular, Gastroenterology, Genitourinary, Integumentary, Muscularskeletal, Neurologic, Psychiatric were all negative except for pertinent positives noted in my HPI, not sure how accurate his ROS is due to cognitive status.    Physical Exam:  /62  Pulse 74  Temp 96.1  F (35.6  C)  Resp 20  Ht 5' 8\" (1.727 m)  Wt 142 lb 3.2 oz (64.5 kg)  SpO2 99%  BMI 21.62 kg/m2  GENERAL APPEARANCE:  Alert, in no distress, cooperative and pleasant  EYES:  EOM, lids, pupils and irises normal, sclera clear and conjunctiva normal, no discharge or mattering on lids or lashes noted  ENT:  Mouth normal, moist mucous membranes, nose without drainage or crusting, external ears without lesions, hearing acuity [place]  RESP:  respiratory effort normal, no respiratory distress on exam, Lungs sounds clear with bilateral crackles in bases. patient is on 5L O2 via NC   CV:  Palpation and auscultation of heart done, rate and rhythm regular.  no edema  ABDOMEN:  normal bowel sounds, soft, nontender, no grimacing or guarding with palpation.  M/S:   Gait and station abnormal: uses walker for ambulation; Digits and nails normal, no tenderness or swelling of the joints; able to move all extremities   SKIN:  Inspection and palpation of skin and subcutaneous tissue: skin warm, dry without rashes  PSYCH:  insight and judgement impaired, memory impaired, affect and mood ok    Recent Labs:     CBC " RESULTS:   Recent Labs   Lab Test 06/21/18 06/20/18   WBC  7.0  9.0   RBC  4.13*  3.84*   HGB  12.2*  11.6*   HCT  37.4*  35.2*   MCV  91  92   MCH  29.5  30.2   MCHC  32.6  33.0   RDW  13.3  13.2   PLT  200  186       Last Basic Metabolic Panel:  Recent Labs   Lab Test 06/22/18 06/21/18   NA  142  143   POTASSIUM  4.1*  5.0   CHLORIDE  100  102   LEXIS  10.2  10.7*   CO2  29  30   BUN  44*  38*   CR  1.83*  1.80*   GLC  102*  155*       Assessment/Plan:  (J18.9) Pneumonia due to infectious organism, unspecified laterality, unspecified part of lung  (primary encounter diagnosis)  (J84.10) Pulmonary fibrosis (H)  (J96.11) Chronic respiratory failure with hypoxia (H)  (Z99.81) Oxygen dependent  Comment: acute- with increased oxygen needs recently, O2 sats not dropping as much today as last week. Continue to treat for pneumonia and suspect some component of fluid overload related to CHF as well, both problems compounded with chronic pulmonary fibrosis  Plan: BMP 7/2. Continue levaquin through 6/29.  Continue Ativan 0.25 mg q6h PRN for anxiety/SOB  Duoneb 2.5 mg / vial QID. Continue albuterol nebulizer q2h PRN. Continue to adjust oxygen to keep oxygen > 90% on NC. Monitor respiratory status for improvement. Notify provider with decline.      (I50.31) Acute diastolic congestive heart failure (H)  Comment: Acute on chronic- weight creeping back up after diuresis last week  Plan: Increase lasix to 30 mg daily. BMP 7/2. Daily weights. 2 gram Na diet. Notify provider with weight gain >2 lbs in a day, >5 lbs in on week. Follow up early next week or sooner if increased weight.    (R53.81) Physical deconditioning  Comment: Acute- walking 10-15 feet today  Plan: Able to walk about 10-15 feet today with desating to 80s. With increased fatigue. physical therapy with plans to schedule home evaluation. Will schedule palliative care consult- wife to bring in provider information. Social work to assist with discharge planning.       (N18.3) CKD (chronic kidney disease) stage 3, GFR 30-59 ml/min  Comment: Chronic- stable   Plan: BMP 7/2. Avoid nephrotoxic medications.     (G30.9,  F02.80) Alzheimer's dementia without behavioral disturbance, unspecified timing of dementia onset  Comment: Chronic- stable- poor medical historian   Plan: Continue aricept. Reorient. Falls precautions. Monitor for safety         Electronically signed by  CELIA Yoo CNP                      Sincerely,        CELIA Yoo CNP

## 2018-06-28 NOTE — PROGRESS NOTES
[facility]    Chief Complaint   Patient presents with     [facility]       [facility] Record Number:  [id number]    HPI:    Chaz Soler is a 89 year old  (5/21/1928), who is being seen today for an episodic care visit at [facility].  HPI information obtained from: facility chart records, facility staff, patient report and [facility] chart review.  Today's concern is:  Pneumonia  Pulmonary fibrosis (H)  Chronic respiratory failure with hypoxia (H)  Oxygen dependent  Is continuing to need 5L O2 via NC. Per PCP note patient is usually on 3-4 L at home 24 hours a day via NC. Is on duonebs QID, albuterol q2h PRN. Has ativan available for anxiety related to breathlessness. Has been requiring increased oxygen with movement and exertion. Per wife his oxygen drops to the upper 70s with activity. Today with physical therapy oxygen dropping to 80s with 5L, which is improved from last week. Afebrile, other VSS. Still tired. He denies cough on exam today. Completes levaquin tomorrow. Completed course of steroids and rocephin recently.     Acute on chronic diastolic congestive heart failure (H)  Patient with recent admission for CHF exacerbation. Received increased diuresis last week.  Weight still up today slightly since admission. On lasix 20 mg daily  Admission weight: 137.8 lbs  Current weight: 142.2 lbs    Physical deconditioning  physical therapy has started working on walking this week- able to walk about 10-15 feet. Patient needs to be using walker to go home, as wheelchair will not fit in their home. physical therapy plans to schedule home safety evaluation for early next week.     CKD (chronic kidney disease) stage 3, GFR 30-59 ml/min  Developed ARF due to diuresis in the hospital. Creatinine back at baseline (1.49) prior to discharge from the hospital. Creatinine stable.  Creatinine   Date Value Ref Range Status   06/22/2018 1.83 (A) 0.70 - 1.30  mg/dL Final     Alzheimer's dementia without behavioral disturbance, unspecified timing of dementia onset  Not a reliable historian. On aricept. Monitor for safety         BP: 123-165/62-80 mmHg  P: 65-77 bpm      ALLERGIES: Contrast dye; Lisinopril; and Oxycodone  Past Medical, Surgical, Family and Social History reviewed and updated in [facility].    Current Outpatient Prescriptions   Medication Sig Dispense Refill     acetaminophen (TYLENOL) 500 MG tablet Take 500 mg by mouth 3 times daily       albuterol (2.5 MG/3ML) 0.083% neb solution Take 2.5 mg by nebulization every 2 hours as needed        amLODIPine (NORVASC) 10 MG tablet TAKE ONE TABLET BY MOUTH ONCE DAILY 90 tablet 3     aspirin 81 MG tablet Take 1 tablet (81 mg) by mouth daily 30 tablet 11     atorvastatin (LIPITOR) 20 MG tablet Take 1 tablet (20 mg) by mouth daily 90 tablet 3     cyanocobalamin (VITAMIN  B-12) 1000 MCG tablet Take 1,000 mcg by mouth daily       donepezil (ARICEPT) 10 MG tablet TAKE ONE TABLET BY MOUTH AT BEDTIME 90 tablet 3     DULoxetine (CYMBALTA) 30 MG EC capsule Take 30 mg by mouth daily       furosemide (LASIX) 20 MG tablet Take 30 mg by mouth daily        gabapentin (NEURONTIN) 100 MG capsule TAKE ONE CAPSULE BY MOUTH THREE TIMES DAILY FOR  PAIN 270 capsule 3     ipratropium - albuterol 0.5 mg/2.5 mg/3 mL (DUONEB) 0.5-2.5 (3) MG/3ML neb solution Take 1 vial by nebulization 4 times daily       LevoFLOXacin (LEVAQUIN PO) Take 250 mg by mouth daily       levothyroxine (SYNTHROID/LEVOTHROID) 88 MCG tablet TAKE ONE TABLET BY MOUTH ONCE DAILY 90 tablet 3     LORazepam (ATIVAN PO) Take 0.25 mg by mouth every 6 hours as needed for anxiety       losartan (COZAAR) 100 MG tablet TAKE ONE TABLET BY MOUTH  DAILY 90 tablet 3     metoprolol succinate (TOPROL-XL) 25 MG 24 hr tablet TAKE ONE TABLET BY MOUTH DAILY 90 tablet 2     Multiple Vitamins-Minerals (MULTIVITAL) TABS Take 1 tablet by mouth daily       pantoprazole (PROTONIX) 40 MG EC tablet TAKE  "ONE TABLET BY MOUTH EVERY MORNING 90 tablet 3     tamsulosin (FLOMAX) 0.4 MG capsule TAKE ONE CAPSULE BY MOUTH ONCE DAILY 90 capsule 3     TRAMADOL HCL PO Take 50 mg by mouth 3 times daily        Medications reviewed:  Medications reconciled to facility chart and changes were made to reflect current medications as identified as above med list.    REVIEW OF SYSTEMS:  10 point ROS of systems including Constitutional, Eyes, Respiratory, Cardiovascular, Gastroenterology, Genitourinary, Integumentary, Muscularskeletal, Neurologic, Psychiatric were all negative except for pertinent positives noted in my HPI, not sure how accurate his ROS is due to cognitive status.    Physical Exam:  /62  Pulse 74  Temp 96.1  F (35.6  C)  Resp 20  Ht 5' 8\" (1.727 m)  Wt 142 lb 3.2 oz (64.5 kg)  SpO2 99%  BMI 21.62 kg/m2  GENERAL APPEARANCE:  Alert, in no distress, cooperative and pleasant  EYES:  EOM, lids, pupils and irises normal, sclera clear and conjunctiva normal, no discharge or mattering on lids or lashes noted  ENT:  Mouth normal, moist mucous membranes, nose without drainage or crusting, external ears without lesions, hearing acuity [place] [facility]  RESP:  respiratory effort normal, no respiratory distress on exam, Lungs sounds clear with bilateral crackles in bases. patient is on 5L O2 via NC   CV:  Palpation and auscultation of heart done, rate and rhythm regular.  no edema  ABDOMEN:  normal bowel sounds, soft, nontender, no grimacing or guarding with palpation.  M/S:   Gait and station abnormal: uses walker for ambulation; Digits and nails normal, no tenderness or swelling of the joints; able to move all extremities   SKIN:  Inspection and palpation of skin and subcutaneous tissue: skin warm, dry without rashes  PSYCH:  insight and judgement impaired, memory impaired, affect and mood ok    Recent Labs:     CBC RESULTS:   Recent Labs   Lab Test 06/21/18 06/20/18   WBC  7.0  9.0   RBC  4.13*  3.84*   HGB  12.2*  11.6*   HCT  " 37.4*  35.2*   MCV  91  92   MCH  29.5  30.2   MCHC  32.6  33.0   RDW  13.3  13.2   PLT  200  186       Last Basic Metabolic Panel:  Recent Labs   Lab Test 06/22/18 06/21/18   NA  142  143   POTASSIUM  4.1*  5.0   CHLORIDE  100  102   LEXIS  10.2  10.7*   CO2  29  30   BUN  44*  38*   CR  1.83*  1.80*   GLC  102*  155*       Assessment/Plan:  (J18.9) Pneumonia due to infectious organism, unspecified laterality, unspecified part of lung  (primary encounter diagnosis)  (J84.10) Pulmonary fibrosis (H)  (J96.11) Chronic respiratory failure with hypoxia (H)  (Z99.81) Oxygen dependent  Comment: acute- with increased oxygen needs recently, O2 sats not dropping as much today as last week. Continue to treat for pneumonia and suspect some component of fluid overload related to CHF as well, both problems compounded with chronic pulmonary fibrosis  Plan: BMP 7/2. Continue levaquin through 6/29.  Continue Ativan 0.25 mg q6h PRN for anxiety/SOB  Duoneb 2.5 mg / vial QID. Continue albuterol nebulizer q2h PRN. Continue to adjust oxygen to keep oxygen > 90% on NC. Monitor respiratory status for improvement. Notify provider with decline.      (I50.31) Acute diastolic congestive heart failure (H)  Comment: Acute on chronic- weight creeping back up after diuresis last week  Plan: Increase lasix to 30 mg daily. BMP 7/2. Daily weights. 2 gram Na diet. Notify provider with weight gain >2 lbs in a day, >5 lbs in on week. Follow up early next week or sooner if increased weight.    (R53.81) Physical deconditioning  Comment: Acute- walking 10-15 feet today  Plan: Able to walk about 10-15 feet today with desating to 80s. With increased fatigue. physical therapy with plans to schedule home evaluation. Will schedule palliative care consult- wife to bring in provider information. Social work to assist with discharge planning.      (N18.3) CKD (chronic kidney disease) stage 3, GFR 30-59 ml/min  Comment: Chronic- stable   Plan: BMP 7/2. Avoid  nephrotoxic medications.     (G30.9,  F02.80) Alzheimer's dementia without behavioral disturbance, unspecified timing of dementia onset  Comment: Chronic- stable- poor medical historian   Plan: Continue aricept. Reorient. Falls precautions. Monitor for safety         Electronically signed by  CELIA Yoo CNP

## 2018-07-02 PROBLEM — J18.9 COMMUNITY ACQUIRED PNEUMONIA: Status: ACTIVE | Noted: 2018-01-01

## 2018-07-02 NOTE — IP AVS SNAPSHOT
` `     [place] [address] MEDICAL SURGICAL: [phone]                 INTERAGENCY TRANSFER FORM - NOTES (H&P, Discharge Summary, Consults, Procedures, Therapies)   2018                    Hospital Admission Date: 2018  NATTY SOLER   : 1928  Sex: Male        Patient PCP Information     Provider PCP Type    Alirio Fox MD General         History & Physicals      H&P by Rosario Ardon PA-C at 2018  2:49 PM     Author:  Rosario Ardon PA-C Service:  Hospitalist Author Type:  Physician Assistant - LADAN    Filed:  2018  8:38 PM Date of Service:  2018  2:49 PM Creation Time:  2018  2:49 PM    Status:  Attested :  Rosario Ardon PA-C (Physician Assistant - C)    Cosigner:  Nash Rhodes MD at 2018 11:19 PM        Attestation signed by Nash Rhodes MD at 2018 11:19 PM        Physician Attestation   I, Nash Rhodes, have reviewed and discussed with the advanced practice provider their history, physical and plan for Natty Soler. I did not participate in a shared visit by interviewing or examining the patient and this should be billed as an advanced practice provider only visit.    Nash Rhodes  Date of Service (when I saw the patient): I did not personally see this patient today.                               [facility]    Hospitalist History and Physical    Name: Natty Soler    MRN: [id number]  YOB: 1928    Age: 89 year old  Date of Admission:  2018  Date of Service (when I saw the patient):[EM1.1] 18[EM1.2]    Assessment & Plan   Natty Soler is a 89 year old male with a PMH significant for[EM1.1] pulmonary fibrosis, on chronic 4-5 L of oxygen, dementia, history of chronic low back pain, hypertension, diastolic heart failure, status post bioprosthetic aortic valve replacement for aortic stenosis, known history of AAA status post endovascular repair in , recent hospitalization  6/3/2018-6/6/2018[EM1.3] who presents for[EM1.1] coughing and noted acute hypoxia at his transitional care unit today.  In the emergency department patient was found to be febrile with leukocytosis and significant hypoxia requiring high flow oxygen and nebs to keep oxygen saturations in mid 90s, consistent with pneumonia.[EM1.3]    1. Acute hypoxic respiratory failure secondary to hospital-acquired pneumonia[EM1.1]:[EM1.2] Febrile and hypoxic with leukocytosis and CXR difficult to interpret given history of pulmonary fibrosis. Clinical status complicated as patient O2 dependent[EM1.1] pulmonary fibrosis on[EM1.3] 4-5 LPM via NC at baseline[EM1.1] and also has diastolic heart failure.[EM1.3] Confirmed with wife and son that patient is DNR/DNI. They would want BiPAP initiated if needed but no intubation.  -Admit to [facility]  -IV Zosyn  -Continue oxygen NC high flow FIO2 and wean as able  -Scheduled duonebs and prn albuterol nebs  -IV methylprednisolone[EM1.1] 40 mg daily[EM1.3]  -Recheck BMP tomorrow prior to additional IV diuresis  -Follow blood cultures[EM1.1]  -Palliative care consult[EM1.2] as family is interesting in a more comfort-care approach[EM1.3]    2.[EM1.1] History of diastolic heart failure: Patient's BNP is 1886.  He has no lower extremity edema.  There is no clear evidence of pulmonary edema on his chest x-ray.  He received IV Lasix 20 mg in the ER.  We will hold on further diuresis for now and reassess after checking BNP and respiratory status tomorrow morning.  Can resume regular home meds Lasix 20 mg daily.    3. Hypertension: Blood pressure is stable.  Continue Norvasc, losartan, and metoprolol.    4. History of coronary artery disease: Denies chest pain and has undetectable troponin.  Low suspicion for acute coronary syndrome.  Lexiscan 10/2017 negative for significant ischemia with ejection fraction measured at 63%.  Continue aspirin, atorvastatin, metoprolol succinate, and losartan.    5.  Hypothyroidism: Resume Synthroid.    6. GERD: Continue proton pump inhibitor.    7. Dementia: Continue PTA donepezil.    8. Depression/anxiety: Continue PTA fluoxetine and as needed Ativan.[EM1.3]    # Pain Assessment:  Current Pain Score 7/2/2018   Patient currently in pain? sleeping: patient not able to self report   Pain score (0-10) -   Pain location -   Pain descriptors -   CPOT pain score -[EM1.1]   Chaz miller pain level was assessed and he currently denies pain.[EM1.3]      Social/Living Situation:[EM1.1] Currently at [facility] but prior to that was living at home with wife[EM1.3]  DVT Prophylaxis:[EM1.1] Pneumatic Compression Devices[EM1.3]  Code Status:[EM1.1] DNR / DNI[EM1.3]  Disposition:[EM1.1] Anticipate > 2 evening hospitalization[EM1.3]    Primary Care Physician   Alirio Fox    Chief Complaint[EM1.1]   Shortness of breath    History is obtained from the patient's family and chart review as well as discussion with ER physician. The history from the patient is limited due to dementia.[EM1.3]    History of Present Illness   Chaz Soler is a 89 year old male with a PMH significant for[EM1.1] pulmonary fibrosis, on chronic 4-5 L of oxygen, dementia, history of chronic low back pain, hypertension, diastolic heart failure, status post bioprosthetic aortic valve replacement for aortic stenosis, known history of AAA status post endovascular repair in 2011, recent hospitalization 6/3/2018-6/6/2018[EM1.3] who presents for[EM1.1] coughing and noted acute hypoxia at his transitional care unit today.  In the emergency department patient was found to be febrile with leukocytosis and significant hypoxia requiring high flow oxygen and nebs to keep oxygen saturations in mid 90s, consistent with pneumonia.    The patient was recently admitted at [facility] 6/3/2018-6/6/2018 after a fall.   on evaluation in the ER, it was felt that he had a congestive heart failure exacerbation based on his chest x-ray  findings and some hypoxia, and he was given IV Lasix 40 mg and admitted to the hospitalist service.  The patient's chest x-ray was somewhat difficult to interpret at that time given his pulmonary fibrosis history.  He developed acute renal failure after receiving a dose of IV Lasix, so it was suspected that he walked a very fine line with his respiratory status in the setting of chronic hypoxia and his pulmonary fibrosis history.  He was given some gentle IV fluids to correct the AK I and his creatinine returned to his baseline around 1.5.  He was returned to his baseline dose of PTA Lasix 20 mg daily and discharged to [facility] TCU. He is chronically on 4-5 LPM oxygen via nasal cannula.  Of note, the patient was treated with Levaquin for pneumonia during his time at TCU, finishing his last dose on 6/29/2018.    The patient's family and records from the patient's TCU, the patient was not acting like himself yesterday.  He was confused about the time of day and complained that he had not been fed.  His wife visited yesterday to play cribbage and felt like he was having trouble holding the cards or keeping them still.  His orientation to time and situation seems to improve a little later in the day.  The nursing notes from TCU document that he had some left lung crackles and his weight was at 142.6 lbs (baseline appears to be around 140 lbs.  This morning the patient was having shortness of breath with O2 sats in the 70s at rest that were not improving on 5 L of oxygen. The family denies known ill contacts, though the patient did have a roommate in the TCU for a little while earlier in the week. The patient has not been complaining of fever, chills, shortness of breath, cough, wheezing, chest pain, palpitations, nausea, vomiting, abdominal pain, diarrhea, or urinary complaints.[EM1.3]    Upon arrival in the Emergency Department, initial vital signs were[EM1.1] temperature 100.4, heart rate 93, blood pressure 140/90,  respiratory rate 28, SPO2 53% on 5 L of oxygen improved to low to mid 90s on oxygen via high flow nasal cannula.  BMP demonstrates creatinine 1.66, GFR 39, BUN 31, otherwise within normal limits.  CBC was also demonstrates leukocytosis 14.6, hemoglobin 11.8, and platelet count 185.  VBG demonstrates pH 7.47 and venous bicarbonate 35, otherwise within normal limits.  Troponin less than 0.015.  BNP 1886.  Blood cultures ×2 obtained.  Chest x-ray ×1 view showed diffuse bilateral pulmonary opacities either representing edema and/or fibrosis with no definite pleural effusions appreciated or pneumothorax.    The patient currently denies pain or shortness of breath.[EM1.3]     Past Medical History    Past Medical History:   Diagnosis Date     AAA (abdominal aortic aneurysm) (H) 10/5/2011    6.1 cm     Anemia 11/30/2011     Aortic stenosis 10/26/2012     CAD (coronary artery disease)     MI - distal inferior/apex. Non transmural     Carotid artery disease (H)      CHF (congestive heart failure) (H) 12/31/2014     CKD (chronic kidney disease) stage 3, GFR 30-59 ml/min 11/30/2011     COPD (chronic obstructive pulmonary disease) (H)      Dementia 8/4/2015     Edema, unspecified edema 1/17/2016     Essential hypertension      Gout 1/06    knee     Hypercalcemia 1/2/2015     Hyperlipidemia LDL goal < 70      Hyperparathyroidism, unspecified 4/22/2015     Hyperplasia of prostate      LEFT LUNG GRANULOMA      Lumbago      Other chronic pain 10/11/2016     Proteinuria 2/8/2012     Pulmonary fibrosis (H)      PVD (peripheral vascular disease) (H)      Thrombocytopenia (H) 11/30/2011     Unspecified hypothyroidism      Vitamin D deficiency          Past Surgical History   Past Surgical History:   Procedure Laterality Date     COLONOSCOPY  9/02 per patient     DISCECTOMY LUMBAR POSTERIOR MICROSCOPIC ONE LEVEL  8/11/2014    Procedure: DISCECTOMY LUMBAR POSTERIOR MICROSCOPIC ONE LEVEL;  Surgeon: Jone Carvalho MD;  Location:  [facility] OR     ENDOVASCULAR REPAIR ANEURYSM ABDOMINAL AORTA  11/18/2011    Procedure:ENDOVASCULAR REPAIR ANEURYSM ABDOMINAL AORTA; ENDOVASCULAR AAA,RIGHT FEMORAL       LAPAROSCOPIC CHOLECYSTECTOMY  Nov 2011     LAPAROSCOPIC CHOLECYSTECTOMY  11/18/2011    Procedure:LAPAROSCOPIC CHOLECYSTECTOMY; LAPAROSCOPIC CHOLECYSTECTOMY ; Surgeon:DARRYL DORADO; Location:[facility] OR     REPAIR ANEURYSM ABDOMINAL AORTA  Nov 2011     right cataract extraction/IOL  12/03     TRANSCATHETER AORTIC VALVE IMPLANT ANESTHESIA N/A 11/12/2014    Bioprosthetic. Procedure: TRANSCATHETER AORTIC VALVE IMPLANT ANESTHESIA;  Surgeon: Generic Anesthesia Provider;  Location: [facility] OR     VASECTOMY         Prior to Admission Medications   Prior to Admission Medications   Prescriptions Last Dose Informant Patient Reported? Taking?   DULoxetine (CYMBALTA) 30 MG EC capsule 7/1/2018 at Unknown time Self Yes Yes   Sig: Take 30 mg by mouth daily   LORazepam (ATIVAN PO)   Yes No   Sig: Take 0.25 mg by mouth every 6 hours as needed for anxiety   Multiple Vitamins-Minerals (MULTIVITAL) TABS 7/1/2018 at Unknown time Self Yes Yes   Sig: Take 1 tablet by mouth daily   TRAMADOL HCL PO 7/1/2018 at Unknown time  Yes Yes   Sig: Take 50 mg by mouth 3 times daily    acetaminophen (TYLENOL) 500 MG tablet 7/1/2018 at Unknown time Self Yes Yes   Sig: Take 500 mg by mouth 3 times daily   albuterol (2.5 MG/3ML) 0.083% neb solution 7/2/2018 at Unknown time  Yes Yes   Sig: Take 2.5 mg by nebulization every 2 hours as needed    amLODIPine (NORVASC) 10 MG tablet 7/1/2018 at Unknown time Self No Yes   Sig: TAKE ONE TABLET BY MOUTH ONCE DAILY   aspirin 81 MG tablet 7/1/2018 at Unknown time Self No Yes   Sig: Take 1 tablet (81 mg) by mouth daily   atorvastatin (LIPITOR) 20 MG tablet 7/1/2018 at Unknown time Self No Yes   Sig: Take 1 tablet (20 mg) by mouth daily   cyanocobalamin (VITAMIN  B-12) 1000 MCG tablet 7/1/2018 at Unknown time Self Yes Yes   Sig: Take 1,000 mcg by mouth daily    donepezil (ARICEPT) 10 MG tablet 7/1/2018 at Unknown time Self No Yes   Sig: TAKE ONE TABLET BY MOUTH AT BEDTIME   furosemide (LASIX) 20 MG tablet 7/1/2018 at Unknown time Self Yes Yes   Sig: Take 30 mg by mouth daily    gabapentin (NEURONTIN) 100 MG capsule 7/1/2018 at Unknown time Self No Yes   Sig: TAKE ONE CAPSULE BY MOUTH THREE TIMES DAILY FOR  PAIN   ipratropium - albuterol 0.5 mg/2.5 mg/3 mL (DUONEB) 0.5-2.5 (3) MG/3ML neb solution 7/1/2018 at Unknown time  Yes Yes   Sig: Take 1 vial by nebulization 4 times daily   levothyroxine (SYNTHROID/LEVOTHROID) 88 MCG tablet 7/2/2018 at Unknown time Self No Yes   Sig: TAKE ONE TABLET BY MOUTH ONCE DAILY   losartan (COZAAR) 100 MG tablet 7/1/2018 at Unknown time Self No Yes   Sig: TAKE ONE TABLET BY MOUTH  DAILY   metoprolol succinate (TOPROL-XL) 25 MG 24 hr tablet 7/1/2018 at Unknown time Self No Yes   Sig: TAKE ONE TABLET BY MOUTH DAILY   pantoprazole (PROTONIX) 40 MG EC tablet 7/1/2018 at Unknown time Self No Yes   Sig: TAKE ONE TABLET BY MOUTH EVERY MORNING   tamsulosin (FLOMAX) 0.4 MG capsule 7/1/2018 at Unknown time Self No Yes   Sig: TAKE ONE CAPSULE BY MOUTH ONCE DAILY      Facility-Administered Medications: None     Allergies   Allergies   Allergen Reactions     Contrast Dye      SOB, increased Bp, difficulty swallowing. Date 6/3/96 (ipaque contrast)  Was premedicated prior to FRANCK and had no reaction at all (solumedrol and benadryl) 2016  Was premedicated with Methylprednisolone protocol, no reaction 1/25/17     Lisinopril      cough     Oxycodone      Delirium       Social History   Social History   Substance Use Topics     Smoking status: Never Smoker     Smokeless tobacco: Never Used     Alcohol use 0.0 oz/week     0 Standard drinks or equivalent per week      Comment: 1 glass wine/day     Social History     Social History Narrative    Lives in  [facility] co[facility]op in [place] for the past 13 years.     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  Pertinent positives and negatives are noted above.  Otherwise negative for contributory information.    Physical Exam   Temp: 99.7  F (37.6  C) Temp src: Axillary BP: 116/68 (left arm)   Heart Rate: 91 Resp: 28 SpO2: 93 % O2 Device: High Flow Nasal Cannula (HFNC) Oxygen Delivery: Other (Comments) (40)  Vital Signs with Ranges  Temp:  [98.3  F (36.8  C)-100.4  F (38  C)] 99.7  F (37.6  C)  Pulse:  [75] 75  Heart Rate:  [] 91  Resp:  [22-28] 28  BP: ()/() 116/68  FiO2 (%):  [70 %-80 %] 70 %  SpO2:  [53 %-100 %] 93 %  0 lbs 0 oz    GEN:  Alert,[EM1.1] oriented to self[EM1.3], appears comfortable, no overt distress[EM1.1] on high flow oxygen via NC.[EM1.3]  HEENT:  Normocephalic/atraumatic, no scleral icterus, no nasal discharge, mouth moist.  CV:  Regular rate and rhythm, no murmur or JVD.  S1 + S2 noted, no S3 or S4.  LUNGS:[EM1.1] Wheezing[EM1.3] bilaterally without rales/rhonchi/wheezing/retractions.  Symmetric chest rise on inhalation noted.  ABD:  Active bowel sounds, soft, non-tender/non-distended.  No rebound/guarding/rigidity.  EXT:  No edema.  No cyanosis.  No acute joint synovitis noted.  SKIN:  Dry to touch, no exanthems noted in the visualized areas.  NEURO:  Symmetric muscle strength, sensation to touch grossly intact.  Coordination symmetric on general exam.  No new focal deficits appreciated.    Data   Data reviewed today:      Results for orders placed or performed during the hospital encounter of 07/02/18 (from the past 48 hour(s))   EKG 12 lead   Result Value Ref Range    Interpretation ECG Click View Image link to view waveform and result    CBC with  platelets differential   Result Value Ref Range    WBC 14.6 (H) 4.0 - 11.0 10e9/L    RBC Count 3.97 (L) 4.4 - 5.9 10e12/L    Hemoglobin 11.8 (L) 13.3 - 17.7 g/dL    Hematocrit 36.2 (L) 40.0 - 53.0 %    MCV 91 78 - 100 fl    MCH 29.7 26.5 - 33.0 pg    MCHC 32.6 31.5 - 36.5 g/dL    RDW 13.5 10.0 - 15.0 %    Platelet Count 185 150 - 450 10e9/L    Diff Method Automated Method     % Neutrophils 80.7 %    % Lymphocytes 7.4 %    % Monocytes 8.6 %    % Eosinophils 2.5 %    % Basophils 0.3 %    % Immature Granulocytes 0.5 %    Nucleated RBCs 0 0 /100    Absolute Neutrophil 11.8 (H) 1.6 - 8.3 10e9/L    Absolute Lymphocytes 1.1 0.8 - 5.3 10e9/L    Absolute Monocytes 1.3 0.0 - 1.3 10e9/L    Absolute Eosinophils 0.4 0.0 - 0.7 10e9/L    Absolute Basophils 0.1 0.0 - 0.2 10e9/L    Abs Immature Granulocytes 0.1 0 - 0.4 10e9/L    Absolute Nucleated RBC 0.0    Basic metabolic panel   Result Value Ref Range    Sodium 139 133 - 144 mmol/L    Potassium 3.9 3.4 - 5.3 mmol/L    Chloride 100 94 - 109 mmol/L    Carbon Dioxide 32 20 - 32 mmol/L    Anion Gap 7 3 - 14 mmol/L    Glucose 132 (H) 70 - 99 mg/dL    Urea Nitrogen 31 (H) 7 - 30 mg/dL    Creatinine 1.66 (H) 0.66 - 1.25 mg/dL    GFR Estimate 39 (L) >60 mL/min/1.7m2    GFR Estimate If Black 47 (L) >60 mL/min/1.7m2    Calcium 9.7 8.5 - 10.1 mg/dL   Troponin I   Result Value Ref Range    Troponin I ES <0.015 0.000 - 0.045 ug/L   Nt probnp inpatient (BNP)   Result Value Ref Range    N-Terminal Pro BNP Inpatient 1886 (H) 0 - 1800 pg/mL   ISTAT gases lactate sharri POCT   Result Value Ref Range    Ph Venous 7.47 (H) 7.32 - 7.43 pH    PCO2 Venous 49 40 - 50 mm Hg    PO2 Venous 31 25 - 47 mm Hg    Bicarbonate Venous 35 (H) 21 - 28 mmol/L    O2 Sat Venous 63 %    Lactic Acid 1.7 0.7 - 2.1 mmol/L   Blood culture   Result Value Ref Range    Specimen Description Blood Right Arm     Special Requests Aerobic and anaerobic bottles received     Culture Micro PENDING    Blood culture   Result Value Ref  Range    Specimen Description Blood Left Arm     Special Requests Received in aerobic bottle only     Culture Micro PENDING    Chest  XR, 1 view PORTABLE    Narrative    XR CHEST PORT 1 VW 7/2/2018 9:45 AM    HISTORY: Cough.    COMPARISON: Anne 3, 2018.      Impression    IMPRESSION: There are diffuse bilateral pulmonary opacities, which  could reflect edema and/or fibrosis. No definite pleural effusions  appreciated. No pneumothorax.    MD Rosario BAKER PA-C    I discussed the patient with [EM1.1] Meagan[EM1.3] who is in agreement with the above plan.[EM1.1]        Revision History        User Key Date/Time User Provider Type Action    > EM1.4 7/2/2018  8:38 PM Rosario Ardon PA-C Physician Assistant - LADAN Sign     EM1.3 7/2/2018  7:48 PM Rosario Ardon PA-C Physician Assistant - C      EM1.2 7/2/2018  5:45 PM Rosario Ardon PA-C Physician Assistant - C      EM1.1 7/2/2018  2:49 PM Rosario Ardon PA-C Physician Assistant - C                   Discharge Summaries     No notes of this type exist for this encounter.         Consult Notes      Consults by Lissy Melvin RD, LD at 7/6/2018  8:53 AM     Author:  Lissy Melvin RD, LD Service:  Nutrition Author Type:  Registered Dietitian    Filed:  7/6/2018 11:17 AM Date of Service:  7/6/2018  8:53 AM Creation Time:  7/6/2018  8:42 AM    Status:  Signed :  Lissy Melvin RD, LD (Registered Dietitian)         CLINICAL NUTRITION SERVICES  -  ASSESSMENT NOTE      Malnutrition:[MS1.1] Patient does not meet malnutrition criteria at this time[MS1.2]        REASON FOR ASSESSMENT  Chaz Soler is a 89 year old male seen by Registered Dietitian for Admission Nutrition Risk Screen - Unintentional weight loss of 10# or more in past 2 months (screened 7/04 at 10:00).      NUTRITION HISTORY[MS1.1]  - Information obtained from patient, daughter and wife at bedside, chart.[MS1.2]  - Patient with a h/o pulmonary  "fibrosis chronically on O2, dementia, chronic back pain, diastolic heart failure.   - Recent admit and was at TCU.   -[MS1.1] Regular diet in TCU setting, regular diet at baseline - chooses softer foods per preference (wears full upper dentures with \"ok\" fit).  Daughter describes she lives \"hours away\" but tries to remind patient/wife to \"stay away from salt\".   - Wife cooks meals, daughter-in-law also provides some meals (freezes).    - Patient and family describe an overall decrease in appetite for a period of years.  Not impacting oral intakes recently (longstanding issue).    - Meals consumed daily are quite variable.  Patient enjoys scrambled eggs, casseroles, soups, and especially loves ice cream.  - Sometimes will have Ensure in place of a lunch meal.   - NKFA.[MS1.2]      CURRENT NUTRITION ORDERS  Diet Order:     Regular    Current Intake/Tolerance:  % oral intakes since admit.  Ordering meals BID-TID.      PHYSICAL FINDINGS  Observed[MS1.1]  See below, suspect chronic changes associated with aging[MS1.2]  Obtained from Chart/Interdisciplinary Team  Palliative following --> restorative goals of care  BM this AM     ANTHROPOMETRICS  Height: 5' 8\"  Weight: 62.2 kg (137#) --> ?dry wt  Body mass index is 22.12 kg/(m^2).  Weight Status:  Normal BMI  IBW: 70 kg (154#)  % IBW: 89%  Weight History:[MS1.1]  Wt Readings from Last 20 Encounters:   07/05/18 66 kg (145 lb 8 oz)   07/02/18 64.7 kg (142 lb 9.6 oz)   06/28/18 64.5 kg (142 lb 3.2 oz)   06/25/18 63.9 kg (140 lb 12.8 oz)   06/22/18 63.5 kg (140 lb)   06/21/18 64 kg (141 lb)   06/20/18 64 kg (141 lb)   06/19/18 64.9 kg (143 lb)   06/18/18 64.9 kg (143 lb)   06/13/18 63.5 kg (140 lb)   06/12/18 63.9 kg (140 lb 12.8 oz)   06/07/18 66.7 kg (147 lb)   06/06/18 66.7 kg (147 lb)   03/12/18 68 kg (150 lb)   01/18/18 68.5 kg (151 lb)   01/15/18 68.8 kg (151 lb 9.6 oz)   01/09/18 68.1 kg (150 lb 3.2 oz)   01/02/18 63.5 kg (140 lb)   11/28/17 67.1 kg (148 lb) "   10/10/17 71.7 kg (158 lb)[MS1.3]   - Reviewed wt trending from previous admission - large shifts 2/2 fluids, current dry wt consistent with previous admit.  Patient is at risk for true wt loss given trending in 1/2018 but cannot determine fluid shifts vs true at this time.[MS1.1]  Patient and family are unsure of recent wt trending but deny change to fit of clothing.[MS1.2]    LABS  Labs reviewed    MEDICATIONS  Medications reviewed      ASSESSED NUTRITION NEEDS PER APPROVED PRACTICE GUIDELINES:    Dosing Weight 62.2 kg ---> suspected dry wt  Estimated Energy Needs:[MS1.1] 3218-4135[MS1.2] kcals ([MS1.1]25-30 Kcal/Kg[MS1.2])  Justification:[MS1.1] maintenance[MS1.2]  Estimated Protein Needs:[MS1.1] 75-93[MS1.2] grams protein ([MS1.1]1.2-1.5 g pro/Kg[MS1.2])  Justification:[MS1.1] preservation of lean body mass[MS1.2]  Estimated Fluid Needs:[MS1.1] 9443-0833[MS1.2] mL ([MS1.1]1 mL/Kcal[MS1.2])  Justification:[MS1.1] maintenance and per provider pending fluid status[MS1.2]    MALNUTRITION:  % Weight Loss:[MS1.1]  Weight loss does not meet criteria for malnutrition[MS1.2]  % Intake:[MS1.1]  Decreased intake does not meet criteria for malnutrition[MS1.2]   Subcutaneous Fat Loss:[MS1.1]  None observed[MS1.2]  Muscle Loss:[MS1.1]  Temporal region mild depletion, Clavicle bone region mild depletion, Acromion bone region moderate depletion and Dorsal hand region moderate depletion --> did not observe LEs[MS1.2]  Fluid Retention:[MS1.1]  None noted/documented[MS1.2]    Malnutrition Diagnosis:[MS1.1] Patient does not meet two of the above criteria necessary for diagnosing malnutrition[MS1.2]    NUTRITION DIAGNOSIS:[MS1.1]  Predicted suboptimal nutrient intake (energy/protein)[MS1.2] related to[MS1.1] potential for decline in oral intakes with recent admissions, c/o decreased appetite, and use of supplements in home setting.[MS1.2]        NUTRITION INTERVENTIONS  Recommendations / Nutrition Prescription[MS1.1]  Ok to  continue regular diet order.  Do not suspect >2000 mg sodium consumption at this time, consider changing to 2 gram Na diet in the future given hx as above.     Ok for prn Boost supplement.[MS1.2]       Implementation  Nutrition education:[MS1.1] Provided brief education on importance of protein and limiting of added salt/high sodium food items.  Discussed how to order supplement prn.     Collaboration and Referral of Nutrition care: Discussed POC with team during rounds.    Medical food/supplement: As above.[MS1.2]       Nutrition Goals[MS1.1]  Patient to consume at least 50-75% of meals TID or the equivalent with supplement use.[MS1.2]      MONITORING AND EVALUATION:[MS1.1]  Progress towards goals will be monitored and evaluated per protocol and Practice Guidelines[MS1.2]        Lissy Melvin RD, ELLA  Clinical Dietitian  3rd floor/ICU: [phone]  All other floors: [phone]  Weekend/holiday: [phone][MS1.1]     Revision History        User Key Date/Time User Provider Type Action    > MS1.2 7/6/2018 11:17 AM Lsisy Melvin RD, LD Registered Dietitian Sign     MS1.3 7/6/2018  8:52 AM Lissy Melvin RD, LD Registered Dietitian      MS1.1 7/6/2018  8:42 AM Lissy Melvin RD, ELLA Registered Dietitian             Consults by Evangelina Johnson APRN CNS at 7/3/2018  2:04 PM     Author:  Evangelina Johnson APRN CNS Service:  Palliative Author Type:  Clinical Nurse Specialist    Filed:  7/3/2018  2:56 PM Date of Service:  7/3/2018  2:04 PM Creation Time:  7/3/2018  2:04 PM    Status:  Signed :  Evangelina Johnson APRN CNS (Clinical Nurse Specialist)         [facility]    Palliative Care Consultation   Text Page    Date of Admission:  7/2/2018    Assessment & Plan   Chaz Soler is a 89 year old male who was admitted on 7/2/2018. I was asked to see the patient for Goals of care and review of POLST.    Recommendations:  1.Decisional Capacity -  Questionable.  Patient has an advance directive dated 12/15/2016.  Include patient in decision making as much as possible but involve health care agent attempting consensus with patient. Ricardo Kingcarlos, Spouse is his primary Health Care Agent.  Surrogates is Son Kwame Koo.   2. Pain- Chronic[AK1.1] l[AK1.2]ow back pain: DDD, recent vertebral fracture with vertebroplasty, lumbar stenosis, facet arthropathy.   Seen in pain clinic.    -Continue chronic oral and topical medications, adjust as needed.    3. Shortness of breath- Underlying pulmonary Fibrosis diagnosed at [facility] 5 years ago.  Spouse notes with any activity for at least a year, occasionally they notice labored appearing breathing at rest which does not appear to be noticed by patient.  He was recently started on low dose ativan, but uses infrequently per review of TCU record.  -Ativan PRN reordered here, has not needed.  4. Spiritual Care- Oriented to Spiritual Health and Social Work Services as part of Palliative Care team.  4. Care Planning- Per discussion with spouse, there is a bed hold at [facility] and they are hopeful he can go back to TCU at [facility].    Goal of Care:DNR/DNI, Restorative. Refer to most recent POLST which is reviewed and accurate.     Disease Process/es & Symptoms:  Chaz Soler is a 89 year old patient admitted with symptoms of[AK1.1] cough, hypoxia and fever[AK1.2].[AK1.1] H[AK1.2]e has been treated for[AK1.1] respiratory failure r/t hospital acquired pneumonia[AK1.2].      This is in the setting of[AK1.1] dementia, pulmonary fibrosis diagnosed about 5 years ago at [facility], on chronic oxygen 4L, diastolic HF, HTN, CAD, hypothyroidism, GERD, and depression anxiety[AK1.2].[AK1.1] H[AK1.2]e[AK1.1] was discharged last month after hospitalization for pneumonia, this is his 4th admition in the past 9 months.  He has had a noted 10lb weight loss over the last 4 months and most recently was at TCU with hope of regaining strength and ability to return  "home.[AK1.2]     Findings & plan of care discussed with:[AK1.1] Hospitalist, Care Coordinator.[AK1.2]  Follow-up plan from palliative team:[AK1.1] Plan to revisit on Thursday and review goals of care with spouse present.[AK1.2]  Thank you for involving us in the patient's care.     Evangelina Johnson[AK1.1] APRN, CNS[AK1.2]  Pain Management and Palliative Care  [facility]  Pgr: [phone]    Time Spent on this Encounter   I spent[AK1.1]  20[AK1.2] minutes ([AK1.1]1340[AK1.2]-[AK1.1]1400[AK1.2]) in assessment of the patient,[AK1.1] 15 minutes[AK1.2] counseling and discussion with the and family[AK1.1] (spouse)[AK1.2] as documented in sections below. Another[AK1.1] 35[AK1.2] minutes in review of chart, documentation, coordination of care and discussion with the health care team.    Reason for Consult   Reason for consult: I was asked by[AK1.1] Dr. Rhodes[AK1.2] to evaluate this patient for[AK1.1] Goals of care  Patient and family support.[AK1.2]    Primary Care Physician   Alirio Fox    Chief Complaint[AK1.1]   \"I need some sleep\"    History is obtained from the patient, electronic health record and patient's spouse[AK1.2]    History of Present Illness   Chaz Soler is a 89 year old male who presents with symptoms of[AK1.1] cough, hypoxia and fever[AK1.2].[AK1.1] H[AK1.2]e has been treated for[AK1.1] respiratory failure r/t hospital acquired pneumonia[AK1.2].      This is in the setting of[AK1.1] dementia, pulmonary fibrosis diagnosed about 5 years ago at [facility], on chronic oxygen 4L, diastolic HF, HTN, CAD, hypothyroidism, GERD, and depression anxiety[AK1.2].[AK1.1] H[AK1.2]e[AK1.1] was discharged last month after hospitalization for pneumonia, this is his 4th admition in the past 9 months.  He has had a noted 10lb weight loss over the last 4 months and most recently was at TCU with hope of regaining strength and ability to return home.[AK1.2]     The following symptoms are noted by patient as " concerning to his quality of life.[AK1.1]  Pain - chronic in low back, c/o pain today interrupting sleep.[AK1.2]    DDD, recent vertebral fracture with vertebroplasty, lumbar stenosis, facet arthropathy.   Seen in pain clinic.[AK1.1]  Dyspnea - as reported by spouse, noted with activity x 1year and at times with rest breathing appears labored which patient does not appear to notice.  Denies today.  Diarrhea /Constipation- Noted hx of hospitalization with constipation and subsequent diarrhea and dehydration.  Depressive symptoms - Currently denies, on Cymbalta.  Anxiety - Denies, noted recent new PRN Ativan with dyspnea, has not used here.    Dec[AK1.2]ision-Making & Goals of Care:  Discussed on July 3, 2018 with Evangelina Johnson[AK1.1] APRN, CNS[AK1.2]:[AK1.1]   Met with patient who is a poor historian.  He tells me the care is fine at [facility] but he cannot remember much, how he got here or even events of yesterday.  Spouse via phone reviews patient recent history.  She is unclear about a specific diagnosis of dementia, but notes they have visited neurology x 2.  She recounts diagnosis of pulmonary fibrosis and is unable to note any specific prognosis with either of these.  We reviewed POLST and she is open to meeting in person prior to leaving to discuss further within specific framework of outcome of this hospitalization and better prognostic information.[AK1.2]    Patient has decision-making capacity[AK1.1] Unreliable[AK1.2]  Patient has[AK1.1] a Health Care Agent named in a legal Advance Directive document dated 12/15/2016. See name(s) and contact information in Code/ACP/Advance Care Planning Activity Tab.  Physician orders for life-sustaining treatment (POLST) form is on file[AK1.2]  Code Status:[AK1.1] Do not resusitate / Do not intubate[AK1.2]     Past Medical History[AK1.1]   I have reviewed this patient's medical history and updated it with pertinent information if needed.[AK1.2]   Past Medical  History:   Diagnosis Date     AAA (abdominal aortic aneurysm) (H) 10/5/2011    6.1 cm     Anemia 11/30/2011     Aortic stenosis 10/26/2012     CAD (coronary artery disease)     MI - distal inferior/apex. Non transmural     Carotid artery disease (H)      CHF (congestive heart failure) (H) 12/31/2014     CKD (chronic kidney disease) stage 3, GFR 30-59 ml/min 11/30/2011     COPD (chronic obstructive pulmonary disease) (H)      Dementia 8/4/2015     Edema, unspecified edema 1/17/2016     Essential hypertension      Gout 1/06    knee     Hypercalcemia 1/2/2015     Hyperlipidemia LDL goal < 70      Hyperparathyroidism, unspecified 4/22/2015     Hyperplasia of prostate      LEFT LUNG GRANULOMA      Lumbago      Other chronic pain 10/11/2016     Proteinuria 2/8/2012     Pulmonary fibrosis (H)      PVD (peripheral vascular disease) (H)      Thrombocytopenia (H) 11/30/2011     Unspecified hypothyroidism      Vitamin D deficiency[AK1.3]        Past Surgical History[AK1.1]   I have reviewed this patient's surgical history and updated it with pertinent information if needed.[AK1.2]  Past Surgical History:   Procedure Laterality Date     COLONOSCOPY  9/02 per patient     DISCECTOMY LUMBAR POSTERIOR MICROSCOPIC ONE LEVEL  8/11/2014    Procedure: DISCECTOMY LUMBAR POSTERIOR MICROSCOPIC ONE LEVEL;  Surgeon: Jone Carvalho MD;  Location:  OR     ENDOVASCULAR REPAIR ANEURYSM ABDOMINAL AORTA  11/18/2011    Procedure:ENDOVASCULAR REPAIR ANEURYSM ABDOMINAL AORTA; ENDOVASCULAR AAA,RIGHT FEMORAL       LAPAROSCOPIC CHOLECYSTECTOMY  Nov 2011     LAPAROSCOPIC CHOLECYSTECTOMY  11/18/2011    Procedure:LAPAROSCOPIC CHOLECYSTECTOMY; LAPAROSCOPIC CHOLECYSTECTOMY ; Surgeon:DARRYL DORADO; Location: OR     REPAIR ANEURYSM ABDOMINAL AORTA  Nov 2011     right cataract extraction/IOL  12/03     TRANSCATHETER AORTIC VALVE IMPLANT ANESTHESIA N/A 11/12/2014    Bioprosthetic. Procedure: TRANSCATHETER AORTIC VALVE IMPLANT ANESTHESIA;   Surgeon: Generic Anesthesia Provider;  Location: [facility] OR     VASECTOMY[AK1.3]         Prior to Admission Medications   Prior to Admission Medications   Prescriptions Last Dose Informant Patient Reported? Taking?   DULoxetine (CYMBALTA) 30 MG EC capsule 7/1/2018 at Unknown time Self Yes Yes   Sig: Take 30 mg by mouth daily   LORazepam (ATIVAN PO)   Yes No   Sig: Take 0.25 mg by mouth every 6 hours as needed for anxiety   Multiple Vitamins-Minerals (MULTIVITAL) TABS 7/1/2018 at Unknown time Self Yes Yes   Sig: Take 1 tablet by mouth daily   NONFORMULARY   Yes Yes   Sig: Apply topically 4 times daily as needed (to lower back for pain) Salonpas Lidocaine 4% plus Benzyl Alcohol 10% Cream   TRAMADOL HCL PO 7/1/2018 at Unknown time  Yes Yes   Sig: Take 50 mg by mouth 3 times daily    acetaminophen (TYLENOL) 500 MG tablet 7/1/2018 at Unknown time Self Yes Yes   Sig: Take 500 mg by mouth 3 times daily   albuterol (2.5 MG/3ML) 0.083% neb solution 7/2/2018 at Unknown time  Yes Yes   Sig: Take 2.5 mg by nebulization every 2 hours as needed    amLODIPine (NORVASC) 10 MG tablet 7/1/2018 at Unknown time Self No Yes   Sig: TAKE ONE TABLET BY MOUTH ONCE DAILY   aspirin 81 MG tablet 7/1/2018 at Unknown time Self No Yes   Sig: Take 1 tablet (81 mg) by mouth daily   atorvastatin (LIPITOR) 20 MG tablet 7/1/2018 at Unknown time Self No Yes   Sig: Take 1 tablet (20 mg) by mouth daily   cyanocobalamin (VITAMIN  B-12) 1000 MCG tablet 7/1/2018 at Unknown time Self Yes Yes   Sig: Take 1,000 mcg by mouth daily   diclofenac (VOLTAREN) 1 % GEL topical gel Past Week at Unknown time  Yes Yes   Sig: Place 2 g onto the skin 4 times daily as needed (lower back pain)    donepezil (ARICEPT) 10 MG tablet 7/1/2018 at Unknown time Self No Yes   Sig: TAKE ONE TABLET BY MOUTH AT BEDTIME   furosemide (LASIX) 20 MG tablet 7/1/2018 at Unknown time Self Yes Yes   Sig: Take 30 mg by mouth daily    gabapentin (NEURONTIN) 100 MG capsule 7/1/2018 at Unknown time Self  "No Yes   Sig: TAKE ONE CAPSULE BY MOUTH THREE TIMES DAILY FOR  PAIN   ipratropium - albuterol 0.5 mg/2.5 mg/3 mL (DUONEB) 0.5-2.5 (3) MG/3ML neb solution 7/1/2018 at Unknown time  Yes Yes   Sig: Take 1 vial by nebulization 4 times daily   levothyroxine (SYNTHROID/LEVOTHROID) 88 MCG tablet 7/2/2018 at Unknown time Self No Yes   Sig: TAKE ONE TABLET BY MOUTH ONCE DAILY   losartan (COZAAR) 100 MG tablet 7/1/2018 at Unknown time Self No Yes   Sig: TAKE ONE TABLET BY MOUTH  DAILY   metoprolol succinate (TOPROL-XL) 25 MG 24 hr tablet 7/1/2018 at Unknown time Self No Yes   Sig: TAKE ONE TABLET BY MOUTH DAILY   pantoprazole (PROTONIX) 40 MG EC tablet 7/1/2018 at Unknown time Self No Yes   Sig: TAKE ONE TABLET BY MOUTH EVERY MORNING   tamsulosin (FLOMAX) 0.4 MG capsule 7/1/2018 at Unknown time Self No Yes   Sig: TAKE ONE CAPSULE BY MOUTH ONCE DAILY      Facility-Administered Medications: None     Allergies   Allergies   Allergen Reactions     Contrast Dye      SOB, increased Bp, difficulty swallowing. Date 6/3/96 (ipaque contrast)  Was premedicated prior to FRANCK and had no reaction at all (solumedrol and benadryl) 2016  Was premedicated with Methylprednisolone protocol, no reaction 1/25/17     Lisinopril      cough     Oxycodone      Delirium       Social History   I have updated and reviewed the following Social History Narrative:   Social History     Social History Narrative    Lives in  [facility] in [place] for the past 13 years.     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]  Use of community resources:[AK1.1] none currently[AK1.2]  Activities/interests:[AK1.1] Cribbage[AK1.2]  History of substance use/abuse:[AK1.1] unknown[AK1.2]  Voodoo affiliation:[AK1.1] " Hindu[AK1.2]  Involvement in felicia community:[AK1.1] unknown[AK1.2]    Family History[AK1.1]   I have reviewed this patient's family history and updated it with pertinent information if needed.[AK1.2]   Family History   Problem Relation Age of Onset     Hypertension Mother      Hypertension Father[AK1.4]        Review of Systems[AK1.1]   The 10 point Review of Systems is negative other than noted in the HPI or here.   Patient is inconsistent with some answers but generally able to participate for current status without a good history, thus obtained from spouse.[AK1.2]    Palliative Symptom Review (0=no symptom/no concern, 1=mild, 2=moderate, 3=severe):      Pain:[AK1.1] 1-mild, keeps from sleep[AK1.2]      Fatigue:[AK1.1] 2-moderate[AK1.2]      Nausea:[AK1.1] 0-none[AK1.2]      Constipation:[AK1.1] 0-none[AK1.2]      Diarrhea:[AK1.1] 0-none[AK1.2]      Depressive Symptoms:[AK1.1] 0-none[AK1.2]      Anxiety:[AK1.1] 0-none[AK1.2]      Drowsiness:[AK1.1] 1-mild[AK1.2]      Poor Appetite:[AK1.1] 0-none[AK1.2]      Shortness of Breath:[AK1.1] 0-none[AK1.2]      Insomnia:[AK1.1] 1-mild[AK1.2]    Physical Exam   Temp:  [97.1  F (36.2  C)-98.6  F (37  C)] 98.5  F (36.9  C)  Heart Rate:  [74-94] 85  Resp:  [22-28] 24  BP: (110-143)/(39-67) 110/39  FiO2 (%):  [45 %-50 %] 45 %  SpO2:  [90 %-99 %] 95 %  140 lbs 1.6 oz  GEN:  Alert, oriented x[AK1.1] 1[AK1.2], appears comfortable, NAD.  HEENT:  Normocephalic/atraumatic, no scleral icterus, no nasal discharge, mouth moist.  CV:  RRR, S1, S2; no murmurs or other irregularities noted.  +3 DP/PT pulses bilatererally; no edema BLE.  RESP:  C[AK1.1]oarse[AK1.2] to auscultation bilaterally[AK1.1].[AK1.2] Symmetric chest rise on inhalation noted.  Normal respiratory effort.  ABD:  Rounded, soft, non-tender/non-distended.  +BS  EXT:  Edema & pulses as noted above.  CMS intact x 4.     M/S:[AK1.1]   Deferred.[AK1.2]   SKIN:  Dry to touch, no exanthems noted in the visualized areas.     NEURO:[AK1.1] SOLOMON.[AK1.2]  Psych:  Normal affect.  Calm, cooperative,[AK1.1] confused conversation.[AK1.2]    Data[AK1.1]   Results for orders placed or performed during the hospital encounter of 07/02/18 (from the past 24 hour(s))   Lactic acid level STAT for sepsis protocol   Result Value Ref Range    Lactate for Sepsis Protocol 1.2 0.4 - 1.9 mmol/L   Basic metabolic panel   Result Value Ref Range    Sodium 140 133 - 144 mmol/L    Potassium 4.1 3.4 - 5.3 mmol/L    Chloride 102 94 - 109 mmol/L    Carbon Dioxide 30 20 - 32 mmol/L    Anion Gap 8 3 - 14 mmol/L    Glucose 119 (H) 70 - 99 mg/dL    Urea Nitrogen 37 (H) 7 - 30 mg/dL    Creatinine 1.63 (H) 0.66 - 1.25 mg/dL    GFR Estimate 40 (L) >60 mL/min/1.7m2    GFR Estimate If Black 48 (L) >60 mL/min/1.7m2    Calcium 9.4 8.5 - 10.1 mg/dL   CBC with platelets differential   Result Value Ref Range    WBC 11.4 (H) 4.0 - 11.0 10e9/L    RBC Count 3.77 (L) 4.4 - 5.9 10e12/L    Hemoglobin 11.1 (L) 13.3 - 17.7 g/dL    Hematocrit 34.3 (L) 40.0 - 53.0 %    MCV 91 78 - 100 fl    MCH 29.4 26.5 - 33.0 pg    MCHC 32.4 31.5 - 36.5 g/dL    RDW 13.6 10.0 - 15.0 %    Platelet Count 161 150 - 450 10e9/L    Diff Method Automated Method     % Neutrophils 83.6 %    % Lymphocytes 11.0 %    % Monocytes 4.8 %    % Eosinophils 0.0 %    % Basophils 0.1 %    % Immature Granulocytes 0.5 %    Nucleated RBCs 0 0 /100    Absolute Neutrophil 9.5 (H) 1.6 - 8.3 10e9/L    Absolute Lymphocytes 1.3 0.8 - 5.3 10e9/L    Absolute Monocytes 0.6 0.0 - 1.3 10e9/L    Absolute Eosinophils 0.0 0.0 - 0.7 10e9/L    Absolute Basophils 0.0 0.0 - 0.2 10e9/L    Abs Immature Granulocytes 0.1 0 - 0.4 10e9/L    Absolute Nucleated RBC 0.0[AK1.5]         Revision History        User Key Date/Time User Provider Type Action    > AK1.5 7/3/2018  2:56 PM Evangelina Johnson APRN CNS Clinical Nurse Specialist Sign     AK1.4 7/3/2018  2:52 PM Evangelina Johnson APRN CNS Clinical Nurse Specialist      AK1.3 7/3/2018  2:51 PM  Evangelina Johnson APRN CNS Clinical Nurse Specialist      AK1.2 7/3/2018  2:33 PM Evangelina Johnson APRN CNS Clinical Nurse Specialist      AK1.1 7/3/2018  2:04 PM Evangelina Johnson APRN CNS Clinical Nurse Specialist                      Progress Notes - Physician (Notes from 07/04/18 through 07/07/18)      Progress Notes by Rachel Mclain MD at 7/6/2018  4:47 PM     Author:  Rachel Mclain MD Service:  Hospitalist Author Type:  Physician    Filed:  7/6/2018  4:48 PM Date of Service:  7/6/2018  4:47 PM Creation Time:  7/6/2018  4:47 PM    Status:  Addendum :  Rachel Mclain MD (Physician)         [facility]  Hospitalist Progress Note  Patient Name: Chaz Soler    MRN: [id number]  Provider:  Rachel Mclain MD  Date:07/06/2018      Initial presenting complaint/issue to hospital (Diagnosis): sob    Summary of Stay: Chaz Soler is a 89 year old male w/ h/o dementia, COPD on 5L oxygen at baseline, pulm fibrosis who was admitted on 7/2/2018 with   Pt is DNR/DNI but ok with BiPAP if needed, confirmed with family. He is much improved with IV antibiotics and IV steroids for treatment of healthcare associated pneumonia.  Unfortunately has mild acute kidney injury due to diuresis.  Overall he is declining in health with 4 admissions this year so far and has poor long-term prognosis given multiorgan dysfunction (CHF, chronic respiratory failure on oxygen, chronic kidney disease, dementia, frailty)-palliative care is following.         Assessment and Plan:      1. Acute hypoxic respiratory failure secondary to hospital-acquired pneumonia: Febrile and hypoxic with leukocytosis and CXR difficult to interpret given history of pulmonary fibrosis. 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 Confirmed with wife and son that patient is DNR/DNI. They would want BiPAP initiated  if needed but no intubation.  -cont IV Zosyn  -weaned off high flow now back to usual O2 requirement   -Scheduled duonebs and prn albuterol nebs-holding duoneb due to elevated lactic acid   -IV methylprednisolone 40 mg daily, increased to BID  -Follow blood cultures  -Palliative care consulted as family is interested in a more comfort-care approach but for now has decided to remain DNR/DNI      History of diastolic heart failure: Patient's BNP is 1886.  He has no lower extremity edema.  There is no clear evidence of pulmonary edema on his chest x-ray. He received lasix 20 mg day 1, then 40 mg BID day 2 then had worsening Cr so lasix is now on hold.      Hypertension: Blood pressure is stable.  Continue Norvasc, losartan, and metoprolol.     History of coronary artery disease: Denies chest pain and has undetectable troponin.  Low suspicion for acute coronary syndrome.  Lexiscan 10/2017 negative for significant ischemia with ejection fraction measured at 63%.  Continue aspirin, atorvastatin, metoprolol succinate, and losartan.     Hypothyroidism: Resume Synthroid.     GERD: Continue proton pump inhibitor.     Dementia: Continue PTA donepezil.     Depression/anxiety: Continue PTA fluoxetine and as needed Ativan.    SOL on CKD: Likely due to diuresis. improved with hydration     Care plan:Overall he is declining in health with 4 admissions this year so far and has poor long-term prognosis given multiorgan dysfunction (CHF, chronic respiratory failure on oxygen, chronic kidney disease, dementia, frailty)-palliative care is following.    # Pain Assessment:  Current Pain Score 7/6/2018   Patient currently in pain? denies   Pain score (0-10) -   Pain location -   Pain descriptors -   CPOT pain score -   Chaz s pain level was assessed and he currently denies pain.       DVT Prophylaxis:  -  Heparin   Code Status: DNR / DNI  Discharge Dispo: tcu   Estimated Disch Date / # of Days until Discharge[RS1.1] : tomorrow[RS1.2]         "Interval History:      Patient states he feels good   denies any shortness of breath    Oxygen requirements improving    Met with family-wife Kemi, 2 daughters, 1 son went over the plan of care and discussed his declining health.  Encouraged them to be meet with palliative care consult during this hospitalization       Data reviewed today: I reviewed all new labs and imaging reports over the last 24 hours. I personally reviewed no images or EKG's today.         Physical Exam:      Last Vital Signs:  /68 (BP Location: Right arm)  Temp 98.3  F (36.8  C) (Oral)  Resp 20  Ht 1.727 m (5' 8\")  Wt 66 kg (145 lb 8 oz)  SpO2 96%  BMI 22.12 kg/m2   GEN:  Alert, oriented to self, appears comfortable, no overt distress.  HEENT:  Normocephalic/atraumatic, no scleral icterus, no nasal discharge, mouth moist.  CV:  Regular rate and rhythm, no murmur or JVD.  S1 + S2 noted, no S3 or S4.  LUNGS: Wheezing bilaterally without rales/rhonchi/wheezing/retractions.  Symmetric chest rise on inhalation noted.  ABD:  Active bowel sounds, soft, non-tender/non-distended.  No rebound/guarding/rigidity.  EXT:  No edema.  No cyanosis.  No acute joint synovitis noted.  SKIN:  Dry to touch, no exanthems noted in the visualized areas.  NEURO:  Symmetric muscle strength, sensation to touch grossly intact.  Coordination symmetric on general exam.  No new focal deficits appreciated.            Medications:      All current medications were reviewed.         Data:      All new lab and imaging data was reviewed.   Data       Recent Labs  Lab 07/06/18  0708 07/05/18  0647 07/04/18  0709   WBC 12.3* 12.7* 16.6*   HGB 11.0* 10.8* 11.0*   HCT 34.8* 34.2* 32.6*   MCV 92 93 91    192 179       Recent Labs  Lab 07/06/18  0708 07/05/18  0647 07/04/18  0709    138 140   POTASSIUM 3.9 4.1 3.5   CHLORIDE 102 101 99   CO2 33* 32 33*   ANIONGAP 5 5 8   * 140* 120*   BUN 47* 47* 44*   CR 1.56* 1.82* 1.79*   GFRESTIMATED 42* 35* 36* "   GFRESTBLACK 51* 43* 43*   LEXIS 9.7 9.4 9.4       No results found for this or any previous visit (from the past 24 hour(s)).    Rachel Mclain MD  Hospitalist  [facility]  [address] [place] [address][RS1.1]     Revision History        User Key Date/Time User Provider Type Action    > [N/A] 7/6/2018  4:48 PM Rachel Mclain MD Physician Addend     RS1.2 7/6/2018  4:48 PM Rachel Mclain MD Physician Sign     RS1.1 7/6/2018  4:47 PM Rachel Mclain MD Physician             Progress Notes by Alena Gillis at 7/6/2018  3:29 PM     Author:  Alena Gillis Service:  Spiritual Health Author Type:      Filed:  7/6/2018  3:53 PM Date of Service:  7/6/2018  3:29 PM Creation Time:  7/6/2018  3:29 PM    Status:  Signed :  Alena Gillis ()         SPIRITUAL HEALTH SERVICES  SPIRITUAL ASSESSMENT Progress Note  Count includes the [facility] 3 Med Surg    PRIMARY FOCUS:     Goals of care    Symptom/pain management    Emotional/spiritual/Mu-ism distress    Support for coping    ILLNESS CIRCUMSTANCES:   Reviewed documentation. Reflective conversation shared with pt, Chaz Rodriguez which integrated elements of illness and family narratives.     Context of Serious Illness/Symptom(s) - When about his illness, Chaz Rodriguez said he was feeling much better today and was looking to going for a walk using his walker.    Resources for Support - Wife, son and daughters.    DISTRESS:     Emotional/Existential/Relational Distress - None expressed.    Spiritual/Buddhist Distress - None expressed.    Social/Cultural/Economic Distress - None expressed    SPIRITUAL/Nondenominational COPING:     Sikh/Magali - According to his chart Chaz Rodriguez is Episcopal.    Spiritual Practice(s) - Not discussed.  Emotional/Existential/Relational Connections - His family.    GOALS OF CARE:    Goals of Care - Not discussed    Meaning/Sense-Making -   o Chaz Rodriguez spoke fondly about his family, his wife Violet, his son Kwame and his  daughters.  o He grew up in [place], [place]  and attended [employer].  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   o He spoke of his Russian heritage and told  about his last name.  o The Hospital Therapy Dog, Miranda made a visit during this conversation and Chaz Rodriguez shared memories of Ivelisse, the dog he owned while growing up.    PLAN: No further plans; Chaz Rodriguez is scheduled to discharge tomorrow to [facility].      Alena Crow Intern  Pager [phone][MM1.1]     Revision History        User Key Date/Time User Provider Type Action    > MM1.1 7/6/2018  3:53 PM Alena Gillis Sign            Progress Notes by Lissy Galeana APRN CNP at 7/6/2018  3:43 PM     Author:  Lissy Galeana APRN CNP Service:  Palliative Author Type:  Nurse Practitioner    Filed:  7/6/2018  3:49 PM Date of Service:  7/6/2018  3:43 PM Creation Time:  7/6/2018  3:43 PM    Status:  Signed :  Lissy Galeana APRN CNP (Nurse Practitioner)         [facility]    Palliative Care Chart Review    This patient's most recent hospitalist note, medication profile and labs from the past 24 hours have been reviewed.  [place]:  No   0 mg Daily Morphine Equivalent based on amount dispensed    Recommendation:   Unchanged from note on 7/5/18.    Code status DNR/DNI, with selected limited treatments. POLST updated 7/5/18.  Family has Advanced Directives Long form at home, they brought it in today, but did not leave a copy for scanning to honoring choices.  D/c is planned for 7/7/18 at [facility].  where bed is being held for resuming TCU care.   It has been determined that no change is necessary to the current plan of care at this time.   The chart will be reviewed regularly and the patient will be seen if necessary.   If you would like the patient to be seen, please contact the service at [phone] and ask to  have the patient seen.  Time Spent  10  minutes, none in direct contact with patient or family.    Thank you!    Lissy Galeana   Pain Management and Palliative Care  [facility]  Pgr: [phone][ML1.1]         Revision History        User Key Date/Time User Provider Type Action    > ML1.1 7/6/2018  3:49 PM Lissett, Lissy Fried, APRN CNP Nurse Practitioner Sign            Progress Notes by Sophie Escudero at 7/6/2018  2:33 PM     Author:  Sophie Escudero Service:  Social Work Author Type:      Filed:  7/6/2018  2:37 PM Date of Service:  7/6/2018  2:33 PM Creation Time:  7/6/2018  2:33 PM    Status:  Signed :  Sophie Escudero ()         [facility]  Notified by Dr. Mclain that pt would be ready for d/c tomorrow (Saturday).  He has a bed hold at [facility].  SW attempted to reach wife without success so spoke to son Kwame.  They plan to transport pt tomorrow at 11am.  Admissions at [facility] notified.  They request orders ASAP in morning, which Dr. Mclain has said she'll do.  [facility] alerted as they are also following pt.  P:  D/C planned for Saturday at 11; family to transport[AJ1.1]     Revision History        User Key Date/Time User Provider Type Action    > AJ1.1 7/6/2018  2:37 PM Sophie Escudero  Sign            Progress Notes by Lilian Bui, PT at 7/6/2018 12:53 PM     Author:  Lilian Bui, PT Service:  (none) Author Type:  Physical Therapist    Filed:  7/6/2018 12:53 PM Date of Service:  7/6/2018 12:53 PM Creation Time:  7/6/2018 12:53 PM    Status:  Signed :  Lilian Bui PT (Physical Therapist)            07/06/18 1236   Quick Adds   Type of Visit Initial PT Evaluation   Living Environment   Lives With significant other   Living Arrangements apartment  ([facility])   Home Accessibility no concerns   Number of Stairs to Enter Home 0   Number of Stairs Within Home 0   Stair Railings at Home none   Self-Care   Usual Activity  Tolerance fair   Regular Exercise no   Equipment Currently Used at Home walker, rolling;wheelchair, manual   Functional Level Prior   Ambulation 1-->assistive equipment   Transferring 1-->assistive equipment   Toileting 1-->assistive equipment   Bathing 2-->assistive person   Dressing 0-->independent   Eating 0-->independent   Communication 0-->understands/communicates without difficulty   Swallowing 0-->swallows foods/liquids without difficulty   Cognition 2 - difficulty with organizing thoughts   Fall history within last six months yes   Which of the above functional risks had a recent onset or change? ambulation;transferring;toileting   Prior Functional Level Comment Pt's wife does IADLs. Pt has a home health aide 4/week for 2 hours, helps with showers and exercises   General Information   Onset of Illness/Injury or Date of Surgery - Date 07/02/18   Referring Physician Rachel Mclain MD   Patient/Family Goals Statement Patient and family plan is for patient to return to TCU at discharge from hospital   Precautions/Limitations oxygen therapy device and L/min  (4L NC)   Cognitive Status Examination   Orientation person;place   Memory impaired   Cognitive Comment Hx of dementia, cheerful and able to participate   Pain Assessment   Patient Currently in Pain No   Integumentary/Edema   Integumentary/Edema no deficits were identifed   Posture    Posture Forward head position;Protracted shoulders   Range of Motion (ROM)   ROM Comment WFL   Strength   Strength Comments Moderate strength deficits, able to transfer to standing without assist   Bed Mobility   Bed Mobility Comments SBA   Transfer Skills   Transfer Comments SBA with 2WW   Gait   Gait Comments Amb 60 feet with 2WW and CGA on 4L, desatted to 69%, required 10L NC to recover in sitting   Balance   Balance Comments SBA with all mobility   General Therapy Interventions   Planned Therapy Interventions bed mobility training;gait  "training;strengthening;transfer training;home program guidelines;progressive activity/exercise   Clinical Impression   Criteria for Skilled Therapeutic Intervention yes, treatment indicated   PT Diagnosis decreased independence with mobility   Influenced by the following impairments decreased endurance   Clinical Presentation Stable/Uncomplicated   Clinical Presentation Rationale complex pmh, stable/complex presentation, good social support   Clinical Decision Making (Complexity) Low complexity   Therapy Frequency` 5 times/week   Predicted Duration of Therapy Intervention (days/wks) 3 days   Anticipated Discharge Disposition Transitional Care Facility   Risk & Benefits of therapy have been explained Yes   Patient, Family & other staff in agreement with plan of care Yes   [facility] TM \"6 Clicks\"   2016, Trustees of [facility], under license to [employer].  All rights reserved.   6 Clicks Short Forms Basic Mobility Inpatient Short Form   [facility]  \"6 Clicks\" V.2 Basic Mobility Inpatient Short Form   1. Turning from your back to your side while in a flat bed without using bedrails? 4 - None   2. Moving from lying on your back to sitting on the side of a flat bed without using bedrails? 4 - None   3. Moving to and from a bed to a chair (including a wheelchair)? 3 - A Little   4. Standing up from a chair using your arms (e.g., wheelchair, or bedside chair)? 3 - A Little   5. To walk in hospital room? 3 - A Little   6. Climbing 3-5 steps with a railing? 2 - A Lot   Basic Mobility Raw Score (Score out of 24.Lower scores equate to lower levels of function) 19   Total Evaluation Time   Total Evaluation Time (Minutes) 5[MM1.1]        Revision History        User Key Date/Time User Provider Type Action    > MM1.1 7/6/2018 12:53 PM Lilian Bui, PT Physical Therapist Sign            Progress Notes by Rachel Mclain MD at 7/6/2018 12:42 PM     Author:  Rachel Mclain, " MD Service:  Hospitalist Author Type:  Physician    Filed:  7/6/2018 12:42 PM Date of Service:  7/6/2018 12:42 PM Creation Time:  7/6/2018 12:42 PM    Status:  Signed :  Rachel Mclain MD (Physician)         [facility]    Sepsis Evaluation Progress Note    Date of Service: 07/06/2018    I was called to see Chaz Soler due to abnormal vital signs triggering the Sepsis SIRS screening alert. He is known to have an infection.     Physical Exam    Vital Signs:  Temp: 97.6  F (36.4  C) Temp src: Oral BP: 165/70   Heart Rate: 72 Resp: 22 SpO2: 93 % O2 Device: Nasal cannula Oxygen Delivery: 3 LPM    Lab:  Lactic Acid   Date Value Ref Range Status   07/02/2018 1.7 0.7 - 2.1 mmol/L Final     Lactate for Sepsis Protocol   Date Value Ref Range Status   07/06/2018 3.4 (HH) 0.4 - 1.9 mmol/L Final     Comment:     Critical Value called to and read back by  YAKOV RIOS ON MS3 @ 1239 7.6.18 LNP         The patient is at baseline mental status.    The rest of their physical exam is significant for wheezing.    Assessment and Plan    The SIRS and exam findings are likely due to nebulizer    ID: The patient is currently on the following antibiotics:  Anti-infectives (Future)    Start     Dose/Rate Route Frequency Ordered Stop    07/06/18 0900  levofloxacin (LEVAQUIN) infusion 250 mg      250 mg  50 mL/hr over 60 Minutes Intravenous EVERY 24 HOURS 07/05/18 0853          Current antibiotic coverage is appropriate for source of infection.    Fluid: Fluid bolus not indicated due to chf.    Lab: Repeat lactic acid ordered for 2 hours from now.    Disposition: The patient will remain on the current unit. We will continue to monitor this patient closely.    Rachel Mclain MD[RS1.1]       Revision History        User Key Date/Time User Provider Type Action    > RS1.1 7/6/2018 12:42 PM Rachel Mclain MD Physician Sign            Progress Notes by Lissy Galeana APRN CNP at  7/5/2018  2:35 PM     Author:  Lissy Galeana APRN CNP Service:  Palliative Author Type:  Nurse Practitioner    Filed:  7/5/2018  3:07 PM Date of Service:  7/5/2018  2:35 PM Creation Time:  7/5/2018  2:35 PM    Status:  Signed :  Lissy Galeana APRN CNP (Nurse Practitioner)         [facility]  Palliative Care Progress Note  Text Page[ML1.1]     Assessment & Plan[ML1.2]   Recommendations:  1.Decisional Capacity - lacks decisional capacity. Patient has an advance directive dated short form  12/15/2016.  Include patient in decision making as much as possible but involve health care agent attempting consensus with patient. Ricardo Tiwari, Spouse is his primary Health Care Agent.  Surrogates is Son Kwame Koo.  Family will bring in Advanced Directive from home.   2. Pain- Chronic low back pain: DDD, recent vertebral fracture with vertebroplasty, lumbar stenosis, facet arthropathy.   Seen in pain clinic in past.  Controlled with current measures   -Continue chronic oral and topical medications, adjust as needed.    3. Shortness of breath- Underlying pulmonary Fibrosis diagnosed at [place] 5 years ago.  Spouse notes with any activity for at least a year, occasionally they notice labored appearing breathing at rest which does not appear to be noticed by patient.  He was recently started on low dose ativan, but uses infrequently per review of TCU record.  -Ativan PRN reordered here, has not needed.  -trial of Morphine intensol for air hunger   4. Spiritual Care- Oriented to Spiritual Health and Social Work Services as part of Palliative Care team.  Appreciate input from Social work services  4. Care Planning- Per discussion with spouse, there is a bed hold at [facility] and they are hopeful he can go back to TCU at [facility].     Goal of Care:DNR/DNI, Limited Restorative. POLST updated today.      Disease Process/es & Symptoms:  Chaz Soler is a 89 year old patient admitted  with symptoms of cough, hypoxia and fever. He has been treated for respiratory failure r/t hospital acquired pneumonia.       This is in the setting of dementia, pulmonary fibrosis diagnosed about 5 years ago at [place], on chronic oxygen 4L, diastolic HF, HTN, CAD, hypothyroidism, GERD, and depression anxiety. He was discharged last month after hospitalization for pneumonia, this is his 4th admition in the past 9 months.  He has had a noted 10lb weight loss over the last 4 months and most recently was at TCU with hope of regaining strength and ability to return home.      Findings & plan of care discussed with: nursing social work   Follow-up plan from palliative team: as needed.  Thank you for involving us in the patient's care.[ML1.1]     Lissy Galeana[ML1.2] SHERMAN YANG  Pain Management and Palliative Care  [facility]  Pgr: [phone][ML1.1]    Time Spent on this Encounter[ML1.2]   I spent  45  minutes (11:45-12:15) in assessment of the patient, counseling and discussion with the patient and family as documented in sections below. Another 15  minutes in review of chart, documentation, coordination of care and discussion with the health care team.[ML1.1]    Interval History[ML1.2]   Up in chair   Confused, pleasant   O x 1   low back pain in good control    Course of Hospitalization Discussions[ML1.1] Data[ML1.2]   Decision-Making & Goals of Care:  On 7/5/18 with CELIA Smith CNP from 11:45- 12:15   I met with family (spouse Violet, son Kwame, daughters Kylee and Maria Isabel with patient present discuss goals of care.  This meeting was per the request of the family wanting an update on the next steps in the patient's care.  Apparently, the spouse has an advanced directives at her home, drawn up when patient was decisional.  I suggested they review this to help guide them in making health care decisions for the patient. I also asked them to bring a copy to the hospital so it can  be scanned into his health record.  The due to advancing dementia lacks decisional capacity.  The health care agents ( spouse and son Kwame) are consistent in their wishes for no CPR or intubation, but wants selected treatments if it will help the patient return to his baseline function. These would include  -BIBAP, CPAP trial, high flow 02, rehospitalization for IVF, ABX,   -trial of enteral feedings.  If measures, in the future used are not helping the patient regain function or are not helping then then would consider a change in the direction of care, leaning more toward comfort and hospice.   At this time the plan is to return to TCU at [facility].  The family understands that if BIBAP/ CPAP is used or if patient has a trial of enteral feeding, chemical and physical restraints may be needed to reduce self injury.   We reviewed potential complications of this as well as complications related to enteral feedings. We also reviewed current literature related to enteral feedings in advanced dementia and that enteral feedings are not recommended in this case.  They seems to have a good understanding of information presented. I answered all questions and concerns.     Discussed on July 3, 2018 with Evangelina YANG, CNS:   Met with patient who is a poor historian.  He tells me the care is fine at [facility] but he cannot remember much, how he got here or even events of yesterday.  Spouse via phone reviews patient recent history.  She is unclear about a specific diagnosis of dementia, but notes they have visited neurology x 2.  She recounts diagnosis of pulmonary fibrosis and is unable to note any specific prognosis with either of these.  We reviewed POLST and she is open to meeting in person prior to leaving to discuss further within specific framework of outcome of this hospitalization and better prognostic information.     Patient has decision-making capacity Unreliable  Patient has a Health Care Agent named  in a legal Advance Directive document dated 12/15/2016. See name(s) and contact information in Code/ACP/Advance Care Planning Activity Tab.  Physician orders for life-sustaining treatment (POLST) form is on file.  It was updated today. The original and one copy was given to the family and a copy is on the patient's paper chart at the nursing pod.   Code Status:                     Do not resusitate / Do not intubate.[ML1.1]  Review of Systems[ML1.2]    CONSTITUTIONAL: NEGATIVE for fever, chills, change in weight  ENT/MOUTH: NEGATIVE for ear, mouth and throat problems  RESP: NEGATIVE for significant cough or SOB  CV: NEGATIVE for chest pain, palpitations or peripheral edema    Palliative Symptom Review (0=no symptom/no concern, 1=mild, 2=moderate, 3=severe):      Pain: 0-none      Fatigue: 1-mild      Nausea: 0-none      Constipation: 2-moderate      Diarrhea: 0-none      Depressive Symptoms: 0-none      Anxiety: 0-none      Drowsiness: 1-mild      Poor Appetite: 2-moderate      Shortness of Breath: 2-moderate      Insomnia: 0-none      Other:       Overall (0 good/no concerns, 3 very poor):  2[ML1.1]    Physical Exam   Temp:  [96.5  F (35.8  C)-97.5  F (36.4  C)] 96.5  F (35.8  C)  Heart Rate:  [63-82] 82  Resp:  [16-18] 16  BP: (122-154)/(49-75) 154/75  FiO2 (%):  [35 %] 35 %  SpO2:  [95 %-100 %] 99 %  145 lbs 8 oz[ML1.2]  GEN:  somnolent, arousing easily. oriented x 1, appears comfortable, NAD.  HEENT:  Normocephalic/atraumatic, no scleral icterus, no nasal discharge, mouth moist.  CV:  RRR, S1, S2; no murmurs or other irregularities noted.  +3 DP/PT pulses bilatererally; no edema BLE.  RESP:  Clear to auscultation bilaterally without rales/rhonchi/wheezing/retractions.  Symmetric chest rise on inhalation noted.  Normal respiratory effort.  ABD:  Rounded, soft, non-tender/non-distended.  +BS  EXT:  Edema & pulses as noted above.  CMS intact x 4.    M/S:   Non tender to palpation through out, SOLOMON X 4.    SKIN:  Dry  to touch, no exanthems noted in the visualized areas.    NEURO: Symmetric strength.  Sensation to touch intact all extremities.   There is no area of allodynia or hyperesthesia.  PAIN BEHAVIOR: Cooperative  Psych:  Normal affect.  Calm, cooperative, conversant appropriately.[ML1.1]    Medications     sodium chloride 75 mL/hr at 07/05/18 0941       acetaminophen  500 mg Oral TID     amLODIPine  10 mg Oral Daily     aspirin  81 mg Oral Daily     atorvastatin  20 mg Oral Daily     diclofenac  2 g Transdermal TID     donepezil  10 mg Oral At Bedtime     DULoxetine  30 mg Oral Daily     gabapentin  100 mg Oral TID     heparin  5,000 Units Subcutaneous Q12H     ipratropium - albuterol 0.5 mg/2.5 mg/3 mL  3 mL Nebulization 4x daily     [START ON 7/6/2018] levofloxacin  250 mg Intravenous Q24H     levothyroxine  88 mcg Oral Daily     losartan  100 mg Oral Daily     metoprolol succinate  25 mg Oral Daily     NONFORMULARY   Topical TID     pantoprazole  40 mg Oral QAM     polyethylene glycol  17 g Oral Daily     predniSONE  60 mg Oral Daily     tamsulosin  0.4 mg Oral or Feeding Tube Daily     traMADol (ULTRAM) tablet 50 mg  50 mg Oral TID       Data[ML1.2]   Results for orders placed or performed during the hospital encounter of 07/02/18 (from the past 24 hour(s))   CBC with platelets   Result Value Ref Range    WBC 12.7 (H) 4.0 - 11.0 10e9/L    RBC Count 3.66 (L) 4.4 - 5.9 10e12/L    Hemoglobin 10.8 (L) 13.3 - 17.7 g/dL    Hematocrit 34.2 (L) 40.0 - 53.0 %    MCV 93 78 - 100 fl    MCH 29.5 26.5 - 33.0 pg    MCHC 31.6 31.5 - 36.5 g/dL    RDW 13.5 10.0 - 15.0 %    Platelet Count 192 150 - 450 10e9/L   Basic metabolic panel   Result Value Ref Range    Sodium 138 133 - 144 mmol/L    Potassium 4.1 3.4 - 5.3 mmol/L    Chloride 101 94 - 109 mmol/L    Carbon Dioxide 32 20 - 32 mmol/L    Anion Gap 5 3 - 14 mmol/L    Glucose 140 (H) 70 - 99 mg/dL    Urea Nitrogen 47 (H) 7 - 30 mg/dL    Creatinine 1.82 (H) 0.66 - 1.25 mg/dL    GFR  Estimate 35 (L) >60 mL/min/1.7m2    GFR Estimate If Black 43 (L) >60 mL/min/1.7m2    Calcium 9.4 8.5 - 10.1 mg/dL[ML1.3]          Revision History        User Key Date/Time User Provider Type Action    > ML1.3 7/5/2018  3:07 PM Lissy Galeana APRN CNP Nurse Practitioner Sign     ML1.2 7/5/2018  2:36 PM Lissy Galeana APRN CNP Nurse Practitioner      ML1.1 7/5/2018  2:35 PM Lissy Galeana, APRN CNP Nurse Practitioner             Progress Notes by Rachel Mclain MD at 7/5/2018  1:11 PM     Author:  Rachel Mclain MD Service:  Hospitalist Author Type:  Physician    Filed:  7/5/2018  1:17 PM Date of Service:  7/5/2018  1:11 PM Creation Time:  7/5/2018  1:11 PM    Status:  Signed :  Rachel Mclain MD (Physician)         [facility]  Hospitalist Progress Note  Patient Name: Chaz Soler    MRN: [id number]  Provider:  Rachel Mclain MD  Date:07/05/2018      Initial presenting complaint/issue to hospital (Diagnosis): sob    Summary of Stay: Chaz Soler is a 89 year old male w/ h/o dementia, COPD on 5L oxygen at baseline, pulm fibrosis who was admitted on 7/2/2018 with   Pt is DNR/DNI but ok with BiPAP if needed, confirmed with family. He is much improved with IV antibiotics and IV steroids for treatment of healthcare associated pneumonia.  Unfortunately has mild acute kidney injury due to diuresis.  Overall he is declining in health with 4 admissions this year so far and has poor long-term prognosis given multiorgan dysfunction (CHF, chronic respiratory failure on oxygen, chronic kidney disease, dementia, frailty)-palliative care is following.         Assessment and Plan:      1. Acute hypoxic respiratory failure secondary to hospital-acquired pneumonia: Febrile and hypoxic with leukocytosis and CXR difficult to interpret given history of pulmonary fibrosis. Clinical status complicated as patient O2 dependent  pulmonary fibrosis on 4-5 LPM via NC at baseline and also has diastolic heart failure. Confirmed with wife and son that patient is DNR/DNI. They would want BiPAP initiated if needed but no intubation.  -cont IV Zosyn  -Continue oxygen NC high flow FIO2 and wean as able  -Scheduled duonebs and prn albuterol nebs  -IV methylprednisolone 40 mg daily, increased to BID  -Recheck BMP tomorrow prior to additional IV diuresis (had worsening Cr with IV lasix)  -Follow blood cultures  -Palliative care consulted as family is interested in a more comfort-care approach but for now has decided to remain DNR/DNI      History of diastolic heart failure: Patient's BNP is 1886.  He has no lower extremity edema.  There is no clear evidence of pulmonary edema on his chest x-ray. He received lasix 20 mg day 1, then 40 mg BID day 2 then had worsening Cr so lasix is now on hold.      Hypertension: Blood pressure is stable.  Continue Norvasc, losartan, and metoprolol.     History of coronary artery disease: Denies chest pain and has undetectable troponin.  Low suspicion for acute coronary syndrome.  Lexiscan 10/2017 negative for significant ischemia with ejection fraction measured at 63%.  Continue aspirin, atorvastatin, metoprolol succinate, and losartan.     Hypothyroidism: Resume Synthroid.     GERD: Continue proton pump inhibitor.     Dementia: Continue PTA donepezil.     Depression/anxiety: Continue PTA fluoxetine and as needed Ativan.    SOL on CKD: Likely due to diuresis.  We will give him gentle hydration today and recheck creatinine in the morning    Care plan:Overall he is declining in health with 4 admissions this year so far and has poor long-term prognosis given multiorgan dysfunction (CHF, chronic respiratory failure on oxygen, chronic kidney disease, dementia, frailty)-palliative care is following.    # Pain Assessment:  Current Pain Score 7/5/2018   Patient currently in pain? denies   Pain score (0-10) 0   Pain location -    Pain descriptors -   CPOT pain score -   Chaz miller pain level was assessed and he currently denies pain.       DVT Prophylaxis:  -  Heparin   Code Status: DNR / DNI  Discharge Dispo: tcu   Estimated Disch Date / # of Days until Discharge: 2 more days atleast pending improvement in creatinine        Interval History:      Patient states he feels good   denies any shortness of breath    Oxygen requirements improving    Met with family-wife Kemi, 2 daughters, 1 son went over the plan of care and discussed his declining health.  Encouraged them to be meet with palliative care consult during this hospitalization       Data reviewed today: I reviewed all new labs and imaging reports over the last 24 hours. I personally reviewed no images or EKG's today.         Physical Exam:      Last Vital Signs:  /75 (BP Location: Right arm)  Temp 96.5  F (35.8  C) (Oral)  Resp 16  Wt 66 kg (145 lb 8 oz)  SpO2 99%  BMI 22.12 kg/m2   GEN:  Alert, oriented to self, appears comfortable, no overt distress.  HEENT:  Normocephalic/atraumatic, no scleral icterus, no nasal discharge, mouth moist.  CV:  Regular rate and rhythm, no murmur or JVD.  S1 + S2 noted, no S3 or S4.  LUNGS: Wheezing bilaterally without rales/rhonchi/wheezing/retractions.  Symmetric chest rise on inhalation noted.  ABD:  Active bowel sounds, soft, non-tender/non-distended.  No rebound/guarding/rigidity.  EXT:  No edema.  No cyanosis.  No acute joint synovitis noted.  SKIN:  Dry to touch, no exanthems noted in the visualized areas.  NEURO:  Symmetric muscle strength, sensation to touch grossly intact.  Coordination symmetric on general exam.  No new focal deficits appreciated.            Medications:      All current medications were reviewed.         Data:      All new lab and imaging data was reviewed.   Data       Recent Labs  Lab 07/05/18  0647 07/04/18  0709 07/03/18  0734   WBC 12.7* 16.6* 11.4*   HGB 10.8* 11.0* 11.1*   HCT 34.2* 32.6* 34.3*   MCV 93 91  91    179 161       Recent Labs  Lab 07/05/18  0647 07/04/18  0709 07/03/18  0734    140 140   POTASSIUM 4.1 3.5 4.1   CHLORIDE 101 99 102   CO2 32 33* 30   ANIONGAP 5 8 8   * 120* 119*   BUN 47* 44* 37*   CR 1.82* 1.79* 1.63*   GFRESTIMATED 35* 36* 40*   GFRESTBLACK 43* 43* 48*   LEXIS 9.4 9.4 9.4       No results found for this or any previous visit (from the past 24 hour(s)).    Rachel Mclain MD  Hospitalist  [facility] [address] [place] [address][RS1.1]     Revision History        User Key Date/Time User Provider Type Action    > RS1.1 7/5/2018  1:17 PM Rachel Mclain MD Physician Sign            Progress Notes by Rachel Mclain MD at 7/4/2018 12:59 PM     Author:  Rachel Mclain MD Service:  Hospitalist Author Type:  Physician    Filed:  7/4/2018  1:02 PM Date of Service:  7/4/2018 12:59 PM Creation Time:  7/4/2018 12:59 PM    Status:  Signed :  Rachel Mclain MD (Physician)         [facility]  Hospitalist Progress Note  Patient Name: Chaz Soler    MRN: [id number]  Provider:  Rachel Mclain MD  Date:07/04/2018      Initial presenting complaint/issue to hospital (Diagnosis): sob    Summary of Stay: Chaz Soler is a 89 year old male w/ h/o dementia, COPD on 5L oxygen at baseline, pulm fibrosis who was admitted on 7/2/2018 with   Pt is DNR/DNI but ok with BiPAP if needed, confirmed with family. He is currently febrile, satting in mid-90s on high flow O2 via NC but in no acute distress and is hemodynamically stable.          Assessment and Plan:      1. Acute hypoxic respiratory failure secondary to hospital-acquired pneumonia: Febrile and hypoxic with leukocytosis and CXR difficult to interpret given history of pulmonary fibrosis. 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 Confirmed with wife and son  that patient is DNR/DNI. They would want BiPAP initiated if needed but no intubation.  -cont IV Zosyn  -Continue oxygen NC high flow FIO2 and wean as able  -Scheduled duonebs and prn albuterol nebs  -IV methylprednisolone 40 mg daily, increased to BID  -Recheck BMP tomorrow prior to additional IV diuresis (had worsening Cr with IV lasix)  -Follow blood cultures  -Palliative care consulted as family is interested in a more comfort-care approach but for now has decided to remain DNR/DNI      2. History of diastolic heart failure: Patient's BNP is 1886.  He has no lower extremity edema.  There is no clear evidence of pulmonary edema on his chest x-ray. He received lasix 20 mg day 1, then 40 mg BID day 2 then had worsening Cr so lasix is now on hold.      3. Hypertension: Blood pressure is stable.  Continue Norvasc, losartan, and metoprolol.     4. History of coronary artery disease: Denies chest pain and has undetectable troponin.  Low suspicion for acute coronary syndrome.  Lexiscan 10/2017 negative for significant ischemia with ejection fraction measured at 63%.  Continue aspirin, atorvastatin, metoprolol succinate, and losartan.     5. Hypothyroidism: Resume Synthroid.     6. GERD: Continue proton pump inhibitor.     7. Dementia: Continue PTA donepezil.     8. Depression/anxiety: Continue PTA fluoxetine and as needed Ativan.    # Pain Assessment:  Current Pain Score 7/4/2018   Patient currently in pain? denies   Pain score (0-10) 0   Pain location -   Pain descriptors -   CPOT pain score -   Chaz miller pain level was assessed and he currently denies pain.       DVT Prophylaxis:  -  Heparin   Code Status: DNR / DNI  Discharge Dispo: tcu   Estimated Disch Date / # of Days until Discharge: 2 more days atleast pending weaning from high flow O2         Interval History:      Demented  Denies SOB , feels better today  No chest pain     Cr worsened over last 1 day due to diuresis. O2 requirement stable- improving       Data  reviewed today: I reviewed all new labs and imaging reports over the last 24 hours. I personally reviewed no images or EKG's today.         Physical Exam:      Last Vital Signs:  /58 (BP Location: Left arm)  Temp 96.8  F (36  C) (Axillary)  Resp 18  Wt 62.2 kg (137 lb 3.2 oz)  SpO2 96%  BMI 20.86 kg/m2   GEN:  Alert, oriented to self, appears comfortable, no overt distress on high flow oxygen via NC.  HEENT:  Normocephalic/atraumatic, no scleral icterus, no nasal discharge, mouth moist.  CV:  Regular rate and rhythm, no murmur or JVD.  S1 + S2 noted, no S3 or S4.  LUNGS: Wheezing bilaterally without rales/rhonchi/wheezing/retractions.  Symmetric chest rise on inhalation noted.  ABD:  Active bowel sounds, soft, non-tender/non-distended.  No rebound/guarding/rigidity.  EXT:  No edema.  No cyanosis.  No acute joint synovitis noted.  SKIN:  Dry to touch, no exanthems noted in the visualized areas.  NEURO:  Symmetric muscle strength, sensation to touch grossly intact.  Coordination symmetric on general exam.  No new focal deficits appreciated.            Medications:      All current medications were reviewed.         Data:      All new lab and imaging data was reviewed.   Data       Recent Labs  Lab 07/04/18  0709 07/03/18  0734 07/02/18  0911   WBC 16.6* 11.4* 14.6*   HGB 11.0* 11.1* 11.8*   HCT 32.6* 34.3* 36.2*   MCV 91 91 91    161 185       Recent Labs  Lab 07/04/18  0709 07/03/18  0734 07/02/18  0911    140 139   POTASSIUM 3.5 4.1 3.9   CHLORIDE 99 102 100   CO2 33* 30 32   ANIONGAP 8 8 7   * 119* 132*   BUN 44* 37* 31*   CR 1.79* 1.63* 1.66*   GFRESTIMATED 36* 40* 39*   GFRESTBLACK 43* 48* 47*   LEXIS 9.4 9.4 9.7       No results found for this or any previous visit (from the past 24 hour(s)).    Rachel Mclain MD  Hospitalist  [facility] [address] [place] [address][RS1.1]     Revision History        User Key Date/Time User Provider Type Action     > RS1.1 7/4/2018  1:02 PM Rachel Mclain MD Physician Sign            Progress Notes by Te Condon RT at 7/4/2018  4:37 AM     Author:  Te Condon RT Service:  (none) Author Type:  Respiratory Therapist    Filed:  7/4/2018  4:38 AM Date of Service:  7/4/2018  4:37 AM Creation Time:  7/4/2018  4:37 AM    Status:  Signed :  Te Condon RT (Respiratory Therapist)         RT-Pt remains on HFNC 45%, 35LPM. Bs diminished. Spo2 94%. RT will continue to follow.[JW1.1]      Revision History        User Key Date/Time User Provider Type Action    > JW1.1 7/4/2018  4:38 AM Te Condon RT Respiratory Therapist Sign                  Procedure Notes     No notes of this type exist for this encounter.         Progress Notes - Therapies (Notes from 07/04/18 through 07/07/18)      Progress Notes by Lilian Bui PT at 7/6/2018 12:53 PM     Author:  Lilian Bui PT Service:  (none) Author Type:  Physical Therapist    Filed:  7/6/2018 12:53 PM Date of Service:  7/6/2018 12:53 PM Creation Time:  7/6/2018 12:53 PM    Status:  Signed :  Lilian Bui PT (Physical Therapist)            07/06/18 1236   Quick Adds   Type of Visit Initial PT Evaluation   Living Environment   Lives With significant other   Living Arrangements apartment  ([facility])   Home Accessibility no concerns   Number of Stairs to Enter Home 0   Number of Stairs Within Home 0   Stair Railings at Home none   Self-Care   Usual Activity Tolerance fair   Regular Exercise no   Equipment Currently Used at Home walker, rolling;wheelchair, manual   Functional Level Prior   Ambulation 1-->assistive equipment   Transferring 1-->assistive equipment   Toileting 1-->assistive equipment   Bathing 2-->assistive person   Dressing 0-->independent   Eating 0-->independent   Communication 0-->understands/communicates without difficulty   Swallowing 0-->swallows foods/liquids without difficulty   Cognition 2 - difficulty with  organizing thoughts   Fall history within last six months yes   Which of the above functional risks had a recent onset or change? ambulation;transferring;toileting   Prior Functional Level Comment Pt's wife does IADLs. Pt has a home health aide 4/week for 2 hours, helps with showers and exercises   General Information   Onset of Illness/Injury or Date of Surgery - Date 07/02/18   Referring Physician Rachel Mclain MD   Patient/Family Goals Statement Patient and family plan is for patient to return to TCU at discharge from hospital   Precautions/Limitations oxygen therapy device and L/min  (4L NC)   Cognitive Status Examination   Orientation person;place   Memory impaired   Cognitive Comment Hx of dementia, cheerful and able to participate   Pain Assessment   Patient Currently in Pain No   Integumentary/Edema   Integumentary/Edema no deficits were identifed   Posture    Posture Forward head position;Protracted shoulders   Range of Motion (ROM)   ROM Comment WFL   Strength   Strength Comments Moderate strength deficits, able to transfer to standing without assist   Bed Mobility   Bed Mobility Comments SBA   Transfer Skills   Transfer Comments SBA with 2WW   Gait   Gait Comments Amb 60 feet with 2WW and CGA on 4L, desatted to 69%, required 10L NC to recover in sitting   Balance   Balance Comments SBA with all mobility   General Therapy Interventions   Planned Therapy Interventions bed mobility training;gait training;strengthening;transfer training;home program guidelines;progressive activity/exercise   Clinical Impression   Criteria for Skilled Therapeutic Intervention yes, treatment indicated   PT Diagnosis decreased independence with mobility   Influenced by the following impairments decreased endurance   Clinical Presentation Stable/Uncomplicated   Clinical Presentation Rationale complex pmh, stable/complex presentation, good social support   Clinical Decision Making (Complexity) Low complexity   Therapy  "Frequency` 5 times/week   Predicted Duration of Therapy Intervention (days/wks) 3 days   Anticipated Discharge Disposition Transitional Care Facility   Risk & Benefits of therapy have been explained Yes   Patient, Family & other staff in agreement with plan of care Yes   [facility] TM \"6 Clicks\"   2016, Trustees of [facility], under license to [employer].  All rights reserved.   6 Clicks Short Forms Basic Mobility Inpatient Short Form   [facility] AM-PAC  \"6 Clicks\" V.2 Basic Mobility Inpatient Short Form   1. Turning from your back to your side while in a flat bed without using bedrails? 4 - None   2. Moving from lying on your back to sitting on the side of a flat bed without using bedrails? 4 - None   3. Moving to and from a bed to a chair (including a wheelchair)? 3 - A Little   4. Standing up from a chair using your arms (e.g., wheelchair, or bedside chair)? 3 - A Little   5. To walk in hospital room? 3 - A Little   6. Climbing 3-5 steps with a railing? 2 - A Lot   Basic Mobility Raw Score (Score out of 24.Lower scores equate to lower levels of function) 19   Total Evaluation Time   Total Evaluation Time (Minutes) 5[MM1.1]        Revision History        User Key Date/Time User Provider Type Action    > MM1.1 7/6/2018 12:53 PM Lilian Bui PT Physical Therapist Sign            Progress Notes by Te Condon RT at 7/4/2018  4:37 AM     Author:  Te Condon RT Service:  (none) Author Type:  Respiratory Therapist    Filed:  7/4/2018  4:38 AM Date of Service:  7/4/2018  4:37 AM Creation Time:  7/4/2018  4:37 AM    Status:  Signed :  Te Condon RT (Respiratory Therapist)         RT-Pt remains on HFNC 45%, 35LPM. Bs diminished. Spo2 94%. RT will continue to follow.[JW1.1]      Revision History        User Key Date/Time User Provider Type Action    > JW1.1 7/4/2018  4:38 AM Te Condon RT Respiratory Therapist Sign            "

## 2018-07-02 NOTE — IP AVS SNAPSHOT
"` `           [place] [address] MEDICAL SURGICAL: [phone]                                              INTERAGENCY TRANSFER FORM - NURSING   2018                    Hospital Admission Date: 2018  NATTY MAN   : 1928  Sex: Male        Attending Provider: Nash Rhodes MD     Allergies:  Contrast Dye, Lisinopril, Oxycodone    Infection:  None   Service:  GENERAL MEDI    Ht:  1.727 m (5' 8\")   Wt:  66 kg (145 lb 8 oz)   Admission Wt:  63.5 kg (140 lb 1.6 oz)    BMI:  22.12 kg/m 2   BSA:  1.78 m 2            Patient PCP Information     Provider PCP Type    Alirio Fox MD General      Current Code Status     Date Active Code Status Order ID Comments User Context       Prior      Code Status History     Date Active Date Inactive Code Status Order ID Comments User Context    2018  2:50 PM  DNR/DNI [id number]  Rachel Mclain MD Outpatient    2018  1:28 PM 2018  2:50 PM DNR/DNI [id number]  Rosario Ardon PA-C Inpatient    2018  1:58 PM 2018  1:28 PM DNR [id number]  Deven Rickettsemerson Carvajal, APRN CNP Outpatient    2018 12:22 PM 2018  1:58 PM DNR/DNI [id number]  Macario Rodriguez MD Outpatient    6/3/2018  5:35 PM 2018 12:22 PM DNR/DNI [id number]  Macario Rodriguez MD Inpatient    2018 12:46 AM 2018  4:40 PM DNI [id number]  Alfonso Villafuerte MD Inpatient    10/2/2017  4:07 PM 2018 12:46 AM DNI [id number]  Tracey Beltran PA-C Outpatient    10/1/2017  5:06 PM 10/2/2017  4:07 PM DNI [id number]  Dayami Guillory PA-C Inpatient    2017  9:49 AM 10/1/2017  5:06 PM Full Code [id number]  Alejo Lopez MD Outpatient    2017 10:10 PM 2017  9:49 AM Full Code [id number]  Carie Bowers MD Inpatient    11/15/2016 11:02 AM 2017 10:10 PM Full Code [id number]  Yudelka Castillo MD Outpatient    2016  6:32 PM 11/15/2016 11:02 AM Full Code [id number]  Yudelka Castillo MD Inpatient    2014 10:49 AM 2016  " 6:32 PM Full Code [id number]  Martínez Larson MD Outpatient    11/19/2014  4:47 AM 11/20/2014 10:49 AM Full Code [id number]  Lokesh Walls MD Inpatient    11/17/2014  3:16 PM 11/19/2014  4:47 AM Full Code [id number]  Joie Caballero PA-C Outpatient    11/12/2014  3:48 PM 11/17/2014  3:16 PM Full Code [id number]  Ruby Monteiro MD Inpatient    10/8/2014  2:52 PM 10/10/2014  8:06 PM Full Code [id number]  Carie Bowers MD Inpatient    8/15/2014 12:40 PM 10/8/2014  2:52 PM Full Code [id number]  Melissa Isaac NP Outpatient    8/14/2014  9:55 AM 8/15/2014 12:40 PM Full Code [id number]  Melissa Isaac NP Outpatient    8/11/2014 12:20 PM 8/14/2014  9:55 AM Full Code [id number]  Jone Carvalho MD Inpatient    11/21/2011  9:22 AM 8/11/2014 12:20 PM Full Code [id number]  Jose Antonio Shin PA-C Outpatient    11/18/2011  6:50 PM 11/21/2011  9:22 AM Full Code [id number]  Niya Persaud RN Inpatient    11/18/2011  3:48 PM 11/18/2011  6:49 PM Full Code [id number]  Niya Persaud, ELIOT Inpatient      Advance Directives        Scanned docmt in ACP Activity?           Yes, scanned ACP docmt        Hospital Problems as of 7/7/2018              Priority Class Noted POA    Community acquired pneumonia Medium  7/2/2018 Yes    SOB (shortness of breath) Medium  Unknown Unknown    Poor appetite Medium  Unknown Unknown    Goals of care, counseling/discussion Medium  Unknown Unknown    Encounter for palliative care Medium  Unknown Unknown      Non-Hospital Problems as of 7/7/2018              Priority Class Noted    Essential hypertension Medium  2/18/2005    Other specified disorders of prostate Medium  4/4/2006    Pulmonary fibrosis (H) Medium  Unknown    Advance Care Planning Medium  9/13/2011    CAD (coronary artery disease) Medium  Unknown    CKD (chronic kidney disease) stage 3, GFR 30-59 ml/min Medium  11/30/2011    Proteinuria Medium  2/8/2012    Hyperlipidemia with target LDL less than  70 Medium  Unknown    Lumbar radiculopathy Medium  8/11/2014    Status post lumbar discectomy Medium  8/12/2014    S/P TAVR (transcatheter aortic valve replacement) Medium  11/12/2014    Physical deconditioning Medium  11/21/2014    CHF (congestive heart failure) (H) Medium  12/31/2014    Anemia Medium  1/20/2015    SAI (obstructive sleep apnea) Medium  2/24/2015    Hyperparathyroidism (H) Medium  4/22/2015    Dementia Medium  8/4/2015    Hypothyroidism Medium  10/27/2015    Health Care Home Medium  11/5/2015    Edema, unspecified edema Medium  1/17/2016    Other chronic pain Medium  10/11/2016    Compression fracture L2-3 Medium  2/2/2017    Weakness Medium  1/8/2018      Immunizations     Name Date      Influenza (H1N1) 01/09/10     Influenza (High Dose) 3 valent vaccine 10/10/17     Influenza (High Dose) 3 valent vaccine 10/11/16     Influenza (High Dose) 3 valent vaccine 10/08/15     Influenza (High Dose) 3 valent vaccine 10/10/14     Influenza (IIV3) PF 10/05/12     Influenza (IIV3) PF 10/16/11     Influenza (IIV3) PF 10/01/05     Influenza (IIV3) PF 10/21/04     Pneumo Conj 13-V (2010&after) 04/22/15     Pneumococcal 23 valent 10/01/05     Pneumococcal 23 valent 01/01/98     TD (ADULT, 7+) 04/20/10     TD (ADULT, 7+) 05/21/99     Zoster vaccine, live 04/27/10          END      ASSESSMENT     Discharge Profile Flowsheet     EXPECTED DISCHARGE     Resources List  Home Care (NA) 03/15/18 1351    Expected Discharge Date  07/07/18 (came in from [facility], home w /[facility] baseline) 07/05/18 1546   Other Resources  Home Care (NA) 03/15/18 1351    DISCHARGE NEEDS ASSESSMENT     PAS Number  [id number] 06/06/18 1340    Concerns To Be Addressed  all concerns addressed in this encounter 03/15/18 1351   Existing Resources/Services  Home Care ([facility]) 11/17/14 1415    Concerns Comments  NA 03/15/18 1351   SKIN      Equipment Currently Used at Home  walker, rolling;wheelchair, manual 07/06/18 1238   Inspection of bony  "prominences  -- 07/07/18 0946    # of Referrals Placed by CTS  Communication hand-offs to next level of Care Providers 06/06/18 1536   Inspection under devices  Full 07/07/18 0220    Primary Care Clinic Name  [facility] 06/05/18 1120   Skin WDL  ex;color;characteristics 07/07/18 0220    Primary Care MD Name  Dr Fox 06/05/18 1120   Skin Color/Characteristics  bruised (ecchymotic) 07/07/18 0220    Equipment Used at Home  cane, straight 11/05/15 1604   Skin Temperature  warm 07/07/18 0220    GASTROINTESTINAL (ADULT,PEDIATRIC,OB)     Skin Moisture  dry 07/07/18 0220    GI WDL  WDL 07/07/18 0946   Skin Elasticity  slow return to original state 07/07/18 0220    All Quadrants Bowel Sounds  audible and normoactive 07/07/18 0220   Skin Integrity  abrasion(s) 07/07/18 0220    Last Bowel Movement  07/07/18 07/07/18 0946   SAFETY      Passing flatus  yes 07/07/18 0946   Safety WDL  ex 07/07/18 0946    COMMUNICATION ASSESSMENT     Safety Factors  bed in low position;call light in reach;upper side rails raised x 2;ID band on 07/07/18 0946    Patient's communication style  spoken language (English or Bilingual) 07/02/18 0846   All Alarms  alarm(s) activated and audible 07/07/18 0946    FINAL RESOURCES                        Assessment WDL (Within Defined Limits) Definitions           Safety WDL     Effective: 09/28/15    Row Information: <b>WDL Definition:</b> Bed in low position, wheels locked; call light in reach; upper side rails up x 2; ID band on<br> <font color=\"gray\"><i>Item=AS safety wdl>>List=AS safety wdl>>Version=F14</i></font>      Skin WDL     Effective: 09/28/15    Row Information: <b>WDL Definition:</b> Warm; dry; intact; elastic; without discoloration; pressure points without redness<br> <font color=\"gray\"><i>Item=AS skin wdl>>List=AS skin wdl>>Version=F14</i></font>      Vitals     Vital Signs Flowsheet     VITAL SIGNS     Pain Location  Back 07/04/18 1608    Temp  96.7  F (35.9  C) 07/07/18 0800   " "Pain Intervention(s)  Medication (See eMAR) 07/04/18 1543    Temp src  Oral 07/07/18 0800   ANALGESIA SIDE EFFECTS MONITORING      Resp  15 07/07/18 0800   Side Effects Monitoring: Respiratory Quality  R 07/07/18 0212    Pulse  75 07/02/18 0648   Side Effects Monitoring: Respiratory Depth  S 07/07/18 0212    Heart Rate  75 07/07/18 0800   Side Effects Monitoring: Sedation Level  S 07/07/18 0212    Pulse/Heart Rate Source  Monitor 07/07/18 0800   HEIGHT AND WEIGHT      BP  143/79 07/07/18 0800   Height  1.727 m (5' 8\") 07/05/18 2315    BP Location  Left arm 07/07/18 0800   Height Method  Stated 07/05/18 2315    OXYGEN THERAPY     Weight  66 kg (145 lb 8 oz) 07/05/18 0618    SpO2  92 % 07/07/18 1002   Weight Method  Bed scale 07/05/18 0618    O2 Device  Nasal cannula 07/07/18 1002   Bed Scale  Standard (fitted sheet, draw sheet/ pad, cover/flat sheet, blanket, two pillows);Call light 07/03/18 0511    FiO2 (%)  35 % 07/04/18 1511   RENAE COMA SCALE      Oxygen Delivery  4 LPM 07/07/18 1002   Best Eye Response  4-->(E4) spontaneous 07/07/18 0946    PAIN/COMFORT     Best Motor Response  6-->(M6) obeys commands 07/07/18 0946    Patient Currently in Pain  denies 07/07/18 0802   Best Verbal Response  4-->(V4) confused 07/07/18 0946    Preferred Pain Scale  number (Numeric Rating Pain Scale) 07/07/18 0802   [place] Coma Scale Score  14 07/07/18 0946    Patient's Stated Pain Goal  No pain 07/07/18 0802   POSITIONING      0-10 Pain Scale  0 07/07/18 0802   Body Position  weight shift assistance provided 07/07/18 0946    PAINAD Breathing  0-->normal 07/07/18 0802   Head of Bed (HOB)  HOB at 60-90 degrees 07/07/18 0946    PAINAD Negative Vocalization  0-->none 07/07/18 0802   Chair  Upright in chair 07/06/18 1713    PAINAD Facial Expression  0-->smiling or inexpressive 07/07/18 0802   Positioning/Transfer Devices  pillows 07/07/18 0800    PAINAD Body Language  0-->relaxed 07/07/18 0802   DAILY CARE      PAINAD " Consolability  0-->no need to console 07/07/18 0802   Activity Management  activity adjusted per tolerance 07/07/18 0946    PAINAD Score  0 07/07/18 0802   Activity Assistance Provided  assistance, 1 person 07/07/18 0946            Patient Lines/Drains/Airways Status    Active LINES/DRAINS/AIRWAYS     Name: Placement date: Placement time: Site: Days: Last dressing change:    Incision/Surgical Site 11/19/14 Lateral Chest 11/19/14   0212    1326     Incision/Surgical Site 11/19/14 Mid;Lateral Chest 11/19/14   0214    1326     Incision/Surgical Site 05/18/17 Left;Lower Back 05/18/17   1100    414             Patient Lines/Drains/Airways Status    Active PICC/CVC     None            Intake/Output Detail Report     Date Intake     Output Net    Shift P.O. I.V. IV Piggyback Total Urine Total       Noc 07/05/18 2300 - 07/06/18 0659 -- -- -- -- -- -- 0    Day 07/06/18 0700 - 07/06/18 1459 -- -- -- -- 100 100 -100    Shabnam 07/06/18 1500 - 07/06/18 2259 240 -- -- 240 225 225 15    Noc 07/06/18 2300 - 07/07/18 0659 -- -- -- -- -- -- 0    Day 07/07/18 0700 - 07/07/18 1459 -- -- -- -- 200 200 -200      Last Void/BM       Most Recent Value    Urine Occurrence 1 at 07/06/2018 1945    Stool Occurrence       Case Management/Discharge Planning     Case Management/Discharge Planning Flowsheet     REFERRAL INFORMATION     Concerns Comments  NA 03/15/18 1351    Arrived From  home or self-care 06/05/18 1117   DISCHARGE PLANNING      # of Referrals Placed by CTS  Communication hand-offs to next level of Care Providers 06/06/18 1536   Equipment Used at Home  cane, straight 11/05/15 1604    [facility]120   FINAL RESOURCES      Primary Care MD Name  Dr Fox 06/05/18 1120   Equipment Currently Used at Home  walker, rolling;wheelchair, manual 07/06/18 1238    LIVING ENVIRONMENT     Resources List  Home Care (NA) 03/15/18 1351    Lives With  significant other 07/06/18 1238   Other Resources  Home  Care (NA) 03/15/18 1351    Living Arrangements  apartment ([facility]) 07/06/18 1238   PAS Number  [id number] 06/06/18 1340    COPING/STRESS     Existing Resources/Services  Home Care ([facility]) 11/17/14 1415    Major Change/Loss/Stressor  hospitalization 07/05/18 1019   ABUSE RISK SCREEN      Patient Personal Strengths  expressive of needs;strong support system 01/30/17 1407   QUESTION TO PATIENT:  Has a member of your family or a partner(now or in the past) intimidated, hurt, manipulated, or controlled you in any way?  patient declined to answer or is unable to answer 07/02/18 0850    EXPECTED DISCHARGE     QUESTION TO PATIENT: Do you feel safe going back to the place where you are living?  patient declined to answer or is unable to answer 07/02/18 0850    Expected Discharge Date  07/07/18 (came in from [facility], [facility]) 07/05/18 1546   OBSERVATION: Is there reason to believe there has been maltreatment of a vulnerable adult (ie. Physical/Sexual/Emotional abuse, self neglect, lack of adequate food, shelter, medical care, or financial exploitation)?  no 07/02/18 0850    ASSESSMENT/CONCERNS TO BE ADDRESSED     OTHER      Concerns To Be Addressed  all concerns addressed in this encounter 03/15/18 1351   Are you depressed or being treated for depression?  Yes 07/05/18 1020

## 2018-07-02 NOTE — IP AVS SNAPSHOT
"          [place] [address] MEDICAL SURGICAL: [phone]                                              INTERAGENCY TRANSFER FORM - LAB / IMAGING / EKG / EMG RESULTS   2018                    Hospital Admission Date: 2018  NATTY MAN   : 1928  Sex: Male        Attending Provider: Nash Rhodes MD     Allergies:  Contrast Dye, Lisinopril, Oxycodone    Infection:  None   Service:  GENERAL MEDI    Ht:  1.727 m (5' 8\")   Wt:  66 kg (145 lb 8 oz)   Admission Wt:  63.5 kg (140 lb 1.6 oz)    BMI:  22.12 kg/m 2   BSA:  1.78 m 2            Patient PCP Information     Provider PCP Type    Alirio Fox MD General         Lab Results - 3 Days      Basic metabolic panel [[id number]] (Abnormal)  Resulted: 18 0754, Result status: Final result    Ordering provider: Rachel Mclain MD  18 0000 Resulting lab: [facility]    Specimen Information    Type Source Collected On   Blood  18 0729          Components       Value Reference Range Flag Lab   Sodium 142 133 - 144 mmol/L  FrRdHs   Potassium 4.8 3.4 - 5.3 mmol/L  FrRdHs   Chloride 103 94 - 109 mmol/L  FrRdHs   Carbon Dioxide 35 20 - 32 mmol/L H FrRdHs   Anion Gap 4 3 - 14 mmol/L  FrRdHs   Glucose 93 70 - 99 mg/dL  FrRdHs   Urea Nitrogen 43 7 - 30 mg/dL H FrRdHs   Creatinine 1.56 0.66 - 1.25 mg/dL H FrRdHs   GFR Estimate 42 >60 mL/min/1.7m2 L FrRdHs   Comment:  Non  GFR Calc   GFR Estimate If Black 51 >60 mL/min/1.7m2 L FrRdHs   Comment:  African American GFR Calc   Calcium 10.2 8.5 - 10.1 mg/dL H FrRdHs            CBC with platelets [[id number]] (Abnormal)  Resulted: 18 0737, Result status: Final result    Ordering provider: Rachel Mclain MD  18 0000 Resulting lab: [facility]    Specimen Information    Type Source Collected On   Blood  18 0729          Components       Value Reference Range Flag Lab   WBC 13.7 4.0 - 11.0 10e9/L H [facility]   RBC Count 3.98 4.4 - " 5.9 10e12/L L FrRdHs   Hemoglobin 12.1 13.3 - 17.7 g/dL L FrRdHs   Hematocrit 36.7 40.0 - 53.0 % L FrRdHs   MCV 92 78 - 100 fl  FrRdHs   MCH 30.4 26.5 - 33.0 pg  FrRdHs   MCHC 33.0 31.5 - 36.5 g/dL  FrRdHs   RDW 13.3 10.0 - 15.0 %  FrRdHs   Platelet Count 216 150 - 450 10e9/L  FrRdHs            Blood culture [[id number]]  Resulted: 07/07/18 0350, Result status: Preliminary result    Ordering provider: Harley Lucas MD  07/02/18 0906 Resulting lab: [facility]    Specimen Information    Type Source Collected On   Blood  07/02/18 0925   Comment:  Left Arm          Components       Value Reference Range Flag Lab   Specimen Description Blood Left Arm      Special Requests Received in aerobic bottle only   75   Culture Micro No growth after 5 days   225            Blood culture [[id number]]  Resulted: 07/07/18 0350, Result status: Preliminary result    Ordering provider: Harley Lucas MD  07/02/18 0906 Resulting lab: [facility]    Specimen Information    Type Source Collected On   Blood  07/02/18 0919   Comment:  Right Arm          Components       Value Reference Range Flag Lab   Specimen Description Blood Right Arm      Special Requests Aerobic and anaerobic bottles received   FrRdHs   Culture Micro No growth after 5 days   225            Lactic acid whole blood [[id number]] (Abnormal)  Resulted: 07/06/18 1526, Result status: Final result    Ordering provider: Rachel Mclain MD  07/06/18 1241 Resulting lab: [facility]    Specimen Information    Type Source Collected On   Blood  07/06/18 1511          Components       Value Reference Range Flag Lab   Lactic Acid 2.1 0.7 - 2.0 mmol/L H FrRd            Lactic acid level STAT for sepsis protocol [[id number]] (Abnormal)  Resulted: 07/06/18 1240, Result status: Final result    Ordering provider: Rachel Mclain MD  07/06/18 1204 Resulting lab: [facility]     Specimen Information    Type Source Collected On   Blood  07/06/18 1228          Components       Value Reference Range Flag Lab   Lactate for Sepsis Protocol 3.4 0.4 - 1.9 mmol/L HH FrRdHs   Comment:         Critical Value called to and read back by  YAKOV RIOS ON MS3 @ 1239 7.6.18 LNP              Basic metabolic panel [[id number]] (Abnormal)  Resulted: 07/06/18 0745, Result status: Final result    Ordering provider: Rachel Mclain MD  07/06/18 0001 Resulting lab: [facility]    Specimen Information    Type Source Collected On   Blood  07/06/18 0708          Components       Value Reference Range Flag Lab   Sodium 140 133 - 144 mmol/L  FrRdHs   Potassium 3.9 3.4 - 5.3 mmol/L  FrRdHs   Chloride 102 94 - 109 mmol/L  FrRdHs   Carbon Dioxide 33 20 - 32 mmol/L H FrRdHs   Anion Gap 5 3 - 14 mmol/L  FrRdHs   Glucose 108 70 - 99 mg/dL H FrRdHs   Urea Nitrogen 47 7 - 30 mg/dL H FrRdHs   Creatinine 1.56 0.66 - 1.25 mg/dL H FrRdHs   GFR Estimate 42 >60 mL/min/1.7m2 L FrRdHs   Comment:  Non  GFR Calc   GFR Estimate If Black 51 >60 mL/min/1.7m2 L FrRdHs   Comment:  African American GFR Calc   Calcium 9.7 8.5 - 10.1 mg/dL  FrRdHs            CBC with platelets [[id number]] (Abnormal)  Resulted: 07/06/18 0727, Result status: Final result    Ordering provider: Rachel Mclain MD  07/06/18 0001 Resulting lab: [facility]    Specimen Information    Type Source Collected On   Blood  07/06/18 0708          Components       Value Reference Range Flag Lab   WBC 12.3 4.0 - 11.0 10e9/L H FrRdHs   RBC Count 3.78 4.4 - 5.9 10e12/L L FrRdHs   Hemoglobin 11.0 13.3 - 17.7 g/dL L FrRdHs   Hematocrit 34.8 40.0 - 53.0 % L FrRdHs   MCV 92 78 - 100 fl  FrRdHs   MCH 29.1 26.5 - 33.0 pg  FrRdHs   MCHC 31.6 31.5 - 36.5 g/dL  FrRdHs   RDW 13.2 10.0 - 15.0 %  [facility]   Platelet Count 188 150 - 450 10e9/L  [facility]            Basic metabolic panel [[id number]] (Abnormal)  Resulted: 07/05/18  0736, Result status: Final result    Ordering provider: Rachel Mclain MD  07/05/18 0000 Resulting lab: [facility]    Specimen Information    Type Source Collected On   Blood  07/05/18 0647          Components       Value Reference Range Flag Lab   Sodium 138 133 - 144 mmol/L  FrRdHs   Potassium 4.1 3.4 - 5.3 mmol/L  FrRdHs   Chloride 101 94 - 109 mmol/L  FrRdHs   Carbon Dioxide 32 20 - 32 mmol/L  FrRdHs   Anion Gap 5 3 - 14 mmol/L  FrRdHs   Glucose 140 70 - 99 mg/dL H FrRdHs   Urea Nitrogen 47 7 - 30 mg/dL H FrRdHs   Creatinine 1.82 0.66 - 1.25 mg/dL H FrRdHs   GFR Estimate 35 >60 mL/min/1.7m2 L FrRdHs   Comment:  Non  GFR Calc   GFR Estimate If Black 43 >60 mL/min/1.7m2 L FrRdHs   Comment:  African American GFR Calc   Calcium 9.4 8.5 - 10.1 mg/dL  FrRdHs            CBC with platelets [[id number]] (Abnormal)  Resulted: 07/05/18 0722, Result status: Final result    Ordering provider: Rachel Mclain MD  07/05/18 0000 Resulting lab: [facility]    Specimen Information    Type Source Collected On   Blood  07/05/18 0647          Components       Value Reference Range Flag Lab   WBC 12.7 4.0 - 11.0 10e9/L H FrRdHs   RBC Count 3.66 4.4 - 5.9 10e12/L L FrRdHs   Hemoglobin 10.8 13.3 - 17.7 g/dL L FrRdHs   Hematocrit 34.2 40.0 - 53.0 % L FrRdHs   MCV 93 78 - 100 fl  FrRdHs   MCH 29.5 26.5 - 33.0 pg  FrRdHs   MCHC 31.6 31.5 - 36.5 g/dL  Fr[facility]dHs   RDW 13.5 10.0 - 15.0 %  Fr[facility]dHs   Platelet Count 192 150 - 450 10e9/L  FrRdHs            Basic metabolic panel [[id number]] (Abnormal)  Resulted: 07/04/18 0802, Result status: Final result    Ordering provider: Rachel Mclain MD  07/04/18 0001 Resulting lab: [facility]    Specimen Information    Type Source Collected On   Blood  07/04/18 0709          Components       Value Reference Range Flag Lab   Sodium 140 133 - 144 mmol/L  FrRdHs   Potassium 3.5 3.4 - 5.3 mmol/L  FrRdHs   Chloride 99 94 - 109 mmol/L   FrRdHs   Carbon Dioxide 33 20 - 32 mmol/L H FrRdHs   Anion Gap 8 3 - 14 mmol/L  FrRdHs   Glucose 120 70 - 99 mg/dL H FrRdHs   Urea Nitrogen 44 7 - 30 mg/dL H FrRdHs   Creatinine 1.79 0.66 - 1.25 mg/dL H FrRdHs   GFR Estimate 36 >60 mL/min/1.7m2 L FrRdHs   Comment:  Non  GFR Calc   GFR Estimate If Black 43 >60 mL/min/1.7m2 L FrRd   Comment:  African American GFR Calc   Calcium 9.4 8.5 - 10.1 mg/dL  FrRdHs            CBC with platelets [[id number]] (Abnormal)  Resulted: 07/04/18 0741, Result status: Final result    Ordering provider: Rachel Mclain MD  07/04/18 0001 Resulting lab: [facility]    Specimen Information    Type Source Collected On   Blood  07/04/18 0709          Components       Value Reference Range Flag Lab   WBC 16.6 4.0 - 11.0 10e9/L H FrRdHs   RBC Count 3.60 4.4 - 5.9 10e12/L L FrRdHs   Hemoglobin 11.0 13.3 - 17.7 g/dL L FrRdHs   Hematocrit 32.6 40.0 - 53.0 % L FrRdHs   MCV 91 78 - 100 fl  FrRdHs   MCH 30.6 26.5 - 33.0 pg  FrRdHs   MCHC 33.7 31.5 - 36.5 g/dL  FrRdHs   RDW 13.7 10.0 - 15.0 %  FrRdHs   Platelet Count 179 150 - 450 10e9/L  FrRdHs            Testing Performed By     Lab - Abbreviation Name Director Address Valid Date Range    12 - FrRd[facility] Unknown [address]  [facility] [address] 05/08/15 1057 - Present    75 - Unknown [facility] Unknown [address] [facility] [address] 01/15/15 1019 - Present    225 - Unknown [facility] Unknown [address] [facility] [address] 12/19/14 0954 - Present            Unresulted Labs (24h ago through future)    Start       Ordered    07/07/18 0600  Platelet count  EVERY 72 HOURS,   Timed     Comments:  If no result is listed, this lab has not been done the past 365 days. LATEST LAB RESULT: Platelet Count (10e9/L)       Date                     Value                 07/06/2018               188              ----------     07/06/18 1040      Encounter-Level Documents:     There are no encounter-level documents.      Order-Level Documents:     There are no order-level documents.

## 2018-07-02 NOTE — ED TRIAGE NOTES
Patient BIBA for eval of SOB, cough, fever. Per EMS, patient is on home O2 and was satting 60-70%. Patient had Duoneb by EMS and sats increased. Upon arrival to room after being moved to ED cart, patient sats dropped to 53% on 5L nasal cannula. MD at bedside.

## 2018-07-02 NOTE — TELEPHONE ENCOUNTER
Approval needed for late re certiification assessment for when pt gets home for [facility] homecare services  for pt since currently in ER and ws in TCU . Pt is private pay . Approved.Mary Holly RN

## 2018-07-02 NOTE — H&P
[facility]    Hospitalist History and Physical    Name: Chaz Soler    MRN: [id number]  YOB: 1928    Age: 89 year old  Date of Admission:  7/2/2018  Date of Service (when I saw the patient): 07/02/18    Assessment & Plan   Chaz Soler is a 89 year old male with a PMH significant for pulmonary fibrosis, on chronic 4-5 L of oxygen, dementia, history of chronic low back pain, hypertension, diastolic heart failure, status post bioprosthetic aortic valve replacement for aortic stenosis, known history of AAA status post endovascular repair in 2011, recent hospitalization 6/3/2018-6/6/2018 who presents for coughing and noted acute hypoxia at his transitional care unit today.  In the emergency department patient was found to be febrile with leukocytosis and significant hypoxia requiring high flow oxygen and nebs to keep oxygen saturations in mid 90s, consistent with pneumonia.    1. Acute hypoxic respiratory failure secondary to hospital-acquired pneumonia: Febrile and hypoxic with leukocytosis and CXR difficult to interpret given history of pulmonary fibrosis. 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 Confirmed with wife and son that patient is DNR/DNI. They would want BiPAP initiated if needed but no intubation.  -Admit to [facility]  -IV Zosyn  -Continue oxygen NC high flow FIO2 and wean as able  -Scheduled duonebs and prn albuterol nebs  -IV methylprednisolone 40 mg daily  -Recheck BMP tomorrow prior to additional IV diuresis  -Follow blood cultures  -Palliative care consult as family is interesting in a more comfort-care approach    2. History of diastolic heart failure: Patient's BNP is 1886.  He has no lower extremity edema.  There is no clear evidence of pulmonary edema on his chest x-ray.  He received IV Lasix 20 mg in the ER.  We will hold on further diuresis for now and reassess after  checking BNP and respiratory status tomorrow morning.  Can resume regular home meds Lasix 20 mg daily.    3. Hypertension: Blood pressure is stable.  Continue Norvasc, losartan, and metoprolol.    4. History of coronary artery disease: Denies chest pain and has undetectable troponin.  Low suspicion for acute coronary syndrome.  Lexiscan 10/2017 negative for significant ischemia with ejection fraction measured at 63%.  Continue aspirin, atorvastatin, metoprolol succinate, and losartan.    5. Hypothyroidism: Resume Synthroid.    6. GERD: Continue proton pump inhibitor.    7. Dementia: Continue PTA donepezil.    8. Depression/anxiety: Continue PTA fluoxetine and as needed Ativan.    # Pain Assessment:  Current Pain Score 7/2/2018   Patient currently in pain? sleeping: patient not able to self report   Pain score (0-10) -   Pain location -   Pain descriptors -   CPOT pain score -   Chaz miller pain level was assessed and he currently denies pain.      Social/Living Situation: Currently at [facility] but prior to that was living at home with wife  DVT Prophylaxis: Pneumatic Compression Devices  Code Status: DNR / DNI  Disposition: Anticipate > 2 evening hospitalization    Primary Care Physician   Alirio Fox    Chief Complaint   Shortness of breath    History is obtained from the patient's family and chart review as well as discussion with ER physician. The history from the patient is limited due to dementia.    History of Present Illness   Chaz Soler is a 89 year old male with a PMH significant for pulmonary fibrosis, on chronic 4-5 L of oxygen, dementia, history of chronic low back pain, hypertension, diastolic heart failure, status post bioprosthetic aortic valve replacement for aortic stenosis, known history of AAA status post endovascular repair in 2011, recent hospitalization 6/3/2018-6/6/2018 who presents for coughing and noted acute hypoxia at his transitional care unit today.  In the emergency department  patient was found to be febrile with leukocytosis and significant hypoxia requiring high flow oxygen and nebs to keep oxygen saturations in mid 90s, consistent with pneumonia.    The patient was recently admitted at [facility] 6/3/2018-6/6/2018 after a fall.   on evaluation in the ER, it was felt that he had a congestive heart failure exacerbation based on his chest x-ray findings and some hypoxia, and he was given IV Lasix 40 mg and admitted to the hospitalist service.  The patient's chest x-ray was somewhat difficult to interpret at that time given his pulmonary fibrosis history.  He developed acute renal failure after receiving a dose of IV Lasix, so it was suspected that he walked a very fine line with his respiratory status in the setting of chronic hypoxia and his pulmonary fibrosis history.  He was given some gentle IV fluids to correct the AK I and his creatinine returned to his baseline around 1.5.  He was returned to his baseline dose of PTA Lasix 20 mg daily and discharged to [facility]. He is chronically on 4-5 LPM oxygen via nasal cannula.  Of note, the patient was treated with Levaquin for pneumonia during his time at TCU, finishing his last dose on 6/29/2018.    The patient's family and records from the patient's TCU, the patient was not acting like himself yesterday.  He was confused about the time of day and complained that he had not been fed.  His wife visited yesterday to play cribbage and felt like he was having trouble holding the cards or keeping them still.  His orientation to time and situation seems to improve a little later in the day.  The nursing notes from TCU document that he had some left lung crackles and his weight was at 142.6 lbs (baseline appears to be around 140 lbs.  This morning the patient was having shortness of breath with O2 sats in the 70s at rest that were not improving on 5 L of oxygen. The family denies known ill contacts, though the patient did have a  roommate in the TCU for a little while earlier in the week. The patient has not been complaining of fever, chills, shortness of breath, cough, wheezing, chest pain, palpitations, nausea, vomiting, abdominal pain, diarrhea, or urinary complaints.    Upon arrival in the Emergency Department, initial vital signs were temperature 100.4, heart rate 93, blood pressure 140/90, respiratory rate 28, SPO2 53% on 5 L of oxygen improved to low to mid 90s on oxygen via high flow nasal cannula.  BMP demonstrates creatinine 1.66, GFR 39, BUN 31, otherwise within normal limits.  CBC was also demonstrates leukocytosis 14.6, hemoglobin 11.8, and platelet count 185.  VBG demonstrates pH 7.47 and venous bicarbonate 35, otherwise within normal limits.  Troponin less than 0.015.  BNP 1886.  Blood cultures ×2 obtained.  Chest x-ray ×1 view showed diffuse bilateral pulmonary opacities either representing edema and/or fibrosis with no definite pleural effusions appreciated or pneumothorax.    The patient currently denies pain or shortness of breath.     Past Medical History    Past Medical History:   Diagnosis Date     AAA (abdominal aortic aneurysm) (H) 10/5/2011    6.1 cm     Anemia 11/30/2011     Aortic stenosis 10/26/2012     CAD (coronary artery disease)     MI - distal inferior/apex. Non transmural     Carotid artery disease (H)      CHF (congestive heart failure) (H) 12/31/2014     CKD (chronic kidney disease) stage 3, GFR 30-59 ml/min 11/30/2011     COPD (chronic obstructive pulmonary disease) (H)      Dementia 8/4/2015     Edema, unspecified edema 1/17/2016     Essential hypertension      Gout 1/06    knee     Hypercalcemia 1/2/2015     Hyperlipidemia LDL goal < 70      Hyperparathyroidism, unspecified 4/22/2015     Hyperplasia of prostate      LEFT LUNG GRANULOMA      Lumbago      Other chronic pain 10/11/2016     Proteinuria 2/8/2012     Pulmonary fibrosis (H)      PVD (peripheral vascular disease) (H)      Thrombocytopenia (H)  11/30/2011     Unspecified hypothyroidism      Vitamin D deficiency          Past Surgical History   Past Surgical History:   Procedure Laterality Date     COLONOSCOPY  9/02 per patient     DISCECTOMY LUMBAR POSTERIOR MICROSCOPIC ONE LEVEL  8/11/2014    Procedure: DISCECTOMY LUMBAR POSTERIOR MICROSCOPIC ONE LEVEL;  Surgeon: Jone Carvalho MD;  Location: [facility] OR     ENDOVASCULAR REPAIR ANEURYSM ABDOMINAL AORTA  11/18/2011    Procedure:ENDOVASCULAR REPAIR ANEURYSM ABDOMINAL AORTA; ENDOVASCULAR AAA,RIGHT FEMORAL       LAPAROSCOPIC CHOLECYSTECTOMY  Nov 2011     LAPAROSCOPIC CHOLECYSTECTOMY  11/18/2011    Procedure:LAPAROSCOPIC CHOLECYSTECTOMY; LAPAROSCOPIC CHOLECYSTECTOMY ; Surgeon:DARRYL DORADO; Location:[facility] OR     REPAIR ANEURYSM ABDOMINAL AORTA  Nov 2011     right cataract extraction/IOL  12/03     TRANSCATHETER AORTIC VALVE IMPLANT ANESTHESIA N/A 11/12/2014    Bioprosthetic. Procedure: TRANSCATHETER AORTIC VALVE IMPLANT ANESTHESIA;  Surgeon: Generic Anesthesia Provider;  Location: [facility] OR     VASECTOMY         Prior to Admission Medications   Prior to Admission Medications   Prescriptions Last Dose Informant Patient Reported? Taking?   DULoxetine (CYMBALTA) 30 MG EC capsule 7/1/2018 at Unknown time Self Yes Yes   Sig: Take 30 mg by mouth daily   LORazepam (ATIVAN PO)   Yes No   Sig: Take 0.25 mg by mouth every 6 hours as needed for anxiety   Multiple Vitamins-Minerals (MULTIVITAL) TABS 7/1/2018 at Unknown time Self Yes Yes   Sig: Take 1 tablet by mouth daily   TRAMADOL HCL PO 7/1/2018 at Unknown time  Yes Yes   Sig: Take 50 mg by mouth 3 times daily    acetaminophen (TYLENOL) 500 MG tablet 7/1/2018 at Unknown time Self Yes Yes   Sig: Take 500 mg by mouth 3 times daily   albuterol (2.5 MG/3ML) 0.083% neb solution 7/2/2018 at Unknown time  Yes Yes   Sig: Take 2.5 mg by nebulization every 2 hours as needed    amLODIPine (NORVASC) 10 MG tablet 7/1/2018 at Unknown time Self No Yes   Sig: TAKE ONE TABLET BY  MOUTH ONCE DAILY   aspirin 81 MG tablet 7/1/2018 at Unknown time Self No Yes   Sig: Take 1 tablet (81 mg) by mouth daily   atorvastatin (LIPITOR) 20 MG tablet 7/1/2018 at Unknown time Self No Yes   Sig: Take 1 tablet (20 mg) by mouth daily   cyanocobalamin (VITAMIN  B-12) 1000 MCG tablet 7/1/2018 at Unknown time Self Yes Yes   Sig: Take 1,000 mcg by mouth daily   donepezil (ARICEPT) 10 MG tablet 7/1/2018 at Unknown time Self No Yes   Sig: TAKE ONE TABLET BY MOUTH AT BEDTIME   furosemide (LASIX) 20 MG tablet 7/1/2018 at Unknown time Self Yes Yes   Sig: Take 30 mg by mouth daily    gabapentin (NEURONTIN) 100 MG capsule 7/1/2018 at Unknown time Self No Yes   Sig: TAKE ONE CAPSULE BY MOUTH THREE TIMES DAILY FOR  PAIN   ipratropium - albuterol 0.5 mg/2.5 mg/3 mL (DUONEB) 0.5-2.5 (3) MG/3ML neb solution 7/1/2018 at Unknown time  Yes Yes   Sig: Take 1 vial by nebulization 4 times daily   levothyroxine (SYNTHROID/LEVOTHROID) 88 MCG tablet 7/2/2018 at Unknown time Self No Yes   Sig: TAKE ONE TABLET BY MOUTH ONCE DAILY   losartan (COZAAR) 100 MG tablet 7/1/2018 at Unknown time Self No Yes   Sig: TAKE ONE TABLET BY MOUTH  DAILY   metoprolol succinate (TOPROL-XL) 25 MG 24 hr tablet 7/1/2018 at Unknown time Self No Yes   Sig: TAKE ONE TABLET BY MOUTH DAILY   pantoprazole (PROTONIX) 40 MG EC tablet 7/1/2018 at Unknown time Self No Yes   Sig: TAKE ONE TABLET BY MOUTH EVERY MORNING   tamsulosin (FLOMAX) 0.4 MG capsule 7/1/2018 at Unknown time Self No Yes   Sig: TAKE ONE CAPSULE BY MOUTH ONCE DAILY      Facility-Administered Medications: None     Allergies   Allergies   Allergen Reactions     Contrast Dye      SOB, increased Bp, difficulty swallowing. Date 6/3/96 (ipaque contrast)  Was premedicated prior to FRANCK and had no reaction at all (solumedrol and benadryl) 2016  Was premedicated with Methylprednisolone protocol, no reaction 1/25/17     Lisinopril      cough     Oxycodone      Delirium       Social History   Social History    Substance Use Topics     Smoking status: Never Smoker     Smokeless tobacco: Never Used     Alcohol use 0.0 oz/week     0 Standard drinks or equivalent per week      Comment: 1 glass wine/day     Social History     Social History Narrative    Lives in  Senior co[facility]op in [place] for the past 13 years.     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   Family History   Problem Relation Age of Onset     Hypertension Mother      Hypertension Father        Review of Systems   A Comprehensive greater than 10 system review of systems was carried out.  Pertinent positives and negatives are noted above.  Otherwise negative for contributory information.    Physical Exam   Temp: 99.7  F (37.6  C) Temp src: Axillary BP: 116/68 (left arm)   Heart Rate: 91 Resp: 28 SpO2: 93 % O2 Device: High Flow Nasal Cannula (HFNC) Oxygen Delivery: Other (Comments) (40)  Vital Signs with Ranges  Temp:  [98.3  F (36.8  C)-100.4  F (38  C)] 99.7  F (37.6  C)  Pulse:  [75] 75  Heart Rate:  [] 91  Resp:  [22-28] 28  BP: ()/() 116/68  FiO2 (%):  [70 %-80 %] 70 %  SpO2:  [53 %-100 %] 93 %  0 lbs 0 oz    GEN:  Alert, oriented to self, appears comfortable, no overt distress on high flow oxygen via NC.  HEENT:  Normocephalic/atraumatic, no scleral icterus, no nasal discharge, mouth moist.  CV:  Regular rate and rhythm, no murmur or JVD.  S1 + S2 noted, no S3 or S4.  LUNGS: Wheezing bilaterally without rales/rhonchi/wheezing/retractions.  Symmetric chest rise on inhalation noted.  ABD:  Active bowel sounds, soft, non-tender/non-distended.  No rebound/guarding/rigidity.  EXT:  No edema.  No cyanosis.  No acute joint synovitis noted.  SKIN:  Dry to touch, no exanthems noted in the visualized areas.  NEURO:  Symmetric muscle strength, sensation to touch grossly intact.  Coordination symmetric on general exam.  No new  focal deficits appreciated.    Data   Data reviewed today:      Results for orders placed or performed during the hospital encounter of 07/02/18 (from the past 48 hour(s))   EKG 12 lead   Result Value Ref Range    Interpretation ECG Click View Image link to view waveform and result    CBC with platelets differential   Result Value Ref Range    WBC 14.6 (H) 4.0 - 11.0 10e9/L    RBC Count 3.97 (L) 4.4 - 5.9 10e12/L    Hemoglobin 11.8 (L) 13.3 - 17.7 g/dL    Hematocrit 36.2 (L) 40.0 - 53.0 %    MCV 91 78 - 100 fl    MCH 29.7 26.5 - 33.0 pg    MCHC 32.6 31.5 - 36.5 g/dL    RDW 13.5 10.0 - 15.0 %    Platelet Count 185 150 - 450 10e9/L    Diff Method Automated Method     % Neutrophils 80.7 %    % Lymphocytes 7.4 %    % Monocytes 8.6 %    % Eosinophils 2.5 %    % Basophils 0.3 %    % Immature Granulocytes 0.5 %    Nucleated RBCs 0 0 /100    Absolute Neutrophil 11.8 (H) 1.6 - 8.3 10e9/L    Absolute Lymphocytes 1.1 0.8 - 5.3 10e9/L    Absolute Monocytes 1.3 0.0 - 1.3 10e9/L    Absolute Eosinophils 0.4 0.0 - 0.7 10e9/L    Absolute Basophils 0.1 0.0 - 0.2 10e9/L    Abs Immature Granulocytes 0.1 0 - 0.4 10e9/L    Absolute Nucleated RBC 0.0    Basic metabolic panel   Result Value Ref Range    Sodium 139 133 - 144 mmol/L    Potassium 3.9 3.4 - 5.3 mmol/L    Chloride 100 94 - 109 mmol/L    Carbon Dioxide 32 20 - 32 mmol/L    Anion Gap 7 3 - 14 mmol/L    Glucose 132 (H) 70 - 99 mg/dL    Urea Nitrogen 31 (H) 7 - 30 mg/dL    Creatinine 1.66 (H) 0.66 - 1.25 mg/dL    GFR Estimate 39 (L) >60 mL/min/1.7m2    GFR Estimate If Black 47 (L) >60 mL/min/1.7m2    Calcium 9.7 8.5 - 10.1 mg/dL   Troponin I   Result Value Ref Range    Troponin I ES <0.015 0.000 - 0.045 ug/L   Nt probnp inpatient (BNP)   Result Value Ref Range    N-Terminal Pro BNP Inpatient 1886 (H) 0 - 1800 pg/mL   ISTAT gases lactate sharri POCT   Result Value Ref Range    Ph Venous 7.47 (H) 7.32 - 7.43 pH    PCO2 Venous 49 40 - 50 mm Hg    PO2 Venous 31 25 - 47 mm Hg     Bicarbonate Venous 35 (H) 21 - 28 mmol/L    O2 Sat Venous 63 %    Lactic Acid 1.7 0.7 - 2.1 mmol/L   Blood culture   Result Value Ref Range    Specimen Description Blood Right Arm     Special Requests Aerobic and anaerobic bottles received     Culture Micro PENDING    Blood culture   Result Value Ref Range    Specimen Description Blood Left Arm     Special Requests Received in aerobic bottle only     Culture Micro PENDING    Chest  XR, 1 view PORTABLE    Narrative    XR CHEST PORT 1 VW 7/2/2018 9:45 AM    HISTORY: Cough.    COMPARISON: Anne 3, 2018.      Impression    IMPRESSION: There are diffuse bilateral pulmonary opacities, which  could reflect edema and/or fibrosis. No definite pleural effusions  appreciated. No pneumothorax.    MD Rosario BAKER PA-C    I discussed the patient with Dr. Rhodes who is in agreement with the above plan.

## 2018-07-02 NOTE — IP AVS SNAPSHOT
[place] [id number] [facility]    [address]    [facility] [address]    Phone:  [phone]    Fax:  [phone]                                       After Visit Summary   7/2/2018    Chaz Soler    MRN: [id number]           After Visit Summary Signature Page     I have received my discharge instructions, and my questions have been answered. I have discussed any challenges I see with this plan with the nurse or doctor.    ..........................................................................................................................................  Patient/Patient Representative Signature      ..........................................................................................................................................  Patient Representative Print Name and Relationship to Patient    ..................................................               ................................................  Date                                            Time    ..........................................................................................................................................  Reviewed by Signature/Title    ...................................................              ..............................................  Date                                                            Time

## 2018-07-02 NOTE — LETTER
Transition Communication Hand-off for Care Transitions to Next Level of Care Provider    Hand-off for Care Transitions to Next Level of Care Provider  Name: Chaz Soler  : 1928  MRN #: [id number]  Reason for Hospitalization:  Pulmonary fibrosis (H) [J84.10]  Acute on chronic respiratory failure with hypoxia (H) [J96.21]  Congestive heart failure, unspecified congestive heart failure chronicity, unspecified congestive heart failure type (H) [I50.9]  Admit Date/Time: 2018  8:42 AM  Discharge Date: 2018    Reason for Communication Hand-off Referral: Admission diagnoses: PN  Admission diagnoses: COPD    Discharge Plan:  Discharged to: TCU: [facility]                   Patient agreeable to post-hospital support suggestions:  Yes    Patient is on new medications:   Yes    MTM follow up recommended: No    Tel-Assurance program:  Ineligible    Patient will receive  TCU  at:  Discharge to [facility].    Follow-up specialty is recommended: Yes. Follow up with [facility] as scheduled below.     Follow-up plan:  Future Appointments  Date Time Provider Department Center   2018 12:30 PM [id number]   2018 3:45 PM Tyrell Jackson MD [facility]     Any outstanding tests or procedures: No. Recommend give two-step Mantoux (PPD) Per Facility Policy       Procedures     Future Labs/Procedures    Oxygen - Nasal cannula     Comments:    4-5 Lpm by nasal cannula to keep O2 sats 92% or greater.          Referrals     Future Labs/Procedures    Occupational Therapy Adult Consult     Comments:    Evaluate and treat as clinically indicated.    Reason:deconditioning    Physical Therapy Adult Consult     Comments:    Evaluate and treat as clinically indicated.    Reason: deconditioning        Supplies     Future Labs/Procedures    AntiEmbolism Stockings     Comments:    Bilateral below knee length.On in the morning, off at night        Key Recommendations:  CTS following for elevated PARAM  score and Reamission. Pt was hospitalized from 6/3-6/6 for CHF exacerbation and pt was dc'd to [facility] TCU. Pt is now admitted again with PNA/COPD exacerbation and needing high flow oxygen. He is being treated with zosyn, IV solumedrol and nebs. He has a bueno. He has a history of pulmonary fibrosis and is on O2 at 4-5 L baseline. Palliative care consulted and will meet with pt and wife on Thurs to discuss goals of care. He has a bed hold at [facility] TCU and will return there on dc.     Evangelina Valentin    Communicated handoff via EPIC Comm Mgt to Dr. Alirio Fox's CC at [facility].      Sent by Merry Aceves RN, BSN, CTS  [facility]  [phone]    AVS/Discharge Summary is the source of truth; this is a helpful guide for improved communication of patient story

## 2018-07-02 NOTE — IP AVS SNAPSHOT
` `     [place] [address] MEDICAL SURGICAL: [phone]            Medication Administration Report for Chaz Soler as of 07/07/18 1021   Legend:    Given Hold Not Given Due Canceled Entry Other Actions    Time Time (Time) Time  Time-Action       Inactive    Active    Linked        Medications 07/01/18 07/02/18 07/03/18 07/04/18 07/05/18 07/06/18 07/07/18    acetaminophen (TYLENOL) tablet 500 mg  Dose: 500 mg  Freq: 3 TIMES DAILY Route: PO  Start: 07/02/18 1600   Admin Instructions: Maximum acetaminophen dose from all sources = 75 mg/kg/day not to exceed 4 gram    Admin. Amount: 1 tablet (1 × 500 mg tablet)  Last Admin: 07/07/18 0837  Dispense Loc: [facility]      (7959)-Not Given       2100 (500 mg)-Given        0911 (500 mg)-Given       1722 (500 mg)-Given       2153 (500 mg)-Given        0913 (500 mg)-Given       1545 (500 mg)-Given       2138 (500 mg)-Given        0941 (500 mg)-Given       1536 (500 mg)-Given       2119 (500 mg)-Given        0807 (500 mg)-Given       1542 (500 mg)-Given       2121 (500 mg)-Given        0837 (500 mg)-Given       [ ] 1600       [ ] 2200           albuterol neb solution 2.5 mg  Dose: 2.5 mg  Freq: EVERY 2 HOURS PRN Route: NEBULIZATION  PRN Reasons: wheezing,shortness of breath / dyspnea  Start: 07/02/18 1328   Admin. Amount: 2.5 mg = 3 mL Conc: 2.5 mg/3 mL  Dispense Loc: [facility]  Volume: 3 mL               amLODIPine (NORVASC) tablet 10 mg  Dose: 10 mg  Freq: DAILY Route: PO  Start: 07/02/18 1600   Admin Instructions: Hold for SBP < 130    Admin. Amount: 1 tablet (1 × 10 mg tablet)  Last Admin: 07/07/18 0840  Dispense Loc: [facility]      (6610)-Not Given [C]        0911 (10 mg)-Given        0913 (10 mg)-Given        0942 (10 mg)-Given        0808 (10 mg)-Given        0840 (10 mg)-Given           aspirin EC tablet 81 mg  Dose: 81 mg  Freq: DAILY Route: PO  Start: 07/02/18 1600   Admin. Amount: 1 tablet (1 × 81 mg tablet)  Last Admin: 07/07/18 0841  Dispense Loc:  [facility]  [facility]      (1709)-Not Given        0911 (81 mg)-Given        0913 (81 mg)-Given        0942 (81 mg)-Given        0807 (81 mg)-Given        0841 (81 mg)-Given           atorvastatin (LIPITOR) tablet 20 mg  Dose: 20 mg  Freq: DAILY Route: PO  Start: 07/02/18 1600   Admin. Amount: 1 tablet (1 × 20 mg tablet)  Last Admin: 07/07/18 0838  Dispense Loc:  [facility]      (1712)-Not Given        0911 (20 mg)-Given        0913 (20 mg)-Given        0942 (20 mg)-Given        0807 (20 mg)-Given        0838 (20 mg)-Given           bisacodyl (DULCOLAX) Suppository 10 mg  Dose: 10 mg  Freq: DAILY PRN Route: RE  PRN Reason: constipation  Start: 07/05/18 1245   Admin. Amount: 1 suppository (1 × 10 mg suppository)  Dispense Loc:  [facility]               diclofenac (VOLTAREN) 1 % topical gel 2 g  Dose: 2 g  Freq: 3 TIMES DAILY Route: TD  Start: 07/03/18 1600   Admin Instructions: Apply to back, with lidocaine  Send dosing card with product.    Admin. Amount: 2 g  Last Admin: 07/07/18 0850  Dispense Loc:  Main Pharmacy       1721 (2 g)-Given       2153 (2 g)-Given        0921 (2 g)-Given       1549 (2 g)-Given       2139 (2 g)-Given        0955 (2 g)-Given       1537 (2 g)-Given       2122 (2 g)-Given        0809 (2 g)-Given       1542 (2 g)-Given       2123 (2 g)-Given        0850 (2 g)-Given       [ ] 1600       [ ] 2200           donepezil (ARIcept) tablet 10 mg  Dose: 10 mg  Freq: AT BEDTIME Route: PO  Start: 07/02/18 2200   Admin. Amount: 2 tablet (2 × 5 mg tablet)  Last Admin: 07/06/18 2122  Dispense Loc:  [facility]      2101 (10 mg)-Given        2153 (10 mg)-Given        2138 (10 mg)-Given        2120 (10 mg)-Given        2122 (10 mg)-Given        [ ] 2200           DULoxetine (CYMBALTA) EC capsule 30 mg  Dose: 30 mg  Freq: DAILY Route: PO  Start: 07/02/18 1600   Admin. Amount: 1 capsule (1 × 30 mg capsule)  Last Admin: 07/07/18 0841  Dispense Loc:  [facility]      (0413)-Not Given        0911 (30 mg)-Given        0912 (30  mg)-Given        0942 (30 mg)-Given        0807 (30 mg)-Given        0841 (30 mg)-Given           gabapentin (NEURONTIN) capsule 100 mg  Dose: 100 mg  Freq: 3 TIMES DAILY Route: PO  Start: 07/03/18 1600   Admin. Amount: 1 capsule (1 × 100 mg capsule)  Last Admin: 07/07/18 0841  Dispense Loc: [facility]       1722 (100 mg)-Given       2153 (100 mg)-Given        0913 (100 mg)-Given       1545 (100 mg)-Given       2138 (100 mg)-Given        0941 (100 mg)-Given       1536 (100 mg)-Given       2120 (100 mg)-Given        0807 (100 mg)-Given       1542 (100 mg)-Given       2121 (100 mg)-Given        0841 (100 mg)-Given       [ ] 1600       [ ] 2200           guaiFENesin (ROBITUSSIN) 20 mg/mL solution 10 mL  Dose: 10 mL  Freq: EVERY 4 HOURS PRN Route: PO  PRN Reason: other  PRN Comment: secretion expectorant  Start: 07/02/18 1328   Admin. Amount: 10 mL  Last Admin: 07/03/18 1032  Dispense Loc:  [facility]  Volume: 10 mL       1032 (10 mL)-Given               heparin sodium PF injection 5,000 Units  Dose: 5,000 Units  Freq: EVERY 12 HOURS Route: SC  Start: 07/03/18 1800   Admin Instructions: High concentration HEParin. Not for line flush or cath care.  High concentration heparin. Not for line flush or cath care.    Admin. Amount: 5,000 Units = 0.5 mL Conc: 5,000 Units/0.5 mL  Last Admin: 07/07/18 0522  Dispense Loc:  [facility]  Volume: 0.5 mL       1722 (5,000 Units)-Given        0557 (5,000 Units)-Given       1802 (5,000 Units)-Given        0619 (5,000 Units)-Given       1843 (5,000 Units)-Given        0624 (5,000 Units)-Given       1800 (5,000 Units)-Given        0522 (5,000 Units)-Given       [ ] 1800           levofloxacin (LEVAQUIN) infusion 250 mg  Dose: 250 mg  Freq: EVERY 24 HOURS Route: IV  Indications of Use: COMMUNITY ACQUIRED PNEUMONIA  Last Dose: 250 mg (07/07/18 0919)  Start: 07/06/18 0900   Admin Instructions: Dose adjusted per renal dosing policy for CrCl 25 ml/min.  Irritant. Administer at a rate of no  "greater than 100 mL/hr    Admin. Amount: 250 mg = 50 mL Conc: 250 mg/50 mL  Last Admin: 07/07/18 0919  Dispense Loc:  Main Pharmacy  Infused Over: 60 Minutes          0837 (250 mg)-New Bag        0919 (250 mg)-New Bag           levothyroxine (SYNTHROID/LEVOTHROID) tablet 88 mcg  Dose: 88 mcg  Freq: DAILY Route: PO  Start: 07/03/18 0900   Admin Instructions: Separate oral administration of iron- or calcium-containing products and levothyroxine by at least 4 hours.    Admin. Amount: 1 tablet (1 × 88 mcg tablet)  Last Admin: 07/07/18 0839  Dispense Loc:  [facility]       0911 (88 mcg)-Given        0912 (88 mcg)-Given        0942 (88 mcg)-Given        0808 (88 mcg)-Given        0839 (88 mcg)-Given           lidocaine 1 % 1 mL  Dose: 1 mL  Freq: EVERY 1 HOUR PRN Route: OTHER  PRN Comment: mild pain with VAD insertion or accessing implanted port  Start: 07/02/18 1328   Admin Instructions: Do NOT give if patient has a history of allergy to any local anesthetic or any \"vkias\" product. MAX dose 1 mL subcutaneous OR intradermal in divided doses.    Admin. Amount: 1 mL  Dispense Loc: [facility] Floor Stock  Volume: 2 mL               LORazepam (ATIVAN) half-tab 0.25 mg  Dose: 0.25 mg  Freq: EVERY 6 HOURS PRN Route: PO  PRN Reason: anxiety  Start: 07/05/18 1246   Admin. Amount: 1 half-tab (1 × 0.25 mg half-tab)  Last Admin: 07/07/18 0938  Dispense Loc:  [facility]          1208 (0.25 mg)-Given        0938 (0.25 mg)-Given           losartan (COZAAR) tablet 100 mg  Dose: 100 mg  Freq: DAILY Route: PO  Start: 07/02/18 1600   Admin Instructions: Hold for SBP < 120    Admin. Amount: 1 tablet (1 × 100 mg tablet)  Last Admin: 07/07/18 0842  Dispense Loc:  [facility] MS3[facility]      (1713)-Not Given [C]        0910 (100 mg)-Given        0912 (100 mg)-Given        0942 (100 mg)-Given        0807 (100 mg)-Given        0842 (100 mg)-Given           melatonin tablet 1 mg  Dose: 1 mg  Freq: AT BEDTIME PRN Route: PO  PRN Reason: sleep  Start: 07/02/18 1328 "   Admin Instructions: Do not give unless at least 6 hours of uninterrupted sleep is expected.    Admin. Amount: 1 tablet (1 × 1 mg tablet)  Dispense Loc:  [facility] MS[facility]               metoprolol succinate (TOPROL-XL) 24 hr tablet 25 mg  Dose: 25 mg  Freq: DAILY Route: PO  Start: 07/02/18 1600   Admin Instructions: Hold for SBP < 110 or HR < 60. DO NOT CRUSH. Tablet may be split in half along score line.    Admin. Amount: 1 tablet (1 × 25 mg tablet)  Last Admin: 07/07/18 0840  Dispense Loc:  [facility] MS[facility]      (1713)-Not Given [C]        0911 (25 mg)-Given        0912 (25 mg)-Given        0942 (25 mg)-Given        0808 (25 mg)-Given        0840 (25 mg)-Given           morphine sulfate HIGH CONCENTRATE (ROXANOL) 10 mg/0.5 mL (HIGH CONC) solution 2.6-5 mg  Dose: 2.6-5 mg  Freq: EVERY 2 HOURS PRN Route: PO  PRN Reasons: moderate to severe pain,shortness of breath / dyspnea  Start: 07/05/18 1244   Admin Instructions: Caution: solution is 10 times more concentrated than standard solution. Verify dose volume.    Admin. Amount: 2.6-5 mg = 0.13-0.25 mL Conc: 10 mg/0.5 mL  Dispense Loc:  Main Pharmacy  Volume: 0.5 mL               naloxone (NARCAN) injection 0.1-0.4 mg  Dose: 0.1-0.4 mg  Freq: EVERY 2 MIN PRN Route: IV  PRN Reason: opioid reversal  Start: 07/02/18 1328   Admin Instructions: For respiratory rate LESS than or EQUAL to 8.  Partial reversal dose:  0.1 mg titrated q 2 minutes for Analgesia Side Effects Monitoring Sedation Level of 3 (frequently drowsy, arousable, drifts to sleep during conversation).Full reversal dose:  0.4 mg bolus for Analgesia Side Effects Monitoring Sedation Level of 4 (somnolent, minimal or no response to stimulation).  For ordered doses up to 2mg give IVP. Give each 0.4mg over 15 seconds in emergency situations. For non-emergent situations further dilute in 9mL of NS to facilitate titration of response.    Admin. Amount: 0.1-0.4 mg = 0.25-1 mL Conc: 0.4 mg/mL  Dispense Loc: [facility]  Volume: 1  mL               NONFORMULARY  Freq: 3 TIMES DAILY Route: Top  Start: 07/03/18 1600   Admin Instructions: Apply to lower back with Diclofenac Gel    Order specific questions:  Medication Name Lidocaine 4% Cream plus Benzyl Alcohol 10%  Form: Cream     Last Admin: 07/07/18 0851  Dispense Loc:  Main Pharmacy       1722 ( )-Given       2153 ( )-Given        0921 ( )-Given       1548 ( )-Given       2139 ( )-Given        0955 ( )-Given       1616 ( )-Given       2122 ( )-Given        0810 ( )-Given       1543 ( )-Given       2123 ( )-Given        0851 ( )-Given       [ ] 1600       [ ] 2200           ondansetron (ZOFRAN-ODT) ODT tab 4 mg  Dose: 4 mg  Freq: EVERY 6 HOURS PRN Route: PO  PRN Reasons: nausea,vomiting  Start: 07/02/18 1328   Admin Instructions: This is Step 1 of nausea and vomiting management.  If nausea not resolved in 15 minutes, go to Step 2 prochlorperazine (COMPAZINE). Do not push through foil backing. Peel back foil and gently remove. Place on tongue immediately. Administration with liquid unnecessary  With dry hands, peel back foil backing and gently remove tablet; do not push oral disintegrating tablet through foil backing; administer immediately on tongue and oral disintegrating tablet dissolves in seconds; then swallow with saliva; liquid not required.    Admin. Amount: 1 tablet (1 × 4 mg tablet)  Dispense Loc:  [facility] MS3[facility]              Or  ondansetron (ZOFRAN) injection 4 mg  Dose: 4 mg  Freq: EVERY 6 HOURS PRN Route: IV  PRN Reasons: nausea,vomiting  Start: 07/02/18 1328   Admin Instructions: This is Step 1 of nausea and vomiting management.  If nausea not resolved in 15 minutes, go to Step 2 prochlorperazine (COMPAZINE).  Irritant. For ordered doses up to 4 mg, give IV Push undiluted over 2-5 minutes.    Admin. Amount: 4 mg = 2 mL Conc: 4 mg/2 mL  Dispense Loc:  [facility]  Infused Over: 2-5 Minutes  Volume: 2 mL               pantoprazole (PROTONIX) EC tablet 40 mg  Dose: 40 mg  Freq: EVERY  MORNING Route: PO  Start: 07/03/18 0900   Admin Instructions: DO NOT CRUSH.    Admin. Amount: 1 tablet (1 × 40 mg tablet)  Last Admin: 07/07/18 0842  Dispense Loc: [facility]       0911 (40 mg)-Given        0912 (40 mg)-Given        0942 (40 mg)-Given        0808 (40 mg)-Given        0842 (40 mg)-Given           polyethylene glycol (MIRALAX/GLYCOLAX) Packet 17 g  Dose: 17 g  Freq: DAILY Route: PO  Start: 07/03/18 1430   Admin Instructions: 1 Packet = 17 grams. Mixed prescribed dose in 8 ounces of water. Follow with 8 oz. of water.    Admin. Amount: 17 g  Last Admin: 07/07/18 0836  Dispense Loc: [facility]       1729 (17 g)-Given        0912 (17 g)-Given        0941 (17 g)-Given        0810 (17 g)-Given        0836 (17 g)-Given           polyethylene glycol (MIRALAX/GLYCOLAX) Packet 17 g  Dose: 17 g  Freq: DAILY PRN Route: PO  PRN Reason: constipation  Start: 07/02/18 1328   Admin Instructions: Give in 8oz of  water, juice, or soda. Hold for loose stools.  This is the second step of a three step constipation treatment.  1 Packet = 17 grams. Mixed prescribed dose in 8 ounces of water. Follow with 8 oz. of water.    Admin. Amount: 17 g  Dispense Loc: [facility]               predniSONE (DELTASONE) tablet 60 mg  Dose: 60 mg  Freq: DAILY Route: PO  Start: 07/05/18 0900   Admin. Amount: 1 tablet (1 × 10 mg tablet) + 1 tablet (1 × 50 mg tablet)  Last Admin: 07/07/18 0838  Dispense Loc: [facility]   Mixture Administration Information:   Medication Type Amount   predniSONE 10 MG TABS Medications 10 mg   predniSONE 50 MG TABS Medications 50 mg                 0942 (60 mg)-Given        0807 (60 mg)-Given        0838 (60 mg)-Given           senna-docusate (SENOKOT-S;PERICOLACE) 8.6-50 MG per tablet 1 tablet  Dose: 1 tablet  Freq: 2 TIMES DAILY PRN Route: PO  PRN Reason: constipation  Start: 07/02/18 1328   Admin Instructions: If no bowel movement in 24 hours, increase to 2 tablets PO.  Hold for loose stools.  This is the  first step of a three step constipation treatment.    Admin. Amount: 1 tablet  Last Admin: 07/05/18 1843  Dispense Loc: [facility]         1843 (1 tablet)-Given                   Or  senna-docusate (SENOKOT-S;PERICOLACE) 8.6-50 MG per tablet 2 tablet  Dose: 2 tablet  Freq: 2 TIMES DAILY PRN Route: PO  PRN Reason: constipation  Start: 07/02/18 1328   Admin Instructions: Hold for loose stools.  This is the first step of a three step constipation treatment.    Admin. Amount: 2 tablet  Last Admin: 07/06/18 1957  Dispense Loc: [facility]                 1957 (2 tablet)-Given            tamsulosin (FLOMAX) capsule 0.4 mg  Dose: 0.4 mg  Freq: DAILY Route: ORAL OR FEED  Start: 07/02/18 1600   Admin Instructions: Hold for SBP < 125. Administer 30 minutes after the same meal each day.  Capsules should be swallowed whole; do not crush chew or open.    Admin. Amount: 1 capsule (1 × 0.4 mg capsule)  Last Admin: 07/07/18 0843  Dispense Loc: [facility]      (1714)-Not Given [C]        0911 (0.4 mg)-Given        0913 (0.4 mg)-Given        0942 (0.4 mg)-Given        0808 (0.4 mg)-Given        0843 (0.4 mg)-Given           traMADol (ULTRAM) tablet 50 mg  Dose: 50 mg  Freq: 3 TIMES DAILY Route: PO  Start: 07/02/18 1600   Admin. Amount: 1 tablet (1 × 50 mg tablet)  Last Admin: 07/07/18 0837  Dispense Loc: [facility]      (1714)-Not Given [C]       2101 (50 mg)-Given        0911 (50 mg)-Given       1722 (50 mg)-Given       2153 (50 mg)-Given        0913 (50 mg)-Given       1544 (50 mg)-Given       2138 (50 mg)-Given        0941 (50 mg)-Given       1536 (50 mg)-Given       2119 (50 mg)-Given        0808 (50 mg)-Given       1542 (50 mg)-Given       2121 (50 mg)-Given        0837 (50 mg)-Given       [ ] 1600       [ ] 2200          Completed Medications  Medications 07/01/18 07/02/18 07/03/18 07/04/18 07/05/18 07/06/18 07/07/18         Dose: 500 mg  Freq: ONCE Route: IV  Indications of Use: COMMUNITY ACQUIRED PNEUMONIA  Last Dose: 500  "mg (18 0941)  Start: 18 0900   End: 18 1041   Admin Instructions: Irritant. Administer at a rate of no greater than 100 mL/hr    Admin. Amount: 500 mg = 100 mL Conc: 500 mg/100 mL  Last Admin: 18  Dispense Loc:  Main Pharmacy  Infused Over: 60 Minutes  Administrations Remainin         0941 (500 mg)-New Bag            Discontinued Medications  Medications 18         Dose: 3 mL  Freq: 4 TIMES DAILY Route: NEBULIZATION  Start: 18 1330   End: 18 1240   Admin. Amount: 3 mL  Last Admin: 18 112  Dispense Loc:  ADS MS3S  Volume: 3 mL             1542 (3 mL)-Given       1941 (3 mL)-Given        0716 (3 mL)-Given       1147 (3 mL)-Given       1525 (3 mL)-Given       1937 (3 mL)-Given        0704 (3 mL)-Given       1210 (3 mL)-Given       1510 (3 mL)-Given       1927 (3 mL)-Given        0727 (3 mL)-Given       (1211)-Not Given [C]       1543 (3 mL)-Given       1952 (3 mL)-Given        0727 (3 mL)-Given       1129 (3 mL)-Given       1240-Med Discontinued          Freq: EVERY 1 HOUR PRN Route: Top  PRN Reason: pain  PRN Comment: with VAD insertion or accessing implanted port.  Start: 18   End: 18   Admin Instructions: Do NOT give if patient has a history of allergy to any local anesthetic or any \"vikas\" product.   Apply 30 minutes prior to VAD insertion or port access.  MAX Dose:  2.5 g (  of 5 g tube)    Dispense Loc:  ADS MS3S         0838-Med Discontinued           Dose: 1 mL  Freq: EVERY 1 HOUR PRN Route: OTHER  PRN Comment: mild pain with VAD insertion or accessing implanted port  Start: 18   End: 18   Admin Instructions: Do NOT give if patient has a history of allergy to any local anesthetic or any \"vikas\" product. MAX dose 1 mL subcutaneous OR intradermal in divided doses.    Admin. Amount: 1 mL  Dispense Loc: [facility] Stock  Volume: 2 mL         0838-Med " Discontinued           Dose: 0.25 mg  Freq: EVERY 6 HOURS PRN Route: PO  PRN Reason: anxiety  Start: 07/02/18 1328   End: 07/05/18 1246   Admin. Amount: 1 half-tab (1 × 0.25 mg half-tab)  Dispense Loc:  ADS MS3S         1246-Med Discontinued           Dose: 40 mg  Freq: EVERY 12 HOURS Route: IV  Start: 07/04/18 2118   End: 07/05/18 0837   Admin Instructions: Give Doses 125mg and less IV Push over 2-3 minutes - reconstitute with 1 mL of bacteriostatic water if no diluent is provided. Doses greater than 125 mg need to be in at least 50 mL IVPB.    Admin. Amount: 40 mg = 1 mL Conc: 40 mg/mL  Last Admin: 07/04/18 2157  Dispense Loc:  ADS MS3S  Volume: 1 mL        2157 (40 mg)-Given        0837-Med Discontinued           Dose: 40 mg  Freq: EVERY 24 HOURS Route: IV  Start: 07/03/18 1000   End: 07/04/18 1300   Admin Instructions: Give Doses 125mg and less IV Push over 2-3 minutes - reconstitute with 1 mL of bacteriostatic water if no diluent is provided. Doses greater than 125 mg need to be in at least 50 mL IVPB.    Admin. Amount: 40 mg = 1 mL Conc: 40 mg/mL  Last Admin: 07/04/18 0918  Dispense Loc:  ADS MS3S  Volume: 1 mL       0930 (40 mg)-Given        0918 (40 mg)-Given       1300-Med Discontinued            Dose: 3.375 g  Freq: EVERY 6 HOURS Route: IV  Indications Comment: Hospital acquired pneumonia  Last Dose: 3.375 g (07/05/18 0619)  Start: 07/02/18 1800   End: 07/05/18 0839   Admin Instructions: First dose given in ER.<br><br>Pharmacy adjusted dose per renal dosing protocol for est crcl 27 ml/min.    Admin. Amount: 3.375 g = 50 mL Conc: 3.375 g/50 mL  Last Admin: 07/05/18 0619  Dispense Loc:  Main Pharmacy  Infused Over: 30 Minutes  Volume: 50 mL      1726 (3.375 g)-New Bag        0034 (3.375 g)-New Bag       0531 (3.375 g)-New Bag       1203 (3.375 g)-New Bag       1730 (3.375 g)-New Bag       2332 (3.375 g)-New Bag        0557 (3.375 g)-New Bag       1150 (3.375 g)-New Bag       1802 (3.375 g)-New  Bag        0025 (3.375 g)-New Bag       0619 (3.375 g)-New Bag       0839-Med Discontinued           Dose: 3 mL  Freq: EVERY 8 HOURS Route: IK  Start: 07/02/18 1400   End: 07/05/18 0838   Admin Instructions: And Q1H PRN, to lock peripheral IV dormant line.    Admin. Amount: 3 mL  Last Admin: 07/05/18 0628  Dispense Loc: [facility] Floor Stock  Volume: 4 mL      1504 (3 mL)-Given       2101 (3 mL)-Given               1205 (3 mL)-Given              2154 (3 mL)-Given       2334 (3 mL)-Given        0557 (3 mL)-Given       1231 (3 mL)-Given       (1331)-Not Given       2139 (3 mL)-Given        0628 (3 mL)-Given       0838-Med Discontinued           Dose: 3 mL  Freq: EVERY 1 HOUR PRN Route: IK  PRN Reason: line flush  PRN Comment: for peripheral IV flush post IV meds  Start: 07/02/18 1328   End: 07/05/18 0838   Admin. Amount: 3 mL  Dispense Loc: [facility] Floor Stock  Volume: 4 mL         0838-Med Discontinued           Rate: 75 mL/hr   Freq: CONTINUOUS Route: IV  Last Dose: Stopped (07/05/18 2232)  Start: 07/05/18 0845   End: 07/05/18 1844   Last Admin: 07/05/18 0941  Dispense Loc: [facility] Floor Stock  Volume: 1,000 mL         0941 ( )-New Bag       1844-Med Discontinued  2232-Stopped               Rate: 50 mL/hr   Freq: CONTINUOUS Route: IV  Start: 07/05/18 0845   End: 07/05/18 0837         0837-Med Discontinued      Medications 07/01/18 07/02/18 07/03/18 07/04/18 07/05/18 07/06/18 07/07/18

## 2018-07-02 NOTE — ED NOTES
[facility]  ED Nurse Handoff Report    Chaz Soler is a 89 year old male who comes in from NH, h/o Alzheimers. Patient is normally on home O2 (4-5L nasal cannula) and had issues with SOB with sats in 60s-70s per EMS. Patient received neb enroute with improvement in sats. Upon arrival to ED, patient was transferred to our gurney and that activity caused sats to drop to 53%. Patient placed on high flow O2 by RT with good improvement.   ED Chief complaint: Shortness of Breath  . ED Diagnosis:   Final diagnoses:   None     Allergies:   Allergies   Allergen Reactions     Contrast Dye      SOB, increased Bp, difficulty swallowing. Date 6/3/96 (ipaque contrast)  Was premedicated prior to FRANCK and had no reaction at all (solumedrol and benadryl) 2016  Was premedicated with Methylprednisolone protocol, no reaction 1/25/17     Lisinopril      cough     Oxycodone      Delirium       Code Status: DNR / DNI  Activity level - Baseline/Home:  Stand with Assist. Activity Level - Current:   Stand with Assist. Lift room needed: No. Bariatric: No   Needed: No   Isolation: No. Infection: Not Applicable.     Vital Signs:   Vitals:    07/02/18 0900 07/02/18 0915 07/02/18 1008 07/02/18 1010   BP: 108/71 100/84 131/74    Resp:       Temp:    99.6  F (37.6  C)   TempSrc:    Oral   SpO2: 98% 98% 97%        Cardiac Rhythm:  ,      Pain level:    Patient confused: Yes. Patient Falls Risk: Yes.   Elimination Status: will bladder scan   Patient Report - Initial Complaint: SOB. Focused Assessment:   Cardiac Review of Systems (Cardiac) - Cardiac Signs/Symptoms: denies; chest pain   Neurological Cognitive/Perceptual/Neuro - Level Of Consciousness: alert Arousal Level: opens eyes spontaneously; arouses to voice Orientation: disoriented x 4 Best Language: 0 - No aphasia Mood/Behavior: cooperative Headache: None Memory Deficit: short term loss; long term loss; forgetful; new learning, recall loss Speech: clear; spontaneous;  illogical  Pupils (CN II) - Pupil PERRLA: yes  Motor Strength - Left Upper: 4 - active movement against gravity and some resistance Right Upper: 4 - active movement against gravity and some resistance Left Lower: 3 - active movement against gravity Right Lower: 3 - active movement against gravity  Faber Coma Scale - Best Eye Response: 3-->(E3) to speech Best Motor Response: 6-->(M6) obeys commands Best Verbal Response: 4-->(V4) confused Faber Coma Scale Score: 13   Skin Color/Condition Skin - Skin WDL: color Skin Color/Characteristics: pale   Respiratory Respiratory - Rhythm/Pattern, Respiratory: shortness of breath reported; tachypnea Expansion/Accessory Muscles/Retractions: accessory muscle use Lung Fields: ZOYA; LLL; RUL; RML; RLL Cough Frequency: frequent Cough Type: fair; loose Throughout All Lung Fields: Posterior: ZOYA Breath Sounds: Posterior:; diminished LLL Breath Sounds: Posterior:; crackles RUL Breath Sounds: Posterior:; diminished RML Breath Sounds: Posterior:; diminished RLL Breath Sounds: crackles  Respiratory Assistance - Treatment/Device/Implant: home 02 Home O2 (L): 4       Tests Performed: labs, imaging. Abnormal Results:   Labs Ordered and Resulted from Time of ED Arrival Up to the Time of Departure from the ED   CBC WITH PLATELETS DIFFERENTIAL - Abnormal; Notable for the following:        Result Value    WBC 14.6 (*)     RBC Count 3.97 (*)     Hemoglobin 11.8 (*)     Hematocrit 36.2 (*)     Absolute Neutrophil 11.8 (*)     All other components within normal limits   BASIC METABOLIC PANEL - Abnormal; Notable for the following:     Glucose 132 (*)     Urea Nitrogen 31 (*)     Creatinine 1.66 (*)     GFR Estimate 39 (*)     GFR Estimate If Black 47 (*)     All other components within normal limits   NT PROBNP INPATIENT - Abnormal; Notable for the following:     N-Terminal Pro BNP Inpatient 1886 (*)     All other components within normal limits   ISTAT  GASES LACTATE KATEY POCT - Abnormal; Notable  for the following:     Ph Venous 7.47 (*)     Bicarbonate Venous 35 (*)     All other components within normal limits   TROPONIN I   ROUTINE UA WITH MICROSCOPIC   PROCALCITONIN   ISTAT CG4 GASES LACTATE KATEY NURSING POCT   BLOOD CULTURE   BLOOD CULTURE     Chest  XR, 1 view PORTABLE   Final Result   IMPRESSION: There are diffuse bilateral pulmonary opacities, which   could reflect edema and/or fibrosis. No definite pleural effusions   appreciated. No pneumothorax.      EMELY COHN MD      .   Treatments provided: Hi flow O2, Duoneb  Family Comments: at bedside  OBS brochure/video discussed/provided to patient:  No  ED Medications:   Medications   lactated ringers BOLUS 250 mL (250 mLs Intravenous New Bag 7/2/18 0975)   methylPREDNISolone sodium succinate (solu-MEDROL) injection 40 mg (not administered)   furosemide (LASIX) injection 20 mg (not administered)   ipratropium - albuterol 0.5 mg/2.5 mg/3 mL (DUONEB) neb solution 3 mL (3 mLs Nebulization Given 7/2/18 0913)     Drips infusing:  No  For the majority of the shift, the patient's behavior Green. Interventions performed were NA.     Severe Sepsis OR Septic Shock Diagnosis Present: No      ED Nurse Name/Phone Number: Sumi Lott,   10:38 AM  .RECEIVING UNIT ED HANDOFF REVIEW    Above ED Nurse Handoff Report was reviewed: Yes  Reviewed by: Candie Vazquez on July 2, 2018 at 12:51 PM

## 2018-07-02 NOTE — LETTER
Key Recommendations:  CTS following for elevated PARAM score and Reamission. Pt was hospitalized from 6/3-6/6 for CHF exacerbation and pt was dc'd to [facility]. Pt is now admitted again with PNA/COPD exacerbation and needing high flow oxygen. He is being treated with zosyn, IV solumedrol and nebs. He has a bueno. He has a history of pulmonary fibrosis and is on O2 at 4-5 L baseline. Palliative care consulted and will meet with pt and wife on Thurs to discuss goals of care. He has a bed hold at [facility] and will return there on dc.     Evangelina Valentin    AVS/Discharge Summary is the source of truth; this is a helpful guide for improved communication of patient story

## 2018-07-02 NOTE — PROGRESS NOTES
H&P to follow.    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 Pt is DNR/DNI but ok with BiPAP if needed, confirmed with family. He is currently febrile, satting in mid-90s on high flow O2 via NC but in no acute distress and is hemodynamically stable.    Plan:  Ad[facility] to [facility]  IV Zosyn  IV methylprednisolone 40 mg BID  Scheduled duonebs, prn albuterol nebs  IS, pulmonary toileting, prn robitussin  Sputum cultures and Gram stain if able to collect  Follow BMP tomorrow prior to additional IV diuresis    Discussed plan with Dr. Rhodes.    Rosario Ardon PA-C

## 2018-07-02 NOTE — PROGRESS NOTES
Initial sats on arrival in 60's on 5 lpm home O2 settings.  Patient set up on HFNC at 100% and 40 lpm.  Sats improved to 100%, so Fio2 weaned to 70%.  Fred QID ordered and delivered inline with HFNC.  Crackles heard on Right bases.  Resp will continue to follow.

## 2018-07-02 NOTE — IP AVS SNAPSHOT
MRN:[id number]                      After Visit Summary   7/2/2018    Chaz Soler    MRN: [id number]           Thank you!     Thank you for choosing [facility] for your care. Our goal is always to provide you with excellent care. Hearing back from our patients is one way we can continue to improve our services. Please take a few minutes to complete the written survey that you may receive in the mail after you visit. If you would like to speak to someone directly about your visit please contact Patient Relations at [phone]. Thank you!          Patient Information     Date Of Birth          5/21/1928        Designated Caregiver       Most Recent Value    Caregiver    Will someone help with your care after discharge? yes    Name of designated caregiver Kylee soler    Phone number of caregiver     Caregiver address [address] [address]      About your hospital stay     You were admitted on:  July 2, 2018 You last received care in the:  [place] [address] [facility]    You were discharged on:  July 7, 2018        Reason for your hospital stay       Pneumonia                  Who to Call     For medical emergencies, please call 911.  For non-urgent questions about your medical care, please call your primary care provider or clinic, [phone]          Attending Provider     Provider Specialty    Harley Lucas MD Emergency Medicine    Nash Rhodes MD Internal Medicine       Primary Care Provider Office Phone # Fax #    Alirio Fox [phone]      After Care Instructions     Activity - Up with assistive device           Advance Diet as Tolerated       Follow this diet upon discharge: Orders Placed This Encounter      Snacks/Supplements Adult: Boost Plus; With Meals      Combination Diet Regular Diet Adult            Fall precautions           General info for SNF       Length of Stay Estimate: Short Term Care: Estimated #  of Days 31-90  Condition at Discharge: Improving  Level of care:skilled   Rehabilitation Potential: Fair  Admission H&P remains valid and up-to-date: Yes  Recent Chemotherapy: N/A  Use Nursing Home Standing Orders: Yes            Mantoux instructions       Give two-step Mantoux (PPD) Per Facility Policy Yes            Oxygen - Nasal cannula       4-5 Lpm by nasal cannula to keep O2 sats 92% or greater.                  Your next 10 appointments already scheduled     Aug 28, 2018 12:30 PM CDT   Ech Complete with [id number] ([facility])    [address]  [place] [address] [phone]           1. Please bring or wear a comfortable two-piece outfit. 2. You may eat, drink and take your normal medicines. 3. For any questions that cannot be answered, please contact the ordering physician ***Please check-in at the [place] [facility] located in [address] in the [facility] building. When you are finished registering, please go to [address] and have a seat. The technician will call your name for the test.            Sep 04, 2018  3:45 PM CDT   Return Visit with Tyrell Jackson MD   [facility] ([facility])    [address]  [place] [address] [phone]              Additional Services     Occupational Therapy Adult Consult       Evaluate and treat as clinically indicated.    Reason:deconditioning            Physical Therapy Adult Consult       Evaluate and treat as clinically indicated.    Reason: deconditioning                  Future tests that were ordered for you     AntiEmbolism Stockings       Bilateral below knee length.On in the morning, off at night                  Pending Results     Date and Time Order Name Status Description    7/2/2018 0906 Blood culture Preliminary     7/2/2018 0906 Blood culture Preliminary            "  Statement of Approval     Ordered          07/07/18 0851  I have reviewed and agree with all the recommendations and orders detailed in this document.  EFFECTIVE NOW     Approved and electronically signed by:  Rachel Mclain MD             Admission Information     Date & Time Provider Department Dept. Phone    7/2/2018 Nash Rhodes MD [place] [address] [facility] [phone]      Your Vitals Were     Blood Pressure Temperature Respirations Height Weight Pulse Oximetry    143/79 (BP Location: Left arm) 96.7  F (35.9  C) (Oral) 15 1.727 m (5' 8\") 66 kg (145 lb 8 oz) 92%    BMI (Body Mass Index)                   22.12 kg/m2           MyChart Information     [employer] gives you secure access to your electronic health record. If you see a primary care provider, you can also send messages to your care team and make appointments. If you have questions, please call your primary care clinic.  If you do not have a primary care provider, please call [phone] and they will assist you.        Care EveryWhere ID     This is your Care EveryWhere ID. This could be used by other organizations to access your [place] medical records  [id number]        Equal Access to Services     NELIDA PHILLIPS : Hadii laura Boyd, waaxda lujillian, qaybta kaalmada orin, johnny ingram. [facility] [phone].    ATENCIÓN: Si habla español, tiene a denise disposición servicios gratuitos de asistencia lingüística. Llelver al [phone].    We comply with applicable federal civil rights laws and [place] laws. We do not discriminate on the basis of race, color, national origin, age, disability, sex, sexual orientation, or gender identity.               Review of your medicines      START taking        Dose / Directions    levofloxacin 500 MG tablet   Commonly known as:  LEVAQUIN   Used for:  Acute on chronic respiratory failure with hypoxia (H)        Dose:  500 mg   Take 1 tablet (500 " mg) by mouth daily for 2 days   Quantity:  2 tablet   Refills:  0       predniSONE 20 MG tablet   Commonly known as:  DELTASONE   Used for:  Acute on chronic respiratory failure with hypoxia (H)        Dose:  40 mg   Take 2 tablets (40 mg) by mouth daily for 4 days   Refills:  0         CONTINUE these medicines which may have CHANGED, or have new prescriptions. If we are uncertain of the size of tablets/capsules you have at home, strength may be listed as something that might have changed.        Dose / Directions    LORazepam 0.5 MG tablet   Commonly known as:  ATIVAN   This may have changed:  medication strength   Used for:  Acute on chronic respiratory failure with hypoxia (H)        Dose:  0.25 mg   Take 0.5 tablets (0.25 mg) by mouth every 6 hours as needed for anxiety   Quantity:  20 tablet   Refills:  0         CONTINUE these medicines which have NOT CHANGED        Dose / Directions    acetaminophen 500 MG tablet   Commonly known as:  TYLENOL        Dose:  500 mg   Take 500 mg by mouth 3 times daily   Refills:  0       albuterol (2.5 MG/3ML) 0.083% neb solution        Dose:  2.5 mg   Take 2.5 mg by nebulization every 2 hours as needed   Refills:  0       amLODIPine 10 MG tablet   Commonly known as:  NORVASC   Used for:  Essential hypertension        TAKE ONE TABLET BY MOUTH ONCE DAILY   Quantity:  90 tablet   Refills:  3       aspirin 81 MG tablet   Used for:  Coronary artery disease involving native coronary artery of native heart without angina pectoris        Dose:  81 mg   Take 1 tablet (81 mg) by mouth daily   Quantity:  30 tablet   Refills:  11       atorvastatin 20 MG tablet   Commonly known as:  LIPITOR   Used for:  Coronary artery disease involving native coronary artery of native heart without angina pectoris        Dose:  20 mg   Take 1 tablet (20 mg) by mouth daily   Quantity:  90 tablet   Refills:  3       cyanocobalamin 1000 MCG tablet   Commonly known as:  vitamin  B-12        Dose:  1000 mcg    Take 1,000 mcg by mouth daily   Refills:  0       diclofenac 1 % Gel topical gel   Commonly known as:  VOLTAREN        Dose:  2 g   Place 2 g onto the skin 4 times daily as needed (lower back pain)   Refills:  0       donepezil 10 MG tablet   Commonly known as:  ARICEPT   Used for:  Memory loss        TAKE ONE TABLET BY MOUTH AT BEDTIME   Quantity:  90 tablet   Refills:  3       DULoxetine 30 MG EC capsule   Commonly known as:  CYMBALTA        Dose:  30 mg   Take 30 mg by mouth daily   Refills:  0       furosemide 20 MG tablet   Commonly known as:  LASIX        Dose:  30 mg   Take 30 mg by mouth daily   Refills:  0       gabapentin 100 MG capsule   Commonly known as:  NEURONTIN   Used for:  Lumbar radiculopathy        TAKE ONE CAPSULE BY MOUTH THREE TIMES DAILY FOR  PAIN   Quantity:  270 capsule   Refills:  3       ipratropium - albuterol 0.5 mg/2.5 mg/3 mL 0.5-2.5 (3) MG/3ML neb solution   Commonly known as:  DUONEB        Dose:  1 vial   Take 1 vial by nebulization 4 times daily   Refills:  0       levothyroxine 88 MCG tablet   Commonly known as:  SYNTHROID/LEVOTHROID   Used for:  Hypothyroidism, unspecified type        TAKE ONE TABLET BY MOUTH ONCE DAILY   Quantity:  90 tablet   Refills:  3       losartan 100 MG tablet   Commonly known as:  COZAAR   Used for:  Essential hypertension        TAKE ONE TABLET BY MOUTH  DAILY   Quantity:  90 tablet   Refills:  3       metoprolol succinate 25 MG 24 hr tablet   Commonly known as:  TOPROL-XL   Used for:  Essential hypertension        TAKE ONE TABLET BY MOUTH DAILY   Quantity:  90 tablet   Refills:  2       MULTIVITAL Tabs        Dose:  1 tablet   Take 1 tablet by mouth daily   Refills:  0       NONFORMULARY        Apply topically 4 times daily as needed (to lower back for pain) Salonpas Lidocaine 4% plus Benzyl Alcohol 10% Cream   Refills:  0       pantoprazole 40 MG EC tablet   Commonly known as:  PROTONIX   Used for:  Gastroesophageal reflux disease, esophagitis  presence not specified        TAKE ONE TABLET BY MOUTH EVERY MORNING   Quantity:  90 tablet   Refills:  3       tamsulosin 0.4 MG capsule   Commonly known as:  FLOMAX   Used for:  Benign prostatic hyperplasia with urinary retention        TAKE ONE CAPSULE BY MOUTH ONCE DAILY   Quantity:  90 capsule   Refills:  3       traMADol 50 MG tablet   Commonly known as:  ULTRAM   Used for:  Acute on chronic respiratory failure with hypoxia (H)        Dose:  50 mg   Take 1 tablet (50 mg) by mouth 3 times daily   Quantity:  15 tablet   Refills:  0            Where to get your medicines      Some of these will need a paper prescription and others can be bought over the counter. Ask your nurse if you have questions.     Bring a paper prescription for each of these medications     LORazepam 0.5 MG tablet    traMADol 50 MG tablet       You don't need a prescription for these medications     levofloxacin 500 MG tablet    predniSONE 20 MG tablet                Protect others around you: Learn how to safely use, store and throw away your medicines at www.disposemymeds.org.        ANTIBIOTIC INSTRUCTION     You've Been Prescribed an Antibiotic - Now What?  Your healthcare team thinks that you or your loved one might have an infection. Some infections can be treated with antibiotics, which are powerful, life-saving drugs. Like all medications, antibiotics have side effects and should only be used when necessary. There are some important things you should know about your antibiotic treatment.      Your healthcare team may run tests before you start taking an antibiotic.    Your team may take samples (e.g., from your blood, urine or other areas) to run tests to look for bacteria. These test can be important to determine if you need an antibiotic at all and, if you do, which antibiotic will work best.      Within a few days, your healthcare team might change or even stop your antibiotic.    Your team may start you on an antibiotic while  they are working to find out what is making you sick.    Your team might change your antibiotic because test results show that a different antibiotic would be better to treat your infection.    In some cases, once your team has more information, they learn that you do not need an antibiotic at all. They may find out that you don't have an infection, or that the antibiotic you're taking won't work against your infection. For example, an infection caused by a virus can't be treated with antibiotics. Staying on an antibiotic when you don't need it is more likely to be harmful than helpful.      You may experience side effects from your antibiotic.    Like all medications, antibiotics have side effects. Some of these can be serious.    Let you healthcare team know if you have any known allergies when you are admitted to the hospital.    One significant side effect of nearly all antibiotics is the risk of severe and sometimes deadly diarrhea caused by Clostridium difficile (C. Difficile). This occurs when a person takes antibiotics because some good germs are destroyed. Antibiotic use allows C. diificile to take over, putting patients at high risk for this serious infection.    As a patient or caregiver, it is important to understand your or your loved one's antibiotic treatment. It is especially important for caregivers to speak up when patients can't speak for themselves. Here are some important questions to ask your healthcare team.    What infection is this antibiotic treating and how do you know I have that infection?    What side effects might occur from this antibiotic?    How long will I need to take this antibiotic?    Is it safe to take this antibiotic with other medications or supplements (e.g., vitamins) that I am taking?     Are there any special directions I need to know about taking this antibiotic? For example, should I take it with food?    How will I be monitored to know whether my infection is  responding to the antibiotic?    What tests may help to make sure the right antibiotic is prescribed for me?      Information provided by:  www.cdc.gov/getsmart  U.S. Department of Health and Human Services  Centers for disease Control and Prevention  National Center for Emerging and Zoonotic Infectious Diseases  Division of Healthcare Quality Promotion        Information about OPIOIDS     PRESCRIPTION OPIOIDS: WHAT YOU NEED TO KNOW   We gave you an opioid (narcotic) pain medicine. It is important to manage your pain, but opioids are not always the best choice. You should first try all the other options your care team gave you. Take this medicine for as short a time (and as few doses) as possible.     These medicines have risks:    DO NOT drive when on new or higher doses of pain medicine. These medicines can affect your alertness and reaction times, and you could be arrested for driving under the influence (DUI). If you need to use opioids long-term, talk to your care team about driving.    DO NOT operate heave machinery    DO NOT do any other dangerous activities while taking these medicines.     DO NOT drink any alcohol while taking these medicines.      If the opioid prescribed includes acetaminophen, DO NOT take with any other medicines that contain acetaminophen. Read all labels carefully. Look for the word  acetaminophen  or  Tylenol.  Ask your pharmacist if you have questions or are unsure.    You can get addicted to pain medicines, especially if you have a history of addiction (chemical, alcohol or substance dependence). Talk to your care team about ways to reduce this risk.    Store your pills in a secure place, locked if possible. We will not replace any lost or stolen medicine. If you don t finish your medicine, please throw away (dispose) as directed by your pharmacist. The [place] Pollution Control Agency has more information about safe disposal:  https://www.pca.[facility].[place].us/living-green/managing-unwanted-medications.     All opioids tend to cause constipation. Drink plenty of water and eat foods that have a lot of fiber, such as fruits, vegetables, prune juice, apple juice and high-fiber cereal. Take a laxative (Miralax, milk of magnesia, Colace, Senna) if you don t move your bowels at least every other day.              Medication List: This is a list of all your medications and when to take them. Check marks below indicate your daily home schedule. Keep this list as a reference.      Medications           Morning Afternoon Evening Bedtime As Needed    acetaminophen 500 MG tablet   Commonly known as:  TYLENOL   Take 500 mg by mouth 3 times daily   Last time this was given:  500 mg on 7/7/2018  8:37 AM            6am       2pm       10pm               albuterol (2.5 MG/3ML) 0.083% neb solution   Take 2.5 mg by nebulization every 2 hours as needed                                   amLODIPine 10 MG tablet   Commonly known as:  NORVASC   TAKE ONE TABLET BY MOUTH ONCE DAILY   Last time this was given:  10 mg on 7/7/2018  8:40 AM            8am                       aspirin 81 MG tablet   Take 1 tablet (81 mg) by mouth daily            8am                       atorvastatin 20 MG tablet   Commonly known as:  LIPITOR   Take 1 tablet (20 mg) by mouth daily   Last time this was given:  20 mg on 7/7/2018  8:38 AM            8am                       cyanocobalamin 1000 MCG tablet   Commonly known as:  vitamin  B-12   Take 1,000 mcg by mouth daily            8am                       diclofenac 1 % Gel topical gel   Commonly known as:  VOLTAREN   Place 2 g onto the skin 4 times daily as needed (lower back pain)   Last time this was given:  2 g on 7/7/2018  8:50 AM            8am       1pm       6pm       10pm           donepezil 10 MG tablet   Commonly known as:  ARICEPT   TAKE ONE TABLET BY MOUTH AT BEDTIME   Last time this was given:  10 mg on 7/6/2018  9:22 PM                         10pm           DULoxetine 30 MG EC capsule   Commonly known as:  CYMBALTA   Take 30 mg by mouth daily   Last time this was given:  30 mg on 7/7/2018  8:41 AM            8am                       furosemide 20 MG tablet   Commonly known as:  LASIX   Take 30 mg by mouth daily            8am                       gabapentin 100 MG capsule   Commonly known as:  NEURONTIN   TAKE ONE CAPSULE BY MOUTH THREE TIMES DAILY FOR  PAIN   Last time this was given:  100 mg on 7/7/2018  8:41 AM            8am       2pm       8pm               ipratropium - albuterol 0.5 mg/2.5 mg/3 mL 0.5-2.5 (3) MG/3ML neb solution   Commonly known as:  DUONEB   Take 1 vial by nebulization 4 times daily   Last time this was given:  3 mLs on 7/6/2018 11:29 AM                                levofloxacin 500 MG tablet   Commonly known as:  LEVAQUIN   Take 1 tablet (500 mg) by mouth daily for 2 days            8am  For 2 more days                       levothyroxine 88 MCG tablet   Commonly known as:  SYNTHROID/LEVOTHROID   TAKE ONE TABLET BY MOUTH ONCE DAILY   Last time this was given:  88 mcg on 7/7/2018  8:39 AM            8am                       LORazepam 0.5 MG tablet   Commonly known as:  ATIVAN   Take 0.5 tablets (0.25 mg) by mouth every 6 hours as needed for anxiety   Last time this was given:  0.25 mg on 7/7/2018  9:38 AM                                   losartan 100 MG tablet   Commonly known as:  COZAAR   TAKE ONE TABLET BY MOUTH  DAILY   Last time this was given:  100 mg on 7/7/2018  8:42 AM            8am                       metoprolol succinate 25 MG 24 hr tablet   Commonly known as:  TOPROL-XL   TAKE ONE TABLET BY MOUTH DAILY   Last time this was given:  25 mg on 7/7/2018  8:40 AM            8am                       MULTIVITAL Tabs   Take 1 tablet by mouth daily            8am       1pm       6pm       10pm           NONFORMULARY   Apply topically 4 times daily as needed (to lower back for pain) Salonpas  Lidocaine 4% plus Benzyl Alcohol 10% Cream   Last time this was given:  7/7/2018  8:51 AM            8am                       pantoprazole 40 MG EC tablet   Commonly known as:  PROTONIX   TAKE ONE TABLET BY MOUTH EVERY MORNING   Last time this was given:  40 mg on 7/7/2018  8:42 AM            8am                       predniSONE 20 MG tablet   Commonly known as:  DELTASONE   Take 2 tablets (40 mg) by mouth daily for 4 days   Last time this was given:  60 mg on 7/7/2018  8:38 AM            8am           6pm               tamsulosin 0.4 MG capsule   Commonly known as:  FLOMAX   TAKE ONE CAPSULE BY MOUTH ONCE DAILY   Last time this was given:  0.4 mg on 7/7/2018  8:43 AM            8am                       traMADol 50 MG tablet   Commonly known as:  ULTRAM   Take 1 tablet (50 mg) by mouth 3 times daily   Last time this was given:  50 mg on 7/7/2018  8:37 AM            8am       2pm       8pm                         More Information        Pneumonia (Adult)  Pneumonia is an infection deep within the lungs. It is in the small air sacs (alveoli). Pneumonia may be caused by a virus or bacteria. Pneumonia caused by bacteria is usually treated with an antibiotic. Severe cases may need to be treated in the hospital. Milder cases can be treated at home. Symptoms usually start to get better during the first 2 days of treatment.    Home care  Follow these guidelines when caring for yourself at home:    Rest at home for the first 2 to 3 days, or until you feel stronger. Don t let yourself get overly tired when you go back to your activities.    Stay away from cigarette smoke - yours or other people s.    You may use acetaminophen or ibuprofen to control fever or pain, unless another medicine was prescribed. If you have chronic liver or kidney disease, talk with your healthcare provider before using these medicines. Also talk with your provider if you ve had a stomach ulcer or gastrointestinal bleeding. Don t give aspirin to  anyone younger than 18 years of age who is ill with a fever. It may cause severe liver damage.    Your appetite may be poor, so a light diet is fine.    Drink 6 to 8 glasses of fluids every day to make sure you are getting enough fluids. Beverages can include water, sport drinks, sodas without caffeine, juices, tea, or soup. Fluids will help loosen secretions in the lung. This will make it easier for you to cough up the phlegm (sputum). If you also have heart or kidney disease, check with your healthcare provider before you drink extra fluids.    Take antibiotic medicine prescribed until it is all gone, even if you are feeling better after a few days.  Follow-up care  Follow up with your healthcare provider in the next 2 to 3 days, or as advised. This is to be sure the medicine is helping you get better.  If you are 65 or older, you should get a pneumococcal vaccine and a yearly flu (influenza) shot. You should also get these vaccines if you have chronic lung disease like asthma, emphysema, or COPD. Recently, a second type of pneumonia vaccine has become available for everyone over 65 years old. This is in addition to the previous vaccine. Ask your provider about this.  When to seek medical advice  Call your healthcare provider right away if any of these occur:    You don t get better within the first 48 hours of treatment    Shortness of breath gets worse    Rapid breathing (more than 25 breaths per minute)    Coughing up blood    Chest pain gets worse with breathing    Fever of 100.4 F (38 C) or higher that doesn t get better with fever medicine    Weakness, dizziness, or fainting that gets worse    Thirst or dry mouth that gets worse    Sinus pain, headache, or a stiff neck    Chest pain not caused by coughing  Date Last Reviewed: 1/1/2017 2000-2017 [employer]. [address] [place] [address], [place] [address]. All rights reserved. This information is not intended as a substitute for professional  medical care. Always follow your healthcare professional's instructions.

## 2018-07-02 NOTE — PHARMACY-ADMISSION MEDICATION HISTORY
Addendum: Added Lidocaine 4% cream and Diclofenac Gel 1% four times daily as needed for lower back pain    Admission medication history interview status for this patient is complete. See [facility] admission navigator for allergy information, prior to admission medications and immunization status.     Medication history interview source(s): Family  Medication history resources (including written lists, pill bottles, clinic record): [facility] med list and MAR  Primary pharmacy:    Changes made to PTA medication list:  Added:   Deleted:   Changed:     Actions taken by pharmacist (provider contacted, etc):None     Additional medication history information:None    Medication reconciliation/reorder completed by provider prior to medication history? No    For patients on insulin therapy: No    Prior to Admission medications    Medication Sig Last Dose Taking? Auth Provider   acetaminophen (TYLENOL) 500 MG tablet Take 500 mg by mouth 3 times daily 7/1/2018 at Unknown time Yes Unknown, Entered By History   amLODIPine (NORVASC) 10 MG tablet TAKE ONE TABLET BY MOUTH ONCE DAILY 7/1/2018 at Unknown time Yes Alirio Fox MD   aspirin 81 MG tablet Take 1 tablet (81 mg) by mouth daily 7/1/2018 at Unknown time Yes Alirio Fox MD   atorvastatin (LIPITOR) 20 MG tablet Take 1 tablet (20 mg) by mouth daily 7/1/2018 at Unknown time Yes Tyrell Jackson MD   cyanocobalamin (VITAMIN  B-12) 1000 MCG tablet Take 1,000 mcg by mouth daily 7/1/2018 at Unknown time Yes Unknown, Entered By History   donepezil (ARICEPT) 10 MG tablet TAKE ONE TABLET BY MOUTH AT BEDTIME 7/1/2018 at Unknown time Yes Alirio Fox MD   DULoxetine (CYMBALTA) 30 MG EC capsule Take 30 mg by mouth daily 7/1/2018 at Unknown time Yes Reported, Patient   furosemide (LASIX) 20 MG tablet Take 30 mg by mouth daily  7/1/2018 at Unknown time Yes Unknown, Entered By History   gabapentin (NEURONTIN) 100 MG capsule TAKE ONE CAPSULE BY MOUTH THREE TIMES DAILY FOR  PAIN  7/1/2018 at Unknown time Yes Alirio Fox MD   ipratropium - albuterol 0.5 mg/2.5 mg/3 mL (DUONEB) 0.5-2.5 (3) MG/3ML neb solution Take 1 vial by nebulization 4 times daily 7/1/2018 at Unknown time Yes Reported, Patient   levothyroxine (SYNTHROID/LEVOTHROID) 88 MCG tablet TAKE ONE TABLET BY MOUTH ONCE DAILY 7/2/2018 at Unknown time Yes Alirio Fox MD   losartan (COZAAR) 100 MG tablet TAKE ONE TABLET BY MOUTH  DAILY 7/1/2018 at Unknown time Yes Alirio Fox MD   metoprolol succinate (TOPROL-XL) 25 MG 24 hr tablet TAKE ONE TABLET BY MOUTH DAILY 7/1/2018 at Unknown time Yes Alirio Fox MD   Multiple Vitamins-Minerals (MULTIVITAL) TABS Take 1 tablet by mouth daily 7/1/2018 at Unknown time Yes Unknown, Entered By History   pantoprazole (PROTONIX) 40 MG EC tablet TAKE ONE TABLET BY MOUTH EVERY MORNING 7/1/2018 at Unknown time Yes Alirio Fox MD   tamsulosin (FLOMAX) 0.4 MG capsule TAKE ONE CAPSULE BY MOUTH ONCE DAILY 7/1/2018 at Unknown time Yes Alirio Fox MD   TRAMADOL HCL PO Take 50 mg by mouth 3 times daily  7/1/2018 at Unknown time Yes Reported, Patient   albuterol (2.5 MG/3ML) 0.083% neb solution Take 2.5 mg by nebulization every 2 hours as needed    Reported, Patient   LORazepam (ATIVAN PO) Take 0.25 mg by mouth every 6 hours as needed for anxiety   Reported, Patient

## 2018-07-02 NOTE — IP AVS SNAPSHOT
"[place] [address] MEDICAL SURGICAL: [phone]                                              INTERAGENCY TRANSFER FORM - PHYSICIAN ORDERS   2018                    Hospital Admission Date: 2018  NATTY MAN   : 1928  Sex: Male        Attending Provider: Nash Rhodes MD     Allergies:  Contrast Dye, Lisinopril, Oxycodone    Infection:  None   Service:  GENERAL MEDI    Ht:  1.727 m (5' 8\")   Wt:  66 kg (145 lb 8 oz)   Admission Wt:  63.5 kg (140 lb 1.6 oz)    BMI:  22.12 kg/m 2   BSA:  1.78 m 2            Patient PCP Information     Provider PCP Type    Alirio Fox MD General      ED Clinical Impression     Diagnosis Description Comment Added By Time Added    Acute on chronic respiratory failure with hypoxia (H) [J96.21] Acute on chronic respiratory failure with hypoxia (H) [J96.21]  Harley Lucas MD 2018 10:43 AM    Congestive heart failure, unspecified congestive heart failure chronicity, unspecified congestive heart failure type (H) [I50.9] Congestive heart failure, unspecified congestive heart failure chronicity, unspecified congestive heart failure type (H) [I50.9]  Harley Lucas MD 2018 10:44 AM    Pulmonary fibrosis (H) [J84.10] Pulmonary fibrosis (H) [J84.10]  Harley Lucas MD 2018 10:44 AM      Hospital Problems as of 2018              Priority Class Noted POA    Community acquired pneumonia Medium  2018 Yes    SOB (shortness of breath) Medium  Unknown Unknown    Poor appetite Medium  Unknown Unknown    Goals of care, counseling/discussion Medium  Unknown Unknown    Encounter for palliative care Medium  Unknown Unknown      Non-Hospital Problems as of 2018              Priority Class Noted    Essential hypertension Medium  2005    Other specified disorders of prostate Medium  2006    Pulmonary fibrosis (H) Medium  Unknown    Advance Care Planning Medium  2011    CAD (coronary artery disease) Medium  Unknown    " CKD (chronic kidney disease) stage 3, GFR 30-59 ml/min Medium  11/30/2011    Proteinuria Medium  2/8/2012    Hyperlipidemia with target LDL less than 70 Medium  Unknown    Lumbar radiculopathy Medium  8/11/2014    Status post lumbar discectomy Medium  8/12/2014    S/P TAVR (transcatheter aortic valve replacement) Medium  11/12/2014    Physical deconditioning Medium  11/21/2014    CHF (congestive heart failure) (H) Medium  12/31/2014    Anemia Medium  1/20/2015    SAI (obstructive sleep apnea) Medium  2/24/2015    Hyperparathyroidism (H) Medium  4/22/2015    Dementia Medium  8/4/2015    Hypothyroidism Medium  10/27/2015    Health Care Home Medium  11/5/2015    Edema, unspecified edema Medium  1/17/2016    Other chronic pain Medium  10/11/2016    Compression fracture L2-3 Medium  2/2/2017    Weakness Medium  1/8/2018      Code Status History     Date Active Date Inactive Code Status Order ID Comments User Context    7/6/2018  2:50 PM  DNR/DNI [id number]  Rachel Mclain MD Outpatient    7/2/2018  1:28 PM 7/6/2018  2:50 PM DNR/DNI [id number]  Rosario Ardon PA-C Inpatient    6/13/2018  1:58 PM 7/2/2018  1:28 PM DNR [id number]  Liliam TiffCELIA Baez CNP Outpatient    6/6/2018 12:22 PM 6/13/2018  1:58 PM DNR/DNI [id number]  Macario Rodriguez MD Outpatient    6/3/2018  5:35 PM 6/6/2018 12:22 PM DNR/DNI [id number]  Macario Rodriguez MD Inpatient    1/8/2018 12:46 AM 1/9/2018  4:40 PM DNI [id number]  Alfonso Villafuerte MD Inpatient    10/2/2017  4:07 PM 1/8/2018 12:46 AM DNI [id number]  Tracey Beltran PA-C Outpatient    10/1/2017  5:06 PM 10/2/2017  4:07 PM DNI [id number]  Dayami Guillory PA-C Inpatient    2/1/2017  9:49 AM 10/1/2017  5:06 PM Full Code [id number]  Alejo Lopez MD Outpatient    1/29/2017 10:10 PM 2/1/2017  9:49 AM Full Code [id number]  Carie Bowers MD Inpatient    11/15/2016 11:02 AM 1/29/2017 10:10 PM Full Code [id number]  Yudelka Castillo MD Outpatient     11/14/2016  6:32 PM 11/15/2016 11:02 AM Full Code [id number]  Yudelka Castillo MD Inpatient    11/20/2014 10:49 AM 11/14/2016  6:32 PM Full Code [id number]  Martínez Larson MD Outpatient    11/19/2014  4:47 AM 11/20/2014 10:49 AM Full Code [id number]  Lokesh Walls MD Inpatient    11/17/2014  3:16 PM 11/19/2014  4:47 AM Full Code [id number]  Joie Caballero PA-C Outpatient    11/12/2014  3:48 PM 11/17/2014  3:16 PM Full Code [id number]  Ruby Monteiro MD Inpatient    10/8/2014  2:52 PM 10/10/2014  8:06 PM Full Code [id number]  Carie Bowers MD Inpatient    8/15/2014 12:40 PM 10/8/2014  2:52 PM Full Code [id number]  Melissa Isaac NP Outpatient    8/14/2014  9:55 AM 8/15/2014 12:40 PM Full Code [id number]  Melissa Isaac NP Outpatient    8/11/2014 12:20 PM 8/14/2014  9:55 AM Full Code [id number]  Jone Carvalho MD Inpatient    11/21/2011  9:22 AM 8/11/2014 12:20 PM Full Code [id number]  Jose Antonio Shin PA-C Outpatient    11/18/2011  6:50 PM 11/21/2011  9:22 AM Full Code [id number]  Niya Persaud RN Inpatient    11/18/2011  3:48 PM 11/18/2011  6:49 PM Full Code [id number]  Niya Persaud RN Inpatient         Medication Review      START taking        Dose / Directions Comments    levofloxacin 500 MG tablet   Commonly known as:  LEVAQUIN   Used for:  Acute on chronic respiratory failure with hypoxia (H)        Dose:  500 mg   Take 1 tablet (500 mg) by mouth daily for 2 days   Quantity:  2 tablet   Refills:  0        predniSONE 20 MG tablet   Commonly known as:  DELTASONE   Used for:  Acute on chronic respiratory failure with hypoxia (H)        Dose:  40 mg   Take 2 tablets (40 mg) by mouth daily for 4 days   Refills:  0          CONTINUE these medications which may have CHANGED, or have new prescriptions. If we are uncertain of the size of tablets/capsules you have at home, strength may be listed as something that might have changed.        Dose / Directions Comments     LORazepam 0.5 MG tablet   Commonly known as:  ATIVAN   This may have changed:  medication strength   Used for:  Acute on chronic respiratory failure with hypoxia (H)        Dose:  0.25 mg   Take 0.5 tablets (0.25 mg) by mouth every 6 hours as needed for anxiety   Quantity:  20 tablet   Refills:  0          CONTINUE these medications which have NOT CHANGED        Dose / Directions Comments    acetaminophen 500 MG tablet   Commonly known as:  TYLENOL        Dose:  500 mg   Take 500 mg by mouth 3 times daily   Refills:  0        albuterol (2.5 MG/3ML) 0.083% neb solution        Dose:  2.5 mg   Take 2.5 mg by nebulization every 2 hours as needed   Refills:  0        amLODIPine 10 MG tablet   Commonly known as:  NORVASC   Used for:  Essential hypertension        TAKE ONE TABLET BY MOUTH ONCE DAILY   Quantity:  90 tablet   Refills:  3        aspirin 81 MG tablet   Used for:  Coronary artery disease involving native coronary artery of native heart without angina pectoris        Dose:  81 mg   Take 1 tablet (81 mg) by mouth daily   Quantity:  30 tablet   Refills:  11        atorvastatin 20 MG tablet   Commonly known as:  LIPITOR   Used for:  Coronary artery disease involving native coronary artery of native heart without angina pectoris        Dose:  20 mg   Take 1 tablet (20 mg) by mouth daily   Quantity:  90 tablet   Refills:  3        cyanocobalamin 1000 MCG tablet   Commonly known as:  vitamin  B-12        Dose:  1000 mcg   Take 1,000 mcg by mouth daily   Refills:  0        diclofenac 1 % Gel topical gel   Commonly known as:  VOLTAREN        Dose:  2 g   Place 2 g onto the skin 4 times daily as needed (lower back pain)   Refills:  0        donepezil 10 MG tablet   Commonly known as:  ARICEPT   Used for:  Memory loss        TAKE ONE TABLET BY MOUTH AT BEDTIME   Quantity:  90 tablet   Refills:  3        DULoxetine 30 MG EC capsule   Commonly known as:  CYMBALTA        Dose:  30 mg   Take 30 mg by mouth daily   Refills:  0         furosemide 20 MG tablet   Commonly known as:  LASIX        Dose:  30 mg   Take 30 mg by mouth daily   Refills:  0        gabapentin 100 MG capsule   Commonly known as:  NEURONTIN   Used for:  Lumbar radiculopathy        TAKE ONE CAPSULE BY MOUTH THREE TIMES DAILY FOR  PAIN   Quantity:  270 capsule   Refills:  3    Please consider 90 day supplies to promote better adherence       ipratropium - albuterol 0.5 mg/2.5 mg/3 mL 0.5-2.5 (3) MG/3ML neb solution   Commonly known as:  DUONEB        Dose:  1 vial   Take 1 vial by nebulization 4 times daily   Refills:  0        levothyroxine 88 MCG tablet   Commonly known as:  SYNTHROID/LEVOTHROID   Used for:  Hypothyroidism, unspecified type        TAKE ONE TABLET BY MOUTH ONCE DAILY   Quantity:  90 tablet   Refills:  3        losartan 100 MG tablet   Commonly known as:  COZAAR   Used for:  Essential hypertension        TAKE ONE TABLET BY MOUTH  DAILY   Quantity:  90 tablet   Refills:  3        metoprolol succinate 25 MG 24 hr tablet   Commonly known as:  TOPROL-XL   Used for:  Essential hypertension        TAKE ONE TABLET BY MOUTH DAILY   Quantity:  90 tablet   Refills:  2        MULTIVITAL Tabs        Dose:  1 tablet   Take 1 tablet by mouth daily   Refills:  0        NONFORMULARY        Apply topically 4 times daily as needed (to lower back for pain) Salonpas Lidocaine 4% plus Benzyl Alcohol 10% Cream   Refills:  0        pantoprazole 40 MG EC tablet   Commonly known as:  PROTONIX   Used for:  Gastroesophageal reflux disease, esophagitis presence not specified        TAKE ONE TABLET BY MOUTH EVERY MORNING   Quantity:  90 tablet   Refills:  3        tamsulosin 0.4 MG capsule   Commonly known as:  FLOMAX   Used for:  Benign prostatic hyperplasia with urinary retention        TAKE ONE CAPSULE BY MOUTH ONCE DAILY   Quantity:  90 capsule   Refills:  3        traMADol 50 MG tablet   Commonly known as:  ULTRAM   Used for:  Acute on chronic respiratory failure with hypoxia  (H)        Dose:  50 mg   Take 1 tablet (50 mg) by mouth 3 times daily   Quantity:  15 tablet   Refills:  0                Summary of Visit     Reason for your hospital stay       Pneumonia             After Care     Activity - Up with assistive device           Advance Diet as Tolerated       Follow this diet upon discharge: Orders Placed This Encounter      Snacks/Supplements Adult: Boost Plus; With Meals      Combination Diet Regular Diet Adult       Fall precautions           General info for SNF       Length of Stay Estimate: Short Term Care: Estimated # of Days 31-90  Condition at Discharge: Improving  Level of care:skilled   Rehabilitation Potential: Fair  Admission H&P remains valid and up-to-date: Yes  Recent Chemotherapy: N/A  Use Nursing Home Standing Orders: Yes       Mantoux instructions       Give two-step Mantoux (PPD) Per Facility Policy Yes             Procedures     Oxygen - Nasal cannula       4-5 Lpm by nasal cannula to keep O2 sats 92% or greater.             Referrals     Occupational Therapy Adult Consult       Evaluate and treat as clinically indicated.    Reason:deconditioning       Physical Therapy Adult Consult       Evaluate and treat as clinically indicated.    Reason: deconditioning             Supplies     AntiEmbolism Stockings       Bilateral below knee length.On in the morning, off at night             Your next 10 appointments already scheduled     Aug 28, 2018 12:30 PM CDT   Ech Complete with [id number]    ([facility])    [address]  [facility] [address] [phone]           1. Please bring or wear a comfortable two-piece outfit. 2. You may eat, drink and take your normal medicines. 3. For any questions that cannot be answered, please contact the ordering physician ***Please check-in at the [place] Registration Office located in [address] in the [facility] building. When you are  finished registering, please go to [address] and have a seat. The technician will call your name for the test.            Sep 04, 2018  3:45 PM CDT   Return Visit with Tyrell Jackson MD   [facility] ([facility])    [address]  [facility] [id number] [phone]              Statement of Approval     Ordered          07/07/18 0851  I have reviewed and agree with all the recommendations and orders detailed in this document.  EFFECTIVE NOW     Approved and electronically signed by:  Rachel Mclain MD

## 2018-07-02 NOTE — IP AVS SNAPSHOT
` ` Patient Information     Patient Name Sex     Chaz Soler ([id number]) Male 1928       Room Bed    [address] [place], [place] [address]      Patient Demographics     Address Phone E-mail Address    [address] [address] [phone] (Home) *Preferred*  NONE (Work)  [phone] (Mobile) [email or web address]      Patient Ethnicity & Race     Ethnic Group Patient Race    American White      Emergency Contact(s)     Name Relation Home Work Mobile    Violet Soler Spouse [phone] none none    Kwame Soler Son [phone]    Maria Isabel Soler Daughter [phone]    Jannie Johnson Daughter [phone] none [phone]      Documents on File        Status Date Received Description       Documents for the Patient    Privacy Notice - Nunez Received 11     Face Sheet Received () 08     Insurance Card  () 03     Insurance Card  () 07/15/04     Insurance Card  () 06     Insurance Card  () 08     Insurance Card  () 09     External Medication Information Consent Accepted () 09     Face Sheet Received () 09     Patient ID Received () 10/10/11     Consent for Services - Hospital/Clinic Received () 11     External Medication Information Consent Accepted () 11     Consent for Services - Hospital/Clinic Received () 11     Insurance Card Received () 11     Consent for Services - Hospital/Clinic Received () 11     Consent to Communicate Received () 11     Patient ID Received () 11     CMS IM for Patient Signature Received 14     External Medication Information Consent Accepted () 12     Insurance Card Received () 12     Insurance Card Received () 10/26/12 Medicare,[employer]ica    Consent to Communicate Received 10/26/12 Auth to  Discuss PHI    Consent for Services - Hospital/Clinic Received () 12     Consent for EHR Access  13 Copied from existing Consent for services - C/HOD collected on 2012    [facility] Specified Other       External Medication Information Consent Accepted 13 Lifetime    Insurance Card Received () 13 Medicare    Insurance Card Received () 13 [employer]    Consent to Communicate Received 13 Auth for E-Mail Comm    Consent for Services - Hospital/Clinic  () 13 CONSENT FOR SERVICE    Consent for Services - Hospital/Clinic Received () 14     Insurance Card Received 05/15/17 Medicare Part A & B    Insurance Card Received () 14 Med[employer] -    Patient ID Received () 14 DL - 2016    Physical Therapy Certification Sent 14 Eval and treat OP status     [facility]IM [facility]OS [facility]ization  [facility]    Consent for Services - [facility] Received 14 Imaging Center    Consent for Services - [facility]  14 GENERAL CONSENT FORM: SHARED EHR - ENGLISH    Consent for Services - Hospital/Clinic Received () 04/22/15     Insurance Card Received () 05/14/15 Med[employer]    Consent for Services/Privacy Notice - Hospital/Clinic Received () 16     Insurance Card Received () 16 [employer] [id number] - issued 2015    Patient ID Received 18 [place] ID - Lifetime    Advance Directives and Living Will Received 16 POLST 16 -     Advance Directives and Living Will Not Received  VALIDATION OF AD 12/15/2016    Advance Directives and Living Will Received 17 HEALTH CARE DIRECTIVE 12/15/2016    Consent for Services/Privacy Notice - Hospital/Clinic Received () 17     Consent for Services - Geriatrics Received 17     Advance Directives and Living Will Received 17 POLST 2017    Consent to Communicate Received 17     Care Everywhere Prospective Auth  Received 10/18/17     Insurance Card Received 10/18/17 [employer] 2017    Insurance Card Received 10/18/17 Medicare Part A & B    Consent for Services - Hospital and Clinic Received 06/03/18     HIE Auth Received 06/03/18     Insurance Card Received 07/02/18     HIM CARLOS Authorization  (Deleted) 08/14/14 [facility]    Consent for Services - Informed Accepted (Deleted) 03/23/17        Documents for the Encounter    CMS IM for Patient Signature Received 07/02/18     Discharge Attachment   ADULT, PNEUMONIA (ENGLISH)      Admission Information     Attending Provider Admitting Provider Admission Type Admission Date/Time    Nash Rhodes MD [place], Daniel L, MD Emergency 07/02/18  0842    Discharge Date Hospital Service Auth/Cert Status Service Area     General Medicine [facility] SERVICES    Unit Room/Bed Admission Status        3 MEDICAL SURGICAL [id number] Admission (Confirmed)       Admission     Complaint    Community acquired pneumonia      Hospital Account     Name Acct ID Class Status Primary Coverage    Chaz Soler [id number] Inpatient Open MEDICARE - MEDICARE FOR HB SUPPLEMENT            Guarantor Account (for Hospital Account #[id number])     Name Relation to Pt Service Area Active? Acct Type    Chaz Soler  FCS Yes Personal/Family    Address Phone          [address]  [place] [address] [phone](H)              Coverage Information (for Hospital Account #[id number])     1. MEDICARE/MEDICARE FOR HB SUPPLEMENT     F/O Payor/Plan Precert #    MEDICARE/MEDICARE FOR HB SUPPLEMENT     Subscriber Subscriber #    Chaz Soler [id number]    Address Phone    ATTN CLAIMS  [address]  [place] [address] [phone]          2. MEDICA/MEDICA PRIME SOLUTION     F/O Payor/Plan Precert #    MEDICA/MEDICA PRIME SOLUTION     Subscriber Subscriber #    Chaz Soler [id number]    Address Phone    [address] [address]  [place] [address] [phone]

## 2018-07-02 NOTE — ED PROVIDER NOTES
History     Chief Complaint:  Shortness of Breath    HPI   History limited due to the patient's dementia. History supplemented by the patient's wife, present at bedside.    Chaz Soler is a 89 year old male with a complex medical history including hypertension, hyperlipidemia, CKD, oxygen-dependent COPD, CAD, dementia, and CHF who presents via EMS with shortness of breath. The patient was hospitalized from 6/3-6/6 after a fall and findings of CHF exacerbation. He was treated with IV Lasix and discharged to TCU on his baseline dose of Lasix and 3-5L of oxygen via nasal cannula. Later in June, he was treated with Levaquin for pneumonia, finishing his last dose of this on 6/29. Today, the patient's wife reports the patient has been short of breath recently, which she notes is worse with exertion. She states his oxygen saturations are usually in the 90s at rest but have dropped into the 50s during physical therapy. She notes yesterday when she went to visit him in his nursing home, he was very shaky and was having a hard time playing cards, when she states is a change from his baseline. She states he has been coughing and denies vomiting or diarrhea but is unsure of any other symptoms, as she has not seen the patient much recently. She denies any known recent fevers, although the patient is febrile in the ED. She does note that the patient has been lucid lately. The patient states he has low back and mild abdominal pain, but he denies lightheadedness or dizziness. Of note, the patient is DNR/DNI.    Allergies:  Contrast dye  Lisinopril  Oxycodone     Medications:    Albuterol  Amlodipine  Aspirin  Lipitor  Aricept  Cymbalta  Lasix  Gabapentin  Synthroid  Duoneb  Ativan  Losartan  Metoprolol  Protonix  Flomax    Past Medical History:    AAA  Anemia  Aortic stenosis  CAD  CHF  CKD  COPD  Dementia  Hypertension  Gout  Hyperlipidemia  Hyperparathyroidism  BPH  Left lung granuloma  Chronic pain  Pulmonary  fibrosis  PVD  Thrombocytopenia  Hypothyroidism  Vitamin D Deficiency  DNR/DNI    Past Surgical History:    Lumbar discectomy  Cholecystectomy  Endovascular repair of abdominal aortic aneurysm  Right cataract surgery  Transcatheter aortic valve implant  Vasectomy    Family History:    Hypertension    Social History:  Smoking status: Never smoker  Alcohol use: No   Marital Status:   [2]     Review of Systems   Unable to perform ROS: Dementia     Physical Exam     Patient Vitals for the past 24 hrs:   BP Temp Temp src Heart Rate Resp SpO2   07/02/18 1130 129/58 - - 96 - 97 %   07/02/18 1129 - - - 107 - 95 %   07/02/18 1128 - - - 96 - 94 %   07/02/18 1127 - - - 96 - 91 %   07/02/18 1126 - - - 99 - (!) 83 %   07/02/18 1125 146/64 - - 103 - (!) 78 %   07/02/18 1100 (!) 104/92 - - 96 - 100 %   07/02/18 1045 115/62 - - 98 - 98 %   07/02/18 1030 95/68 - - 98 - 96 %   07/02/18 1015 103/83 - - 103 - 97 %   07/02/18 1010 - 99.6  F (37.6  C) Oral - - -   07/02/18 1008 131/74 - - 99 - 97 %   07/02/18 0915 100/84 - - 99 - 98 %   07/02/18 0900 108/71 - - 90 - 98 %   07/02/18 0858 - - - - - 98 %   07/02/18 0851 - - - - - (!) 88 %   07/02/18 0850 - - - - - (!) 79 %   07/02/18 0849 - - - - - (!) 61 %   07/02/18 0848 - - - - - (!) 53 %   07/02/18 0847 140/90 100.4  F (38  C) Temporal 93 28 (!) 57 %   07/02/18 0846 - - - - - (!) 71 %   07/02/18 0845 - - - - - 91 %   07/02/18 0844 - - - - - 93 %   07/02/18 0843 - - - - - 90 %   07/02/18 0842 140/90 - - - - -      Physical Exam   HENT:   Right Ear: External ear normal.   Left Ear: External ear normal.   Nose: Nose normal.   Mouth/Throat: No oropharyngeal exudate.   Eyes: Conjunctivae and lids are normal.   Neck: Neck supple. No JVD present. No tracheal deviation present.   Cardiovascular: Regular rhythm and intact distal pulses.    Pulmonary/Chest:   Mild tachypnea, crackles throughout prolonged expiratory phase no significant wheezing   Abdominal: Soft. There is no tenderness.  There is no rebound and no guarding.   Musculoskeletal:   No peripheral edema   Neurological: He is alert.   Awake answer simple questions follow simple commands without any localizing motor or sensory deficits for    MAEE, no gross focal motor or sensory deficit   Skin: Skin is warm and dry. He is not diaphoretic.   Psychiatric: He has a normal mood and affect.   Nursing note and vitals reviewed.    Emergency Department Course   ECG (09:02:24):  Rate 91 bpm. AZ interval 176. QRS duration 110. QT/QTc 346/425. P-R-T axes 30 -33 107. Normal sinus rhythm. Left axis deviation. Left ventricular hypertrophy with repolarization abnormality. Abnormal ECG. No significant change compared to 6/3/3018. Interpreted at 0906 by Harley Lucas MD.     Imaging:  Radiographic findings were communicated with the patient and family who voiced understanding of the findings.    Chest XR, 1 View Portable:  There are diffuse bilateral pulmonary opacities, which  could reflect edema and/or fibrosis. No definite pleural effusions  appreciated. No pneumothorax.  As read by Radiology.     Laboratory:  CBC: WBC 14.6 (H), HGB 11.8 (L), o/w WNL ()  BMP: Glucose 132 (H), BUN 31 (H), Creatinine 1.66 (H), GFR estimate 39 (L), o/w WNL   Troponin: <0.015  BNP: 1886 (H)  0915: ISTAT venous gases and lactate: pH 7.47 (H), PCO2 49, PO2 31, Bicarbonate 35 (H), O2 sat 63, Lactic acid 1.7     Procalcitonin: in process  Blood cultures: in process    Interventions:  0913: DuoNeb, 2.5mg/3 mL, Inhalation solution, Nebulizer    0943: Lactated ringers 250 mL IV Bolus   1128: Lasix 20 mg IV  1128: Solu-Medrol 40 mg IV  1206: Zosyn 3.375 g IV  1233: Vancomycin 1,500 mg IV     Emergency Department Course:  The patient arrived in the emergency department via EMS.    Past medical records, nursing notes, and vitals reviewed.  0848: I performed an exam of the patient and obtained history, as documented above.   IV inserted and blood drawn. The patient  was placed on high-flow oxygen via nasal cannula and continuous cardiac monitoring and pulse oximetry.   The patient had a portable chest x-ray while in the emergency department, findings above.   EKG obtained, results above.    1106: I spoke to Rosario Ardon PA-C of the hospitalist service who accepts the patient for admission.     Findings and plan explained to the patient, spouse, and son who consent to admission.     Discussed the patient with Rosario Ardon PA-C, who will admit the patient to an IMC bed with Dr. Rhodes for further monitoring, evaluation, and treatment.      Impression & Plan    Medical Decision Making:  Chaz Soler is a 89 year old male with a complex past medical history here with acute on chronic respiratory failure with hypoxia, likely multifactorial including CHF, pulmonary fibrosis, and recurrent pneumonia. He unfortunately just finished a course of Levaquin 2 days ago and had ceftriaxone and a course of steroids prior to that. Initial sats here are in the 50s. He was placed on high-flow nasal cannula with his sats returning to the mid-to-low 90s. EKG with no new changes. Mild leukocytosis. Creatinine at baseline, troponin negative, BNP elevated compared to previous. Lactic acid okay. Chest x-ray showing pulmonary opacities, likely CHF. Would have to consider recurrent pneumonia given the fever he arrived here with. I spoke with the hospitalist in terms of appropriate bed status (ICU versus IMC) and discussed reinitiating antibiotics for hospital-acquired coverage.     He remains stable here on high-flow nasal cannula. Discussed with the hospitalist service who will admit him to an IMC bed and will cover him for hospital acquired pneumonia as a possibilty. If clinical status improves with diuresis, fever curve is favorable. I discussed with his wife that he is DNR/DNI and she has paperwork for this.    Diagnosis:    ICD-10-CM   1. Acute on chronic respiratory failure with hypoxia (H)  J96.21   2. Congestive heart failure, unspecified congestive heart failure chronicity, unspecified congestive heart failure type (H) I50.9   3. Pulmonary fibrosis (H) J84.10       Disposition:  Admitted to [facility] in the care of Dr. Meagan Hammond  7/2/2018   [facility] EMERGENCY DEPARTMENT    Laura NEGRETE, am serving as a scribe at 8:48 AM on 7/2/2018 to document services personally performed by Harley Lucas MD based on my observations and the provider's statements to me.       Harley Lucas MD  07/02/18 1640

## 2018-07-02 NOTE — PLAN OF CARE
"Problem: Patient Care Overview  Goal: Plan of Care/Patient Progress Review  Pt disoriented x4, bedrest, pt has hx of dementia.  Was very twitchy on admission to the floor, lethargic and warm.  99.7 axillary temp, MD notified.  Came up on high flow oxygen, sating at 97%, resps 28.  Wife and son at bedside, wife states that he gets \"twitchy\" sometimes but is not baseline.  At home he uses 5L O2 NC.  Will continue POC.          "

## 2018-07-03 NOTE — PLAN OF CARE
Problem: Patient Care Overview  Goal: Plan of Care/Patient Progress Review    PT:  Orders received. Pt admitted 7/2/18 with acute hypoxic respiratory failure secondary to HAP.  Was hospitalized 6/3-6/6 for CHF exacerbation and discharged to TCU. Has bed hold and plan in place to return to TCU at time of discharge.  Will allow 24-48 hrs for appropriate medical management of pneumonia prior to initiating PT eval to optimize tolerance with assessment. Although, may not need acute PT intervention if short current hospitalization and return to TCU to continue rehab.  Will reassess on 7/5/18.

## 2018-07-03 NOTE — PLAN OF CARE
Problem: Patient Care Overview  Goal: Plan of Care/Patient Progress Review  Pt alert, oriented to self only. Easily redirected. On high flow, sats drop on exertion. Occasional cough, PRN Robitussin given. LS: dim with crackles. Tele: SR with inverted T waves. Kim with adequate output. Reporting chronic back pain, repositioned and Voltaren cream applied. Family at bedside updated with POC.

## 2018-07-03 NOTE — PROGRESS NOTES
Clinic Care Coordination Contact    Situation: Patient chart reviewed by care coordinator.    Background: Pt was admitted to TCU. SW reviewed chart to f/u to check on dc plan.    Assessment: Pt is currently in hospital for pneumonia.    Plan/Recommendations: SW to f/u again in 3 weeks to check on progress and assess needs.      HARINDER Summers  Clinic Care Coordinator  [facility]  [phone]  [email or web address]

## 2018-07-03 NOTE — PROGRESS NOTES
Care Coordination:  CTS following for elevated PARAM score and Reamission. Pt was hospitalized from 6/3-6/6 for CHF exacerbation. CTS met with pt and wife at length last admission and pt was dc'd to [facility] TCU. Pt is now admitted again with PNA/COPD exacerbation and needing high flow oxygen. He has a history of pulmonary fibrosis and is on O2 at 4-5 L baseline with supplies through [facility]. Palliative care consulted and will meet with pt and wife on Thurs to discuss goals of care. CTS to follow for dc plan of care. He has a bed hold at [facility] TCU and will return there on dc.     Evangelina Valentin RN CTS

## 2018-07-03 NOTE — PROGRESS NOTES
[facility]  Hospitalist Progress Note  Patient Name: Chaz Soler    MRN: [id number]  Provider:  Rachel Mclain MD  Date:07/03/2018      Initial presenting complaint/issue to hospital (Diagnosis): sob    Summary of Stay: Chaz Soler is a 89 year old male w/ h/o dementia, COPD on 5L oxygen at baseline, pulm fibrosis who was admitted on 7/2/2018 with   Pt is DNR/DNI but ok with BiPAP if needed, confirmed with family. He is currently febrile, satting in mid-90s on high flow O2 via NC but in no acute distress and is hemodynamically stable.          Assessment and Plan:      1. Acute hypoxic respiratory failure secondary to hospital-acquired pneumonia: Febrile and hypoxic with leukocytosis and CXR difficult to interpret given history of pulmonary fibrosis. 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 Confirmed with wife and son that patient is DNR/DNI. They would want BiPAP initiated if needed but no intubation.  -cont IV Zosyn  -Continue oxygen NC high flow FIO2 and wean as able  -Scheduled duonebs and prn albuterol nebs  -IV methylprednisolone 40 mg daily  -Recheck BMP tomorrow prior to additional IV diuresis  -Follow blood cultures  -Palliative care consulted as family is interested in a more comfort-care approach but for now has decided to remain DNR/DNI      2. History of diastolic heart failure: Patient's BNP is 1886.  He has no lower extremity edema.  There is no clear evidence of pulmonary edema on his chest x-ray.  He received IV Lasix 20 mg in the ER.  We will hold on further diuresis for now and reassess after checking BNP and respiratory status tomorrow morning.  Can resume regular home meds Lasix 20 mg daily.     3. Hypertension: Blood pressure is stable.  Continue Norvasc, losartan, and metoprolol.     4. History of coronary artery disease: Denies chest pain and has undetectable troponin.  Low  "suspicion for acute coronary syndrome.  Lexiscan 10/2017 negative for significant ischemia with ejection fraction measured at 63%.  Continue aspirin, atorvastatin, metoprolol succinate, and losartan.     5. Hypothyroidism: Resume Synthroid.     6. GERD: Continue proton pump inhibitor.     7. Dementia: Continue PTA donepezil.     8. Depression/anxiety: Continue PTA fluoxetine and as needed Ativan.    # Pain Assessment:  Current Pain Score 7/3/2018   Patient currently in pain? denies   Pain score (0-10) -   Pain location -   Pain descriptors -   CPOT pain score -   Chaz miller pain level was assessed and he currently denies pain.       DVT Prophylaxis:  -  Heparin   Code Status: DNR / DNI  Discharge Dispo: tcu   Estimated Disch Date / # of Days until Discharge: 2 more days atleast pending diuresis         Interval History:      Demented  Denies SOB but \"has felt better\"- \"how did I get here\"  No chest pain        Data reviewed today: I reviewed all new labs and imaging reports over the last 24 hours. I personally reviewed no images or EKG's today.         Physical Exam:      Last Vital Signs:  BP (!) 110/39 (BP Location: Left arm)  Temp 98.5  F (36.9  C) (Temporal)  Resp 24  Wt 63.5 kg (140 lb 1.6 oz)  SpO2 95%  BMI 21.3 kg/m2   GEN:  Alert, oriented to self, appears comfortable, no overt distress on high flow oxygen via NC.  HEENT:  Normocephalic/atraumatic, no scleral icterus, no nasal discharge, mouth moist.  CV:  Regular rate and rhythm, no murmur or JVD.  S1 + S2 noted, no S3 or S4.  LUNGS: Wheezing bilaterally without rales/rhonchi/wheezing/retractions.  Symmetric chest rise on inhalation noted.  ABD:  Active bowel sounds, soft, non-tender/non-distended.  No rebound/guarding/rigidity.  EXT:  No edema.  No cyanosis.  No acute joint synovitis noted.  SKIN:  Dry to touch, no exanthems noted in the visualized areas.  NEURO:  Symmetric muscle strength, sensation to touch grossly intact.  Coordination symmetric on " general exam.  No new focal deficits appreciated.            Medications:      All current medications were reviewed.         Data:      All new lab and imaging data was reviewed.   Data       Recent Labs  Lab 07/03/18  0734 07/02/18  0911   WBC 11.4* 14.6*   HGB 11.1* 11.8*   HCT 34.3* 36.2*   MCV 91 91    185       Recent Labs  Lab 07/03/18  0734 07/02/18  0911 07/02/18    139 139   POTASSIUM 4.1 3.9 4.1   CHLORIDE 102 100 100   CO2 30 32 29   ANIONGAP 8 7 10   * 132* 118   BUN 37* 31* 31*   CR 1.63* 1.66* 1.63*   GFRESTIMATED 40* 39* 40*   GFRESTBLACK 48* 47* 48*   LEXIS 9.4 9.7 10.2       No results found for this or any previous visit (from the past 24 hour(s)).    Rachel Mclain MD  Hospitalist  [facility] [address] [place] [address]

## 2018-07-03 NOTE — PROGRESS NOTES
RT note: pt remains on HFNC, settings weaned this shift. Currently on 30 lpm 45% FiO2. BS clear/diminished with fine crackles in the LLL. Receives duoneb QID.     Olesya Pulido, RRT

## 2018-07-03 NOTE — PLAN OF CARE
Problem: Patient Care Overview  Goal: Plan of Care/Patient Progress Review  Outcome: No Change  Pt disoriented x4- lethargic but arouses to voice, VSS, denies pain, currently on 35 lpm High Flow O2- tolerating well, sats maintaining >92%, Tele: SR, incontinent of bladder, LS- diminished/crackles, continue POC.

## 2018-07-03 NOTE — PLAN OF CARE
Problem: Patient Care Overview  Goal: Plan of Care/Patient Progress Review  Outcome: Declining  VSS alert/oriented to self  And place disoriented to time and situation denies pain ambulates with AX2 on 30l high flow oxygen increased to 40 liters per RT due to low sats with sitting at edge of bed. Kim cath placed  ON IV zosyn tele NSR with inverted T waves.

## 2018-07-03 NOTE — PROGRESS NOTES
"Critical [facility] hospital [facility]     Date: 7/3/2018  Admission Dx: pneumonia  Pulmonary History pulmonary fibrosis, COPD  Home Nebulizer/MDI Use: duoneb QID, albuterol prn  Home Oxygen: 4-5 lpm  Acuity Level (RCAT flow sheet): 3  Aerosol Therapy initiated: duoneb QID, albuterol prn      Pulmonary Hygiene initiated: deep breath and coughing technique      Volume Expansion initiated: continuous HFNC       Current Oxygen Requirements: HFNC 30 lpm 45%  Current SpO2: 93%    Re-evaluation date: 4/6/2018    Patient Education: benefits of HFNC and bronchodilators.       See \"RT Assessments\" flow sheet for patient assessment scoring and Acuity Level Details.             "

## 2018-07-03 NOTE — CONSULTS
[facility]    Palliative Care Consultation   Text Page    Date of Admission:  7/2/2018    Assessment & Plan   Chaz Soler is a 89 year old male who was admitted on 7/2/2018. I was asked to see the patient for Goals of care and review of POLST.    Recommendations:  1.Decisional Capacity -  Questionable. Patient has an advance directive dated 12/15/2016.  Include patient in decision making as much as possible but involve health care agent attempting consensus with patient. Ricardo Kinglinda, Spouse is his primary Health Care Agent.  Surrogates is Son Kwame Koo.   2. Pain- Chronic low back pain: DDD, recent vertebral fracture with vertebroplasty, lumbar stenosis, facet arthropathy.   Seen in pain clinic.    -Continue chronic oral and topical medications, adjust as needed.    3. Shortness of breath- Underlying pulmonary Fibrosis diagnosed at [facility] 5 years ago.  Spouse notes with any activity for at least a year, occasionally they notice labored appearing breathing at rest which does not appear to be noticed by patient.  He was recently started on low dose ativan, but uses infrequently per review of TCU record.  -Ativan PRN reordered here, has not needed.  4. Spiritual Care- Oriented to Spiritual Health and Social Work Services as part of Palliative Care team.  4. Care Planning- Per discussion with spouse, there is a bed hold at [facility] and they are hopeful he can go back to TCU at [facility].    Goal of Care:DNR/DNI, Restorative. Refer to most recent POLST which is reviewed and accurate.     Disease Process/es & Symptoms:  Chaz Soler is a 89 year old patient admitted with symptoms of cough, hypoxia and fever. He has been treated for respiratory failure r/t hospital acquired pneumonia.      This is in the setting of dementia, pulmonary fibrosis diagnosed about 5 years ago at [place], on chronic oxygen 4L, diastolic HF, HTN, CAD, hypothyroidism, GERD, and depression anxiety. He was discharged last month after  "hospitalization for pneumonia, this is his 4th admition in the past 9 months.  He has had a noted 10lb weight loss over the last 4 months and most recently was at TCU with hope of regaining strength and ability to return home.     Findings & plan of care discussed with: Hospitalist, Care Coordinator.  Follow-up plan from palliative team: Plan to revisit on Thursday and review goals of care with spouse present.  Thank you for involving us in the patient's care.     Evangelina YANG, CNS  Pain Management and Palliative Care  [facility]  Pgr: [phone]    Time Spent on this Encounter   I spent  20 minutes ([phone]) in assessment of the patient, 15 minutes counseling and discussion with the and family (spouse) as documented in sections below. Another 35 minutes in review of chart, documentation, coordination of care and discussion with the health care team.    Reason for Consult   Reason for consult: I was asked by Dr. Rhodes to evaluate this patient for Goals of care  Patient and family support.    Primary Care Physician   Alirio Fox    Chief Complaint   \"I need some sleep\"    History is obtained from the patient, electronic health record and patient's spouse    History of Present Illness   Chaz Soler is a 89 year old male who presents with symptoms of cough, hypoxia and fever. He has been treated for respiratory failure r/t hospital acquired pneumonia.      This is in the setting of dementia, pulmonary fibrosis diagnosed about 5 years ago at [place], on chronic oxygen 4L, diastolic HF, HTN, CAD, hypothyroidism, GERD, and depression anxiety. He was discharged last month after hospitalization for pneumonia, this is his 4th admition in the past 9 months.  He has had a noted 10lb weight loss over the last 4 months and most recently was at TCU with hope of regaining strength and ability to return home.     The following symptoms are noted by patient as concerning to his quality of " life.  Pain - chronic in low back, c/o pain today interrupting sleep.    DDD, recent vertebral fracture with vertebroplasty, lumbar stenosis, facet arthropathy.   Seen in pain clinic.  Dyspnea - as reported by spouse, noted with activity x 1year and at times with rest breathing appears labored which patient does not appear to notice.  Denies today.  Diarrhea /Constipation- Noted hx of hospitalization with constipation and subsequent diarrhea and dehydration.  Depressive symptoms - Currently denies, on Cymbalta.  Anxiety - Denies, noted recent new PRN Ativan with dyspnea, has not used here.    Decision-Making & Goals of Care:  Discussed on July 3, 2018 with Evangelina Johnson APRN, CNS:   Met with patient who is a poor historian.  He tells me the care is fine at [facility] but he cannot remember much, how he got here or even events of yesterday.  Spouse via phone reviews patient recent history.  She is unclear about a specific diagnosis of dementia, but notes they have visited neurology x 2.  She recounts diagnosis of pulmonary fibrosis and is unable to note any specific prognosis with either of these.  We reviewed POLST and she is open to meeting in person prior to leaving to discuss further within specific framework of outcome of this hospitalization and better prognostic information.    Patient has decision-making capacity Unreliable  Patient has a Health Care Agent named in a legal Advance Directive document dated 12/15/2016. See name(s) and contact information in Code/ACP/Advance Care Planning Activity Tab.  Physician orders for life-sustaining treatment (POLST) form is on file  Code Status: Do not resusitate / Do not intubate     Past Medical History   I have reviewed this patient's medical history and updated it with pertinent information if needed.   Past Medical History:   Diagnosis Date     AAA (abdominal aortic aneurysm) (H) 10/5/2011    6.1 cm     Anemia 11/30/2011     Aortic stenosis 10/26/2012     CAD  (coronary artery disease)     MI - distal inferior/apex. Non transmural     Carotid artery disease (H)      CHF (congestive heart failure) (H) 12/31/2014     CKD (chronic kidney disease) stage 3, GFR 30-59 ml/min 11/30/2011     COPD (chronic obstructive pulmonary disease) (H)      Dementia 8/4/2015     Edema, unspecified edema 1/17/2016     Essential hypertension      Gout 1/06    knee     Hypercalcemia 1/2/2015     Hyperlipidemia LDL goal < 70      Hyperparathyroidism, unspecified 4/22/2015     Hyperplasia of prostate      LEFT LUNG GRANULOMA      Lumbago      Other chronic pain 10/11/2016     Proteinuria 2/8/2012     Pulmonary fibrosis (H)      PVD (peripheral vascular disease) (H)      Thrombocytopenia (H) 11/30/2011     Unspecified hypothyroidism      Vitamin D deficiency        Past Surgical History   I have reviewed this patient's surgical history and updated it with pertinent information if needed.  Past Surgical History:   Procedure Laterality Date     COLONOSCOPY  9/02 per patient     DISCECTOMY LUMBAR POSTERIOR MICROSCOPIC ONE LEVEL  8/11/2014    Procedure: DISCECTOMY LUMBAR POSTERIOR MICROSCOPIC ONE LEVEL;  Surgeon: Jone Carvalho MD;  Location:  OR     ENDOVASCULAR REPAIR ANEURYSM ABDOMINAL AORTA  11/18/2011    Procedure:ENDOVASCULAR REPAIR ANEURYSM ABDOMINAL AORTA; ENDOVASCULAR AAA,RIGHT FEMORAL       LAPAROSCOPIC CHOLECYSTECTOMY  Nov 2011     LAPAROSCOPIC CHOLECYSTECTOMY  11/18/2011    Procedure:LAPAROSCOPIC CHOLECYSTECTOMY; LAPAROSCOPIC CHOLECYSTECTOMY ; Surgeon:DARRYL DORADO; Location: OR     REPAIR ANEURYSM ABDOMINAL AORTA  Nov 2011     right cataract extraction/IOL  12/03     TRANSCATHETER AORTIC VALVE IMPLANT ANESTHESIA N/A 11/12/2014    Bioprosthetic. Procedure: TRANSCATHETER AORTIC VALVE IMPLANT ANESTHESIA;  Surgeon: Generic Anesthesia Provider;  Location: [facility] OR     VASECTOMY         Prior to Admission Medications   Prior to Admission Medications   Prescriptions Last Dose  Informant Patient Reported? Taking?   DULoxetine (CYMBALTA) 30 MG EC capsule 7/1/2018 at Unknown time Self Yes Yes   Sig: Take 30 mg by mouth daily   LORazepam (ATIVAN PO)   Yes No   Sig: Take 0.25 mg by mouth every 6 hours as needed for anxiety   Multiple Vitamins-Minerals (MULTIVITAL) TABS 7/1/2018 at Unknown time Self Yes Yes   Sig: Take 1 tablet by mouth daily   NONFORMULARY   Yes Yes   Sig: Apply topically 4 times daily as needed (to lower back for pain) Salonpas Lidocaine 4% plus Benzyl Alcohol 10% Cream   TRAMADOL HCL PO 7/1/2018 at Unknown time  Yes Yes   Sig: Take 50 mg by mouth 3 times daily    acetaminophen (TYLENOL) 500 MG tablet 7/1/2018 at Unknown time Self Yes Yes   Sig: Take 500 mg by mouth 3 times daily   albuterol (2.5 MG/3ML) 0.083% neb solution 7/2/2018 at Unknown time  Yes Yes   Sig: Take 2.5 mg by nebulization every 2 hours as needed    amLODIPine (NORVASC) 10 MG tablet 7/1/2018 at Unknown time Self No Yes   Sig: TAKE ONE TABLET BY MOUTH ONCE DAILY   aspirin 81 MG tablet 7/1/2018 at Unknown time Self No Yes   Sig: Take 1 tablet (81 mg) by mouth daily   atorvastatin (LIPITOR) 20 MG tablet 7/1/2018 at Unknown time Self No Yes   Sig: Take 1 tablet (20 mg) by mouth daily   cyanocobalamin (VITAMIN  B-12) 1000 MCG tablet 7/1/2018 at Unknown time Self Yes Yes   Sig: Take 1,000 mcg by mouth daily   diclofenac (VOLTAREN) 1 % GEL topical gel Past Week at Unknown time  Yes Yes   Sig: Place 2 g onto the skin 4 times daily as needed (lower back pain)    donepezil (ARICEPT) 10 MG tablet 7/1/2018 at Unknown time Self No Yes   Sig: TAKE ONE TABLET BY MOUTH AT BEDTIME   furosemide (LASIX) 20 MG tablet 7/1/2018 at Unknown time Self Yes Yes   Sig: Take 30 mg by mouth daily    gabapentin (NEURONTIN) 100 MG capsule 7/1/2018 at Unknown time Self No Yes   Sig: TAKE ONE CAPSULE BY MOUTH THREE TIMES DAILY FOR  PAIN   ipratropium - albuterol 0.5 mg/2.5 mg/3 mL (DUONEB) 0.5-2.5 (3) MG/3ML neb solution 7/1/2018 at Unknown  "time  Yes Yes   Sig: Take 1 vial by nebulization 4 times daily   levothyroxine (SYNTHROID/LEVOTHROID) 88 MCG tablet 7/2/2018 at Unknown time Self No Yes   Sig: TAKE ONE TABLET BY MOUTH ONCE DAILY   losartan (COZAAR) 100 MG tablet 7/1/2018 at Unknown time Self No Yes   Sig: TAKE ONE TABLET BY MOUTH  DAILY   metoprolol succinate (TOPROL-XL) 25 MG 24 hr tablet 7/1/2018 at Unknown time Self No Yes   Sig: TAKE ONE TABLET BY MOUTH DAILY   pantoprazole (PROTONIX) 40 MG EC tablet 7/1/2018 at Unknown time Self No Yes   Sig: TAKE ONE TABLET BY MOUTH EVERY MORNING   tamsulosin (FLOMAX) 0.4 MG capsule 7/1/2018 at Unknown time Self No Yes   Sig: TAKE ONE CAPSULE BY MOUTH ONCE DAILY      Facility-Administered Medications: None     Allergies   Allergies   Allergen Reactions     Contrast Dye      SOB, increased Bp, difficulty swallowing. Date 6/3/96 (ipaque contrast)  Was premedicated prior to FRANCK and had no reaction at all (solumedrol and benadryl) 2016  Was premedicated with Methylprednisolone protocol, no reaction 1/25/17     Lisinopril      cough     Oxycodone      Delirium       Social History   I have updated and reviewed the following Social History Narrative:   Social History     Social History Narrative    Lives in  [facility] in [place] for the past 13 years.     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   Family History   Problem Relation Age of Onset     Hypertension Mother      Hypertension Father        Review of " Systems   The 10 point Review of Systems is negative other than noted in the HPI or here.   Patient is inconsistent with some answers but generally able to participate for current status without a good history, thus obtained from spouse.    Palliative Symptom Review (0=no symptom/no concern, 1=mild, 2=moderate, 3=severe):      Pain: 1-mild, keeps from sleep      Fatigue: 2-moderate      Nausea: 0-none      Constipation: 0-none      Diarrhea: 0-none      Depressive Symptoms: 0-none      Anxiety: 0-none      Drowsiness: 1-mild      Poor Appetite: 0-none      Shortness of Breath: 0-none      Insomnia: 1-mild    Physical Exam   Temp:  [97.1  F (36.2  C)-98.6  F (37  C)] 98.5  F (36.9  C)  Heart Rate:  [74-94] 85  Resp:  [22-28] 24  BP: (110-143)/(39-67) 110/39  FiO2 (%):  [45 %-50 %] 45 %  SpO2:  [90 %-99 %] 95 %  140 lbs 1.6 oz  GEN:  Alert, oriented x 1, appears comfortable, NAD.  HEENT:  Normocephalic/atraumatic, no scleral icterus, no nasal discharge, mouth moist.  CV:  RRR, S1, S2; no murmurs or other irregularities noted.  +3 DP/PT pulses bilatererally; no edema BLE.  RESP:  Coarse to auscultation bilaterally. Symmetric chest rise on inhalation noted.  Normal respiratory effort.  ABD:  Rounded, soft, non-tender/non-distended.  +BS  EXT:  Edema & pulses as noted above.  CMS intact x 4.     M/S:   Deferred.   SKIN:  Dry to touch, no exanthems noted in the visualized areas.    NEURO: SOLOMON.  Psych:  Normal affect.  Calm, cooperative, confused conversation.    Data   Results for orders placed or performed during the hospital encounter of 07/02/18 (from the past 24 hour(s))   Lactic acid level STAT for sepsis protocol   Result Value Ref Range    Lactate for Sepsis Protocol 1.2 0.4 - 1.9 mmol/L   Basic metabolic panel   Result Value Ref Range    Sodium 140 133 - 144 mmol/L    Potassium 4.1 3.4 - 5.3 mmol/L    Chloride 102 94 - 109 mmol/L    Carbon Dioxide 30 20 - 32 mmol/L    Anion Gap 8 3 - 14 mmol/L    Glucose 119 (H)  70 - 99 mg/dL    Urea Nitrogen 37 (H) 7 - 30 mg/dL    Creatinine 1.63 (H) 0.66 - 1.25 mg/dL    GFR Estimate 40 (L) >60 mL/min/1.7m2    GFR Estimate If Black 48 (L) >60 mL/min/1.7m2    Calcium 9.4 8.5 - 10.1 mg/dL   CBC with platelets differential   Result Value Ref Range    WBC 11.4 (H) 4.0 - 11.0 10e9/L    RBC Count 3.77 (L) 4.4 - 5.9 10e12/L    Hemoglobin 11.1 (L) 13.3 - 17.7 g/dL    Hematocrit 34.3 (L) 40.0 - 53.0 %    MCV 91 78 - 100 fl    MCH 29.4 26.5 - 33.0 pg    MCHC 32.4 31.5 - 36.5 g/dL    RDW 13.6 10.0 - 15.0 %    Platelet Count 161 150 - 450 10e9/L    Diff Method Automated Method     % Neutrophils 83.6 %    % Lymphocytes 11.0 %    % Monocytes 4.8 %    % Eosinophils 0.0 %    % Basophils 0.1 %    % Immature Granulocytes 0.5 %    Nucleated RBCs 0 0 /100    Absolute Neutrophil 9.5 (H) 1.6 - 8.3 10e9/L    Absolute Lymphocytes 1.3 0.8 - 5.3 10e9/L    Absolute Monocytes 0.6 0.0 - 1.3 10e9/L    Absolute Eosinophils 0.0 0.0 - 0.7 10e9/L    Absolute Basophils 0.0 0.0 - 0.2 10e9/L    Abs Immature Granulocytes 0.1 0 - 0.4 10e9/L    Absolute Nucleated RBC 0.0

## 2018-07-04 NOTE — PLAN OF CARE
Problem: Patient Care Overview  Goal: Plan of Care/Patient Progress Review  Outcome: No Change  Pt. Alert to self, forgetful, disoriented to time and place. Tele: SR with BBB and inverted T waves. Lung sounds diminished, nonproductive cough at times. Pt. continues on HFNC with O2 at 35LPM, FIO2 at 45%, O2 sat s 94%, desat s with exertion. Plan: Pt. continues on Abx (zosyn) for PNA, HFNC, Agapito. Nebs, solumedrol, await bld cx results, Pt. on Lasix IV Q12, recheck BNP in AM. Pt. has bed hold at [facility] TCU and will likely discharge there when stable. Pt. denies any needs at this time. Will continue with POC.

## 2018-07-04 NOTE — PROGRESS NOTES
[facility]  Hospitalist Progress Note  Patient Name: Chaz Soler    MRN: [id number]  Provider:  Rachel Mclain MD  Date:07/04/2018      Initial presenting complaint/issue to hospital (Diagnosis): sob    Summary of Stay: Chaz Soler is a 89 year old male w/ h/o dementia, COPD on 5L oxygen at baseline, pulm fibrosis who was admitted on 7/2/2018 with   Pt is DNR/DNI but ok with BiPAP if needed, confirmed with family. He is currently febrile, satting in mid-90s on high flow O2 via NC but in no acute distress and is hemodynamically stable.          Assessment and Plan:      1. Acute hypoxic respiratory failure secondary to hospital-acquired pneumonia: Febrile and hypoxic with leukocytosis and CXR difficult to interpret given history of pulmonary fibrosis. 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 Confirmed with wife and son that patient is DNR/DNI. They would want BiPAP initiated if needed but no intubation.  -cont IV Zosyn  -Continue oxygen NC high flow FIO2 and wean as able  -Scheduled duonebs and prn albuterol nebs  -IV methylprednisolone 40 mg daily, increased to BID  -Recheck BMP tomorrow prior to additional IV diuresis (had worsening Cr with IV lasix)  -Follow blood cultures  -Palliative care consulted as family is interested in a more comfort-care approach but for now has decided to remain DNR/DNI      2. History of diastolic heart failure: Patient's BNP is 1886.  He has no lower extremity edema.  There is no clear evidence of pulmonary edema on his chest x-ray. He received lasix 20 mg day 1, then 40 mg BID day 2 then had worsening Cr so lasix is now on hold.      3. Hypertension: Blood pressure is stable.  Continue Norvasc, losartan, and metoprolol.     4. History of coronary artery disease: Denies chest pain and has undetectable troponin.  Low suspicion for acute coronary syndrome.  Lexiscan  10/2017 negative for significant ischemia with ejection fraction measured at 63%.  Continue aspirin, atorvastatin, metoprolol succinate, and losartan.     5. Hypothyroidism: Resume Synthroid.     6. GERD: Continue proton pump inhibitor.     7. Dementia: Continue PTA donepezil.     8. Depression/anxiety: Continue PTA fluoxetine and as needed Ativan.    # Pain Assessment:  Current Pain Score 7/4/2018   Patient currently in pain? denies   Pain score (0-10) 0   Pain location -   Pain descriptors -   CPOT pain score -   Chaz miller pain level was assessed and he currently denies pain.       DVT Prophylaxis:  -  Heparin   Code Status: DNR / DNI  Discharge Dispo: tcu   Estimated Disch Date / # of Days until Discharge: 2 more days atleast pending weaning from high flow O2         Interval History:      Demented  Denies SOB , feels better today  No chest pain     Cr worsened over last 1 day due to diuresis. O2 requirement stable- improving       Data reviewed today: I reviewed all new labs and imaging reports over the last 24 hours. I personally reviewed no images or EKG's today.         Physical Exam:      Last Vital Signs:  /58 (BP Location: Left arm)  Temp 96.8  F (36  C) (Axillary)  Resp 18  Wt 62.2 kg (137 lb 3.2 oz)  SpO2 96%  BMI 20.86 kg/m2   GEN:  Alert, oriented to self, appears comfortable, no overt distress on high flow oxygen via NC.  HEENT:  Normocephalic/atraumatic, no scleral icterus, no nasal discharge, mouth moist.  CV:  Regular rate and rhythm, no murmur or JVD.  S1 + S2 noted, no S3 or S4.  LUNGS: Wheezing bilaterally without rales/rhonchi/wheezing/retractions.  Symmetric chest rise on inhalation noted.  ABD:  Active bowel sounds, soft, non-tender/non-distended.  No rebound/guarding/rigidity.  EXT:  No edema.  No cyanosis.  No acute joint synovitis noted.  SKIN:  Dry to touch, no exanthems noted in the visualized areas.  NEURO:  Symmetric muscle strength, sensation to touch grossly intact.   Coordination symmetric on general exam.  No new focal deficits appreciated.            Medications:      All current medications were reviewed.         Data:      All new lab and imaging data was reviewed.   Data       Recent Labs  Lab 07/04/18  0709 07/03/18  0734 07/02/18  0911   WBC 16.6* 11.4* 14.6*   HGB 11.0* 11.1* 11.8*   HCT 32.6* 34.3* 36.2*   MCV 91 91 91    161 185       Recent Labs  Lab 07/04/18  0709 07/03/18  0734 07/02/18  0911    140 139   POTASSIUM 3.5 4.1 3.9   CHLORIDE 99 102 100   CO2 33* 30 32   ANIONGAP 8 8 7   * 119* 132*   BUN 44* 37* 31*   CR 1.79* 1.63* 1.66*   GFRESTIMATED 36* 40* 39*   GFRESTBLACK 43* 48* 47*   LEXIS 9.4 9.4 9.7       No results found for this or any previous visit (from the past 24 hour(s)).    Rachel Mclain MD  Hospitalist  [facility] [address] [place] [address]

## 2018-07-04 NOTE — PLAN OF CARE
Problem: Patient Care Overview  Goal: Plan of Care/Patient Progress Review  Alert, oriented to self. Up with assist x1, up in recliner for meals. Lung sounds: dim. Continues on high flow. Tele: SR with inverted T waves. Diet: regular, herrera well. PIV: SL. Pain: denies. Reviewed POC with patient and family.

## 2018-07-04 NOTE — PLAN OF CARE
Problem: Patient Care Overview  Goal: Plan of Care/Patient Progress Review  Pt disoriented to time and place, VSS, afebrile, denies pain, Tele: SR w/ inverted Ts, on HFNC @ 35 LPM- tolerating well, sats maintaining >95%, pt does desat with exertion, bueno in place- adequate output, monitoring strict I&Os, LS-diminished/clear, continue POC.

## 2018-07-05 NOTE — PLAN OF CARE
Problem: Patient Care Overview  Goal: Plan of Care/Patient Progress Review    PT: Received orders for evaluation and treatment.  Patient was admitted from [facility] TCU and has a bed hold, plan per  on 7/3 is to DC to TCU.  Attempted to see patient x3 this morning; patient sleeping, then was being fed by son, now has multiple family members present to meet with palliative care team and hospitalist.  Discussed with RN, will reschedule PT evaluation until goals of care are determined (as patient currently has a discharge plan to return to TCU).

## 2018-07-05 NOTE — PLAN OF CARE
Problem: Patient Care Overview  Goal: Plan of Care/Patient Progress Review  Outcome: No Change  VSS. [place]. Tele: SR with BBB. IVF started and infusing. Crt 1.82. IV abx changed to IV Levaquin. Palliative care consult performed with pt's spouse, son and daughters. Kim in place for strict I&O. Hypoactive BS-PRN bowel meds ordered and to be given on evening shift. K+ 4.1. 4-5L NC at baseline-now on 4L NC. PPX: SQ heparin.

## 2018-07-05 NOTE — PROGRESS NOTES
[facility]  Palliative Care Progress Note  Text Page     Assessment & Plan   Recommendations:  1.Decisional Capacity - lacks decisional capacity. Patient has an advance directive dated short form  12/15/2016.  Include patient in decision making as much as possible but involve health care agent attempting consensus with patient. Ricardo Tiwari, Spouse is his primary Health Care Agent.  Surrogates is Son Kwame Koo.  Family will bring in Advanced Directive from home.   2. Pain- Chronic low back pain: DDD, recent vertebral fracture with vertebroplasty, lumbar stenosis, facet arthropathy.   Seen in pain clinic in past.  Controlled with current measures   -Continue chronic oral and topical medications, adjust as needed.    3. Shortness of breath- Underlying pulmonary Fibrosis diagnosed at [place] 5 years ago.  Spouse notes with any activity for at least a year, occasionally they notice labored appearing breathing at rest which does not appear to be noticed by patient.  He was recently started on low dose ativan, but uses infrequently per review of TCU record.  -Ativan PRN reordered here, has not needed.  -trial of Morphine intensol for air hunger   4. Spiritual Care- Oriented to Spiritual Health and Social Work Services as part of Palliative Care team.  Appreciate input from Social work services  4. Care Planning- Per discussion with spouse, there is a bed hold at [facility] and they are hopeful he can go back to TCU at [facility].     Goal of Care:DNR/DNI, Limited Restorative. POLST updated today.      Disease Process/es & Symptoms:  Chaz Soler is a 89 year old patient admitted with symptoms of cough, hypoxia and fever. He has been treated for respiratory failure r/t hospital acquired pneumonia.       This is in the setting of dementia, pulmonary fibrosis diagnosed about 5 years ago at [place], on chronic oxygen 4L, diastolic HF, HTN, CAD, hypothyroidism, GERD, and depression anxiety. He was discharged last month  after hospitalization for pneumonia, this is his 4th admition in the past 9 months.  He has had a noted 10lb weight loss over the last 4 months and most recently was at TCU with hope of regaining strength and ability to return home.      Findings & plan of care discussed with: nursing social work   Follow-up plan from palliative team: as needed.  Thank you for involving us in the patient's care.     Lissy YANG CNP  Pain Management and Palliative Care  [facility]  Pgr: [phone]    Time Spent on this Encounter   I spent  45  minutes (11:45-12:15) in assessment of the patient, counseling and discussion with the patient and family as documented in sections below. Another 15  minutes in review of chart, documentation, coordination of care and discussion with the health care team.    Interval History   Up in chair   Confused, pleasant   O x 1   low back pain in good control    Course of Hospitalization Discussions Data   Decision-Making & Goals of Care:  On 7/5/18 with CELIA Smith CNP from 11:45- 12:15   I met with family (spouse Violet, son Kwame, daughters Kylee and Maria Isabel with patient present discuss goals of care.  This meeting was per the request of the family wanting an update on the next steps in the patient's care.  Apparently, the spouse has an advanced directives at her home, drawn up when patient was decisional.  I suggested they review this to help guide them in making health care decisions for the patient. I also asked them to bring a copy to the hospital so it can be scanned into his health record.  The due to advancing dementia lacks decisional capacity.  The health care agents ( spouse and son Kwame) are consistent in their wishes for no CPR or intubation, but wants selected treatments if it will help the patient return to his baseline function. These would include  -BIBAP, CPAP trial, high flow 02, rehospitalization for IVF, ABX,   -trial of enteral  feedings.  If measures, in the future used are not helping the patient regain function or are not helping then then would consider a change in the direction of care, leaning more toward comfort and hospice.   At this time the plan is to return to TCU at [facility].  The family understands that if BIBAP/ CPAP is used or if patient has a trial of enteral feeding, chemical and physical restraints may be needed to reduce self injury.   We reviewed potential complications of this as well as complications related to enteral feedings. We also reviewed current literature related to enteral feedings in advanced dementia and that enteral feedings are not recommended in this case.  They seems to have a good understanding of information presented. I answered all questions and concerns.     Discussed on July 3, 2018 with Evangelina YANG, CNS:   Met with patient who is a poor historian.  He tells me the care is fine at [facility] but he cannot remember much, how he got here or even events of yesterday.  Spouse via phone reviews patient recent history.  She is unclear about a specific diagnosis of dementia, but notes they have visited neurology x 2.  She recounts diagnosis of pulmonary fibrosis and is unable to note any specific prognosis with either of these.  We reviewed POLST and she is open to meeting in person prior to leaving to discuss further within specific framework of outcome of this hospitalization and better prognostic information.     Patient has decision-making capacity Unreliable  Patient has a Health Care Agent named in a legal Advance Directive document dated 12/15/2016. See name(s) and contact information in Code/ACP/Advance Care Planning Activity Tab.  Physician orders for life-sustaining treatment (POLST) form is on file.  It was updated today. The original and one copy was given to the family and a copy is on the patient's paper chart at the nursing pod.   Code Status:                     Do not  resusitate / Do not intubate.  Review of Systems    CONSTITUTIONAL: NEGATIVE for fever, chills, change in weight  ENT/MOUTH: NEGATIVE for ear, mouth and throat problems  RESP: NEGATIVE for significant cough or SOB  CV: NEGATIVE for chest pain, palpitations or peripheral edema    Palliative Symptom Review (0=no symptom/no concern, 1=mild, 2=moderate, 3=severe):      Pain: 0-none      Fatigue: 1-mild      Nausea: 0-none      Constipation: 2-moderate      Diarrhea: 0-none      Depressive Symptoms: 0-none      Anxiety: 0-none      Drowsiness: 1-mild      Poor Appetite: 2-moderate      Shortness of Breath: 2-moderate      Insomnia: 0-none      Other:       Overall (0 good/no concerns, 3 very poor):  2    Physical Exam   Temp:  [96.5  F (35.8  C)-97.5  F (36.4  C)] 96.5  F (35.8  C)  Heart Rate:  [63-82] 82  Resp:  [16-18] 16  BP: (122-154)/(49-75) 154/75  FiO2 (%):  [35 %] 35 %  SpO2:  [95 %-100 %] 99 %  145 lbs 8 oz  GEN:  somnolent, arousing easily. oriented x 1, appears comfortable, NAD.  HEENT:  Normocephalic/atraumatic, no scleral icterus, no nasal discharge, mouth moist.  CV:  RRR, S1, S2; no murmurs or other irregularities noted.  +3 DP/PT pulses bilatererally; no edema BLE.  RESP:  Clear to auscultation bilaterally without rales/rhonchi/wheezing/retractions.  Symmetric chest rise on inhalation noted.  Normal respiratory effort.  ABD:  Rounded, soft, non-tender/non-distended.  +BS  EXT:  Edema & pulses as noted above.  CMS intact x 4.    M/S:   Non tender to palpation through out, SOLOMON X 4.    SKIN:  Dry to touch, no exanthems noted in the visualized areas.    NEURO: Symmetric strength.  Sensation to touch intact all extremities.   There is no area of allodynia or hyperesthesia.  PAIN BEHAVIOR: Cooperative  Psych:  Normal affect.  Calm, cooperative, conversant appropriately.    Medications     sodium chloride 75 mL/hr at 07/05/18 0941       acetaminophen  500 mg Oral TID     amLODIPine  10 mg Oral Daily     aspirin   81 mg Oral Daily     atorvastatin  20 mg Oral Daily     diclofenac  2 g Transdermal TID     donepezil  10 mg Oral At Bedtime     DULoxetine  30 mg Oral Daily     gabapentin  100 mg Oral TID     heparin  5,000 Units Subcutaneous Q12H     ipratropium - albuterol 0.5 mg/2.5 mg/3 mL  3 mL Nebulization 4x daily     [START ON 7/6/2018] levofloxacin  250 mg Intravenous Q24H     levothyroxine  88 mcg Oral Daily     losartan  100 mg Oral Daily     metoprolol succinate  25 mg Oral Daily     NONFORMULARY   Topical TID     pantoprazole  40 mg Oral QAM     polyethylene glycol  17 g Oral Daily     predniSONE  60 mg Oral Daily     tamsulosin  0.4 mg Oral or Feeding Tube Daily     traMADol (ULTRAM) tablet 50 mg  50 mg Oral TID       Data   Results for orders placed or performed during the hospital encounter of 07/02/18 (from the past 24 hour(s))   CBC with platelets   Result Value Ref Range    WBC 12.7 (H) 4.0 - 11.0 10e9/L    RBC Count 3.66 (L) 4.4 - 5.9 10e12/L    Hemoglobin 10.8 (L) 13.3 - 17.7 g/dL    Hematocrit 34.2 (L) 40.0 - 53.0 %    MCV 93 78 - 100 fl    MCH 29.5 26.5 - 33.0 pg    MCHC 31.6 31.5 - 36.5 g/dL    RDW 13.5 10.0 - 15.0 %    Platelet Count 192 150 - 450 10e9/L   Basic metabolic panel   Result Value Ref Range    Sodium 138 133 - 144 mmol/L    Potassium 4.1 3.4 - 5.3 mmol/L    Chloride 101 94 - 109 mmol/L    Carbon Dioxide 32 20 - 32 mmol/L    Anion Gap 5 3 - 14 mmol/L    Glucose 140 (H) 70 - 99 mg/dL    Urea Nitrogen 47 (H) 7 - 30 mg/dL    Creatinine 1.82 (H) 0.66 - 1.25 mg/dL    GFR Estimate 35 (L) >60 mL/min/1.7m2    GFR Estimate If Black 43 (L) >60 mL/min/1.7m2    Calcium 9.4 8.5 - 10.1 mg/dL

## 2018-07-05 NOTE — PLAN OF CARE
Problem: Patient Care Overview  Goal: Plan of Care/Patient Progress Review  Outcome: Improving  RN - VSS. Pt back to baseline O2 use of 5L NC - O2 saturation % - Noting significant desaturation and PRESCOTT with minimal activity. Remains A&Ox1. Pt c/o ongoing back pain - utilizing scheduled medications for relief. Tolerating PO intake. Lung sounds with crackles in bases. Pt up with SBA in room - refusing further ambulation. Indwelling bueno remains in place due to strict I&O - plan to remove to be addressed. Anticipating palliative consult tomorrow.

## 2018-07-05 NOTE — PROGRESS NOTES
[facility]  Hospitalist Progress Note  Patient Name: Chaz Soler    MRN: [id number]  Provider:  Rachel Mclain MD  Date:07/05/2018      Initial presenting complaint/issue to hospital (Diagnosis): sob    Summary of Stay: Chaz Soler is a 89 year old male w/ h/o dementia, COPD on 5L oxygen at baseline, pulm fibrosis who was admitted on 7/2/2018 with   Pt is DNR/DNI but ok with BiPAP if needed, confirmed with family. He is much improved with IV antibiotics and IV steroids for treatment of healthcare associated pneumonia.  Unfortunately has mild acute kidney injury due to diuresis.  Overall he is declining in health with 4 admissions this year so far and has poor long-term prognosis given multiorgan dysfunction (CHF, chronic respiratory failure on oxygen, chronic kidney disease, dementia, frailty)-palliative care is following.         Assessment and Plan:      1. Acute hypoxic respiratory failure secondary to hospital-acquired pneumonia: Febrile and hypoxic with leukocytosis and CXR difficult to interpret given history of pulmonary fibrosis. 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 Confirmed with wife and son that patient is DNR/DNI. They would want BiPAP initiated if needed but no intubation.  -cont IV Zosyn  -Continue oxygen NC high flow FIO2 and wean as able  -Scheduled duonebs and prn albuterol nebs  -IV methylprednisolone 40 mg daily, increased to BID  -Recheck BMP tomorrow prior to additional IV diuresis (had worsening Cr with IV lasix)  -Follow blood cultures  -Palliative care consulted as family is interested in a more comfort-care approach but for now has decided to remain DNR/DNI      History of diastolic heart failure: Patient's BNP is 1886.  He has no lower extremity edema.  There is no clear evidence of pulmonary edema on his chest x-ray. He received lasix 20 mg day 1, then 40 mg BID day 2  then had worsening Cr so lasix is now on hold.      Hypertension: Blood pressure is stable.  Continue Norvasc, losartan, and metoprolol.     History of coronary artery disease: Denies chest pain and has undetectable troponin.  Low suspicion for acute coronary syndrome.  Lexiscan 10/2017 negative for significant ischemia with ejection fraction measured at 63%.  Continue aspirin, atorvastatin, metoprolol succinate, and losartan.     Hypothyroidism: Resume Synthroid.     GERD: Continue proton pump inhibitor.     Dementia: Continue PTA donepezil.     Depression/anxiety: Continue PTA fluoxetine and as needed Ativan.    SOL on CKD: Likely due to diuresis.  We will give him gentle hydration today and recheck creatinine in the morning    Care plan:Overall he is declining in health with 4 admissions this year so far and has poor long-term prognosis given multiorgan dysfunction (CHF, chronic respiratory failure on oxygen, chronic kidney disease, dementia, frailty)-palliative care is following.    # Pain Assessment:  Current Pain Score 7/5/2018   Patient currently in pain? denies   Pain score (0-10) 0   Pain location -   Pain descriptors -   CPOT pain score -   Chaz miller pain level was assessed and he currently denies pain.       DVT Prophylaxis:  -  Heparin   Code Status: DNR / DNI  Discharge Dispo: tcu   Estimated Disch Date / # of Days until Discharge: 2 more days atleast pending improvement in creatinine        Interval History:      Patient states he feels good   denies any shortness of breath    Oxygen requirements improving    Met with family-wife Kemi, 2 daughters, 1 son went over the plan of care and discussed his declining health.  Encouraged them to be meet with palliative care consult during this hospitalization       Data reviewed today: I reviewed all new labs and imaging reports over the last 24 hours. I personally reviewed no images or EKG's today.         Physical Exam:      Last Vital Signs:  /75 (BP  Location: Right arm)  Temp 96.5  F (35.8  C) (Oral)  Resp 16  Wt 66 kg (145 lb 8 oz)  SpO2 99%  BMI 22.12 kg/m2   GEN:  Alert, oriented to self, appears comfortable, no overt distress.  HEENT:  Normocephalic/atraumatic, no scleral icterus, no nasal discharge, mouth moist.  CV:  Regular rate and rhythm, no murmur or JVD.  S1 + S2 noted, no S3 or S4.  LUNGS: Wheezing bilaterally without rales/rhonchi/wheezing/retractions.  Symmetric chest rise on inhalation noted.  ABD:  Active bowel sounds, soft, non-tender/non-distended.  No rebound/guarding/rigidity.  EXT:  No edema.  No cyanosis.  No acute joint synovitis noted.  SKIN:  Dry to touch, no exanthems noted in the visualized areas.  NEURO:  Symmetric muscle strength, sensation to touch grossly intact.  Coordination symmetric on general exam.  No new focal deficits appreciated.            Medications:      All current medications were reviewed.         Data:      All new lab and imaging data was reviewed.   Data       Recent Labs  Lab 07/05/18  0647 07/04/18  0709 07/03/18  0734   WBC 12.7* 16.6* 11.4*   HGB 10.8* 11.0* 11.1*   HCT 34.2* 32.6* 34.3*   MCV 93 91 91    179 161       Recent Labs  Lab 07/05/18  0647 07/04/18  0709 07/03/18  0734    140 140   POTASSIUM 4.1 3.5 4.1   CHLORIDE 101 99 102   CO2 32 33* 30   ANIONGAP 5 8 8   * 120* 119*   BUN 47* 44* 37*   CR 1.82* 1.79* 1.63*   GFRESTIMATED 35* 36* 40*   GFRESTBLACK 43* 43* 48*   LEXIS 9.4 9.4 9.4       No results found for this or any previous visit (from the past 24 hour(s)).    Rachel Mclain MD  Hospitalist  [facility] [address] [place] [address]

## 2018-07-05 NOTE — PLAN OF CARE
Problem: Pneumonia (Adult)  Goal: Signs and Symptoms of Listed Potential Problems Will be Absent, Minimized or Managed (Pneumonia)  Signs and symptoms of listed potential problems will be absent, minimized or managed by discharge/transition of care (reference Pneumonia (Adult) CPG).   Outcome: Improving  Pt is alert to self. VSS. O2 is 100% on 4L. Lung sounds coarse, crackles. PIV is SL and receiving IV zosyn. Tele is SR.

## 2018-07-06 NOTE — PLAN OF CARE
Problem: Patient Care Overview  Goal: Plan of Care/Patient Progress Review  PT: Patient seen by physical therapy for evaluation and treatment.  Patient with PMH including dementia, COPD, on 5L oxygen at baseline, pulmonary fibrosis now admitted with acute hypoxic respiratory failure secondary to hospital-acquired pneumonia.  At baseline, patient lives with wife in senior coop apartment, uses a 2WW for ambulation (limited distances secondary to hypoxia with pulmonary fibrosis).      Discharge Planner PT   Patient plan for discharge: return to TCU  Current status: Patient seated in bedside chair upon arrival, on 4L NC.  Patient transferred to standing with 2WW and CGA.  Patient amb 60 feet with 2WW and CGA.  Patient with increased SOB, upon returning to room patient satting at 69%.  Patient returned to above 90% within 3 min seated rest with O2 increased to 10L (before increasing from 70-71%).  Patient with increased agitation upon returning to room, improved once O2 sats above 90%.  Patient with understandable increase in impulsivity with movement with decreased O2 sats.  Barriers to return to prior living situation: severe hypoxia with mobility  Recommendations for discharge: return to TCU  Rationale for recommendations: Patient below baseline for mobility; presents with significant decrease in activity tolerance requiring increased amounts of O2 with noted hypoxia.  Patient would benefit from ongoing physical therapy at TCU for increased strength, activity tolerance and safety evaluation for eventual discharge to home setting.       Entered by: Lilian Bui 07/06/2018 1:07 PM

## 2018-07-06 NOTE — PROGRESS NOTES
07/06/18 1236   Quick Adds   Type of Visit Initial PT Evaluation   Living Environment   Lives With significant other   Living Arrangements apartment  (Senior Coop)   Home Accessibility no concerns   Number of Stairs to Enter Home 0   Number of Stairs Within Home 0   Stair Railings at Home none   Self-Care   Usual Activity Tolerance fair   Regular Exercise no   Equipment Currently Used at Home walker, rolling;wheelchair, manual   Functional Level Prior   Ambulation 1-->assistive equipment   Transferring 1-->assistive equipment   Toileting 1-->assistive equipment   Bathing 2-->assistive person   Dressing 0-->independent   Eating 0-->independent   Communication 0-->understands/communicates without difficulty   Swallowing 0-->swallows foods/liquids without difficulty   Cognition 2 - difficulty with organizing thoughts   Fall history within last six months yes   Which of the above functional risks had a recent onset or change? ambulation;transferring;toileting   Prior Functional Level Comment Pt's wife does IADLs. Pt has a home health aide 4/week for 2 hours, helps with showers and exercises   General Information   Onset of Illness/Injury or Date of Surgery - Date 07/02/18   Referring Physician Rachel Mclain MD   Patient/Family Goals Statement Patient and family plan is for patient to return to TCU at discharge from hospital   Precautions/Limitations oxygen therapy device and L/min  (4L NC)   Cognitive Status Examination   Orientation person;place   Memory impaired   Cognitive Comment Hx of dementia, cheerful and able to participate   Pain Assessment   Patient Currently in Pain No   Integumentary/Edema   Integumentary/Edema no deficits were identifed   Posture    Posture Forward head position;Protracted shoulders   Range of Motion (ROM)   ROM Comment WFL   Strength   Strength Comments Moderate strength deficits, able to transfer to standing without assist   Bed Mobility   Bed Mobility Comments SBA   Transfer  "Skills   Transfer Comments SBA with 2WW   Gait   Gait Comments Amb 60 feet with 2WW and CGA on 4L, desatted to 69%, required 10L NC to recover in sitting   Balance   Balance Comments SBA with all mobility   General Therapy Interventions   Planned Therapy Interventions bed mobility training;gait training;strengthening;transfer training;home program guidelines;progressive activity/exercise   Clinical Impression   Criteria for Skilled Therapeutic Intervention yes, treatment indicated   PT Diagnosis decreased independence with mobility   Influenced by the following impairments decreased endurance   Clinical Presentation Stable/Uncomplicated   Clinical Presentation Rationale complex pmh, stable/complex presentation, good social support   Clinical Decision Making (Complexity) Low complexity   Therapy Frequency` 5 times/week   Predicted Duration of Therapy Intervention (days/wks) 3 days   Anticipated Discharge Disposition Transitional Care Facility   Risk & Benefits of therapy have been explained Yes   Patient, Family & other staff in agreement with plan of care Yes   [facility] TM \"6 Clicks\"   2016, Trustees of [facility], under license to [employer].  All rights reserved.   6 Clicks Short Forms Basic Mobility Inpatient Short Form   [facility]  \"6 Clicks\" V.2 Basic Mobility Inpatient Short Form   1. Turning from your back to your side while in a flat bed without using bedrails? 4 - None   2. Moving from lying on your back to sitting on the side of a flat bed without using bedrails? 4 - None   3. Moving to and from a bed to a chair (including a wheelchair)? 3 - A Little   4. Standing up from a chair using your arms (e.g., wheelchair, or bedside chair)? 3 - A Little   5. To walk in hospital room? 3 - A Little   6. Climbing 3-5 steps with a railing? 2 - A Lot   Basic Mobility Raw Score (Score out of 24.Lower scores equate to lower levels of function) 19   Total Evaluation Time   Total " Evaluation Time (Minutes) 5

## 2018-07-06 NOTE — PLAN OF CARE
Alert to self but short term memory impaired. [place].  Does remember to use call light.  Alarms on for added safety.  Incontinent  .  Chronic breathing issues and desats on 4L with activity of walking in [place], with walker to 70.  Recovers with rest.  Ativan given also with added benefit.  Lactic triggered with result of 3.4.  Dr Ordaz informed.  Tele SR

## 2018-07-06 NOTE — PROGRESS NOTES
[facility]  Hospitalist Progress Note  Patient Name: Chaz Soler    MRN: [id number]  Provider:  Rachel Mclain MD  Date:07/06/2018      Initial presenting complaint/issue to hospital (Diagnosis): sob    Summary of Stay: Chaz Soler is a 89 year old male w/ h/o dementia, COPD on 5L oxygen at baseline, pulm fibrosis who was admitted on 7/2/2018 with   Pt is DNR/DNI but ok with BiPAP if needed, confirmed with family. He is much improved with IV antibiotics and IV steroids for treatment of healthcare associated pneumonia.  Unfortunately has mild acute kidney injury due to diuresis.  Overall he is declining in health with 4 admissions this year so far and has poor long-term prognosis given multiorgan dysfunction (CHF, chronic respiratory failure on oxygen, chronic kidney disease, dementia, frailty)-palliative care is following.         Assessment and Plan:      1. Acute hypoxic respiratory failure secondary to hospital-acquired pneumonia: Febrile and hypoxic with leukocytosis and CXR difficult to interpret given history of pulmonary fibrosis. 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 Confirmed with wife and son that patient is DNR/DNI. They would want BiPAP initiated if needed but no intubation.  -cont IV Zosyn  -weaned off high flow now back to usual O2 requirement   -Scheduled duonebs and prn albuterol nebs-holding duoneb due to elevated lactic acid   -IV methylprednisolone 40 mg daily, increased to BID  -Follow blood cultures  -Palliative care consulted as family is interested in a more comfort-care approach but for now has decided to remain DNR/DNI      History of diastolic heart failure: Patient's BNP is 1886.  He has no lower extremity edema.  There is no clear evidence of pulmonary edema on his chest x-ray. He received lasix 20 mg day 1, then 40 mg BID day 2 then had worsening Cr so lasix is now  "on hold.      Hypertension: Blood pressure is stable.  Continue Norvasc, losartan, and metoprolol.     History of coronary artery disease: Denies chest pain and has undetectable troponin.  Low suspicion for acute coronary syndrome.  Lexiscan 10/2017 negative for significant ischemia with ejection fraction measured at 63%.  Continue aspirin, atorvastatin, metoprolol succinate, and losartan.     Hypothyroidism: Resume Synthroid.     GERD: Continue proton pump inhibitor.     Dementia: Continue PTA donepezil.     Depression/anxiety: Continue PTA fluoxetine and as needed Ativan.    SOL on CKD: Likely due to diuresis. improved with hydration     Care plan:Overall he is declining in health with 4 admissions this year so far and has poor long-term prognosis given multiorgan dysfunction (CHF, chronic respiratory failure on oxygen, chronic kidney disease, dementia, frailty)-palliative care is following.    # Pain Assessment:  Current Pain Score 7/6/2018   Patient currently in pain? denies   Pain score (0-10) -   Pain location -   Pain descriptors -   CPOT pain score -   Chaz miller pain level was assessed and he currently denies pain.       DVT Prophylaxis:  -  Heparin   Code Status: DNR / DNI  Discharge Dispo: tcu   Estimated Disch Date / # of Days until Discharge : tomorrow        Interval History:      Patient states he feels good   denies any shortness of breath    Oxygen requirements improving    Met with family-wife Kemi, 2 daughters, 1 son went over the plan of care and discussed his declining health.  Encouraged them to be meet with palliative care consult during this hospitalization       Data reviewed today: I reviewed all new labs and imaging reports over the last 24 hours. I personally reviewed no images or EKG's today.         Physical Exam:      Last Vital Signs:  /68 (BP Location: Right arm)  Temp 98.3  F (36.8  C) (Oral)  Resp 20  Ht 1.727 m (5' 8\")  Wt 66 kg (145 lb 8 oz)  SpO2 96%  BMI 22.12 kg/m2   " GEN:  Alert, oriented to self, appears comfortable, no overt distress.  HEENT:  Normocephalic/atraumatic, no scleral icterus, no nasal discharge, mouth moist.  CV:  Regular rate and rhythm, no murmur or JVD.  S1 + S2 noted, no S3 or S4.  LUNGS: Wheezing bilaterally without rales/rhonchi/wheezing/retractions.  Symmetric chest rise on inhalation noted.  ABD:  Active bowel sounds, soft, non-tender/non-distended.  No rebound/guarding/rigidity.  EXT:  No edema.  No cyanosis.  No acute joint synovitis noted.  SKIN:  Dry to touch, no exanthems noted in the visualized areas.  NEURO:  Symmetric muscle strength, sensation to touch grossly intact.  Coordination symmetric on general exam.  No new focal deficits appreciated.            Medications:      All current medications were reviewed.         Data:      All new lab and imaging data was reviewed.   Data       Recent Labs  Lab 07/06/18  0708 07/05/18  0647 07/04/18  0709   WBC 12.3* 12.7* 16.6*   HGB 11.0* 10.8* 11.0*   HCT 34.8* 34.2* 32.6*   MCV 92 93 91    192 179       Recent Labs  Lab 07/06/18  0708 07/05/18  0647 07/04/18  0709    138 140   POTASSIUM 3.9 4.1 3.5   CHLORIDE 102 101 99   CO2 33* 32 33*   ANIONGAP 5 5 8   * 140* 120*   BUN 47* 47* 44*   CR 1.56* 1.82* 1.79*   GFRESTIMATED 42* 35* 36*   GFRESTBLACK 51* 43* 43*   LEXIS 9.7 9.4 9.4       No results found for this or any previous visit (from the past 24 hour(s)).    Rachel Mclain MD  Hospitalist  [facility]  [address] [place] [address]

## 2018-07-06 NOTE — PROGRESS NOTES
SWS  Notified by Dr. Mclain that pt would be ready for d/c tomorrow (Saturday).  He has a bed hold at [facility].  SW attempted to reach wife without success so spoke to son Kwame.  They plan to transport pt tomorrow at 11am.  Admissions at [facility] notified.  They request orders ASAP in morning, which Dr. Mclain has said she'll do.  [facility] alerted as they are also following pt.  P:  D/C planned for Saturday at 11; family to transport

## 2018-07-06 NOTE — PROGRESS NOTES
SPIRITUAL HEALTH SERVICES  SPIRITUAL ASSESSMENT Progress Note  [facility] 3 Med Surg    PRIMARY FOCUS:     Goals of care    Symptom/pain management    Emotional/spiritual/Spiritism distress    Support for coping    ILLNESS CIRCUMSTANCES:   Reviewed documentation. Reflective conversation shared with pt, Chaz Rodriguez which integrated elements of illness and family narratives.     Context of Serious Illness/Symptom(s) - When about his illness, Chaz Rodriguez said he was feeling much better today and was looking to going for a walk using his walker.    Resources for Support - Wife, son and daughters.    DISTRESS:     Emotional/Existential/Relational Distress - None expressed.    Spiritual/Shinto Distress - None expressed.    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    Spiritual Practice(s) - Not discussed.  Emotional/Existential/Relational Connections - His family.    GOALS OF CARE:    Goals of Care - Not discussed    Meaning/Sense-Making -   o Chaz Rodriguez spoke fondly about his family, his wife Violet, his son Kwame and his daughters.  o He grew up in [place], [place]  and attended [employer].  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   o He spoke of his Kinyarwanda heritage and told  about his last name.  o [facility] Therapy Dog, Miranda made a visit during this conversation and Chaz Rodriguez shared memories of Ivelisse, the dog he owned while growing up.    PLAN: No further plans; Chaz Rodriguez is scheduled to discharge tomorrow to [facility].      Alena Gillis   Intern  Pager [phone]

## 2018-07-06 NOTE — CONSULTS
"CLINICAL NUTRITION SERVICES  -  ASSESSMENT NOTE      Malnutrition: Patient does not meet malnutrition criteria at this time        REASON FOR ASSESSMENT  Chaz Soler is a 89 year old male seen by Registered Dietitian for Admission Nutrition Risk Screen - Unintentional weight loss of 10# or more in past 2 months (screened 7/04 at 10:00).      NUTRITION HISTORY  - Information obtained from patient, daughter and wife at bedside, chart.  - Patient with a h/o pulmonary fibrosis chronically on O2, dementia, chronic back pain, diastolic heart failure.   - Recent admit and was at TCU.   - Regular diet in TCU setting, regular diet at baseline - chooses softer foods per preference (wears full upper dentures with \"ok\" fit).  Daughter describes she lives \"hours away\" but tries to remind patient/wife to \"stay away from salt\".   - Wife cooks meals, daughter-in-law also provides some meals (freezes).    - Patient and family describe an overall decrease in appetite for a period of years.  Not impacting oral intakes recently (longstanding issue).    - Meals consumed daily are quite variable.  Patient enjoys scrambled eggs, casseroles, soups, and especially loves ice cream.  - Sometimes will have Ensure in place of a lunch meal.   - NKFA.      CURRENT NUTRITION ORDERS  Diet Order:     Regular    Current Intake/Tolerance:  % oral intakes since admit.  Ordering meals BID-TID.      PHYSICAL FINDINGS  Observed  See below, suspect chronic changes associated with aging  Obtained from Chart/Interdisciplinary Team  Palliative following --> restorative goals of care  BM this AM     ANTHROPOMETRICS  Height: 5' 8\"  Weight: 62.2 kg (137#) --> ?dry wt  Body mass index is 22.12 kg/(m^2).  Weight Status:  Normal BMI  IBW: 70 kg (154#)  % IBW: 89%  Weight History:  Wt Readings from Last 20 Encounters:   07/05/18 66 kg (145 lb 8 oz)   07/02/18 64.7 kg (142 lb 9.6 oz)   06/28/18 64.5 kg (142 lb 3.2 oz)   06/25/18 63.9 kg (140 lb 12.8 oz) "   06/22/18 63.5 kg (140 lb)   06/21/18 64 kg (141 lb)   06/20/18 64 kg (141 lb)   06/19/18 64.9 kg (143 lb)   06/18/18 64.9 kg (143 lb)   06/13/18 63.5 kg (140 lb)   06/12/18 63.9 kg (140 lb 12.8 oz)   06/07/18 66.7 kg (147 lb)   06/06/18 66.7 kg (147 lb)   03/12/18 68 kg (150 lb)   01/18/18 68.5 kg (151 lb)   01/15/18 68.8 kg (151 lb 9.6 oz)   01/09/18 68.1 kg (150 lb 3.2 oz)   01/02/18 63.5 kg (140 lb)   11/28/17 67.1 kg (148 lb)   10/10/17 71.7 kg (158 lb)   - Reviewed wt trending from previous admission - large shifts 2/2 fluids, current dry wt consistent with previous admit.  Patient is at risk for true wt loss given trending in 1/2018 but cannot determine fluid shifts vs true at this time.  Patient and family are unsure of recent wt trending but deny change to fit of clothing.    LABS  Labs reviewed    MEDICATIONS  Medications reviewed      ASSESSED NUTRITION NEEDS PER APPROVED PRACTICE GUIDELINES:    Dosing Weight 62.2 kg ---> suspected dry wt  Estimated Energy Needs: 6397-3870 kcals (25-30 Kcal/Kg)  Justification: maintenance  Estimated Protein Needs: 75-93 grams protein (1.2-1.5 g pro/Kg)  Justification: preservation of lean body mass  Estimated Fluid Needs: 5474-4500 mL (1 mL/Kcal)  Justification: maintenance and per provider pending fluid status    MALNUTRITION:  % Weight Loss:  Weight loss does not meet criteria for malnutrition  % Intake:  Decreased intake does not meet criteria for malnutrition   Subcutaneous Fat Loss:  None observed  Muscle Loss:  Temporal region mild depletion, Clavicle bone region mild depletion, Acromion bone region moderate depletion and Dorsal hand region moderate depletion --> did not observe LEs  Fluid Retention:  None noted/documented    Malnutrition Diagnosis: Patient does not meet two of the above criteria necessary for diagnosing malnutrition    NUTRITION DIAGNOSIS:  Predicted suboptimal nutrient intake (energy/protein) related to potential for decline in oral intakes with  recent admissions, c/o decreased appetite, and use of supplements in home setting.        NUTRITION INTERVENTIONS  Recommendations / Nutrition Prescription  Ok to continue regular diet order.  Do not suspect >2000 mg sodium consumption at this time, consider changing to 2 gram Na diet in the future given hx as above.     Ok for prn Boost supplement.       Implementation  Nutrition education: Provided brief education on importance of protein and limiting of added salt/high sodium food items.  Discussed how to order supplement prn.     Collaboration and Referral of Nutrition care: Discussed POC with team during rounds.    Medical food/supplement: As above.       Nutrition Goals  Patient to consume at least 50-75% of meals TID or the equivalent with supplement use.      MONITORING AND EVALUATION:  Progress towards goals will be monitored and evaluated per protocol and Practice Guidelines        Lissy Melvin RD, LD  Clinical Dietitian  3rd floor/ICU: [phone]  All other floors: [phone]  Weekend/holiday: [phone]

## 2018-07-06 NOTE — PROGRESS NOTES
[facility]    Palliative Care Chart Review    This patient's most recent hospitalist note, medication profile and labs from the past 24 hours have been reviewed.  [place]:  No   0 mg Daily Morphine Equivalent based on amount dispensed    Recommendation:   Unchanged from note on 7/5/18.    Code status DNR/DNI, with selected limited treatments. POLST updated 7/5/18.  Family has Advanced Directives Long form at home, they brought it in today, but did not leave a copy for scanning to honoring choices.  D/c is planned for 7/7/18 at [facility].  where bed is being held for resuming TCU care.   It has been determined that no change is necessary to the current plan of care at this time.   The chart will be reviewed regularly and the patient will be seen if necessary.   If you would like the patient to be seen, please contact the service at [phone] and ask to have the patient seen.  Time Spent  10  minutes, none in direct contact with patient or family.    Thank you!    Lissy Galeana   Pain Management and Palliative Care  [facility]  Pgr: [phone]

## 2018-07-06 NOTE — PROVIDER NOTIFICATION
07/06/18 1200   Significant Event   Significant Event Other (see comments)  (Lactic 3.4)   MD and RN notified of above result

## 2018-07-06 NOTE — PLAN OF CARE
Problem: Patient Care Overview  Goal: Plan of Care/Patient Progress Review  Outcome: No Change  VSS. Lung sounds diminished crackles. Currently on 4L NC (baseline 4-5L). Hard of Hearing. Tele - SR.  Kim removed per order - due to void. Last bowel movement unknown - senna given. Up SBA in room. PT and palliative following. Plan: possibly discharge in 2 days pending labs.

## 2018-07-06 NOTE — PLAN OF CARE
Problem: Pneumonia (Adult)  Goal: Signs and Symptoms of Listed Potential Problems Will be Absent, Minimized or Managed (Pneumonia)  Signs and symptoms of listed potential problems will be absent, minimized or managed by discharge/transition of care (reference Pneumonia (Adult) CPG).   Outcome: Improving  VSS. O2 95% on 4L.  Lung sounds are diminished with crackles. IV zosyn. incont of bladder x2.  Tele is SR with inverted T waves.

## 2018-07-06 NOTE — PROGRESS NOTES
[facility]    Sepsis Evaluation Progress Note    Date of Service: 07/06/2018    I was called to see Chaz Soler due to abnormal vital signs triggering the Sepsis SIRS screening alert. He is known to have an infection.     Physical Exam    Vital Signs:  Temp: 97.6  F (36.4  C) Temp src: Oral BP: 165/70   Heart Rate: 72 Resp: 22 SpO2: 93 % O2 Device: Nasal cannula Oxygen Delivery: 3 LPM    Lab:  Lactic Acid   Date Value Ref Range Status   07/02/2018 1.7 0.7 - 2.1 mmol/L Final     Lactate for Sepsis Protocol   Date Value Ref Range Status   07/06/2018 3.4 (HH) 0.4 - 1.9 mmol/L Final     Comment:     Critical Value called to and read back by  YAKOV RIOS ON MS3 @ 7581 7.6.18 LNP         The patient is at baseline mental status.    The rest of their physical exam is significant for wheezing.    Assessment and Plan    The SIRS and exam findings are likely due to nebulizer    ID: The patient is currently on the following antibiotics:  Anti-infectives (Future)    Start     Dose/Rate Route Frequency Ordered Stop    07/06/18 0900  levofloxacin (LEVAQUIN) infusion 250 mg      250 mg  50 mL/hr over 60 Minutes Intravenous EVERY 24 HOURS 07/05/18 0853          Current antibiotic coverage is appropriate for source of infection.    Fluid: Fluid bolus not indicated due to chf.    Lab: Repeat lactic acid ordered for 2 hours from now.    Disposition: The patient will remain on the current unit. We will continue to monitor this patient closely.    Rachel Mclain MD

## 2018-07-07 NOTE — PLAN OF CARE
dischged with saritha womack after instructions and copy of plan given to family.  Packet given to family to give to Isabella.  Pt left with own 02.

## 2018-07-07 NOTE — PLAN OF CARE
Problem: Patient Care Overview  Goal: Plan of Care/Patient Progress Review  Outcome: No Change  Disorientated to time and situation, VSS, Asstx1, SOB with exertion, 4L NC, Tele SR, non productive cough - unable to collect sputumn culture this shift, regular diet, incontinent, denies pain, plan to discharge tomorrow around 11am back to [facility]. Continue with plan of care.

## 2018-07-07 NOTE — PLAN OF CARE
"Problem: Patient Care Overview  Goal: Plan of Care/Patient Progress Review    PT:  Pt discharged back to TCU without needing to see further PT today.    Physical Therapy Discharge Summary    Reason for therapy discharge:    Discharged to transitional care facility.    Progress towards therapy goal(s). See goals on Care Plan in [facility] electronic health record for goal details.  Goals not met.  Barriers to achieving goals:   limited tolerance for therapy and discharge from facility.  Pt seen for evaluation on 7/6:    \"Discharge Planner PT   Patient plan for discharge: return to TCU  Current status: Patient seated in bedside chair upon arrival, on 4L NC.  Patient transferred to standing with 2WW and CGA.  Patient amb 60 feet with 2WW and CGA.  Patient with increased SOB, upon returning to room patient satting at 69%.  Patient returned to above 90% within 3 min seated rest with O2 increased to 10L (before increasing from 70-71%).  Patient with increased agitation upon returning to room, improved once O2 sats above 90%.  Patient with understandable increase in impulsivity with movement with decreased O2 sats.  Barriers to return to prior living situation: severe hypoxia with mobility  Recommendations for discharge: return to TCU  Rationale for recommendations: Patient below baseline for mobility; presents with significant decrease in activity tolerance requiring increased amounts of O2 with noted hypoxia.  Patient would benefit from ongoing physical therapy at TCU for increased strength, activity tolerance and safety evaluation for eventual discharge to home setting.       Entered by: Lilian Bui 07/06/2018 1:07 PM\"       Therapy recommendation(s) Progressive strengthening and functional mobility training at TCU.      **Pt not seen by discharging therapist on this date, note written based on previous treating therapist's notes and recommendations.      "

## 2018-07-07 NOTE — PLAN OF CARE
Problem: Pneumonia (Adult)  Goal: Signs and Symptoms of Listed Potential Problems Will be Absent, Minimized or Managed (Pneumonia)  Signs and symptoms of listed potential problems will be absent, minimized or managed by discharge/transition of care (reference Pneumonia (Adult) CPG).   Outcome: Improving  VSS. Currently on 4L NC (baseline 4-5L); lung sounds diminished with fine crackles. Ambulated with 1 and walker to doorway and back. Tele - SR. Regular diet; good appetite. 2 tabs senna given. Plan: discharge tomorrow around 11am back to [facility].

## 2018-07-08 NOTE — PROGRESS NOTES
Hand-off for Care Transitions to Next Level of Care Provider  Name: Chaz Soler  : 1928  MRN #: [id number]  Reason for Hospitalization:  Pulmonary fibrosis (H) [J84.10]  Acute on chronic respiratory failure with hypoxia (H) [J96.21]  Congestive heart failure, unspecified congestive heart failure chronicity, unspecified congestive heart failure type (H) [I50.9]  Admit Date/Time: 2018  8:42 AM  Discharge Date: 2018    Reason for Communication Hand-off Referral: Admission diagnoses: PN  Admission diagnoses: COPD    Discharge Plan:  Discharged to: TCU: [facility]                   Patient agreeable to post-hospital support suggestions:  Yes    Patient is on new medications:   Yes    MTM follow up recommended: No    Tel-Assurance program:  Ineligible    Patient will receive  TCU  at:  Discharge to [facility].    Follow-up specialty is recommended: Yes. Follow up with [facility] as scheduled below.     Follow-up plan:  Future Appointments  Date Time Provider Department Center   2018 12:30 PM [id number]   2018 3:45 PM Tyrell Jackson MD [facility]     Any outstanding tests or procedures: No. Recommend give two-step Mantoux (PPD) Per Facility Policy       Procedures     Future Labs/Procedures    Oxygen - Nasal cannula     Comments:    4-5 Lpm by nasal cannula to keep O2 sats 92% or greater.          Referrals     Future Labs/Procedures    Occupational Therapy Adult Consult     Comments:    Evaluate and treat as clinically indicated.    Reason:deconditioning    Physical Therapy Adult Consult     Comments:    Evaluate and treat as clinically indicated.    Reason: deconditioning        Supplies     Future Labs/Procedures    AntiEmbolism Stockings     Comments:    Bilateral below knee length.On in the morning, off at night        Key Recommendations:  CTS following for elevated PARAM score and Reamission. Pt was hospitalized from 6/3- for CHF exacerbation and pt was dc'd  to [facility] TCU. Pt is now admitted again with PNA/COPD exacerbation and needing high flow oxygen. He is being treated with zosyn, IV solumedrol and nebs. He has a bueno. He has a history of pulmonary fibrosis and is on O2 at 4-5 L baseline. Palliative care consulted and will meet with pt and wife on Thurs to discuss goals of care. He has a bed hold at [facility] TCU and will return there on dc.     Evangelina Valentin    Communicated handoff via EPIC Comm Mgt to Dr. Alirio Fox's CC at [facility].      Sent by Merry Aceves RN, BSN, CTS  [facility]  [phone]

## 2018-07-09 NOTE — LETTER
[facility]   To:   [facility]          Please give to facility    From:   Guero Witt  \[facility]\""  Care Coordinator   [facility]   P: [phone]  [email or web address]   Patient Name:  Chaz Soler YOB: 1928   Admit date: 7/7/2018      *Information Needed:  Please contact me when the patient will discharge (or if they will move to long term care)- include the discharge date, disposition, and main diagnosis   - If the patient is discharged with home care services, please provide the name of the agency    Also- Please inform me if a care conference is being held.   Phone, Fax or Email with information                              Thank you

## 2018-07-09 NOTE — PROGRESS NOTES
[facility]  PRIMARY CARE PROVIDER AND CLINIC:  Alirio Fox [address] / [facility] [address]  Chief Complaint   Patient presents with     Hospital F/U     [place] Medical Record Number:  [id number]    HPI:    Chaz Soler is a 89 year old  (5/21/1928),admitted to the [facility] from [facility]  [facility].  Hospital stay 7/2/18 through 7/7/18.  Admitted to this facility for  rehab, medical management and nursing care.  HPI information obtained from: facility chart records, facility staff, patient report and [facility] chart review.      From hospital discharge summary (in italics):  Summary of Stay: Chaz Soler is a 89 year old male w/ h/o dementia, COPD on 5L oxygen at baseline, pulm fibrosis who was admitted on 7/2/2018 with   Pt is DNR/DNI but ok with BiPAP if needed, confirmed with family. He is much improved with IV antibiotics and IV steroids for treatment of healthcare associated pneumonia.  Unfortunately has mild acute kidney injury due to diuresis.  Overall he is declining in health with 4 admissions this year so far and has poor long-term prognosis given multiorgan dysfunction (CHF, chronic respiratory failure on oxygen, chronic kidney disease, dementia, frailty)-palliative care is following.          Assessment and Plan:      1. Acute hypoxic respiratory failure secondary to hospital-acquired pneumonia: Febrile and hypoxic with leukocytosis and CXR difficult to interpret given history of pulmonary fibrosis. 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 Confirmed with wife and son that patient is DNR/DNI. They would want BiPAP initiated if needed but no intubation.  -cont IV Zosyn  -weaned off high flow now back to usual O2 requirement   -Scheduled duonebs and prn albuterol nebs-holding duoneb due to elevated lactic acid   -IV  methylprednisolone 40 mg daily, increased to BID  -Follow blood cultures  -Palliative care consulted as family is interested in a more comfort-care approach but for now has decided to remain DNR/DNI       History of diastolic heart failure: Patient's BNP is 1886.  He has no lower extremity edema.  There is no clear evidence of pulmonary edema on his chest x-ray. He received lasix 20 mg day 1, then 40 mg BID day 2 then had worsening Cr so lasix is now on hold.       Hypertension: Blood pressure is stable.  Continue Norvasc, losartan, and metoprolol.      History of coronary artery disease: Denies chest pain and has undetectable troponin.  Low suspicion for acute coronary syndrome.  Lexiscan 10/2017 negative for significant ischemia with ejection fraction measured at 63%.  Continue aspirin, atorvastatin, metoprolol succinate, and losartan.      Hypothyroidism: Resume Synthroid.      GERD: Continue proton pump inhibitor.      Dementia: Continue PTA donepezil.      Depression/anxiety: Continue PTA fluoxetine and as needed Ativan.     SOL on CKD: Likely due to diuresis. improved with hydration      Care plan:Overall he is declining in health with 4 admissions this year so far and has poor long-term prognosis given multiorgan dysfunction (CHF, chronic respiratory failure on oxygen, chronic kidney disease, dementia, frailty)-palliative care is following.      Current issues are:      Acute respiratory failure with hypoxia (H)  Pneumonia due to infectious organism, unspecified laterality, unspecified part of lung  Pulmonary fibrosis (H)  Oxygen dependent  On 5L O2 today, which is increased from baseline-Per PCP note patient is usually on 3-4 L at home 24 hours a day via NC. Is on prednisone 40 mg daily- to complete on 7/10. Completed course of levaquin on 7/8. Is on duonebs QID, albuterol q2h PRN. Has ativan available for anxiety related to breathlessness. No fevers. Occasional cough. Reports SOB at times. Followed by  palliative care during hospitalization and family would like to pursue comfort focused care. Follow up with palliative care, pulmonary as indicated.    Acute diastolic congestive heart failure (H)  On lasix 30 mg daily, metoprolol, losartan, amlodipine. Daily weights. Regular diet.    Weight since return from hospital at TCU: 135 lbs    Wt Readings from Last 5 Encounters:   07/09/18 135 lb (61.2 kg)   07/05/18 145 lb 8 oz (66 kg)   07/02/18 142 lb 9.6 oz (64.7 kg)   06/28/18 142 lb 3.2 oz (64.5 kg)   06/25/18 140 lb 12.8 oz (63.9 kg)     CKD (chronic kidney disease) stage 3, GFR 30-59 ml/min  Developed ARF due to diuresis in the hospital. Creatinine 1.56 at discharge from hospital. Baseline creatinine around 1.5    Benign essential hypertension  On metoprolol 25 mg XL, lasix 20 mg daily, losartan 100 mg daily, amlodipine 10 mg daily     BP: 138-170/69-85 mmHg  P: 62-75 bpm    Alzheimer's dementia without behavioral disturbance, unspecified timing of dementia onset  Not a reliable historian. Unable to tell me much about his medical history or what happened in the hospital. On aricept.    Coronary artery disease involving native heart, angina presence unspecified, unspecified vessel or lesion type  On ASA, statin, metoprolol, losartan. Uses a walker at baseline. Lives with his wife in [facility]. Lexiscan from 10/2017 with LVEF 63% and no ischemia.    Depression, unspecified depression type  Anxiety  On prozac.    Hypothyroidism, unspecified type  On levothyroxine.  TSH   Date Value Ref Range Status   11/28/2017 1.01 0.40 - 4.00 mU/L Final        Generalized muscle weakness  Physical deconditioning  With multiple hospitalizations over past year, several of them related to weakness and falls at home. Will work with therapy here. Uses a walker at baseline. Lives with his wife in [facility].    CODE STATUS/ADVANCE DIRECTIVES DISCUSSION:   DNR / DNI  Patient's living condition: lives with  spouse    ALLERGIES:Contrast dye; Lisinopril; and Oxycodone  PAST MEDICAL HISTORY:  has a past medical history of AAA (abdominal aortic aneurysm) (H) (10/5/2011); Anemia (11/30/2011); Aortic stenosis (10/26/2012); CAD (coronary artery disease); Carotid artery disease (H); CHF (congestive heart failure) (H) (12/31/2014); CKD (chronic kidney disease) stage 3, GFR 30-59 ml/min (11/30/2011); COPD (chronic obstructive pulmonary disease) (H); Dementia (8/4/2015); Edema, unspecified edema (1/17/2016); Essential hypertension; Gout (1/06); Hypercalcemia (1/2/2015); Hyperlipidemia LDL goal < 70; Hyperparathyroidism, unspecified (4/22/2015); Hyperplasia of prostate; LEFT LUNG GRANULOMA; Lumbago; Other chronic pain (10/11/2016); Proteinuria (2/8/2012); Pulmonary fibrosis (H); PVD (peripheral vascular disease) (H); Thrombocytopenia (H) (11/30/2011); Unspecified hypothyroidism; and Vitamin D deficiency.  PAST SURGICAL HISTORY:  has a past surgical history that includes Vasectomy; right cataract extraction/IOL (12/03); Colonoscopy (9/02 per patient); Laparoscopic cholecystectomy (Nov 2011); Repair aneurysm abdominal aorta (Nov 2011); Endovascular repair aneurysm abdominal aorta (11/18/2011); Laparoscopic cholecystectomy (11/18/2011); Discectomy lumbar posterior microscopic one level (8/11/2014); and TRANSCATHETER AORTIC VALVE IMPLANT ANEST (N/A, 11/12/2014).  FAMILY HISTORY: family history includes Hypertension in his father and mother.  SOCIAL HISTORY:  reports that he has never smoked. He has never used smokeless tobacco. He reports that he drinks alcohol. He reports that he does not use illicit drugs.    Post Discharge Medication Reconciliation Status: discharge medications reconciled and changed, per note/orders (see AVS).  Current Outpatient Prescriptions   Medication Sig Dispense Refill     acetaminophen (TYLENOL) 500 MG tablet Take 500 mg by mouth 3 times daily       albuterol (2.5 MG/3ML) 0.083% neb solution Take 2.5 mg by  nebulization every 2 hours as needed        amLODIPine (NORVASC) 10 MG tablet TAKE ONE TABLET BY MOUTH ONCE DAILY 90 tablet 3     aspirin 81 MG tablet Take 1 tablet (81 mg) by mouth daily 30 tablet 11     atorvastatin (LIPITOR) 20 MG tablet Take 1 tablet (20 mg) by mouth daily 90 tablet 3     cyanocobalamin (VITAMIN  B-12) 1000 MCG tablet Take 1,000 mcg by mouth daily       diclofenac (VOLTAREN) 1 % GEL topical gel Place 2 g onto the skin 4 times daily as needed (lower back pain)        donepezil (ARICEPT) 10 MG tablet TAKE ONE TABLET BY MOUTH AT BEDTIME 90 tablet 3     DULoxetine (CYMBALTA) 30 MG EC capsule Take 30 mg by mouth daily       furosemide (LASIX) 20 MG tablet Take 30 mg by mouth daily        gabapentin (NEURONTIN) 100 MG capsule TAKE ONE CAPSULE BY MOUTH THREE TIMES DAILY FOR  PAIN 270 capsule 3     ipratropium - albuterol 0.5 mg/2.5 mg/3 mL (DUONEB) 0.5-2.5 (3) MG/3ML neb solution Take 1 vial by nebulization 4 times daily       levothyroxine (SYNTHROID/LEVOTHROID) 88 MCG tablet TAKE ONE TABLET BY MOUTH ONCE DAILY 90 tablet 3     LORazepam (ATIVAN) 0.5 MG tablet Take 0.5 tablets (0.25 mg) by mouth every 6 hours as needed for anxiety 20 tablet 0     losartan (COZAAR) 100 MG tablet TAKE ONE TABLET BY MOUTH  DAILY 90 tablet 3     metoprolol succinate (TOPROL-XL) 25 MG 24 hr tablet TAKE ONE TABLET BY MOUTH DAILY 90 tablet 2     Multiple Vitamins-Minerals (MULTIVITAL) TABS Take 1 tablet by mouth daily       pantoprazole (PROTONIX) 40 MG EC tablet TAKE ONE TABLET BY MOUTH EVERY MORNING 90 tablet 3     predniSONE (DELTASONE) 20 MG tablet Take 2 tablets (40 mg) by mouth daily for 4 days       tamsulosin (FLOMAX) 0.4 MG capsule TAKE ONE CAPSULE BY MOUTH ONCE DAILY 90 capsule 3     traMADol (ULTRAM) 50 MG tablet Take 1 tablet (50 mg) by mouth 3 times daily 15 tablet 0       ROS:  10 point ROS of systems including Constitutional, Eyes, Respiratory, Cardiovascular, Gastroenterology, Genitourinary, Integumentary,  "Muscularskeletal, Neurologic, Psychiatric were all negative except for pertinent positives noted in my HPI, not sure how accurate his ROS is due to cognitive status.    Exam:  /72  Pulse 68  Temp 97.6  F (36.4  C)  Resp 20  Ht 5' 8\" (1.727 m)  Wt 135 lb (61.2 kg)  SpO2 98%  BMI 20.53 kg/m2   GENERAL APPEARANCE:  Alert, in no distress, cooperative and pleasant, sitting in wheelchair on exam today  EYES:  EOM, lids, pupils and irises normal, sclera clear and conjunctiva normal, no discharge or mattering on lids or lashes noted  ENT:  Mouth normal, moist mucous membranes, nose without drainage or crusting, external ears without lesions, hearing acuity [place]  RESP:  respiratory effort normal, no respiratory distress on exam, Lungs sounds clear but diminished in the bases. patient is on 5L O2 via NC   CV:  Palpation and auscultation of heart done, rate and rhythm regular.  no edema  ABDOMEN:  normal bowel sounds, soft, nontender, no grimacing or guarding with palpation.  M/S:   Gait and station abnormal: uses walker for ambulation; able to move all extremities   SKIN:  Inspection and palpation of skin and subcutaneous tissue: skin warm, dry without rashes, large bruising to bilateral hands and up left arm- non tender, soft.  PSYCH:  insight and judgement impaired, memory impaired, affect and mood ok      Lab/Diagnostic data:     CBC RESULTS:   Recent Labs   Lab Test  07/07/18   0729  07/06/18   0708   WBC  13.7*  12.3*   RBC  3.98*  3.78*   HGB  12.1*  11.0*   HCT  36.7*  34.8*   MCV  92  92   MCH  30.4  29.1   MCHC  33.0  31.6   RDW  13.3  13.2   PLT  216  188       Last Basic Metabolic Panel:  Recent Labs   Lab Test  07/07/18   0729  07/06/18   0708   NA  142  140   POTASSIUM  4.8  3.9   CHLORIDE  103  102   LEXIS  10.2*  9.7   CO2  35*  33*   BUN  43*  47*   CR  1.56*  1.56*   GLC  93  108*       ASSESSMENT/PLAN:  (J96.01) Acute respiratory failure with hypoxia (H)  (primary encounter diagnosis)  (J18.9) " Pneumonia due to infectious organism, unspecified laterality, unspecified part  (J84.10) Pulmonary fibrosis (H)  (Z99.81) Oxygen dependent  Comment: Acute- on 5L oxygen today. Denies increase SOB, has occasional cough, no fevers  Plan: Completed levaquin on 7/8. Complete prednisone on 7/1o. Continue duonebs QID, albuterol q2h PRN. Continue oxygen to keep o2 sats >90%. Ativan PRN for anxiety related to breathlessness. VS per TCU policy. Follow up with palliative care, pulmonary as indicated.    (I50.31) Acute diastolic congestive heart failure (H)  Comment: Stable   Plan: Continue lasix 30 mg daily, metoprolol, losartan, amlodipine. BMP 7/12. Daily weights. Notify provider with weight gain >2 lbs in a day, >5 lbs in on week.    (N18.3) CKD (chronic kidney disease) stage 3, GFR 30-59 ml/min  Comment: Chronic- stable   Plan: BMP 7/12. Avoid nephrotoxic medications.    (I10) Benign essential hypertension  Comment: Chronic- well controlled  Plan: Continue medications as above. VS per TCU policy.    (G30.9,  F02.80) Alzheimer's dementia without behavioral disturbance, unspecified timing of dementia onset  Comment: Chronic- stable- poor medical historian  Plan: Continue aricept. Reorient. Falls precautions    (I25.10) Coronary artery disease involving native heart, angina presence unspecified, unspecified vessel or lesion type  Comment: Chronic, stable   Plan: Continue medications as above    (F32.9) Depression, unspecified depression type  (F41.9) Anxiety  Comment: chronic, stable  Plan: Continue prozac, ativan PRN for anxiety. Consider ACP referral if needed    (E03.9) Hypothyroidism, unspecified type  Comment: Chronic  Plan: Continue levothyroxine.     (M62.81) Generalized muscle weakness  (R53.81) Physical deconditioning  Comment: Acute- related to recent hospitalization, CHF  Plan: Falls precautions. Encourage participation in physical therapy/occupational therapy for strengthening and deconditioning. Discharge  planning per their recommendation. Social work to assist with d/c planning.       Total time spent with patient visit at the skilled nursing facility was 47 minutes including patient visit, review of past records and review of admission orders, medication reconciliation. Greater than 50% of total time spent with counseling and coordinating care due to pneumonia, pulmonary fibrosis, CHF, and other problems as above    Electronically signed by:  CELIA Yoo CNP

## 2018-07-09 NOTE — PROGRESS NOTES
Clinic Care Coordination Contact  Care Coordination Transition Communication         Clinical Data: Patient was hospitalized at [facility] from 7/2/2018 to 7/7/2018 with diagnosis of COPD.     Transition to Facility:              Facility Name: [facility]              Contact name and phone number/fax: [phone]      Plan: RN/SW Care Coordinator will await notification from facility staff informing RN/SW Care Coordinator of patient's discharge plans/needs. RN/SW Care Coordinator will review chart and outreach to facility staff every 4 weeks and as needed.     HARINDER Summers  Clinic Care Coordinator  [facility]  [phone]  [email or web address]

## 2018-07-11 NOTE — DISCHARGE SUMMARY
[facility]  Discharge Summary  Name: Chaz Soler    MRN: [id number]  YOB: 1928    Age: 89 year old  Date of Discharge:  7/7/2018 10:37 AM  Date of Admission: 7/2/2018  Primary Care Provider: Alirio Fox  Discharge Physician:  Rachel Mclain MD  Discharging Service:  Hospitalist      Discharge Diagnosis:  Acute on chronic hypoxic respiratory failure secondary to  Pneumonia-suspect gram negative  History of COPD on 5 L oxygen at baseline   Chronic respiratory failure due to COPD and pulmonary fibrosis on 5 L oxygen at baseline  History of diastolic heart failure   hypertension  GERD  Hypothyroidism  Dementia  Acute kidney injury on chronic kidney injury due to diuresis resolved prior to discharge-       Discharge Disposition:  Discharged to short-term care facility     History of Illness:  See detailed admission note for full details.    Hospital Course:  Chaz Soler is a 89 year old male w/ h/o dementia, COPD on 5L oxygen at baseline, pulm fibrosis who was admitted on 7/2/2018 with   Pt is DNR/DNI but ok with BiPAP if needed, confirmed with family. He is much improved with IV antibiotics and IV steroids for treatment of healthcare associated pneumonia.  Unfortunately has mild acute kidney injury due to diuresis.  Overall he is declining in health with 4 admissions this year so far and has poor long-term prognosis given multiorgan dysfunction (CHF, chronic respiratory failure on oxygen, chronic kidney disease, dementia, frailty)-palliative care is following.          Assessment and Plan:      1. Acute hypoxic respiratory failure secondary to hospital-acquired pneumonia: Febrile and hypoxic with leukocytosis on admission and CXR was difficult to interpret given history of pulmonary fibrosis. 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 Confirmed with wife and son that patient is DNR/DNI.  "They would want BiPAP initiated if needed but no intubation.  -Was on Zosyn and then switched to levofloxacin to complete 10 day course of antibiotics  Was initially requiring high flow oxygen but was discharged on 5 L via nasal cannula (baseline)  -Palliative care was consulted as family showed interested in a more comfort-care approach but for now they have decided to attempt DNR/DNI       History of diastolic heart failure: Patient's BNP was 1886.  He had no lower extremity edema.  There was no clear evidence of pulmonary edema on his chest x-ray. He received lasix 20 mg day 1, then 40 mg BID day 2 then had worsening Cr so lasix was held off.       Hypertension: Blood pressure remained stable.  Continue Norvasc, losartan, and metoprolol.      History of coronary artery disease: Denies chest pain and has undetectable troponin.  Low suspicion for acute coronary syndrome.  Lexiscan 10/2017 negative for significant ischemia with ejection fraction measured at 63%.  Continue aspirin, atorvastatin, metoprolol succinate, and losartan.      Hypothyroidism: Resume Synthroid.      GERD: Continue proton pump inhibitor.      Dementia: Continue PTA donepezil.      Depression/anxiety: Continue PTA fluoxetine and as needed Ativan.     SOL on CKD: Likely due to diuresis. improved with hydration .  Back to baseline prior to discharge     Care plan:Overall he is declining in health with 4 admissions this year so far and has poor long-term prognosis given multiorgan dysfunction (CHF, chronic respiratory failure on oxygen, chronic kidney disease, dementia, frailty)-palliative care is following.         # Discharge Pain Plan: .  - Patient currently has NO PAIN and is not being prescribed pain medications on discharge.           Physical Exam Findings:  Blood pressure 143/79, temperature 96.7  F (35.9  C), temperature source Oral, resp. rate 15, height 1.727 m (5' 8\"), weight 66 kg (145 lb 8 oz), SpO2 92 %.  Wt Readings from Last 1 " Encounters:   07/09/18 61.2 kg (135 lb)     GENERAL: No apparent distress. Awake, alert, disoriented  HEENT: Normocephalic, atraumatic. Extraocular movements intact.  CARDIOVASCULAR: Regular rate and rhythm without murmurs or rubs. No S3.  PULMONARY: Wheezing  ABDOMINAL: Soft, non-tender, non-distended. Bowel sounds normoactive. No hepatosplenomegaly.  EXTREMITIES: No cyanosis or clubbing. No edema.  NEUROLOGICAL: CN 2-12 grossly intact, no focal neurological deficits.  DERMATOLOGICAL: No rash, ulcer, ecchymoses, jaundice.  PSYCH: Demented      Allergies:  Allergies   Allergen Reactions     Contrast Dye      SOB, increased Bp, difficulty swallowing. Date 6/3/96 (ipaque contrast)  Was premedicated prior to FRANCK and had no reaction at all (solumedrol and benadryl) 2016  Was premedicated with Methylprednisolone protocol, no reaction 1/25/17     Lisinopril      cough     Oxycodone      Delirium        Discharge Medications:   Discharge Medication List as of 7/7/2018 10:21 AM      START taking these medications    Details   predniSONE (DELTASONE) 20 MG tablet Take 2 tablets (40 mg) by mouth daily for 4 days, Transitional      levofloxacin (LEVAQUIN) 500 MG tablet Take 1 tablet (500 mg) by mouth daily for 2 days, Disp-2 tablet, R-0, Transitional         CONTINUE these medications which have CHANGED    Details   LORazepam (ATIVAN) 0.5 MG tablet Take 0.5 tablets (0.25 mg) by mouth every 6 hours as needed for anxiety, Disp-20 tablet, R-0, Local Print      traMADol (ULTRAM) 50 MG tablet Take 1 tablet (50 mg) by mouth 3 times daily, Disp-15 tablet, R-0, Local Print         CONTINUE these medications which have NOT CHANGED    Details   acetaminophen (TYLENOL) 500 MG tablet Take 500 mg by mouth 3 times daily, Historical      albuterol (2.5 MG/3ML) 0.083% neb solution Take 2.5 mg by nebulization every 2 hours as needed , Historical      amLODIPine (NORVASC) 10 MG tablet TAKE ONE TABLET BY MOUTH ONCE DAILY, Disp-90 tablet, R-3,  E-Prescribe      aspirin 81 MG tablet Take 1 tablet (81 mg) by mouth daily, Disp-30 tablet, R-11, No Print Out      atorvastatin (LIPITOR) 20 MG tablet Take 1 tablet (20 mg) by mouth daily, Disp-90 tablet, R-3, E-Prescribe      cyanocobalamin (VITAMIN  B-12) 1000 MCG tablet Take 1,000 mcg by mouth daily, Historical      diclofenac (VOLTAREN) 1 % GEL topical gel Place 2 g onto the skin 4 times daily as needed (lower back pain) Historical      donepezil (ARICEPT) 10 MG tablet TAKE ONE TABLET BY MOUTH AT BEDTIME, Disp-90 tablet, R-3, E-Prescribe      DULoxetine (CYMBALTA) 30 MG EC capsule Take 30 mg by mouth daily, Historical      furosemide (LASIX) 20 MG tablet Take 30 mg by mouth daily , Historical      gabapentin (NEURONTIN) 100 MG capsule TAKE ONE CAPSULE BY MOUTH THREE TIMES DAILY FOR  PAIN, Disp-270 capsule, R-3, E-PrescribePlease consider 90 day supplies to promote better adherence      ipratropium - albuterol 0.5 mg/2.5 mg/3 mL (DUONEB) 0.5-2.5 (3) MG/3ML neb solution Take 1 vial by nebulization 4 times daily, Historical      levothyroxine (SYNTHROID/LEVOTHROID) 88 MCG tablet TAKE ONE TABLET BY MOUTH ONCE DAILY, Disp-90 tablet, R-3, E-Prescribe      losartan (COZAAR) 100 MG tablet TAKE ONE TABLET BY MOUTH  DAILY, Disp-90 tablet, R-3, E-Prescribe      metoprolol succinate (TOPROL-XL) 25 MG 24 hr tablet TAKE ONE TABLET BY MOUTH DAILY, Disp-90 tablet, R-2, E-Prescribe      Multiple Vitamins-Minerals (MULTIVITAL) TABS Take 1 tablet by mouth daily, Historical      pantoprazole (PROTONIX) 40 MG EC tablet TAKE ONE TABLET BY MOUTH EVERY MORNING, Disp-90 tablet, R-3, E-Prescribe      tamsulosin (FLOMAX) 0.4 MG capsule TAKE ONE CAPSULE BY MOUTH ONCE DAILY, Disp-90 capsule, R-3, E-Prescribe      NONFORMULARY Apply topically 4 times daily as needed (to lower back for pain) Salonpas Lidocaine 4% plus Benzyl Alcohol 10% Cream, Historical              Condition on Discharge:  Discharge condition: Stable   Discharge vitals:  "Blood pressure 143/79, temperature 96.7  F (35.9  C), temperature source Oral, resp. rate 15, height 1.727 m (5' 8\"), weight 66 kg (145 lb 8 oz), SpO2 92 %.   Code status on discharge: DNR / DNI       Procedures other than Imaging:  None      Imaging:  Results for orders placed or performed during the hospital encounter of 07/02/18   Chest  XR, 1 view PORTABLE    Narrative    XR CHEST PORT 1 VW 7/2/2018 9:45 AM    HISTORY: Cough.    COMPARISON: Anne 3, 2018.      Impression    IMPRESSION: There are diffuse bilateral pulmonary opacities, which  could reflect edema and/or fibrosis. No definite pleural effusions  appreciated. No pneumothorax.    EMELY COHN MD        Consultations:  No consultations were requested during this admission.     Recent Lab Results:    Recent Labs  Lab 07/07/18  0729 07/06/18  0708 07/05/18  0647   WBC 13.7* 12.3* 12.7*   HGB 12.1* 11.0* 10.8*   HCT 36.7* 34.8* 34.2*   MCV 92 92 93    188 192       Recent Labs  Lab 07/07/18  0729 07/06/18  0708 07/05/18  0647    140 138   POTASSIUM 4.8 3.9 4.1   CHLORIDE 103 102 101   CO2 35* 33* 32   ANIONGAP 4 5 5   GLC 93 108* 140*   BUN 43* 47* 47*   CR 1.56* 1.56* 1.82*   GFRESTIMATED 42* 42* 35*   GFRESTBLACK 51* 51* 43*   LEXIS 10.2* 9.7 9.4          Pending Results:    Unresulted Labs Ordered in the Past 30 Days of this Admission     No orders found from 5/3/2018 to 7/3/2018.           Discharge Instructions and Follow-Up:     Discharge Procedure Orders  AntiEmbolism Stockings   Order Comments: Bilateral below knee length.On in the morning, off at night     General info for SNF   Order Comments: Length of Stay Estimate: Short Term Care: Estimated # of Days 31-90  Condition at Discharge: Improving  Level of care:skilled   Rehabilitation Potential: Fair  Admission H&P remains valid and up-to-date: Yes  Recent Chemotherapy: N/A  Use Nursing Home Standing Orders: Yes     Mantoux instructions   Order Comments: Give two-step Mantoux (PPD) Per " Facility Policy Yes     Reason for your hospital stay   Order Comments: Pneumonia     Activity - Up with assistive device   Order Specific Question Answer Comments   Is discharge order? Yes      DNR/DNI     Physical Therapy Adult Consult   Order Comments: Evaluate and treat as clinically indicated.    Reason: deconditioning     Occupational Therapy Adult Consult   Order Comments: Evaluate and treat as clinically indicated.    Reason:deconditioning     Oxygen - Nasal cannula   Order Comments: 4-5 Lpm by nasal cannula to keep O2 sats 92% or greater.     Fall precautions     Advance Diet as Tolerated   Order Comments: Follow this diet upon discharge: Orders Placed This Encounter     Snacks/Supplements Adult: Boost Plus; With Meals     Combination Diet Regular Diet Adult   Order Specific Question Answer Comments   Is discharge order? Yes          Total time spent in face to face contact with the patient and coordinating discharge was:  45 Minutes.    Rachel Mclain MD  Hospitalist  [facility] [address] [place] [address]

## 2018-07-13 NOTE — PROGRESS NOTES
PRIMARY CARE PROVIDER AND CLINIC RESPONSIBLE:  Alirio Fox, [address]  / [facility] [id number]        ADMISSION HISTORY AND PHYSICAL EXAMINATION     Chief Complaint   Patient presents with     Hospital F/U         HISTORY OF PRESENT ILLNESS:  89 year old male, (5/21/1928), admitted to the [facility] TCU for continuation of medical care and rehab.    Pt admitted [facility] 7/2 to 7/7 for HCAP. At baseline is WC bound now with 5L oxygen. Has had multiple admissions this year.    Pt denies any fevers/chills/cough/worsening SOB.    Patient is seen and examined by me with Tiff Ricketts CNP. Please see Tiff Ricketts 's admit noted dated 7/9 for details of admission, past medical history, family history, allergies, medication list, social history and other details pertinent with this admission. Hospital admission and dc summary reviewed.      Past Medical History:   Diagnosis Date     AAA (abdominal aortic aneurysm) (H) 10/5/2011    6.1 cm     Anemia 11/30/2011     Aortic stenosis 10/26/2012     CAD (coronary artery disease)     MI - distal inferior/apex. Non transmural     Carotid artery disease (H)      CHF (congestive heart failure) (H) 12/31/2014     CKD (chronic kidney disease) stage 3, GFR 30-59 ml/min 11/30/2011     COPD (chronic obstructive pulmonary disease) (H)      Dementia 8/4/2015     Edema, unspecified edema 1/17/2016     Essential hypertension      Gout 1/06    knee     Hypercalcemia 1/2/2015     Hyperlipidemia LDL goal < 70      Hyperparathyroidism, unspecified 4/22/2015     Hyperplasia of prostate      LEFT LUNG GRANULOMA      Lumbago      Other chronic pain 10/11/2016     Proteinuria 2/8/2012     Pulmonary fibrosis (H)      PVD (peripheral vascular disease) (H)      Thrombocytopenia (H) 11/30/2011     Unspecified hypothyroidism      Vitamin D deficiency        Past Surgical History:   Procedure Laterality Date     COLONOSCOPY  9/02 per patient     DISCECTOMY LUMBAR POSTERIOR MICROSCOPIC ONE LEVEL  8/11/2014     Procedure: DISCECTOMY LUMBAR POSTERIOR MICROSCOPIC ONE LEVEL;  Surgeon: Jone Carvalho MD;  Location: [facility] OR     ENDOVASCULAR REPAIR ANEURYSM ABDOMINAL AORTA  11/18/2011    Procedure:ENDOVASCULAR REPAIR ANEURYSM ABDOMINAL AORTA; ENDOVASCULAR AAA,RIGHT FEMORAL       LAPAROSCOPIC CHOLECYSTECTOMY  Nov 2011     LAPAROSCOPIC CHOLECYSTECTOMY  11/18/2011    Procedure:LAPAROSCOPIC CHOLECYSTECTOMY; LAPAROSCOPIC CHOLECYSTECTOMY ; Surgeon:DARRYL DORADO; Location:[facility] OR     REPAIR ANEURYSM ABDOMINAL AORTA  Nov 2011     right cataract extraction/IOL  12/03     TRANSCATHETER AORTIC VALVE IMPLANT ANESTHESIA N/A 11/12/2014    Bioprosthetic. Procedure: TRANSCATHETER AORTIC VALVE IMPLANT ANESTHESIA;  Surgeon: Generic Anesthesia Provider;  Location: [facility] OR     VASECTOMY         Current Outpatient Prescriptions   Medication Sig     acetaminophen (TYLENOL) 500 MG tablet Take 500 mg by mouth 3 times daily     albuterol (2.5 MG/3ML) 0.083% neb solution Take 2.5 mg by nebulization every 2 hours as needed      amLODIPine (NORVASC) 10 MG tablet TAKE ONE TABLET BY MOUTH ONCE DAILY     aspirin 81 MG tablet Take 1 tablet (81 mg) by mouth daily     atorvastatin (LIPITOR) 20 MG tablet Take 1 tablet (20 mg) by mouth daily     cyanocobalamin (VITAMIN  B-12) 1000 MCG tablet Take 1,000 mcg by mouth daily     diclofenac (VOLTAREN) 1 % GEL topical gel Place 2 g onto the skin 4 times daily as needed (lower back pain)      donepezil (ARICEPT) 10 MG tablet TAKE ONE TABLET BY MOUTH AT BEDTIME     DULoxetine (CYMBALTA) 30 MG EC capsule Take 30 mg by mouth daily     furosemide (LASIX) 20 MG tablet Take 30 mg by mouth daily      gabapentin (NEURONTIN) 100 MG capsule TAKE ONE CAPSULE BY MOUTH THREE TIMES DAILY FOR  PAIN     ipratropium - albuterol 0.5 mg/2.5 mg/3 mL (DUONEB) 0.5-2.5 (3) MG/3ML neb solution Take 1 vial by nebulization 4 times daily     levothyroxine (SYNTHROID/LEVOTHROID) 88 MCG tablet TAKE ONE TABLET BY MOUTH ONCE DAILY      LORazepam (ATIVAN) 0.5 MG tablet Take 0.5 tablets (0.25 mg) by mouth every 6 hours as needed for anxiety     losartan (COZAAR) 100 MG tablet TAKE ONE TABLET BY MOUTH  DAILY     metoprolol succinate (TOPROL-XL) 25 MG 24 hr tablet TAKE ONE TABLET BY MOUTH DAILY     Multiple Vitamins-Minerals (MULTIVITAL) TABS Take 1 tablet by mouth daily     pantoprazole (PROTONIX) 40 MG EC tablet TAKE ONE TABLET BY MOUTH EVERY MORNING     tamsulosin (FLOMAX) 0.4 MG capsule TAKE ONE CAPSULE BY MOUTH ONCE DAILY     traMADol (ULTRAM) 50 MG tablet Take 1 tablet (50 mg) by mouth 3 times daily     No current facility-administered medications for this visit.        Allergies   Allergen Reactions     Contrast Dye      SOB, increased Bp, difficulty swallowing. Date 6/3/96 (ipaque contrast)  Was premedicated prior to FRANCK and had no reaction at all (solumedrol and benadryl) 2016  Was premedicated with Methylprednisolone protocol, no reaction 1/25/17     Lisinopril      cough     Oxycodone      Delirium       Social History     Social History     Marital status:      Spouse name: N/A     Number of children: 4     Years of education: 18     Occupational History      Retired     Social History Main Topics     Smoking status: Never Smoker     Smokeless tobacco: Never Used     Alcohol use 0.0 oz/week     0 Standard drinks or equivalent per week      Comment: 1 glass wine/day     Drug use: No     Sexual activity: No     Other Topics Concern     Caffeine Concern Yes     1 day      Sleep Concern Yes     too much      Weight Concern Yes     appetite reduced      Special Diet No     Back Care Yes     Exercise Yes     daily 5 x per week.  recummbant bike      Seat Belt Yes     Social History Narrative    Lives in  [facility] co[facility] in [place] for the past 13 years.     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     ROS: All 10 point  "review of system completed, those pertinent positive, please see H&P, the remaining ROS is negative.    /81  Pulse 66  Temp 97.1  F (36.2  C)  Resp 18  Ht 5' 8\" (1.727 m)  Wt 136 lb 12.8 oz (62.1 kg)  SpO2 98%  BMI 20.8 kg/m2    PHYSICAL EXAMINATION:   GENERAL:  No acute distress. Seated on WC. [place]. Frail and chronically ill in NAD.  SKIN:  Dry and warm.  There is no rash, lesions, ulcers or juandice at area of skin examined.  HEENT:  Head without trauma.  Pupils round, reactive. Exam of conjunctiva and lids are normal. Sclera without icterus. There is no oral thrush.  NECK:  Supple.  There is no cervical adenopathy, no thyromegaly. No jugular venous distension.  CHEST: No reproducible chest tenderness.   LUNGS:  Normal respiratory effort. + crackles bilaterally.  HEART:  Regular rate and rhythm.  No murmur, gallops or rubs auscultated.  ABDOMEN:  Soft, bowel sounds positive.  There is no tenderness or guarding.   EXTREMITIES: No edema.   NEUROLOGIC:  Alert and oriented to self and follows commands.    Lab/Diagnostic data:  Reviewed    Lab Results   Component Value Date    WBC 7.7 07/15/2018     Lab Results   Component Value Date    RBC 3.96 07/15/2018     Lab Results   Component Value Date    HGB 11.9 07/15/2018     Lab Results   Component Value Date    HCT 36.9 07/15/2018     Lab Results   Component Value Date    MCV 93 07/15/2018     Lab Results   Component Value Date    MCH 30.1 07/15/2018     Lab Results   Component Value Date    MCHC 32.2 07/15/2018     Lab Results   Component Value Date    RDW 14.0 07/15/2018     Lab Results   Component Value Date     07/15/2018       Last Basic Metabolic Panel:  Lab Results   Component Value Date     07/15/2018      Lab Results   Component Value Date    POTASSIUM 4.9 07/15/2018     Lab Results   Component Value Date    CHLORIDE 102 07/15/2018     Lab Results   Component Value Date    LEXIS 9.3 07/15/2018     Lab Results   Component Value Date    CO2 " 34 07/15/2018     Lab Results   Component Value Date    BUN 49 07/15/2018     Lab Results   Component Value Date    CR 1.87 07/15/2018     Lab Results   Component Value Date     07/15/2018         ASSESSMENT / PLAN:     HCAP (healthcare-associated pneumonia)  - Was treated with broad spectrum Abx and steroids along with NIPPV.  - Cx NGTD.    Pulmonary fibrosis (H)  - At baseline is on 5L oxygen and has FTT and numerous hospitalizations.  - Need to discuss hospice with pt and family.    Dementia without behavioral disturbance, unspecified dementia type  - On aricept.    Hypothyroidism, unspecified type  - On synthroid.    Chronic diastolic heart failure (H)  - Has hx of MI and TAVR.  - On lasix, metoprolol and losartan.  - Daily weights.    CKD (chronic kidney disease) stage 3, GFR 30-59 ml/min  - Avoid nephrotoxins.    Depression, unspecified depression type  - On cymbalta.    Hyperlipidemia, LDL goal <70.  - On lipitor.    Benign essential HTN.  - On norvasc, metoprolol and losartan.    Physical deconditioning  -Plan: PT/OT, fall precautions. Care conference with patient and family for the progress of rehab and disposition issues will be discussed as planned. Rehab evaluation and other evaluations including CPT are at rehab logs, to be reviewed separately.  Fall risk assessment as well as cognitive evaluation will be formed during rehab stay if indicated.    Other problems with same care. Primary care doctor and other specialists to address those chronic problems in next clinic appointment to be scheduled upon discharge from the TCU.      Total time spent with patient visit was 43 min including patient visit, review of past records, 1/2 time on patients counseling and coordinating care.

## 2018-07-13 NOTE — LETTER
7/13/2018        RE: Chaz Soler  [address]  [facility] [address]          PRIMARY CARE PROVIDER AND CLINIC RESPONSIBLE:  Alirio Fox, [address] [place] [address] / [facility] [address]        ADMISSION HISTORY AND PHYSICAL EXAMINATION     Chief Complaint   Patient presents with     Hospital F/U         HISTORY OF PRESENT ILLNESS:  89 year old male, (5/21/1928), [facility] of medical care and rehab.    Pt admitted Critical access hospital 7/2 to 7/7 for HCAP. At baseline is WC bound now with 5L oxygen. Has had multiple admissions this year.    Pt denies any fevers/chills/cough/worsening SOB.    Patient is seen and examined by me with Tiff Ricketts CNP. Please see Tiff Ricketts 's admit noted dated 7/9 for details of admission, past medical history, family history, allergies, medication list, social history and other details pertinent with this admission. Hospital admission and dc summary reviewed.      Past Medical History:   Diagnosis Date     AAA (abdominal aortic aneurysm) (H) 10/5/2011    6.1 cm     Anemia 11/30/2011     Aortic stenosis 10/26/2012     CAD (coronary artery disease)     MI - distal inferior/apex. Non transmural     Carotid artery disease (H)      CHF (congestive heart failure) (H) 12/31/2014     CKD (chronic kidney disease) stage 3, GFR 30-59 ml/min 11/30/2011     COPD (chronic obstructive pulmonary disease) (H)      Dementia 8/4/2015     Edema, unspecified edema 1/17/2016     Essential hypertension      Gout 1/06    knee     Hypercalcemia 1/2/2015     Hyperlipidemia LDL goal < 70      Hyperparathyroidism, unspecified 4/22/2015     Hyperplasia of prostate      LEFT LUNG GRANULOMA      Lumbago      Other chronic pain 10/11/2016     Proteinuria 2/8/2012     Pulmonary fibrosis (H)      PVD (peripheral vascular disease) (H)      Thrombocytopenia (H) 11/30/2011     Unspecified hypothyroidism      Vitamin D deficiency        Past Surgical History:   Procedure Laterality  Date     COLONOSCOPY  9/02 per patient     DISCECTOMY LUMBAR POSTERIOR MICROSCOPIC ONE LEVEL  8/11/2014    Procedure: DISCECTOMY LUMBAR POSTERIOR MICROSCOPIC ONE LEVEL;  Surgeon: Jone Carvalho MD;  Location: [facility] OR     ENDOVASCULAR REPAIR ANEURYSM ABDOMINAL AORTA  11/18/2011    Procedure:ENDOVASCULAR REPAIR ANEURYSM ABDOMINAL AORTA; ENDOVASCULAR AAA,RIGHT FEMORAL       LAPAROSCOPIC CHOLECYSTECTOMY  Nov 2011     LAPAROSCOPIC CHOLECYSTECTOMY  11/18/2011    Procedure:LAPAROSCOPIC CHOLECYSTECTOMY; LAPAROSCOPIC CHOLECYSTECTOMY ; Surgeon:DARRYL DORADO; Location:[facility] OR     REPAIR ANEURYSM ABDOMINAL AORTA  Nov 2011     right cataract extraction/IOL  12/03     TRANSCATHETER AORTIC VALVE IMPLANT ANESTHESIA N/A 11/12/2014    Bioprosthetic. Procedure: TRANSCATHETER AORTIC VALVE IMPLANT ANESTHESIA;  Surgeon: Generic Anesthesia Provider;  Location: [facility] OR     VASECTOMY         Current Outpatient Prescriptions   Medication Sig     acetaminophen (TYLENOL) 500 MG tablet Take 500 mg by mouth 3 times daily     albuterol (2.5 MG/3ML) 0.083% neb solution Take 2.5 mg by nebulization every 2 hours as needed      amLODIPine (NORVASC) 10 MG tablet TAKE ONE TABLET BY MOUTH ONCE DAILY     aspirin 81 MG tablet Take 1 tablet (81 mg) by mouth daily     atorvastatin (LIPITOR) 20 MG tablet Take 1 tablet (20 mg) by mouth daily     cyanocobalamin (VITAMIN  B-12) 1000 MCG tablet Take 1,000 mcg by mouth daily     diclofenac (VOLTAREN) 1 % GEL topical gel Place 2 g onto the skin 4 times daily as needed (lower back pain)      donepezil (ARICEPT) 10 MG tablet TAKE ONE TABLET BY MOUTH AT BEDTIME     DULoxetine (CYMBALTA) 30 MG EC capsule Take 30 mg by mouth daily     furosemide (LASIX) 20 MG tablet Take 30 mg by mouth daily      gabapentin (NEURONTIN) 100 MG capsule TAKE ONE CAPSULE BY MOUTH THREE TIMES DAILY FOR  PAIN     ipratropium - albuterol 0.5 mg/2.5 mg/3 mL (DUONEB) 0.5-2.5 (3) MG/3ML neb solution Take 1 vial by nebulization 4 times  daily     levothyroxine (SYNTHROID/LEVOTHROID) 88 MCG tablet TAKE ONE TABLET BY MOUTH ONCE DAILY     LORazepam (ATIVAN) 0.5 MG tablet Take 0.5 tablets (0.25 mg) by mouth every 6 hours as needed for anxiety     losartan (COZAAR) 100 MG tablet TAKE ONE TABLET BY MOUTH  DAILY     metoprolol succinate (TOPROL-XL) 25 MG 24 hr tablet TAKE ONE TABLET BY MOUTH DAILY     Multiple Vitamins-Minerals (MULTIVITAL) TABS Take 1 tablet by mouth daily     pantoprazole (PROTONIX) 40 MG EC tablet TAKE ONE TABLET BY MOUTH EVERY MORNING     tamsulosin (FLOMAX) 0.4 MG capsule TAKE ONE CAPSULE BY MOUTH ONCE DAILY     traMADol (ULTRAM) 50 MG tablet Take 1 tablet (50 mg) by mouth 3 times daily     No current facility-administered medications for this visit.        Allergies   Allergen Reactions     Contrast Dye      SOB, increased Bp, difficulty swallowing. Date 6/3/96 (ipaque contrast)  Was premedicated prior to FRANCK and had no reaction at all (solumedrol and benadryl) 2016  Was premedicated with Methylprednisolone protocol, no reaction 1/25/17     Lisinopril      cough     Oxycodone      Delirium       Social History     Social History     Marital status:      Spouse name: N/A     Number of children: 4     Years of education: 18     Occupational History      Retired     Social History Main Topics     Smoking status: Never Smoker     Smokeless tobacco: Never Used     Alcohol use 0.0 oz/week     0 Standard drinks or equivalent per week      Comment: 1 glass wine/day     Drug use: No     Sexual activity: No     Other Topics Concern     Caffeine Concern Yes     1 day      Sleep Concern Yes     too much      Weight Concern Yes     appetite reduced      Special Diet No     Back Care Yes     Exercise Yes     daily 5 x per week.  recummbant bike      Seat Belt Yes     Social History Narrative    Lives in  Senior co-op in [place] for the past 13 years.     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     ROS: All 10 point review of system completed, those pertinent positive, please see H&P, the remaining ROS is negative.    /81  Pulse 66  Temp 97.1  F (36.2  C)  Resp 18  Ht 5' 8\" (1.727 m)  Wt 136 lb 12.8 oz (62.1 kg)  SpO2 98%  BMI 20.8 kg/m2    PHYSICAL EXAMINATION:   GENERAL:  No acute distress. Seated on WC. [place]. Frail and chronically ill in NAD.  SKIN:  Dry and warm.  There is no rash, lesions, ulcers or juandice at area of skin examined.  HEENT:  Head without trauma.  Pupils round, reactive. Exam of conjunctiva and lids are normal. Sclera without icterus. There is no oral thrush.  NECK:  Supple.  There is no cervical adenopathy, no thyromegaly. No jugular venous distension.  CHEST: No reproducible chest tenderness.   LUNGS:  Normal respiratory effort. + crackles bilaterally.  HEART:  Regular rate and rhythm.  No murmur, gallops or rubs auscultated.  ABDOMEN:  Soft, bowel sounds positive.  There is no tenderness or guarding.   EXTREMITIES: No edema.   NEUROLOGIC:  Alert and oriented to self and follows commands.    Lab/Diagnostic data:  Reviewed    Lab Results   Component Value Date    WBC 7.7 07/15/2018     Lab Results   Component Value Date    RBC 3.96 07/15/2018     Lab Results   Component Value Date    HGB 11.9 07/15/2018     Lab Results   Component Value Date    HCT 36.9 07/15/2018     Lab Results   Component Value Date    MCV 93 07/15/2018     Lab Results   Component Value Date    MCH 30.1 07/15/2018     Lab Results   Component Value Date    MCHC 32.2 07/15/2018     Lab Results   Component Value Date    RDW 14.0 07/15/2018     Lab Results   Component Value Date     07/15/2018       Last Basic Metabolic Panel:  Lab Results   Component Value Date     07/15/2018      Lab Results   Component Value Date    POTASSIUM 4.9 07/15/2018     Lab Results   Component Value Date    CHLORIDE 102 07/15/2018 "     Lab Results   Component Value Date    LEXIS 9.3 07/15/2018     Lab Results   Component Value Date    CO2 34 07/15/2018     Lab Results   Component Value Date    BUN 49 07/15/2018     Lab Results   Component Value Date    CR 1.87 07/15/2018     Lab Results   Component Value Date     07/15/2018         ASSESSMENT / PLAN:     HCAP (healthcare-associated pneumonia)  - Was treated with broad spectrum Abx and steroids along with NIPPV.  - Cx NGTD.    Pulmonary fibrosis (H)  - At baseline is on 5L oxygen and has FTT and numerous hospitalizations.  - Need to discuss hospice with pt and family.    Dementia without behavioral disturbance, unspecified dementia type  - On aricept.    Hypothyroidism, unspecified type  - On synthroid.    Chronic diastolic heart failure (H)  - Has hx of MI and TAVR.  - On lasix, metoprolol and losartan.  - Daily weights.    CKD (chronic kidney disease) stage 3, GFR 30-59 ml/min  - Avoid nephrotoxins.    Depression, unspecified depression type  - On cymbalta.    Hyperlipidemia, LDL goal <70.  - On lipitor.    Benign essential HTN.  - On norvasc, metoprolol and losartan.    Physical deconditioning  -Plan: PT/OT, fall precautions. Care conference with patient and family for the progress of rehab and disposition issues will be discussed as planned. Rehab evaluation and other evaluations including CPT are at rehab logs, to be reviewed separately.  Fall risk assessment as well as cognitive evaluation will be formed during rehab stay if indicated.    Other problems with same care. Primary care doctor and other specialists to address those chronic problems in next clinic appointment to be scheduled upon discharge from the TCU.      Total time spent with patient visit was 43 min including patient visit, review of past records, 1/2 time on patients counseling and coordinating care.            Sincerely,        Susi Galvez MD

## 2018-07-13 NOTE — MR AVS SNAPSHOT
After Visit Summary   7/13/2018    Chaz Soler    MRN: [id number]           Patient Information     Date Of Birth          5/21/1928        Visit Information        Provider Department      7/13/2018 12:30 PM Susi Galvez MD [facility]        Today's Diagnoses     HCAP (healthcare-associated pneumonia)    -  1    Pulmonary fibrosis (H)        Dementia without behavioral disturbance, unspecified dementia type        Hypothyroidism, unspecified type        Chronic diastolic heart failure (H)        CKD (chronic kidney disease) stage 3, GFR 30-59 ml/min        Depression, unspecified depression type        Physical deconditioning           Follow-ups after your visit        Your next 10 appointments already scheduled     Aug 28, 2018 12:30 PM CDT   Ech Complete with [address] ([facility])    [address]  [facility] [address] [phone]           1.  Please bring or wear a comfortable two-piece outfit. 2.  You may eat, drink and take your normal medicines. 3.  For any questions that cannot be answered, please contact the ordering physician 4.  Please do not wear perfumes or scented lotions on the day of your exam. ***Please check-in at the [facility] Office located in [address] in the [facility] building. When you are finished registering, please go to [address] and have a seat. The technician will call your name for the test.            Sep 04, 2018  3:45 PM CDT   Return Visit with Tyrell Jackson MD   [facility] ([facility])    [address]  [facility] [address] [phone]              Future tests that were ordered for you today     Open Standing Orders        Priority Remaining Interval Expires Ordered    Blood gas venous and oxyhgb Routine 16/16 DAILY  7/15/2018    BIPAP continuous  "Routine [id number] CONTINUOUS  7/14/2018    Notify provider Routine [id number] PRN  7/14/2018    Blood gas arterial - 30 minutes after placement on BIPAP STAT 1/1 CONDITIONAL X 1 STAT  7/14/2018    CBC with platelets differential Routine 15/16 DAILY  7/14/2018    Basic metabolic panel Routine 15/16 DAILY  7/14/2018    Platelet count Routine 12/12 EVERY THREE DAYS  7/14/2018            Who to contact     If you have questions or need follow up information about today's clinic visit or your schedule please contact [facility] directly at [phone].  Normal or non-critical lab and imaging results will be communicated to you by [employer], letter or phone within 4 business days after the clinic has received the results. If you do not hear from us within 7 days, please contact the clinic through [employer] or phone. If you have a critical or abnormal lab result, we will notify you by phone as soon as possible.  Submit refill requests through [employer] or call your pharmacy and they will forward the refill request to us. Please allow 3 business days for your refill to be completed.          Additional Information About Your Visit        [employer] Information     [employer] gives you secure access to your electronic health record. If you see a primary care provider, you can also send messages to your care team and make appointments. If you have questions, please call your primary care clinic.  If you do not have a primary care provider, please call [phone] and they will assist you.        Care EveryWhere ID     This is your Care EveryWhere ID. This could be used by other organizations to access your [place] medical records  [id number]        Your Vitals Were     Pulse Temperature Respirations Height Pulse Oximetry BMI (Body Mass Index)    66 97.1  F (36.2  C) 18 5' 8\" (1.727 m) 98% 20.8 kg/m2       Blood Pressure from Last 3 Encounters:   07/15/18 106/61   07/12/18 162/81   07/09/18 138/72    Weight from " Last 3 Encounters:   07/15/18 133 lb 2.5 oz (60.4 kg)   07/12/18 136 lb 12.8 oz (62.1 kg)   07/09/18 135 lb (61.2 kg)              Today, you had the following     No orders found for display       Primary Care Provider Office Phone # Fax #    Alirio Fox [phone]       [address] [facility] [address]        Goals        General    Medical (pt-stated)     Notes - Note created  4/9/2018 10:33 AM by Pebbles Leija, RN    Goal Statement: I will decrease shortness of breath episodes   Measure of Success: I will have decreased shortness of breath episodes and increase my activity   Supportive Steps to Achieve: I will use my nebulizer as directed and seek medical help if increased symptoms of concern   Barriers: Deconditioned  Strengths: Supportive wife   Date to Achieve By: Ongoing         Equal Access to Services     NELIDA PHILLIPS AH: Hadkayleen Boyd, warayo ricketts, qaybta kaalmadaniela sr, johnny dinh . So [facility] [phone].    ATENCIÓN: Si habla español, tiene a denise disposición servicios gratuitos de asistencia lingüística. Llame al [phone].    We comply with applicable federal civil rights laws and [place] laws. We do not discriminate on the basis of race, color, national origin, age, disability, sex, sexual orientation, or gender identity.            Thank you!     Thank you for choosing [facility]  for your care. Our goal is always to provide you with excellent care. Hearing back from our patients is one way we can continue to improve our services. Please take a few minutes to complete the written survey that you may receive in the mail after your visit with us. Thank you!             Your Updated Medication List - Protect others around you: Learn how to safely use, store and throw away your medicines at www.disposemymeds.org.          This list is accurate as of 7/13/18 11:59 PM.  Always use your most recent med list.                    Brand Name Dispense Instructions for use Diagnosis    acetaminophen 500 MG tablet    TYLENOL     Take 500 mg by mouth 3 times daily        albuterol (2.5 MG/3ML) 0.083% neb solution      Take 2.5 mg by nebulization every 2 hours as needed        amLODIPine 10 MG tablet    NORVASC    90 tablet    TAKE ONE TABLET BY MOUTH ONCE DAILY    Essential hypertension       aspirin 81 MG tablet     30 tablet    Take 1 tablet (81 mg) by mouth daily    Coronary artery disease involving native coronary artery of native heart without angina pectoris       atorvastatin 20 MG tablet    LIPITOR    90 tablet    Take 1 tablet (20 mg) by mouth daily    Coronary artery disease involving native coronary artery of native heart without angina pectoris       cyanocobalamin 1000 MCG tablet    vitamin  B-12     Take 1,000 mcg by mouth daily        diclofenac 1 % Gel topical gel    VOLTAREN     Place 2 g onto the skin 4 times daily as needed (lower back pain)        donepezil 10 MG tablet    ARICEPT    90 tablet    TAKE ONE TABLET BY MOUTH AT BEDTIME    Memory loss       DULoxetine 30 MG EC capsule    CYMBALTA     Take 30 mg by mouth daily        furosemide 20 MG tablet    LASIX     Take 30 mg by mouth daily        gabapentin 100 MG capsule    NEURONTIN    270 capsule    TAKE ONE CAPSULE BY MOUTH THREE TIMES DAILY FOR  PAIN    Lumbar radiculopathy       ipratropium - albuterol 0.5 mg/2.5 mg/3 mL 0.5-2.5 (3) MG/3ML neb solution    DUONEB     Take 1 vial by nebulization 4 times daily        levothyroxine 88 MCG tablet    SYNTHROID/LEVOTHROID    90 tablet    TAKE ONE TABLET BY MOUTH ONCE DAILY    Hypothyroidism, unspecified type       LORazepam 0.5 MG tablet    ATIVAN    20 tablet    Take 0.5 tablets (0.25 mg) by mouth every 6 hours as needed for anxiety    Acute on chronic respiratory failure with hypoxia (H)       losartan 100 MG tablet    COZAAR    90 tablet    TAKE ONE TABLET BY MOUTH  DAILY    Essential hypertension        metoprolol succinate 25 MG 24 hr tablet    TOPROL-XL    90 tablet    TAKE ONE TABLET BY MOUTH DAILY    Essential hypertension       MULTIVITAL Tabs      Take 1 tablet by mouth daily        pantoprazole 40 MG EC tablet    PROTONIX    90 tablet    TAKE ONE TABLET BY MOUTH EVERY MORNING    Gastroesophageal reflux disease, esophagitis presence not specified       tamsulosin 0.4 MG capsule    FLOMAX    90 capsule    TAKE ONE CAPSULE BY MOUTH ONCE DAILY    Benign prostatic hyperplasia with urinary retention       traMADol 50 MG tablet    ULTRAM    15 tablet    Take 1 tablet (50 mg) by mouth 3 times daily    Acute on chronic respiratory failure with hypoxia (H)

## 2018-07-14 PROBLEM — G93.40 ENCEPHALOPATHY: Status: ACTIVE | Noted: 2018-01-01

## 2018-07-14 NOTE — ED PROVIDER NOTES
History     Chief Complaint:  Altered Mental Status    HPI limited secondary to patient's altered mental status. HPI provided via EMS and nursing staff.  HPI   Chaz Soler is a 89 year old male with a history of dementia, who presents with his daughter to the ED for the evaluation of altered mental status. EMS reports that the patient has been lethargic today and that he is hypotensive. Of note, he was seen in the ED on 7/2/2018 for altered mental status as well and a care plan was discussed with family.    Upon arrival of patient's daughter, she reports the patient has been sleeping up to 20 hours a day for the past four days and that he has a decreased appetite that is down 80%. The daughter is not the regular care giver of the patient.    Allergies:  Lisinopril  Oxycodone     Medications:    Norvasc  Lipitor  Voltaren  Aricept  Cymbalta  Neurontin  Synthroid  Ativan  Cozaar  Toprol  Protonix  Ultram    Past Medical History:    AAA  Anemia  Aortic stenosis  CAD  CHF  CKD  COPD  Dementia  Edema  Essential hypertension  Hypercalcemia  Hyperlipidemia  Hyperplasia  Pulmonary fibrosis  Thrombocytopenia  Hypothyroidism    Past Surgical History:    Endovascular Repair Aneurysm Abdominal Aorta  Transcatheter Aortic Valve Implant     Family History:    Hypertension    Social History:  Smoking status: Never Smoker  Alcohol use: No  Marital Status:   [2]     Review of Systems   Unable to perform ROS: Mental status change     Physical Exam     Patient Vitals for the past 24 hrs:   BP Temp Temp src Heart Rate Resp SpO2   07/14/18 1755 - 98.5  F (36.9  C) Oral - - -   07/14/18 1742 105/57 - - 70 (!) 40 (!) 84 %     Physical Exam  Constitutional: Patient opens eyes to voice. Answers simple questions yes no.  Head: Atraumatic.  Mouth/Throat: Oropharynx is clear and moist. No oropharyngeal exudate.  Eyes: Conjunctivae and EOM are normal. No scleral icterus.  Neck: Normal range of motion. Neck supple.   Cardiovascular:  Normal rate, regular rhythm, normal heart sounds and intact distal pulses.   Pulmonary/Chest: Breath sounds normal. No respiratory distress.  Abdominal: Soft. Bowel sounds are normal. No distension. No tenderness. No rebound or guarding.   Musculoskeletal: Appears to be moving all extremities. No edema or tenderness.   Neurological: Alert and orientated to person, place, and time. No observable focal neuro deficit  Skin: Warm and dry. No rash noted. Not diaphoretic.       Emergency Department Course   ECG (17:44:48):  Rate 64 bpm. LA interval 184. QRS duration 124. QT/QTc 428/441. P-R-T axes 21 -43 65. Normal sinus rhythm. Left axis deviation. Left ventricular hypertrophy with QRS widening. Nonspecific T wave abnormality. Abnormal ECG. Interpreted at 1748 by Christoph Brooks MD.    Imaging:  Radiographic findings were communicated with the patient and family who voiced understanding of the findings.  XR Chest Port 1 View  IMPRESSION: Lung volumes remain low. Background of interstitial  opacities are again seen although the hazy opacities from prior  appears slightly improved which may be due to edema and/or pneumonia.  No significant pleural effusion or pneumothorax. Cardiac silhouette  remains enlarged.  As read by Radiology.    CT Head w/o Contrast    As read by Radiology.    Laboratory:  Glucose (1755): 142 (H)    ISTAT Basic Met ICa HCT POCT (1753): Glucose 125 (H), BUN 48 (H), Creatinine 2.0 (H), GFR 32 (L), HGB 10.5 (L), Hematocrit 31 (L)    CBC: HCB 11.1 (L),  (L). WNL (WBC 8.9)  BMP: Creatinine 1.92 (H), CO2 35 (H), Glucose 122 (H), BUN  47 (H), GFR 33 (L)    INR: 0.95  Troponin I (1749) <0.015  Lactic Acid (1813): 1.1  Nt probnp inpatient: N-Terminal Pro     Interventions:  1911, vancocin, 250 mL, IV    Emergency Department Course:  Past medical records, nursing notes, and vitals reviewed.  1738: I performed an exam of the patient and obtained history, as documented above.    IV inserted and  blood drawn.    The patient was sent for a chest x ray and head CT while in the emergency department, findings above.  Hospitallist agrees to follow up on Ct findings.      1815: I rechecked the patient. Explained findings to patient and daughter.    1937: I rechecked the patient. Explained findings to patient and daughter.    2006: Discussed the patient with the hospitalist, who will admit the patient to a bed for further monitoring, evaluation, and treatment. Findings and plan explained to the Patient who consents to admission.    Impression & Plan    Medical Decision Making:  DNR DNI multi organ chronic failure.  Worsening.  Lethargy today and suspect still has HAP by CXR and dropping o2 sats.  EMpiric abx as only intervention including bipap allowed by POLST and family wishes.  Dr. Galvez saw pt yesterday and knows him well and is will to readmit.  Please see extensive EMR admission history throughout June and July.  Family is on path to palliative care.  Daughter updated multiple times and pt looks very comfortable awaiting admission.      Diagnosis:    ICD-10-CM    1. Altered mental status, unspecified altered mental status type R41.82 UA with Microscopic   2. Hospital-acquired pneumonia J18.9        Disposition:  Admitted to hospital.      Joni Montemayor  7/14/2018   [facility] EMERGENCY DEPARTMENT  Scribe Disclosure:  I, Joni Montemayor, am serving as a scribe at 5:38 PM on 7/14/2018 to document services personally performed by Christoph Brooks MD based on my observations and the provider's statements to me.        Christoph Brooks MD  07/14/18 [id number]

## 2018-07-14 NOTE — IP AVS SNAPSHOT
"    [place] [address] [facility]: [phone]                                              INTERAGENCY TRANSFER FORM - PHYSICIAN ORDERS   2018                    Hospital Admission Date: 2018  NATTY MAN   : 1928  Sex: Male        Attending Provider: Susi Galvez MD     Allergies:  Contrast Dye, Lisinopril, Oxycodone    Infection:  None   Service:  HOSPITALIST    Ht:  1.727 m (5' 8\")   Wt:  58.7 kg (129 lb 6.6 oz)   Admission Wt:  60.2 kg (132 lb 11.5 oz)    BMI:  19.68 kg/m 2   BSA:  1.68 m 2            Patient PCP Information     Provider PCP Type    Alirio Fox MD General      ED Clinical Impression     Diagnosis Description Comment Added By Time Added    Altered mental status, unspecified altered mental status type [R41.82] Altered mental status, unspecified altered mental status type [R41.82]  Crhistoph Brooks MD 2018  6:35 PM    Hospital-acquired pneumonia [J18.9] Hospital-acquired pneumonia [J18.9]  Christoph Brooks MD 2018  6:35 PM      Hospital Problems as of 2018              Priority Class Noted POA    Encephalopathy Medium  2018 Yes    Air hunger Medium  Unknown Unknown      Non-Hospital Problems as of 2018              Priority Class Noted    Essential hypertension Medium  2005    Other specified disorders of prostate Medium  2006    Pulmonary fibrosis (H) Medium  Unknown    Advance Care Planning Medium  2011    CAD (coronary artery disease) Medium  Unknown    CKD (chronic kidney disease) stage 3, GFR 30-59 ml/min Medium  2011    Proteinuria Medium  2012    Hyperlipidemia with target LDL less than 70 Medium  Unknown    Lumbar radiculopathy Medium  2014    Status post lumbar discectomy Medium  2014    S/P TAVR (transcatheter aortic valve replacement) Medium  2014    Physical deconditioning Medium  2014    CHF (congestive heart failure) (H) Medium  2014    Anemia Medium  2015    SAI " (obstructive sleep apnea) Medium  2/24/2015    Hyperparathyroidism (H) Medium  4/22/2015    Dementia Medium  8/4/2015    Hypothyroidism Medium  10/27/2015    Health Care Home Medium  11/5/2015    Edema, unspecified edema Medium  1/17/2016    Other chronic pain Medium  10/11/2016    Compression fracture L2-3 Medium  2/2/2017    Weakness Medium  1/8/2018    Community acquired pneumonia Medium  7/2/2018    PRESCOTT (dyspnea on exertion) Medium  Unknown    Poor appetite Medium  Unknown    Goals of care, counseling/discussion Medium  Unknown    Encounter for palliative care Medium  Unknown      Code Status History     Date Active Date Inactive Code Status Order ID Comments User Context    7/25/2018 10:07 AM  DNR/DNI [id number]  Susi Galvez MD Outpatient    7/14/2018 10:43 PM 7/25/2018 10:07 AM DNR/DNI [id number]  Susi Galvez MD Inpatient    7/6/2018  2:50 PM 7/14/2018 10:43 PM DNR/DNI [id number]  Rachel Mclain MD Outpatient    7/2/2018  1:28 PM 7/6/2018  2:50 PM DNR/DNI [id number]  Rosario Ardon PA-C Inpatient    6/13/2018  1:58 PM 7/2/2018  1:28 PM DNR [id number]  Liliam TiffCELIA Baez CNP Outpatient    6/6/2018 12:22 PM 6/13/2018  1:58 PM DNR/DNI [id number]  Macario Rodriguez MD Outpatient    6/3/2018  5:35 PM 6/6/2018 12:22 PM DNR/DNI [id number]  Macario Rodriguez MD Inpatient    1/8/2018 12:46 AM 1/9/2018  4:40 PM DNI [id number]  Alfonso Villafuerte MD Inpatient    10/2/2017  4:07 PM 1/8/2018 12:46 AM DNI [id number]  Tracey Beltran PA-C Outpatient    10/1/2017  5:06 PM 10/2/2017  4:07 PM DNI [id number]  Dayami Guillory PA-C Inpatient    2/1/2017  9:49 AM 10/1/2017  5:06 PM Full Code [id number]  Alejo Lopez MD Outpatient    1/29/2017 10:10 PM 2/1/2017  9:49 AM Full Code [id number]  Carie Bowers MD Inpatient    11/15/2016 11:02 AM 1/29/2017 10:10 PM Full Code [id number]  Yudelka Castillo MD Outpatient    11/14/2016  6:32 PM 11/15/2016 11:02 AM Full Code [id number]   Yudelka Castillo MD Inpatient    11/20/2014 10:49 AM 11/14/2016  6:32 PM Full Code [id number]  Martínez Larson MD Outpatient    11/19/2014  4:47 AM 11/20/2014 10:49 AM Full Code [id number]  Lokesh Walls MD Inpatient    11/17/2014  3:16 PM 11/19/2014  4:47 AM Full Code [id number]  Joie Caballero PA-C Outpatient    11/12/2014  3:48 PM 11/17/2014  3:16 PM Full Code [id number]  Ruby Monteiro MD Inpatient    10/8/2014  2:52 PM 10/10/2014  8:06 PM Full Code [id number]  Carie Bowers MD Inpatient    8/15/2014 12:40 PM 10/8/2014  2:52 PM Full Code [id number]  Melissa Isaac NP Outpatient    8/14/2014  9:55 AM 8/15/2014 12:40 PM Full Code [id number]  Melissa Isaac NP Outpatient    8/11/2014 12:20 PM 8/14/2014  9:55 AM Full Code [id number]  Jone Carvalho MD Inpatient    11/21/2011  9:22 AM 8/11/2014 12:20 PM Full Code [id number]  Jose Antonio Shin PA-C Outpatient    11/18/2011  6:50 PM 11/21/2011  9:22 AM Full Code [id number]  Niya Persaud RN Inpatient    11/18/2011  3:48 PM 11/18/2011  6:49 PM Full Code [id number]  Niya Persaud RN Inpatient         Medication Review      START taking        Dose / Directions Comments    atropine 1 % ophthalmic solution   Used for:  End of life care        Dose:  2-4 drop   Take 2-4 drops by mouth, place under tongue or place inside cheek every 2 hours as needed for other (terminal respiratory secretions) Not for ophthalmic use.   Quantity:  5 mL   Refills:  1    All meds need to be bubble wrapped.       bisacodyl 10 MG Suppository   Commonly known as:  DULCOLAX   Used for:  End of life care        Unwrap and insert 1 suppository rectally twice daily as needed for constipation.   Quantity:  4 suppository   Refills:  0    All meds need to be bubble wrapped.       haloperidol 2 MG/ML (HIGH CONC) solution   Commonly known as:  HALDOL   Used for:  End of life care        Dose:  0.5-1 mg   Take 0.25-0.5 mLs (0.5-1 mg) by mouth, place under  tongue or insert rectally every 6 hours as needed for agitation (nausea)   Quantity:  30 mL   Refills:  0    All meds need to be bubble wrapped.       HYDROmorphone (HIGH CONC) 10 mg/mL Liqd oral   Commonly known as:  DILAUDID   Used for:  End of life care   Replaces:  HYDROmorphone 2 MG tablet        Dose:  1-2 mg   Take 0.1-0.2 mLs (1-2 mg) by mouth or place under tongue every 2 hours as needed for moderate to severe pain or other (and/or??shortness of breath)   Quantity:  30 mL   Refills:  0    All meds need to be bubble packed       LORazepam 2 MG/ML (HIGH CONC) solution   Commonly known as:  ATIVAN   Used for:  End of life care   Replaces:  LORazepam 0.5 MG tablet        Dose:  0.5 mg   Take 0.25 mLs (0.5 mg) by mouth, place under tongue or insert rectally every 4 hours as needed for anxiety (restlessness)   Quantity:  30 mL   Refills:  1    All meds need to be bubble wrapped.       MEDICATION INSTRUCTION   Used for:  End of life care        If care facility cannot accept or use ranges, facility is instructed to use lower end of dosing range   Refills:  0        sennosides 8.6 MG tablet   Commonly known as:  SENOKOT   Used for:  End of life care        Dose:  1-2 tablet   Take 1-2 tablets by mouth 2 times daily   Quantity:  100 tablet   Refills:  1    All meds need to be bubble wrapped.       sodium chloride 0.65 % nasal spray   Commonly known as:  OCEAN   Used for:  End of life care        Dose:  1 spray   Spray 1 spray into both nostrils every 4 hours as needed for congestion (if o2 cannot be humidified - prevent epistaxis)   Quantity:  1 Bottle   Refills:  0          CONTINUE these medications which may have CHANGED, or have new prescriptions. If we are uncertain of the size of tablets/capsules you have at home, strength may be listed as something that might have changed.        Dose / Directions Comments    * APAP 325 MG Tabs   This may have changed:    - medication strength  - how much to take  - when to  take this   Used for:  End of life care        Dose:  650 mg   Take 650 mg by mouth 4 times daily   Quantity:  1 Bottle   Refills:  0    All meds to be bubble packed.       * acetaminophen 650 MG Suppository   Commonly known as:  TYLENOL   This may have changed:  You were already taking a medication with the same name, and this prescription was added. Make sure you understand how and when to take each.   Used for:  End of life care        Dose:  650 mg   Place 1 suppository (650 mg) rectally every 4 hours as needed for fever or mild pain (Do not exceed 4000 mg total acetaminophen per day.) Unwrap prior to insertion. See also oral tylenol order. Give if pt is unable to take PO.   Quantity:  12 suppository   Refills:  0    All meds need to be bubble wrapped.       diclofenac 1 % Gel topical gel   Commonly known as:  VOLTAREN   This may have changed:    - when to take this  - reasons to take this   Used for:  End of life care        Dose:  2 g   Place 2 g onto the skin 4 times daily   Quantity:  1 Tube   Refills:  0    All meds need to be bubble wrapped.       gabapentin 100 MG capsule   Commonly known as:  NEURONTIN   This may have changed:  See the new instructions.   Used for:  Chronic midline low back pain with left-sided sciatica        Dose:  100 mg   Take 1 capsule (100 mg) by mouth 3 times daily   Quantity:  15 capsule   Refills:  0        * Notice:  This list has 2 medication(s) that are the same as other medications prescribed for you. Read the directions carefully, and ask your doctor or other care provider to review them with you.      CONTINUE these medications which have NOT CHANGED        Dose / Directions Comments    albuterol (2.5 MG/3ML) 0.083% neb solution        Dose:  2.5 mg   Take 2.5 mg by nebulization every 2 hours as needed   Refills:  0        aspirin 81 MG tablet   Used for:  Coronary artery disease involving native coronary artery of native heart without angina pectoris        Dose:  81 mg    Take 1 tablet (81 mg) by mouth daily   Quantity:  30 tablet   Refills:  11        DULoxetine 30 MG EC capsule   Commonly known as:  CYMBALTA        Dose:  30 mg   Take 30 mg by mouth daily   Refills:  0        ipratropium - albuterol 0.5 mg/2.5 mg/3 mL 0.5-2.5 (3) MG/3ML neb solution   Commonly known as:  DUONEB        Dose:  1 vial   Take 1 vial by nebulization 4 times daily   Refills:  0        levothyroxine 88 MCG tablet   Commonly known as:  SYNTHROID/LEVOTHROID   Used for:  Hypothyroidism, unspecified type        TAKE ONE TABLET BY MOUTH ONCE DAILY   Quantity:  90 tablet   Refills:  3        metoprolol succinate 25 MG 24 hr tablet   Commonly known as:  TOPROL-XL   Used for:  Essential hypertension        TAKE ONE TABLET BY MOUTH DAILY   Quantity:  90 tablet   Refills:  2        pantoprazole 40 MG EC tablet   Commonly known as:  PROTONIX   Used for:  Gastroesophageal reflux disease, esophagitis presence not specified        TAKE ONE TABLET BY MOUTH EVERY MORNING   Quantity:  90 tablet   Refills:  3        tamsulosin 0.4 MG capsule   Commonly known as:  FLOMAX   Used for:  Benign prostatic hyperplasia with urinary retention        TAKE ONE CAPSULE BY MOUTH ONCE DAILY   Quantity:  90 capsule   Refills:  3          STOP taking     amLODIPine 10 MG tablet   Commonly known as:  NORVASC           atorvastatin 20 MG tablet   Commonly known as:  LIPITOR           cyanocobalamin 1000 MCG tablet   Commonly known as:  vitamin  B-12           donepezil 10 MG tablet   Commonly known as:  ARICEPT           furosemide 20 MG tablet   Commonly known as:  LASIX           HYDROmorphone 2 MG tablet   Commonly known as:  DILAUDID   Replaced by:  HYDROmorphone (HIGH CONC) 10 mg/mL Liqd oral           LORazepam 0.5 MG tablet   Commonly known as:  ATIVAN   Replaced by:  LORazepam 2 MG/ML (HIGH CONC) solution           losartan 100 MG tablet   Commonly known as:  COZAAR           MULTIVITAL Tabs           traMADol 50 MG tablet    Commonly known as:  ULTRAM                   After Care     Advance Diet as Tolerated       Follow this diet upon discharge: Orders Placed This Encounter      Room Service      Snacks/Supplements Adult: Magic Cup; With Meals      Regular Diet Adult       General info for SNF       Length of Stay Estimate: Short Term Care: Estimated # of Days <30  Condition at Discharge: Declining  Level of care:skilled   Rehabilitation Potential: Fair  Admission H&P remains valid and up-to-date: Yes  Recent Chemotherapy: N/A  Use Nursing Home Standing Orders: Yes       Mantoux instructions       Give two-step Mantoux (PPD) Per Facility Policy Yes             Procedures     Oxygen - Nasal cannula       5 Lpm by nasal cannula to keep O2 sats 92% or greater.             Follow-Up Appointment Instructions     Future Labs/Procedures    Follow Up and recommended labs and tests     Comments:    Follow up with Nursing home physician.  No follow up labs or test are needed.  Follow up with hospice team.      Follow-Up Appointment Instructions     Follow Up and recommended labs and tests       Follow up with Nursing home physician.  No follow up labs or test are needed.  Follow up with hospice team.             Statement of Approval     Ordered          07/25/18 1012  I have reviewed and agree with all the recommendations and orders detailed in this document.  EFFECTIVE NOW     Approved and electronically signed by:  Susi Galvez MD

## 2018-07-14 NOTE — IP AVS SNAPSHOT
MRN:[id number]                      After Visit Summary   7/14/2018    Chaz Soler    MRN: [id number]           Thank you!     Thank you for choosing [facility] for your care. Our goal is always to provide you with excellent care. Hearing back from our patients is one way we can continue to improve our services. Please take a few minutes to complete the written survey that you may receive in the mail after you visit. If you would like to speak to someone directly about your visit please contact Patient Relations at [phone]. Thank you!          Patient Information     Date Of Birth          5/21/1928        Designated Caregiver       Most Recent Value    Caregiver    Will someone help with your care after discharge? yes    Name of designated caregiver [facility]    Phone number of caregiver [facility]    Caregiver address [place]      About your hospital stay     You were admitted on:  July 14, 2018 You last received care in the:  [place] [address] [facility]    You were discharged on:  July 25, 2018       Who to Call     For medical emergencies, please call 911.  For non-urgent questions about your medical care, please call your primary care provider or clinic, [phone]          Attending Provider     Provider Specialty    Christoph Brooks MD Emergency Medicine    [facility]       Primary Care Provider Office Phone # Fax #    Alirio Fox [phone]      After Care Instructions     Advance Diet as Tolerated       Follow this diet upon discharge: Orders Placed This Encounter      Room Service      Snacks/Supplements Adult: Magic Cup; With Meals      Regular Diet Adult            General info for SNF       Length of Stay Estimate: Short Term Care: Estimated # of Days <30  Condition at Discharge: Declining  Level of care:skilled   Rehabilitation Potential: Fair  Admission H&P remains valid and up-to-date:  "Yes  Recent Chemotherapy: N/A  Use Nursing Home Standing Orders: Yes            Mantoux instructions       Give two-step Mantoux (PPD) Per Facility Policy Yes            Oxygen - Nasal cannula       5 Lpm by nasal cannula to keep O2 sats 92% or greater.                  Follow-up Appointments     Follow Up and recommended labs and tests       Follow up with Nursing home physician.  No follow up labs or test are needed.  Follow up with hospice team.                  Pending Results     No orders found from 7/12/2018 to 7/15/2018.            Statement of Approval     Ordered          07/25/18 1012  I have reviewed and agree with all the recommendations and orders detailed in this document.  EFFECTIVE NOW     Approved and electronically signed by:  Susi Galvez MD             Admission Information     Date & Time Provider Department Dept. Phone    7/14/2018 Susi Galvez MD [place] [address] [facility] [phone]      Your Vitals Were     Blood Pressure Pulse Temperature Respirations Height Weight    153/85 (BP Location: Left arm) 74 97.4  F (36.3  C) (Oral) 30 1.727 m (5' 8\") 58.7 kg (129 lb 6.6 oz)    Pulse Oximetry BMI (Body Mass Index)                91% 19.68 kg/m2          [employer] Information     [employer] gives you secure access to your electronic health record. If you see a primary care provider, you can also send messages to your care team and make appointments. If you have questions, please call your primary care clinic.  If you do not have a primary care provider, please call [phone] and they will assist you.        Care EveryWhere ID     This is your Care EveryWhere ID. This could be used by other organizations to access your [place] medical records  [id number]        Equal Access to Services     [facility]: Nickolas Boyd, warayo ricketts, qaybta johnny garcia. So [facility] [phone].    ATENCIÓN: Si kevin ramirez " denise disposición servicios gratuitos de asistencia lingüística. Coleen martinez [phone].    We comply with applicable federal civil rights laws and [place] laws. We do not discriminate on the basis of race, color, national origin, age, disability, sex, sexual orientation, or gender identity.               Review of your medicines      START taking        Dose / Directions    atropine 1 % ophthalmic solution   Used for:  End of life care        Dose:  2-4 drop   Take 2-4 drops by mouth, place under tongue or place inside cheek every 2 hours as needed for other (terminal respiratory secretions) Not for ophthalmic use.   Quantity:  5 mL   Refills:  1       bisacodyl 10 MG Suppository   Commonly known as:  DULCOLAX   Used for:  End of life care        Unwrap and insert 1 suppository rectally twice daily as needed for constipation.   Quantity:  4 suppository   Refills:  0       haloperidol 2 MG/ML (HIGH CONC) solution   Commonly known as:  HALDOL   Used for:  End of life care        Dose:  0.5-1 mg   Take 0.25-0.5 mLs (0.5-1 mg) by mouth, place under tongue or insert rectally every 6 hours as needed for agitation (nausea)   Quantity:  30 mL   Refills:  0       HYDROmorphone (HIGH CONC) 10 mg/mL Liqd oral   Commonly known as:  DILAUDID   Used for:  End of life care   Replaces:  HYDROmorphone 2 MG tablet        Dose:  1-2 mg   Take 0.1-0.2 mLs (1-2 mg) by mouth or place under tongue every 2 hours as needed for moderate to severe pain or other (and/or??shortness of breath)   Quantity:  30 mL   Refills:  0       LORazepam 2 MG/ML (HIGH CONC) solution   Commonly known as:  ATIVAN   Used for:  End of life care   Replaces:  LORazepam 0.5 MG tablet        Dose:  0.5 mg   Take 0.25 mLs (0.5 mg) by mouth, place under tongue or insert rectally every 4 hours as needed for anxiety (restlessness)   Quantity:  30 mL   Refills:  1       MEDICATION INSTRUCTION   Used for:  End of life care        If care facility cannot accept or use  ranges, facility is instructed to use lower end of dosing range   Refills:  0       sennosides 8.6 MG tablet   Commonly known as:  SENOKOT   Used for:  End of life care        Dose:  1-2 tablet   Take 1-2 tablets by mouth 2 times daily   Quantity:  100 tablet   Refills:  1       sodium chloride 0.65 % nasal spray   Commonly known as:  OCEAN   Used for:  End of life care        Dose:  1 spray   Spray 1 spray into both nostrils every 4 hours as needed for congestion (if o2 cannot be humidified - prevent epistaxis)   Quantity:  1 Bottle   Refills:  0         CONTINUE these medicines which may have CHANGED, or have new prescriptions. If we are uncertain of the size of tablets/capsules you have at home, strength may be listed as something that might have changed.        Dose / Directions    * APAP 325 MG Tabs   This may have changed:    - medication strength  - how much to take  - when to take this   Used for:  End of life care        Dose:  650 mg   Take 650 mg by mouth 4 times daily   Quantity:  1 Bottle   Refills:  0       * acetaminophen 650 MG Suppository   Commonly known as:  TYLENOL   This may have changed:  You were already taking a medication with the same name, and this prescription was added. Make sure you understand how and when to take each.   Used for:  End of life care        Dose:  650 mg   Place 1 suppository (650 mg) rectally every 4 hours as needed for fever or mild pain (Do not exceed 4000 mg total acetaminophen per day.) Unwrap prior to insertion. See also oral tylenol order. Give if pt is unable to take PO.   Quantity:  12 suppository   Refills:  0       diclofenac 1 % Gel topical gel   Commonly known as:  VOLTAREN   This may have changed:    - when to take this  - reasons to take this   Used for:  End of life care        Dose:  2 g   Place 2 g onto the skin 4 times daily   Quantity:  1 Tube   Refills:  0       gabapentin 100 MG capsule   Commonly known as:  NEURONTIN   This may have changed:  See  the new instructions.   Used for:  Chronic midline low back pain with left-sided sciatica        Dose:  100 mg   Take 1 capsule (100 mg) by mouth 3 times daily   Quantity:  15 capsule   Refills:  0       * Notice:  This list has 2 medication(s) that are the same as other medications prescribed for you. Read the directions carefully, and ask your doctor or other care provider to review them with you.      CONTINUE these medicines which have NOT CHANGED        Dose / Directions    albuterol (2.5 MG/3ML) 0.083% neb solution        Dose:  2.5 mg   Take 2.5 mg by nebulization every 2 hours as needed   Refills:  0       aspirin 81 MG tablet   Used for:  Coronary artery disease involving native coronary artery of native heart without angina pectoris        Dose:  81 mg   Take 1 tablet (81 mg) by mouth daily   Quantity:  30 tablet   Refills:  11       DULoxetine 30 MG EC capsule   Commonly known as:  CYMBALTA        Dose:  30 mg   Take 30 mg by mouth daily   Refills:  0       ipratropium - albuterol 0.5 mg/2.5 mg/3 mL 0.5-2.5 (3) MG/3ML neb solution   Commonly known as:  DUONEB        Dose:  1 vial   Take 1 vial by nebulization 4 times daily   Refills:  0       levothyroxine 88 MCG tablet   Commonly known as:  SYNTHROID/LEVOTHROID   Used for:  Hypothyroidism, unspecified type        TAKE ONE TABLET BY MOUTH ONCE DAILY   Quantity:  90 tablet   Refills:  3       metoprolol succinate 25 MG 24 hr tablet   Commonly known as:  TOPROL-XL   Used for:  Essential hypertension        TAKE ONE TABLET BY MOUTH DAILY   Quantity:  90 tablet   Refills:  2       pantoprazole 40 MG EC tablet   Commonly known as:  PROTONIX   Used for:  Gastroesophageal reflux disease, esophagitis presence not specified        TAKE ONE TABLET BY MOUTH EVERY MORNING   Quantity:  90 tablet   Refills:  3       tamsulosin 0.4 MG capsule   Commonly known as:  FLOMAX   Used for:  Benign prostatic hyperplasia with urinary retention        TAKE ONE CAPSULE BY MOUTH  ONCE DAILY   Quantity:  90 capsule   Refills:  3         STOP taking     amLODIPine 10 MG tablet   Commonly known as:  NORVASC           atorvastatin 20 MG tablet   Commonly known as:  LIPITOR           cyanocobalamin 1000 MCG tablet   Commonly known as:  vitamin  B-12           donepezil 10 MG tablet   Commonly known as:  ARICEPT           furosemide 20 MG tablet   Commonly known as:  LASIX           HYDROmorphone 2 MG tablet   Commonly known as:  DILAUDID   Replaced by:  HYDROmorphone (HIGH CONC) 10 mg/mL Liqd oral           LORazepam 0.5 MG tablet   Commonly known as:  ATIVAN   Replaced by:  LORazepam 2 MG/ML (HIGH CONC) solution           losartan 100 MG tablet   Commonly known as:  COZAAR           MULTIVITAL Tabs           traMADol 50 MG tablet   Commonly known as:  ULTRAM                Where to get your medicines      These medications were sent to [facility] [address] [address] [address]     Phone:  [phone]     APAP 325 MG Tabs    bisacodyl 10 MG Suppository    diclofenac 1 % Gel topical gel    haloperidol 2 MG/ML (HIGH CONC) solution    sennosides 8.6 MG tablet         Some of these will need a paper prescription and others can be bought over the counter. Ask your nurse if you have questions.     Bring a paper prescription for each of these medications     acetaminophen 650 MG Suppository    atropine 1 % ophthalmic solution    HYDROmorphone (HIGH CONC) 10 mg/mL Liqd oral    LORazepam 2 MG/ML (HIGH CONC) solution       You don't need a prescription for these medications     gabapentin 100 MG capsule    MEDICATION INSTRUCTION    sodium chloride 0.65 % nasal spray                Protect others around you: Learn how to safely use, store and throw away your medicines at www.disposemymeds.org.        Information about OPIOIDS     PRESCRIPTION OPIOIDS: WHAT YOU NEED TO KNOW   We gave you an opioid (narcotic) pain medicine. It is  important to manage your pain, but opioids are not always the best choice. You should first try all the other options your care team gave you. Take this medicine for as short a time (and as few doses) as possible.     These medicines have risks:    DO NOT drive when on new or higher doses of pain medicine. These medicines can affect your alertness and reaction times, and you could be arrested for driving under the influence (DUI). If you need to use opioids long-term, talk to your care team about driving.    DO NOT operate heave machinery    DO NOT do any other dangerous activities while taking these medicines.     DO NOT drink any alcohol while taking these medicines.      If the opioid prescribed includes acetaminophen, DO NOT take with any other medicines that contain acetaminophen. Read all labels carefully. Look for the word  acetaminophen  or  Tylenol.  Ask your pharmacist if you have questions or are unsure.    You can get addicted to pain medicines, especially if you have a history of addiction (chemical, alcohol or substance dependence). Talk to your care team about ways to reduce this risk.    Store your pills in a secure place, locked if possible. We will not replace any lost or stolen medicine. If you don t finish your medicine, please throw away (dispose) as directed by your pharmacist. The [place] [employer]ution [employer] has more information about safe disposal: https://www.pca.[facility].mn.us/living-green/managing-unwanted-medications.     All opioids tend to cause constipation. Drink plenty of water and eat foods that have a lot of fiber, such as fruits, vegetables, prune juice, apple juice and high-fiber cereal. Take a laxative (Miralax, milk of magnesia, Colace, Senna) if you don t move your bowels at least every other day.              Medication List: This is a list of all your medications and when to take them. Check marks below indicate your daily home schedule. Keep this list as a reference.       Medications           Morning Afternoon Evening Bedtime As Needed    albuterol (2.5 MG/3ML) 0.083% neb solution   Take 2.5 mg by nebulization every 2 hours as needed   Last time this was given:  2.5 mg on 7/19/2018  2:30 AM   Next Dose Due:  Available when needed                                   * APAP 325 MG Tabs   Take 650 mg by mouth 4 times daily   Last time this was given:  650 mg on 7/25/2018  8:33 AM   Next Dose Due:  7/25:afternoon                                            * acetaminophen 650 MG Suppository   Commonly known as:  TYLENOL   Place 1 suppository (650 mg) rectally every 4 hours as needed for fever or mild pain (Do not exceed 4000 mg total acetaminophen per day.) Unwrap prior to insertion. See also oral tylenol order. Give if pt is unable to take PO.   Next Dose Due:  Available as needed                                   aspirin 81 MG tablet   Take 1 tablet (81 mg) by mouth daily   Next Dose Due:  7/26:AM                                   atropine 1 % ophthalmic solution   Take 2-4 drops by mouth, place under tongue or place inside cheek every 2 hours as needed for other (terminal respiratory secretions) Not for ophthalmic use.   Next Dose Due:  Available as needed                                   bisacodyl 10 MG Suppository   Commonly known as:  DULCOLAX   Unwrap and insert 1 suppository rectally twice daily as needed for constipation.   Next Dose Due:  Available as needed                                   diclofenac 1 % Gel topical gel   Commonly known as:  VOLTAREN   Place 2 g onto the skin 4 times daily   Last time this was given:  2 g on 7/25/2018  8:34 AM   Next Dose Due:  7/25: 1300                                              DULoxetine 30 MG EC capsule   Commonly known as:  CYMBALTA   Take 30 mg by mouth daily   Last time this was given:  30 mg on 7/25/2018  8:33 AM   Next Dose Due:  7/26:AM                                   gabapentin 100 MG capsule   Commonly known as:  NEURONTIN    Take 1 capsule (100 mg) by mouth 3 times daily   Last time this was given:  100 mg on 7/25/2018  8:34 AM   Next Dose Due:  7/25: 1600                                         haloperidol 2 MG/ML (HIGH CONC) solution   Commonly known as:  HALDOL   Take 0.25-0.5 mLs (0.5-1 mg) by mouth, place under tongue or insert rectally every 6 hours as needed for agitation (nausea)   Next Dose Due:  Available when needed                                   HYDROmorphone (HIGH CONC) 10 mg/mL Liqd oral   Commonly known as:  DILAUDID   Take 0.1-0.2 mLs (1-2 mg) by mouth or place under tongue every 2 hours as needed for moderate to severe pain or other (and/or??shortness of breath)   Last time this was given:  7/25: 1031   Next Dose Due:  Next dose available 7/25 after: 1231                                   ipratropium - albuterol 0.5 mg/2.5 mg/3 mL 0.5-2.5 (3) MG/3ML neb solution   Commonly known as:  DUONEB   Take 1 vial by nebulization 4 times daily   Last time this was given:  3 mLs on 7/25/2018  7:34 AM   Next Dose Due:  7/25:afternoon                                            levothyroxine 88 MCG tablet   Commonly known as:  SYNTHROID/LEVOTHROID   TAKE ONE TABLET BY MOUTH ONCE DAILY   Last time this was given:  88 mcg on 7/25/2018  8:34 AM   Next Dose Due:  7/26: AM                                   LORazepam 2 MG/ML (HIGH CONC) solution   Commonly known as:  ATIVAN   Take 0.25 mLs (0.5 mg) by mouth, place under tongue or insert rectally every 4 hours as needed for anxiety (restlessness)   Last time this was given:  7/25: 0229                                MEDICATION INSTRUCTION   If care facility cannot accept or use ranges, facility is instructed to use lower end of dosing range                                metoprolol succinate 25 MG 24 hr tablet   Commonly known as:  TOPROL-XL   TAKE ONE TABLET BY MOUTH DAILY   Last time this was given:  50 mg on 7/25/2018  8:33 AM   Next Dose Due:  7/26:AM                                    pantoprazole 40 MG EC tablet   Commonly known as:  PROTONIX   TAKE ONE TABLET BY MOUTH EVERY MORNING   Last time this was given:  40 mg on 7/25/2018  8:34 AM   Next Dose Due:  7/26:AM                                   sennosides 8.6 MG tablet   Commonly known as:  SENOKOT   Take 1-2 tablets by mouth 2 times daily   Next Dose Due:  7/25:2100                                      sodium chloride 0.65 % nasal spray   Commonly known as:  OCEAN   Spray 1 spray into both nostrils every 4 hours as needed for congestion (if o2 cannot be humidified - prevent epistaxis)   Last time this was given:  2 sprays on 7/17/2018 12:05 PM   Next Dose Due:  Available when needed                                   tamsulosin 0.4 MG capsule   Commonly known as:  FLOMAX   TAKE ONE CAPSULE BY MOUTH ONCE DAILY   Last time this was given:  0.4 mg on 7/25/2018  8:33 AM   Next Dose Due:  7/26:AM                                   * Notice:  This list has 2 medication(s) that are the same as other medications prescribed for you. Read the directions carefully, and ask your doctor or other care provider to review them with you.

## 2018-07-14 NOTE — TELEPHONE ENCOUNTER
[facility] TELEPHONE ENCOUNTER    Chief Complaint   Patient presents with     LETHARGY     Hypotension       Chaz Soler is a 89 year old  (5/21/1928),Nurse called today to report: Lethargy/Hypotension    ASSESSMENT/PLAN  Patient with increased lethargy and presence of hypotension; did receive scheduled HTN medications this AM - VS this AM WNL for administration of scheduled medications    STAT CBC/BMP    Push fluids    BPq2h    Roxanne Mcrae, APRN CNP

## 2018-07-14 NOTE — IP AVS SNAPSHOT
[place] [address] [facility]    [address]    [facility] [address]    Phone:  [phone]    Fax:  [phone]                                       After Visit Summary   7/14/2018    Chaz Soler    MRN: [id number]           After Visit Summary Signature Page     I have received my discharge instructions, and my questions have been answered. I have discussed any challenges I see with this plan with the nurse or doctor.    ..........................................................................................................................................  Patient/Patient Representative Signature      ..........................................................................................................................................  Patient Representative Print Name and Relationship to Patient    ..................................................               ................................................  Date                                            Time    ..........................................................................................................................................  Reviewed by Signature/Title    ...................................................              ..............................................  Date                                                            Time

## 2018-07-14 NOTE — IP AVS SNAPSHOT
"          [place] [address] MEDICAL SURGICAL: [phone]                                              INTERAGENCY TRANSFER FORM - LAB / IMAGING / EKG / EMG RESULTS   2018                    Hospital Admission Date: 2018  NATTY MAN   : 1928  Sex: Male        Attending Provider: Susi Galvez MD     Allergies:  Contrast Dye, Lisinopril, Oxycodone    Infection:  None   Service:  HOSPITALIST    Ht:  1.727 m (5' 8\")   Wt:  58.7 kg (129 lb 6.6 oz)   Admission Wt:  60.2 kg (132 lb 11.5 oz)    BMI:  19.68 kg/m 2   BSA:  1.68 m 2            Patient PCP Information     Provider PCP Type    Alirio Fox MD General         Lab Results - 3 Days      Platelet count [[id number]] (Abnormal)  Resulted: 18 0607, Result status: Final result    Ordering provider: Susi Galvez MD  18 0000 Resulting lab: [facility]    Specimen Information    Type Source Collected On   Blood  18 0555          Components       Value Reference Range Flag Lab   Platelet Count 125 150 - 450 10e9/L L FrRdHs            Phosphorus [[id number]] (Abnormal)  Resulted: 18 0838, Result status: Final result    Ordering provider: Susi Galvez MD  18 0550 Resulting lab: [facility]    Specimen Information    Type Source Collected On     18 0550          Components       Value Reference Range Flag Lab   Phosphorus 4.8 2.5 - 4.5 mg/dL H FrRdHs            Basic metabolic panel [[id number]] (Abnormal)  Resulted: 18 0617, Result status: Final result    Ordering provider: Susi Galvez MD  18 0000 Resulting lab: [facility]    Specimen Information    Type Source Collected On   Blood  18 0550          Components       Value Reference Range Flag Lab   Sodium 141 133 - 144 mmol/L  FrRdHs   Potassium 4.4 3.4 - 5.3 mmol/L  FrRdHs   Chloride 101 94 - 109 mmol/L  FrRdHs   Carbon Dioxide 31 20 - 32 mmol/L  FrRdHs   Anion Gap 9 3 - 14 mmol/L  FrRdHs "   Glucose 140 70 - 99 mg/dL H FrRdHs   Urea Nitrogen 81 7 - 30 mg/dL H FrRdHs   Creatinine 1.73 0.66 - 1.25 mg/dL H FrRdHs   GFR Estimate 37 >60 mL/min/1.7m2 L FrRdHs   Comment:  Non  GFR Calc   GFR Estimate If Black 45 >60 mL/min/1.7m2 L FrRdHs   Comment:  African American GFR Calc   Calcium 9.3 8.5 - 10.1 mg/dL  FrRdHs            CBC with platelets differential [[id number]] (Abnormal)  Resulted: 07/22/18 0601, Result status: Final result    Ordering provider: Susi Galvez MD  07/22/18 0000 Resulting lab: [facility]    Specimen Information    Type Source Collected On   Blood  07/22/18 0550          Components       Value Reference Range Flag Lab   WBC 16.4 4.0 - 11.0 10e9/L H FrRdHs   RBC Count 3.79 4.4 - 5.9 10e12/L L FrRdHs   Hemoglobin 11.3 13.3 - 17.7 g/dL L FrRdHs   Hematocrit 35.4 40.0 - 53.0 % L FrRdHs   MCV 93 78 - 100 fl  FrRdHs   MCH 29.8 26.5 - 33.0 pg  FrRdHs   MCHC 31.9 31.5 - 36.5 g/dL  FrRdHs   RDW 14.6 10.0 - 15.0 %  FrRdHs   Platelet Count 126 150 - 450 10e9/L L FrRdHs   Diff Method Automated Method   FrRdHs   % Neutrophils 90.3 %  FrRdHs   % Lymphocytes 7.0 %  FrRdHs   % Monocytes 1.9 %  FrRdHs   % Eosinophils 0.0 %  FrRdHs   % Basophils 0.1 %  FrRdHs   % Immature Granulocytes 0.7 %  FrRdHs   Nucleated RBCs 0 0 /100  FrRdHs   Absolute Neutrophil 14.8 1.6 - 8.3 10e9/L H FrRdHs   Absolute Lymphocytes 1.1 0.8 - 5.3 10e9/L  FrRdHs   Absolute Monocytes 0.3 0.0 - 1.3 10e9/L  FrRdHs   Absolute Eosinophils 0.0 0.0 - 0.7 10e9/L  FrRdHs   Absolute Basophils 0.0 0.0 - 0.2 10e9/L  FrRdHs   Abs Immature Granulocytes 0.1 0 - 0.4 10e9/L  FrRdHs   Absolute Nucleated RBC 0.0   FrRdHs            Blood gas venous and oxyhgb [[id number]] (Abnormal)  Resulted: 07/22/18 0601, Result status: Final result    Ordering provider: Susi Galvez MD  07/22/18 0000 Resulting lab: [facility]    Specimen Information    Type Source Collected On   Blood  07/22/18 0587           Components       Value Reference Range Flag Lab   Ph Venous 7.42 7.32 - 7.43 pH  FrRdHs   PCO2 Venous 51 40 - 50 mm Hg H FrRdHs   PO2 Venous 106 25 - 47 mm Hg H FrRdHs   Bicarbonate Venous 33 21 - 28 mmol/L H FrRdHs   FIO2 30 liters   FrRdHs   Oxyhemoglobin Venous 97 %  FrRdHs   Base Excess Venous 6.8 mmol/L  FrRd   Comment:  Abnormal Result, Ref range: -7.7 to 1.9            Testing Performed By     Lab - Abbreviation Name Director Address Valid Date Range    12 - [facility] Unknown [address] [place] [address] 05/08/15 1057 - Present            Unresulted Labs     None         ECG/EMG Results      Echocardiogram Complete [[id number]]  Resulted: 07/15/18 1127, Result status: Edited Result - FINAL    Ordering provider: Filippo Bowers MD  07/15/18 0948 Resulted by: Price Washington MD    Performed: 07/15/18 1109 - 07/15/18 1157 Resulting lab: RADIOLOGY RESULTS    Narrative:       [id number]  [facility]  [id number]  [id number]^SHASHANK^FILIPPO^FLETCHER           [facility]  Echocardiography Laboratory  [address] [place] [address]        Name: NATTY MAN  MRN: [id number]  : 1928  Study Date: 07/15/2018 11:27 AM  Age: 89 yrs  Gender: Male  Patient Location: [facility]  Reason For Study: Aortic Valve Disorder  Ordering Physician: FILIPPO BOWERS  Referring Physician: Alirio Fox  Performed By: Milagros Cardenas     BSA: 1.7 m2  Height: 68 in  Weight: 133 lb  HR: 78  BP: 127/68 mmHg  _____________________________________________________________________________  __        Procedure  Complete Portable Echo Adult. Complete Echo Adult.  _____________________________________________________________________________  __        Interpretation Summary     Sinus rhythm was noted. There was significant artifact with the tracings.  Technically difficult study limited views obtained due to the patient was on a  ventilator  The left ventricle is normal in size.  There is mild  concentric left ventricular hypertrophy.  Hyperdynamic left ventricular function The visual ejection fraction is  estimated at 65-70%.  Grade I or early diastolic dysfunction.  The right ventricular systolic function is normal.  There is trace mitral regurgitation.  The left atrium is mild to moderately dilated by volume criteria at 41 ml/m2.  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 These  gradients noted below are stable and normal for this prosthetic aortic valve.  Compared to the prior echo study dated 6-2-2017, there have been no major  changes. The LA is more dilated.  The mean AoV pressure gradient is 8.7 mmHg. The peak AoV pressure gradient is  17.0 mmHg. The calculated aortic valve are is 2.5 cm^2.  _____________________________________________________________________________  __        Left Ventricle  The left ventricle is normal in size. There is mild concentric left  ventricular hypertrophy. The left ventricular ejection fraction is normal. The  visual ejection fraction is estimated at 65-70%. Hyperdynamic left ventricular  function. Grade I or early diastolic dysfunction. Diastolic Doppler findings  (E/E' ratio and/or other parameters) suggest left ventricular filling  pressures are indeterminate. No regional wall motion abnormalities noted.  There is no thrombus seen in the left ventricle.     Right Ventricle  Normal right ventricle structure and size. The right ventricular systolic  function is normal.     Atria  The left atrium is mild to moderately dilated. Left atrial enlargement is  noted by volume criteria. at 41 ml/m2. Right atrial size is normal. There is  no atrial shunt seen.     Mitral Valve  The mitral valve is normal in structure and function. There is no evidence of  mitral valve prolapse. There is trace mitral regurgitation. There is no mitral  valve stenosis.        Tricuspid Valve  The tricuspid valve is normal in structure and function. The right  ventricular  systolic pressure is approximated at 31.8 mmHg plus the right atrial pressure.  There is trace tricuspid regurgitation. The right ventricular systolic  pressure is approximated at 31.8mmHg plus the right atrial pressure. Right  ventricular systolic pressure is elevated, consistent with mild pulmonary  hypertension.     Aortic Valve  No aortic regurgitation is present. No aortic stenosis is present. 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 The gradient is normal for this prosthetic aortic  valve.     Pulmonic Valve  The pulmonic valve is not well seen, but is grossly normal. There is trace  pulmonic valvular regurgitation.     Vessels  Normal size aorta. Inferior vena cava not well visualized for estimation of  right atrial pressure.     Pericardium  There is no pericardial effusion. There is no pleural effusion.        Rhythm  Sinus rhythm was noted. There was significant artifact with the tracings.  _____________________________________________________________________________  __  MMode/2D Measurements & Calculations  IVSd: 1.2 cm     LVIDd: 4.4 cm  LVIDs: 2.4 cm  LVPWd: 1.2 cm  FS: 45.2 %  LV mass(C)d: 193.8 grams  LV mass(C)dI: 112.8 grams/m2  asc Aorta Diam: 2.5 cm  LVOT diam: 2.0 cm  LVOT area: 3.1 cm2  LA Volume (BP): 70.9 ml  LA Volume Index (BP): 41.2 ml/m2  RWT: 0.54           Doppler Measurements & Calculations  MV E max perez: 67.8 cm/sec  MV A max perez: 131.3 cm/sec  MV E/A: 0.52  MV dec time: 0.14 sec  Ao V2 max: 205.9 cm/sec  Ao max P.0 mmHg  Ao V2 mean: 135.9 cm/sec  Ao mean P.7 mmHg  Ao V2 VTI: 38.3 cm  SORAYA(I,D): 2.5 cm2  SORAYA(V,D): 1.8 cm2  LV V1 max P.5 mmHg  LV V1 max: 117.1 cm/sec  LV V1 VTI: 30.2 cm  SV(LVOT): 94.9 ml  SI(LVOT): 55.2 ml/m2  PA acc time: 0.05 sec  TR max perez: 281.7 cm/sec  TR max P.8 mmHg  AV Perez Ratio (DI): 0.57  SORAYA Index (cm2/m2): 1.4  E/E' av.3  Lateral E/e': 14.2  Medial E/e': 14.5               _____________________________________________________________________________  __        Report approved by: Christofer Snell 07/15/2018 12:37 PM       1    Type Source Collected On     07/15/18 1127          View Image (below)              Encounter-Level Documents:     There are no encounter-level documents.      Order-Level Documents:     There are no order-level documents.

## 2018-07-14 NOTE — LETTER
"Transition Communication Hand-off for Care Transitions to Next Level of Care Provider    Name: Chaz Soler  : 1928  MRN #: [id number]  Primary Care Provider: Alirio Fox  Primary Care MD Name: Dominique   Primary Clinic: [address] [facility] [address]  Primary Care Clinic Name: [facility]   Reason for Hospitalization:  Hospital-acquired pneumonia [J18.9]  Altered mental status, unspecified altered mental status type [R41.82]  Admit Date/Time: 2018  5:36 PM  Discharge Date: 18  Payor Source: Payor: MEDICA / Plan: MEDICA PRIME SOLUTION / Product Type: Indemnity /     Readmission Assessment Measure (PARAM) Risk Score/category: ELEVATED         Reason for Communication Hand-off Referral: Fragility  Other PARAM ELEVATED    Discharge Plan:       Concern for non-adherence with plan of care:  NO  Discharge Needs Assessment:  Needs       Most Recent Value    Equipment Currently Used at Home walker, rolling, wheelchair, manual    Skilled Nursing Facility [facility] ([place]) [phone], Fax: [phone]    PAS Number [id number]          Already enrolled in Tele-monitoring program and name of program:  na  Follow-up specialty is recommended: No    Follow-up plan:  No future appointments.    Any outstanding tests or procedures:    Procedures     Future Labs/Procedures    Oxygen - Nasal cannula     Comments:    5 Lpm by nasal cannula to keep O2 sats 92% or greater.              Key Recommendations: Key Recommendations: CTS following for elevated PARAM score 2/2 to readmit status, 3rd admit in 3 months, palli consulted GOC were discussed with a plan to continue with restorative goals for \"one more go around\" the family felt that the pt has \"come around:\" quite quickly and would lke to try the rehab route one more time. However, goals of care changed and Pt was discharged to [facility] with hospice support.      Eugenia Weller    AVS/Discharge Summary is the source of truth; this is a helpful " guide for improved communication of patient story

## 2018-07-14 NOTE — ED TRIAGE NOTES
Patient arrives [facility] for evaluation of altered mental status that was noticed this AM. Hypotension noted otherwise normal vitals per EMS.  via finger stick. Normal up and walking independently, appears lethargic, and confused. History of COPD normally on 3Liters

## 2018-07-14 NOTE — IP AVS SNAPSHOT
"` `           [place] [address] MEDICAL SURGICAL: [phone]                                              INTERAGENCY TRANSFER FORM - NURSING   2018                    Hospital Admission Date: 2018  NATTY MAN   : 1928  Sex: Male        Attending Provider: Susi Galvez MD     Allergies:  Contrast Dye, Lisinopril, Oxycodone    Infection:  None   Service:  HOSPITALIST    Ht:  1.727 m (5' 8\")   Wt:  58.7 kg (129 lb 6.6 oz)   Admission Wt:  60.2 kg (132 lb 11.5 oz)    BMI:  19.68 kg/m 2   BSA:  1.68 m 2            Patient PCP Information     Provider PCP Type    Alirio Fox MD General      Current Code Status     Date Active Code Status Order ID Comments User Context       Prior      Code Status History     Date Active Date Inactive Code Status Order ID Comments User Context    2018 10:07 AM  DNR/DNI [id number]  Susi Galvez MD Outpatient    2018 10:43 PM 2018 10:07 AM DNR/DNI [id number]  Susi Galvez MD Inpatient    2018  2:50 PM 2018 10:43 PM DNR/DNI [id number]  Rachel Mclain MD Outpatient    2018  1:28 PM 2018  2:50 PM DNR/DNI [id number]  Rosario Ardon PA-C Inpatient    2018  1:58 PM 2018  1:28 PM DNR [id number]  Tiff Ricketts APRN CNP Outpatient    2018 12:22 PM 2018  1:58 PM DNR/DNI [id number]  Macario Rodriguez MD Outpatient    6/3/2018  5:35 PM 2018 12:22 PM DNR/DNI [id number]  Macario Rodriguez MD Inpatient    2018 12:46 AM 2018  4:40 PM DNI [id number]  Alfonso Villafuerte MD Inpatient    10/2/2017  4:07 PM 2018 12:46 AM DNI [id number]  Tracey Beltran PA-C Outpatient    10/1/2017  5:06 PM 10/2/2017  4:07 PM DNI [id number]  Dayami Guillory PA-C Inpatient    2017  9:49 AM 10/1/2017  5:06 PM Full Code [id number]  Alejo Lopez MD Outpatient    2017 10:10 PM 2017  9:49 AM Full Code [id number]  Carie Bowers MD Inpatient    11/15/2016 11:02 AM " 1/29/2017 10:10 PM Full Code [id number]  Yudelka Castillo MD Outpatient    11/14/2016  6:32 PM 11/15/2016 11:02 AM Full Code [id number]  Yudelka Castillo MD Inpatient    11/20/2014 10:49 AM 11/14/2016  6:32 PM Full Code [id number]  Martínez Larson MD Outpatient    11/19/2014  4:47 AM 11/20/2014 10:49 AM Full Code [id number]  Lokesh Walls MD Inpatient    11/17/2014  3:16 PM 11/19/2014  4:47 AM Full Code [id number]  Joie Caballero PA-C Outpatient    11/12/2014  3:48 PM 11/17/2014  3:16 PM Full Code [id number]  Ruby Monteiro MD Inpatient    10/8/2014  2:52 PM 10/10/2014  8:06 PM Full Code [id number]  Carie Bowers MD Inpatient    8/15/2014 12:40 PM 10/8/2014  2:52 PM Full Code [id number]  Melissa Isaac NP Outpatient    8/14/2014  9:55 AM 8/15/2014 12:40 PM Full Code [id number]  Melissa Isaac NP Outpatient    8/11/2014 12:20 PM 8/14/2014  9:55 AM Full Code [id number]  Jone Carvalho MD Inpatient    11/21/2011  9:22 AM 8/11/2014 12:20 PM Full Code [id number]  Jose Antonio Shin PA-C Outpatient    11/18/2011  6:50 PM 11/21/2011  9:22 AM Full Code [id number]  Niya Persaud RN Inpatient    11/18/2011  3:48 PM 11/18/2011  6:49 PM Full Code [id number]  Niya Persaud, ELIOT Inpatient      Advance Directives        Scanned docmt in ACP Activity?           Yes, scanned ACP docmt        Hospital Problems as of 7/25/2018              Priority Class Noted POA    Encephalopathy Medium  7/14/2018 Yes    Air hunger Medium  Unknown Unknown      Non-Hospital Problems as of 7/25/2018              Priority Class Noted    Essential hypertension Medium  2/18/2005    Other specified disorders of prostate Medium  4/4/2006    Pulmonary fibrosis (H) Medium  Unknown    Advance Care Planning Medium  9/13/2011    CAD (coronary artery disease) Medium  Unknown    CKD (chronic kidney disease) stage 3, GFR 30-59 ml/min Medium  11/30/2011    Proteinuria Medium  2/8/2012    Hyperlipidemia with target LDL less than  70 Medium  Unknown    Lumbar radiculopathy Medium  8/11/2014    Status post lumbar discectomy Medium  8/12/2014    S/P TAVR (transcatheter aortic valve replacement) Medium  11/12/2014    Physical deconditioning Medium  11/21/2014    CHF (congestive heart failure) (H) Medium  12/31/2014    Anemia Medium  1/20/2015    SAI (obstructive sleep apnea) Medium  2/24/2015    Hyperparathyroidism (H) Medium  4/22/2015    Dementia Medium  8/4/2015    Hypothyroidism Medium  10/27/2015    Health Care Home Medium  11/5/2015    Edema, unspecified edema Medium  1/17/2016    Other chronic pain Medium  10/11/2016    Compression fracture L2-3 Medium  2/2/2017    Weakness Medium  1/8/2018    Community acquired pneumonia Medium  7/2/2018    PRESCOTT (dyspnea on exertion) Medium  Unknown    Poor appetite Medium  Unknown    Goals of care, counseling/discussion Medium  Unknown    Encounter for palliative care Medium  Unknown      Immunizations     Name Date      Influenza (H1N1) 01/09/10     Influenza (High Dose) 3 valent vaccine 10/10/17     Influenza (High Dose) 3 valent vaccine 10/11/16     Influenza (High Dose) 3 valent vaccine 10/08/15     Influenza (High Dose) 3 valent vaccine 10/10/14     Influenza (IIV3) PF 10/05/12     Influenza (IIV3) PF 10/16/11     Influenza (IIV3) PF 10/01/05     Influenza (IIV3) PF 10/21/04     Pneumo Conj 13-V (2010&after) 04/22/15     Pneumococcal 23 valent 10/01/05     Pneumococcal 23 valent 01/01/98     TD (ADULT, 7+) 04/20/10     TD (ADULT, 7+) 05/21/99     Zoster vaccine, live 04/27/10          END      ASSESSMENT     Discharge Profile Flowsheet     EXPECTED DISCHARGE     Other Resources  Home Care (NA) 03/15/18 1351    Expected Discharge Date  07/25/18 (/home/[facility] TCU; [facility]/ [facility]) 07/24/18 1031   Skilled Nursing Facility  [facility]) [phone], Fax: [phone] 07/19/18 1710    DISCHARGE NEEDS ASSESSMENT     PAS Number  [id number] 07/19/18 1711     "Concerns To Be Addressed  all concerns addressed in this encounter 03/15/18 1351   Existing Resources/Services  Home Care ([facility]) 11/17/14 1415    Concerns Comments  NA 03/15/18 1351   SKIN      Equipment Currently Used at Home  walker, rolling;wheelchair, manual 07/15/18 1629   Inspection of bony prominences  Full 07/23/18 1716    # of Referrals Placed by CTS  Communication hand-offs to next level of Care Providers 07/08/18 1518   Inspection under devices  Full except (identify device(s) not inspected) 07/23/18 0441    Key Recommendations  CTS following for elevated PARAM score 2/2 to readmit status, 3rd admit in 3 months, palli consulted GOC were discussed with a plan to continue with restorative goals for \"one more go around\" the family felt that the pt has \"come around:\" quite quickly and would lke to try the rehab route one more time. Pt was discharged back to **** 07/18/18 1411   Skin WDL  ex 07/25/18 0248    Primary Care Clinic Name  [facility]  07/18/18 1408   Skin Color/Characteristics  pale;bruised (ecchymotic) 07/25/18 0248    Primary Care MD Name  Ve  07/18/18 1408   Skin Temperature  warm 07/25/18 0248    Equipment Used at Home  cane, straight 11/05/15 1604   Skin Moisture  dry 07/25/18 0248    GASTROINTESTINAL (ADULT,PEDIATRIC,OB)     Skin Elasticity  slow return to original state 07/25/18 0248    GI WDL  ex 07/24/18 0302   Skin Integrity  bruise(s);wound(s) 07/25/18 0248    Abdominal Appearance  flat 07/25/18 0248   Full except areas not inspected   Scapula, left;Scapula, right;Buttock, right;Buttock, left;Sacrum;Coccyx 07/23/18 0441    All Quadrants Bowel Sounds  audible and active in all quadrants 07/23/18 1714   Not Inspected under devices  SCD/Pneumo boots;Blood pressure cuff 07/23/18 0441    Last Bowel Movement  07/23/18 07/25/18 0248   SAFETY      GI Signs/Symptoms  no gastrointestinal signs/symptoms 07/25/18 0248   Safety WDL  WDL 07/25/18 0248    Passing flatus  yes 07/23/18 1245   Safety " "Factors  ID band on;call light in reach 07/24/18 1424    COMMUNICATION ASSESSMENT     Safety Equipment  oxygen flowmeter 07/23/18 1759    Patient's communication style  spoken language (English or Bilingual) 07/14/18 1738   All Alarms  alarm(s) activated and audible 07/25/18 0248    FINAL RESOURCES     Airway Safety Measures  all equipment/monitors on and audible 07/23/18 1759    Resources List  Skilled Nursing Facility 07/19/18 1711                      Assessment WDL (Within Defined Limits) Definitions           Safety WDL     Effective: 09/28/15    Row Information: <b>WDL Definition:</b> Bed in low position, wheels locked; call light in reach; upper side rails up x 2; ID band on<br> <font color=\"gray\"><i>Item=AS safety wdl>>List=AS safety wdl>>Version=F14</i></font>      Skin WDL     Effective: 09/28/15    Row Information: <b>WDL Definition:</b> Warm; dry; intact; elastic; without discoloration; pressure points without redness<br> <font color=\"gray\"><i>Item=AS skin wdl>>List=AS skin wdl>>Version=F14</i></font>      Vitals     Vital Signs Flowsheet     VITAL SIGNS     Side Effects Monitoring: Respiratory Depth  N 07/25/18 0237    Temp  97.4  F (36.3  C) 07/23/18 1535   Side Effects Monitoring: Sedation Level  S 07/25/18 0237    Temp src  Oral 07/23/18 1535   HEIGHT AND WEIGHT      Resp  30 07/25/18 0416   Height  -- 07/20/18 2011    Pulse  74 07/23/18 2139   Height Method  Stated 07/14/18 2257    Heart Rate  78 07/24/18 0907   Weight  58.7 kg (129 lb 6.6 oz) 07/22/18 0007    BP  153/85 07/23/18 1647   Weight Method  Bed scale 07/22/18 0007    BP Location  Left arm 07/23/18 1647   Bed Scale  Standard (fitted sheet, draw sheet/ pad, cover/flat sheet, blanket, two pillows) 07/19/18 0635    OXYGEN THERAPY     BSA (Calculated - sq m)  1.7 07/14/18 2257    SpO2  91 % 07/25/18 0855   BMI (Calculated)  20.22 07/14/18 2257    O2 Device  Nasal cannula 07/25/18 0855   RENAE COMA SCALE      FiO2 (%)  50 % 07/23/18 " 1453   Best Eye Response  4-->(E4) spontaneous 07/23/18 1711    Oxygen Delivery  5 LPM 07/25/18 0855   Best Motor Response  6-->(M6) obeys commands 07/23/18 1711    PAIN/COMFORT     Best Verbal Response  4-->(V4) confused 07/23/18 1711    Patient Currently in Pain  denies 07/25/18 0832   [place] Coma Scale Score  14 07/23/18 1711    Preferred Pain Scale  number (Numeric Rating Pain Scale) 07/25/18 0237   POSITIONING      Patient's Stated Pain Goal  No pain 07/25/18 0237   Body Position  turned;side-lying, left 07/25/18 1000    0-10 Pain Scale  0 07/25/18 0833   Head of Bed (HOB)  HOB at 30-45 degrees 07/25/18 1000    Word Pain Scale  2 07/23/18 1536   Positioning/Transfer Devices  pillows 07/25/18 1000    Pain Location  Back 07/23/18 1536   Chair  Shifted weight;Upright in chair 07/23/18 1245    Pain Orientation  Lower 07/23/18 1536   DAILY CARE      Pain Descriptors  Aching 07/23/18 1536   Activity Management  activity adjusted per tolerance;activity encouraged 07/25/18 0248    Pain Management Interventions  -- 07/16/18 2231   Activity Assistance Provided  assistance, 2 people 07/25/18 0248    Pain Intervention(s)  Medication (See eMAR) 07/23/18 1536   Assistive Device Utilized  mechanical lift 07/24/18 0020    Response to Interventions  Absence of nonverbal indicators of pain (work of breathing decreased after dilaudid  ) 07/25/18 0520   Additional Documentation  Activity Device Assistance (Row) 07/22/18 2321    CRITICAL-CARE PAIN OBSERVATION TOOL (CPOT)     ECG      Facial Expression  0 07/23/18 0435   ECG Rhythm  Sinus rhythm 07/23/18 1535    Body Movements  0 07/23/18 0435   Ectopy  PVC 07/23/18 1128    Compliance w/ventilator (intubated patients)  Extubated 07/23/18 0435   Lead Monitored  Lead II;V 1 07/23/18 1535    Vocalization (extubated patients)  0 07/23/18 0435   Ectopy Frequency  Occasional 07/23/18 1128    Muscle Tension  0 07/23/18 0435   Rhythm Comment  T Wave Inversion 07/23/18 0822    Total  0  07/23/18 0435   POINT OF CARE TESTING      ANALGESIA SIDE EFFECTS MONITORING     Puncture Site  fingertip 07/15/18 0436    Side Effects Monitoring: Respiratory Quality  R 07/25/18 0237   Bedside Glucose (mg/dl )   152 mg/dl 07/15/18 0436            Patient Lines/Drains/Airways Status    Active LINES/DRAINS/AIRWAYS     Name: Placement date: Placement time: Site: Days: Last dressing change:    Wound 07/14/18 Posterior Sacrum Abrasion(s) abrasion/scab surrounded by blanchable redness 07/14/18   2346   Sacrum   10     Wound 07/19/18 Lateral;Left Penis Other (comment) 07/19/18   0800   Penis   6     Incision/Surgical Site 11/19/14 Lateral Chest 11/19/14   0212    1344     Incision/Surgical Site 11/19/14 Mid;Lateral Chest 11/19/14   0214    1344     Incision/Surgical Site 05/18/17 Left;Lower Back 05/18/17   1100    432             Patient Lines/Drains/Airways Status    Active PICC/CVC     None            Intake/Output Detail Report     Date Intake     Output Net    Shift P.O. I.V. IV Piggyback Total Urine Total       Noc 07/23/18 2300 - 07/24/18 0659 120 -- -- 120 450 450 -330    Day 07/24/18 0700 - 07/24/18 1459 390 -- -- 390 525 525 -135    Shabnam 07/24/18 1500 - 07/24/18 2259 120 -- -- 120 175 175 -55    Noc 07/24/18 2300 - 07/25/18 0659 150 -- -- 150 150 150 0    Day 07/25/18 0700 - 07/25/18 1459 -- -- -- -- 150 150 -150      Last Void/BM       Most Recent Value    Urine Occurrence 1 at 07/25/2018 0900    Stool Occurrence 0 at 07/23/2018 0614      Case Management/Discharge Planning     Case Management/Discharge Planning Flowsheet     REFERRAL INFORMATION     Concerns To Be Addressed  all concerns addressed in this encounter 03/15/18 1351    Did the Initial Social Work Assessment result in a Social Work Case?  Yes 07/15/18 1629   Concerns Comments  NA 03/15/18 1351    Admission Type  inpatient 07/15/18 1629   DISCHARGE PLANNING      Arrived From  rehab facility 07/15/18 1635   Key Recommendations  CTS following for  "elevated PARAM score 2/2 to readmit status, 3rd admit in 3 months, palli consulted GOC were discussed with a plan to continue with restorative goals for \"one more go around\" the family felt that the pt has \"come around:\" quite quickly and would lke to try the rehab route one more time. Pt was discharged back to **** 07/18/18 1411    # of Referrals Placed by CTS  Communication hand-offs to next level of Care Providers 07/08/18 1518   Equipment Used at Home  cane, straight 11/05/15 1604    Reason For Consult  care coordination/care conference;discharge planning (Eelvated PARAM ) 07/18/18 1408   FINAL RESOURCES      CTS Assigned to Case  Corinne  07/19/18 1711   Equipment Currently Used at Home  walker, rolling;wheelchair, manual 07/15/18 1629    Primary Care Clinic Name  [facility]  07/18/18 1408   Resources List  Skilled Nursing Facility 07/19/18 1711    Primary Care MD Name  Veum  07/18/18 1408   Other Resources  Home Care (NA) 03/15/18 1351    LIVING ENVIRONMENT     Skilled Nursing Facility  [facility] [phone], Fax: [phone] 07/19/18 1711    Lives With  significant other 07/15/18 1629   PAS Number  [id number] 07/19/18 1711    Living Arrangements  apartment 07/15/18 1629   Existing Resources/Services  Home Care ([facility]) 11/17/14 1415    Provides Primary Care For  no one, unable/limited ability to care for self 07/15/18 1635   ABUSE RISK SCREEN      HOME SAFETY     QUESTION TO PATIENT:  Has a member of your family or a partner(now or in the past) intimidated, hurt, manipulated, or controlled you in any way?  no 07/14/18 1740    Patient Feels Safe Living in Home?  yes 07/19/18 1711   QUESTION TO PATIENT: Do you feel safe going back to the place where you are living?  yes 07/14/18 1740    COPING/STRESS     OBSERVATION: Is there reason to believe there has been maltreatment of a vulnerable adult (ie. Physical/Sexual/Emotional abuse, self neglect, lack of adequate food, shelter, medical " care, or financial exploitation)?  no 07/14/18 1740    Major Change/Loss/Stressor  hospitalization 07/14/18 2251   OTHER      Patient Personal Strengths  expressive of needs;strong support system 01/30/17 1407   Are you depressed or being treated for depression?  No 07/14/18 2251    EXPECTED DISCHARGE     HOMICIDE RISK      Expected Discharge Date  07/25/18 (/home/[facility] TCU; [facility]) 07/24/18 1031   Feels Like Hurting Others  no 07/14/18 1740    ASSESSMENT/CONCERNS TO BE ADDRESSED

## 2018-07-14 NOTE — IP AVS SNAPSHOT
` ` Patient Information     Patient Name Sex     Chaz Soler ([id number]) Male 1928       Room Bed    [address] [place] [address]      Patient Demographics     Address Phone E-mail Address    [address] [address] [phone] (Home) *Preferred*  NONE (Work)  [phone] (Mobile) [email or web address]      Patient Ethnicity & Race     Ethnic Group Patient Race    American White      Emergency Contact(s)     Name Relation Home Work Mobile    Violet Soler Spouse [phone] none none    Kwame Soler Son [phone]    Maria Isabel Soler Daughter [phone]    Jannie Johnson Daughter [phone] none [phone]      Documents on File        Status Date Received Description       Documents for the Patient    Privacy Notice - [place] Received 11     Face Sheet Received () 08     Insurance Card  () 03     Insurance Card  () 07/15/04     Insurance Card  () 06     Insurance Card  () 08     Insurance Card  () 09     External Medication Information Consent Accepted () 09     Face Sheet Received () 09     Patient ID Received () 10/10/11     Consent for Services - Hospital/Clinic Received () 11     External Medication Information Consent Accepted () 11     Consent for Services - Hospital/Clinic Received () 11     Insurance Card Received () 11     Consent for Services - Hospital/Clinic Received () 11     Consent to Communicate Received () 11     Patient ID Received () 11     CMS IM for Patient Signature Received 14     External Medication Information Consent Accepted () 12     Insurance Card Received () 12     Insurance Card Received () 10/26/12 Medicare,Medica    Consent to Communicate Received 10/26/12 Auth to  Discuss PHI    Consent for Services - Hospital/Clinic Received () 12     Consent for EHR Access  13 Copied from existing Consent for services - C/HOD collected on 2012    [facility] Specified Other       External Medication Information Consent Accepted 13 Lifetime    Insurance Card Received () 13 Medicare    Insurance Card Received () 13 Medica    Consent to Communicate Received 13 Auth for E-Mail Comm    Consent for Services - Hospital/Clinic  () 13 CONSENT FOR SERVICE    Consent for Services - Hospital/Clinic Received () 14     Insurance Card Received 05/15/17 Medicare Part A & B    Insurance Card Received () 14 Med[employer] -    Patient ID Received () 14 DL - 2016    Physical Therapy Certification Sent 14 Eval and treat OP status     [facility]    Consent for Services - [facility] Received 14 [facility]    Consent for Services - [facility]  14 GENERAL CONSENT FORM: SHARED EHR - ENGLISH    Consent for Services - Hospital/Clinic Received () 04/22/15     Insurance Card Received () 05/14/15 Medica    Consent for Services/Privacy Notice - Hospital/Clinic Received () 16     Insurance Card Received () 16 [employer] [id number] - issued 2015    Patient ID Received 18 [place] ID - Lifetime    Advance Directives and Living Will Received 16 POLST 16 -    Advance Directives and Living Will Not Received  VALIDATION OF AD 12/15/2016    Advance Directives and Living Will Received 17 HEALTH CARE DIRECTIVE 12/15/2016    Consent for Services/Privacy Notice - Hospital/Clinic Received () 17     Consent for Services - Geriatrics Received 17     Advance Directives and Living Will Received 17 POLST 2017-    Consent to Communicate Received 17     Care Everywhere  Prospective Auth Received 10/18/17     Insurance Card Received 10/18/17 [employer] 2017    Insurance Card Received 10/18/17 Medicare Part A & B    Consent for Services - Hospital and Clinic Received 18     HIE Auth Received 18     Insurance Card Received 18     Advance Directives and Living Will Received 18 POLST 2018-    Advance Directives and Living Will Received 18 POLST 2018-    Advance Directives and Living Will Received 18 POLST 2018    HIM CARLOS Authorization  (Deleted) 14 [facility]    Consent for Services - Informed Accepted (Deleted) 17        Documents for the Encounter    CMS IM for Patient Signature Received 18     CMS IM for Patient Signature Received 18       Admission Information     Attending Provider Admitting Provider Admission Type Admission Date/Time    Susi Galvez MD Parpia, Abdul K, MD Emergency 18  1736    Discharge Date Hospital Service Auth/Cert Status Service Area     Hospitalist Incomplete [facility]    Unit Room/Bed Admission Status        5 MEDICAL SURGICAL 0504/0504-01 Admission (Confirmed)       Admission     Complaint    Encephalopathy      Hospital Account     Name Acct ID Class Status Primary Coverage    Chaz Soler [id number] Inpatient Open MEDICARE - MEDICARE FOR HB SUPPLEMENT            Guarantor Account (for Hospital Account #[id number])     Name Relation to Pt Service Area Active? Acct Type    Chaz Soler  FCS Yes Personal/Family    Address Phone          [address] [facility] [address] [address]  [place] [address] [phone](H)              Coverage Information (for Hospital Account #[id number])     1. MEDICARE/MEDICARE FOR HB SUPPLEMENT     F/O Payor/Plan Precert #    MEDICARE/MEDICARE FOR HB SUPPLEMENT     Subscriber Subscriber #    Chaz Soler [id number]    Address Phone    ATTN CLAIMS  [address]  [facility] [place] [address] [phone]           2. MEDICA/MEDICA PRIME SOLUTION     F/O Payor/Plan Precert #    MEDICA/MEDICA PRIME SOLUTION     Subscriber Subscriber #    Chaz Soler [id number]    Address Phone    [address] [address]  [place] [address] [phone]

## 2018-07-14 NOTE — IP AVS SNAPSHOT
` `     [place] [address] MEDICAL SURGICAL: [phone]                 INTERAGENCY TRANSFER FORM - NOTES (H&P, Discharge Summary, Consults, Procedures, Therapies)   2018                    Hospital Admission Date: 2018  NATTY MAN   : 1928  Sex: Male        Patient PCP Information     Provider PCP Type    Alirio Fox MD General         History & Physicals      H&P signed by Susi Galvez MD at 2018 12:17 PM      Author:  Susi Galvez MD Service:  Hospitalist Author Type:  Physician    Filed:  2018 12:17 PM Date of Service:  2018  8:31 PM Creation Time:  2018  9:32 PM    Status:  Signed :  Susi Galvez MD (Physician)         Admitted:     2018      DATE OF ADMISSION: 2018      CHIEF COMPLAINT:  Shortness of breath, hypotension, AMS.      HISTORY OF PRESENT ILLNESS:  This is a 89-year-old white male who was brought in from a rehab facility.  The history is mainly obtained from ER records as the patient is on BiPAP and has underlying dementia.  This is a 89-year-old gentleman who was recently admitted to the hospital between 2018-2018 for acute on chronic hypoxic respiratory failure due to pneumonia.  At baseline, he has a history of pulmonary fibrosis and is on 5 liters of oxygen.  He also has a history of hypertension, diastolic heart failure, hypothyroidism and GERD, along with chronic kidney disease.  During his last hospitalization, Palliative Care was following him.  This is his fifth admission in the past 9 months.  He has lost weight. He is brought in for hypotension, hypoxia and altered mental status.  In the ER over here, he was seen by Dr. Christoph Brooks. He is thought to have a possible pneumonia.  He is requiring BiPAP and has been initiated on healthcare-associated pneumonia antibiotics.  I discussed care with Dr. Brooks. I am asked to admit him for further evaluation.      PAST MEDICAL HISTORY:  Pulmonary fibrosis on  5 liters of oxygen, hyperlipidemia, COPD, diastolic heart failure, coronary artery disease and abdominal aortic aneurysm.      PAST SURGICAL HISTORY:  Significant for transcatheter aortic valve replacement in 2014, endovascular repair of his abdominal aneurysm, laparoscopic cholecystectomy.      FAMILY HISTORY:  Significant for mother with hypertension.      SOCIAL HISTORY:  He does not smoke.  Unable to obtain his drinking history.      ALLERGIES:  ALLERGIC TO CONTRAST, LISINOPRIL, OXYCODONE.      HOME MEDICATIONS:  List includes Norvasc, Tylenol, nebs, Lipitor, Aricept, Lasix, Ativan, metoprolol.      REVIEW OF SYSTEMS:  Unable to be obtained as the patient is in respiratory distress on BiPAP and has underlying dementia.      PHYSICAL EXAMINATION:   VITAL SIGNS:  Temperature 98.5, pulse 60, blood pressure is 129/60, respiratory rate 23, O2 sat is 100% on BiPAP.   GENERAL:  The patient is awake, frail, in respiratory distress on BiPAP.   LUNGS:  He has diffuse crackles bilaterally.   HEART:  Regular rate, S1, S2 normal.  No murmurs or gallops.   ABDOMEN:  Soft, nontender, with good bowel sounds.   EXTREMITIES:  There is no edema.   HEENT:  His pupils are equal, round, reactive to light.   SKIN:  There is no rash.      LABORATORY:  Labwork obtained here shows a sodium 137, potassium 4.3, chloride 101, bicarbonate 35, BUN 47, creatinine 1.92, GFR of 33, calcium 8.7.  Lactic acid 1.1.  His BNP is 924.  Troponin is less than 0.015.  On his CBC, his white cell count is 8.9, hemoglobin 11.1, hematocrit 35.4, platelet count of 148.  His diff is grossly within normal limits.  INR is within normal limits.        IMAGING: A chest x-ray obtained over here shows lung volumes remain low.  Background of interstitial opacities are again seen, although the hazy opacities from prior appear slightly improved which may be due to edema and/or pneumonia.  No significant pleural effusion or pneumothorax.  Cardiac silhouette remains  enlarged.        An EKG obtained here shows normal sinus rhythm at 64 beats per minute with left axis deviation and left ventricular hypertrophy with nonspecific T-wave abnormalities.      ASSESSMENT AND PLAN:   1.  Acute respiratory distress.  Likely due to underlying pneumonia, cannot rule out healthcare-associated pneumonia or aspiration pneumonia.  He has been initiated on broad-spectrum antibiotics.  I will continue that.  He is on BiPAP.  We will continue that.  In addition, we will place him on IV steroids.   2.  Acute encephalopathy.  Likely due to above.  Difficult to assess as he is presently on BiPAP and has an underlying history of dementia.  Will monitor.  CT of the head has been ordered, which is pending.        CODE STATUS: I had a detailed conversation with his daughter over here, Maria Isabel, who is actually 1 of 4 siblings who is visiting from [place].  The patient is DNR/DNI.  It seems like if he does not improve over the next 24-48 hours, the family may be leaning towards less aggressive measures.  However, at this point in time, he is DNR/DNI with no further escalation of care.      DISPOSITION: He will be admitted as an inpatient to the Intensive Care Unit.      > 30 MINS of Critical care time due to need for BiPAP. Discussion with family.       SUSI GALVEZ MD             D: 2018   T: 2018   MT:       Name:     NATTY MAN   MRN:      [id number]        Account:      [id number]   :      1928        Admitted:     2018                   Document: [id number][AP1.1]         Revision History        User Key Date/Time User Provider Type Action    > AP1.1 2018 12:17 PM Susi Galvez MD Physician Sign     [N/A] 2018 10:04 PM Susi Galvez MD Physician Edit     [N/A] 2018  9:32 PM Susi Galvez MD Physician Edit                     Discharge Summaries      Discharge Summaries by Susi Galvez MD at 2018 10:08 AM     Author:  Susi Galvez  MD JOHN PAUL Service:  Hospitalist Author Type:  Physician    Filed:  7/25/2018 10:12 AM Date of Service:  7/25/2018 10:08 AM Creation Time:  7/25/2018 10:08 AM    Status:  Signed :  Susi Galvez MD (Physician)           Discharge Summary  [facility]    Chaz Soler MRN# [id number]   YOB: 1928 Age: 89 year old     Date of Admission:  7/14/2018  Date of Discharge:  7/25/2018  Admitting Physician:  Susi Galvez MD  Discharge Physician: Susi Galvez MD  Discharging Service: Hospitalist     Primary Provider: Alirio Fox  Primary Care Physician Phone Number: [phone]         Discharge Diagnoses/Problem Oriented Hospital Course (Providers):    Chaz Soler was admitted on 7/14/2018 by Susi Galvez MD and I would refer you to their history and physical.  The following problems were addressed during his hospitalization:    1.  Acute on chronic hypoxemic respiratory failure with history of chronic pulmonary fibrosis requiring home oxygen at 5 L via nasal cannula prior to this hospitalization.  - continues to require high flow oxygen to maintain saturations  -continue prednisone at 20 mg daily for now.  Doubtful if this is still helpful for our patient long-term and does not appear to be on chronic steroids PTA  -He remained afebrile but earlier had some clinical deterioration with his oxygen levels requiring high flow nasal cannula.  Repeat chest x-ray stating worsening infiltrates likely from pneumonia versus edema.   -He finished 6 day course of intravenous antibiotics with Zosyn (7/16-7/21)  - suspect that his ongoing issues with resp failure related to end stage pulm fibrosis      2.  Aortic stenosis status post TAVR.  Echo done showing normal EF and normal appearing post-TAVR valve      3.  Chronic diastolic congestive heart failure with preserved EF.  BNP was normal.  However the past day or so his respiratory parameters are worsening with chest x-ray findings  suggestive of increasing edema.  Likely a component also of acute on chronic diastolic CHF in exacerbation.  has been diuresed without significant improvement in sx      4.  Pulmonary fibrosis, with chronic hypoxic respiratory failure on 5 L oxygen.    Likely reaching end-stage process now      5.  Acute encephalopathy on top of dementia-encephalopathy-  resolved.  It was suspected due to his respiratory failure and multiple other health issues.   -Started on low-dose oral Ativan.      6.  Chronic kidney disease.  Baseline is around 1.6-1.8.  Creatinine is at baseline.      7.  Dementia.  On Aricept.      8.  Hypertension.  Blood pressure was elevated last night, increased Toprol-XL to 50 mg p.o. Daily.  -Losartan on hold due to renal failure.      9.  Coronary artery disease.  No sign of any active ischemia, had a negative Lexiscan last year in October.      10.  Hypothyroidism on Synthroid.      11.  Epistaxis-earlier with no further recurrence. It is likely related to dry air with oxygen, better with humidified oxygen.  -Watch for further bleeding      Goals of care.  Comfort cares.     Chaz Soler is a 89 year old male admitted on 7/14/2018 with SOB. Patient has a PMH of chronic HFpEF, AS s/p TAVR, pulmonary fibrosis and chronic hypoxic resp failure on 5L oxygen at baseline, CAD, Dementia, CKD he was recently hospitalized at this facility for pneumonia and discharged a few days ago.  According to the family, patient was not eating and drinking much, has been sleeping a lot and brought back to the ER and found to have acute on chronic hypoxic respiratory failure and admitted to the hospital.      Despite treatment with antibiotics, steroids, and diuretics the patient has not made a significant improvement in his sx.  It is suspected that much of his sx are likely from pulm fibrosis and no clear reversible process to improve has been found.  Care conference being held today to establish goals of care.    Hospice/comfort cares in light of lack of response to treatment attempts this admit and multiple recent hospitalizations.           Code Status:      Comfort Care         Important Results:                Pending Results:        Unresulted Labs Ordered in the Past 30 Days of this Admission     No orders found from 5/15/2018 to 7/15/2018.               Discharge Instructions and Follow-Up:      Follow-up Appointments     Follow Up and recommended labs and tests       Follow up with Nursing home physician.  No follow up labs or test are   needed.  Follow up with hospice team.                         Discharge Disposition:      Discharged to LTC.          Discharge Medications:        Current Discharge Medication List      START taking these medications    Details   acetaminophen (TYLENOL) 650 MG Suppository Place 1 suppository (650 mg) rectally every 4 hours as needed for fever or mild pain (Do not exceed 4000 mg total acetaminophen per day.) Unwrap prior to insertion. See also oral tylenol order. Give if pt is unable to take PO.  Qty: 12 suppository, Refills: 0    Comments: All meds need to be bubble wrapped.  Associated Diagnoses: End of life care      atropine 1 % ophthalmic solution Take 2-4 drops by mouth, place under tongue or place inside cheek every 2 hours as needed for other (terminal respiratory secretions) Not for ophthalmic use.  Qty: 5 mL, Refills: 1    Comments: All meds need to be bubble wrapped.  Associated Diagnoses: End of life care      bisacodyl (DULCOLAX) 10 MG Suppository Unwrap and insert 1 suppository rectally twice daily as needed for constipation.  Qty: 4 suppository, Refills: 0    Comments: All meds need to be bubble wrapped.  Associated Diagnoses: End of life care      haloperidol (HALDOL) 2 MG/ML (HIGH CONC) solution Take 0.25-0.5 mLs (0.5-1 mg) by mouth, place under tongue or insert rectally every 6 hours as needed for agitation (nausea)  Qty: 30 mL, Refills: 0    Comments: All meds need  to be bubble wrapped.  Associated Diagnoses: End of life care      HYDROmorphone, HIGH CONC, (DILAUDID) 10 mg/mL LIQD oral Take 0.1-0.2 mLs (1-2 mg) by mouth or place under tongue every 2 hours as needed for moderate to severe pain or other (and/or  shortness of breath)  Qty: 30 mL, Refills: 0    Comments: All meds need to be bubble packed  Associated Diagnoses: End of life care      LORazepam (ATIVAN) 2 MG/ML (HIGH CONC) solution Take 0.25 mLs (0.5 mg) by mouth, place under tongue or insert rectally every 4 hours as needed for anxiety (restlessness)  Qty: 30 mL, Refills: 1    Comments: All meds need to be bubble wrapped.  Associated Diagnoses: End of life care      MEDICATION INSTRUCTION If care facility cannot accept or use ranges, facility is instructed to use lower end of dosing range    Associated Diagnoses: End of life care      sennosides (SENOKOT) 8.6 MG tablet Take 1-2 tablets by mouth 2 times daily  Qty: 100 tablet, Refills: 1    Comments: All meds need to be bubble wrapped.  Associated Diagnoses: End of life care      sodium chloride (OCEAN) 0.65 % nasal spray Spray 1 spray into both nostrils every 4 hours as needed for congestion (if o2 cannot be humidified - prevent epistaxis)  Qty: 1 Bottle, Refills: 0    Associated Diagnoses: End of life care         CONTINUE these medications which have CHANGED    Details   acetaminophen 325 MG TABS Take 650 mg by mouth 4 times daily  Qty: 1 Bottle, Refills: 0    Comments: All meds to be bubble packed.  Associated Diagnoses: End of life care      diclofenac (VOLTAREN) 1 % GEL topical gel Place 2 g onto the skin 4 times daily  Qty: 1 Tube, Refills: 0    Comments: All meds need to be bubble wrapped.  Associated Diagnoses: End of life care      gabapentin (NEURONTIN) 100 MG capsule Take 1 capsule (100 mg) by mouth 3 times daily  Qty: 15 capsule, Refills: 0    Associated Diagnoses: Chronic midline low back pain with left-sided sciatica         CONTINUE these medications  which have NOT CHANGED    Details   albuterol (2.5 MG/3ML) 0.083% neb solution Take 2.5 mg by nebulization every 2 hours as needed       aspirin 81 MG tablet Take 1 tablet (81 mg) by mouth daily  Qty: 30 tablet, Refills: 11    Associated Diagnoses: Coronary artery disease involving native coronary artery of native heart without angina pectoris      cyanocobalamin (VITAMIN  B-12) 1000 MCG tablet Take 1,000 mcg by mouth daily      DULoxetine (CYMBALTA) 30 MG EC capsule Take 30 mg by mouth daily      furosemide (LASIX) 20 MG tablet Take 30 mg by mouth daily       ipratropium - albuterol 0.5 mg/2.5 mg/3 mL (DUONEB) 0.5-2.5 (3) MG/3ML neb solution Take 1 vial by nebulization 4 times daily      levothyroxine (SYNTHROID/LEVOTHROID) 88 MCG tablet TAKE ONE TABLET BY MOUTH ONCE DAILY  Qty: 90 tablet, Refills: 3    Associated Diagnoses: Hypothyroidism, unspecified type      metoprolol succinate (TOPROL-XL) 25 MG 24 hr tablet TAKE ONE TABLET BY MOUTH DAILY  Qty: 90 tablet, Refills: 2    Associated Diagnoses: Essential hypertension      Multiple Vitamins-Minerals (MULTIVITAL) TABS Take 1 tablet by mouth daily      pantoprazole (PROTONIX) 40 MG EC tablet TAKE ONE TABLET BY MOUTH EVERY MORNING  Qty: 90 tablet, Refills: 3    Associated Diagnoses: Gastroesophageal reflux disease, esophagitis presence not specified      tamsulosin (FLOMAX) 0.4 MG capsule TAKE ONE CAPSULE BY MOUTH ONCE DAILY  Qty: 90 capsule, Refills: 3    Associated Diagnoses: Benign prostatic hyperplasia with urinary retention         STOP taking these medications       amLODIPine (NORVASC) 10 MG tablet Comments:   Reason for Stopping:         atorvastatin (LIPITOR) 20 MG tablet Comments:   Reason for Stopping:         donepezil (ARICEPT) 10 MG tablet Comments:   Reason for Stopping:         HYDROmorphone (DILAUDID) 2 MG tablet Comments:   Reason for Stopping:         LORazepam (ATIVAN) 0.5 MG tablet Comments:   Reason for Stopping:         losartan (COZAAR) 100 MG  "tablet Comments:   Reason for Stopping:         traMADol (ULTRAM) 50 MG tablet Comments:   Reason for Stopping:                    Allergies:         Allergies   Allergen Reactions     Contrast Dye      SOB, increased Bp, difficulty swallowing. Date 6/3/96 (ipaque contrast)  Was premedicated prior to FRANCK and had no reaction at all (solumedrol and benadryl) 2016  Was premedicated with Methylprednisolone protocol, no reaction 1/25/17     Lisinopril      cough     Oxycodone      Delirium            Consultations This Hospital Stay:      No consultations were requested during this admission          Condition and Physical Exam on Discharge:      Discharge condition: Stable   Discharge vitals: Blood pressure 153/85, pulse 74, temperature 97.4  F (36.3  C), temperature source Oral, resp. rate 30, height 1.727 m (5' 8\"), weight 58.7 kg (129 lb 6.6 oz), SpO2 91 %.     Gen - Alert, awake, [place].  Lungs - + crackles B.  Heart - RR,S1+S2 nml, no m/g/r.  Abd - soft, NT, ND, + BS.  Ext - no edema.           Discharge Orders for Skilled Facility (from Discharge Orders):        After Care Instructions     Advance Diet as Tolerated       Follow this diet upon discharge: Orders Placed This Encounter      Room Service      Snacks/Supplements Adult: Magic Cup; With Meals      Regular Diet Adult            General info for SNF       Length of Stay Estimate: Short Term Care: Estimated # of Days <30  Condition at Discharge: Declining  Level of care:skilled   Rehabilitation Potential: Fair  Admission H&P remains valid and up-to-date: Yes  Recent Chemotherapy: N/A  Use Nursing Home Standing Orders: Yes            Mantoux instructions       Give two-step Mantoux (PPD) Per Facility Policy Yes            Oxygen - Nasal cannula       5 Lpm by nasal cannula to keep O2 sats 92% or greater.                           Rehab orders for Skilled Facility (from Discharge Orders):               Discharge Time:        > 30 mins on chart review, exam and " .        Image Results From This Hospital Stay (For Non-EPIC Providers):        Results for orders placed or performed during the hospital encounter of 07/14/18   XR Chest Port 1 View    Narrative    CHEST PORTABLE ONE VIEW   7/14/2018 6:12 PM     HISTORY: AMS.     COMPARISON: 7/2/2018.      Impression    IMPRESSION: Lung volumes remain low. Background of interstitial  opacities are again seen although the hazy opacities from prior  appears slightly improved which may be due to edema and/or pneumonia.  No significant pleural effusion or pneumothorax. Cardiac silhouette  remains enlarged.    EV BURNETT MD   CT Head w/o Contrast    Narrative    CT SCAN OF THE HEAD WITHOUT CONTRAST   7/14/2018 8:58 PM     HISTORY: AMS.     TECHNIQUE:  Axial images of the head and coronal reformations without  IV contrast material. Radiation dose for this scan was reduced using  automated exposure control, adjustment of the mA and/or kV according  to patient size, or iterative reconstruction technique.    COMPARISON: Head CT 6/3/2018.    FINDINGS: There is no evidence of intracranial hemorrhage, mass, acute  infarct or anomaly. There is generalized atrophy of the brain. There  is low attenuation in the white matter of the cerebral hemispheres  consistent with sequelae of small vessel ischemic disease. 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 Ventricular size is within  normal limits without evidence of hydrocephalus.     The visualized portions of the sinuses and mastoids appear normal. The  bony calvarium and bones of the skull base appear intact.       Impression    IMPRESSION:     1. No evidence of acute intracranial hemorrhage, mass, or herniation.  2. There is generalized atrophy of the brain. White matter changes are  present in the cerebral hemispheres that are consistent with small  vessel ischemic disease in this age patient.      EV BURNETT MD   US Lower Extremity Venous Bilateral Port    Narrative     US LOWER EXTREMITY VENOUS DUPLEX BILATERAL PORTABLE   7/14/2018 11:26  PM     HISTORY: Shortness of breath. Rule out deep venous thrombosis.    COMPARISON: None.    FINDINGS: Gray-scale, color and Doppler spectral analysis ultrasound  was performed of the legs. Compression and augmentation imaging was  performed.    There is no evidence for deep venous thrombosis. The veins compress  and augment normally.      Impression    IMPRESSION: No deep venous thrombosis.    ALCIRA SINGLETARY MD   XR Chest Port 1 View    Narrative    XR CHEST PORT 1 VW  7/19/2018 5:33 AM     INDICATION: Shortness of breath.    COMPARISON: 7/14/2018.      Impression    IMPRESSION: Patchy bilateral interstitial and alveolar infiltrates  have increased and may be due to pneumonia or edema. Shallow  inspiration. Stable heart size, borderline prominent. Postoperative  changes aortic valve.    DALILA ATKINS MD           Most Recent Lab Results In EPIC (For Non-EPIC Providers):[AP1.1]    No results found for this or any previous visit (from the past 24 hour(s)).[AP1.2]      Revision History        User Key Date/Time User Provider Type Action    > AP1.2 7/25/2018 10:12 AM Susi Galvez MD Physician Sign     AP1.1 7/25/2018 10:08 AM Susi Galvez MD Physician                      Consult Notes      Consults by Eugenia Weller RN at 7/17/2018  9:59 AM     Author:  Eugenia Weller RN Service:  (none) Author Type:      Filed:  7/17/2018  9:59 AM Date of Service:  7/17/2018  9:59 AM Creation Time:  7/17/2018  9:57 AM    Status:  Signed :  Eugenia Weller RN ()     Consult Orders:    1. Care Coordinator IP Consult [[id number]] ordered by Basim Romero MD at 07/17/18 0956                Care Transitions Team: Following for CC, discharge planning, and disposition.      Per chart review pt noted to have ELEVATED PARAM score, with readmit status admitted from [facility].     Noted Palliative care consulted  "and following for on going GOC planning.     SWS consulted for follow up.     PCP handoff to follow.[ED1.1]      Revision History        User Key Date/Time User Provider Type Action    > ED1.1 7/17/2018  9:59 AM Eugenia Weller, RN Case Manager Sign            Consults by Marianna Mccoy APRN CNS at 7/16/2018  9:37 AM     Author:  Marianna Mccoy APRN CNS Service:  Palliative Author Type:  Clinical Nurse Specialist    Filed:  7/17/2018 12:05 AM Date of Service:  7/16/2018  9:37 AM Creation Time:  7/16/2018  9:17 AM    Status:  Signed :  Marianna Mccoy APRN CNS (Clinical Nurse Specialist)         [facility]    Palliative Care Consultation   Text Page    Date of Admission:  7/14/2018    Assessment & Plan   Chaz Soler is a 89 year old male who was admitted on 7/14/2018. I was asked to see the patient for goals of care.    Recommendations:  1.Decisional Capacity -  Unreliable. Patient has an advance directive dated 12/15/16.  Include patient in decision making as much as possible but involve health care agent attempting consensus with patient. Spouse, Ricardo is his primary Health Care Agent and his alternate HCA is son Kwame.  2. Pain-[JW1.1] chronic low back pain  Tylenol 500 mg PO TID. Voltaren gel 1% 4 times per day to low back. LIdocaine 5% topical to low back q 4 hours prn - do not use heating pad to area.  Gabapentin 100 mg PO TID.  Continue with cymbalta 30 mg PO daily. Tramadol 50 mg PO TID prn moderate to severe pain. Hold on hydromorphone for now.  3. Dyspnea - Antibiotics, steroids, o2 as per hospitalist/intensivist. Fan at bedside prn. Observe for \"panic attacks\" due to shortness of breath. Lorazepam 0.5 mg PO/SL or VT q 6 hours prn - use cautiously and hold for sedation. Hold on hydromorphone for now.  4. Cachexia/Dehydration - Liberalized pt's diet as tolerated. Allow family to supplement with foods that pt enjoys including chocolate ice cream, pudding. Monitor pt " "for signs of dehydration and encourage PO intake.[JW1.2]  3. Spiritual Care-[JW1.1] Oriented to Spiritual Health and Social Work Services as part of Palliative Care team. Pt is Yarsanism.  is following.  is following.[JW1.2]  4. Care Planning-[JW1.1] Met with hospice for information 7/16/18. Family hope that pt can recover to his most recent baseline and [facility]/Rehab.[JW1.2]     Goal of Care: DNR/DNI.[JW1.1] Family verbalize that trial of bipap or tube feeding to help him overcome an acute exacerbation of his illness are currently worth it for the pt due to his perceived QOL, but they would not want these therapies for pt long term to sustain him.[JW1.2]    Disease Process/es & Symptoms:  Chaz Soler is a 89 year old patient admitted with symptoms of[JW1.1] decreased mental status, hypotension and shortness of breath[JW1.2].[JW1.1] H[JW1.2]e has been treated for[JW1.1] acute on chronic hypoxemic respiratory failure due to pulmonary fibrosis and possible PNA, associated \"panic attacks\", acute encephalopathy, aortic stenosis s/p TAVR, chronic diastolic CHF with preserved EF, low back pain with compression fracture, CKD, dementia, HTN, CAD, hypothyroidism[JW1.2].      This is in the setting of pulmonary fibrosis diagnosed at the [facility] approximately 5 years ago, on chronic oxygen[JW1.1] that has been gradually increasing to 4-5 l/min via NC[JW1.2], dementia, diastolic CHF, HTN, CAD, hypothyroidisim, GERD[JW1.1],[JW1.2] depression/anxiety[JW1.1], chronic low back pain due to DDD with recent vertebral fracture s/p vertebroplasty L3, L4-L5 stenosis and facet arthropathy, s/p lumbosacral caudal epidural steroid injections.[JW1.2].[JW1.1] H[JW1.2]e[JW1.1] has been hospitalized 5 times in the past 10 months for recurrent respiratory failure and episodes of AMS, CHF and SOL/CKD. Prior to admission patient has moved to ICU from TCU for higher level of daily care needs. There " "has been a decline in daily function including increased time in wc, decreased ability to walk long distances, episodes of panic attacks.  There is a documented unitentional weight loss of 13 lbs over the past 9 months.[JW1.2]    Findings & plan of care discussed with:[JW1.1] Dr. Romero, Dr. Hylton, bedside nurse Baylee, [facility] RN Gifty Navarro, Care Coordinator Maureen.[JW1.2]  Follow-up plan from palliative team:[JW1.1] will continue to follow this pt for symptom management and support for family with goals of care discussion.  Ernst[JW1.2]nk you for involving us in the patient's care.     Marianna Mccoy[JW1.1] APRN, CNS[JW1.2]  Pain Management and Palliative Care  [facility]  Pgr: [phone]    Time Spent on this Encounter   I spent[JW1.1] 75[JW1.2] minutes ([JW1.1]13:45-15:02pm[JW1.2]) in assessment of the patient, counseling and[JW1.1] care conference with son Kwame and daughter Maria Isabel[JW1.2] as documented in sections below. Another[JW1.1] 45[JW1.2] minutes in review of chart, documentation, coordination of care and discussion with the health care team.    Reason for Consult   Reason for consult: I was asked by Dr. Carie Bowers  to evaluate this patient for Goals of care.    Primary Care Physician   Alirio Fox    Chief Complaint[JW1.1]   AMS, SOB, Hypotension    History is obtained from the electronic health record and patient's family[JW1.2]    History of Present Illness   Chaz Soler is a 89 year old male who presents[JW1.1] from [facility] TCU with hypotension, hypoxia and increasing lethargy.    Pt was discharged from [facility] on 7/7/18 to [facility] TCU after 5 day inpatient stay for acute on chronic hypoxemic respiratory failure due to hospital acquired PNA in setting of advanced pulmonary fibrosis.Initially family reports that pt was doing well. \"We use his ability to play cribbage as a rhoda for how well he is doing\". Pt normally takes a nap after lunch, however on Saturday pt was unable " "to be awakened by staff or family. Pt was also found to be hypotensive and had blood drawn at [facility] although family was not sure what was being tested.     In ED, pt was placed on bipap and started on antibiotics for presumed healthcare associated PNA.  He was transferred to ICU. Pt has chronic low back pain due to DDD, stenosis and facet disease at L3, s/p vertebroplasty for compression fracture. Pt's pain medications including tramadol, gabapentin were held due to AMS.    When I see him he has been transferred into a chair and is sleeping. His son Kwame and daughter Maria Isabel are at his bedside.[JW1.2]     The following symptoms are noted by patient as concerning to his quality of life.[JW1.1]  Pain   Dyspnea[JW1.2]     Decision-Making & Goals of Care:  Discussed on July 16, 2018 with Marianna Mccoy[JW1.1] APRN, CNS[JW1.2]:[JW1.1]   Met with pt's son/alternate HCA Kwame and daughter Maria Isabel in ICU [facility]. Pt's wife Kylee went home to rest after the 3 of them had a  Hospice information meeting with Gifty Navarro RN. They describe pt as pleasant, a witty sense of humor, enjoys company of family \"he exists for us\". Pt has difficulty with short term memory but looks at newspapers and other things to figure out what day it is. Kwame has made a cribbage board for pt that reminds him of the steps of the game and they play with him when they visit. They note that pt is being admitted to the hospital more frequently for issues related to his respiratory status, slowly is increasing his need for oxygen over the last 2 years and is close to the maximum o2 flow he can have with his concentrators, is having more frequent episodes of \"panic\" attacks with his breathing even though his o2 sat is WNL, is only able to walk short distance with his walker and o2, spending more time in a w.c.  He has lost weight and they try to encourage his PO intake and include chocolate ice cream, pudding and other things he like to help with his " intake. Prior to the last 2 admissions he had been living with his wife, who helped to care for him. He has been compliant with PT/OT at TCU. They agree that pt should be DNR DNI. They along with pt's wife Kylee, have noted that pt was able to recover after being hospitalized and is improving back to his baseline PTA. They hope he can continue to improve and feel that short term trials of bipap or other treatments, even including a keofeed tube would be worth it for pt, if it would help him overcome an acute exacerbation, but not long term. Pt's POLST completed prior to DC with pt's wife and my colleague Lissy Serrato 7/5/2018 was reviewed and is currently up to date with the information shared by Kwame and Maria Isabel.[JW1.2]    Patient has decision-making capacity[JW1.1] Unreliable[JW1.2]  Patient has[JW1.1] a Health Care Agent named in a legal Advance Directive document dated 12/15/16. See name(s) and contact information in Code/ACP/Advance Care Planning Activity Tab.  Physician orders for life-sustaining treatment (POLST) form indicates no aggressive treatment[JW1.2]  Code Status:[JW1.1] Do not resuscitate / Do not intubate[JW1.2]     Past Medical History[JW1.1]   I have reviewed this patient's medical history and updated it with pertinent information if needed.[JW1.2]   Past Medical History:   Diagnosis Date     AAA (abdominal aortic aneurysm) (H) 10/5/2011    6.1 cm     Anemia 11/30/2011     Aortic stenosis 10/26/2012     CAD (coronary artery disease)     MI - distal inferior/apex. Non transmural     Carotid artery disease (H)      CHF (congestive heart failure) (H) 12/31/2014     CKD (chronic kidney disease) stage 3, GFR 30-59 ml/min 11/30/2011     COPD (chronic obstructive pulmonary disease) (H)      Dementia 8/4/2015     Edema, unspecified edema 1/17/2016     Essential hypertension      Gout 1/06    knee     Hypercalcemia 1/2/2015     Hyperlipidemia LDL goal < 70      Hyperparathyroidism, unspecified  4/22/2015     Hyperplasia of prostate      LEFT LUNG GRANULOMA      Lumbago      Other chronic pain 10/11/2016     Proteinuria 2/8/2012     Pulmonary fibrosis (H)      PVD (peripheral vascular disease) (H)      Thrombocytopenia (H) 11/30/2011     Unspecified hypothyroidism      Vitamin D deficiency[JW1.3]        Past Surgical History[JW1.1]   I have reviewed this patient's surgical history and updated it with pertinent information if needed.[JW1.2]  Past Surgical History:   Procedure Laterality Date     COLONOSCOPY  9/02 per patient     DISCECTOMY LUMBAR POSTERIOR MICROSCOPIC ONE LEVEL  8/11/2014    Procedure: DISCECTOMY LUMBAR POSTERIOR MICROSCOPIC ONE LEVEL;  Surgeon: Jone Carvalho MD;  Location: [facility] OR     ENDOVASCULAR REPAIR ANEURYSM ABDOMINAL AORTA  11/18/2011    Procedure:ENDOVASCULAR REPAIR ANEURYSM ABDOMINAL AORTA; ENDOVASCULAR AAA,RIGHT FEMORAL       LAPAROSCOPIC CHOLECYSTECTOMY  Nov 2011     LAPAROSCOPIC CHOLECYSTECTOMY  11/18/2011    Procedure:LAPAROSCOPIC CHOLECYSTECTOMY; LAPAROSCOPIC CHOLECYSTECTOMY ; Surgeon:DARRYL DORADO; Location:[facility] OR     REPAIR ANEURYSM ABDOMINAL AORTA  Nov 2011     right cataract extraction/IOL  12/03     TRANSCATHETER AORTIC VALVE IMPLANT ANESTHESIA N/A 11/12/2014    Bioprosthetic. Procedure: TRANSCATHETER AORTIC VALVE IMPLANT ANESTHESIA;  Surgeon: Generic Anesthesia Provider;  Location: [facility] OR     VASECTOMY[JW1.3]         Prior to Admission Medications   Prior to Admission Medications   Prescriptions Last Dose Informant Patient Reported? Taking?   DULoxetine (CYMBALTA) 30 MG EC capsule 7/14/2018 at 0800 Self Yes Yes   Sig: Take 30 mg by mouth daily   HYDROmorphone (DILAUDID) 2 MG tablet 7/14/2018 at 1122  Yes Yes   Sig: Take 1 mg by mouth every 6 hours while awake   LORazepam (ATIVAN) 0.5 MG tablet  at unknown  No Yes   Sig: Take 0.5 tablets (0.25 mg) by mouth every 6 hours as needed for anxiety   Multiple Vitamins-Minerals (MULTIVITAL) TABS 7/14/2018 at 0800 Self  Yes Yes   Sig: Take 1 tablet by mouth daily   acetaminophen (TYLENOL) 500 MG tablet 7/14/2018 at 1130 Self Yes Yes   Sig: Take 500 mg by mouth 3 times daily   albuterol (2.5 MG/3ML) 0.083% neb solution  at unknown  Yes Yes   Sig: Take 2.5 mg by nebulization every 2 hours as needed    amLODIPine (NORVASC) 10 MG tablet 7/14/2018 at 0800 Self No Yes   Sig: TAKE ONE TABLET BY MOUTH ONCE DAILY   aspirin 81 MG tablet 7/14/2018 at 0800 Self No Yes   Sig: Take 1 tablet (81 mg) by mouth daily   atorvastatin (LIPITOR) 20 MG tablet 7/13/2018 at 1930 Self No Yes   Sig: Take 1 tablet (20 mg) by mouth daily   cyanocobalamin (VITAMIN  B-12) 1000 MCG tablet 7/14/2018 at 0800 Self Yes Yes   Sig: Take 1,000 mcg by mouth daily   diclofenac (VOLTAREN) 1 % GEL topical gel  at unknown  Yes Yes   Sig: Place 2 g onto the skin 4 times daily as needed (lower back pain)    donepezil (ARICEPT) 10 MG tablet 7/13/2018 at 1930 Self No Yes   Sig: TAKE ONE TABLET BY MOUTH AT BEDTIME   furosemide (LASIX) 20 MG tablet 7/14/2018 at 0800 Self Yes Yes   Sig: Take 30 mg by mouth daily    gabapentin (NEURONTIN) 100 MG capsule 7/14/2018 at 1122 Self No Yes   Sig: TAKE ONE CAPSULE BY MOUTH THREE TIMES DAILY FOR  PAIN   ipratropium - albuterol 0.5 mg/2.5 mg/3 mL (DUONEB) 0.5-2.5 (3) MG/3ML neb solution 7/14/2018 at 1542  Yes Yes   Sig: Take 1 vial by nebulization 4 times daily   levothyroxine (SYNTHROID/LEVOTHROID) 88 MCG tablet 7/14/2018 at 0600 Self No Yes   Sig: TAKE ONE TABLET BY MOUTH ONCE DAILY   losartan (COZAAR) 100 MG tablet 7/14/2018 at 0800 Self No Yes   Sig: TAKE ONE TABLET BY MOUTH  DAILY   metoprolol succinate (TOPROL-XL) 25 MG 24 hr tablet 7/14/2018 at 0800 Self No Yes   Sig: TAKE ONE TABLET BY MOUTH DAILY   pantoprazole (PROTONIX) 40 MG EC tablet 7/14/2018 at 0800 Self No Yes   Sig: TAKE ONE TABLET BY MOUTH EVERY MORNING   tamsulosin (FLOMAX) 0.4 MG capsule 7/14/2018 at 0800 Self No Yes   Sig: TAKE ONE CAPSULE BY MOUTH ONCE DAILY   traMADol  (ULTRAM) 50 MG tablet 7/12/2018 at 1200  No Yes   Sig: Take 1 tablet (50 mg) by mouth 3 times daily      Facility-Administered Medications: None     Allergies   Allergies   Allergen Reactions     Contrast Dye      SOB, increased Bp, difficulty swallowing. Date 6/3/96 (ipaque contrast)  Was premedicated prior to FRANCK and had no reaction at all (solumedrol and benadryl) 2016  Was premedicated with Methylprednisolone protocol, no reaction 1/25/17     Lisinopril      cough     Oxycodone      Delirium       Social History   I have updated and reviewed the following Social History Narrative:   Social History     Social History Narrative    Lives in  [facility] co[facility] in [place] for the past 13 years.     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 Son Kwame, daughters Jannie, Maria Isabel and Jessy.[JW1.2]  Functional status (needs help with ADLs or IADLs):[JW1.1] assist with ADLs, able to walk short distances with walker, but is more often in w.c. Due to dyspnea. Participates in PT/OT.   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 Home o2 at 4-5 liters/min.[JW1.2]  Activities/interests:[JW1.1] cribbage, being with his family, watching movies, his sense of humor.[JW1.2]  History of substance use/abuse:[JW1.1] None.[JW1.2]  Confucianism affiliation:[JW1.1] Jehovah's witness[JW1.2]  Involvement in felicia community:[JW1.1] Not recently.[JW1.2]    Family History[JW1.1]   I have reviewed this patient's family history and updated it with pertinent information if needed.[JW1.2]   Family History   Problem Relation Age of Onset     Hypertension Mother      Hypertension Father[JW1.4]        Review of Systems[JW1.1]   CONSTITUTIONAL: NEGATIVE for fever, chills, change in weight  ENT/MOUTH: NEGATIVE for ear, mouth and throat problems  RESP: NEGATIVE for significant cough.  "POSITIVE for SOB  CV: NEGATIVE for chest pain, palpitations or peripheral edema    P[JW1.2]alliative Symptom Review (0=no symptom/no concern, 1=mild, 2=moderate, 3=severe):      Pain:[JW1.1] 2-moderate  Midline lumbar back ache without sciatica.[JW1.2]      Fatigue:[JW1.1] 2-moderate[JW1.2]      Nausea:[JW1.1] 0-none[JW1.2]      Constipation:[JW1.1] 0-none[JW1.2]      Diarrhea:[JW1.1] 0-none[JW1.2]      Depressive Symptoms:[JW1.1] 0-none[JW1.2]      Anxiety:[JW1.1] 2-moderate \"panic attacks\" due to dyspnea.[JW1.2]      Drowsiness:[JW1.1] 1-mild  Improving with improved oxygen and ventilation.[JW1.2]      Poor Appetite:[JW1.1] 2-moderate[JW1.2]      Shortness of Breath:[JW1.1] 2-moderate  On home o2 at 4-5 liters/minute.[JW1.2]      Insomnia:[JW1.1] 0-none[JW1.2]          Overall (0 good/no concerns, 3 very poor):[JW1.1]  2[JW1.2]    Physical Exam   Temp:  [97.6  F (36.4  C)-99.9  F (37.7  C)] 97.6  F (36.4  C)  Pulse:  [76] 76  Heart Rate:  [69-96] 84  Resp:  [10-41] 18  BP: ()/(44-77) 128/66  FiO2 (%):  [30 %-35 %] 35 %  SpO2:  [81 %-100 %] 96 %  135 lbs 9.33 oz  GEN:  Alert, oriented x[JW1.1] 2[JW1.2], appears comfortable, NAD.  HEENT:  Normocephalic/atraumatic, no scleral icterus, no nasal discharge, mouth moist.  CV:  RRR, S1, S2;[JW1.1] + click[JW1.2].  +3 DP/PT pulses bilatererally; no edema BLE.  RESP:[JW1.1]  Diminished[JW1.2] to auscultation bilaterally without rales/rhonchi/wheezing/retractions.  Symmetric chest rise on inhalation noted.  Normal respiratory effort.[JW1.1] On HFNC.[JW1.2]  ABD:  Rounded, soft, non-tender/non-distended.  +BS[JW1.1]. BM x 1.[JW1.2]  EXT:  Edema & pulses as noted above.  CMS intact x 4.     M/S:[JW1.1]   Deferred palpation.[JW1.2]   SKIN:  Dry to touch, no exanthems noted in the visualized areas.    NEURO: Symmetric strength +5/5.  Sensation to touch intact all extremities.   There is no area of allodynia or hyperesthesia.  Psych:  Normal affect.  Calm, cooperative, " conversant appropriately[JW1.1], short term memory changes - knows he is in the hospital but cannot tell me why he is in the hospital[JW1.2].     Delirium Screen/CAM:  Delirium = (#1 and #2 = YES) + (#3 and/or #4)   1) Acute onset and fluctuating course:[JW1.1]   No[JW1.2]   (acute change in mental status from baseline over last 24 hours)  2) Inattention:[JW1.1]   No[JW1.2]   (difficulty focusing, distractible, can't follow conversation)  3) Disorganized thinking:[JW1.1]   No[JW1.2]   (score only if #1 and #2 are YES)  (rambling/irrelevant conversation, unclear/illogical thoughts, inconsistency)  4) Altered level of consciousness:[JW1.1]   No[JW1.2]   (score only if #1 and #2 are YES)  (other than alert, calm, cooperative)    Delirium/CAM score:[JW1.1] 0[JW1.2]/4  Interpretation:  1)  Delirium:[JW1.1]  Absent[JW1.2]      Data   Results for orders placed or performed during the hospital encounter of 18 (from the past 24 hour(s))   Echocardiogram Complete    Narrative    [id number]  [facility]  [id number]  [id number]^SHASHANK^FILIPPO^[facility]  Echocardiography Laboratory  [address] [place] [address]        Name: NATTY MAN  MRN: [id number]  : 1928  Study Date: 07/15/2018 11:27 AM  Age: 89 yrs  Gender: Male  Patient Location: [facility]  Reason For Study: Aortic Valve Disorder  Ordering Physician: FILIPPO ENCARNACION  Referring Physician: Alirio Fox  Performed By: Milagros Cardenas     BSA: 1.7 m2  Height: 68 in  Weight: 133 lb  HR: 78  BP: 127/68 mmHg  _____________________________________________________________________________  __        Procedure  Complete Portable Echo Adult. Complete Echo Adult.  _____________________________________________________________________________  __        Interpretation Summary     Sinus rhythm was noted. There was significant artifact with the tracings.  Technically difficult study limited views obtained due to the patient was on  a  ventilator  The left ventricle is normal in size.  There is mild concentric left ventricular hypertrophy.  Hyperdynamic left ventricular function The visual ejection fraction is  estimated at 65-70%.  Grade I or early diastolic dysfunction.  The right ventricular systolic function is normal.  There is trace mitral regurgitation.  The left atrium is mild to moderately dilated by volume criteria at 41 ml/m2.  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 These  gradients noted below are stable and normal for this prosthetic aortic valve.  Compared to the prior echo study dated 6-2-2017, there have been no major  changes. The LA is more dilated.  The mean AoV pressure gradient is 8.7 mmHg. The peak AoV pressure gradient is  17.0 mmHg. The calculated aortic valve are is 2.5 cm^2.  _____________________________________________________________________________  __        Left Ventricle  The left ventricle is normal in size. There is mild concentric left  ventricular hypertrophy. The left ventricular ejection fraction is normal. The  visual ejection fraction is estimated at 65-70%. Hyperdynamic left ventricular  function. Grade I or early diastolic dysfunction. Diastolic Doppler findings  (E/E' ratio and/or other parameters) suggest left ventricular filling  pressures are indeterminate. No regional wall motion abnormalities noted.  There is no thrombus seen in the left ventricle.     Right Ventricle  Normal right ventricle structure and size. The right ventricular systolic  function is normal.     Atria  The left atrium is mild to moderately dilated. Left atrial enlargement is  noted by volume criteria. at 41 ml/m2. Right atrial size is normal. There is  no atrial shunt seen.     Mitral Valve  The mitral valve is normal in structure and function. There is no evidence of  mitral valve prolapse. There is trace mitral regurgitation. There is no mitral  valve stenosis.        Tricuspid Valve  The  tricuspid valve is normal in structure and function. The right ventricular  systolic pressure is approximated at 31.8 mmHg plus the right atrial pressure.  There is trace tricuspid regurgitation. The right ventricular systolic  pressure is approximated at 31.8mmHg plus the right atrial pressure. Right  ventricular systolic pressure is elevated, consistent with mild pulmonary  hypertension.     Aortic Valve  No aortic regurgitation is present. No aortic stenosis is present. 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 The gradient is normal for this prosthetic aortic  valve.     Pulmonic Valve  The pulmonic valve is not well seen, but is grossly normal. There is trace  pulmonic valvular regurgitation.     Vessels  Normal size aorta. Inferior vena cava not well visualized for estimation of  right atrial pressure.     Pericardium  There is no pericardial effusion. There is no pleural effusion.        Rhythm  Sinus rhythm was noted. There was significant artifact with the tracings.  _____________________________________________________________________________  __  MMode/2D Measurements & Calculations  IVSd: 1.2 cm     LVIDd: 4.4 cm  LVIDs: 2.4 cm  LVPWd: 1.2 cm  FS: 45.2 %  LV mass(C)d: 193.8 grams  LV mass(C)dI: 112.8 grams/m2  asc Aorta Diam: 2.5 cm  LVOT diam: 2.0 cm  LVOT area: 3.1 cm2  LA Volume (BP): 70.9 ml  LA Volume Index (BP): 41.2 ml/m2  RWT: 0.54           Doppler Measurements & Calculations  MV E max perez: 67.8 cm/sec  MV A max perez: 131.3 cm/sec  MV E/A: 0.52  MV dec time: 0.14 sec  Ao V2 max: 205.9 cm/sec  Ao max P.0 mmHg  Ao V2 mean: 135.9 cm/sec  Ao mean P.7 mmHg  Ao V2 VTI: 38.3 cm  SORAYA(I,D): 2.5 cm2  SORAYA(V,D): 1.8 cm2  LV V1 max P.5 mmHg  LV V1 max: 117.1 cm/sec  LV V1 VTI: 30.2 cm  SV(LVOT): 94.9 ml  SI(LVOT): 55.2 ml/m2  PA acc time: 0.05 sec  TR max perez: 281.7 cm/sec  TR max P.8 mmHg  AV Perez Ratio (DI): 0.57  SORAYA Index (cm2/m2): 1.4  E/E' av.3  Lateral E/e': 14.2  Medial E/e':  14.5              _____________________________________________________________________________  __        Report approved by: Christofer Snell 07/15/2018 12:37 PM      Glucose by meter   Result Value Ref Range    Glucose 166 (H) 70 - 99 mg/dL   Social Work IP Consult    Narrative    Eamon Mckeon, LGSW     7/15/2018  4:34 PM  SW met with daughter and spoke to her about Hospice. Family will   go with [facility]. Hospice set to meet with family   tentatively at  9 am 7/16/18. [facility] will call son Kwame   to confirm time tomorrow.    Glucose by meter   Result Value Ref Range    Glucose 225 (H) 70 - 99 mg/dL   Glucose by meter   Result Value Ref Range    Glucose 265 (H) 70 - 99 mg/dL   Glucose by meter   Result Value Ref Range    Glucose 213 (H) 70 - 99 mg/dL   Glucose by meter   Result Value Ref Range    Glucose 157 (H) 70 - 99 mg/dL   CBC with platelets differential   Result Value Ref Range    WBC 20.0 (H) 4.0 - 11.0 10e9/L    RBC Count 3.53 (L) 4.4 - 5.9 10e12/L    Hemoglobin 10.7 (L) 13.3 - 17.7 g/dL    Hematocrit 32.7 (L) 40.0 - 53.0 %    MCV 93 78 - 100 fl    MCH 30.3 26.5 - 33.0 pg    MCHC 32.7 31.5 - 36.5 g/dL    RDW 14.4 10.0 - 15.0 %    Platelet Count 168 150 - 450 10e9/L    Diff Method Automated Method     % Neutrophils 92.9 %    % Lymphocytes 4.7 %    % Monocytes 1.7 %    % Eosinophils 0.0 %    % Basophils 0.1 %    % Immature Granulocytes 0.6 %    Nucleated RBCs 0 0 /100    Absolute Neutrophil 18.6 (H) 1.6 - 8.3 10e9/L    Absolute Lymphocytes 0.9 0.8 - 5.3 10e9/L    Absolute Monocytes 0.3 0.0 - 1.3 10e9/L    Absolute Eosinophils 0.0 0.0 - 0.7 10e9/L    Absolute Basophils 0.0 0.0 - 0.2 10e9/L    Abs Immature Granulocytes 0.1 0 - 0.4 10e9/L    Absolute Nucleated RBC 0.0    Basic metabolic panel   Result Value Ref Range    Sodium 143 133 - 144 mmol/L    Potassium 4.2 3.4 - 5.3 mmol/L    Chloride 106 94 - 109 mmol/L    Carbon Dioxide 29 20 - 32 mmol/L    Anion Gap 8 3 - 14 mmol/L    Glucose  144 (H) 70 - 99 mg/dL    Urea Nitrogen 65 (H) 7 - 30 mg/dL    Creatinine 2.11 (H) 0.66 - 1.25 mg/dL    GFR Estimate 30 (L) >60 mL/min/1.7m2    GFR Estimate If Black 36 (L) >60 mL/min/1.7m2    Calcium 9.1 8.5 - 10.1 mg/dL   Blood gas venous and oxyhgb   Result Value Ref Range    Ph Venous 7.44 (H) 7.32 - 7.43 pH    PCO2 Venous 46 40 - 50 mm Hg    PO2 Venous 79 (H) 25 - 47 mm Hg    Bicarbonate Venous 31 (H) 21 - 28 mmol/L    FIO2 4LHF     Oxyhemoglobin Venous 95 %    Base Excess Venous 6.0 mmol/L[JW1.1]   Order   CT Head w/o Contrast [[id number]] (Order [id number])   Exam Information   Exam Date Exam Time Accession # Performing Department Results    7/14/18  8:58 PM [id number] [facility] Radiology    [employer] Interactive Report and Info[employer]   View the interactive report   PACS Images   Show images for CT Head w/o Contrast   Study Result   CT SCAN OF THE HEAD WITHOUT CONTRAST   7/14/2018 8:58 PM      HISTORY: AMS.      TECHNIQUE:  Axial images of the head and coronal reformations without  IV contrast material. Radiation dose for this scan was reduced using  automated exposure control, adjustment of the mA and/or kV according  to patient size, or iterative reconstruction technique.     COMPARISON: Head CT 6/3/2018.     FINDINGS: There is no evidence of intracranial hemorrhage, mass, acute  infarct or anomaly. There is generalized atrophy of the brain. There  is low attenuation in the white matter of the cerebral hemispheres  consistent with sequelae of small vessel ischemic disease. 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 Ventricular size is within  normal limits without evidence of hydrocephalus.      The visualized portions of the sinuses and mastoids appear normal. The  bony calvarium and bones of the skull base appear intact.          IMPRESSION:     1. No evidence of acute intracranial hemorrhage, mass, or herniation.  2. There is generalized atrophy of the brain. White matter changes  are  present in the cerebral hemispheres that are consistent with small  vessel ischemic disease in this age patient.        EV BURNETT MD   Order   XR Chest Port 1 View [[id number]] (Order [id number])   Exam Information   Exam Date Exam Time Accession # Performing Department Results    7/14/18  6:12 PM [id number] [facility] Radiology    [employer] Interactive Report and InfoRx   View the interactive report   PACS Images   Show images for XR Chest Port 1 View   Study Result   CHEST PORTABLE ONE VIEW   7/14/2018 6:12 PM      HISTORY: AMS.      COMPARISON: 7/2/2018.         IMPRESSION: Lung volumes remain low. Background of interstitial  opacities are again seen although the hazy opacities from prior  appears slightly improved which may be due to edema and/or pneumonia.  No significant pleural effusion or pneumothorax. Cardiac silhouette  remains enlarged.     EV BURNETT MD     Order   Cervical spine CT w/o contrast [[id number]] (Order [id number])   Exam Information   Exam Date Exam Time Accession # Performing Department Results    6/3/18 12:50 PM [id number] [facility] Radiology    [employer] Interactive Report and InfoRx   View the interactive report   PACS Images   Show images for Cervical spine CT w/o contrast   Study Result   CT OF THE CERVICAL SPINE WITHOUT CONTRAST   6/3/2018 12:50 PM      COMPARISON: None.     HISTORY: Dementia, fall.      TECHNIQUE: Axial images of the cervical spine were acquired without  intravenous contrast. Multiplanar reformations were created.       FINDINGS: There is normal alignment of the cervical vertebrae.  Vertebral body heights of the cervical spine are normal.  Craniocervical alignment is normal. There are no fractures of the  cervical spine.  There is degenerative endplate spurring at the C3-C4  level. There is moderate facet arthropathy throughout cervical spine.  There is no degenerative spinal canal narrowing of the cervical spine.  There is no  prevertebral soft tissue swelling.         IMPRESSION: No evidence for fracture or traumatic malalignment of the  cervical spine.     Radiation dose for this scan was reduced using automated exposure  control, adjustment of the mA and/or kV according to patient size, or  iterative reconstruction technique     GAGE GARRETT MD     Order   MR Lumbar Spine w/o Contrast [[id number]] (Order [id number])   Exam Information   Exam Date Exam Time Accession # Performing Department Results    5/11/17  3:21 PM [id number] [facility] MRI    [employer] Interactive Report and InfoRx   View the interactive report   PACS Images   Show images for MR Lumbar Spine w/o Contrast   Study Result   MR LUMBAR SPINE WITHOUT CONTRAST  5/11/2017 3:21 PM     HISTORY: Recent fall. MR for consideration of vertebroplasty  appropriateness.     COMPARISON: Plain films 4/27/2017. MR 11/4/2016.     TECHNIQUE: Routine MR lumbar spine mid T12 through the sacrum.     FINDINGS: Moderate scoliotic curve concave to the right centered at  L2-L3. Mild compression of the superior endplate of L3 with moderately  extensive bone edema on STIR images. This indicates this is a subacute  compression fracture that has not yet healed. No retropulsion. This  would be amenable to vertebroplasty if indicated. There is subtle high  T2 signal in the anterosuperior portion of the L2 vertebral body which  could be bone bruising or microfractures. I do not see a well-defined  area of compression. I would favor treatment of the L3 vertebral body  and not L2 at this time.     Degenerative changes as follows:     L1-L2: Mild annular bulge. No central or lateral stenosis.     L2-L3: No disc protrusion. No central or lateral stenosis.     L3-L4: Moderate bilateral facet joint disease worse on the left. No  central or lateral stenosis.     L4-L5: Moderate degenerative narrowing of the interspace. Schmorl's  node in the inferior endplate of L4. Advanced facet joint  disease.  Mild central stenosis. Moderate to severe right-sided foraminal  stenosis and mild left-sided foraminal stenosis.     L5-S1: Moderate narrowing of the interspace. Minimal annular bulge. No  central or lateral stenosis. Moderate facet joint disease on the left.         IMPRESSION:  1. Mild compression fracture of the superior endplate of L3 but  considerable edema throughout most of the L3 vertebral body. This  would be amenable to vertebroplasty. No retropulsion.  2. There may be minimal bone edema in the anterosuperior margin of L2  but no definite fracture line identified.  3. Degenerative changes as described.  4. The degenerative disease was present on 11/4/2016. The compression  fracture of L3 is new.     EVELINE TAN MD[JW1.2]            Revision History        User Key Date/Time User Provider Type Action    > JW1.4 7/17/2018 12:05 AM Marianna Mccoy APRN CNS Clinical Nurse Specialist Sign     JW1.3 7/16/2018 10:09 PM Marianna Mccoy APRN CNS Clinical Nurse Specialist      JW1.2 7/16/2018 10:05 PM Marianna Mccoy APRN CNS Clinical Nurse Specialist      JW1.1 7/16/2018  9:17 AM Marianna Mccoy APRN CNS Clinical Nurse Specialist             Consults by Eamon Mckeon LGSW at 7/15/2018  4:34 PM     Author:  Eamon Mckeon LGSW Service:  (none) Author Type:      Filed:  7/15/2018  4:34 PM Date of Service:  7/15/2018  4:34 PM Creation Time:  7/15/2018  4:30 PM    Status:  Signed :  Eamon Mckeon LGSW ()     Consult Orders:    1. Social Work IP Consult [[id number]] ordered by Carie Bowers MD at 07/15/18 1434                SW met with daughter and spoke to her about Hospice. Family will go with [facility]. Hospice set to meet with family tentatively at  9 am 7/16/18. [facility] will call son Kwame to confirm time tomorrow.[NN1.1]      Revision History        User Key Date/Time User Provider Type Action    > NN1.1 7/15/2018  " 4:34 PM Eamon Mckeon [facility]  Sign                     Progress Notes - Physician (Notes from 07/22/18 through 07/25/18)      Progress Notes by Marianna Mccoy APRN CNS at 7/25/2018  9:42 AM     Author:  Marianna Mccoy APRN CNS Service:  Palliative Author Type:  Clinical Nurse Specialist    Filed:  7/25/2018  9:59 AM Date of Service:  7/25/2018  9:42 AM Creation Time:  7/25/2018  9:42 AM    Status:  Signed :  Marianna Mccoy APRN CNS (Clinical Nurse Specialist)         [facility]  Palliative Care Progress Note  Text Page      Assessment & Plan   Chaz Soler is a 89 year old male who was admitted on 7/14/2018. I was asked to see the patient for goals of care.     Recommendations:  1.Decisional Capacity -  Unreliable. Patient has an advance directive dated 12/15/16.  Include patient in decision making as much as possible but involve health care agent attempting consensus with patient. Spouse, Kylee is his primary Health Care Agent and his alternate HCA is son Kwame Soler.  2. Pain- chronic low back pain-- good pain control  Tylenol 650 mg PO 4 times per day. Voltaren gel 1% 4 times per day to low back. Lidocaine 5% topical to low back q 4 hours prn - do not use heating pad to area - will not send lidocaine with pt at discharge.  Continue with Gabapentin 100 mg PO TID.  Continue with cymbalta 30 mg PO daily. DC Tramadol. Hydromorphone 0.5-1 mg PO.SL q 2 hours prn moderate to severe pain if pt unable to take other options for pain or for dyspnea.  3. Dyspnea -  Respiratory reserve is fragile and he easily exacerbates   O2 via NC at 5 liters- do not titrate. Steroids, o2 as per hospitalist. Fan at bedside prn. Observe for \"panic attacks\" due to shortness of breath. Lorazepam 0.5 mg PO/SL or MT q 4 hours prn . Hydromorphone 1-2 mg PO/SL/IV q 2 hours prn dyspnea or for moderate to severe pain if pt is unable to take other options. Ordered saline nasal spray for " "discharge if pt's o2 is unable to be humidified due to epistaxis episodes during his stay.  4. Cachexia/Dehydration - Liberalize pt's diet as tolerated. Allow family to supplement with foods that pt enjoys including chocolate ice cream, pudding.   5. Deconditioning - family has decided to make pt comfort care.    5. Spiritual Care- Oriented to Spiritual Health and Social Work Services as part of Palliative Care team. Pt is [employer].  is following.  is following.  6. Care Planning- family has decided to make pt comfort care. Appreciate SWS Corinne following to assist with discharge back to [facility] with [facility] support if pt is stable in 24-48 hours.     Goal of Care: DNR/DNI. 7/23/18 comfort care. Discharge 7/25 at 11:30am to [facility] with [facility] if pt is stable and symptoms are managed. Hospice meds ordered. POLST completed. Hospice meds ordered with request for bubble packing.     Disease Process/es & Symptoms:  Chaz Soler is a 89 year old patient admitted with symptoms of decreased mental status, hypotension and shortness of breath. He has been treated for acute on chronic hypoxemic respiratory failure due to pulmonary fibrosis and possible PNA, associated \"panic attacks\", acute encephalopathy, aortic stenosis s/p TAVR, chronic diastolic CHF with preserved EF, low back pain with compression fracture, CKD, dementia, HTN, CAD, hypothyroidism.       This is in the setting of pulmonary fibrosis diagnosed at the [facility] approximately 5 years ago, on chronic oxygen that has been gradually increasing to 4-5 l/min via NC, dementia, diastolic CHF, HTN, CAD, hypothyroidisim, GERD, depression/anxiety, chronic low back pain due to DDD with recent vertebral fracture s/p vertebroplasty L3, L4-L5 stenosis and facet arthropathy, s/p lumbosacral caudal epidural steroid injections.. He has been hospitalized 5 times in the past 10 months for recurrent respiratory failure and episodes of AMS, " CHF and SOL/CKD. Prior to admission patient has moved to ICU from TCU for higher level of daily care needs. There has been a decline in daily function including increased time in wc, decreased ability to walk long distances, episodes of panic attacks.  There is a documented unitentional weight loss of 13 lbs over the past 9 months.     Findings & plan of care discussed with:  MERARY Barrios, bedside nurse Jak.  Follow-up plan from palliative team: will continue to follow this pt for symptom management and support for family with goals of care discussion.  Thank you for involving us in the patient's care    Marianna YANG, CNS  Pain Management and Palliative Care  [facility]  Pgr: [phone]    Time Spent on this Encounter   I spent 10 minutes (9:20-9:30 am) in assessment of the patient, and  in counseling and discussion with pt's daughter Maria Isabel and daughter Jessy as documented in sections below. Another 15 minutes in review of chart, documentation, coordination of care and discussion with the health care team.    Interval History   Chart reviewed.  Pt removed his o2 overnight and desaturated. Dyspnea controlled with o2, lorazepam and hydromorphone, positioning. O2 at 5 liters per nc.  Sleepy this am, but comfortable. His daughters Jessy and Maria Isabel are feeding him a magic cup. No new complaints.  Had a shower last evening. Discussed discharge with bedside Nurse Jak who will premedicate pt for dyspnea and discomfort prior to discharge today.  Course of Hospitalization Discussions Data[JW1.1]    7/25/2018[JW1.2]  Met with Jaxson at bedside. Reviewed treatments for dyspnea with side effect of drowsiness. Drowsiness also part of respiratory failure and fatigue with work of breathing, episode of hypoxia last night.  Jaxson have questions related to hospice care such as how often pt will be seen, how they manage new symptoms as they arise, if pt can have magic cups after  discharge.  Answered their questions and encouraged them to also ask them of the hospice team when they meet at [facility] later today.  No new questions. All are agreeable and understanding that pt will discharge this am.    7/24/2018  Met at bedside with Kylee, Jannie and Jannie's . Discussed plan of care. Focus on care today is managing his symptoms and ensure he is comfortable. If he continues to remain stable with symptoms managed, he will discharge tomorrow at 11:30am with stretcher to [facility] for hospice care with  hospice. If pt has a sudden change and is deteriorating, we would hold his discharge and focus on his comfort. Reviewed treatment for dyspnea with medications, not by turning up pt's oxygen. Pt is encouraged to be up in a chair if he wants to, eat what he likes, do what he likes to do with his family. Pt has an awareness of what is happening, even though he is forgetful. He smiles and states gratitude. All questions answered. Reviewed POLST and completed with wife Kylee at pt bedside in presence of Jannie and .     7/23/2018  Met with pt's wife Kylee, son Kwame, daughter Jannie and her , daughter Maria Isabel and daughter Jessy along with Medical Student Adrian. Pts family shared the messages that they had received all weekend from the care team along with Dr. Rodriguez's update today. In summary, pt has been treated for possible PNA with a 7 day course of antibiotic, steroids for inflammation, and lasix diuretic for potential fluid on his lungs.  Despite these treatments, He is still not improving. This is most likely his pulmonary fibrosis. There are no other treatments to try to fix his disease and it is end stage. He is requiring a higher level of oxygen than we can discharge him on, although he is not far from his baseline oxygen requirement after speaking with respiratory. Family verbalize that pt is eating well intermittently, but then describe pt as being exhausted and sleeping after  "he eats, or gets in the chair, or talks for long periods.     We spent additional time talking about next steps, including comfort care and what that looks like for the patient and family while he is in the hospital, and discharging with hospice care. It is uncertain if pt will survive this hospital stay and discharge with hospice, or if he will die. Either way we will continue care focused on his comfort. Stressed that the treatments for hypoxia are opioids and benzodiazepines, and would not recommend titration of his oxygen. Talked about other non-pharmacologic interventions for dyspnea, including positioning, fan for pt comfort. \"I just don't want him to feel like he is drowning\". \"We would have been  66 years exactly 1 month from today\".  Family agree to comfort care for pt. And verbalize understanding of that care in the hospital as well as the process for discharge with hospice care to [facility] if pt remains stable over the next 24-48 hours. They will speak more with SWS tomorrow. Pt's wife Kylee states that she will contact their . All are aware that the chaplains are available for support if needed or desired. \"Were not a very [employer] family\" . The family, Medicine Student Adrian and I went back to the room and shared with pt an update about his condition, specifically that despite the treatments given for his breathing, it is not getting better, and there isn't more that can be done. Pt asked if he was going to die now. I said not now, but it could be in the next days to weeks. I reassured him that we would continue to take care of him and  focus on  Managing his dyspnea, keeping his pain under control. If he wants to eat, he should eat. If he wants to get up in the chair and play cribbage, he should do that. We want to focus on what he wants. We hope to be able to discharge him back to [facility] to [facility] for Hospice care.  Pt said, \"sounds good\" and gave me a thumbs up.     7/20/18: with " "Lissy Galeana, APRN,CNP updated family on condition and rationale for Physical Therapy /OT. They agree that his respiratory status has deteriorated and that dyspneic episodes are more frequent with less endurance related provocation.  I asked Physical Therapy tot evaluate patient for TCU reentry.  If he is able to get off high flow O2, he still may not have the endurance to tolerate the intensity of therapy with TCU setting.  Goal is to promote quality of life .  Family is also in agreement that they do not want to keep coming back to hospital as this is poor or reduced QOL.  Will await outcome of Physical Therapy and if able to get off high flow O2 to see if goals of care discussion needs to take place.   7/19/2018  No family present. Pt tells me today, \" I sure wish I could go back to [facility] . . It gives me hope, that I am going to get better . .. Nothing against you guys\". I acknowledged his thoughts and that it must be scary to be in ICU and wondering what is happening. He said yes, but the care team is great. I asked him if all the interventions and treatments he is going through here are worth it for him to go through to get back to [facility] - he said yes. He denied feeling like he is suffering.  I shared with him that it is important for the care team and his family to know if he feels like he is suffering and it is too hard for him. He acknowledged this. Shared that I had spoken to his wife Kylee yesterday - he smiled. Told him that she was starting to feel better and hoped to visit him today.   7/18/2018  Called pt's wife Kylee at home. She has been ill and has not been able to come to the hospital. She is feeling better today and hopes to visit pt tomorrow. I updated her about parts of his day today. She shared that she is glad to know that he is returning to his baseline. \"We've been  for over 60 years . . . I hope to keep him for awhile longer\". I shared with her what I had heard from " "her son Kwame and daughter Maria Isabel on 7/16 that pt's goal of care is to be DNR DNI, but it would be ok for pt to receive more aggressive therapies such as bipap, antibiotics and other selective interventions for a trial period to see if he improves. Kylee agreed with this. We also discussed that pt has signs that his pulmonary fibrosis is progressing such as increased oxygen needs, increased sleeping, increase in \"panic attacks\", weight loss and more frequent hospitalizations for respiratory failure associated events. She agreed with this as well. She verbalizes that she can decided not to have him return to the hospital and enroll in hospice at any time if she feels that pt is suffering and not receiving benefit from restorative care. A hospice admission does not require pt to again be hospitalized. She agreed with me that the POLST completed 7/5/2018 is valid and I will send a copy with him back to [facility] at discharge. She verbalizes understanding that pt will return to [facility] TCU in the next day or two if he remains stable, and PT and OT have been consulted to work with pt while he is hospitalized.    7/17/2018  No family present. Pt is improving to baseline and expected to transfer out of ICU.    Discussed on July 16, 2018 with Marianna YANG, CNS:   Met with pt's son/alternate HCA Kwame and daughter Maria Isabel in ICU [facility]. Pt's wife Kylee went home to rest after the 3 of them had a  Hospice information meeting with Gifty Navarro RN. They describe pt as pleasant, a witty sense of humor, enjoys company of family \"he exists for us\". Pt has difficulty with short term memory but looks at newspapers and other things to figure out what day it is. Kwame has made a cribbage board for pt that reminds him of the steps of the game and they play with him when they visit. They note that pt is being admitted to the hospital more frequently for issues related to his respiratory status, slowly is increasing his need for " "oxygen over the last 2 years and is close to the maximum o2 flow he can have with his concentrators, is having more frequent episodes of \"panic\" attacks with his breathing even though his o2 sat is WNL, is only able to walk short distance with his walker and o2, spending more time in a w.c.  He has lost weight and they try to encourage his PO intake and include chocolate ice cream, pudding and other things he like to help with his intake. Prior to the last 2 admissions he had been living with his wife, who helped to care for him. He has been compliant with PT/OT at TCU. They agree that pt should be DNR DNI. They along with pt's wife Kylee, have noted that pt was able to recover after being hospitalized and is improving back to his baseline PTA. They hope he can continue to improve and feel that short term trials of bipap or other treatments, even including a keofeed tube would be worth it for pt, if it would help him overcome an acute exacerbation, but not long term. Pt's POLST completed prior to DC with pt's wife and my colleague Lissy Godinez Rojelio 7/5/2018 was reviewed and is currently up to date with the information shared by Kwame and Maria Isabel.      Palliative Symptom Review (0=no symptom/no concern, 1=mild, 2=moderate, 3=severe):     Variable historian due to dementia.   Pt currently denies pain, SOB, PRESCOTT, insomnia, depression. + anxiety associated with PRESCOTT. Negative constipation      Physical Exam   Resp:  [20-30] 30  SpO2:  [86 %-96 %] 91 %  129 lbs 6.56 oz   GEN:  Weak appearing, drowsy, oriented x 2, appears comfortable, NAD.  HEENT:  Normocephalic/atraumatic, no scleral icterus, no nasal discharge, mouth moist.  CV:  RRR, S1, S2; no murmurs or other irregularities noted.  +3 DP/PT pulses bilatererally; no edema BLE.  RESP:  Tachypnea 24. Diminished to auscultation anteriorly bilaterally no rales,  no rhonchi/wheezing/retractions.  Symmetric chest rise on inhalation noted. Humidified NC at 5 liters/min.  ABD: "  Rounded, soft, non-tender/non-distended.  +BS. BM x2 yesterday.  EXT:  Edema & pulses as noted above.  CMS intact x 4.     M/S:   Deferred.  SKIN:  Dry to touch, no exanthems noted in the visualized areas. Bruising of UE bilaterally    NEURO: Symmetric strength. 5/5.  Sensation to touch intact all extremities.   There is no area of allodynia or hyperesthesia.  PAIN BEHAVIOR: Cooperative  Psych:  Withdrawn, weak affect.  Calm, cooperative, less verbal today. Able to make basic needs known. Aware of events, family present.    Medications       acetaminophen  650 mg Oral TID    Or     acetaminophen  650 mg Rectal TID     diclofenac  2 g Transdermal 4x Daily     DULoxetine  30 mg Oral Daily     gabapentin  100 mg Oral TID     ipratropium - albuterol 0.5 mg/2.5 mg/3 mL  1 vial Nebulization 4x Daily     levothyroxine  88 mcg Oral Daily     metoprolol succinate  50 mg Oral Daily     pantoprazole  40 mg Oral QAM     predniSONE  20 mg Oral Daily     senna-docusate  1 tablet Oral BID     sodium chloride (PF)  3 mL Intravenous Q8H     tamsulosin  0.4 mg Oral Daily       Data   No results found for this or any previous visit (from the past 24 hour(s)).[JW1.1]     Revision History        User Key Date/Time User Provider Type Action    > JW1.2 7/25/2018  9:59 AM Marianna Mccoy APRN CNS Clinical Nurse Specialist Sign     JW1.1 7/25/2018  9:42 AM Marianna Mccoy APRN CNS Clinical Nurse Specialist             Progress Notes by Marianna Mccoy APRN CNS at 7/24/2018  1:42 PM     Author:  Marianna Mccoy APRN CNS Service:  Pain Service Author Type:  Clinical Nurse Specialist    Filed:  7/24/2018  3:24 PM Date of Service:  7/24/2018  1:42 PM Creation Time:  7/24/2018  1:42 PM    Status:  Signed :  Marianna Mccoy APRN CNS (Clinical Nurse Specialist)         [facility]  Palliative Care Progress Note  Text Page      Assessment & Plan   Chaz Soler is a 89 year old male who was admitted on  "7/14/2018. I was asked to see the patient for goals of care.     Recommendations:  1.Decisional Capacity -  Unreliable. Patient has an advance directive dated 12/15/16.  Include patient in decision making as much as possible but involve health care agent attempting consensus with patient. Spouse, Kylee is his primary Health Care Agent and his alternate HCA is son Kwame Soler.  2. Pain- chronic low back pain-- good pain control  Tylenol[JW1.1] 650[JW1.2] mg PO[JW1.1] 4 times per day[JW1.2]. Voltaren gel 1% 4 times per day to low back. Lidocaine 5% topical to low back q 4 hours prn - do not use heating pad to area[JW1.1] - will not send lidocaine with pt at discharge.  Continue with[JW1.2] Gabapentin 100 mg PO TID.  Continue with cymbalta 30 mg PO daily.[JW1.1] DC Tramadol.[JW1.2] Hydromorphone 0.5-1 mg PO.SL q 2 hours prn moderate to severe pain if pt unable to take other options for pain or for dyspnea.  3. Dyspnea -  Respiratory reserve is fragile and he easily exacerbates   O2 via oxymizer[JW1.1] at 5 liters[JW1.2]- do not titrate. Steroids, o2 as per hospitalist. Fan at bedside prn. Observe for \"panic attacks\" due to shortness of breath. Lorazepam 0.5 mg PO/SL or NC q 4 hours prn - use cautiously and hold for sedation. Hydromorphone 0.5-1 mg PO/SL/IV q 2 hours prn dyspnea or for moderate to severe pain if pt is unable to take other options.  4. Cachexia/Dehydration - Liberalize pt's diet as tolerated. Allow family to supplement with foods that pt enjoys including chocolate ice cream, pudding.   5. Deconditioning - family has decided to make pt comfort care.    5. Spiritual Care- Oriented to Spiritual Health and Social Work Services as part of Palliative Care team. Pt is [employer].  is following.  is following.  6. Care Planning- family has decided to make pt comfort care. Appreciate SWS Corinne following to assist with discharge back to [facility] with [facility] support if pt is stable in " "24-48 hours.     Goal of Care: DNR/DNI. 7/23/18 comfort care. Discharge[JW1.1] 7/25 at 11:30am[JW1.2] to [facility] with  Hospice if pt is stable and symptoms are managed.[JW1.1] Hospice meds ordered. POLST completed. Hospice meds ordered with request for bubble packing.[JW1.2]     Disease Process/es & Symptoms:  Chaz Soler is a 89 year old patient admitted with symptoms of decreased mental status, hypotension and shortness of breath. He has been treated for acute on chronic hypoxemic respiratory failure due to pulmonary fibrosis and possible PNA, associated \"panic attacks\", acute encephalopathy, aortic stenosis s/p TAVR, chronic diastolic CHF with preserved EF, low back pain with compression fracture, CKD, dementia, HTN, CAD, hypothyroidism.       This is in the setting of pulmonary fibrosis diagnosed at the [facility] approximately 5 years ago, on chronic oxygen that has been gradually increasing to 4-5 l/min via NC, dementia, diastolic CHF, HTN, CAD, hypothyroidisim, GERD, depression/anxiety, chronic low back pain due to DDD with recent vertebral fracture s/p vertebroplasty L3, L4-L5 stenosis and facet arthropathy, s/p lumbosacral caudal epidural steroid injections.. He has been hospitalized 5 times in the past 10 months for recurrent respiratory failure and episodes of AMS, CHF and SOL/CKD. Prior to admission patient has moved to ICU from TCU for higher level of daily care needs. There has been a decline in daily function including increased time in wc, decreased ability to walk long distances, episodes of panic attacks.  There is a documented unitentional weight loss of 13 lbs over the past 9 months.     Findings & plan of care discussed with:  MERARY[JW1.1] Spohie, bedside nurse Jak.[JW1.2]  Follow-up plan from palliative team: will continue to follow this pt for symptom management and support for family with goals of care discussion.  Thank you for involving us in the patient's care    Marianna Mccoy " CELIA, CNS  Pain Management and Palliative Care  [facility]  Pgr: [phone]    Time Spent on this Encounter   I spent[JW1.1] 20[JW1.2] minutes ([JW1.1]13[JW1.2]:35:-[JW1.1]13[JW1.2]:[JW1.1]5[JW1.2]5 am) in assessment of the patient, and  in counseling and discussion with pt's wife Kylee, son Kwame, daughter Jannie and her  as documented in sections below. Another 20 minutes in review of chart, documentation, coordination of care and discussion with the health care team.    Interval History   Chart reviewed.  Pt[JW1.1] transferred out of ICU to 5th floor last evening. Has rested comfortably in bed with oxymizer at 5 liters/min. Has required intermittent dosing of hydromorphone for back discomfort and dyspnea/anxiety with relief. He is eating well, and then dozes off to sleep. He is pleasant and cooperative. Voiding per urinal.    Family - wife Kylee, daughter Jannie and her  present at bedside.[JW1.2]   Course of Hospitalization Discussions Data[JW1.1]    7/24/2018[JW1.3]  Met at bedside with Kylee, Jannie and Jannie's . Discussed plan of care. Focus on care today is managing his symptoms and ensure he is comfortable. If he continues to remain stable with symptoms managed, he will discharge tomorrow at 11:30am with stretcher to [facility] for hospice care with  hospice. If pt has a sudden change and is deteriorating, we would hold his discharge and focus on his comfort. Reviewed treatment for dyspnea with medications, not by turning up pt's oxygen. Pt is encouraged to be up in a chair if he wants to, eat what he likes, do what he likes to do with his family. Pt has an awareness of what is happening, even though he is forgetful. He smiles and states gratitude. All questions answered. Reviewed POLST and completed with wife Kylee at pt bedside in presence of Jannie and .[JW1.2]     7/23/2018  Met with pt's wife Kylee, son Kwame, daughter Jannie and her , daughter Maria Isabel and daughter  "Jessy along with Medical Student Adrian. Pts family shared the messages that they had received all weekend from the care team along with Dr. Rodriguez's update today. In summary, pt has been treated for possible PNA with a 7 day course of antibiotic, steroids for inflammation, and lasix diuretic for potential fluid on his lungs.  Despite these treatments, He is still not improving. This is most likely his pulmonary fibrosis. There are no other treatments to try to fix his disease and it is end stage. He is requiring a higher level of oxygen than we can discharge him on, although he is not far from his baseline oxygen requirement after speaking with respiratory. Family verbalize that pt is eating well intermittently, but then describe pt as being exhausted and sleeping after he eats, or gets in the chair, or talks for long periods.     We spent additional time talking about next steps, including comfort care and what that looks like for the patient and family while he is in the hospital, and discharging with hospice care. It is uncertain if pt will survive this hospital stay and discharge with hospice, or if he will die. Either way we will continue care focused on his comfort. Stressed that the treatments for hypoxia are opioids and benzodiazepines, and would not recommend titration of his oxygen. Talked about other non-pharmacologic interventions for dyspnea, including positioning, fan for pt comfort. \"I just don't want him to feel like he is drowning\". \"We would have been  66 years exactly 1 month from today\".  Family agree to comfort care for pt. And verbalize understanding of that care in the hospital as well as the process for discharge with hospice care to [facility] if pt remains stable over the next 24-48 hours. They will speak more with SWS tomorrow. Pt's wife Kylee states that she will contact their . All are aware that the chaplains are available for support if needed or desired. \"Were not a very " "Muslim family\" . The family, Medicine Student Adrian and I went back to the room and shared with pt an update about his condition, specifically that despite the treatments given for his breathing, it is not getting better, and there isn't more that can be done. Pt asked if he was going to die now. I said not now, but it could be in the next days to weeks. I reassured him that we would continue to take care of him and  focus on  Managing his dyspnea, keeping his pain under control. If he wants to eat, he should eat. If he wants to get up in the chair and play cribbage, he should do that. We want to focus on what he wants. We hope to be able to discharge him back to [facility] to LT for Hospice care.  Pt said, \"sounds good\" and gave me a thumbs up.     7/20/18: with CLEIA Smith,CNP updated family on condition and rationale for Physical Therapy /OT. They agree that his respiratory status has deteriorated and that dyspneic episodes are more frequent with less endurance related provocation.  I asked Physical Therapy tot evaluate patient for TCU reentry.  If he is able to get off high flow O2, he still may not have the endurance to tolerate the intensity of therapy with TCU setting.  Goal is to promote quality of life .  Family is also in agreement that they do not want to keep coming back to hospital as this is poor or reduced QOL.  Will await outcome of Physical Therapy and if able to get off high flow O2 to see if goals of care discussion needs to take place.   7/19/2018  No family present. Pt tells me today, \" I sure wish I could go back to [facility] . . It gives me hope, that I am going to get better . .. Nothing against you guys\". I acknowledged his thoughts and that it must be scary to be in ICU and wondering what is happening. He said yes, but the care team is great. I asked him if all the interventions and treatments he is going through here are worth it for him to go through to get [facility] he said yes. He denied feeling like he is suffering.  I shared with him that it is important for the care team and his family to know if he feels like he is suffering and it is too hard for him. He acknowledged this. Shared that I had spoken to his wife Kylee yesterday - he smiled. Told him that she was starting to feel better and hoped to visit him today.   7/18/2018  Called pt's wife Kylee at home. She has been ill and has not been able to come to the hospital. She is feeling better today and hopes to visit pt tomorrow. I updated her about parts of his day today. She shared that she is glad to know that he is returning to his baseline. \"We've been  for over 60 years . . . I hope to keep him for awhile longer\". I shared with her what I had heard from her son Kwame and daughter Maria Isabel on 7/16 that pt's goal of care is to be DNR DNI, but it would be ok for pt to receive more aggressive therapies such as bipap, antibiotics and other selective interventions for a trial period to see if he improves. Kylee agreed with this. We also discussed that pt has signs that his pulmonary fibrosis is progressing such as increased oxygen needs, increased sleeping, increase in \"panic attacks\", weight loss and more frequent hospitalizations for respiratory failure associated events. She agreed with this as well. She verbalizes that she can decided not to have him return to the hospital and enroll in hospice at any time if she feels that pt is suffering and not receiving benefit from restorative care. A hospice admission does not require pt to again be hospitalized. She agreed with me that the POLST completed 7/5/2018 is valid and I will send a copy with him back to [facility] at discharge. She verbalizes understanding that pt will return to [facility] TCU in the next day or two if he remains stable, and PT and OT have been consulted to work with pt while he is hospitalized.    7/17/2018  No family present. Pt is improving " "to baseline and expected to transfer out of ICU.    Discussed on July 16, 2018 with Marianna YANG, CNS:   Met with pt's son/alternate HCA Kwame and daughter Maria Isabel in ICU [facility]. Pt's wife Kylee went home to rest after the 3 of them had a  Hospice information meeting with Gifty Navarro RN. They describe pt as pleasant, a witty sense of humor, enjoys company of family \"he exists for us\". Pt has difficulty with short term memory but looks at newspapers and other things to figure out what day it is. Kwame has made a cribbage board for pt that reminds him of the steps of the game and they play with him when they visit. They note that pt is being admitted to the hospital more frequently for issues related to his respiratory status, slowly is increasing his need for oxygen over the last 2 years and is close to the maximum o2 flow he can have with his concentrators, is having more frequent episodes of \"panic\" attacks with his breathing even though his o2 sat is WNL, is only able to walk short distance with his walker and o2, spending more time in a w.c.  He has lost weight and they try to encourage his PO intake and include chocolate ice cream, pudding and other things he like to help with his intake. Prior to the last 2 admissions he had been living with his wife, who helped to care for him. He has been compliant with PT/OT at TCU. They agree that pt should be DNR DNI. They along with pt's wife Kylee, have noted that pt was able to recover after being hospitalized and is improving back to his baseline PTA. They hope he can continue to improve and feel that short term trials of bipap or other treatments, even including a keofeed tube would be worth it for pt, if it would help him overcome an acute exacerbation, but not long term. Pt's POLST completed prior to DC with pt's wife and my colleague Lissy Serrato 7/5/2018 was reviewed and is currently up to date with the information shared by Kwame and " Maria Isabel.      Palliative Symptom Review (0=no symptom/no concern, 1=mild, 2=moderate, 3=severe):     Variable historian due to dementia.   Pt currently denies pain, SOB, PRESCOTT, insomnia, depression. + anxiety associated with PRESCOTT. Negative constipation      Physical Exam   Temp:  [97.4  F (36.3  C)] 97.4  F (36.3  C)  Pulse:  [74] 74  Heart Rate:  [69-79] 78  Resp:  [20-50] 20  BP: (145-153)/(72-85) 153/85  FiO2 (%):  [50 %] 50 %  SpO2:  [91 %-98 %] 93 %  129 lbs 6.56 oz   GEN:  Weak appearing, drowsy, oriented x 2, appears comfortable, NAD.  HEENT:  Normocephalic/atraumatic, no scleral icterus, no nasal discharge, mouth moist.  CV:  RRR, S1, S2; no murmurs or other irregularities noted.  +3 DP/PT pulses bilatererally; no edema BLE.  RESP:  Tachypnea. 24-30. Diminished to auscultation anteriorly bilaterally no rales,  no rhonchi/wheezing/retractions.  Symmetric chest rise on inhalation noted.[JW1.1] Oxymizer at 5 liters/min.[JW1.2]  ABD:  Rounded, soft, non-tender/non-distended.  +BS. Incont of stool  EXT:  Edema & pulses as noted above.  CMS intact x 4.     M/S:   Deferred.  SKIN:  Dry to touch, no exanthems noted in the visualized areas. Bruising of UE bilaterally    NEURO: Symmetric strength. 5/5.  Sensation to touch intact all extremities.   There is no area of allodynia or hyperesthesia.  PAIN BEHAVIOR: Cooperative  Psych:  Withdrawn, weak affect.  Calm, cooperative, conversant appropriately but forgetful with short term memory deficits.    Medications       acetaminophen  500 mg Oral TID    Or     acetaminophen  650 mg Rectal TID     diclofenac  2 g Transdermal 4x Daily     DULoxetine  30 mg Oral Daily     gabapentin  100 mg Oral TID     ipratropium - albuterol 0.5 mg/2.5 mg/3 mL  1 vial Nebulization 4x Daily     levothyroxine  88 mcg Oral Daily     metoprolol succinate  50 mg Oral Daily     pantoprazole  40 mg Oral QAM     predniSONE  20 mg Oral Daily     senna-docusate  1 tablet Oral BID     sodium chloride (PF)   3 mL Intravenous Q8H     tamsulosin  0.4 mg Oral Daily       Data   No results found for this or any previous visit (from the past 24 hour(s)).[JW1.1]     Revision History        User Key Date/Time User Provider Type Action    > JW1.3 7/24/2018  3:24 PM Marianna Mccoy APRN CNS Clinical Nurse Specialist Sign     JW1.2 7/24/2018  2:30 PM Marianna Mccoy APRN CNS Clinical Nurse Specialist      JW1.1 7/24/2018  1:42 PM Marianna Mccoy APRN CNS Clinical Nurse Specialist             Progress Notes by Sophie Escudero at 7/24/2018 10:21 AM     Author:  Sophie Escudero Service:  Social Work Author Type:      Filed:  7/24/2018 10:24 AM Date of Service:  7/24/2018 10:21 AM Creation Time:  7/24/2018 10:21 AM    Status:  Signed :  Sophie Escudero ()         [facility]  Met with 2 dtrs as well as d/w Dr Galvez.  Pt will dc tomorrow (Wednesday) to [facility] at 1130 via [facility] stretcher.   Hospice will meet them at [facility] at 1300.  Kylee RN at hospice will order 02.  [facility] aware as are dtrs.  P: DC tomorrow[AJ1.1]     Revision History        User Key Date/Time User Provider Type Action    > AJ1.1 7/24/2018 10:24 AM Sophie Escudero  Sign            Progress Notes by Susi Galvez MD at 7/24/2018  9:10 AM     Author:  Susi Galvez MD Service:  Hospitalist Author Type:  Physician    Filed:  7/24/2018  9:14 AM Date of Service:  7/24/2018  9:10 AM Creation Time:  7/24/2018  9:10 AM    Status:  Signed :  Susi Galvez MD (Physician)           [facility]  Hospitalist Progress Note  Name: Chaz Soler    MRN: [id number]  Provider:  Susi Galvez MD  07/24/18    Initial presenting complaint/issue to hospital (Diagnosis): resp failure.         Assessment and Plan:      Summary of Stay: Chaz Soler is a 89 year old male admitted on 7/14/2018 with SOB. Patient has a PMH of chronic HFpEF, AS s/p TAVR, pulmonary fibrosis and chronic hypoxic resp failure on 5L oxygen at  baseline, CAD, Dementia, CKD he was recently hospitalized at this facility for pneumonia and discharged a few days ago.  According to the family, patient was not eating and drinking much, has been sleeping a lot and brought back to the ER and found to have acute on chronic hypoxic respiratory failure and admitted to the hospital.     Despite treatment with antibiotics, steroids, and diuretics the patient has not made a significant improvement in his sx.  It is suspected that much of his sx are likely from pulm fibrosis and no clear reversible process to improve has been found.  Care conference being held today to establish goals of care.  ? Hospice/comfort cares in light of lack of response to treatment attempts this admit and multiple recent hospitalizations      1.  Acute on chronic hypoxemic respiratory failure with history of chronic pulmonary fibrosis requiring home oxygen at 5 L via nasal cannula prior to this hospitalization.  - continues to require high flow oxygen to maintain saturations  -continue prednisone at 20 mg daily for now.  Doubtful if this is still helpful for our patient long-term and does not appear to be on chronic steroids PTA  -He remained afebrile but earlier had some clinical deterioration with his oxygen levels requiring high flow nasal cannula.  Repeat chest x-ray stating worsening infiltrates likely from pneumonia versus edema.   -He finished 6 day course of intravenous antibiotics with Zosyn (7/16-7/21)  - suspect that his ongoing issues with resp failure related to end stage pulm fibrosis     2.  Aortic stenosis status post TAVR.  Echo done showing normal EF and normal appearing post-TAVR valve      3.  Chronic diastolic congestive heart failure with preserved EF.  BNP was normal.  However the past day or so his respiratory parameters are worsening with chest x-ray findings suggestive of increasing edema.  Likely a component also of acute on chronic diastolic CHF in exacerbation.  has  been diuresed without significant improvement in sx      4.  Pulmonary fibrosis, with chronic hypoxic respiratory failure on 5 L oxygen.    Likely reaching end-stage process now      5.  Acute encephalopathy on top of dementia-encephalopathy-  resolved.  It was suspected due to his respiratory failure and multiple other health issues.   -Started on low-dose oral Ativan.      6.  Chronic kidney disease.  Baseline is around 1.6-1.8.  Creatinine is at baseline.     7.  Dementia.  On Aricept.      8.  Hypertension.  Blood pressure was elevated last night, increased Toprol-XL to 50 mg p.o. Daily.  -Losartan on hold due to renal failure.      9.  Coronary artery disease.  No sign of any active ischemia, had a negative Lexiscan last year in October.      10.  Hypothyroidism on Synthroid.      11.  Epistaxis-earlier with no further recurrence. It is likely related to dry air with oxygen, better with humidified oxygen.  -Watch for further bleeding      Goals of care.  Comfort cares.      DVT Prophylaxis:  On PCD's.  Patient also has some mild thrombocytopenia      Code Status: DNR/DNI        Discharge Dispo: TBD.  Estimated Disch Date / # of Days until Discharge: 1-2 days.        Interval History:        No chest pain/SOB/N/V.                  Physical Exam:      Last Vital Signs:  Temp: 97.4  F (36.3  C) Temp src: Oral BP: 153/85 Pulse: 74 Heart Rate: 78 Resp: 20 SpO2: 93 % O2 Device: Oxymizer cannula Oxygen Delivery: 5 LPM[AP1.1]    Intake/Output Summary (Last 24 hours) at 07/24/18 0913  Last data filed at 07/24/18 0728   Gross per 24 hour   Intake             1000 ml   Output              975 ml   Net               25 ml     I/O last 3 completed shifts:  In: 1000 [P.O.:1000]  Out: 1050 [Urine:1050]  Vitals:    07/17/18 0400 07/18/18 0600 07/19/18 0630 07/21/18 0003   Weight: 61.9 kg (136 lb 7.4 oz) 63 kg (138 lb 14.2 oz) 62.1 kg (136 lb 14.5 oz) 59.8 kg (131 lb 13.4 oz)    07/22/18 0004   Weight: 58.7 kg (129 lb 6.6  oz)[AP1.2]     Gen - Alert, awake, [place], follows commands.  Lungs - + crackles B.  Heart - RR,S1+S2 nml, no m/g/r.  Abd - soft, NT, ND, + BS.  Ext - no edema.           Medications:      All current medications were reviewed.         Data:      All new lab and imaging data was reviewed.   Labs:[AP1.1]  No results for input(s): CULT in the last 168 hours.    Recent Labs  Lab 07/23/18  0555 07/22/18  0550 07/21/18  0548 07/20/18  0601   WBC  --  16.4* 13.3* 13.6*   HGB  --  11.3* 12.2* 11.2*   HCT  --  35.4* 37.5* 34.5*   MCV  --  93 93 93   * 126* 116* 124*       Recent Labs  Lab 07/22/18  0550 07/21/18  0548 07/20/18  0601 07/19/18  0557    143 143 141   POTASSIUM 4.4 4.2 4.0 3.8   CHLORIDE 101 103 103 105   CO2 31 32 34* 29   ANIONGAP 9 8 6 7   * 147* 105* 121*   BUN 81* 67* 53* 50*   CR 1.73* 1.93* 1.70* 1.59*   GFRESTIMATED 37* 33* 38* 41*   GFRESTBLACK 45* 40* 46* 50*   LEXIS 9.3 9.5 9.3 9.6   MAG  --   --   --  2.4*   PHOS 4.8*  --   --   --[AP1.3]       Recent Imaging:   No results found for this or any previous visit (from the past 24 hour(s)).[AP1.1]     Revision History        User Key Date/Time User Provider Type Action    > AP1.3 7/24/2018  9:14 AM Susi Galvez MD Physician Sign     AP1.2 7/24/2018  9:13 AM Susi Galvez MD Physician      AP1.1 7/24/2018  9:10 AM Susi Galvez MD Physician             Progress Notes by Karma Abebe RD, LD at 7/24/2018  7:25 AM     Author:  Karma Abebe RD, LELA Service:  Nutrition Author Type:  Registered Dietitian    Filed:  7/24/2018  7:25 AM Date of Service:  7/24/2018  7:25 AM Creation Time:  7/24/2018  7:25 AM    Status:  Signed :  Karma Abebe RD, LD (Registered Dietitian)         NUTRITION BRIEF NOTE  RD following for malnutrition. Upon chart review, comfort care election. RD to sign off. Please page/consult as needed.     Karma Abebe RDN, LD, [facility]  3rd floor/ICU: [phone]  All other floors:  "[phone]  Weekend/holiday: [phone]  Office: [phone][RP1.1]     Revision History        User Key Date/Time User Provider Type Action    > RP1.1 7/24/2018  7:25 AM Karma Abebe RD, LD Registered Dietitian Sign            Progress Notes by Olesya Pulido RT at 7/23/2018  7:34 PM     Author:  Olesya Pulido RT Service:  (none) Author Type:  Respiratory Therapist    Filed:  7/23/2018  7:36 PM Date of Service:  7/23/2018  7:34 PM Creation Time:  7/23/2018  7:34 PM    Status:  Signed :  Olesya Pulido RT (Respiratory Therapist)         [facility]     Date: 7/23/2018  Admission Dx: Encephalopathy   Pulmonary History pulmonary fibrosis, COPD  Home Nebulizer/MDI Use: duoneb QID, albuterol prn  Home Oxygen: 4-5 lpm  Acuity Level (RCAT flow sheet): 3  Aerosol Therapy initiated: continue with duoneb QID, albuterol prn       Pulmonary Hygiene initiated: deep breath and coughing technique      Volume Expansion initiated: IS      Current Oxygen Requirements: 5 lpm  Current SpO2: 95%    Re-evaluation date: 7/26/2018    Patient Education: continuing education on bronchodilator      See \"RT Assessments\" flow sheet for patient assessment scoring and Acuity Level Details.[JP1.1]                Revision History        User Key Date/Time User Provider Type Action    > JP1.1 7/23/2018  7:36 PM Olesya Pulido RT Respiratory Therapist Sign            Progress Notes by Macario Rodriguez MD at 7/23/2018  2:00 PM     Author:  Macario Rodriguez MD Service:  Hospitalist Author Type:  Physician    Filed:  7/23/2018  5:40 PM Date of Service:  7/23/2018  2:00 PM Creation Time:  7/23/2018  2:00 PM    Status:  Addendum :  Macario Rodriguez MD (Physician)         [facility]  Hospitalist Progress Note  Macario Rodriguez MD 07/23/2018    Reason for Stay (Diagnosis): resp failure         Assessment and Plan:      Summary of Stay: Chaz Soler is a 89 year old male who was admitted on " 7/14/2018 with SOB. Patient has a PMH of chronic HFpEF, AS s/p TAVR, pulmonary fibrosis and chronic hypoxic resp failure on 5L oxygen at baseline, CAD, Dementia, CKD he was recently hospitalized at this facility for pneumonia and discharged a few days ago.  According to the family, patient was not eating and drinking much, has been sleeping a lot and brought back to the ER and found to have acute on chronic hypoxic respiratory failure and admitted to the hospital.    Despite treatment with antibiotics, steroids, and diuretics the patient has not made a significant improvement in his sx.  It is suspected that much of his sx are likely from pulm fibrosis and no clear reversible process to improve has been found.  Care conference being held today to establish goals of care.  ? Hospice/comfort cares[SL1.1] in light of lack of response to treatment attempts this admit and multiple recent hospitalizations[SL1.2]      1.  Acute on chronic hypoxemic respiratory failure with history of chronic pulmonary fibrosis requiring home oxygen at 5 L via nasal cannula prior to this hospitalization.  - continues to require high flow oxygen to maintain saturations  -continue prednisone at 20 mg daily for now.  Doubtful if this is still helpful for our patient long-term and does not appear to be on chronic steroids PTA  -He remained afebrile but earlier had some clinical deterioration with his oxygen levels requiring high flow nasal cannula.  Repeat chest x-ray stating worsening infiltrates likely from pneumonia versus edema.   -He finished[SL1.1] 6[SL1.3] day course of intravenous antibiotics with Zosyn[SL1.1] (7/16-7/21)  - suspect that his ongoing issues with resp failure related to end stage pulm fibrosis[SL1.3]    2.  Aortic stenosis status post TAVR.  Echo done[SL1.1] showing normal EF and normal appearing post-TAVR valve[SL1.3]      3.  Chronic diastolic congestive heart failure with preserved EF.  BNP was normal.  However the  "past day or so his respiratory parameters are worsening with chest x-ray findings suggestive of increasing edema.  Likely a component also of acute on chronic diastolic CHF in exacerbation.[SL1.1]  has been diuresed without significant improvement in sx[SL1.3]      4.  Pulmonary fibrosis, with chronic hypoxic respiratory failure on 5 L oxygen.    Likely reaching end-stage process now      5.  Acute encephalopathy on top of dementia-encephalopathy-  resolved.  It was suspected due to his respiratory failure and multiple other health issues.   -Started on low-dose oral Ativan.      6.  Chronic kidney disease.  Baseline is around 1.6-1.8.  Creatinine is at baseline.    7.  Dementia.  On Aricept.      8.  Hypertension.  Blood pressure was elevated last night, increased Toprol-XL to 50 mg p.o. Daily.  -Losartan on hold due to renal failure.      9.  Coronary artery disease.  No sign of any active ischemia, had a negative Lexiscan last year in October.      10.  Hypothyroidism on Synthroid.      11.  Epistaxis-earlier with no further recurrence. It is likely related to dry air with oxygen, better with humidified oxygen.  -Watch for further bleeding     Goals of care.    See above discussion.      DVT Prophylaxis:  On PCD's.  Patient also has some mild thrombocytopenia      Code Status: DNR/DNI      Disposition:  Transfer to [facility] bed, he will likely need hospice care referral as well.   [SL1.1]    ADDENDUM:  Family met with palliative care today and goal has now changed to comfort care.  Anticipate discharge likely later this week to facility with hospice capability[SL1.4]          Interval History (Subjective):[SL1.1]      No complaints from patient.  Continues to require high flow O2[SL1.3]                  Physical Exam:      Last Vital Signs:  BP (!) 150/132 (BP Location: Left arm)  Pulse 76  Temp 97.8  F (36.6  C) (Oral)  Resp (!) 35  Ht 1.727 m (5' 8\")  Wt 58.7 kg (129 lb 6.6 oz)  SpO2 97%  BMI 19.68 " kg/m2[SL1.1]      Intake/Output Summary (Last 24 hours) at 07/23/18 1412  Last data filed at 07/23/18 1300   Gross per 24 hour   Intake             1290 ml   Output              975 ml   Net              315 ml[SL1.5]       Constitutional: Awake, alert, cooperative,[SL1.1] family present.  Will answer simple questions.  High flow O2 on[SL1.3]   Respiratory:[SL1.1] Scattered crackles.  No tachypnea[SL1.3]   Cardiovascular: Regular rate and rhythm, normal S1 and S2, and no murmur noted   Abdomen: Normal bowel sounds, soft, non-distended, non-tender   Skin: No rashes, no cyanosis, dry to touch   Neuro: Alert and oriented x3, no weakness, numbness, memory loss   Extremities: No edema, normal range of motion   Other(s):        All other systems: Negative          Medications:      All current medications were reviewed with changes reflected in problem list.         Data:      All new lab and imaging data was reviewed.   Labs:[SL1.1]    Recent Labs  Lab 07/23/18  0555 07/22/18  0550   WBC  --  16.4*   HGB  --  11.3*   HCT  --  35.4*   MCV  --  93   * 126*[SL1.6]      Imaging:[SL1.1]   No results found for this or any previous visit (from the past 24 hour(s)).[SL1.6]      Revision History        User Key Date/Time User Provider Type Action    > SL1.4 7/23/2018  5:40 PM Macario Rodriguez MD Physician Addend     SL1.2 7/23/2018  2:14 PM Macario Rodriguez MD Physician Addend     SL1.6 7/23/2018  2:13 PM Macario Rodriguez MD Physician Sign     SL1.5 7/23/2018  2:12 PM Macario Rodriguez MD Physician      SL1.3 7/23/2018  2:06 PM Macario Rodriguez MD Physician      SL1.1 7/23/2018  2:00 PM Macario Rodriguez MD Physician             Progress Notes by Samina Corona RT at 7/23/2018  5:01 PM     Author:  Samina Corona RT Service:  Respiratory Therapy Author Type:  Respiratory Therapist    Filed:  7/23/2018  5:02 PM Date of Service:  7/23/2018  5:01 PM Creation Time:  7/23/2018  5:01 PM     Status:  Signed :  Samina Corona RT (Respiratory Therapist)         RT- Patient transitioned to comfort cares today and taken off HFNC. Placed on oxymizer initally at 10L. Would prefer to keep on 5L or less if possible to be able to transition back to [facility].[KT1.1]      Revision History        User Key Date/Time User Provider Type Action    > KT1.1 7/23/2018  5:02 PM Samina Corona RT Respiratory Therapist Sign            Progress Notes by Marianna Mccoy APRN CNS at 7/23/2018 10:50 AM     Author:  Marianna Mccoy APRN CNS Service:  Palliative Author Type:  Clinical Nurse Specialist    Filed:  7/23/2018  4:11 PM Date of Service:  7/23/2018 10:50 AM Creation Time:  7/23/2018 10:50 AM    Status:  Signed :  Marianna Mccoy APRN CNS (Clinical Nurse Specialist)         [facility]  Palliative Care Progress Note  Text Page      Assessment & Plan   Chaz Soler is a 89 year old male who was admitted on 7/14/2018. I was asked to see the patient for goals of care.     Recommendations:  1.Decisional Capacity -  Unreliable. Patient has an advance directive dated 12/15/16.  Include patient in decision making as much as possible but involve health care agent attempting consensus with patient. Spouse, Kylee is his primary Health Care Agent and his alternate HCA is son Kwame Soler.  2. Pain- chronic low back pain-- good pain control  Tylenol 500 mg PO TID. Voltaren gel 1% 4 times per day to low back. Lidocaine 5% topical to low back q 4 hours prn - do not use heating pad to area.  Gabapentin 100 mg PO TID.  Continue with cymbalta 30 mg PO daily. Tramadol 50 mg PO TID prn moderate to severe pain. Hydromorphone 0.5-1 mg PO.SL q 2 hours prn moderate to severe pain if pt unable to take other options for pain or for dyspnea.  3. Dyspnea -  Respiratory reserve is fragile and he easily exacerbates[JW1.1]   O2 via oxymizer - do not titrate. S[JW1.2]teroids, o2 as per hospitalist. Fan at  "bedside prn. Observe for \"panic attacks\" due to shortness of breath. Lorazepam 0.5 mg PO/SL or [place] q[JW1.1] 4[JW1.2] hours prn - use cautiously and hold for sedation. Hydromorphone 0.5-1 mg PO/SL[JW1.1]/IV[JW1.2] q 2 hours prn dyspnea or for moderate to severe pain if pt is unable to take other options.  4. Cachexia/Dehydration - Liberalize pt's diet as tolerated. Allow family to supplement with foods that pt enjoys including chocolate ice cream, pudding.   5. Deconditioning -[JW1.1] family has decided to make pt comfort care.[JW1.2]    5. Spiritual Care- Oriented to Spiritual Health and Social Work Services as part of Palliative Care team. Pt is Protestant.  is following.  is following.  6. Care Planning-[JW1.1] family has decided to make pt comfort care. Appreciate SWS Corinne following to assist with discharge back to [facility] with  Hospice support if pt is stable in 24-48 hours.[JW1.2]     Goal of Care: DNR/DNI.[JW1.1] 7/23/18 comfort care. Discharge in the next 2 days to [facility] with  Hospice if pt is stable and symptoms are managed.[JW1.2]     Disease Process/es & Symptoms:  Chaz Soler is a 89 year old patient admitted with symptoms of decreased mental status, hypotension and shortness of breath. He has been treated for acute on chronic hypoxemic respiratory failure due to pulmonary fibrosis and possible PNA, associated \"panic attacks\", acute encephalopathy, aortic stenosis s/p TAVR, chronic diastolic CHF with preserved EF, low back pain with compression fracture, CKD, dementia, HTN, CAD, hypothyroidism.       This is in the setting of pulmonary fibrosis diagnosed at the [facility] approximately 5 years ago, on chronic oxygen that has been gradually increasing to 4-5 l/min via NC, dementia, diastolic CHF, HTN, CAD, hypothyroidisim, GERD, depression/anxiety, chronic low back pain due to DDD with recent vertebral fracture s/p vertebroplasty L3, L4-L5 stenosis and facet " "arthropathy, s/p lumbosacral caudal epidural steroid injections.. He has been hospitalized 5 times in the past 10 months for recurrent respiratory failure and episodes of AMS, CHF and SOL/CKD. Prior to admission patient has moved to ICU from TCU for higher level of daily care needs. There has been a decline in daily function including increased time in wc, decreased ability to walk long distances, episodes of panic attacks.  There is a documented unitentional weight loss of 13 lbs over the past 9 months.     Findings & plan of care discussed with: Dr. Lemons, Dr. Melo, Medical Student Adrian, bedside nurse Cynthia[JW1.1], SWS Corinne[JW1.2].  Follow-up plan from palliative team: will continue to follow this pt for symptom management and support for family with goals of care discussion.  Thank you for involving us in the patient's care    Marianna YANG, CNS  Pain Management and Palliative Care  [facility]  Pgr: [phone]    Time Spent on this Encounter   I spent 10 minutes (10:35:-10:45 am) in assessment of the patient, and[JW1.1] 65[JW1.2] minutes[JW1.1] 2:10-3:15pm[JW1.2] in counseling and discussion with pt's wife Kylee, son Kwame, daughter Jannie[JW1.1] and her [JW1.2],[JW1.1] daughter[JW1.2] Maria Isabel and[JW1.1] daughter Jessy[JW1.2],[JW1.1] and Medical Student Adrian as[JW1.2] documented in sections below. Another[JW1.1] 20[JW1.2] minutes in review of chart, documentation, coordination of care and discussion with the health care team.    Interval History   Chart reviewed.  Pt up in chair sleeping. Son reports pt ate a big breakfast.  Nursing notes episode of \"not feeling good\" with tachypnea. Pt did not have relief after dose of lorazepam. Pt did have relief after dose of hydromorphone orally.    Continues with leukocytosis, afebrile. Pt has completed course of antibiotic, continues with oral steriods, nebs, no longer receiving diuresis. Weight is below admission weight. Pt continues on " "HFNC at 30% 50-60L.    Pt's family at bedside, 4th daughter arriving at airport this am. Plan to meet again at 2pm.  Course of Hospitalization Discussions Data[JW1.1]    7/23/2018[JW1.3]  Met with pt's wife Kylee, son Kwame, daughter Jannie and her , daughter Maria Isabel and daughter Jessy along with Medical Student Adrian. Pts family shared the messages that they had received all weekend from the care team along with Dr. Rodriguez's update today. In summary, pt has been treated for possible PNA with a 7 day course of antibiotic, steroids for inflammation, and lasix diuretic for potential fluid on his lungs.  Despite these treatments, He is still not improving. This is most likely his pulmonary fibrosis. There are no other treatments to try to fix his disease and it is end stage. He is requiring a higher level of oxygen than we can discharge him on, although he is not far from his baseline oxygen requirement after speaking with respiratory. Family verbalize that pt is eating well intermittently, but then describe pt as being exhausted and sleeping after he eats, or gets in the chair, or talks for long periods.     We spent additional time talking about next steps, including comfort care and what that looks like for the patient and family while he is in the hospital, and discharging with hospice care. It is uncertain if pt will survive this hospital stay and discharge with hospice, or if he will die. Either way we will continue care focused on his comfort. Stressed that the treatments for hypoxia are opioids and benzodiazepines, and would not recommend titration of his oxygen. Talked about other non-pharmacologic interventions for dyspnea, including positioning, fan for pt comfort. \"I just don't want him to feel like he is drowning\". \"We would have been  66 years exactly 1 month from today\".  Family agree to comfort care for pt. And verbalize understanding of that care in the hospital as well as the process for " "discharge with hospice care to [facility] if pt remains stable over the next 24-48 hours. They will speak more with SWS tomorrow. Pt's wife Kylee states that she will contact their . All are aware that the chaplains are available for support if needed or desired. \"Were not a very Adventism family\" . The family, Medicine Student Adrian and I went back to the room and shared with pt an update about his condition, specifically that despite the treatments given for his breathing, it is not getting better, and there isn't more that can be done. Pt asked if he was going to die now. I said not now, but it could be in the next days to weeks. I reassured him that we would continue to take care of him and  focus on  Managing his dyspnea, keeping his pain under control. If he wants to eat, he should eat. If he wants to get up in the chair and play cribbage, he should do that. We want to focus on what he wants. We hope to be able to discharge him back to [facility] to LT for Hospice care.  Pt said, \"sounds good\" and gave me a thumbs up.[JW1.2]     7/20/18: with CELIA Smith,CNP updated family on condition and rationale for Physical Therapy /OT. They agree that his respiratory status has deteriorated and that dyspneic episodes are more frequent with less endurance related provocation.  I asked Physical Therapy tot evaluate patient for TCU reentry.  If he is able to get off high flow O2, he still may not have the endurance to tolerate the intensity of therapy with TCU setting.  Goal is to promote quality of life .  Family is also in agreement that they do not want to keep coming back to hospital as this is poor or reduced QOL.  Will await outcome of Physical Therapy and if able to get off high flow O2 to see if goals of care discussion needs to take place.   7/19/2018  No family present. Pt tells me today, \" I sure wish I could go back to [facility] . . It gives me hope, that I am going to get better . .. " "Nothing against you guys\". I acknowledged his thoughts and that it must be scary to be in ICU and wondering what is happening. He said yes, but the care team is great. I asked him if all the interventions and treatments he is going through here are worth it for him to go through to get back to [facility] - he said yes. He denied feeling like he is suffering.  I shared with him that it is important for the care team and his family to know if he feels like he is suffering and it is too hard for him. He acknowledged this. Shared that I had spoken to his wife Kylee yesterday - he smiled. Told him that she was starting to feel better and hoped to visit him today.   7/18/2018  Called pt's wife Kylee at home. She has been ill and has not been able to come to the hospital. She is feeling better today and hopes to visit pt tomorrow. I updated her about parts of his day today. She shared that she is glad to know that he is returning to his baseline. \"We've been  for over 60 years . . . I hope to keep him for awhile longer\". I shared with her what I had heard from her son Kwame and daughter Maria Isabel on 7/16 that pt's goal of care is to be DNR DNI, but it would be ok for pt to receive more aggressive therapies such as bipap, antibiotics and other selective interventions for a trial period to see if he improves. Kylee agreed with this. We also discussed that pt has signs that his pulmonary fibrosis is progressing such as increased oxygen needs, increased sleeping, increase in \"panic attacks\", weight loss and more frequent hospitalizations for respiratory failure associated events. She agreed with this as well. She verbalizes that she can decided not to have him return to the hospital and enroll in hospice at any time if she feels that pt is suffering and not receiving benefit from restorative care. A hospice admission does not require pt to again be hospitalized. She agreed with me that the POLST completed 7/5/2018 is valid and I " "will send a copy with him back to [facility] at discharge. She verbalizes understanding that pt will return to [facility] TCU in the next day or two if he remains stable, and PT and OT have been consulted to work with pt while he is hospitalized.    7/17/2018  No family present. Pt is improving to baseline and expected to transfer out of ICU.    Discussed on July 16, 2018 with Marianna YANG, CNS:   Met with pt's son/alternate HCA Kwame and daughter Maria Isabel in ICU [facility]. Pt's wife Kylee went home to rest after the 3 of them had a  Hospice information meeting with Gifty Navarro RN. They describe pt as pleasant, a witty sense of humor, enjoys company of family \"he exists for us\". Pt has difficulty with short term memory but looks at newspapers and other things to figure out what day it is. Kwame has made a cribbage board for pt that reminds him of the steps of the game and they play with him when they visit. They note that pt is being admitted to the hospital more frequently for issues related to his respiratory status, slowly is increasing his need for oxygen over the last 2 years and is close to the maximum o2 flow he can have with his concentrators, is having more frequent episodes of \"panic\" attacks with his breathing even though his o2 sat is WNL, is only able to walk short distance with his walker and o2, spending more time in a w.c.  He has lost weight and they try to encourage his PO intake and include chocolate ice cream, pudding and other things he like to help with his intake. Prior to the last 2 admissions he had been living with his wife, who helped to care for him. He has been compliant with PT/OT at TCU. They agree that pt should be DNR DNI. They along with pt's wife Kylee, have noted that pt was able to recover after being hospitalized and is improving back to his baseline PTA. They hope he can continue to improve and feel that short term trials of bipap or other treatments, even including a keofeed " tube would be worth it for pt, if it would help him overcome an acute exacerbation, but not long term. Pt's POLST completed prior to DC with pt's wife and my colleague Lissy Serrato 7/5/2018 was reviewed and is currently up to date with the information shared by Son.      Palliative Symptom Review (0=no symptom/no concern, 1=mild, 2=moderate, 3=severe):     Variable historian due to dementia.   Pt currently denies pain, SOB, PRESCOTT, insomnia, depression. + anxiety associated with PRESCOTT. Negative constipation      Physical Exam   Temp:  [97.2  F (36.2  C)-98.7  F (37.1  C)] 97.2  F (36.2  C)  Heart Rate:  [64-85] 68  Resp:  [11-50] 18  BP: (109-185)/(54-93) 152/68  FiO2 (%):  [40 %-60 %] 40 %  SpO2:  [90 %-100 %] 94 %  129 lbs 6.56 oz   GEN:  Weak appearing, drowsy, oriented x 2, appears comfortable, NAD.  HEENT:  Normocephalic/atraumatic, no scleral icterus, no nasal discharge, mouth moist.  CV:  RRR, S1, S2; no murmurs or other irregularities noted.  +3 DP/PT pulses bilatererally; no edema BLE.  RESP:  Tachypnea. 24-30. Diminished to auscultation anteriorly bilaterally no rales,  no rhonchi/wheezing/retractions.  Symmetric chest rise on inhalation noted.   ABD:  Rounded, soft, non-tender/non-distended.  +BS. Incont of stool  EXT:  Edema & pulses as noted above.  CMS intact x 4.     M/S:   Deferred.  SKIN:  Dry to touch, no exanthems noted in the visualized areas. Bruising of UE bilaterally    NEURO: Symmetric strength. 5/5.  Sensation to touch intact all extremities.   There is no area of allodynia or hyperesthesia.  PAIN BEHAVIOR: Cooperative  Psych:  Withdrawn, weak affect.  Calm, cooperative, conversant appropriately but forgetful with short term memory deficits.    Medications       acetaminophen  500 mg Oral TID    Or     acetaminophen  650 mg Rectal TID     diclofenac  2 g Transdermal 4x Daily     donepezil  10 mg Oral At Bedtime     DULoxetine  30 mg Oral Daily     gabapentin  100 mg Oral TID      ipratropium - albuterol 0.5 mg/2.5 mg/3 mL  1 vial Nebulization 4x Daily     levothyroxine  88 mcg Oral Daily     metoprolol succinate  50 mg Oral Daily     pantoprazole  40 mg Oral QAM     predniSONE  20 mg Oral Daily     tamsulosin  0.4 mg Oral Daily       Data   Results for orders placed or performed during the hospital encounter of 07/14/18 (from the past 24 hour(s))   Platelet count   Result Value Ref Range    Platelet Count 125 (L) 150 - 450 10e9/L[JW1.1]        Revision History        User Key Date/Time User Provider Type Action    > JW1.3 7/23/2018  4:11 PM Marianna Mccoy APRN CNS Clinical Nurse Specialist Sign     JW1.2 7/23/2018  3:44 PM Marianna Mccoy APRN CNS Clinical Nurse Specialist      JW1.1 7/23/2018 10:50 AM Marianna Mccoy APRN CNS Clinical Nurse Specialist             Progress Notes by White, Corinne C, LSW at 7/23/2018  3:59 PM     Author:  White, Corinne C, LSW Service:  (none) Author Type:      Filed:  7/23/2018  4:02 PM Date of Service:  7/23/2018  3:59 PM Creation Time:  7/23/2018  3:59 PM    Status:  Signed :  White, Corinne C, LSW ()         Discharge Planner   Discharge Plans in progress: pt now comfort cares and moving from ICU to 5th floor off High Flow o2  Barriers to discharge plan: Spoke with [facility] team. Family would like pt to return to [facility] if possible on Hospice.   Follow up plan: [facility] team recommendation is to wait to see how pt does off High flow o2. IF pt does well then set up Hospice meeting for Wed with dc to [facility]  Called [facility] to update. They will reassess. Still need to follow up with family as they may not know placement will be private pay and will need $5,000 deposit        Entered by: Corinne C. White 07/23/2018 3:59 PM[CW1.1]          Revision History        User Key Date/Time User Provider Type Action    > CW1.1 7/23/2018  4:02 PM White, Corinne C, LSW  Sign            Progress Notes by Nic  Jason RAI RT at 7/22/2018  4:17 PM     Author:  Jason Lucero RT Service:  (none) Author Type:  Respiratory Therapist    Filed:  7/22/2018  4:18 PM Date of Service:  7/22/2018  4:17 PM Creation Time:  7/22/2018  4:17 PM    Status:  Signed :  Jason Lucero RT (Respiratory Therapist)         Respiratory Therapy Note        Pt remains on HFNC currently 50% at 30 Lpm.  His breath sounds remain diminished and slightly coarse.    July 22, 2018 4:17 PM  Jason Lucero[RA1.1]         Revision History        User Key Date/Time User Provider Type Action    > RA1.1 7/22/2018  4:18 PM Jason Lucero, RT Respiratory Therapist Sign            Progress Notes by Danish Maynard MD at 7/22/2018  9:38 AM     Author:  Danish Maynard MD Service:  Hospitalist Author Type:  Physician    Filed:  7/22/2018  9:46 AM Date of Service:  7/22/2018  9:38 AM Creation Time:  7/22/2018  9:38 AM    Status:  Signed :  Danish Maynard MD (Physician)         [facility]  Hospitalist Progress Note  Danish Maynard MD, MD 07/22/2018  (Text Page)  Reason for Stay (Diagnosis): Acute on chronic hypoxic respiratory failure secondary to underlying pneumonia and likely with CHF diastolic in exacerbation         Assessment and Plan:      Summary of Stay: Chaz Soler is a 89 year old male who was admitted on 7/14/2018 with SOB. Patient has a PMH of chronic HFpEF, AS s/p TAVR, pulmonary fibrosis and chronic hypoxic resp failure on 5L oxygen at baseline, CAD, Dementia, CKD he was recently hospitalized at this facility for pneumonia and discharged a few days ago.  According to the family, patient was not eating and drinking much, has been sleeping a lot and brought back to the ER and found to have acute on chronic hypoxic respiratory failure and admitted to the hospital.     1.  Acute on chronic hypoxemic respiratory failure with history of chronic pulmonary fibrosis requiring home oxygen at 5 L via nasal cannula  prior to this hospitalization.  -still has stable leukocytosis but also remain on corticosteroids orally  -Decrease oral prednisone to 20 mg.  Doubtful if this is still helpful for our patient long-term  -He remained afebrile but earlier had some clinical deterioration with his oxygen levels requiring high flow nasal cannula.  Repeat chest x-ray stating worsening infiltrates likely from pneumonia versus edema.  Earlier pro-calcitonin was low.  -He finished 7 day course of intravenous antibiotics with Zosyn    -I had a long discussion with our patient and his daughter was present in attendance at bedside earlier regarding the tenous course of his stay, persistent  shortness of breath and likely this persistent hypoxia and easy oxygen desaturation is brought about by his chronic long-standing pulmonary fibrosis that likely end-stage now.  This is likely patient's new baseline in terms of his oxygen needs.  If he truly had a pneumonia earlier than that should be treated already with a week course of IV antibiotics but still with no significant improvement in terms of his oxygenation.  Trial of aggressive diuresis was also pursued earlier but did not really provide much of improvement as a matter fact might also be detrimental to him given now his kidney function is a bit worsening.  -I recommended to them that our goals of care should focus more on comfort and not be too aggressive with this persistent hypoxia.  His daughter stated that they were already expecting this as they are aware about the progression of his pulmonary fibrosis and numerous hospitalizations in the past.  -//palliative care are working with us and likely will be arranging how to pursue forward if we are changing her goals of care mostly to comfort measures.  -No further escalation of oxygen supplementation and avoid BiPAP.  -He will not be under ICU care setting anymore but continue with current oxygen supplementation as  what were doing right now until we are truly on comfort care measures.     2.  Aortic stenosis status post TAVR.  Echo done stable.     3.  Chronic diastolic congestive heart failure with preserved EF.  BNP was normal.  However the past day or so his respiratory parameters are worsening with chest x-ray findings suggestive of increasing edema.  Likely a component also of acute on chronic diastolic CHF in exacerbation.  .     4.  Pulmonary fibrosis, with chronic hypoxic respiratory failure on 5 L oxygen.    Likely reaching end-stage process now     5.  Acute encephalopathy on top of dementia-encephalopathy-  resolved.  It was suspected due to his respiratory failure and multiple other health issues.   -Started on low-dose oral Ativan.     6.  Chronic kidney disease.  Baseline is around 1.6-1.8.  Creatinine is at baseline.  -Improved after Vanco was stopped and also patient was off Lasix.  -Lasix held due to increasing serum creatinine.  -Most recent creatinine has been decreasing trend but still with azotemia     7.  Dementia.  On Aricept.     8.  Hypertension.  Blood pressure was elevated last night, increased Toprol-XL to 50 mg p.o. Daily.  -Losartan on hold due to renal failure.     9.  Coronary artery disease.  No sign of any active ischemia, had a negative Lexiscan last year in October.     10.  Hypothyroidism on Synthroid.     11.  Epistaxis-earlier with no further recurrence. It is likely related to dry air with oxygen, better with humidified oxygen.  -Watch for further bleeding    Goals of care.    See above discussion.     DVT Prophylaxis:  On PCD's.  Patient also has some mild thrombocytopenia     Code Status: DNR/DNI     Disposition:  Transfer to adult [facility] bed, he will likely need hospice care referral as well.           Interval History (Subjective):      Continuing care today.  Seen and examined.  Chart reviewed.  Case discussed with nursing service.  No significant reported events overnight.   "Remained stable on high flow nasal cannula.  He is sleeping but easily arouse as any new complaints.  Main afebrile.  No reported diarrhea, nausea or vomiting.       Physical Exam:      Last Vital Signs:  /68  Pulse 76  Temp 97.5  F (36.4  C) (Axillary)  Resp 16  Ht 1.727 m (5' 8\")  Wt 58.7 kg (129 lb 6.6 oz)  SpO2 100%  BMI 19.68 kg/m2    I/O last 3 completed shifts:  In: 660 [P.O.:660]  Out: 575 [Urine:575]  Wt Readings from Last 1 Encounters:   07/22/18 58.7 kg (129 lb 6.6 oz)     Vitals:    07/17/18 0400 07/18/18 0600 07/19/18 0630 07/21/18 0003   Weight: 61.9 kg (136 lb 7.4 oz) 63 kg (138 lb 14.2 oz) 62.1 kg (136 lb 14.5 oz) 59.8 kg (131 lb 13.4 oz)    07/22/18 0004   Weight: 58.7 kg (129 lb 6.6 oz)       Constitutional: Awake, alert, cooperative, no apparent distress   Respiratory:  Fair air entry, bilateral mid lung the bases inspiratory crackles on posterior auscultation, no wheezing   Cardiovascular: Regular rate and rhythm, normal S1 and S2, and no JVD   Abdomen: Normal bowel sounds, soft, non-distended, non-tender   Skin: No rashes, no cyanosis, dry to touch   Neuro: Alert and oriented x3, no weakness, spontaneous and coherent speech   Extremities: Nonpitting edema at the ankle level on both lower extremities, normal range of motion   Other(s): Euthymic mood, not agitated       All other systems: Negative          Medications:      All current medications were reviewed with changes reflected in problem list.         Data:      All new lab and imaging data was reviewed.   Labs:  No results for input(s): CULT in the last 168 hours.  No results for input(s): PH, PHARTERIAL, PO2, [id number], SAT, PCO2, HCO3, BASEEXCESS, DESEAN, BEB in the last 168 hours.    Invalid input(s): [id number]    Recent Labs  Lab 07/22/18  0550 07/21/18  0548 07/20/18  0601   WBC 16.4* 13.3* 13.6*   HGB 11.3* 12.2* 11.2*   HCT 35.4* 37.5* 34.5*   MCV 93 93 93   * 116* 124*       Recent Labs  Lab 07/22/18  0550 " 07/21/18  0548 07/20/18  0601 07/19/18  0557  07/17/18  0551    143 143 141  < > 142   POTASSIUM 4.4 4.2 4.0 3.8  < > 4.8   CHLORIDE 101 103 103 105  < > 106   CO2 31 32 34* 29  < > 30   ANIONGAP 9 8 6 7  < > 6   * 147* 105* 121*  < > 137*   BUN 81* 67* 53* 50*  < > 58*   CR 1.73* 1.93* 1.70* 1.59*  < > 1.84*   GFRESTIMATED 37* 33* 38* 41*  < > 35*   GFRESTBLACK 45* 40* 46* 50*  < > 42*   LEXIS 9.3 9.5 9.3 9.6  < > 9.3   MAG  --   --   --  2.4*  --   --    PHOS 4.8*  --   --   --   --   --    PROTTOTAL  --   --   --   --   --  6.2*   ALBUMIN  --   --   --   --   --  2.9*   BILITOTAL  --   --   --   --   --  0.7   ALKPHOS  --   --   --   --   --  129   AST  --   --   --   --   --  12   ALT  --   --   --   --   --  20   < > = values in this interval not displayed.    Recent Labs  Lab 07/22/18  0550 07/21/18  0548 07/20/18  0601 07/19/18  0557 07/18/18  0611  07/17/18  0411 07/16/18  2337 07/16/18  2024 07/16/18  1549 07/16/18  1150   * 147* 105* 121* 154*  < >  --   --   --   --   --    BGM  --   --   --   --   --   --  133* 174* 231* 213* 186*   < > = values in this interval not displayed.  No results for input(s): INR in the last 168 hours.  No results for input(s): TROPONIN, TROPI, TROPR in the last 168 hours.    Invalid input(s): TROP, TROPONINIES  No results for input(s): COLOR, APPEARANCE, URINEGLC, URINEBILI, URINEKETONE, SG, UBLD, URINEPH, PROTEIN, UROBILINOGEN, NITRITE, LEUKEST, RBCU, WBCU in the last 168 hours.   Imaging:   Results for orders placed or performed during the hospital encounter of 07/14/18   XR Chest Port 1 View    Narrative    CHEST PORTABLE ONE VIEW   7/14/2018 6:12 PM     HISTORY: AMS.     COMPARISON: 7/2/2018.      Impression    IMPRESSION: Lung volumes remain low. Background of interstitial  opacities are again seen although the hazy opacities from prior  appears slightly improved which may be due to edema and/or pneumonia.  No significant pleural effusion or  pneumothorax. Cardiac silhouette  remains enlarged.    EV BURNETT MD   CT Head w/o Contrast    Narrative    CT SCAN OF THE HEAD WITHOUT CONTRAST   7/14/2018 8:58 PM     HISTORY: AMS.     TECHNIQUE:  Axial images of the head and coronal reformations without  IV contrast material. Radiation dose for this scan was reduced using  automated exposure control, adjustment of the mA and/or kV according  to patient size, or iterative reconstruction technique.    COMPARISON: Head CT 6/3/2018.    FINDINGS: There is no evidence of intracranial hemorrhage, mass, acute  infarct or anomaly. There is generalized atrophy of the brain. There  is low attenuation in the white matter of the cerebral hemispheres  consistent with sequelae of small vessel ischemic disease. 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 Ventricular size is within  normal limits without evidence of hydrocephalus.     The visualized portions of the sinuses and mastoids appear normal. The  bony calvarium and bones of the skull base appear intact.       Impression    IMPRESSION:     1. No evidence of acute intracranial hemorrhage, mass, or herniation.  2. There is generalized atrophy of the brain. White matter changes are  present in the cerebral hemispheres that are consistent with small  vessel ischemic disease in this age patient.      EV BURNETT MD   US Lower Extremity Venous Bilateral Port    Narrative    US LOWER EXTREMITY VENOUS DUPLEX BILATERAL PORTABLE   7/14/2018 11:26  PM     HISTORY: Shortness of breath. Rule out deep venous thrombosis.    COMPARISON: None.    FINDINGS: Gray-scale, color and Doppler spectral analysis ultrasound  was performed of the legs. Compression and augmentation imaging was  performed.    There is no evidence for deep venous thrombosis. The veins compress  and augment normally.      Impression    IMPRESSION: No deep venous thrombosis.    ALCIRA SINGLETARY MD   XR Chest Port 1 View    Narrative    XR CHEST PORT 1 VW   "7/19/2018 5:33 AM     INDICATION: Shortness of breath.    COMPARISON: 7/14/2018.      Impression    IMPRESSION: Patchy bilateral interstitial and alveolar infiltrates  have increased and may be due to pneumonia or edema. Shallow  inspiration. Stable heart size, borderline prominent. Postoperative  changes aortic valve.    DALILA ATKINS MD[AG1.1]          Revision History        User Key Date/Time User Provider Type Action    > AG1.1 7/22/2018  9:46 AM Dnaish Maynard MD Physician Sign                  Procedure Notes     No notes of this type exist for this encounter.         Progress Notes - Therapies (Notes from 07/22/18 through 07/25/18)      Progress Notes by Olesya Pulido RT at 7/23/2018  7:34 PM     Author:  Olesya Pulido RT Service:  (none) Author Type:  Respiratory Therapist    Filed:  7/23/2018  7:36 PM Date of Service:  7/23/2018  7:34 PM Creation Time:  7/23/2018  7:34 PM    Status:  Signed :  Olesya Pulido RT (Respiratory Therapist)         [facility]     Date: 7/23/2018  Admission Dx: Encephalopathy   Pulmonary History pulmonary fibrosis, COPD  Home Nebulizer/MDI Use: duoneb QID, albuterol prn  Home Oxygen: 4-5 lpm  Acuity Level (RCAT flow sheet): 3  Aerosol Therapy initiated: continue with duoneb QID, albuterol prn       Pulmonary Hygiene initiated: deep breath and coughing technique      Volume Expansion initiated: IS      Current Oxygen Requirements: 5 lpm  Current SpO2: 95%    Re-evaluation date: 7/26/2018    Patient Education: continuing education on bronchodilator      See \"RT Assessments\" flow sheet for patient assessment scoring and Acuity Level Details.[JP1.1]                Revision History        User Key Date/Time User Provider Type Action    > JP1.1 7/23/2018  7:36 PM Olesya Pulido RT Respiratory Therapist Sign            Progress Notes by Samina Corona RT at 7/23/2018  5:01 PM     Author:  Samina Corona RT Service:  Respiratory Therapy Author Type:  " Respiratory Therapist    Filed:  7/23/2018  5:02 PM Date of Service:  7/23/2018  5:01 PM Creation Time:  7/23/2018  5:01 PM    Status:  Signed :  Samina Corona RT (Respiratory Therapist)         RT- Patient transitioned to comfort cares today and taken off HFNC. Placed on oxymizer initally at 10L. Would prefer to keep on 5L or less if possible to be able to transition back to [facility].[KT1.1]      Revision History        User Key Date/Time User Provider Type Action    > KT1.1 7/23/2018  5:02 PM Samina Corona, RT Respiratory Therapist Sign            Progress Notes by Jason Lucero RT at 7/22/2018  4:17 PM     Author:  Jason Lucero, RT Service:  (none) Author Type:  Respiratory Therapist    Filed:  7/22/2018  4:18 PM Date of Service:  7/22/2018  4:17 PM Creation Time:  7/22/2018  4:17 PM    Status:  Signed :  Jason Lucero RT (Respiratory Therapist)         Respiratory Therapy Note        Pt remains on HFNC currently 50% at 30 Lpm.  His breath sounds remain diminished and slightly coarse.    July 22, 2018 4:17 PM  Jason Lucero[RA1.1]         Revision History        User Key Date/Time User Provider Type Action    > RA1.1 7/22/2018  4:18 PM Jason Lucero, RT Respiratory Therapist Sign

## 2018-07-14 NOTE — TELEPHONE ENCOUNTER
[facility] TELEPHONE ENCOUNTER    Chief Complaint   Patient presents with     Lab Result Notice     Dehydration       Chaz Soler is a 89 year old  (5/21/1928),Nurse called today to report: Lab results    ASSESSMENT/PLAN  Called this AM noting increased lethargic and hypotension. STAT BMP ordered    BMP results with Crt 1.99, other indicating presence of dehydration    Patient with presence of orthostatic hypotension    Wanted to order IVF - NaCl 0.9% 75cc/h x2L and recheck of BMP    NURSING NOTED THEY CANNOT/DO NOT ADMINISTER IVF IN FACILITY; THEREFORE PATIENT NEEDED TO BE SENT TO ED FOR IV HYDRATION    CELIA Berry CNP     143

## 2018-07-14 NOTE — ED NOTES
Bed: ED03  Expected date: 7/14/18  Expected time: 5:27 PM  Means of arrival:   Comments:  Yonis [age]yo male

## 2018-07-14 NOTE — ED NOTES
.  [facility]  ED Nurse Handoff Report    Chaz Soler is a 89 year old male   ED Chief complaint: Altered Mental Status  . ED Diagnosis:   Final diagnoses:   Altered mental status, unspecified altered mental status type   Hospital-acquired pneumonia     Allergies:   Allergies   Allergen Reactions     Contrast Dye      SOB, increased Bp, difficulty swallowing. Date 6/3/96 (ipaque contrast)  Was premedicated prior to FRANCK and had no reaction at all (solumedrol and benadryl) 2016  Was premedicated with Methylprednisolone protocol, no reaction 1/25/17     Lisinopril      cough     Oxycodone      Delirium       Code Status: DNR / DNI  Activity level - Baseline/Home:  Independent. Activity Level - Current:   Stand with Assist of 2. Lift room needed: No. Bariatric: No   Needed: No   Isolation: No. Infection: Not Applicable, pneumonia   Other .     Vital Signs:   Vitals:    07/14/18 1753 07/14/18 1754 07/14/18 1755 07/14/18 1830   BP: 129/60      Resp:  21  (!) 33   Temp:   98.5  F (36.9  C)    TempSrc:   Oral    SpO2:  98%  100%       Cardiac Rhythm:  ,      Pain level:    Patient confused: Yes. Patient Falls Risk: Yes.   Elimination Status: Has voided   Patient Report - Initial Complaint: Altered Level of Consciousness . Focused Assessment: Chaz Soler is a 89 year old male with a history of dementia, who presents with his daughter to the ED for the evaluation of altered mental status. EMS reports that the patient has been lethargic today and that he is hypotensive. Of note, he was seen in the ED on 7/2/2018 for altered mental status as well and a care plan was discussed with family.     Upon arrival of patient's daughter, she reports the patient has been sleeping up to 20 hours a day for the past four days and that he has a decreased appetite that is down 80%. The daughter is not the regular care giver of the patient.  Tests Performed: Chest xray, labs, CT, UA. Abnormal Results:.  Labs  Ordered and Resulted from Time of ED Arrival Up to the Time of Departure from the ED   CBC WITH PLATELETS DIFFERENTIAL - Abnormal; Notable for the following:        Result Value    RBC Count 3.79 (*)     Hemoglobin 11.1 (*)     Hematocrit 35.4 (*)     MCHC 31.4 (*)     Platelet Count 148 (*)     All other components within normal limits   BASIC METABOLIC PANEL - Abnormal; Notable for the following:     Carbon Dioxide 35 (*)     Glucose 122 (*)     Urea Nitrogen 47 (*)     Creatinine 1.92 (*)     GFR Estimate 33 (*)     GFR Estimate If Black 40 (*)     All other components within normal limits   GLUCOSE BY METER - Abnormal; Notable for the following:     Glucose 142 (*)     All other components within normal limits   ISTAT BASIC MET ICA HCT POCT - Abnormal; Notable for the following:     Total CO2 34 (*)     Anion Gap 5 (*)     Glucose 125 (*)     Urea Nitrogen 48 (*)     Creatinine 2.0 (*)     GFR Estimate 32 (*)     GFR Estimate If Black 38 (*)     Hemoglobin 10.5 (*)     Hematocrit - POCT 31 (*)     All other components within normal limits   INR   TROPONIN I   LACTIC ACID WHOLE BLOOD   ROUTINE UA WITH MICROSCOPIC   NT PROBNP INPATIENT   ISTAT GAS OR ELECTROLYTE NURSING POCT      See Chest Xray.   Treatments provided: Oxygen via oxymask at 10Liters to maintain O2 greater than 93%. Zosyn, Vancomycin, EMS gave 500ml of NS  Family Comments: Daughter present  OBS brochure/video discussed/provided to patient:  N/A  ED Medications:   Medications   0.9% sodium chloride BOLUS (not administered)     Followed by   sodium chloride 0.9% infusion (not administered)   piperacillin-tazobactam (ZOSYN) infusion 3.375 g (3.375 g Intravenous New Bag 7/14/18 1840)   vancomycin (VANCOCIN) 1,500 mg in sodium chloride 0.9 % 250 mL intermittent infusion (not administered)     Drips infusing:  Yes  For the majority of the shift, the patient's behavior Green. Interventions performed were NA.     Severe Sepsis OR Septic Shock Diagnosis  Present: No      ED Nurse Name/Phone Number: Marianne Christopher,   6:49 PM

## 2018-07-14 NOTE — IP AVS SNAPSHOT
` [place] [id number] MEDICAL SURGICAL: [phone]            Medication Administration Report for Chaz Soler as of 07/25/18 1052   Legend:    Given Hold Not Given Due Canceled Entry Other Actions    Time Time (Time) Time  Time-Action       Inactive    Active    Linked        Medications 07/19/18 07/20/18 07/21/18 07/22/18 07/23/18 07/24/18 07/25/18    acetaminophen (TYLENOL) tablet 650 mg  Dose: 650 mg  Freq: 3 TIMES DAILY Route: PO  Start: 07/24/18 1600   Admin Instructions: Maximum acetaminophen dose from all sources = 75 mg/kg/day not to exceed 4 grams/day.    Admin. Amount: 2 tablet (2 × 325 mg tablet)  Last Admin: 07/25/18 0833  Dispense Loc:  ADS MS5E          1734 (650 mg)-Given       2151 (650 mg)-Given        0833 (650 mg)-Given       [ ] 1600       [ ] 2200          Or  acetaminophen (TYLENOL) Suppository 650 mg  Dose: 650 mg  Freq: 3 TIMES DAILY Route: RE  Start: 07/24/18 1600   Admin Instructions: Maximum acetaminophen dose from all sources = 75 mg/kg/day not to exceed 4 grams/day.    Admin. Amount: 1 suppository (1 × 650 mg suppository)  Dispense Loc:  ADS MS5E                                [ ] 1600       [ ] 2200           albuterol neb solution 2.5 mg  Dose: 2.5 mg  Freq: EVERY 2 HOURS PRN Route: NEBULIZATION  PRN Reason: other  PRN Comment: dyspnea  Start: 07/14/18 2243   Admin. Amount: 2.5 mg = 3 mL Conc: 2.5 mg/3 mL  Last Admin: 07/19/18 0230  Dispense Loc:  ADS MS5E  Volume: 3 mL     0230 (2.5 mg)-Given                 artificial saliva (BIOTENE MT) solution 1 spray  Dose: 1 spray  Freq: EVERY 1 HOUR PRN Route: MT  PRN Reason: dry mouth  Start: 07/23/18 1532   Admin. Amount: 1 spray = 1 mL Conc: 1 spray/mL  Dispense Loc:  Main Pharmacy  Volume: 44.3 mL               atropine 1 % ophthalmic solution 1-2 drop  Dose: 1-2 drop  Freq: EVERY 1 HOUR PRN Route: SL  PRN Reason: other  PRN Comment: secretions  Start: 07/23/18 1531   Admin. Amount: 1-2 drop  Dispense Loc: [facility]  Pharmacy  Volume: 15 mL               diclofenac (VOLTAREN) 1 % topical gel 2 g  Dose: 2 g  Freq: 4 TIMES DAILY Route: TD  Start: 07/16/18 1330   Admin Instructions: Apply to skin as needed  Send dosing card with product.    Admin. Amount: 2 g  Last Admin: 07/25/18 0834  Dispense Loc:  Main Pharmacy     0813 (2 g)-Given       1322 (2 g)-Given [C]       1711 (2 g)-Given        0039 (2 g)-Given       1043 (2 g)-Given       1627 (2 g)-Given       (1735)-Not Given [C]       (2210)-Not Given [C]        0835 (2 g)-Given       1600 (2 g)-Given       (1734)-Not Given [C]       (2205)-Not Given [C]        (0943)-Not Given [C]       1209 (2 g)-Given       (1809)-Not Given [C]       (2122)-Not Given [C]        (1046)-Not Given [C]       1313 (2 g)-Given       1810 (2 g)-Given       2151 (2 g)-Given        0917 (2 g)-Given [C]       1305 (2 g)-Given [C]       1816 (2 g)-Given       2154 (2 g)-Given        0834 (2 g)-Given       [ ] 1300       [ ] 1800       [ ] 2200           DULoxetine (CYMBALTA) EC capsule 30 mg  Dose: 30 mg  Freq: DAILY Route: PO  Start: 07/16/18 0900   Admin. Amount: 1 capsule (1 × 30 mg capsule)  Last Admin: 07/25/18 0833  Dispense Loc:  ADS MS5E     0814 (30 mg)-Given        1015 (30 mg)-Given        0833 (30 mg)-Given        0935 (30 mg)-Given        0923 (30 mg)-Given        0913 (30 mg)-Given        0833 (30 mg)-Given           gabapentin (NEURONTIN) capsule 100 mg  Dose: 100 mg  Freq: 3 TIMES DAILY Route: PO  Start: 07/15/18 2200   Admin. Amount: 1 capsule (1 × 100 mg capsule)  Last Admin: 07/25/18 0834  Dispense Loc:  ADS MS5E     0814 (100 mg)-Given       1637 (100 mg)-Given        0037 (100 mg)-Given       1015 (100 mg)-Given       1627 (100 mg)-Given       2208 (100 mg)-Given        0834 (100 mg)-Given       1642 (100 mg)-Given       2203 (100 mg)-Given        0935 (100 mg)-Given       1515 (100 mg)-Given       2121 (100 mg)-Given        0922 (100 mg)-Given       1810 (100 mg)-Given        2151 (100 mg)-Given        0912 (100 mg)-Given       1734 (100 mg)-Given       2151 (100 mg)-Given        0834 (100 mg)-Given       [ ] 1600       [ ] 2200           HYDROmorphone (PF) (DILAUDID) injection 0.3-0.5 mg  Dose: 0.3-0.5 mg  Freq: EVERY 1 HOUR PRN Route: IV  PRN Reason: moderate to severe pain  PRN Comment: or dyspnea  Start: 07/23/18 1529   Admin Instructions: Give if sublingual is not effective.  For ordered doses up to 4 mg give IV Push undiluted. Administer each 2mg over 2-5 minutes.    Admin. Amount: 0.3-0.5 mg  Dispense Loc:  ADS MS5E               HYDROmorphone (STANDARD CONC) (DILAUDID) liquid 0.5-1 mg  Dose: 0.5-1 mg  Freq: EVERY 2 HOURS PRN Route: PO/SL  PRN Reasons: moderate to severe pain,other  PRN Comment: dyspnea  Start: 07/23/18 1054   Admin Instructions: Use smallest effective dose for dyspnea. Use other options for pain first.    Admin. Amount: 0.5-1 mg = 0.5-1 mL Conc: 1 mg/mL  Last Admin: 07/25/18 1031  Dispense Loc:  ADS MS5E  Volume: 1 mL         1126 (0.5 mg)-Given [C]       1529 (1 mg)-Given        1104 (1 mg)-Given       1509 (1 mg)-Given       1916 (1 mg)-Given       2151 (1 mg)-Given        0215 (1 mg)-Given       0416 (1 mg)-Given       0704 (1 mg)-Given       1031 (1 mg)-Given           hypromellose-dextran (ARTIFICAL TEARS) 0.1-0.3 % ophthalmic solution 1-2 drop  Dose: 1-2 drop  Freq: EVERY 8 HOURS PRN Route: Both Eyes  PRN Reason: dry eyes  Start: 07/23/18 1532   Admin Instructions: Offer every shift.    Admin. Amount: 1-2 drop  Dispense Loc:  Main Pharmacy  Volume: 15 mL               ipratropium - albuterol 0.5 mg/2.5 mg/3 mL (DUONEB) neb solution 3 mL  Dose: 1 vial  Freq: 4 TIMES DAILY Route: NEBULIZATION  Start: 07/14/18 2245   Admin. Amount: 3 mL  Last Admin: 07/25/18 0734  Dispense Loc:  ADS MS5E  Volume: 3 mL     0745 (3 mL)-Given       1205 (3 mL)-Given       1543 (3 mL)-Given       1927 (3 mL)-Given        0801 (3 mL)-Given       1136 (3 mL)-Given      "  1601 (3 mL)-Given       1946 (3 mL)-Given        0753 (3 mL)-Given       1202 (3 mL)-Given       1558 (3 mL)-Given       1949 (3 mL)-Given        0718 (3 mL)-Given       1125 (3 mL)-Given       1526 (3 mL)-Given       1954 (3 mL)-Given        0832 (3 mL)-Given       1210 (3 mL)-Given              1929 (3 mL)-Given        0747 (3 mL)-Given       (1154)-Not Given [C]       1530 (3 mL)-Given       2143 (3 mL)-Given        0734 (3 mL)-Given       [ ] 1200       [ ] 1600       [ ] 2000           levothyroxine (SYNTHROID/LEVOTHROID) tablet 88 mcg  Dose: 88 mcg  Freq: DAILY Route: PO  Start: 18 0900   Admin Instructions: Separate oral administration of iron- or calcium-containing products and levothyroxine by at least 4 hours.    Admin. Amount: 1 tablet (1 × 88 mcg tablet)  Last Admin: 18 0834  Dispense Loc:  ADS MS5E     0814 (88 mcg)-Given        1016 (88 mcg)-Given        0834 (88 mcg)-Given        0935 (88 mcg)-Given        0923 (88 mcg)-Given        0913 (88 mcg)-Given        0834 (88 mcg)-Given           lidocaine (LMX4) kit  Freq: ONCE PRN Route: Top  PRN Reason: mild pain  PRN Comment: with VAD insertion or accessing implanted port,  Start: 18 1528   Admin Instructions: Do NOT give if patient has a history of allergy to any local anesthetic or any \"vikas\" product.   Apply 30 min prior to VAD insertion or port access.  MAX Dose:  2.5 gm (  of 5 gm tube)      Dispense Loc:  ADS MS5E  Administrations Remainin               lidocaine (XYLOCAINE) 5 % ointment  Freq: EVERY 4 HOURS PRN Route: Top  PRN Reason: moderate pain  Start: 18 1314   Admin Instructions: Apply to area as needed    Dispense Loc:  Main Pharmacy               lidocaine 1 % 1 mL  Dose: 1 mL  Freq: ONCE PRN Route: OTHER  PRN Comment: mild pain with VAD insertion or accessing implanted port,  Start: 18 1528   Admin Instructions: Do NOT give if patient has a history of allergy to any local anesthetic or any \"vikas\" " product. MAX dose 1 mL subcutaneous OR intradermal in divided doses.    Admin. Amount: 1 mL  Dispense Loc: [facility] Floor Stock  Administrations Remainin  Volume: 2 mL               LORazepam (ATIVAN) tablet 0.5 mg  Dose: 0.5 mg  Freq: EVERY 4 HOURS PRN Route: SL  PRN Reasons: anxiety,other  PRN Comment: dyspnea  Start: 18   Admin Instructions: NOTE: May give ORAL or SUBLINGUAL or RECTAL.<br><br>Use cautiously and hold for sedation.    Admin. Amount: 1 tablet (1 × 0.5 mg tablet)  Last Admin: 18  Dispense Loc:  ADS MS5E           0229 (0.5 mg)-Given           metoprolol succinate (TOPROL-XL) 24 hr tablet 50 mg  Dose: 50 mg  Freq: DAILY Route: PO  Start: 18 0800   Admin Instructions: Hold for SBP < 100 or HR < 60   DO NOT CRUSH. Tablet may be split in half along score line.    Admin. Amount: 1 tablet (1 × 50 mg tablet)  Last Admin: 18  Dispense Loc:  ADS MS5E     0814 (50 mg)-Given        1013 (50 mg)-Given        0833 (50 mg)-Given        0935 (50 mg)-Given        0920 (50 mg)-Given        0912 (50 mg)-Given        0833 (50 mg)-Given           mineral oil-hydrophilic petrolatum (AQUAPHOR)  Freq: EVERY 8 HOURS PRN Route: Top  PRN Reason: dry skin  Start: 18   Admin Instructions: Apply to lips as directed, for dryness.   Offer every shift.    Dispense Loc:  Main Pharmacy               naloxone (NARCAN) injection 0.1-0.4 mg  Dose: 0.1-0.4 mg  Freq: EVERY 2 MIN PRN Route: IV  PRN Reason: opioid reversal  Start: 18   Admin Instructions: Try to minimize reversal of analgesia especially in end-of-life patients.  For ordered doses up to 2mg give IVP. Give each 0.4mg over 15 seconds in emergency situations. For non-emergent situations further dilute in 9mL of NS to facilitate titration of response.    Admin. Amount: 0.1-0.4 mg = 0.25-1 mL Conc: 0.4 mg/mL  Dispense Loc: [facility] ADS MS5E  Volume: 1 mL               ondansetron (ZOFRAN-ODT) ODT tab 4 mg  Dose: 4  mg  Freq: EVERY 6 HOURS PRN Route: PO  PRN Reasons: nausea,vomiting  Start: 07/23/18 1531   Admin Instructions: This is Step 1 of nausea and vomiting management.  If nausea not resolved in 15 minutes, go to Step 2 prochlorperazine (COMPAZINE). Do not push through foil backing. Peel back foil and gently remove. Place on tongue immediately. Administration with liquid unnecessary  With dry hands, peel back foil backing and gently remove tablet; do not push oral disintegrating tablet through foil backing; administer immediately on tongue and oral disintegrating tablet dissolves in seconds; then swallow with saliva; liquid not required.    Admin. Amount: 1 tablet (1 × 4 mg tablet)  Dispense Loc: [facility] ADS MS5E              Or  ondansetron (ZOFRAN) injection 4 mg  Dose: 4 mg  Freq: EVERY 6 HOURS PRN Route: IV  PRN Reasons: nausea,vomiting  Start: 07/23/18 1531   Admin Instructions: This is Step 1 of nausea and vomiting management.  If nausea not resolved in 15 minutes, go to Step 2 prochlorperazine (COMPAZINE).  Irritant. For ordered doses up to 4 mg, give IV Push undiluted over 2-5 minutes.    Admin. Amount: 4 mg = 2 mL Conc: 4 mg/2 mL  Dispense Loc: [facility] ADS MS5E  Infused Over: 2-5 Minutes  Volume: 2 mL               pantoprazole (PROTONIX) EC tablet 40 mg  Dose: 40 mg  Freq: EVERY MORNING Route: PO  Start: 07/16/18 0900   Admin Instructions: DO NOT CRUSH.    Admin. Amount: 1 tablet (1 × 40 mg tablet)  Last Admin: 07/25/18 0834  Dispense Loc: [facility] ADS MS5E     0814 (40 mg)-Given        1015 (40 mg)-Given        0833 (40 mg)-Given        0935 (40 mg)-Given        0920 (40 mg)-Given        0913 (40 mg)-Given        0834 (40 mg)-Given           predniSONE (DELTASONE) tablet 20 mg  Dose: 20 mg  Freq: DAILY Route: PO  Start: 07/23/18 0900   Admin. Amount: 1 tablet (1 × 20 mg tablet)  Last Admin: 07/25/18 0834  Dispense Loc: RH ADS MS5E         0920 (20 mg)-Given        0913 (20 mg)-Given        0834 (20 mg)-Given            senna-docusate (SENOKOT-S;PERICOLACE) 8.6-50 MG per tablet 1 tablet  Dose: 1 tablet  Freq: 2 TIMES DAILY Route: PO  Start: 07/23/18 2100   Admin. Amount: 1 tablet  Last Admin: 07/25/18 0833  Dispense Loc:  [facility] MS[facility]         2016 (1 tablet)-Given        0912 (1 tablet)-Given       2151 (1 tablet)-Given        0833 (1 tablet)-Given       [ ] 2100           sodium chloride (OCEAN) 0.65 % nasal spray 1-2 spray  Dose: 1-2 spray  Freq: EVERY 1 HOUR PRN Route: NA  PRN Reason: other  PRN Comment: dry nasal passages  Start: 07/17/18 1140   Admin Instructions: To affected nostril(s)    Admin. Amount: 1-2 spray  Last Admin: 07/17/18 1205  Dispense Loc:  Main Pharmacy  Volume: 45 mL               sodium chloride (PF) 0.9% PF flush 3 mL  Dose: 3 mL  Freq: EVERY 8 HOURS Route: IV  Start: 07/23/18 1545   Admin Instructions: And Q1H PRN, to lock peripheral IV dormant line.      Admin. Amount: 3 mL  Last Admin: 07/24/18 0928  Dispense Loc: [facility] Floor Stock  Volume: 4 mL         1533 (3 mL)-Given        0020 (3 mL)-Given       0928 (3 mL)-Given       (1801)-Not Given       (2321)-Not Given        (0834)-Not Given       [ ] 1600           sodium chloride (PF) 0.9% PF flush 3 mL  Dose: 3 mL  Freq: EVERY 1 HOUR PRN Route: IV  PRN Reasons: line flush,post meds or blood draw  Start: 07/23/18 1528   Admin Instructions: for peripheral IV flush post IV meds    Admin. Amount: 3 mL  Dispense Loc: [facility] Floor Stock  Volume: 4 mL               tamsulosin (FLOMAX) capsule 0.4 mg  Dose: 0.4 mg  Freq: DAILY Route: PO  Start: 07/16/18 0900   Admin Instructions: Administer 30 minutes after the same meal each day.  Capsules should be swallowed whole; do not crush chew or open.    Admin. Amount: 1 capsule (1 × 0.4 mg capsule)  Last Admin: 07/25/18 0833  Dispense Loc: [facility] ADS MS5E     0815 (0.4 mg)-Given        1015 (0.4 mg)-Given        0834 (0.4 mg)-Given        0935 (0.4 mg)-Given        0920 (0.4 mg)-Given        0912 (0.4 mg)-Given        0833 (0.4  mg)-Given          Future Medications  Medications 18       bisacodyl (DULCOLAX) Suppository 10 mg  Dose: 10 mg  Freq: ONCE PRN Route: RE  PRN Reason: other  PRN Comment: for no bowel movement for 72 hours  Start: 18 0000   Admin Instructions: Give only after rectal check for impacted stool.    Admin. Amount: 1 suppository (1 × 10 mg suppository)  Dispense Loc: [facility] ADS MS5E  Administrations Remainin              Discontinued Medications  Medications 18         Dose: 500 mg  Freq: 3 TIMES DAILY Route: PO  Start: 07/15/18 2200   End: 18   Admin Instructions: Maximum acetaminophen dose from all sources = 75 mg/kg/day not to exceed 4 gram    Admin. Amount: 1 tablet (1 × 500 mg tablet)  Last Admin: 18  Dispense Loc:  ADS MS5E     0811 (500 mg)-Given       1637 (500 mg)-Given        0038 (500 mg)-Given       1016 (500 mg)-Given              1627 (500 mg)-Given       2208 (500 mg)-Given        0834 (500 mg)-Given       1641 (500 mg)-Given       2203 (500 mg)-Given        0935 (500 mg)-Given       1515 (500 mg)-Given       2121 (500 mg)-Given        0920 (500 mg)-Given       1531 (500 mg)-Given       2151 (500 mg)-Given        0912 (500 mg)-Given       1510-Med Discontinued       Or    Dose: 650 mg  Freq: 3 TIMES DAILY Route: RE  Start: 07/15/18 2200   End: 18   Admin Instructions: Maximum acetaminophen dose from all sources = 75 mg/kg/day not to exceed 4 grams/day.    Admin. Amount: 1 suppository (1 × 650 mg suppository)  Dispense Loc: [facility] ADS MS5E                                  (1539)-Not Given [C]                                                                                               1510-Med Discontinued          Dose: 10 mg  Freq: AT BEDTIME Route: PO  Start: 07/15/18 2200   End: 18 152   Admin. Amount: 1 tablet (1 × 10 mg tablet)  Last Admin:  07/22/18 2121  Dispense Loc: [facility] ADS 3ICU      0037 (10 mg)-Given       2207 (10 mg)-Given        2203 (10 mg)-Given        2121 (10 mg)-Given        1526-Med Discontinued           Dose: 10 mg  Freq: EVERY 4 HOURS PRN Route: IV  PRN Reason: high blood pressure  PRN Comment: SBP >160  Start: 07/18/18 1130   End: 07/23/18 1526   Admin Instructions: For ordered doses up to 40 mg, give IV Push undiluted over 1 minute.    Admin. Amount: 10 mg = 0.5 mL Conc: 20 mg/mL  Last Admin: 07/23/18 0256  Dispense Loc: [facility] 3ICU  Infused Over: 1 Minutes  Volume: 0.5 mL     0108 (10 mg)-Given        0209 (10 mg)-Given        0014 (10 mg)-Given         0256 (10 mg)-Given       1526-Med Discontinued           Dose: 0.5 mg  Freq: EVERY 4 HOURS PRN Route: PO  PRN Comment: air hunger  Start: 07/21/18 1531   End: 07/23/18 1055   Admin Instructions: Use ativan first for panic related breathing. Watch for delirium    Admin. Amount: 0.5 mg = 0.5 mL Conc: 1 mg/mL  Last Admin: 07/22/18 2312  Dispense Loc: [facility] ADS 3ICU  Volume: 0.5 mL        2312 (0.5 mg)-Given [C]        1055-Med Discontinued           Dose: 0.25-0.5 mg  Freq: EVERY 6 HOURS PRN Route: SL  PRN Reasons: anxiety,other  PRN Comment: dyspnea  Start: 07/20/18 1034   End: 07/23/18 1532   Admin Instructions: NOTE: May give ORAL or SUBLINGUAL or RECTAL.<br><br>Use cautiously and hold for sedation.    Admin. Amount: 1-2 half-tab (1-2 × 0.25 mg half-tab)  Last Admin: 07/23/18 1208  Dispense Loc: [facility] ADS 3ICU      1305 (0.5 mg)-Given         2149 (0.5 mg)-Given [C]        1208 (0.5 mg)-Given       1532-Med Discontinued           Dose: 0.5 mg  Freq: EVERY 6 HOURS PRN Route: SL  PRN Reasons: anxiety,other  PRN Comment: dyspnea  Start: 07/23/18 1527   End: 07/23/18 1534   Admin Instructions: NOTE: May give ORAL or SUBLINGUAL or RECTAL.<br><br>Use cautiously and hold for sedation.    Admin. Amount: 1 tablet (1 × 0.5 mg tablet)         1534-Med Discontinued           Dose: 0.1-0.4 mg  Freq:  EVERY 2 MIN PRN Route: IV  PRN Reason: opioid reversal  Start: 07/14/18 2243   End: 07/23/18 1526   Admin Instructions: For respiratory rate LESS than or EQUAL to 8.  Partial reversal dose:  0.1 mg titrated q 2 minutes for Analgesia Side Effects Monitoring Sedation Level of 3 (frequently drowsy, arousable, drifts to sleep during conversation).Full reversal dose:  0.4 mg bolus for Analgesia Side Effects Monitoring Sedation Level of 4 (somnolent, minimal or no response to stimulation).  For ordered doses up to 2mg give IVP. Give each 0.4mg over 15 seconds in emergency situations. For non-emergent situations further dilute in 9mL of NS to facilitate titration of response.    Admin. Amount: 0.1-0.4 mg = 0.25-1 mL Conc: 0.4 mg/mL  Dispense Loc:  ADS 3ICU  Volume: 1 mL         1526-Med Discontinued           Dose: 50 mg  Freq: 3 TIMES DAILY PRN Route: PO  PRN Reason: moderate pain  Start: 07/15/18 1959   End: 07/24/18 1433   Admin. Amount: 1 tablet (1 × 50 mg tablet)  Last Admin: 07/16/18 2105  Dispense Loc:  ADS MS5E          1433-Med Discontinued     Medications 07/19/18 07/20/18 07/21/18 07/22/18 07/23/18 07/24/18 07/25/18

## 2018-07-14 NOTE — LETTER
"  Key Recommendations: Key Recommendations: CTS following for elevated PARAM score 2/2 to readmit status, 3rd admit in 3 months, palli consulted GOC were discussed with a plan to continue with restorative goals for \"one more go around\" the family felt that the pt has \"come around:\" quite quickly and would lke to try the rehab route one more time. Pt was discharged back to ****  Final palli plan was ***    Eugenia Weller    AVS/Discharge Summary is the source of truth; this is a helpful guide for improved communication of patient story          "

## 2018-07-15 NOTE — PROGRESS NOTES
RT note: pt on bipap this morning and then was placed on HFNC and tolerating well, tachypneic at times. Current settings 40 lpm 30%. BS diminished, continues to receive duoneb inline QID.     Olesya Pulido, RRT

## 2018-07-15 NOTE — PLAN OF CARE
Problem: Patient Care Overview  Goal: Plan of Care/Patient Progress Review  Outcome: No Change  ICU End of Shift Summary.  For vital signs and complete assessments, please see documentation flowsheets.     Pertinent assessments: Soft BPs at times, afebrile.  Oriented to self only, very confused, lethargic but does arouse to voice easily.  Reports some back pain, relieved with requisitioning.  Tele reads SB/SR.  NPO.  Up assist x2 or lift, very weak. [place].  LS-diminished/coarse.  PIV SL.   Denies nausea, hypoactive bowel sounds, decreased appetite.  Voiding well, incontinent at times.  Lots of ecchymosis to arms/legs/abdomen. Scattered scabs, scab and blanchable redness to sacrum.   Major Shift Events: Pt admitted from ED to ICU, daughter Maria Isabel present.  US of BLE negative for DVT.  IV zosyn/vanco. Scheduled nebs, IV solumedrol.  Pt tolerated BIPAP overnight.  Baseline 4-5L NC at home.  Low platelets.   Plan (Upcoming Events): wean of BIPAP as able, monitor BP, IV abx  Discharge/Transfer Needs: TBD, probable D/C back to [facility] TCU     Bedside Shift Report Completed :  yes  Bedside Safety Check Completed: yes

## 2018-07-15 NOTE — H&P
Admitted:     07/14/2018      DATE OF ADMISSION: 07/14/2018      CHIEF COMPLAINT:  Shortness of breath, hypotension, AMS.      HISTORY OF PRESENT ILLNESS:  This is a 89-year-old white male who was brought in from a rehab facility.  The history is mainly obtained from ER records as the patient is on BiPAP and has underlying dementia.  This is a 89-year-old gentleman who was recently admitted to the hospital between 07/02/2018-07/07/2018 for acute on chronic hypoxic respiratory failure due to pneumonia.  At baseline, he has a history of pulmonary fibrosis and is on 5 liters of oxygen.  He also has a history of hypertension, diastolic heart failure, hypothyroidism and GERD, along with chronic kidney disease.  During his last hospitalization, Palliative Care was following him.  This is his fifth admission in the past 9 months.  He has lost weight. He is brought in for hypotension, hypoxia and altered mental status.  In the ER over here, he was seen by Dr. Christoph Brooks. He is thought to have a possible pneumonia.  He is requiring BiPAP and has been initiated on healthcare-associated pneumonia antibiotics.  I discussed care with Dr. Brooks. I am asked to admit him for further evaluation.      PAST MEDICAL HISTORY:  Pulmonary fibrosis on 5 liters of oxygen, hyperlipidemia, COPD, diastolic heart failure, coronary artery disease and abdominal aortic aneurysm.      PAST SURGICAL HISTORY:  Significant for transcatheter aortic valve replacement in 2014, endovascular repair of his abdominal aneurysm, laparoscopic cholecystectomy.      FAMILY HISTORY:  Significant for mother with hypertension.      SOCIAL HISTORY:  He does not smoke.  Unable to obtain his drinking history.      ALLERGIES:  ALLERGIC TO CONTRAST, LISINOPRIL, OXYCODONE.      HOME MEDICATIONS:  List includes Norvasc, Tylenol, nebs, Lipitor, Aricept, Lasix, Ativan, metoprolol.      REVIEW OF SYSTEMS:  Unable to be obtained as the patient is in respiratory distress  on BiPAP and has underlying dementia.      PHYSICAL EXAMINATION:   VITAL SIGNS:  Temperature 98.5, pulse 60, blood pressure is 129/60, respiratory rate 23, O2 sat is 100% on BiPAP.   GENERAL:  The patient is awake, frail, in respiratory distress on BiPAP.   LUNGS:  He has diffuse crackles bilaterally.   HEART:  Regular rate, S1, S2 normal.  No murmurs or gallops.   ABDOMEN:  Soft, nontender, with good bowel sounds.   EXTREMITIES:  There is no edema.   HEENT:  His pupils are equal, round, reactive to light.   SKIN:  There is no rash.      LABORATORY:  Labwork obtained here shows a sodium 137, potassium 4.3, chloride 101, bicarbonate 35, BUN 47, creatinine 1.92, GFR of 33, calcium 8.7.  Lactic acid 1.1.  His BNP is 924.  Troponin is less than 0.015.  On his CBC, his white cell count is 8.9, hemoglobin 11.1, hematocrit 35.4, platelet count of 148.  His diff is grossly within normal limits.  INR is within normal limits.        IMAGING: A chest x-ray obtained over here shows lung volumes remain low.  Background of interstitial opacities are again seen, although the hazy opacities from prior appear slightly improved which may be due to edema and/or pneumonia.  No significant pleural effusion or pneumothorax.  Cardiac silhouette remains enlarged.        An EKG obtained here shows normal sinus rhythm at 64 beats per minute with left axis deviation and left ventricular hypertrophy with nonspecific T-wave abnormalities.      ASSESSMENT AND PLAN:   1.  Acute respiratory distress.  Likely due to underlying pneumonia, cannot rule out healthcare-associated pneumonia or aspiration pneumonia.  He has been initiated on broad-spectrum antibiotics.  I will continue that.  He is on BiPAP.  We will continue that.  In addition, we will place him on IV steroids.   2.  Acute encephalopathy.  Likely due to above.  Difficult to assess as he is presently on BiPAP and has an underlying history of dementia.  Will monitor.  CT of the head has  been ordered, which is pending.        CODE STATUS: I had a detailed conversation with his daughter over here, Maria Isabel, who is actually 1 of 4 siblings who is visiting from [place].  The patient is DNR/DNI.  It seems like if he does not improve over the next 24-48 hours, the family may be leaning towards less aggressive measures.  However, at this point in time, he is DNR/DNI with no further escalation of care.      DISPOSITION: He will be admitted as an inpatient to the Intensive Care Unit.      > 30 MINS of Critical care time due to need for BiPAP. Discussion with family.       MISBAH COCHRAN MD             D: 2018   T: 2018   MT:       Name:     NATTY MAN   MRN:      [id number]        Account:      [id number]   :      1928        Admitted:     2018                   Document: [id number]

## 2018-07-15 NOTE — PROGRESS NOTES
Pt was transported to the ICU Room 64 on BIPAP. Pt SPO2 maintained at 100% throughout transport. Pt has been settled in room with no complications encountered. Respiratory will continue to follow.

## 2018-07-15 NOTE — PLAN OF CARE
Problem: Pneumonia (Adult)  Goal: Signs and Symptoms of Listed Potential Problems Will be Absent, Minimized or Managed (Pneumonia)  Signs and symptoms of listed potential problems will be absent, minimized or managed by discharge/transition of care (reference Pneumonia (Adult) CPG).   Outcome: Improving  ICU End of Shift Summary.  For vital signs and complete assessments, please see documentation flowsheets.     Pertinent assessments:  Pt continues to improve off the bipap most of the day.  Pt is on hi-rafaela.  He gets dyspneic with any activity.  Lungs decreased throughout.  Ate soup and chocolate boost for lunch and supper.   With good set up he was able to feed himself.  Major Shift Events:  Up in chair for about 2 hours.  Remains confused.  Knows his family and who he is.  Echo done.  Plan (Upcoming Events):   Family to look at hospice.  Discussed with .  Palliative care consult ordered.  Discharge/Transfer Needs:  TBD  Hospice vs TCU     Bedside Shift Report Completed :   Bedside Safety Check Completed:

## 2018-07-15 NOTE — PROGRESS NOTES
[facility]    Hospitalist Progress Note    Date of Service (when I saw the patient): 07/15/2018    Assessment & Plan   Chaz Soler is a 89 year old male who was admitted on 7/14/2018 with SOB. Patient has a PMH of chronic HFpEF, AS s/p TAVR, pulmonary fibrosis and chronic hypoxic resp failure on 5L oxygen at baseline, CAD, Dementia, CKD he was recently hospitalized at this facility for pneumonia and discharged a few days ago.  According to the family, patient was not eating and drinking much, has been sleeping a lot and brought back to the ER.  Next    1.  Acute on chronic hypoxemic respiratory failure.  it is reasonable to continue IV antibiotics at this time, although I do not really see much evidence for worsening of his pneumonia.  His x-ray is improved, pro-calcitonin is negative.  No fevers or leukocytosis.  I suspect, part of this is due to worsening of patient's underlying multiple health comorbidities, overall decline and deconditioning.  -On IV vancomycin, Zosyn, steroids  -Switch from BiPAP to high flow oxygen, he had no significant hypercapnia     2.  Aortic stenosis status post TAVR.  Echo done today is stable.  3.  Chronic congestive heart failure with preserved EF.  BNP is normal and no evidence to suggest CHF exacerbation, resume PO lasix   4.  Pulmonary fibrosis.  With chronic hypoxic respiratory failure and 5 L oxygen next  5.  Acute encephalopathy on top of dementia.  With patient having increased lethargy.  Likely due to his respiratory failure and multiple other health issues. Hold ativan, gabapentin, narcotics for now   6.  Chronic kidney disease.  Baseline is around 1.6-1.8.  Currently 1.87.  Monitor in the hospital,  7.  Dementia.  On Aricept  8.  Hypertension.  On amlodipine, losartan and metoprolol - > resume metoprolol for now and watch BP  9.  Coronary artery disease.  No sign of any active ischemia, had a negative Lexiscan last year in October  10.  Hypothyroidism on  Synthroid    Goals of care.  I have discussed this in detail with the patient's daughter at bedside.  We discussed patient's decline, recurrent hospitalizations, multiple medical comorbidities and the overall guarded prognosis.  I reviewed with her in detail the option of hospice and what that would entail.  She mentions that they have been discussing this among the family with her brother and patient's wife, and agrees with the focus on comfort. I suggested conference with hospice for information and also palliative care and she seems interested in that.  We will request a SW consult.  In the meanwhile, she confirms DNR/DNI and avoiding further escalation of cares.    DVT Prophylaxis: Heparin SQ    Code Status: DNR/DNI    Disposition: Expected discharge : TBD pending palliative consult and hospice conference     Tauseef Ur Robinson    Interval History   Chart reviewed and patient seen. Case discussed with nursing staff.     Patient is lethargic, later resting in chair, denies any chest pain, wants to eat, No other complaints voiced.     -Data reviewed today: I reviewed all new labs and imaging results over the last 24 hours. I personally reviewed .  # Pain Assessment:  Current Pain Score 7/15/2018   Patient currently in pain? denies   Pain score (0-10) -   Pain location -   Pain descriptors -   CPOT pain score -        Physical Exam   Temp: 97.6  F (36.4  C) Temp src: Axillary BP: 106/61   Heart Rate: 67 Resp: 10 SpO2: 96 % O2 Device: High Flow Nasal Cannula (HFNC) Oxygen Delivery: Other (Comments) (40 lpm)  Vitals:    07/14/18 2240 07/15/18 0615   Weight: 60.2 kg (132 lb 11.5 oz) 60.4 kg (133 lb 2.5 oz)     Vital Signs with Ranges  Temp:  [97.6  F (36.4  C)-98.5  F (36.9  C)] 97.6  F (36.4  C)  Heart Rate:  [56-77] 67  Resp:  [10-46] 10  BP: ()/(40-73) 106/61  FiO2 (%):  [30 %-55 %] 30 %  SpO2:  [79 %-100 %] 96 %  I/O last 3 completed shifts:  In: 100 [I.V.:100]  Out: 300 [Urine:300]    GENERAL:  Lethargic,   Opens eyes to voice, No acute distress.  PSYCH: no acute agitation   HEENT:  Neck is Supple, trachea is midline, EOMI, conjunctiva clear  CARDIOVASCULAR: Regular rate and rhythm, Normal S1, S2, no loud murmurs   PULMONARY:  Coarse sounds bilaterally , no wheezing or crackles   GI: Abdomen is soft, non tender, non-distended, bowel sounds present. No rebound or guarding   SKIN:  No cyanosis or clubbing, no obvious exanthems on exposed areas   MSK: Extremities are warm and well perfused. No pitting edema   Neuro: tired, lethargic, opens eyes , moves extremities       Medications       heparin  5,000 Units Subcutaneous Q8H     ipratropium - albuterol 0.5 mg/2.5 mg/3 mL  1 vial Nebulization 4x Daily     methylPREDNISolone  40 mg Intravenous Q8H     piperacillin-tazobactam  3.375 g Intravenous Q6H     [START ON 7/16/2018] vancomycin (VANCOCIN) IV  1,500 mg Intravenous Q48H       Data   All new lab data and imaging results from today have been reviewed       Recent Labs  Lab 07/15/18  0548 07/14/18  2303 07/14/18  1753 07/14/18  1749 07/12/18   WBC 7.7  --   --  8.9  --    HGB 11.9*  --  10.5* 11.1*  --    MCV 93  --   --  93  --     131*  --  148*  --    INR  --   --   --  0.95  --      --  137 140 139   POTASSIUM 4.9  --  4.3 4.7 4.2   CHLORIDE 102  --  98 101 97*   CO2 34*  --   --  35* 33*   BUN 49*  --  48* 47* 45*   CR 1.87*  --   --  1.92* 1.51*   ANIONGAP 4  --  5* 4 9   LEXIS 9.3  --   --  8.7 10.3   *  --  125* 122* 87   TROPI  --   --   --  <0.015  --        Recent Results (from the past 24 hour(s))   XR Chest Port 1 View    Narrative    CHEST PORTABLE ONE VIEW   7/14/2018 6:12 PM     HISTORY: AMS.     COMPARISON: 7/2/2018.      Impression    IMPRESSION: Lung volumes remain low. Background of interstitial  opacities are again seen although the hazy opacities from prior  appears slightly improved which may be due to edema and/or pneumonia.  No significant pleural effusion or pneumothorax.  Cardiac silhouette  remains enlarged.    EV BURNETT MD   CT Head w/o Contrast    Narrative    CT SCAN OF THE HEAD WITHOUT CONTRAST   7/14/2018 8:58 PM     HISTORY: AMS.     TECHNIQUE:  Axial images of the head and coronal reformations without  IV contrast material. Radiation dose for this scan was reduced using  automated exposure control, adjustment of the mA and/or kV according  to patient size, or iterative reconstruction technique.    COMPARISON: Head CT 6/3/2018.    FINDINGS: There is no evidence of intracranial hemorrhage, mass, acute  infarct or anomaly. There is generalized atrophy of the brain. There  is low attenuation in the white matter of the cerebral hemispheres  consistent with sequelae of small vessel ischemic disease. 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 Ventricular size is within  normal limits without evidence of hydrocephalus.     The visualized portions of the sinuses and mastoids appear normal. The  bony calvarium and bones of the skull base appear intact.       Impression    IMPRESSION:     1. No evidence of acute intracranial hemorrhage, mass, or herniation.  2. There is generalized atrophy of the brain. White matter changes are  present in the cerebral hemispheres that are consistent with small  vessel ischemic disease in this age patient.      EV BURNETT MD   US Lower Extremity Venous Bilateral Port    Narrative    US LOWER EXTREMITY VENOUS DUPLEX BILATERAL PORTABLE   7/14/2018 11:26  PM     HISTORY: Shortness of breath. Rule out deep venous thrombosis.    COMPARISON: None.    FINDINGS: Gray-scale, color and Doppler spectral analysis ultrasound  was performed of the legs. Compression and augmentation imaging was  performed.    There is no evidence for deep venous thrombosis. The veins compress  and augment normally.      Impression    IMPRESSION: No deep venous thrombosis.    ALCIRA SINGLETARY MD

## 2018-07-15 NOTE — PHARMACY-ADMISSION MEDICATION HISTORY
"Admission medication history interview status for this patient is complete. See [facility] admission navigator for allergy information, prior to admission medications and immunization status.     Medication history interview source(s): [facility]  Medication history resources (including written lists, pill bottles, clinic record): MAR from [facility]   Primary pharmacy: [facility] [place]     Changes made to PTA medication list:  Added: hydromorphone  Deleted: none  Changed: none    Actions taken by pharmacist (provider contacted, etc):None     Additional medication history information: Staying at [facility] \"for a while\" - MAR reviewed.     Medication reconciliation/reorder completed by provider prior to medication history? No    Do you take OTC medications (eg tylenol, ibuprofen, fish oil, eye/ear drops, etc)? Y (Y/N) tylenol, aspirin    For patients on insulin therapy: N (Y/N)    Prior to Admission medications    Medication Sig Last Dose Taking? Auth Provider   acetaminophen (TYLENOL) 500 MG tablet Take 500 mg by mouth 3 times daily 7/14/2018 at 1130 Yes Unknown, Entered By History   albuterol (2.5 MG/3ML) 0.083% neb solution Take 2.5 mg by nebulization every 2 hours as needed   at unknown Yes Reported, Patient   amLODIPine (NORVASC) 10 MG tablet TAKE ONE TABLET BY MOUTH ONCE DAILY 7/14/2018 at 0800 Yes Alirio Fox MD   aspirin 81 MG tablet Take 1 tablet (81 mg) by mouth daily 7/14/2018 at 0800 Yes Alirio Fox MD   atorvastatin (LIPITOR) 20 MG tablet Take 1 tablet (20 mg) by mouth daily 7/13/2018 at 1930 Yes Tyrell Jackson MD   cyanocobalamin (VITAMIN  B-12) 1000 MCG tablet Take 1,000 mcg by mouth daily 7/14/2018 at 0800 Yes Unknown, Entered By History   diclofenac (VOLTAREN) 1 % GEL topical gel Place 2 g onto the skin 4 times daily as needed (lower back pain)   at unknown Yes Unknown, Entered By History   donepezil (ARICEPT) 10 MG tablet TAKE ONE TABLET BY MOUTH AT BEDTIME " 7/13/2018 at 1930 Yes Alirio Fox MD   DULoxetine (CYMBALTA) 30 MG EC capsule Take 30 mg by mouth daily 7/14/2018 at 0800 Yes Reported, Patient   furosemide (LASIX) 20 MG tablet Take 30 mg by mouth daily  7/14/2018 at 0800 Yes Unknown, Entered By History   gabapentin (NEURONTIN) 100 MG capsule TAKE ONE CAPSULE BY MOUTH THREE TIMES DAILY FOR  PAIN 7/14/2018 at 1122 Yes Alirio Fox MD   HYDROmorphone (DILAUDID) 2 MG tablet Take 1 mg by mouth every 6 hours while awake 7/14/2018 at 1122 Yes Unknown, Entered By History   ipratropium - albuterol 0.5 mg/2.5 mg/3 mL (DUONEB) 0.5-2.5 (3) MG/3ML neb solution Take 1 vial by nebulization 4 times daily 7/14/2018 at 1542 Yes Reported, Patient   levothyroxine (SYNTHROID/LEVOTHROID) 88 MCG tablet TAKE ONE TABLET BY MOUTH ONCE DAILY 7/14/2018 at 0600 Yes Alirio Fox MD   LORazepam (ATIVAN) 0.5 MG tablet Take 0.5 tablets (0.25 mg) by mouth every 6 hours as needed for anxiety  at unknown Yes Rachel Mclain MD   losartan (COZAAR) 100 MG tablet TAKE ONE TABLET BY MOUTH  DAILY 7/14/2018 at 0800 Yes Alirio Fox MD   metoprolol succinate (TOPROL-XL) 25 MG 24 hr tablet TAKE ONE TABLET BY MOUTH DAILY 7/14/2018 at 0800 Yes Alirio Fox MD   Multiple Vitamins-Minerals (MULTIVITAL) TABS Take 1 tablet by mouth daily 7/14/2018 at 0800 Yes Unknown, Entered By History   pantoprazole (PROTONIX) 40 MG EC tablet TAKE ONE TABLET BY MOUTH EVERY MORNING 7/14/2018 at 0800 Yes Alirio Fox MD   tamsulosin (FLOMAX) 0.4 MG capsule TAKE ONE CAPSULE BY MOUTH ONCE DAILY 7/14/2018 at 0800 Yes Alirio Fox MD   traMADol (ULTRAM) 50 MG tablet Take 1 tablet (50 mg) by mouth 3 times daily 7/12/2018 at 1200 Yes Rachel Mclain MD

## 2018-07-15 NOTE — ED NOTES
Patient resumed to 10 L after SPO2 of 70%, did not return to normal. MD notified and requested BIPAP.

## 2018-07-15 NOTE — CONSULTS
PETER met with daughter and spoke to her about Hospice. Family will go with [facility]. Hospice set to meet with family tentatively at  9 am 7/16/18. [facility] will call son Kwame to confirm time tomorrow.

## 2018-07-15 NOTE — PROGRESS NOTES
Pt was transported to CT on BIPAP. SPO2 was maintained at 99% throughout transport. CT was completed successfully and pt was returned to room 3 in ER.

## 2018-07-16 NOTE — PROGRESS NOTES
[facility]  Met with spouse, son Kwame and daughter Maria Isabel in the conference room for Hospice informational meeting. Discussed Hospice philosophy and the Hospice Medicare Benefit. At this time, family is leaning toward another try with rehab with the possibility of not rehospitalization should pt not tolerate therapy and condition deteriorate. Palliative Care to meet with family this afternoon, update provided to PETER and Marianna with Palliative Care. Will follow along for final discharge plan.

## 2018-07-16 NOTE — CONSULTS
"[facility]    Palliative Care Consultation   Text Page    Date of Admission:  7/14/2018    Assessment & Plan   Chaz Soler is a 89 year old male who was admitted on 7/14/2018. I was asked to see the patient for goals of care.    Recommendations:  1.Decisional Capacity -  Unreliable. Patient has an advance directive dated 12/15/16.  Include patient in decision making as much as possible but involve health care agent attempting consensus with patient. Spouse, Ricardo is his primary Health Care Agent and his alternate HCA is son Kwame.  2. Pain- chronic low back pain  Tylenol 500 mg PO TID. Voltaren gel 1% 4 times per day to low back. LIdocaine 5% topical to low back q 4 hours prn - do not use heating pad to area.  Gabapentin 100 mg PO TID.  Continue with cymbalta 30 mg PO daily. Tramadol 50 mg PO TID prn moderate to severe pain. Hold on hydromorphone for now.  3. Dyspnea - Antibiotics, steroids, o2 as per hospitalist/intensivist. Fan at bedside prn. Observe for \"panic attacks\" due to shortness of breath. Lorazepam 0.5 mg PO/SL or CT q 6 hours prn - use cautiously and hold for sedation. Hold on hydromorphone for now.  4. Cachexia/Dehydration - Liberalized pt's diet as tolerated. Allow family to supplement with foods that pt enjoys including chocolate ice cream, pudding. Monitor pt for signs of dehydration and encourage PO intake.  3. Spiritual Care- Oriented to Spiritual Health and Social Work Services as part of Palliative Care team. Pt is Caodaism.  is following.  is following.  4. Care Planning- Met with hospice for information 7/16/18. Family hope that pt can recover to his most recent baseline and return to [facility] for TCU/Rehab.     Goal of Care: DNR/DNI. Family verbalize that trial of bipap or tube feeding to help him overcome an acute exacerbation of his illness are currently worth it for the pt due to his perceived QOL, but they would not want these therapies for pt long " "term to sustain him.    Disease Process/es & Symptoms:  Chaz Soler is a 89 year old patient admitted with symptoms of decreased mental status, hypotension and shortness of breath. He has been treated for acute on chronic hypoxemic respiratory failure due to pulmonary fibrosis and possible PNA, associated \"panic attacks\", acute encephalopathy, aortic stenosis s/p TAVR, chronic diastolic CHF with preserved EF, low back pain with compression fracture, CKD, dementia, HTN, CAD, hypothyroidism.      This is in the setting of pulmonary fibrosis diagnosed at the [facility] approximately 5 years ago, on chronic oxygen that has been gradually increasing to 4-5 l/min via NC, dementia, diastolic CHF, HTN, CAD, hypothyroidisim, GERD, depression/anxiety, chronic low back pain due to DDD with recent vertebral fracture s/p vertebroplasty L3, L4-L5 stenosis and facet arthropathy, s/p lumbosacral caudal epidural steroid injections.. He has been hospitalized 5 times in the past 10 months for recurrent respiratory failure and episodes of AMS, CHF and SOL/CKD. Prior to admission patient has moved to ICU from TCU for higher level of daily care needs. There has been a decline in daily function including increased time in wc, decreased ability to walk long distances, episodes of panic attacks.  There is a documented unitentional weight loss of 13 lbs over the past 9 months.    Findings & plan of care discussed with: Dr. Romero, Dr. Hylton, bedside nurse Baylee, [facility] RN Gifty Navarro, Care Coordinator Maureen.  Follow-up plan from palliative team: will continue to follow this pt for symptom management and support for family with goals of care discussion.  Thank you for involving us in the patient's care.     Marianna YANG, CNS  Pain Management and Palliative Care  [facility]  Pgr: [phone]    Time Spent on this Encounter   I spent 75 minutes (13:45-15:02pm) in assessment of the patient, counseling and care " "conference with son Kwame and daughter Maria Isabel as documented in sections below. Another 45 minutes in review of chart, documentation, coordination of care and discussion with the health care team.    Reason for Consult   Reason for consult: I was asked by Dr. Carie Bowers  to evaluate this patient for Goals of care.    Primary Care Physician   Alirio Fox    Chief Complaint   AMS, SOB, Hypotension    History is obtained from the electronic health record and patient's family    History of Present Illness   Chaz Soler is a 89 year old male who presents from [facility] TCU with hypotension, hypoxia and increasing lethargy.    Pt was [facility] from [facility] after 5 day inpatient stay for acute on chronic hypoxemic respiratory failure due to hospital acquired PNA in setting of advanced pulmonary fibrosis.Initially family reports that pt was doing well. \"We use his ability to play cribbage as a rhoda for how well he is doing\". Pt normally takes a nap after lunch, however on Saturday pt was unable to be awakened by staff or family. Pt was also found to be hypotensive and had blood drawn at [facility] although family was not sure what was being tested.     In ED, pt was placed on bipap and started on antibiotics for presumed healthcare associated PNA.  He was transferred to ICU. Pt has chronic low back pain due to DDD, stenosis and facet disease at L3, s/p vertebroplasty for compression fracture. Pt's pain medications including tramadol, gabapentin were held due to AMS.    When I see him he has been transferred into a chair and is sleeping. His son Kwame and daughter Maria Isabel are at his bedside.     The following symptoms are noted by patient as concerning to his quality of life.  Pain   Dyspnea     Decision-Making & Goals of Care:  Discussed on July 16, 2018 with Marianna YANG, CNS:   Met with pt's son/alternate HCA Kwame and daughter Maria Isabel in ICU [facility]. Pt's wife Kylee went home to rest after " "the 3 of them had a  Hospice information meeting with Gifty Navarro RN. They describe pt as pleasant, a witty sense of humor, enjoys company of family \"he exists for us\". Pt has difficulty with short term memory but looks at newspapers and other things to figure out what day it is. Kwame has made a cribbage board for pt that reminds him of the steps of the game and they play with him when they visit. They note that pt is being admitted to the hospital more frequently for issues related to his respiratory status, slowly is increasing his need for oxygen over the last 2 years and is close to the maximum o2 flow he can have with his concentrators, is having more frequent episodes of \"panic\" attacks with his breathing even though his o2 sat is WNL, is only able to walk short distance with his walker and o2, spending more time in a w.c.  He has lost weight and they try to encourage his PO intake and include chocolate ice cream, pudding and other things he like to help with his intake. Prior to the last 2 admissions he had been living with his wife, who helped to care for him. He has been compliant with PT/OT at TCU. They agree that pt should be DNR DNI. They along with pt's wife Kylee, have noted that pt was able to recover after being hospitalized and is improving back to his baseline PTA. They hope he can continue to improve and feel that short term trials of bipap or other treatments, even including a keofeed tube would be worth it for pt, if it would help him overcome an acute exacerbation, but not long term. Pt's POLST completed prior to DC with pt's wife and my colleague Lissy Godinez Rojelio 7/5/2018 was reviewed and is currently up to date with the information shared by Kwame and Maria Isabel.    Patient has decision-making capacity Unreliable  Patient has a Health Care Agent named in a legal Advance Directive document dated 12/15/16. See name(s) and contact information in Code/ACP/Advance Care Planning Activity " Tab.  Physician orders for life-sustaining treatment (POLST) form indicates no aggressive treatment  Code Status: Do not resuscitate / Do not intubate     Past Medical History   I have reviewed this patient's medical history and updated it with pertinent information if needed.   Past Medical History:   Diagnosis Date     AAA (abdominal aortic aneurysm) (H) 10/5/2011    6.1 cm     Anemia 11/30/2011     Aortic stenosis 10/26/2012     CAD (coronary artery disease)     MI - distal inferior/apex. Non transmural     Carotid artery disease (H)      CHF (congestive heart failure) (H) 12/31/2014     CKD (chronic kidney disease) stage 3, GFR 30-59 ml/min 11/30/2011     COPD (chronic obstructive pulmonary disease) (H)      Dementia 8/4/2015     Edema, unspecified edema 1/17/2016     Essential hypertension      Gout 1/06    knee     Hypercalcemia 1/2/2015     Hyperlipidemia LDL goal < 70      Hyperparathyroidism, unspecified 4/22/2015     Hyperplasia of prostate      LEFT LUNG GRANULOMA      Lumbago      Other chronic pain 10/11/2016     Proteinuria 2/8/2012     Pulmonary fibrosis (H)      PVD (peripheral vascular disease) (H)      Thrombocytopenia (H) 11/30/2011     Unspecified hypothyroidism      Vitamin D deficiency        Past Surgical History   I have reviewed this patient's surgical history and updated it with pertinent information if needed.  Past Surgical History:   Procedure Laterality Date     COLONOSCOPY  9/02 per patient     DISCECTOMY LUMBAR POSTERIOR MICROSCOPIC ONE LEVEL  8/11/2014    Procedure: DISCECTOMY LUMBAR POSTERIOR MICROSCOPIC ONE LEVEL;  Surgeon: Jone Carvalho MD;  Location: [facility] OR     ENDOVASCULAR REPAIR ANEURYSM ABDOMINAL AORTA  11/18/2011    Procedure:ENDOVASCULAR REPAIR ANEURYSM ABDOMINAL AORTA; ENDOVASCULAR AAA,RIGHT FEMORAL       LAPAROSCOPIC CHOLECYSTECTOMY  Nov 2011     LAPAROSCOPIC CHOLECYSTECTOMY  11/18/2011    Procedure:LAPAROSCOPIC CHOLECYSTECTOMY; LAPAROSCOPIC CHOLECYSTECTOMY ;  Surgeon:DARRYL DORADO; Location:[facility] OR     REPAIR ANEURYSM ABDOMINAL AORTA  Nov 2011     right cataract extraction/IOL  12/03     TRANSCATHETER AORTIC VALVE IMPLANT ANESTHESIA N/A 11/12/2014    Bioprosthetic. Procedure: TRANSCATHETER AORTIC VALVE IMPLANT ANESTHESIA;  Surgeon: Generic Anesthesia Provider;  Location: [facility] OR     VASECTOMY         Prior to Admission Medications   Prior to Admission Medications   Prescriptions Last Dose Informant Patient Reported? Taking?   DULoxetine (CYMBALTA) 30 MG EC capsule 7/14/2018 at 0800 Self Yes Yes   Sig: Take 30 mg by mouth daily   HYDROmorphone (DILAUDID) 2 MG tablet 7/14/2018 at 1122  Yes Yes   Sig: Take 1 mg by mouth every 6 hours while awake   LORazepam (ATIVAN) 0.5 MG tablet  at unknown  No Yes   Sig: Take 0.5 tablets (0.25 mg) by mouth every 6 hours as needed for anxiety   Multiple Vitamins-Minerals (MULTIVITAL) TABS 7/14/2018 at 0800 Self Yes Yes   Sig: Take 1 tablet by mouth daily   acetaminophen (TYLENOL) 500 MG tablet 7/14/2018 at 1130 Self Yes Yes   Sig: Take 500 mg by mouth 3 times daily   albuterol (2.5 MG/3ML) 0.083% neb solution  at unknown  Yes Yes   Sig: Take 2.5 mg by nebulization every 2 hours as needed    amLODIPine (NORVASC) 10 MG tablet 7/14/2018 at 0800 Self No Yes   Sig: TAKE ONE TABLET BY MOUTH ONCE DAILY   aspirin 81 MG tablet 7/14/2018 at 0800 Self No Yes   Sig: Take 1 tablet (81 mg) by mouth daily   atorvastatin (LIPITOR) 20 MG tablet 7/13/2018 at 1930 Self No Yes   Sig: Take 1 tablet (20 mg) by mouth daily   cyanocobalamin (VITAMIN  B-12) 1000 MCG tablet 7/14/2018 at 0800 Self Yes Yes   Sig: Take 1,000 mcg by mouth daily   diclofenac (VOLTAREN) 1 % GEL topical gel  at unknown  Yes Yes   Sig: Place 2 g onto the skin 4 times daily as needed (lower back pain)    donepezil (ARICEPT) 10 MG tablet 7/13/2018 at 1930 Self No Yes   Sig: TAKE ONE TABLET BY MOUTH AT BEDTIME   furosemide (LASIX) 20 MG tablet 7/14/2018 at 0800 Self Yes Yes   Sig: Take 30 mg  by mouth daily    gabapentin (NEURONTIN) 100 MG capsule 7/14/2018 at 1122 Self No Yes   Sig: TAKE ONE CAPSULE BY MOUTH THREE TIMES DAILY FOR  PAIN   ipratropium - albuterol 0.5 mg/2.5 mg/3 mL (DUONEB) 0.5-2.5 (3) MG/3ML neb solution 7/14/2018 at 1542  Yes Yes   Sig: Take 1 vial by nebulization 4 times daily   levothyroxine (SYNTHROID/LEVOTHROID) 88 MCG tablet 7/14/2018 at 0600 Self No Yes   Sig: TAKE ONE TABLET BY MOUTH ONCE DAILY   losartan (COZAAR) 100 MG tablet 7/14/2018 at 0800 Self No Yes   Sig: TAKE ONE TABLET BY MOUTH  DAILY   metoprolol succinate (TOPROL-XL) 25 MG 24 hr tablet 7/14/2018 at 0800 Self No Yes   Sig: TAKE ONE TABLET BY MOUTH DAILY   pantoprazole (PROTONIX) 40 MG EC tablet 7/14/2018 at 0800 Self No Yes   Sig: TAKE ONE TABLET BY MOUTH EVERY MORNING   tamsulosin (FLOMAX) 0.4 MG capsule 7/14/2018 at 0800 Self No Yes   Sig: TAKE ONE CAPSULE BY MOUTH ONCE DAILY   traMADol (ULTRAM) 50 MG tablet 7/12/2018 at 1200  No Yes   Sig: Take 1 tablet (50 mg) by mouth 3 times daily      Facility-Administered Medications: None     Allergies   Allergies   Allergen Reactions     Contrast Dye      SOB, increased Bp, difficulty swallowing. Date 6/3/96 (ipaque contrast)  Was premedicated prior to FRANCK and had no reaction at all (solumedrol and benadryl) 2016  Was premedicated with Methylprednisolone protocol, no reaction 1/25/17     Lisinopril      cough     Oxycodone      Delirium       Social History   I have updated and reviewed the following Social History Narrative:   Social History     Social History Narrative    Lives in  [facility] co[facility]op in [place] for the past 13 years.     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  Family system:  to wife Kylee. Son Kwame, daughters Jannie, Maria Isabel and Jessy.  Functional status (needs help with ADLs or IADLs): assist with ADLs, able to walk short distances with walker, but is more often in w.c. Due to  "dyspnea. Participates in PT/OT.   Employment/education: sales.  Use of community resources: Acute rehab. Home o2 at 4-5 liters/min.  Activities/interests: cribbage, being with his family, watching movies, his sense of humor.  History of substance use/abuse: None.  Anabaptist affiliation: Congregation  Involvement in felicia community: Not recently.    Family History   I have reviewed this patient's family history and updated it with pertinent information if needed.   Family History   Problem Relation Age of Onset     Hypertension Mother      Hypertension Father        Review of Systems   CONSTITUTIONAL: NEGATIVE for fever, chills, change in weight  ENT/MOUTH: NEGATIVE for ear, mouth and throat problems  RESP: NEGATIVE for significant cough. POSITIVE for SOB  CV: NEGATIVE for chest pain, palpitations or peripheral edema    Palliative Symptom Review (0=no symptom/no concern, 1=mild, 2=moderate, 3=severe):      Pain: 2-moderate  Midline lumbar back ache without sciatica.      Fatigue: 2-moderate      Nausea: 0-none      Constipation: 0-none      Diarrhea: 0-none      Depressive Symptoms: 0-none      Anxiety: 2-moderate \"panic attacks\" due to dyspnea.      Drowsiness: 1-mild  Improving with improved oxygen and ventilation.      Poor Appetite: 2-moderate      Shortness of Breath: 2-moderate  On home o2 at 4-5 liters/minute.      Insomnia: 0-none          Overall (0 good/no concerns, 3 very poor):  2    Physical Exam   Temp:  [97.6  F (36.4  C)-99.9  F (37.7  C)] 97.6  F (36.4  C)  Pulse:  [76] 76  Heart Rate:  [69-96] 84  Resp:  [10-41] 18  BP: ()/(44-77) 128/66  FiO2 (%):  [30 %-35 %] 35 %  SpO2:  [81 %-100 %] 96 %  135 lbs 9.33 oz  GEN:  Alert, oriented x 2, appears comfortable, NAD.  HEENT:  Normocephalic/atraumatic, no scleral icterus, no nasal discharge, mouth moist.  CV:  RRR, S1, S2; + click.  +3 DP/PT pulses bilatererally; no edema BLE.  RESP:  Diminished to auscultation bilaterally without " rales/rhonchi/wheezing/retractions.  Symmetric chest rise on inhalation noted.  Normal respiratory effort. On HFNC.  ABD:  Rounded, soft, non-tender/non-distended.  +BS. BM x 1.  EXT:  Edema & pulses as noted above.  CMS intact x 4.     M/S:   Deferred palpation.   SKIN:  Dry to touch, no exanthems noted in the visualized areas.    NEURO: Symmetric strength +5/5.  Sensation to touch intact all extremities.   There is no area of allodynia or hyperesthesia.  Psych:  Normal affect.  Calm, cooperative, conversant appropriately, short term memory changes - knows he is in the hospital but cannot tell me why he is in the hospital.     Delirium Screen/CAM:  Delirium = (#1 and #2 = YES) + (#3 and/or #4)   1) Acute onset and fluctuating course:   No   (acute change in mental status from baseline over last 24 hours)  2) Inattention:   No   (difficulty focusing, distractible, can't follow conversation)  3) Disorganized thinking:   No   (score only if #1 and #2 are YES)  (rambling/irrelevant conversation, unclear/illogical thoughts, inconsistency)  4) Altered level of consciousness:   No   (score only if #1 and #2 are YES)  (other than alert, calm, cooperative)    Delirium/CAM score: 0/4  Interpretation:  1)  Delirium:  Absent      Data   Results for orders placed or performed during the hospital encounter of 18 (from the past 24 hour(s))   Echocardiogram Complete    Narrative    [id number]  [id number]  [id number]  [id number]^SHASHANK^FILIPPO^[facility]  Echocardiography Laboratory  [address] [place] [address]        Name: NATTY MAN  MRN: [id number]  : 1928  Study Date: 07/15/2018 11:27 AM  Age: 89 yrs  Gender: Male  Patient Location: [facility]  Reason For Study: Aortic Valve Disorder  Ordering Physician: FILIPPO ENCARNACION  Referring Physician: Alirio Fox  Performed By: Milagros Cardenas     BSA: 1.7 m2  Height: 68 in  Weight: 133 lb  HR: 78  BP: 127/68  mmHg  _____________________________________________________________________________  __        Procedure  Complete Portable Echo Adult. Complete Echo Adult.  _____________________________________________________________________________  __        Interpretation Summary     Sinus rhythm was noted. There was significant artifact with the tracings.  Technically difficult study limited views obtained due to the patient was on a  ventilator  The left ventricle is normal in size.  There is mild concentric left ventricular hypertrophy.  Hyperdynamic left ventricular function The visual ejection fraction is  estimated at 65-70%.  Grade I or early diastolic dysfunction.  The right ventricular systolic function is normal.  There is trace mitral regurgitation.  The left atrium is mild to moderately dilated by volume criteria at 41 ml/m2.  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 These  gradients noted below are stable and normal for this prosthetic aortic valve.  Compared to the prior echo study dated 6-2-2017, there have been no major  changes. The LA is more dilated.  The mean AoV pressure gradient is 8.7 mmHg. The peak AoV pressure gradient is  17.0 mmHg. The calculated aortic valve are is 2.5 cm^2.  _____________________________________________________________________________  __        Left Ventricle  The left ventricle is normal in size. There is mild concentric left  ventricular hypertrophy. The left ventricular ejection fraction is normal. The  visual ejection fraction is estimated at 65-70%. Hyperdynamic left ventricular  function. Grade I or early diastolic dysfunction. Diastolic Doppler findings  (E/E' ratio and/or other parameters) suggest left ventricular filling  pressures are indeterminate. No regional wall motion abnormalities noted.  There is no thrombus seen in the left ventricle.     Right Ventricle  Normal right ventricle structure and size. The right ventricular  systolic  function is normal.     Atria  The left atrium is mild to moderately dilated. Left atrial enlargement is  noted by volume criteria. at 41 ml/m2. Right atrial size is normal. There is  no atrial shunt seen.     Mitral Valve  The mitral valve is normal in structure and function. There is no evidence of  mitral valve prolapse. There is trace mitral regurgitation. There is no mitral  valve stenosis.        Tricuspid Valve  The tricuspid valve is normal in structure and function. The right ventricular  systolic pressure is approximated at 31.8 mmHg plus the right atrial pressure.  There is trace tricuspid regurgitation. The right ventricular systolic  pressure is approximated at 31.8mmHg plus the right atrial pressure. Right  ventricular systolic pressure is elevated, consistent with mild pulmonary  hypertension.     Aortic Valve  No aortic regurgitation is present. No aortic stenosis is present. 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 The gradient is normal for this prosthetic aortic  valve.     Pulmonic Valve  The pulmonic valve is not well seen, but is grossly normal. There is trace  pulmonic valvular regurgitation.     Vessels  Normal size aorta. Inferior vena cava not well visualized for estimation of  right atrial pressure.     Pericardium  There is no pericardial effusion. There is no pleural effusion.        Rhythm  Sinus rhythm was noted. There was significant artifact with the tracings.  _____________________________________________________________________________  __  MMode/2D Measurements & Calculations  IVSd: 1.2 cm     LVIDd: 4.4 cm  LVIDs: 2.4 cm  LVPWd: 1.2 cm  FS: 45.2 %  LV mass(C)d: 193.8 grams  LV mass(C)dI: 112.8 grams/m2  asc Aorta Diam: 2.5 cm  LVOT diam: 2.0 cm  LVOT area: 3.1 cm2  LA Volume (BP): 70.9 ml  LA Volume Index (BP): 41.2 ml/m2  RWT: 0.54           Doppler Measurements & Calculations  MV E max noris: 67.8 cm/sec  MV A max noris: 131.3 cm/sec  MV E/A: 0.52  MV dec time: 0.14  sec  Ao V2 max: 205.9 cm/sec  Ao max P.0 mmHg  Ao V2 mean: 135.9 cm/sec  Ao mean P.7 mmHg  Ao V2 VTI: 38.3 cm  SORAYA(I,D): 2.5 cm2  SORAYA(V,D): 1.8 cm2  LV V1 max P.5 mmHg  LV V1 max: 117.1 cm/sec  LV V1 VTI: 30.2 cm  SV(LVOT): 94.9 ml  SI(LVOT): 55.2 ml/m2  PA acc time: 0.05 sec  TR max perez: 281.7 cm/sec  TR max P.8 mmHg  AV Perez Ratio (DI): 0.57  SORAYA Index (cm2/m2): 1.4  E/E' av.3  Lateral E/e': 14.2  Medial E/e': 14.5              _____________________________________________________________________________  __        Report approved by: Christofer Snell 07/15/2018 12:37 PM      Glucose by meter   Result Value Ref Range    Glucose 166 (H) 70 - 99 mg/dL   Social Work IP Consult    Narrative    Eamon Mckeon, LGSW     7/15/2018  4:34 PM  SW met with daughter and spoke to her about Hospice. Family will   go with [facility]. Hospice set to meet with family   tentatively at  9 am 18. [facility] will call son Kwame   to confirm time tomorrow.    Glucose by meter   Result Value Ref Range    Glucose 225 (H) 70 - 99 mg/dL   Glucose by meter   Result Value Ref Range    Glucose 265 (H) 70 - 99 mg/dL   Glucose by meter   Result Value Ref Range    Glucose 213 (H) 70 - 99 mg/dL   Glucose by meter   Result Value Ref Range    Glucose 157 (H) 70 - 99 mg/dL   CBC with platelets differential   Result Value Ref Range    WBC 20.0 (H) 4.0 - 11.0 10e9/L    RBC Count 3.53 (L) 4.4 - 5.9 10e12/L    Hemoglobin 10.7 (L) 13.3 - 17.7 g/dL    Hematocrit 32.7 (L) 40.0 - 53.0 %    MCV 93 78 - 100 fl    MCH 30.3 26.5 - 33.0 pg    MCHC 32.7 31.5 - 36.5 g/dL    RDW 14.4 10.0 - 15.0 %    Platelet Count 168 150 - 450 10e9/L    Diff Method Automated Method     % Neutrophils 92.9 %    % Lymphocytes 4.7 %    % Monocytes 1.7 %    % Eosinophils 0.0 %    % Basophils 0.1 %    % Immature Granulocytes 0.6 %    Nucleated RBCs 0 0 /100    Absolute Neutrophil 18.6 (H) 1.6 - 8.3 10e9/L    Absolute Lymphocytes 0.9 0.8 - 5.3  10e9/L    Absolute Monocytes 0.3 0.0 - 1.3 10e9/L    Absolute Eosinophils 0.0 0.0 - 0.7 10e9/L    Absolute Basophils 0.0 0.0 - 0.2 10e9/L    Abs Immature Granulocytes 0.1 0 - 0.4 10e9/L    Absolute Nucleated RBC 0.0    Basic metabolic panel   Result Value Ref Range    Sodium 143 133 - 144 mmol/L    Potassium 4.2 3.4 - 5.3 mmol/L    Chloride 106 94 - 109 mmol/L    Carbon Dioxide 29 20 - 32 mmol/L    Anion Gap 8 3 - 14 mmol/L    Glucose 144 (H) 70 - 99 mg/dL    Urea Nitrogen 65 (H) 7 - 30 mg/dL    Creatinine 2.11 (H) 0.66 - 1.25 mg/dL    GFR Estimate 30 (L) >60 mL/min/1.7m2    GFR Estimate If Black 36 (L) >60 mL/min/1.7m2    Calcium 9.1 8.5 - 10.1 mg/dL   Blood gas venous and oxyhgb   Result Value Ref Range    Ph Venous 7.44 (H) 7.32 - 7.43 pH    PCO2 Venous 46 40 - 50 mm Hg    PO2 Venous 79 (H) 25 - 47 mm Hg    Bicarbonate Venous 31 (H) 21 - 28 mmol/L    FIO2 4LHF     Oxyhemoglobin Venous 95 %    Base Excess Venous 6.0 mmol/L   Order   CT Head w/o Contrast [[id number]] (Order [id number])   Exam Information   Exam Date Exam Time Accession # Performing Department Results    7/14/18  8:58 PM [id number] [facility] Radiology    [employer] Interactive Report and InfoRx   View the interactive report   PACS Images   Show images for CT Head w/o Contrast   Study Result   CT SCAN OF THE HEAD WITHOUT CONTRAST   7/14/2018 8:58 PM      HISTORY: AMS.      TECHNIQUE:  Axial images of the head and coronal reformations without  IV contrast material. Radiation dose for this scan was reduced using  automated exposure control, adjustment of the mA and/or kV according  to patient size, or iterative reconstruction technique.     COMPARISON: Head CT 6/3/2018.     FINDINGS: There is no evidence of intracranial hemorrhage, mass, acute  infarct or anomaly. There is generalized atrophy of the brain. There  is low attenuation in the white matter of the cerebral hemispheres  consistent with sequelae of small vessel ischemic disease.  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 Ventricular size is within  normal limits without evidence of hydrocephalus.      The visualized portions of the sinuses and mastoids appear normal. The  bony calvarium and bones of the skull base appear intact.          IMPRESSION:     1. No evidence of acute intracranial hemorrhage, mass, or herniation.  2. There is generalized atrophy of the brain. White matter changes are  present in the cerebral hemispheres that are consistent with small  vessel ischemic disease in this age patient.        EV BURNETT MD   Order   XR Chest Port 1 View [[id number]] (Order [id number])   Exam Information   Exam Date Exam Time Accession # Performing Department Results    7/14/18  6:12 PM [id number] [facility] Radiology    [employer] Interactive Report and InfoRx   View the interactive report   PACS Images   Show images for XR Chest Port 1 View   Study Result   CHEST PORTABLE ONE VIEW   7/14/2018 6:12 PM      HISTORY: AMS.      COMPARISON: 7/2/2018.         IMPRESSION: Lung volumes remain low. Background of interstitial  opacities are again seen although the hazy opacities from prior  appears slightly improved which may be due to edema and/or pneumonia.  No significant pleural effusion or pneumothorax. Cardiac silhouette  remains enlarged.     EV BURNETT MD     Order   Cervical spine CT w/o contrast [[id number]] (Order [id number])   Exam Information   Exam Date Exam Time Accession # Performing Department Results    6/3/18 12:50 PM [id number] [facility] Radiology    [employer] Interactive Report and InfoRx   View the interactive report   PACS Images   Show images for Cervical spine CT w/o contrast   Study Result   CT OF THE CERVICAL SPINE WITHOUT CONTRAST   6/3/2018 12:50 PM      COMPARISON: None.     HISTORY: Dementia, fall.      TECHNIQUE: Axial images of the cervical spine were acquired without  intravenous contrast. Multiplanar reformations were created.        FINDINGS: There is normal alignment of the cervical vertebrae.  Vertebral body heights of the cervical spine are normal.  Craniocervical alignment is normal. There are no fractures of the  cervical spine.  There is degenerative endplate spurring at the C3-C4  level. There is moderate facet arthropathy throughout cervical spine.  There is no degenerative spinal canal narrowing of the cervical spine.  There is no prevertebral soft tissue swelling.         IMPRESSION: No evidence for fracture or traumatic malalignment of the  cervical spine.     Radiation dose for this scan was reduced using automated exposure  control, adjustment of the mA and/or kV according to patient size, or  iterative reconstruction technique     GAGE GARRETT MD     Order   MR Lumbar Spine w/o Contrast [[id number]] (Order [id number])   Exam Information   Exam Date Exam Time Accession # Performing Department Results    5/11/17  3:21 PM [id number] [facility] MRI    [employer] Interactive Report and InfoRx   View the interactive report   PACS Images   Show images for MR Lumbar Spine w/o Contrast   Study Result   MR LUMBAR SPINE WITHOUT CONTRAST  5/11/2017 3:21 PM     HISTORY: Recent fall. MR for consideration of vertebroplasty  appropriateness.     COMPARISON: Plain films 4/27/2017. MR 11/4/2016.     TECHNIQUE: Routine MR lumbar spine mid T12 through the sacrum.     FINDINGS: Moderate scoliotic curve concave to the right centered at  L2-L3. Mild compression of the superior endplate of L3 with moderately  extensive bone edema on STIR images. This indicates this is a subacute  compression fracture that has not yet healed. No retropulsion. This  would be amenable to vertebroplasty if indicated. There is subtle high  T2 signal in the anterosuperior portion of the L2 vertebral body which  could be bone bruising or microfractures. I do not see a well-defined  area of compression. I would favor treatment of the L3 vertebral body  and not L2  at this time.     Degenerative changes as follows:     L1-L2: Mild annular bulge. No central or lateral stenosis.     L2-L3: No disc protrusion. No central or lateral stenosis.     L3-L4: Moderate bilateral facet joint disease worse on the left. No  central or lateral stenosis.     L4-L5: Moderate degenerative narrowing of the interspace. Schmorl's  node in the inferior endplate of L4. Advanced facet joint disease.  Mild central stenosis. Moderate to severe right-sided foraminal  stenosis and mild left-sided foraminal stenosis.     L5-S1: Moderate narrowing of the interspace. Minimal annular bulge. No  central or lateral stenosis. Moderate facet joint disease on the left.         IMPRESSION:  1. Mild compression fracture of the superior endplate of L3 but  considerable edema throughout most of the L3 vertebral body. This  would be amenable to vertebroplasty. No retropulsion.  2. There may be minimal bone edema in the anterosuperior margin of L2  but no definite fracture line identified.  3. Degenerative changes as described.  4. The degenerative disease was present on 11/4/2016. The compression  fracture of L3 is new.     EVELINE TAN MD

## 2018-07-16 NOTE — PROGRESS NOTES
RT-Pt remains on HFNC 35%, 40LPM. Pt tolerating well. Bs diminished. Spo2 96%. RT will continue to follow.

## 2018-07-16 NOTE — PROGRESS NOTES
"SPIRITUAL HEALTH SERVICES Progress Note  [facility] ICU     [facility] visit per palliative team consult and info from IDT rounds. Pt, \"J.D.\", just turned 89 and son Kwame shares that he has had \"5 hospitalizations for pneumonia and breathing problems in the past few months.\" J.D. Admitted for SOB, pneumonia in the context of having pulmonary fibrosis. Currently on Hi flow oxygen. During IDT rounds, noted that family will be meeting with hospice to discuss options. Son, Kwame, shares that his dad is an [employer]  and identifies as Bahai. He also notes that \"Dad has dementia and that has been challenging.\" Kwame is able to engage in conversation with using short phrases/answers and with Kwame helping him with word finding, etc. Kwame notes that he \"enjoyed being an [employer] ,\" lives in [place] with his wife in assisted living facility, and has \"four wonderful kids\" (Kwame, Radha, Maria Isabel, Jessy). DtrMaria Isabel then arrived, and after brief casual conversation visit ended. Family/pt don't express specific needs at this time and are looking forward to meeting with hospice to assess ongoing goals of care. Will continue to follow.     LAITH Vela.  Staff    Pager #[phone]     "

## 2018-07-16 NOTE — PLAN OF CARE
Problem: Pneumonia (Adult)  Goal: Signs and Symptoms of Listed Potential Problems Will be Absent, Minimized or Managed (Pneumonia)  Signs and symptoms of listed potential problems will be absent, minimized or managed by discharge/transition of care (reference Pneumonia (Adult) CPG).   Outcome: Improving  ICU End of Shift Summary.  For vital signs and complete assessments, please see documentation flowsheets.     Pertinent assessments: Lungs continue to be diminished with basilar crackles.  Weaning HF as pt tolerates.  Up in chair for about 4 hours.  Tolerated well reclined the chair and he napped on and off.  Eating improved.  Ate all that was ordered.  Loves to drink the chocolate boost.  Major Shift Events:  Family met with palliative and hospice.  Decision to wait and see how he does once he leaves here was made before they start hospice.  Plan (Upcoming Events):  TBD probably return to [facility] for rehab  Discharge/Transfer Needs:  TBD    Bedside Shift Report Completed :    Bedside Safety Check Completed:          Problem: Renal Insufficiency Comorbidity  Goal: Renal Insufficiency  Patient comorbidity will be monitored for signs and symptoms of Renal Insufficiency (Chronic) condition.  Problems will be absent, minimized or managed by discharge/transition of care.   Outcome: No Change  Held lasix today due to increase in creatinine

## 2018-07-16 NOTE — PROGRESS NOTES
"Pt continued to be on the HFNC throughout the day and is tolerating it well. I was able to titrate him down to 15 LPM @ 40% where he currently at, and his sats remain in the mid 90's. Will continue to monitor his respiratory needs and status.    Vital signs:  Temp: 98.2  F (36.8  C) Temp src: Oral BP: 131/58   Heart Rate: 88 Resp: 22 SpO2: 96 % O2 Device: (S) High Flow Nasal Cannula (HFNC) Oxygen Delivery: (S)  (15 LPM) Height: 172.7 cm (5' 8\") Weight: 61.5 kg (135 lb 9.3 oz)  Estimated body mass index is 20.62 kg/(m^2) as calculated from the following:    Height as of this encounter: 1.727 m (5' 8\").    Weight as of this encounter: 61.5 kg (135 lb 9.3 oz).    PAST MEDICAL HISTORY:   Past Medical History:   Diagnosis Date     AAA (abdominal aortic aneurysm) (H) 10/5/2011    6.1 cm     Anemia 11/30/2011     Aortic stenosis 10/26/2012     CAD (coronary artery disease)     MI - distal inferior/apex. Non transmural     Carotid artery disease (H)      CHF (congestive heart failure) (H) 12/31/2014     CKD (chronic kidney disease) stage 3, GFR 30-59 ml/min 11/30/2011     COPD (chronic obstructive pulmonary disease) (H)      Dementia 8/4/2015     Edema, unspecified edema 1/17/2016     Essential hypertension      Gout 1/06    knee     Hypercalcemia 1/2/2015     Hyperlipidemia LDL goal < 70      Hyperparathyroidism, unspecified 4/22/2015     Hyperplasia of prostate      LEFT LUNG GRANULOMA      Lumbago      Other chronic pain 10/11/2016     Proteinuria 2/8/2012     Pulmonary fibrosis (H)      PVD (peripheral vascular disease) (H)      Thrombocytopenia (H) 11/30/2011     Unspecified hypothyroidism      Vitamin D deficiency        PAST SURGICAL HISTORY:   Past Surgical History:   Procedure Laterality Date     COLONOSCOPY  9/02 per patient     DISCECTOMY LUMBAR POSTERIOR MICROSCOPIC ONE LEVEL  8/11/2014    Procedure: DISCECTOMY LUMBAR POSTERIOR MICROSCOPIC ONE LEVEL;  Surgeon: Jone Carvalho MD;  Location:  OR     " ENDOVASCULAR REPAIR ANEURYSM ABDOMINAL AORTA  11/18/2011    Procedure:ENDOVASCULAR REPAIR ANEURYSM ABDOMINAL AORTA; ENDOVASCULAR AAA,RIGHT FEMORAL       LAPAROSCOPIC CHOLECYSTECTOMY  Nov 2011     LAPAROSCOPIC CHOLECYSTECTOMY  11/18/2011    Procedure:LAPAROSCOPIC CHOLECYSTECTOMY; LAPAROSCOPIC CHOLECYSTECTOMY ; Surgeon:DARRYL DORADO; Location:[facility] OR     REPAIR ANEURYSM ABDOMINAL AORTA  Nov 2011     right cataract extraction/IOL  12/03     TRANSCATHETER AORTIC VALVE IMPLANT ANESTHESIA N/A 11/12/2014    Bioprosthetic. Procedure: TRANSCATHETER AORTIC VALVE IMPLANT ANESTHESIA;  Surgeon: Generic Anesthesia Provider;  Location: [facility] OR     VASECTOMY         FAMILY HISTORY:   Family History   Problem Relation Age of Onset     Hypertension Mother      Hypertension Father        SOCIAL HISTORY:   Social History   Substance Use Topics     Smoking status: Never Smoker     Smokeless tobacco: Never Used     Alcohol use 0.0 oz/week     0 Standard drinks or equivalent per week      Comment: 1 glass wine/day     Harshad Hartman RT  7/16/2018

## 2018-07-16 NOTE — PLAN OF CARE
Problem: Patient Care Overview  Goal: Plan of Care/Patient Progress Review  Outcome: No Change  ICU End of Shift Summary.  For vital signs and complete assessments, please see documentation flowsheets.     Pertinent assessments: Pt rested on and off throughout shift. Remains alert, but confused. Alert to self and wife/children only. BP remains stable. T max of 99.9 oral - resolved after receiving scheduled Tylenol and did not return the rest of the shift. LS remains diminished throughout. Gets dyspneic on exertion with minimal activity. Encouraged slow, deep breaths and TCDB. Satting low to mid 90s on 40L 35% High Flow Nasal Cannula. Incontinent of urine.   Major Shift Events: C/o moderate pain in back at the beginning of the shift. According to sonKwame, patient takes Gabapentin, Tylenol, and Tramadol three time a day along with 2 creams that he has been putting on at home. Spoke with Tele[facility]Hub who ordered the three medications. Given Tylenol/Gabapentin and massaged with Orthopaedic oil/lotion with patient denying pain the rest of the shift. Will inform next nurse to follow-up with getting creams ordered if needed.  Plan (Upcoming Events): Continue increased respiratory support, antibiotics, and steroids. Plan for hospice to meet with family at some point today along with Palliative. SonKwame, updated on POC as he was at bedside until 2100.  Discharge/Transfer Needs: TBD    Bedside Shift Report Completed   Bedside Safety Check Completed

## 2018-07-16 NOTE — PROGRESS NOTES
PETER spoke with [facility] intake, informed that no consult has been scheduled and that they will not have availability until tomorrow. They reported that Ariana, Home Care liaison, may be able to assist today. PETER discussed with Ariana.    PETER met with pts son and daughter in pt room. Discussed options and pending palliative consult as well. Pts daughter reports that she lives about 2.5 hours away and so they would prefer the hospice consult happen this morning if possible. They reported that they would also like to have pts wife get to the hospital for the consult. Discussed potential discharge options including home or facility. They reported pt will likely need placement at a facility as pts wife will likely be unable to take him home.     PETER discussed with Ariana and Marianna, palliative care. Ariana planning to connect with family this morning for hospice consult, anticipated at 11:00 AM. PETER will continue to follow.  ALYSHA Cornelius, LICSW

## 2018-07-17 NOTE — PLAN OF CARE
Problem: Pneumonia (Adult)  Goal: Signs and Symptoms of Listed Potential Problems Will be Absent, Minimized or Managed (Pneumonia)  Signs and symptoms of listed potential problems will be absent, minimized or managed by discharge/transition of care (reference Pneumonia (Adult) CPG).   ICU End of Shift Summary.  For vital signs and complete assessments, please see documentation flowsheets.     Pertinent assessments:VSS. Pt had nosebleeds x3 on pms, O2 changed to Oxymask 5L ,pt tolerated the change well,call to tele hub regarding SQ Heparin, OK to hold tonight,review with MD in am. Pt also bled from a tiny skin tear on R arm. Voiding in urinal but incontinent of stoolx4   Major Shift Events:   Plan (Upcoming Events): Wean O2 as tolerated, ? tx to floor   Discharge/Transfer Needs: TBD    Bedside Shift Report Completed : YES  Bedside Safety Check Completed:YES

## 2018-07-17 NOTE — CONSULTS
Care Transitions Team: Following for CC, discharge planning, and disposition.      Per chart review pt noted to have ELEVATED PARAM score, with readmit status admitted from [facility].     Noted Palliative care consulted and following for on going GOC planning.     SWS consulted for follow up.     PCP handoff to follow.

## 2018-07-17 NOTE — PROGRESS NOTES
"[facility]  Palliative Care Progress Note  Text Page     Assessment & Plan   Chaz Soler is a 89 year old male who was admitted on 7/14/2018. I was asked to see the patient for goals of care.     Recommendations:  1.Decisional Capacity -  Unreliable. Patient has an advance directive dated 12/15/16.  Include patient in decision making as much as possible but involve health care agent attempting consensus with patient. Spouse, Kylee is his primary Health Care Agent and his alternate HCA is son Kwame Soler.  2. Pain- chronic low back pain  Tylenol 500 mg PO TID. Voltaren gel 1% 4 times per day to low back. LIdocaine 5% topical to low back q 4 hours prn - do not use heating pad to area.  Gabapentin 100 mg PO TID.  Continue with cymbalta 30 mg PO daily. Tramadol 50 mg PO TID prn moderate to severe pain. Hold on hydromorphone for now.  3. Dyspnea - Antibiotics, steroids, o2 as per hospitalist/intensivist. Fan at bedside prn. Observe for \"panic attacks\" due to shortness of breath. Lorazepam 0.5 mg PO/SL or NC q 6 hours prn - use cautiously and hold for sedation. Hold on hydromorphone for now. Appreciate nursing to increase pt activity as tolerated and monitor for \"panic attacks\".  4. Cachexia/Dehydration - Liberalize pt's diet as tolerated. Allow family to supplement with foods that pt enjoys including chocolate ice cream, pudding. Monitor pt for signs of dehydration and encourage PO intake.  5. Deconditioning - family hoping for restorative goals with return to [facility] for [place]. PT and OT consults placed 7/17 ok to see 7/18.  5. Spiritual Care- Oriented to Spiritual Health and Social Work Services as part of Palliative Care team. Pt is [employer].  is following.  is following.  6. Care Planning- Met with Gifty Navarro RN for  hospice for information 7/16/18. Family hope that pt can recover to his most recent baseline and return to [facility] for TCU/Rehab.     Goal of Care: " "DNR/DNI. Family verbalize that trial of bipap or tube feeding to help pt overcome an acute exacerbation of his illness are currently worth it for the pt due to his perceived QOL, but they would not want these therapies for pt long term to sustain him.     Disease Process/es & Symptoms:  Chaz Soler is a 89 year old patient admitted with symptoms of decreased mental status, hypotension and shortness of breath. He has been treated for acute on chronic hypoxemic respiratory failure due to pulmonary fibrosis and possible PNA, associated \"panic attacks\", acute encephalopathy, aortic stenosis s/p TAVR, chronic diastolic CHF with preserved EF, low back pain with compression fracture, CKD, dementia, HTN, CAD, hypothyroidism.       This is in the setting of pulmonary fibrosis diagnosed at the [facility] approximately 5 years ago, on chronic oxygen that has been gradually increasing to 4-5 l/min via NC, dementia, diastolic CHF, HTN, CAD, hypothyroidisim, GERD, depression/anxiety, chronic low back pain due to DDD with recent vertebral fracture s/p vertebroplasty L3, L4-L5 stenosis and facet arthropathy, s/p lumbosacral caudal epidural steroid injections.. He has been hospitalized 5 times in the past 10 months for recurrent respiratory failure and episodes of AMS, CHF and SOL/CKD. Prior to admission patient has moved to ICU from TCU for higher level of daily care needs. There has been a decline in daily function including increased time in wc, decreased ability to walk long distances, episodes of panic attacks.  There is a documented unitentional weight loss of 13 lbs over the past 9 months.     Findings & plan of care discussed with: Dr. Romero, Dr. Hylton, bedside nurse, Care Coordinator Evangelina.  Follow-up plan from palliative team: will continue to follow this pt for symptom management and support for family with goals of care discussion.  Thank you for involving us in the patient's care    Marianna YANG, " "CNS  Pain Management and Palliative Care  [facility]  Pgr: [phone]    Time Spent on this Encounter   I spent  10 minutes (1:25-1:35pm) in assessment of the patient, counseling and discussion with the patient and family as documented in sections below. Another 15 minutes in review of chart, documentation, coordination of care and discussion with the health care team.    Interval History   Chart reviewed.  Pt sleeping in chair. Awakes to voice. Cooperative. Tachypnea when awake - RR 30. O2 via humidified NC at 4 liters. Nose bleeds over night. Lungs diminished anteriorly. Nursing is increasing pt's activity at bedside.    Eating well. Incontinent of stool.    No family present.    Course of Hospitalization Discussions Data   7/17/2018  No family present. Pt is improving to baseline and expected to transfer out of ICU.    Discussed on July 16, 2018 with Marianna YANG, CNS:   Met with pt's son/alternate HCA Kwame and daughter Maria Isabel [place] [facility]. Pt's wife Kylee went home to rest after the 3 of them had a  Hospice information meeting with Gifty Navarro RN. They describe pt as pleasant, a witty sense of humor, enjoys company of family \"he exists for us\". Pt has difficulty with short term memory but looks at newspapers and other things to figure out what day it is. Kwame has made a cribbage board for pt that reminds him of the steps of the game and they play with him when they visit. They note that pt is being admitted to the hospital more frequently for issues related to his respiratory status, slowly is increasing his need for oxygen over the last 2 years and is close to the maximum o2 flow he can have with his concentrators, is having more frequent episodes of \"panic\" attacks with his breathing even though his o2 sat is WNL, is only able to walk short distance with his walker and o2, spending more time in a w.c.  He has lost weight and they try to encourage his PO intake and include chocolate " ice cream, pudding and other things he like to help with his intake. Prior to the last 2 admissions he had been living with his wife, who helped to care for him. He has been compliant with PT/OT at TCU. They agree that pt should be DNR DNI. They along with pt's wife Kylee, have noted that pt was able to recover after being hospitalized and is improving back to his baseline PTA. They hope he can continue to improve and feel that short term trials of bipap or other treatments, even including a keofeed tube would be worth it for pt, if it would help him overcome an acute exacerbation, but not long term. Pt's POLST completed prior to DC with pt's wife and my colleague Lissy Serrato 7/5/2018 was reviewed and is currently up to date with the information shared by Kwame and Maria Isabel.      Palliative Symptom Review (0=no symptom/no concern, 1=mild, 2=moderate, 3=severe):     Variable historian due to dementia.   Pt currently denies pain, SOB, nausea.      Physical Exam   Temp:  [97.7  F (36.5  C)-98.4  F (36.9  C)] 98.4  F (36.9  C)  Heart Rate:  [71-92] 75  Resp:  [10-27] 23  BP: (131-168)/(58-95) 162/78  FiO2 (%):  [40 %] 40 %  SpO2:  [94 %-100 %] 100 %  136 lbs 7.44 oz  GEN:  Alert, oriented x 2, appears comfortable, NAD.  HEENT:  Normocephalic/atraumatic, no scleral icterus, no nasal discharge, mouth moist.  CV:  RRR, S1, S2; no murmurs or other irregularities noted.  +3 DP/PT pulses bilatererally; no edema BLE.  RESP:  Diminished to auscultation anteriorly bilaterally without rales/rhonchi/wheezing/retractions.  Symmetric chest rise on inhalation noted.  Episodes of tachypnea to 30/min when awake and talking.  ABD:  Rounded, soft, non-tender/non-distended.  +BS. Incont of stool  EXT:  Edema & pulses as noted above.  CMS intact x 4.     M/S:   Non-Tender to palpation - did not palpate spine.    SKIN:  Dry to touch, no exanthems noted in the visualized areas.    NEURO: Symmetric strength +5/5.  Sensation to touch  intact all extremities.   There is no area of allodynia or hyperesthesia.  PAIN BEHAVIOR: Cooperative  Psych:  Normal affect.  Calm, cooperative, conversant appropriately but forgetful with short term memory deficits.    Medications       acetaminophen  500 mg Oral TID    Or     acetaminophen  650 mg Rectal TID     aspirin  81 mg Oral Daily     atorvastatin  20 mg Oral Daily     diclofenac  2 g Transdermal 4x Daily     donepezil  10 mg Oral At Bedtime     DULoxetine  30 mg Oral Daily     gabapentin  100 mg Oral TID     ipratropium - albuterol 0.5 mg/2.5 mg/3 mL  1 vial Nebulization 4x Daily     levothyroxine  88 mcg Oral Daily     methylPREDNISolone  40 mg Intravenous Q12H     metoprolol succinate  25 mg Oral Daily     pantoprazole  40 mg Oral QAM     piperacillin-tazobactam  2.25 g Intravenous Q6H     tamsulosin  0.4 mg Oral Daily       Data   Results for orders placed or performed during the hospital encounter of 07/14/18 (from the past 24 hour(s))   Glucose by meter   Result Value Ref Range    Glucose 213 (H) 70 - 99 mg/dL   Glucose by meter   Result Value Ref Range    Glucose 231 (H) 70 - 99 mg/dL   Glucose by meter   Result Value Ref Range    Glucose 174 (H) 70 - 99 mg/dL   Glucose by meter   Result Value Ref Range    Glucose 133 (H) 70 - 99 mg/dL   CBC with platelets differential   Result Value Ref Range    WBC 20.0 (H) 4.0 - 11.0 10e9/L    RBC Count 3.56 (L) 4.4 - 5.9 10e12/L    Hemoglobin 10.7 (L) 13.3 - 17.7 g/dL    Hematocrit 33.1 (L) 40.0 - 53.0 %    MCV 93 78 - 100 fl    MCH 30.1 26.5 - 33.0 pg    MCHC 32.3 31.5 - 36.5 g/dL    RDW 14.5 10.0 - 15.0 %    Platelet Count 160 150 - 450 10e9/L    Diff Method Automated Method     % Neutrophils 93.3 %    % Lymphocytes 4.3 %    % Monocytes 1.6 %    % Eosinophils 0.0 %    % Basophils 0.1 %    % Immature Granulocytes 0.7 %    Nucleated RBCs 0 0 /100    Absolute Neutrophil 18.7 (H) 1.6 - 8.3 10e9/L    Absolute Lymphocytes 0.9 0.8 - 5.3 10e9/L    Absolute Monocytes  0.3 0.0 - 1.3 10e9/L    Absolute Eosinophils 0.0 0.0 - 0.7 10e9/L    Absolute Basophils 0.0 0.0 - 0.2 10e9/L    Abs Immature Granulocytes 0.2 0 - 0.4 10e9/L    Absolute Nucleated RBC 0.0    Basic metabolic panel   Result Value Ref Range    Sodium 142 133 - 144 mmol/L    Potassium 4.8 3.4 - 5.3 mmol/L    Chloride 106 94 - 109 mmol/L    Carbon Dioxide 30 20 - 32 mmol/L    Anion Gap 6 3 - 14 mmol/L    Glucose 137 (H) 70 - 99 mg/dL    Urea Nitrogen 58 (H) 7 - 30 mg/dL    Creatinine 1.84 (H) 0.66 - 1.25 mg/dL    GFR Estimate 35 (L) >60 mL/min/1.7m2    GFR Estimate If Black 42 (L) >60 mL/min/1.7m2    Calcium 9.3 8.5 - 10.1 mg/dL   Blood gas venous and oxyhgb   Result Value Ref Range    Ph Venous 7.43 7.32 - 7.43 pH    PCO2 Venous 47 40 - 50 mm Hg    PO2 Venous 85 (H) 25 - 47 mm Hg    Bicarbonate Venous 31 (H) 21 - 28 mmol/L    FIO2 5LMASK     Oxyhemoglobin Venous 96 %    Base Excess Venous 5.7 mmol/L   Hepatic panel   Result Value Ref Range    Bilirubin Direct 0.1 0.0 - 0.2 mg/dL    Bilirubin Total 0.7 0.2 - 1.3 mg/dL    Albumin 2.9 (L) 3.4 - 5.0 g/dL    Protein Total 6.2 (L) 6.8 - 8.8 g/dL    Alkaline Phosphatase 129 40 - 150 U/L    ALT 20 0 - 70 U/L    AST 12 0 - 45 U/L   Care Coordinator IP Consult    Narrative    Eugenia Weller RN     7/17/2018  9:59 AM  Care Transitions Team: Following for CC, discharge planning, and   disposition.      Per chart review pt noted to have ELEVATED PARAM score, with   readmit status admitted from [facility] TCU.     Noted Palliative care consulted and following for on going GOC   planning.     SWS consulted for follow up.     PCP handoff to follow.

## 2018-07-17 NOTE — PROGRESS NOTES
[facility]    Hospitalist Progress Note    Date of Service (when I saw the patient): 07/17/2018    Assessment & Plan   Chaz Soler is a 89 year old male who was admitted on 7/14/2018 with SOB. Patient has a PMH of chronic HFpEF, AS s/p TAVR, pulmonary fibrosis and chronic hypoxic resp failure on 5L oxygen at baseline, CAD, Dementia, CKD he was recently hospitalized at this facility for pneumonia and discharged a few days ago.  According to the family, patient was not eating and drinking much, has been sleeping a lot and brought back to the ER and found to have acute on chronic hypoxic respiratory failure and admitted to the hospital.    1.  Acute on chronic hypoxemic respiratory failure.  it is reasonable to continue IV antibiotics at this time, although I do not really see much evidence for worsening of his pneumonia.  His x-ray is improved, pro-calcitonin is negative.  No fevers or leukocytosis.  I suspect, part of this is due to worsening of patient's underlying multiple health comorbidities, overall decline and deconditioning.  - On IV vancomycin, Zosyn, steroids for now  -He was on BiPAP then transitioned to high flow oxygen, currently on nasal cannula this afternoon, and we will closely monitor the patient    2.  Aortic stenosis status post TAVR.  Echo done stable.    3.  Chronic diastolic congestive heart failure with preserved EF.  BNP is normal and no evidence to suggest CHF exacerbation.  -Lasix on hold, was on 30 mg p.o, due to renal failure.  Evaluate the twice daily basis.  Creatinine improved to 1.84 today  -Noted increased BUN and also creatinine  -Check BMP in the morning, if it continue to rise we will stop Lasix.    4.  Pulmonary fibrosis, with chronic hypoxic respiratory failure and 5 L oxygen.    5.  Acute encephalopathy on top of dementia.  With patient having increased lethargy.  Likely due to his respiratory failure and multiple other health issues. Hold ativan, gabapentin,  narcotics for now.    6.  Chronic kidney disease.  Baseline is around 1.6-1.8.  Currently 2.11, continue to monitor BMP daily.  -Vanco is also likely is a contributing factor here.  -BUN is also elevated, likely due to Lasix, on 30 mg p.o. Daily.    7.  Dementia.  On Aricept.    8.  Hypertension.  On amlodipine, losartan and metoprolol - > resume metoprolol monitor BP.    9.  Coronary artery disease.  No sign of any active ischemia, had a negative Lexiscan last year in October.    10.  Hypothyroidism on Synthroid.  11.  Epistaxis-this likely related to dry air with oxygen, better with oxygen humidifier.  -Watch for further bleeding  -Reported that his urine color change as well needs to be monitored    Goals of care.  Palliative care on board, hospice consulted, meet with patient and family.  The plan at this time is to discharge patient on on oxygen, or BiPAP if needed per my discussion with palliative care.  Patient and family do not wish readmission for his condition worsens.    DNR/DNI and avoiding further escalation of cares.    DVT Prophylaxis: Heparin SQ.    Code Status: DNR/DNI    Disposition: Expected discharge : 2 days if he continues to improve the goal of care to the stem on restorative measures without escalation of care.     Basim Romero    Interval History   Chart reviewed and patient seen. Case discussed with nursing staff.     Patient is more awake and conversant today, hard of hearing, resting in chair, denied pain, no nausea or vomiting, episode of epistaxis, Lovenox discontinued, there is reddish urine discoloration, denies any chest pain, tolerating oral intake, No other complaints voiced.     -Data reviewed today: I reviewed all new labs and imaging results over the last 24 hours. I personally reviewed .  # Pain Assessment:  Current Pain Score 7/17/2018   Patient currently in pain? denies   Pain score (0-10) -   Pain location -   Pain descriptors -   CPOT pain score -        Physical  Exam   Temp: 98.4  F (36.9  C) Temp src: Axillary BP: 162/78   Heart Rate: 75 Resp: 23 SpO2: 100 % O2 Device: Nasal cannula Oxygen Delivery: 4 LPM  Vitals:    07/15/18 0615 07/16/18 0600 07/17/18 0400   Weight: 60.4 kg (133 lb 2.5 oz) 61.5 kg (135 lb 9.3 oz) 61.9 kg (136 lb 7.4 oz)     Vital Signs with Ranges  Temp:  [97.7  F (36.5  C)-98.4  F (36.9  C)] 98.4  F (36.9  C)  Heart Rate:  [71-92] 75  Resp:  [10-27] 23  BP: (131-168)/(58-95) 162/78  FiO2 (%):  [40 %] 40 %  SpO2:  [94 %-100 %] 100 %  I/O last 3 completed shifts:  In: 740 [P.O.:640; I.V.:100]  Out: 800 [Urine:800]    GENERAL:  Lethargic,  Opens eyes to voice, No acute distress.  PSYCH: no acute agitation   HEENT:  Neck is Supple, trachea is midline, EOMI, conjunctiva clear  CARDIOVASCULAR: Regular rate and rhythm, Normal S1, S2, no loud murmurs   PULMONARY:  Coarse sounds bilaterally , no wheezing or crackles   GI: Abdomen is soft, non tender, non-distended, bowel sounds present. No rebound or guarding   SKIN:  No cyanosis or clubbing, no obvious exanthems on exposed areas   MSK: Extremities are warm and well perfused. No pitting edema   Neuro: tired, awake, hard of hearing but responds to questions, moves extremities       Medications       acetaminophen  500 mg Oral TID    Or     acetaminophen  650 mg Rectal TID     aspirin  81 mg Oral Daily     atorvastatin  20 mg Oral Daily     diclofenac  2 g Transdermal 4x Daily     donepezil  10 mg Oral At Bedtime     DULoxetine  30 mg Oral Daily     gabapentin  100 mg Oral TID     ipratropium - albuterol 0.5 mg/2.5 mg/3 mL  1 vial Nebulization 4x Daily     levothyroxine  88 mcg Oral Daily     methylPREDNISolone  40 mg Intravenous Q12H     metoprolol succinate  25 mg Oral Daily     pantoprazole  40 mg Oral QAM     piperacillin-tazobactam  2.25 g Intravenous Q6H     tamsulosin  0.4 mg Oral Daily       Data   All new lab data and imaging results from today have been reviewed       Recent Labs  Lab 07/17/18  0657  07/16/18  0515 07/15/18  0548  07/14/18  1749   WBC 20.0* 20.0* 7.7  --  8.9   HGB 10.7* 10.7* 11.9*  < > 11.1*   MCV 93 93 93  --  93    168 159  < > 148*   INR  --   --   --   --  0.95    143 140  < > 140   POTASSIUM 4.8 4.2 4.9  < > 4.7   CHLORIDE 106 106 102  < > 101   CO2 30 29 34*  --  35*   BUN 58* 65* 49*  < > 47*   CR 1.84* 2.11* 1.87*  --  1.92*   ANIONGAP 6 8 4  < > 4   LEXIS 9.3 9.1 9.3  --  8.7   * 144* 158*  < > 122*   ALBUMIN 2.9*  --   --   --   --    PROTTOTAL 6.2*  --   --   --   --    BILITOTAL 0.7  --   --   --   --    ALKPHOS 129  --   --   --   --    ALT 20  --   --   --   --    AST 12  --   --   --   --    TROPI  --   --   --   --  <0.015   < > = values in this interval not displayed.    No results found for this or any previous visit (from the past 24 hour(s)).

## 2018-07-18 NOTE — PROGRESS NOTES
[facility]    Hospitalist Progress Note    Date of Service (when I saw the patient): 07/18/2018    Assessment & Plan   Chaz Soler is a 89 year old male who was admitted on 7/14/2018 with SOB. Patient has a PMH of chronic HFpEF, AS s/p TAVR, pulmonary fibrosis and chronic hypoxic resp failure on 5L oxygen at baseline, CAD, Dementia, CKD he was recently hospitalized at this facility for pneumonia and discharged a few days ago.  According to the family, patient was not eating and drinking much, has been sleeping a lot and brought back to the ER and found to have acute on chronic hypoxic respiratory failure and admitted to the hospital.    1.  Acute on chronic hypoxemic respiratory failure.  Suspect pneumonia unable to specify his organism. It is reasonable to continue IV antibiotics at this time, although I do not really see much evidence for worsening of his pneumonia.  His x-ray is improved, pro-calcitonin is negative.  No fevers or leukocytosis.  I suspect, part of this is due to worsening of patient's underlying multiple health comorbidities, overall decline and deconditioning.  -Patient was on IV vancomycin, Zosyn, steroids. Vancomycin stopped, Solu-Medrol and transition to prednisone 40 mg p.o. daily  -He was on BiPAP then transitioned to high flow oxygen, currently on nasal cannula, and needs to be closely monitored as he desaturates with activity    2.  Aortic stenosis status post TAVR.  Echo done stable.    3.  Chronic diastolic congestive heart failure with preserved EF.  BNP is normal and no evidence to suggest CHF exacerbation.  -Lasix restarted today at 20 mg p.o. Daily.  -It was on hold due to renal failure, at home patient was on 30 mg p.o daily.   Creatinine improved to 1.63 today  -Adjust Lasix as needed based on renal function and hydration status/    4.  Pulmonary fibrosis, with chronic hypoxic respiratory failure on 5 L oxygen.  Overall patient is getting closer to his baseline.      5.  Acute encephalopathy on top of dementia-encephalopathy is resolved.  It was suspected due to his respiratory failure and multiple other health issues.   -Started on low-dose oral Ativan.    6.  Chronic kidney disease.  Baseline is around 1.6-1.8.  Creatinine is at baseline.  -Improved after Vanco was stopped and also patient was off Lasix.    7.  Dementia.  On Aricept.    8.  Hypertension.  Blood pressure was elevated last night, increased Toprol-XL to 50 mg p.o. Daily.  -Losartan on hold due to renal failure.    9.  Coronary artery disease.  No sign of any active ischemia, had a negative Lexiscan last year in October.    10.  Hypothyroidism on Synthroid.    11.  Epistaxis-this happened 2 days ago, there is no more episodes. It is likely related to dry air with oxygen, better with humidified oxygen.  -Watch for further bleeding  -Reported that his urine color change as well needs to be monitored    Goals of care.  Palliative care on board, hospice consulted, meet with patient and family.  The plan at this time is to discharge patient when he is stable enough on oxygen alone on with BiPAP if needed,.  Patient and family do not wish readmission if his condition worsens.  The last 2 days patient showed improvement and hopefully will be discharged close to his baseline status respiratory wise.  DNR/DNI and avoiding further escalation of cares.    DVT Prophylaxis: Heparin SQ.    Code Status: DNR/DNI    Disposition: Expected discharge : 1-2 days if he continues to improve, placement is available for patient.  Social service consulted.     [facility]    Interval History   Chart reviewed and patient seen. Case discussed with nursing staff.     Patient is more awake and conversant today, hard of hearing, resting in chair, no new overnight issues, desats briefly with activity, no nausea or vomiting, denies any chest pain, tolerating oral intake, No other complaints voiced.     -Data reviewed today: I  reviewed all new labs and imaging results over the last 24 hours. I personally reviewed .  # Pain Assessment:  Current Pain Score 7/18/2018   Patient currently in pain? denies   Pain score (0-10) 0   Pain location -   Pain descriptors -   CPOT pain score -        Physical Exam   Temp: 97.7  F (36.5  C) Temp src: Axillary BP: 144/71   Heart Rate: 70 Resp: 21 SpO2: 97 % O2 Device: Nasal cannula Oxygen Delivery: 4 LPM  Vitals:    07/16/18 0600 07/17/18 0400 07/18/18 0600   Weight: 61.5 kg (135 lb 9.3 oz) 61.9 kg (136 lb 7.4 oz) 63 kg (138 lb 14.2 oz)     Vital Signs with Ranges  Temp:  [97.5  F (36.4  C)-98  F (36.7  C)] 97.7  F (36.5  C)  Heart Rate:  [63-85] 70  Resp:  [13-48] 21  BP: (134-186)/() 144/71  SpO2:  [81 %-100 %] 97 %  I/O last 3 completed shifts:  In: 1880 [P.O.:1730; I.V.:150]  Out: 1250 [Urine:1250]    GENERAL: Awake, alert, not in distress at rest, does not seem to have increased work of breathing at rest.  PSYCH: no acute agitation   HEENT:  Neck is Supple, trachea is midline, EOMI, conjunctiva clear  CARDIOVASCULAR: Regular rate and rhythm, Normal S1, S2, no loud murmurs   PULMONARY:  Coarse sounds bilaterally , no wheezing or crackles   GI: Abdomen is soft, non tender, non-distended, bowel sounds present. No rebound or guarding   SKIN:  No cyanosis or clubbing, no obvious exanthems on exposed areas   MSK: Extremities are warm and well perfused. No pitting edema   Neuro: tired, awake, hard of hearing but responds to questions, moves extremities       Medications       acetaminophen  500 mg Oral TID    Or     acetaminophen  650 mg Rectal TID     aspirin  81 mg Oral Daily     atorvastatin  20 mg Oral Daily     diclofenac  2 g Transdermal 4x Daily     donepezil  10 mg Oral At Bedtime     DULoxetine  30 mg Oral Daily     furosemide  20 mg Oral Daily     gabapentin  100 mg Oral TID     ipratropium - albuterol 0.5 mg/2.5 mg/3 mL  1 vial Nebulization 4x Daily     levothyroxine  88 mcg Oral Daily      metoprolol succinate  50 mg Oral Daily     pantoprazole  40 mg Oral QAM     piperacillin-tazobactam  2.25 g Intravenous Q6H     predniSONE  40 mg Oral Daily     tamsulosin  0.4 mg Oral Daily       Data   All new lab data and imaging results from today have been reviewed       Recent Labs  Lab 07/18/18  0611 07/17/18  0551 07/16/18  0515  07/14/18  1749   WBC 12.4* 20.0* 20.0*  < > 8.9   HGB 10.3* 10.7* 10.7*  < > 11.1*   MCV 93 93 93  < > 93   * 160 168  < > 148*   INR  --   --   --   --  0.95    142 143  < > 140   POTASSIUM 4.7 4.8 4.2  < > 4.7   CHLORIDE 105 106 106  < > 101   CO2 31 30 29  < > 35*   BUN 52* 58* 65*  < > 47*   CR 1.63* 1.84* 2.11*  < > 1.92*   ANIONGAP 5 6 8  < > 4   LEXIS 9.2 9.3 9.1  < > 8.7   * 137* 144*  < > 122*   ALBUMIN  --  2.9*  --   --   --    PROTTOTAL  --  6.2*  --   --   --    BILITOTAL  --  0.7  --   --   --    ALKPHOS  --  129  --   --   --    ALT  --  20  --   --   --    AST  --  12  --   --   --    TROPI  --   --   --   --  <0.015   < > = values in this interval not displayed.    No results found for this or any previous visit (from the past 24 hour(s)).

## 2018-07-18 NOTE — PLAN OF CARE
Problem: Patient Care Overview  Goal: Plan of Care/Patient Progress Review  Outcome: No Change  ICU End of Shift Summary.  For vital signs and complete assessments, please see documentation flowsheets.     Pertinent assessments: Alert to person only (baseline per family). Requires 4-5 LPM NC to keeps sats > 92%. Lungs diminished with fine crackles. PRESCOTT. Desats quickly with movement into 70's.  Anxiety noted with desaturations. Instructions given during that time to breath in through nose and out of mouth. Can take up to 4 minutes to recover. PRN hydralazine given x1 for SBP of 175.  BP now 113/80. Denies pain. Eating and drinking well with encouragement. A1 for cares. Urinary urgency noted. Incont for bladder and bowel at times. One BM this shift. POC reviewed with pt and son.   Major Shift Events: Added PO Lasix.   Plan (Upcoming Events): Continue to monitor.   Discharge/Transfer Needs: SW working on pt returning to [facility]. Son, Kwame, stated he can transport tomorrow if discharge and TCU ready.     Bedside Shift Report Completed : yes  Bedside Safety Check Completed: yes

## 2018-07-18 NOTE — PROGRESS NOTES
"[facility]  Palliative Care Progress Note  Text Page     Assessment & Plan   Chaz Soler is a 89 year old male who was admitted on 7/14/2018. I was asked to see the patient for goals of care.     Recommendations:  1.Decisional Capacity -  Unreliable. Patient has an advance directive dated 12/15/16.  Include patient in decision making as much as possible but involve health care agent attempting consensus with patient. Spouse, Kylee is his primary Health Care Agent and his alternate HCA is son Kwame Soler.  2. Pain- chronic low back pain  Tylenol 500 mg PO TID. Voltaren gel 1% 4 times per day to low back. LIdocaine 5% topical to low back q 4 hours prn - do not use heating pad to area.  Gabapentin 100 mg PO TID.  Continue with cymbalta 30 mg PO daily. Tramadol 50 mg PO TID prn moderate to severe pain. Hold on hydromorphone for now.  3. Dyspnea - Antibiotics, steroids, o2 as per hospitalist/intensivist. Fan at bedside prn. Observe for \"panic attacks\" due to shortness of breath. Lorazepam 0.5 mg PO/SL or AL q 6 hours prn - use cautiously and hold for sedation. Hold on hydromorphone for now. Appreciate nursing to increase pt activity as tolerated and monitor for \"panic attacks\".  4. Cachexia/Dehydration - Liberalize pt's diet as tolerated. Allow family to supplement with foods that pt enjoys including chocolate ice cream, pudding. Monitor pt for signs of dehydration and encourage PO intake.  5. Deconditioning - family hoping for restorative goals with return to [facility] for [place]CU [place]. PT and OT consults placed 7/17 ok to see 7/18.  5. Spiritual Care- Oriented to Spiritual Health and Social Work Services as part of Palliative Care team. Pt is Jehovah's witness.  is following.  is following.  6. Care Planning- Met with Gifty Navarro RN for  hospice for information 7/16/18. Family hope that pt can recover to his most recent baseline and return to [facility] for TCU/Rehab.     Goal of Care: " "DNR/DNI. Family verbalize that trial of bipap or tube feeding to help pt overcome an acute exacerbation of his illness are currently worth it for the pt due to his perceived QOL, but they would not want these therapies for pt long term to sustain him.     Disease Process/es & Symptoms:  Chaz Soler is a 89 year old patient admitted with symptoms of decreased mental status, hypotension and shortness of breath. He has been treated for acute on chronic hypoxemic respiratory failure due to pulmonary fibrosis and possible PNA, associated \"panic attacks\", acute encephalopathy, aortic stenosis s/p TAVR, chronic diastolic CHF with preserved EF, low back pain with compression fracture, CKD, dementia, HTN, CAD, hypothyroidism.       This is in the setting of pulmonary fibrosis diagnosed at the [facility] approximately 5 years ago, on chronic oxygen that has been gradually increasing to 4-5 l/min via NC, dementia, diastolic CHF, HTN, CAD, hypothyroidisim, GERD, depression/anxiety, chronic low back pain due to DDD with recent vertebral fracture s/p vertebroplasty L3, L4-L5 stenosis and facet arthropathy, s/p lumbosacral caudal epidural steroid injections.. He has been hospitalized 5 times in the past 10 months for recurrent respiratory failure and episodes of AMS, CHF and SOL/CKD. Prior to admission patient has moved to ICU from TCU for higher level of daily care needs. There has been a decline in daily function including increased time in wc, decreased ability to walk long distances, episodes of panic attacks.  There is a documented unitentional weight loss of 13 lbs over the past 9 months.     Findings & plan of care discussed with: Dr. Romero, Dr. Hylton, bedside nurse, Care Coordinator Eugenia.  Follow-up plan from palliative team: will continue to follow this pt for symptom management and support for family with goals of care discussion.  Thank you for involving us in the patient's care    Marianna YANG, " "CNS  Pain Management and Palliative Care  [facility]  Pgr: [phone]    Time Spent on this Encounter   I spent  20 minutes (1:25-1:35pm) in assessment of the patient, counseling and discussion with pt's wife Kylee 2:45-2:55pm as documented in sections below. Another 15 minutes in review of chart, documentation, coordination of care and discussion with the health care team.    Interval History   Chart reviewed.  Hypertensive overnight.  Denies back pain. Eating well. Incont of bowel and bladder.  Pleasant, cooperative, forgetful.    Nurses note pt desaturates to 70% with activity. Pt has associated anxiety, mouth breathing. On 4-5 liters per minute NC which is consistent with his home o2 rate. Nursing staff are able to talk pt through anxiety episodes with coaching to breath through his nose and reassurance.    No family present.    Course of Hospitalization Discussions Data    7/18/2018  Called pt's wife Kylee at home. She has been ill and has not been able to come to the hospital. She is feeling better today and hopes to visit pt tomorrow. I updated her about parts of his day today. She shared that she is glad to know that he is returning to his baseline. \"We've been  for over 60 years . . . I hope to keep him for awhile longer\". I shared with her what I had heard from her son Kwame and daughter Maria Isabel on 7/16 that pt's goal of care is to be DNR DNI, but it would be ok for pt to receive more aggressive therapies such as bipap, antibiotics and other selective interventions for a trial period to see if he improves. Kylee agreed with this. We also discussed that pt has signs that his pulmonary fibrosis is progressing such as increased oxygen needs, increased sleeping, increase in \"panic attacks\", weight loss and more frequent hospitalizations for respiratory failure associated events. She agreed with this as well. She verbalizes that she can decided not to have him return to the hospital and enroll " "in hospice at any time if she feels that pt is suffering and not receiving benefit from restorative care. A hospice admission does not require pt to again be hospitalized. She agreed with me that the POLST completed 7/5/2018 is valid and I will send a copy with him back to [facility] at discharge. She verbalizes understanding that pt will return to [facility] TCU in the next day or two if he remains stable, and PT and OT have been consulted to work with pt while he is hospitalized.    7/17/2018  No family present. Pt is improving to baseline and expected to transfer out of ICU.    Discussed on July 16, 2018 with Marianna YANG, CNS:   Met with pt's son/alternate HCA Kwame and daughter Maria Isabel in ICU [facility]. Pt's wife Kylee went home to rest after the 3 of them had a  Hospice information meeting with Gifty Navarro RN. They describe pt as pleasant, a witty sense of humor, enjoys company of family \"he exists for us\". Pt has difficulty with short term memory but looks at newspapers and other things to figure out what day it is. Kwame has made a cribbage board for pt that reminds him of the steps of the game and they play with him when they visit. They note that pt is being admitted to the hospital more frequently for issues related to his respiratory status, slowly is increasing his need for oxygen over the last 2 years and is close to the maximum o2 flow he can have with his concentrators, is having more frequent episodes of \"panic\" attacks with his breathing even though his o2 sat is WNL, is only able to walk short distance with his walker and o2, spending more time in a w.c.  He has lost weight and they try to encourage his PO intake and include chocolate ice cream, pudding and other things he like to help with his intake. Prior to the last 2 admissions he had been living with his wife, who helped to care for him. He has been compliant with PT/OT at TCU. They agree that pt should be DNR DNI. They along with pt's wife " Kylee, have noted that pt was able to recover after being hospitalized and is improving back to his baseline PTA. They hope he can continue to improve and feel that short term trials of bipap or other treatments, even including a keofeed tube would be worth it for pt, if it would help him overcome an acute exacerbation, but not long term. Pt's POLST completed prior to DC with pt's wife and my colleague Lissy Serrato 7/5/2018 was reviewed and is currently up to date with the information shared by Kwame and Maria Isabel.      Palliative Symptom Review (0=no symptom/no concern, 1=mild, 2=moderate, 3=severe):     Variable historian due to dementia.   Pt currently denies pain, SOB, nausea.      Physical Exam   Temp:  [97.5  F (36.4  C)-98  F (36.7  C)] 97.7  F (36.5  C)  Heart Rate:  [63-85] 70  Resp:  [13-48] 21  BP: (134-186)/() 144/71  SpO2:  [81 %-100 %] 97 %  138 lbs 14.24 oz   GEN:  Alert, oriented x 2, appears comfortable, NAD.  HEENT:  Normocephalic/atraumatic, no scleral icterus, no nasal discharge, mouth moist.  CV:  RRR, S1, S2; no murmurs or other irregularities noted.  +3 DP/PT pulses bilatererally; no edema BLE.  RESP:  Diminished to auscultation anteriorly bilaterally with basilar rales but no rhonchi/wheezing/retractions.  Symmetric chest rise on inhalation noted.  Episodes of tachypnea to 30/min when awake and talking. Mouth breathing. o2 at 4-5 liters/min via nc.  ABD:  Rounded, soft, non-tender/non-distended.  +BS. Incont of stool  EXT:  Edema & pulses as noted above.  CMS intact x 4.     M/S:   Non-Tender to palpation including his low back.    SKIN:  Dry to touch, no exanthems noted in the visualized areas.    NEURO: Symmetric strength +5/5.  Sensation to touch intact all extremities.   There is no area of allodynia or hyperesthesia.  PAIN BEHAVIOR: Cooperative  Psych:  Normal affect.  Calm, cooperative, conversant appropriately but forgetful with short term memory deficits.    Medications        acetaminophen  500 mg Oral TID    Or     acetaminophen  650 mg Rectal TID     aspirin  81 mg Oral Daily     atorvastatin  20 mg Oral Daily     diclofenac  2 g Transdermal 4x Daily     donepezil  10 mg Oral At Bedtime     DULoxetine  30 mg Oral Daily     furosemide  20 mg Oral Daily     gabapentin  100 mg Oral TID     ipratropium - albuterol 0.5 mg/2.5 mg/3 mL  1 vial Nebulization 4x Daily     levothyroxine  88 mcg Oral Daily     metoprolol succinate  50 mg Oral Daily     pantoprazole  40 mg Oral QAM     piperacillin-tazobactam  2.25 g Intravenous Q6H     predniSONE  40 mg Oral Daily     tamsulosin  0.4 mg Oral Daily       Data   Results for orders placed or performed during the hospital encounter of 07/14/18 (from the past 24 hour(s))   CBC with platelets differential   Result Value Ref Range    WBC 12.4 (H) 4.0 - 11.0 10e9/L    RBC Count 3.43 (L) 4.4 - 5.9 10e12/L    Hemoglobin 10.3 (L) 13.3 - 17.7 g/dL    Hematocrit 31.9 (L) 40.0 - 53.0 %    MCV 93 78 - 100 fl    MCH 30.0 26.5 - 33.0 pg    MCHC 32.3 31.5 - 36.5 g/dL    RDW 14.3 10.0 - 15.0 %    Platelet Count 135 (L) 150 - 450 10e9/L    Diff Method Automated Method     % Neutrophils 89.7 %    % Lymphocytes 5.7 %    % Monocytes 2.8 %    % Eosinophils 0.0 %    % Basophils 0.1 %    % Immature Granulocytes 1.7 %    Nucleated RBCs 0 0 /100    Absolute Neutrophil 11.1 (H) 1.6 - 8.3 10e9/L    Absolute Lymphocytes 0.7 (L) 0.8 - 5.3 10e9/L    Absolute Monocytes 0.3 0.0 - 1.3 10e9/L    Absolute Eosinophils 0.0 0.0 - 0.7 10e9/L    Absolute Basophils 0.0 0.0 - 0.2 10e9/L    Abs Immature Granulocytes 0.2 0 - 0.4 10e9/L    Absolute Nucleated RBC 0.0    Basic metabolic panel   Result Value Ref Range    Sodium 141 133 - 144 mmol/L    Potassium 4.7 3.4 - 5.3 mmol/L    Chloride 105 94 - 109 mmol/L    Carbon Dioxide 31 20 - 32 mmol/L    Anion Gap 5 3 - 14 mmol/L    Glucose 154 (H) 70 - 99 mg/dL    Urea Nitrogen 52 (H) 7 - 30 mg/dL    Creatinine 1.63 (H) 0.66 - 1.25 mg/dL    GFR  Estimate 40 (L) >60 mL/min/1.7m2    GFR Estimate If Black 48 (L) >60 mL/min/1.7m2    Calcium 9.2 8.5 - 10.1 mg/dL   Blood gas venous and oxyhgb   Result Value Ref Range    Ph Venous 7.43 7.32 - 7.43 pH    PCO2 Venous 46 40 - 50 mm Hg    PO2 Venous 128 (H) 25 - 47 mm Hg    Bicarbonate Venous 30 (H) 21 - 28 mmol/L    FIO2 4.5%O2NC     Oxyhemoglobin Venous 98 %    Base Excess Venous 5.3 mmol/L

## 2018-07-18 NOTE — PLAN OF CARE
Problem: Patient Care Overview  Goal: Plan of Care/Patient Progress Review  Outcome: Improving  ICU End of Shift Summary.  For vital signs and complete assessments, please see documentation flowsheets.     Pertinent assessments: Pt rested well throughout shift. Remains alert with cares, but fell asleep quickly after nurse was out of room. Confused to all but self/family. Denies any pain or SOB throughout shift. LS diminished throughout with fine crackles noted in bilateral bases posteriorly. Encouraged TCDB. Satting mid to high 90s on 4L NC. Tele SR BBB. UO hard to measure fully due to some incontinence with urinal use. Incontinent BM x 1. Very fragile skin with multiple bruises/scabs on arms/legs. Mepilex remains on coccyx area due to 4 scabs present with one that broke open. Pt is turning himself in bed.   Major Shift Events: 0500: Called Tele-Hub regarding patient's hypertension over the last several hours despite sleeping comfortably in bed and denying any pain or discomfort. 0545 - Dr Shah placed an order for PRN Hydralazine.  Plan (Upcoming Events): Continue antibiotics and steroids. ? When to resume PO Lasix. Monitor BP closely. Possible transfer to medical floor today.   [facility]    Bedside Shift Report Completed   Bedside Safety Check Completed

## 2018-07-18 NOTE — PROGRESS NOTES
Patient with gradually increasing blood pressure. Added hydralazine prn order.    Suhail Shah MD  [facility] Intensivist Service

## 2018-07-19 NOTE — PROGRESS NOTES
"[facility]  Palliative Care Progress Note  Text Page     Assessment & Plan   Chaz Soler is a 89 year old male who was admitted on 7/14/2018. I was asked to see the patient for goals of care.     Recommendations:  1.Decisional Capacity -  Unreliable. Patient has an advance directive dated 12/15/16.  Include patient in decision making as much as possible but involve health care agent attempting consensus with patient. Spouse, Kylee is his primary Health Care Agent and his alternate HCA is son Kwame Soler.  2. Pain- chronic low back pain  Tylenol 500 mg PO TID. Voltaren gel 1% 4 times per day to low back. LIdocaine 5% topical to low back q 4 hours prn - do not use heating pad to area.  Gabapentin 100 mg PO TID.  Continue with cymbalta 30 mg PO daily. Tramadol 50 mg PO TID prn moderate to severe pain. Hold on hydromorphone for now.  3. Dyspnea - Antibiotics, steroids, o2 as per hospitalist/intensivist. Fan at bedside prn. Observe for \"panic attacks\" due to shortness of breath. Lorazepam 0.5 mg PO/SL or TN q 6 hours prn - use cautiously and hold for sedation. Hold on hydromorphone for now. Appreciate nursing to increase pt activity as tolerated and monitor for \"panic attacks\".  4. Cachexia/Dehydration - Liberalize pt's diet as tolerated. Allow family to supplement with foods that pt enjoys including chocolate ice cream, pudding. Monitor pt for signs of dehydration and encourage PO intake.  5. Deconditioning - family hoping for restorative goals with return to [facility] [place]. PT and OT consults placed 7/17 ok to see 7/18.  5. Spiritual Care- Oriented to Spiritual Health and Social Work Services as part of Palliative Care team. Pt is Sikh.  is following.  is following.  6. Care Planning- family met for hospice information meeting 7/16/18. Family hope that pt can recover to his most recent baseline and return to [facility] for TCU/Rehab.     Goal of Care: DNR/DNI. Family " "verbalize that trial of bipap or tube feeding to help pt overcome an acute exacerbation of his illness are currently worth it for the pt due to his perceived QOL, but they would not want these therapies for pt long term to sustain him.     Disease Process/es & Symptoms:  Chaz Soler is a 89 year old patient admitted with symptoms of decreased mental status, hypotension and shortness of breath. He has been treated for acute on chronic hypoxemic respiratory failure due to pulmonary fibrosis and possible PNA, associated \"panic attacks\", acute encephalopathy, aortic stenosis s/p TAVR, chronic diastolic CHF with preserved EF, low back pain with compression fracture, CKD, dementia, HTN, CAD, hypothyroidism.       This is in the setting of pulmonary fibrosis diagnosed at the [facility] approximately 5 years ago, on chronic oxygen that has been gradually increasing to 4-5 l/min via NC, dementia, diastolic CHF, HTN, CAD, hypothyroidisim, GERD, depression/anxiety, chronic low back pain due to DDD with recent vertebral fracture s/p vertebroplasty L3, L4-L5 stenosis and facet arthropathy, s/p lumbosacral caudal epidural steroid injections.. He has been hospitalized 5 times in the past 10 months for recurrent respiratory failure and episodes of AMS, CHF and SOL/CKD. Prior to admission patient has moved to ICU from TCU for higher level of daily care needs. There has been a decline in daily function including increased time in wc, decreased ability to walk long distances, episodes of panic attacks.  There is a documented unitentional weight loss of 13 lbs over the past 9 months.     Findings & plan of care discussed with: Dr. Maynard, Dr. Hylton, bedside nurse Jessie.  Follow-up plan from palliative team: will continue to follow this pt for symptom management and support for family with goals of care discussion.  Thank you for involving us in the patient's care    Marianna YANG, CNS  Pain Management and Palliative " "Care  [facility]  Pgr: [phone]    Time Spent on this Encounter   I spent 10 minutes (2:05-2:15 pm) in assessment of the patient, counseling and discussion with pt's wife Kylee 2:45-2:55 pm as documented in sections below. Another 15 minutes in review of chart, documentation, coordination of care and discussion with the health care team.    Interval History   Chart reviewed.  Increased o2 requirement overnight and cxr with increased bilateral interstitial and alveolar infiltrates. Pt back on HFNC. Currently on 60% and 40 liters. Given additional doses of lasix. Given 1 dose of lorazepam 0.5 mg PO. Pt responds to nursing verbal coaching to breath through his nose and catch his breath.     PT/OT consults on hold today due to pt fatigue and increased need for oxygen. They will check back tomorrow.    Denies back pain. Eating well. Incont of bowel and bladder.  Pleasant, cooperative, forgetful.    No family present. Son Kwame was here last night and played cribbage with pt as pt was up in chair last evening. Kwame also received updates from care team this am regarding events overnight.    Course of Hospitalization Discussions Data    7/19/2018  No family present. Pt tells me today, \" I sure wish I could go back to [facility] . . It gives me hope, that I am going to get better . .. Nothing against you guys\". I acknowledged his thoughts and that it must be scary to be in ICU and wondering what is happening. He said yes, but the care team is great. I asked him if all the interventions and treatments he is going through here are worth it for him to go through to get back to [facility] - he said yes. He denied feeling like he is suffering.  I shared with him that it is important for the care team and his family to know if he feels like he is suffering and it is too hard for him. He acknowledged this. Shared that I had spoken to his wife Kylee yesterday - he smiled. Told him that she was starting to feel better and " "hoped to visit him today.   7/18/2018  Called pt's wife Kylee at home. She has been ill and has not been able to come to the hospital. She is feeling better today and hopes to visit pt tomorrow. I updated her about parts of his day today. She shared that she is glad to know that he is returning to his baseline. \"We've been  for over 60 years . . . I hope to keep him for awhile longer\". I shared with her what I had heard from her son Kwame and daughter Maria Isabel on 7/16 that pt's goal of care is to be DNR DNI, but it would be ok for pt to receive more aggressive therapies such as bipap, antibiotics and other selective interventions for a trial period to see if he improves. Kylee agreed with this. We also discussed that pt has signs that his pulmonary fibrosis is progressing such as increased oxygen needs, increased sleeping, increase in \"panic attacks\", weight loss and more frequent hospitalizations for respiratory failure associated events. She agreed with this as well. She verbalizes that she can decided not to have him return to the hospital and enroll in hospice at any time if she feels that pt is suffering and not receiving benefit from restorative care. A hospice admission does not require pt to again be hospitalized. She agreed with me that the POLST completed 7/5/2018 is valid and I will send a copy with him back to [facility] at discharge. She verbalizes understanding that pt will return to [facility] TCU in the next day or two if he remains stable, and PT and OT have been consulted to work with pt while he is hospitalized.    7/17/2018  No family present. Pt is improving to baseline and expected to transfer out of ICU.    Discussed on July 16, 2018 with Marianna YANG, CNS:   Met with pt's son/alternate HCA Kwame and daughter Maria Isabel in ICU [facility]. Pt's wife Kylee went home to rest after the 3 of them had a  Hospice information meeting with Gifty Navarro RN. They describe pt as pleasant, a witty sense of " "humor, enjoys company of family \"he exists for us\". Pt has difficulty with short term memory but looks at newspapers and other things to figure out what day it is. Kwame has made a cribbage board for pt that reminds him of the steps of the game and they play with him when they visit. They note that pt is being admitted to the hospital more frequently for issues related to his respiratory status, slowly is increasing his need for oxygen over the last 2 years and is close to the maximum o2 flow he can have with his concentrators, is having more frequent episodes of \"panic\" attacks with his breathing even though his o2 sat is WNL, is only able to walk short distance with his walker and o2, spending more time in a w.c.  He has lost weight and they try to encourage his PO intake and include chocolate ice cream, pudding and other things he like to help with his intake. Prior to the last 2 admissions he had been living with his wife, who helped to care for him. He has been compliant with PT/OT at TCU. They agree that pt should be DNR DNI. They along with pt's wife Kylee, have noted that pt was able to recover after being hospitalized and is improving back to his baseline PTA. They hope he can continue to improve and feel that short term trials of bipap or other treatments, even including a keofeed tube would be worth it for pt, if it would help him overcome an acute exacerbation, but not long term. Pt's POLST completed prior to DC with pt's wife and my colleague Lissybhargavi Serrato 7/5/2018 was reviewed and is currently up to date with the information shared by Son.      Palliative Symptom Review (0=no symptom/no concern, 1=mild, 2=moderate, 3=severe):     Variable historian due to dementia.   Pt currently denies pain, SOB, nausea.      Physical Exam   Temp:  [97.5  F (36.4  C)-98.6  F (37  C)] 98.6  F (37  C)  Heart Rate:  [] 75  Resp:  [14-40] 20  BP: (113-177)/() 129/74  FiO2 (%):  [60 %-75 %] 70 " %  SpO2:  [77 %-99 %] 94 %  136 lbs 14.49 oz   GEN:  Alert, oriented x 2, appears comfortable, NAD.  HEENT:  Normocephalic/atraumatic, no scleral icterus, no nasal discharge, mouth moist.  CV:  RRR, S1, S2; no murmurs or other irregularities noted.  +3 DP/PT pulses bilatererally; no edema BLE.  RESP:  Diminished to auscultation anteriorly bilaterally with basilar rales but no rhonchi/wheezing/retractions.  Symmetric chest rise on inhalation noted.  Episodes of tachypnea to 30/min when awake and talking. Mouth breathing. o2 at 4-5 liters/min via nc.  ABD:  Rounded, soft, non-tender/non-distended.  +BS. Incont of stool  EXT:  Edema & pulses as noted above.  CMS intact x 4.     M/S:   Non-Tender to palpation including his low back.    SKIN:  Dry to touch, no exanthems noted in the visualized areas.    NEURO: Symmetric strength +5/5.  Sensation to touch intact all extremities.   There is no area of allodynia or hyperesthesia.  PAIN BEHAVIOR: Cooperative  Psych:  Normal affect.  Calm, cooperative, conversant appropriately but forgetful with short term memory deficits.    Medications       acetaminophen  500 mg Oral TID    Or     acetaminophen  650 mg Rectal TID     aspirin  81 mg Oral Daily     atorvastatin  20 mg Oral Daily     diclofenac  2 g Transdermal 4x Daily     donepezil  10 mg Oral At Bedtime     DULoxetine  30 mg Oral Daily     furosemide  20 mg Intravenous BID     gabapentin  100 mg Oral TID     ipratropium - albuterol 0.5 mg/2.5 mg/3 mL  1 vial Nebulization 4x Daily     levothyroxine  88 mcg Oral Daily     metoprolol succinate  50 mg Oral Daily     pantoprazole  40 mg Oral QAM     piperacillin-tazobactam  2.25 g Intravenous Q6H     predniSONE  40 mg Oral Daily     tamsulosin  0.4 mg Oral Daily       Data   Results for orders placed or performed during the hospital encounter of 07/14/18 (from the past 24 hour(s))   XR Chest Port 1 View    Narrative    XR CHEST PORT 1 VW  7/19/2018 5:33 AM     INDICATION:  Shortness of breath.    COMPARISON: 7/14/2018.      Impression    IMPRESSION: Patchy bilateral interstitial and alveolar infiltrates  have increased and may be due to pneumonia or edema. Shallow  inspiration. Stable heart size, borderline prominent. Postoperative  changes aortic valve.    DALILA ATKINS MD   CBC with platelets differential   Result Value Ref Range    WBC 22.6 (H) 4.0 - 11.0 10e9/L    RBC Count 3.68 (L) 4.4 - 5.9 10e12/L    Hemoglobin 11.2 (L) 13.3 - 17.7 g/dL    Hematocrit 34.5 (L) 40.0 - 53.0 %    MCV 94 78 - 100 fl    MCH 30.4 26.5 - 33.0 pg    MCHC 32.5 31.5 - 36.5 g/dL    RDW 14.5 10.0 - 15.0 %    Platelet Count 140 (L) 150 - 450 10e9/L    Diff Method Automated Method     % Neutrophils 85.5 %    % Lymphocytes 5.1 %    % Monocytes 7.3 %    % Eosinophils 0.0 %    % Basophils 0.1 %    % Immature Granulocytes 2.0 %    Nucleated RBCs 0 0 /100    Absolute Neutrophil 19.3 (H) 1.6 - 8.3 10e9/L    Absolute Lymphocytes 1.1 0.8 - 5.3 10e9/L    Absolute Monocytes 1.6 (H) 0.0 - 1.3 10e9/L    Absolute Eosinophils 0.0 0.0 - 0.7 10e9/L    Absolute Basophils 0.0 0.0 - 0.2 10e9/L    Abs Immature Granulocytes 0.5 (H) 0 - 0.4 10e9/L    Absolute Nucleated RBC 0.0    Basic metabolic panel   Result Value Ref Range    Sodium 141 133 - 144 mmol/L    Potassium 3.8 3.4 - 5.3 mmol/L    Chloride 105 94 - 109 mmol/L    Carbon Dioxide 29 20 - 32 mmol/L    Anion Gap 7 3 - 14 mmol/L    Glucose 121 (H) 70 - 99 mg/dL    Urea Nitrogen 50 (H) 7 - 30 mg/dL    Creatinine 1.59 (H) 0.66 - 1.25 mg/dL    GFR Estimate 41 (L) >60 mL/min/1.7m2    GFR Estimate If Black 50 (L) >60 mL/min/1.7m2    Calcium 9.6 8.5 - 10.1 mg/dL   Blood gas venous and oxyhgb   Result Value Ref Range    Ph Venous 7.48 (H) 7.32 - 7.43 pH    PCO2 Venous 42 40 - 50 mm Hg    PO2 Venous 68 (H) 25 - 47 mm Hg    Bicarbonate Venous 31 (H) 21 - 28 mmol/L    FIO2 4Lbipap     Oxyhemoglobin Venous 93 %    Base Excess Venous 6.8 mmol/L   Magnesium   Result Value Ref Range     Magnesium 2.4 (H) 1.6 - 2.3 mg/dL

## 2018-07-19 NOTE — PROGRESS NOTES
Discharge Planner   Discharge Plans in progress: PT continues to be a bed hold at [facility]  Barriers to discharge plan: none. Met with pt today to confirm status. Called [facility] to update  Follow up plan: TCU once Medically stable       Entered by: Corinne C. White 07/19/2018 5:11 PM

## 2018-07-19 NOTE — PLAN OF CARE
Problem: Patient Care Overview  Goal: Plan of Care/Patient Progress Review  Outcome: Declining  ICU End of Shift Summary.  For vital signs and complete assessments, please see documentation flowsheets.     Pertinent assessments: Pt remains confused - alert to self only. Continues to be dyspneic on exertion, but worsened tonight after any activity and O2 needs increased. Denies feeling SOB at rest despite low O2 sats and appeared comfortable. LS continue to be diminished throughout with no crackles noted. Encouraged deep, slow breaths and coughing. Remains afebrile. BP hypertensive - given Hydralazine x 1 with some improvement. Continues to have urgency with voiding.  Incontinent of BM with urination. Skin fragile with multiple scabs, bruises. Dressing to back/coccyx CDI. Turning and repositioning frequently on own. Bath done tonight and linen changed.  Major Shift Events: 0230: Nurse into room due to patient's low O2 saturation in the 80s. Pt trying to urinate and incontinent of stool. Appeared very anxious and SOB. Increased O2 from 5L to 10 L and RT called for neb. Wheezes noted that cleared after nebulizer. After patient was cleaned up, he was able to relax and fall back asleep. Sats remained in the mid to low 90s on the 10L with patient resting comfortably in bed and denying any SOB, so Tele-Hub notified at 0345 of no improvement and orders placed for stat CX, high flow, and PO Lasix. PO Ativan also given at this time due to air hunger and anxiety with having to void again. Sats improved to mid 90s after high flow placed and patient resting comfortably with no SOB. Updated Tele-Hub with plan to reassess need for AM Lasix on next shift.                 - Spoke with Tele-Hub about patient having more frequent ectopy with PACs/PVC and magnesium added on to labs.  Plan (Upcoming Events): Monitor patient closely with increasing O2 needs. RN to talk to MD this AM to determine if AM Lasix dose should be given. Son,  Kwame in at 0700 and updated on change and questions answered. He stated he would talk to his mom to update her.  Discharge/Transfer Needs: Plan to DC back to [facility] when stable.    Bedside Shift Report Completed   Bedside Safety Check Completed

## 2018-07-19 NOTE — PLAN OF CARE
Problem: Patient Care Overview  Goal: Plan of Care/Patient Progress Review  PT: Per discussion with RN, pt not appropriate for PT today due to work of breathing. Will reschedule for tomorrow.

## 2018-07-19 NOTE — PROGRESS NOTES
Notified by the tele hub of increasing oxygen requirements.    Patient was restarted on lasix today, still in positive fluid balance , will give am lasix now  Repeat CXR    Switch to HFNC.      Shay Pascal MD  Critical Care Medicine

## 2018-07-19 NOTE — PROGRESS NOTES
[facility]  Hospitalist Progress Note  Danish Maynard MD, MD 07/19/2018  (Text Page)  Reason for Stay (Diagnosis): Acute on chronic hypoxic respiratory failure secondary to underlying pneumonia and likely with CHF diastolic in exacerbation         Assessment and Plan:      Summary of Stay: Chaz Soler is a 89 year old male who was admitted on 7/14/2018 with SOB. Patient has a PMH of chronic HFpEF, AS s/p TAVR, pulmonary fibrosis and chronic hypoxic resp failure on 5L oxygen at baseline, CAD, Dementia, CKD he was recently hospitalized at this facility for pneumonia and discharged a few days ago.  According to the family, patient was not eating and drinking much, has been sleeping a lot and brought back to the ER and found to have acute on chronic hypoxic respiratory failure and admitted to the hospital.     1.  Acute on chronic hypoxemic respiratory failure with history of chronic pulmonary fibrosis requiring home oxygen at 5 L via nasal cannula prior to this hospitalization.  -Patient still has persistent leukocytosis of 20 K.  Unsure of clinical significance of days as somewhat erratic as it was 12,000 yesterday and earlier was also about 20 K.  However patient is also on corticosteroids but can have some leukemoid reaction from it.  -He remained afebrile but overnight had some clinical deterioration with his oxygen levels requiring high flow nasal cannula.  Repeat chest x-ray stating worsening infiltrates likely from pneumonia versus edema.  Earlier pro-calcitonin was low.  -Remain on Zosyn, earlier was on vancomycin but was stopped.  Continue with oral corticosteroids.  -Continue with oxygen supplementation with plans to escalate the BiPAP if necessary as earlier discussed with the patient and his family by the ICU team.  -Started back on his Lasix but due to chest x-ray findings of likely worsening edema and the need for increasing oxygen supplementation will just put him on IV Lasix for  now and continue to monitor electrolytes and renal function.   -He was on BiPAP then transitioned to high flow oxygen, currently on nasal cannula, and needs to be closely monitored as he desaturates with activity     2.  Aortic stenosis status post TAVR.  Echo done stable.     3.  Chronic diastolic congestive heart failure with preserved EF.  BNP was normal.  However the past day or so his respiratory parameters are worsening with chest x-ray findings suggestive of increasing edema.  Likely a component also of acute on chronic diastolic CHF in exacerbation.  We will continue with IV Lasix for now.  And no evidence to suggest CHF exacerbation.  -Lasix was held earlier due to increasing creatinine levels.     4.  Pulmonary fibrosis, with chronic hypoxic respiratory failure on 5 L oxygen.        5.  Acute encephalopathy on top of dementia-encephalopathy-  resolved.  It was suspected due to his respiratory failure and multiple other health issues.   -Started on low-dose oral Ativan.     6.  Chronic kidney disease.  Baseline is around 1.6-1.8.  Creatinine is at baseline.  -Improved after Vanco was stopped and also patient was off Lasix.     7.  Dementia.  On Aricept.     8.  Hypertension.  Blood pressure was elevated last night, increased Toprol-XL to 50 mg p.o. Daily.  -Losartan on hold due to renal failure.     9.  Coronary artery disease.  No sign of any active ischemia, had a negative Lexiscan last year in October.     10.  Hypothyroidism on Synthroid.     11.  Epistaxis-this happened 2 days ago, there is no more episodes. It is likely related to dry air with oxygen, better with humidified oxygen.  -Watch for further bleeding  -Reported that his urine color change as well needs to be monitored     Goals of care.  Palliative care on board, hospice consulted and earlier met with patient and family.  The plan at this time is to discharge patient when he is stable enough on oxygen alone on with BiPAP if needed,.  Patient  "and family do not wish readmission if his condition worsens.  The last 2 days patient showed improvement and hopefully will be discharged close to his baseline status respiratory wise.  DNR/DNI and avoiding further escalation of cares.     DVT Prophylaxis:  On PCD's.  Patient also has some mild thrombocytopenia     Code Status: DNR/DNI     Disposition:  Continue ICU care.  Our patient still requiring inpatient hospitalization and likely will be needing at least 3 more inpatient days.         Interval History (Subjective):      Assume care today.  Seen and examined.  Chart reviewed.  Case discussed with ICU multi service rounds.  He still requiring oxygen supplementation with noted oxygen desaturation last night requiring initiation back on high flow NC.  However our patient remained awake, following simple commands, conversant, and cooperative.  No reported agitation or combativeness.  Remained afebrile.  Easily desaturates even with talking in full sentences.  No reported nausea, vomiting, back pain, diarrhea.     # Pain Assessment:  Current Pain Score 7/19/2018   Patient currently in pain? -   Pain score (0-10) 0   Pain location -   Pain descriptors -   CPOT pain score -   Chaz s pain level was assessed and he currently denies pain.                  Physical Exam:      Last Vital Signs:  BP (!) 177/106  Pulse 76  Temp 97.8  F (36.6  C) (Axillary)  Resp (!) 40  Ht 1.727 m (5' 8\")  Wt 62.1 kg (136 lb 14.5 oz)  SpO2 (!) 77%  BMI 20.82 kg/m2    I/O last 3 completed shifts:  In: 1160 [P.O.:1060; I.V.:100]  Out: 2260 [Urine:2260]  Wt Readings from Last 1 Encounters:   07/19/18 62.1 kg (136 lb 14.5 oz)     Vitals:    07/15/18 0615 07/16/18 0600 07/17/18 0400 07/18/18 0600   Weight: 60.4 kg (133 lb 2.5 oz) 61.5 kg (135 lb 9.3 oz) 61.9 kg (136 lb 7.4 oz) 63 kg (138 lb 14.2 oz)    07/19/18 0630   Weight: 62.1 kg (136 lb 14.5 oz)       Constitutional: Awake, alert, cooperative, no apparent distress   Respiratory:  Fair " air entry, bilateral mid lung the bases inspiratory crackles on posterior auscultation, no wheezing   Cardiovascular: Regular rate and rhythm, normal S1 and S2, and no JVD   Abdomen: Normal bowel sounds, soft, non-distended, non-tender   Skin: No rashes, no cyanosis, dry to touch   Neuro: Alert and oriented x3, no weakness, spontaneous and coherent speech   Extremities: Nonpitting edema at the ankle level on both lower extremities, normal range of motion   Other(s): Euthymic mood, not agitated       All other systems: Negative          Medications:      All current medications were reviewed with changes reflected in problem list.         Data:      All new lab and imaging data was reviewed.   Labs:  No results for input(s): CULT in the last 168 hours.  No results for input(s): PH, PHARTERIAL, PO2, [id number], SAT, PCO2, HCO3, BASEEXCESS, DESEAN, BEB in the last 168 hours.    Invalid input(s): [id number]    Recent Labs  Lab 07/19/18  0557 07/18/18  0611 07/17/18  0551   WBC 22.6* 12.4* 20.0*   HGB 11.2* 10.3* 10.7*   HCT 34.5* 31.9* 33.1*   MCV 94 93 93   * 135* 160       Recent Labs  Lab 07/19/18  0557 07/18/18  0611 07/17/18  0551    141 142   POTASSIUM 3.8 4.7 4.8   CHLORIDE 105 105 106   CO2 29 31 30   ANIONGAP 7 5 6   * 154* 137*   BUN 50* 52* 58*   CR 1.59* 1.63* 1.84*   GFRESTIMATED 41* 40* 35*   GFRESTBLACK 50* 48* 42*   LEXIS 9.6 9.2 9.3   MAG 2.4*  --   --    PROTTOTAL  --   --  6.2*   ALBUMIN  --   --  2.9*   BILITOTAL  --   --  0.7   ALKPHOS  --   --  129   AST  --   --  12   ALT  --   --  20       Recent Labs  Lab 07/19/18  0557 07/18/18  0611 07/17/18  0551 07/17/18  0411 07/16/18  2337 07/16/18  2024 07/16/18  1549 07/16/18  1150  07/16/18  0515  07/15/18  0548   * 154* 137*  --   --   --   --   --   --  144*  --  158*   BGM  --   --   --  133* 174* 231* 213* 186*  < >  --   < >  --    < > = values in this interval not displayed.    Recent Labs  Lab 07/14/18  1749   INR 0.95        Recent Labs  Lab 07/14/18  1749   TROPI <0.015       Recent Labs  Lab 07/14/18  1843   COLOR Yellow   APPEARANCE Clear   URINEGLC Negative   URINEBILI Negative   URINEKETONE Negative   SG 1.014   UBLD Negative   URINEPH 6.0   PROTEIN Negative   NITRITE Negative   LEUKEST Negative   RBCU 0   WBCU 1      Imaging:   Results for orders placed or performed during the hospital encounter of 07/14/18   XR Chest Port 1 View    Narrative    CHEST PORTABLE ONE VIEW   7/14/2018 6:12 PM     HISTORY: AMS.     COMPARISON: 7/2/2018.      Impression    IMPRESSION: Lung volumes remain low. Background of interstitial  opacities are again seen although the hazy opacities from prior  appears slightly improved which may be due to edema and/or pneumonia.  No significant pleural effusion or pneumothorax. Cardiac silhouette  remains enlarged.    EV BURNETT MD   CT Head w/o Contrast    Narrative    CT SCAN OF THE HEAD WITHOUT CONTRAST   7/14/2018 8:58 PM     HISTORY: AMS.     TECHNIQUE:  Axial images of the head and coronal reformations without  IV contrast material. Radiation dose for this scan was reduced using  automated exposure control, adjustment of the mA and/or kV according  to patient size, or iterative reconstruction technique.    COMPARISON: Head CT 6/3/2018.    FINDINGS: There is no evidence of intracranial hemorrhage, mass, acute  infarct or anomaly. There is generalized atrophy of the brain. There  is low attenuation in the white matter of the cerebral hemispheres  consistent with sequelae of small vessel ischemic disease. 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 Ventricular size is within  normal limits without evidence of hydrocephalus.     The visualized portions of the sinuses and mastoids appear normal. The  bony calvarium and bones of the skull base appear intact.       Impression    IMPRESSION:     1. No evidence of acute intracranial hemorrhage, mass, or herniation.  2. There is generalized atrophy  of the brain. White matter changes are  present in the cerebral hemispheres that are consistent with small  vessel ischemic disease in this age patient.      EV BURNETT MD   US Lower Extremity Venous Bilateral Port    Narrative    US LOWER EXTREMITY VENOUS DUPLEX BILATERAL PORTABLE   7/14/2018 11:26  PM     HISTORY: Shortness of breath. Rule out deep venous thrombosis.    COMPARISON: None.    FINDINGS: Gray-scale, color and Doppler spectral analysis ultrasound  was performed of the legs. Compression and augmentation imaging was  performed.    There is no evidence for deep venous thrombosis. The veins compress  and augment normally.      Impression    IMPRESSION: No deep venous thrombosis.    ALCIRA SINGLETARY MD   XR Chest Port 1 View    Narrative    XR CHEST PORT 1 VW  7/19/2018 5:33 AM     INDICATION: Shortness of breath.    COMPARISON: 7/14/2018.      Impression    IMPRESSION: Patchy bilateral interstitial and alveolar infiltrates  have increased and may be due to pneumonia or edema. Shallow  inspiration. Stable heart size, borderline prominent. Postoperative  changes aortic valve.    DALILA ATKINS MD

## 2018-07-19 NOTE — PROGRESS NOTES
Respiratory Therapy Note        Pt has remained on HFNC today.  His FiO2 needs were as high as 75% this morning and currently he is back to 60% on 40Lpm.  His breath sounds remain coarse and diminished.    July 19, 2018 3:50 PM  Jason Lucero

## 2018-07-19 NOTE — PLAN OF CARE
Problem: Patient Care Overview  Goal: Plan of Care/Patient Progress Review  OT order received, chart reviewed, and met with NSG.  NSG request to hold eval today and reschedule for tomorrow.  Will re-schedule OT eval.

## 2018-07-20 NOTE — PROGRESS NOTES
[facility]  Hospitalist Progress Note  Danish Maynard MD, MD 07/20/2018  (Text Page)  Reason for Stay (Diagnosis): Acute on chronic hypoxic respiratory failure secondary to underlying pneumonia and likely with CHF diastolic in exacerbation         Assessment and Plan:      Summary of Stay: Chaz Soler is a 89 year old male who was admitted on 7/14/2018 with SOB. Patient has a PMH of chronic HFpEF, AS s/p TAVR, pulmonary fibrosis and chronic hypoxic resp failure on 5L oxygen at baseline, CAD, Dementia, CKD he was recently hospitalized at this facility for pneumonia and discharged a few days ago.  According to the family, patient was not eating and drinking much, has been sleeping a lot and brought back to the ER and found to have acute on chronic hypoxic respiratory failure and admitted to the hospital.     1.  Acute on chronic hypoxemic respiratory failure with history of chronic pulmonary fibrosis requiring home oxygen at 5 L via nasal cannula prior to this hospitalization.  -Leukocytosis has been trending down.   .  -He remained afebrile but earlier had some clinical deterioration with his oxygen levels requiring high flow nasal cannula.  Repeat chest x-ray stating worsening infiltrates likely from pneumonia versus edema.  Earlier pro-calcitonin was low.  -Remain on Zosyn now on day #6., earlier was on vancomycin but was stopped.  I am planning to finish this 7 day course.  continue with oral corticosteroids.  -Continue with oxygen supplementation with plans to escalate the BiPAP if necessary as earlier discussed with the patient and his family by the ICU team.  -Started back on his Lasix but due to chest x-ray findings of likely worsening edema and the need for increasing oxygen supplementation will just put him on IV Lasix for now and continue to monitor electrolytes and renal function.   Appears to be improving now and serum creatinine is trending up with persistent azotemia.  I believe  the IV Lasix can be converted back to oral route.  -He was on BiPAP then transitioned to high flow oxygen, currently on nasal cannula, and needs to be closely monitored as he desaturates with activity     2.  Aortic stenosis status post TAVR.  Echo done stable.     3.  Chronic diastolic congestive heart failure with preserved EF.  BNP was normal.  However the past day or so his respiratory parameters are worsening with chest x-ray findings suggestive of increasing edema.  Likely a component also of acute on chronic diastolic CHF in exacerbation.  Oral Lasix now.  And no evidence to suggest CHF exacerbation.  -Lasix was held earlier due to increasing creatinine levels.     4.  Pulmonary fibrosis, with chronic hypoxic respiratory failure on 5 L oxygen.        5.  Acute encephalopathy on top of dementia-encephalopathy-  resolved.  It was suspected due to his respiratory failure and multiple other health issues.   -Started on low-dose oral Ativan.     6.  Chronic kidney disease.  Baseline is around 1.6-1.8.  Creatinine is at baseline.  -Improved after Vanco was stopped and also patient was off Lasix.  -Change IV Lasix to oral route today given increasing serum bicarb and appears to be improving in terms of his oxygen requirements.     7.  Dementia.  On Aricept.     8.  Hypertension.  Blood pressure was elevated last night, increased Toprol-XL to 50 mg p.o. Daily.  -Losartan on hold due to renal failure.     9.  Coronary artery disease.  No sign of any active ischemia, had a negative Lexiscan last year in October.     10.  Hypothyroidism on Synthroid.     11.  Epistaxis-earlier with no further recurrence. It is likely related to dry air with oxygen, better with humidified oxygen.  -Watch for further bleeding    Goals of care.  Palliative care on board, hospice consulted and earlier met with patient and family.  The plan at this time is to discharge patient when he is stable enough on oxygen alone on with BiPAP if  "needed,.  Patient and family do not wish readmission if his condition worsens.  The last 2 days patient showed improvement and hopefully will be discharged close to his baseline status respiratory wise.  DNR/DNI and avoiding further escalation of cares.     DVT Prophylaxis:  On PCD's.  Patient also has some mild thrombocytopenia     Code Status: DNR/DNI     Disposition:  Continue ICU care.  Our patient still requiring inpatient hospitalization and likely will be needing at least 3 more inpatient days.         Interval History (Subjective):      Continuing care today.  Seen and examined.  Chart reviewed.  Case discussed with ICU multi service rounds.  He still requiring oxygen supplementation but was able to titrate down.  No reported fever spikes.  No reported nausea, vomiting or abdominal pain.  Sleeping this morning and appears comfortable laying in bed.  However our patient remained awake, following simple commands, conversant, and cooperative.  No reported agitation or combativeness.  Remained afebrile.  \                   Physical Exam:      Last Vital Signs:  /79  Pulse 76  Temp 97.8  F (36.6  C) (Axillary)  Resp 17  Ht 1.727 m (5' 8\")  Wt 62.1 kg (136 lb 14.5 oz)  SpO2 97%  BMI 20.82 kg/m2    I/O last 3 completed shifts:  In: 1040 [P.O.:940; I.V.:100]  Out: 1590 [Urine:1590]  Wt Readings from Last 1 Encounters:   07/19/18 62.1 kg (136 lb 14.5 oz)     Vitals:    07/15/18 0615 07/16/18 0600 07/17/18 0400 07/18/18 0600   Weight: 60.4 kg (133 lb 2.5 oz) 61.5 kg (135 lb 9.3 oz) 61.9 kg (136 lb 7.4 oz) 63 kg (138 lb 14.2 oz)    07/19/18 0630   Weight: 62.1 kg (136 lb 14.5 oz)       Constitutional: Awake, alert, cooperative, no apparent distress   Respiratory:  Fair air entry, bilateral mid lung the bases inspiratory crackles on posterior auscultation, no wheezing   Cardiovascular: Regular rate and rhythm, normal S1 and S2, and no JVD   Abdomen: Normal bowel sounds, soft, non-distended, non-tender "   Skin: No rashes, no cyanosis, dry to touch   Neuro: Alert and oriented x3, no weakness, spontaneous and coherent speech   Extremities: Nonpitting edema at the ankle level on both lower extremities, normal range of motion   Other(s): Euthymic mood, not agitated       All other systems: Negative          Medications:      All current medications were reviewed with changes reflected in problem list.         Data:      All new lab and imaging data was reviewed.   Labs:  No results for input(s): CULT in the last 168 hours.  No results for input(s): PH, PHARTERIAL, PO2, [id number], SAT, PCO2, HCO3, BASEEXCESS, DESEAN, BEB in the last 168 hours.    Invalid input(s): [id number]    Recent Labs  Lab 07/20/18  0601 07/19/18  0557 07/18/18  0611   WBC 13.6* 22.6* 12.4*   HGB 11.2* 11.2* 10.3*   HCT 34.5* 34.5* 31.9*   MCV 93 94 93   * 140* 135*       Recent Labs  Lab 07/20/18  0601 07/19/18  0557 07/18/18  0611 07/17/18  0551    141 141 142   POTASSIUM 4.0 3.8 4.7 4.8   CHLORIDE 103 105 105 106   CO2 34* 29 31 30   ANIONGAP 6 7 5 6   * 121* 154* 137*   BUN 53* 50* 52* 58*   CR 1.70* 1.59* 1.63* 1.84*   GFRESTIMATED 38* 41* 40* 35*   GFRESTBLACK 46* 50* 48* 42*   LEXIS 9.3 9.6 9.2 9.3   MAG  --  2.4*  --   --    PROTTOTAL  --   --   --  6.2*   ALBUMIN  --   --   --  2.9*   BILITOTAL  --   --   --  0.7   ALKPHOS  --   --   --  129   AST  --   --   --  12   ALT  --   --   --  20       Recent Labs  Lab 07/20/18  0601 07/19/18  0557 07/18/18  0611 07/17/18  0551 07/17/18  0411 07/16/18  2337 07/16/18  2024 07/16/18  1549 07/16/18  1150  07/16/18  0515   * 121* 154* 137*  --   --   --   --   --   --  144*   BGM  --   --   --   --  133* 174* 231* 213* 186*  < >  --    < > = values in this interval not displayed.    Recent Labs  Lab 07/14/18  1749   INR 0.95       Recent Labs  Lab 07/14/18  1749   TROPI <0.015       Recent Labs  Lab 07/14/18  1843   COLOR Yellow   APPEARANCE Clear   URINEGLC Negative    URINEBILI Negative   URINEKETONE Negative   SG 1.014   UBLD Negative   URINEPH 6.0   PROTEIN Negative   NITRITE Negative   LEUKEST Negative   RBCU 0   WBCU 1      Imaging:   Results for orders placed or performed during the hospital encounter of 07/14/18   XR Chest Port 1 View    Narrative    CHEST PORTABLE ONE VIEW   7/14/2018 6:12 PM     HISTORY: AMS.     COMPARISON: 7/2/2018.      Impression    IMPRESSION: Lung volumes remain low. Background of interstitial  opacities are again seen although the hazy opacities from prior  appears slightly improved which may be due to edema and/or pneumonia.  No significant pleural effusion or pneumothorax. Cardiac silhouette  remains enlarged.    EV BURNETT MD   CT Head w/o Contrast    Narrative    CT SCAN OF THE HEAD WITHOUT CONTRAST   7/14/2018 8:58 PM     HISTORY: AMS.     TECHNIQUE:  Axial images of the head and coronal reformations without  IV contrast material. Radiation dose for this scan was reduced using  automated exposure control, adjustment of the mA and/or kV according  to patient size, or iterative reconstruction technique.    COMPARISON: Head CT 6/3/2018.    FINDINGS: There is no evidence of intracranial hemorrhage, mass, acute  infarct or anomaly. There is generalized atrophy of the brain. There  is low attenuation in the white matter of the cerebral hemispheres  consistent with sequelae of small vessel ischemic disease. 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 Ventricular size is within  normal limits without evidence of hydrocephalus.     The visualized portions of the sinuses and mastoids appear normal. The  bony calvarium and bones of the skull base appear intact.       Impression    IMPRESSION:     1. No evidence of acute intracranial hemorrhage, mass, or herniation.  2. There is generalized atrophy of the brain. White matter changes are  present in the cerebral hemispheres that are consistent with small  vessel ischemic disease in this age  patient.      EV BURNETT MD   US Lower Extremity Venous Bilateral Port    Narrative    US LOWER EXTREMITY VENOUS DUPLEX BILATERAL PORTABLE   7/14/2018 11:26  PM     HISTORY: Shortness of breath. Rule out deep venous thrombosis.    COMPARISON: None.    FINDINGS: Gray-scale, color and Doppler spectral analysis ultrasound  was performed of the legs. Compression and augmentation imaging was  performed.    There is no evidence for deep venous thrombosis. The veins compress  and augment normally.      Impression    IMPRESSION: No deep venous thrombosis.    ALCIRA SINGLETARY MD   XR Chest Port 1 View    Narrative    XR CHEST PORT 1 VW  7/19/2018 5:33 AM     INDICATION: Shortness of breath.    COMPARISON: 7/14/2018.      Impression    IMPRESSION: Patchy bilateral interstitial and alveolar infiltrates  have increased and may be due to pneumonia or edema. Shallow  inspiration. Stable heart size, borderline prominent. Postoperative  changes aortic valve.    DALILA ATKINS MD

## 2018-07-20 NOTE — PLAN OF CARE
Problem: Pneumonia (Adult)  Goal: Signs and Symptoms of Listed Potential Problems Will be Absent, Minimized or Managed (Pneumonia)  Signs and symptoms of listed potential problems will be absent, minimized or managed by discharge/transition of care (reference Pneumonia (Adult) CPG).   Outcome: Declining  ICU End of Shift Summary.  For vital signs and complete assessments, please see documentation flowsheets.  Pt walked with 2 assist to chair. Pt desaturated and pt got very anxious -  Took a while for sats to return to 90's.  Appetite not great but managed to take all of breakfast and some of lunch.   Pt has been needing more oxygen throughout the day. Liters reduced to 30 when taking fluids or diet. Later, pt coughing more and desaturated again requiring o2 to increase to 80%. Used sling and mechanical lift to get pt back to bed.   Pt has been incontinent of stool several times today, but stool soft. Pt asking for urinal.   Called palliative and Dr Maynard to update on condition. Ativan given today when sitting out in chair.  Wife and daughter and son in to visit today. Updated on pt's condition.     Pertinent assessments:   Major Shift Events:   Plan (Upcoming Events):   Discharge/Transfer Needs:     Bedside Shift Report Completed :   Bedside Safety Check Completed:

## 2018-07-20 NOTE — PLAN OF CARE
Problem: Patient Care Overview  Goal: Plan of Care/Patient Progress Review  Outcome: Therapy, progress towards functional goals is fair  ICU End of Shift Summary.  For vital signs and complete assessments, please see documentation flowsheets.     Pertinent assessments: Remains on Hi-flow 60%/40L with sats 97%; slept well all night; pt pulled external catheter off; remained off; 3-4 soft brown stools; lungs coarse; multiple bruises and friable skin; 10mg Hydralazine given for B/P control; no complaints of pain; calm and cooperative  Major Shift Events:   Plan (Upcoming Events): Continue to attempt to reduce O2 needs  Discharge/Transfer Needs: Return to [facility] when ready for discharge     Bedside Shift Report Completed :   Bedside Safety Check Completed:

## 2018-07-20 NOTE — PLAN OF CARE
Problem: Patient Care Overview  Goal: Plan of Care/Patient Progress Review  OT: Order received, per chart review and discussion with PT, pt not medically appropriate to initiate OT eval today.

## 2018-07-20 NOTE — PLAN OF CARE
Problem: Patient Care Overview  Goal: Plan of Care/Patient Progress Review    PT: Per EMR, discussion with nursing and MD at rounds, patient is not medically appropriate for physical therapy today.  Will reschedule evaluation for tomorrow.

## 2018-07-20 NOTE — PLAN OF CARE
Problem: Patient Care Overview  Goal: Plan of Care/Patient Progress Review  Outcome: No Change  ICU End of Shift Summary.  For vital signs and complete assessments, please see documentation flowsheets.     Pertinent assessments: Patient with little sleep last noc.  Tired and fatigued today with frequent napping.  Desated with urinating and eating. HiFlo O2 conts.  VSS.  Lungs coarse/crackles post.  Incotinent of stool and urine.  External Catheter placed.  Patient noted to have lesions on penis treated and protectant applied prior to ext. Cath placement.  Red rectal areas treated with ctiticaid ointment.    Major Shift Events: Desating with activities.  PT/OT held.   Plan (Upcoming Events): Close resp monitoring.  Discharge/Transfer Needs: TBD     Bedside Shift Report Completed :   Bedside Safety Check Completed:

## 2018-07-20 NOTE — PROGRESS NOTES
"[facility]  Palliative Care Progress Note  Text Page   addendum:  Contacted by Nurse Lisa this afternoon, patient destaurating appearing unstable. Family not at bedside.  I contacted hospitalist Dr. Maynard to inform. He would see the patient. I arrived on unit at 4:55 pm. Patient asleep On FIO2 at 40% high flow at 80 %. Family did come and now went home.    If this is the trend for this patient, hospitalist/ intensivist may need to have goal of care discussion before our service is here Monday am.   It is not likely the family would wan to continue present plan of care in this setting. If he needs high flow, patient  Will not be able to return to [facility].    CELIA Smith,CNP    Pain Management and Palliative Care  [facility]  Pgr: [phone]    Assessment & Plan   Chaz Soler is a 89 year old male who was admitted on 7/14/2018. I was asked to see the patient for goals of care.     Recommendations:  1.Decisional Capacity -  Unreliable. Patient has an advance directive dated 12/15/16.  Include patient in decision making as much as possible but involve health care agent attempting consensus with patient. Spouse, Kylee is his primary Health Care Agent and his alternate HCA is son Kwame Soler.  2. Pain- chronic low back pain-- good pain control  Tylenol 500 mg PO TID. Voltaren gel 1% 4 times per day to low back. Lidocaine 5% topical to low back q 4 hours prn - do not use heating pad to area.  Gabapentin 100 mg PO TID.  Continue with cymbalta 30 mg PO daily. Tramadol 50 mg PO TID prn moderate to severe pain. Hold on hydromorphone for now.  3. Dyspnea -  Respiratory reserve is fragile and he easily exacerbates   Attempt wean from High flow O2.  Antibiotics, steroids, o2 as per hospitalist/intensivist. Fan at bedside prn. Observe for \"panic attacks\" due to shortness of breath. Range dose Lorazepam 0.25 mg-0.5 mg PO/SL or ID q 6 hours prn - use cautiously and hold for " "sedation. Discussed with nursing to consider using lower dose of Lorazepam prior to activity to see if this may control exertional dyspnea better    Hold on hydromorphone for now. Appreciate nursing to increase pt activity as tolerated and monitor for \"panic attacks\".  4. Cachexia/Dehydration - Liberalize pt's diet as tolerated. Allow family to supplement with foods that pt enjoys including chocolate ice cream, pudding. Monitor pt for signs of dehydration and encourage PO intake.  5. Deconditioning - family hoping for restorative goals with return to [facility] for TCU at [place]. PT and OT consults placed 7/17 ok to see 7/18. Discussion during ICU rounds to evaluate TCU rehab.   Suspect he may not have the endurance 2/2 respiratory compromise to tolerate TCU  5. Spiritual Care- Oriented to Spiritual Health and Social Work Services as part of Palliative Care team. Pt is Jainism.  is following.  is following.  6. Care Planning- family met for hospice information meeting 7/16/18. Family hope that pt can recover to his most recent baseline and return to [facility] for TCU/Rehab.  Met with wife and daughter today and they have good understanding and agree with paln of care      Goal of Care: DNR/DNI. Family verbalize that trial of bipap or tube feeding to help pt overcome an acute exacerbation of his illness are currently worth it for the pt due to his perceived QOL, but they would not want these therapies for pt long term to sustain him.     Disease Process/es & Symptoms:  Chaz Soler is a 89 year old patient admitted with symptoms of decreased mental status, hypotension and shortness of breath. He has been treated for acute on chronic hypoxemic respiratory failure due to pulmonary fibrosis and possible PNA, associated \"panic attacks\", acute encephalopathy, aortic stenosis s/p TAVR, chronic diastolic CHF with preserved EF, low back pain with compression fracture, CKD, dementia, HTN, CAD, " hypothyroidism.       This is in the setting of pulmonary fibrosis diagnosed at the [facility] approximately 5 years ago, on chronic oxygen that has been gradually increasing to 4-5 l/min via NC, dementia, diastolic CHF, HTN, CAD, hypothyroidisim, GERD, depression/anxiety, chronic low back pain due to DDD with recent vertebral fracture s/p vertebroplasty L3, L4-L5 stenosis and facet arthropathy, s/p lumbosacral caudal epidural steroid injections.. He has been hospitalized 5 times in the past 10 months for recurrent respiratory failure and episodes of AMS, CHF and SOL/CKD. Prior to admission patient has moved to ICU from TCU for higher level of daily care needs. There has been a decline in daily function including increased time in wc, decreased ability to walk long distances, episodes of panic attacks.  There is a documented unitentional weight loss of 13 lbs over the past 9 months.     Findings & plan of care discussed with: Dr. Maynard, Dr. Hylton, bedside nurse.  Follow-up plan from palliative team: will continue to follow this pt for symptom management and support for family with goals of care discussion.  Thank you for involving us in the patient's care    Lissy YANG, CNS  Pain Management and Palliative Care  [facility]  Pgr: [phone]    Time Spent on this Encounter   I spent 10 minutes (10:40:-10:50 pm) in assessment of the patient, counseling and discussion with pt's wife Kylee and daughter as documented in sections below. Another 15 minutes in review of chart, documentation, coordination of care and discussion with the health care team.    Interval History   Chart reviewed.  Increased o2  On high flow requirement overnight and cxr with increased bilateral interstitial and alveolar infiltrates. . Currently on 87%   Attended an participated in interdiscilinary ICU rounds.    Patient   Up in chair, variable with mental status --confused  Pleasant cooperative   "  PT/OT consults pending  due to pt fatigue.  Denies back pain. Eating well, family assisting with feeding  Incont of bowel and bladder.  Wife and daughter in room  Course of Hospitalization Discussions Data    7/20/18: with CELIA Smith,CNP updated family on condition and rationale for Physical Therapy /OT. They agree that his respiratory status has deteriorated and that dyspneic episodes are more frequent with less endurance related provocation.  I asked Physical Therapy tot evaluate patient for TCU reentry.  If he is able to get off high flow O2, he still may not have the endurance to tolerate the intensity of therapy with TCU setting.  Goal is to promote quality of life .  Family is also in agreement that they do not want to keep coming back to hospital as this is poor or reduced QOL.  Will await outcome of Physical Therapy and if able to get off high flow O2 to see if goals of care discussion needs to take place.   7/19/2018  No family present. Pt tells me today, \" I sure wish I could go back to [facility] . . It gives me hope, that I am going to get better . .. Nothing against you guys\". I acknowledged his thoughts and that it must be scary to be in ICU and wondering what is happening. He said yes, but the care team is great. I asked him if all the interventions and treatments he is going through here are worth it for him to go through to get back to [facility] - he said yes. He denied feeling like he is suffering.  I shared with him that it is important for the care team and his family to know if he feels like he is suffering and it is too hard for him. He acknowledged this. Shared that I had spoken to his wife Kylee yesterday - he smiled. Told him that she was starting to feel better and hoped to visit him today.   7/18/2018  Called pt's wife Kylee at home. She has been ill and has not been able to come to the hospital. She is feeling better today and hopes to visit pt tomorrow. I updated her " "about parts of his day today. She shared that she is glad to know that he is returning to his baseline. \"We've been  for over 60 years . . . I hope to keep him for awhile longer\". I shared with her what I had heard from her son Kwame and daughter Maria Isabel on 7/16 that pt's goal of care is to be DNR DNI, but it would be ok for pt to receive more aggressive therapies such as bipap, antibiotics and other selective interventions for a trial period to see if he improves. Kylee agreed with this. We also discussed that pt has signs that his pulmonary fibrosis is progressing such as increased oxygen needs, increased sleeping, increase in \"panic attacks\", weight loss and more frequent hospitalizations for respiratory failure associated events. She agreed with this as well. She verbalizes that she can decided not to have him return to the hospital and enroll in hospice at any time if she feels that pt is suffering and not receiving benefit from restorative care. A hospice admission does not require pt to again be hospitalized. She agreed with me that the POLST completed 7/5/2018 is valid and I will send a copy with him back to [facility] at discharge. She verbalizes understanding that pt will return to [facility] TCU in the next day or two if he remains stable, and PT and OT have been consulted to work with pt while he is hospitalized.    7/17/2018  No family present. Pt is improving to baseline and expected to transfer out of ICU.    Discussed on July 16, 2018 with Marianna YANG, CNS:   Met with pt's son/alternate HCA Kwame and daughter Maria Isabel in ICU [facility]. Pt's wife Kylee went home to rest after the 3 of them had a  Hospice information meeting with Gifty Navarro RN. They describe pt as pleasant, a witty sense of humor, enjoys company of family \"he exists for us\". Pt has difficulty with short term memory but looks at newspapers and other things to figure out what day it is. Kwame has made a cribbage board for pt that " "reminds him of the steps of the game and they play with him when they visit. They note that pt is being admitted to the hospital more frequently for issues related to his respiratory status, slowly is increasing his need for oxygen over the last 2 years and is close to the maximum o2 flow he can have with his concentrators, is having more frequent episodes of \"panic\" attacks with his breathing even though his o2 sat is WNL, is only able to walk short distance with his walker and o2, spending more time in a w.c.  He has lost weight and they try to encourage his PO intake and include chocolate ice cream, pudding and other things he like to help with his intake. Prior to the last 2 admissions he had been living with his wife, who helped to care for him. He has been compliant with PT/OT at TCU. They agree that pt should be DNR DNI. They along with pt's wife Kylee, have noted that pt was able to recover after being hospitalized and is improving back to his baseline PTA. They hope he can continue to improve and feel that short term trials of bipap or other treatments, even including a keofeed tube would be worth it for pt, if it would help him overcome an acute exacerbation, but not long term. Pt's POLST completed prior to DC with pt's wife and my colleague Lissy Serrato 7/5/2018 was reviewed and is currently up to date with the information shared by Son.      Palliative Symptom Review (0=no symptom/no concern, 1=mild, 2=moderate, 3=severe):     Variable historian due to dementia.   Pt currently denies pain, SOB, PRESCOTT.  Poor appetite no insonima, depression + anxiety associated with PRESCOTT. Negative constipation      Physical Exam   Temp:  [97.8  F (36.6  C)-98.8  F (37.1  C)] 98.1  F (36.7  C)  Heart Rate:  [62-92] 80  Resp:  [13-39] 28  BP: (113-187)/(54-98) 121/72  FiO2 (%):  [50 %-60 %] 50 %  SpO2:  [87 %-100 %] 96 %  136 lbs 14.49 oz   GEN:  Alert, oriented x 2, ( month, place and person) appears " comfortable, NAD.  HEENT:  Normocephalic/atraumatic, no scleral icterus, no nasal discharge, mouth moist.  CV:  RRR, S1, S2; no murmurs or other irregularities noted.  +3 DP/PT pulses bilatererally; no edema BLE.  RESP:  Diminished to auscultation anteriorly bilaterally no rales,  no rhonchi/wheezing/retractions.  Symmetric chest rise on inhalation noted.   ABD:  Rounded, soft, non-tender/non-distended.  +BS. Incont of stool  EXT:  Edema & pulses as noted above.  CMS intact x 4.     M/S:   Non-Tender to palpation including his low back.    SKIN:  Dry to touch, no exanthems noted in the visualized areas. Bruising of UE bilaterally    NEURO: Symmetric strength. 5-/5 dorsiflexion and plantar, Good quad strength   Sensation to touch intact all extremities.   There is no area of allodynia or hyperesthesia.  PAIN BEHAVIOR: Cooperative  Psych:  Normal affect.  Calm, cooperative, conversant appropriately but forgetful with short term memory deficits.    Medications       acetaminophen  500 mg Oral TID    Or     acetaminophen  650 mg Rectal TID     aspirin  81 mg Oral Daily     atorvastatin  20 mg Oral Daily     diclofenac  2 g Transdermal 4x Daily     donepezil  10 mg Oral At Bedtime     DULoxetine  30 mg Oral Daily     furosemide  20 mg Oral BID     gabapentin  100 mg Oral TID     ipratropium - albuterol 0.5 mg/2.5 mg/3 mL  1 vial Nebulization 4x Daily     levothyroxine  88 mcg Oral Daily     metoprolol succinate  50 mg Oral Daily     pantoprazole  40 mg Oral QAM     piperacillin-tazobactam  2.25 g Intravenous Q6H     predniSONE  40 mg Oral Daily     tamsulosin  0.4 mg Oral Daily       Data   Results for orders placed or performed during the hospital encounter of 07/14/18 (from the past 24 hour(s))   CBC with platelets differential   Result Value Ref Range    WBC 13.6 (H) 4.0 - 11.0 10e9/L    RBC Count 3.73 (L) 4.4 - 5.9 10e12/L    Hemoglobin 11.2 (L) 13.3 - 17.7 g/dL    Hematocrit 34.5 (L) 40.0 - 53.0 %    MCV 93 78 - 100  fl    MCH 30.0 26.5 - 33.0 pg    MCHC 32.5 31.5 - 36.5 g/dL    RDW 14.6 10.0 - 15.0 %    Platelet Count 124 (L) 150 - 450 10e9/L    Diff Method Automated Method     % Neutrophils 80.5 %    % Lymphocytes 11.5 %    % Monocytes 6.2 %    % Eosinophils 0.1 %    % Basophils 0.2 %    % Immature Granulocytes 1.5 %    Nucleated RBCs 0 0 /100    Absolute Neutrophil 11.0 (H) 1.6 - 8.3 10e9/L    Absolute Lymphocytes 1.6 0.8 - 5.3 10e9/L    Absolute Monocytes 0.9 0.0 - 1.3 10e9/L    Absolute Eosinophils 0.0 0.0 - 0.7 10e9/L    Absolute Basophils 0.0 0.0 - 0.2 10e9/L    Abs Immature Granulocytes 0.2 0 - 0.4 10e9/L    Absolute Nucleated RBC 0.0    Basic metabolic panel   Result Value Ref Range    Sodium 143 133 - 144 mmol/L    Potassium 4.0 3.4 - 5.3 mmol/L    Chloride 103 94 - 109 mmol/L    Carbon Dioxide 34 (H) 20 - 32 mmol/L    Anion Gap 6 3 - 14 mmol/L    Glucose 105 (H) 70 - 99 mg/dL    Urea Nitrogen 53 (H) 7 - 30 mg/dL    Creatinine 1.70 (H) 0.66 - 1.25 mg/dL    GFR Estimate 38 (L) >60 mL/min/1.7m2    GFR Estimate If Black 46 (L) >60 mL/min/1.7m2    Calcium 9.3 8.5 - 10.1 mg/dL   Blood gas venous and oxyhgb   Result Value Ref Range    Ph Venous 7.47 (H) 7.32 - 7.43 pH    PCO2 Venous 48 40 - 50 mm Hg    PO2 Venous 86 (H) 25 - 47 mm Hg    Bicarbonate Venous 35 (H) 21 - 28 mmol/L    FIO2 4LBipap     Oxyhemoglobin Venous 96 %    Base Excess Venous 9.9 mmol/L

## 2018-07-20 NOTE — PROGRESS NOTES
RT: Received patient on HFNC 40L, 55%. Patient's O2 needs have increased t/o shift. His FIO2 is currently set at 70%. He desats very quickly with activity and eating.    SPO2 90-93%. BBS coarse with scattered crackles. Duoneb given per scheduled.

## 2018-07-21 NOTE — PLAN OF CARE
Problem: Pneumonia (Adult)  Goal: Signs and Symptoms of Listed Potential Problems Will be Absent, Minimized or Managed (Pneumonia)  Signs and symptoms of listed potential problems will be absent, minimized or managed by discharge/transition of care (reference Pneumonia (Adult) CPG).   Outcome: No Change  ICU End of Shift Summary.  For vital signs and complete assessments, please see documentation flowsheets.   Pt sat out in chair using sling. Dr Maynard spoke to daughter regarding comfort care. RN spoke to daughter later to gain insight to dgt's understanding of comfort care. Spoke to daughter that when hiflow removed that pt might tolerate same, and all kids might want to be present. Daughter indicated understanding and was tearful. PT came, but family not present and pt not tolerating any exertion, so PT signed off for now, as daughter seemed to accept plan to go comfort care.  Pt coughing at times with fluid and some diet. Given with caution, and on request.  Pt incontinent of 2 small stool. Less than yesterday.  Attempted to decrease flow of o2, but as pt desaturated had to go back to 30L. SR. BP stable.    Pertinent assessments:   Major Shift Events:   Plan (Upcoming Events):   Discharge/Transfer Needs:     Bedside Shift Report Completed :   Bedside Safety Check Completed:

## 2018-07-21 NOTE — PLAN OF CARE
Problem: Patient Care Overview  Goal: Plan of Care/Patient Progress Review  Discharge Planner OT   Patient plan for discharge: Not discussed.   Current status: Chart reviewed. Spoke with PT who reported RN advises completion of PT/OT orders at this time as skilled therapy is not appropriate and family is making decisions about POC/goals. Will complete order at this time. Please reorder if status/needs change.   Barriers to return to prior living situation: N/A  Recommendations for discharge: Defer to medical team.   Rationale for recommendations: Skilled IP OT not indicated at current time       Entered by: Evangelina Reinoso 07/21/2018 3:05 PM

## 2018-07-21 NOTE — PROGRESS NOTES
"CLINICAL NUTRITION SERVICES  -  ASSESSMENT NOTE      Recommendations Ordered by Registered Dietitian (RD):   Oral nutrition supplements   Malnutrition:   % Weight Loss:> 5% in 1 month (severe malnutrition)  % Intake: Variable, suspect <75% of needs for >1 month  Subcutaneous Fat Loss:Orbital region severe depletion and Upper arm region moderate to severe depletion  Muscle Loss:Temporal region moderate depletion, Clavicle bone region severe depletion, Acromion bone region moderate to severe depletion, Scapular bone region moderate to severe depletion and Dorsal hand region severe depletion    Malnutrition Diagnosis: Severe malnutrition  In Context of:  Chronic illness or disease     REASON FOR ASSESSMENT  Chaz Soler is a 89 year old male seen by the dietitian for LOS    HX of dementia, CKD, CAD, chronic hypoxic respiratory failure    NUTRITION HISTORY  - Information obtained from patient and family - limited diet hx given length of hospitalization, discussions surrounding goals of care, 02 needs  - Food allergies/intolerances: NKFA   - Self selects soft foods, gradual decline in overall intake - admitted from TCU with multiple hospitalizations in <1 year    CURRENT NUTRITION ORDERS  - Diet: Regular  - Supplement: Boost 10-2  Current Intake/Tolerance:  - Per flow sheet review, 0-100% intake for documented meals. RN noted coughing with intake in last 24 hours, providing close supervision during oral intake. Likes Boost and liked Magic cup offered last night. Room service with assist for meal ordering guidance  - Factors affecting nutrition intake include: ?dysphagia, decreased appetite, early satiety and high 02 needs    Diet hx obtained by [facility] 7/6/2018:  - Recent admit and was at TCU.   - Regular diet in TCU setting, regular diet at baseline - chooses softer foods per preference (wears full upper dentures with \"ok\" fit).  Daughter describes she lives \"hours away\" but tries to remind patient/wife to \"stay away " "from salt\".   - Wife cooks meals, daughter-in-law also provides some meals (freezes).    - Patient and family describe an overall decrease in appetite for a period of years.  Not impacting oral intakes recently (longstanding issue).    - Meals consumed daily are quite variable.  Patient enjoys scrambled eggs, casseroles, soups, and especially loves ice cream.  - Sometimes will have Ensure in place of a lunch meal.     PHYSICAL FINDINGS  Observed  See malnutrition section below. BLE not observed - daughter performing hygiene cares  Obtained from Chart/Interdisciplinary Team  Maverick nutrition score: 2; total score: 13  Skin: pale, ecchymotic  Generalized weakness  [facility]    ANTHROPOMETRICS  Height: 5' 8\"  Weight: 59 kg   Body mass index is 20.05 kg/(m^2).  Weight Status:  Normal BMI (low for age)  Ideal body weight: 70 kg +/- 10%, 85% of IBW   Weight History:  7% weight loss in ~1 months, as noted below  Wt Readings from Last 10 Encounters:   07/21/18 59.8 kg (131 lb 13.4 oz)   07/12/18 62.1 kg (136 lb 12.8 oz)   07/09/18 61.2 kg (135 lb)   07/05/18 66 kg (145 lb 8 oz)   07/02/18 64.7 kg (142 lb 9.6 oz)   06/28/18 64.5 kg (142 lb 3.2 oz)   06/25/18 63.9 kg (140 lb 12.8 oz)   06/22/18 63.5 kg (140 lb)   06/21/18 64 kg (141 lb)   06/20/18 64 kg (141 lb)       ASSESSED NUTRITION NEEDS (PER APPROVED PRACTICE GUIDELINES, Dosing weight: 60 kg):  Estimated Energy Needs: 3464-4555 kcals (30-35 Kcal/Kg)  Justification: repletion - question ability to meet needs orally  Estimated Protein Needs: 72-90 grams protein (1.2-1.5 g pro/Kg)  Justification: preservation of lean body mass  Estimated Fluid Needs: >1 mL/Kcal  Justification: maintenance    LABS  Labs reviewed    Recent Labs   Lab Test  07/21/18   0548  07/20/18   0601  07/19/18   0557  07/18/18   0611  07/17/18   0551   POTASSIUM  4.2  4.0  3.8  4.7  4.8     Recent Labs   Lab Test  11/17/14   0642  11/16/14   0720  11/15/14   0427  11/14/14   0403  11/13/14   0350   PHOS  " 1.8*  2.1*  2.5  3.2  2.4*     Recent Labs   Lab Test  07/19/18   0557  10/01/17   0925  01/30/17   0655 11/30/14 11/29/14   MAG  2.4*  2.3  2.3  1.9  2.0     Recent Labs   Lab Test  07/21/18   0548  07/20/18   0601  07/19/18   0557  07/18/18   0611  07/17/18   0551   NA  143  143  141  141  142     Recent Labs   Lab Test  07/21/18   0548  07/20/18   0601  07/19/18   0557  07/18/18   0611  07/17/18   0551   CR  1.93*  1.70*  1.59*  1.63*  1.84*       Recent Labs  Lab 07/21/18  0548 07/20/18  0601 07/19/18  0557 07/18/18  0611 07/17/18  0551 07/16/18  0515 07/15/18  0548 07/14/18  1753 07/14/18  1749   * 105* 121* 154* 137* 144* 158* 125* 122*     Lab Results   Component Value Date    A1C 5.5 11/13/2014    A1C 5.3 11/18/2011    A1C 5.2 11/18/2011    A1C 5.5 02/28/2008       MEDICATIONS  Medications reviewed    MALNUTRITION:  % Weight Loss:> 5% in 1 month (severe malnutrition)  % Intake: Variable, suspect <75% of needs for >1 month  Subcutaneous Fat Loss:Orbital region severe depletion and Upper arm region moderate to severe depletion  Muscle Loss:Temporal region moderate depletion, Clavicle bone region severe depletion, Acromion bone region moderate to severe depletion, Scapular bone region moderate to severe depletion and Dorsal hand region severe depletion  Fluid Retention:None noted    Malnutrition Diagnosis: Severe malnutrition  In Context of:  Chronic illness or disease    NUTRITION DIAGNOSIS:  Malnutrition related to inadequate oral intake and increased needs 2/2 SOB as evidenced by fat and muscle loss, 7% weight loss in 1 month, variable intake since admit    INTERVENTIONS  Recommendations / Nutrition Prescription  Continue diet as ordered (nutrition associate can offer mechanical/dental soft foods) + oral nutrition supplements to increase calorie and protein intake as tolerated    Implementation  Nutrition education: reviewed mechanical/dental soft options, supplements as tolerated  Medical food  supplement: Boost 10-2, Magic cup with meals (as tolerated)  Collaboration and Referral of care: Discussed patient with bedside RN    Goals  Patient to consume >/= 50% of meals TID and >/=1 high protein supplements per day  No signs/symptoms of aspiration      MONITORING AND EVALUATION:  Progress towards goals will be monitored and evaluated per protocol and Practice Guidelines      Karma Abebe RDN, LD, CNSC  Pager - 3rd floor/ICU: [phone]  Pager - All other floors: [phone]  Pager - Weekend/holiday: [phone]  Office: [phone]

## 2018-07-21 NOTE — PROGRESS NOTES
[facility]  Hospitalist Progress Note  Danish Maynard MD, MD 07/21/2018  (Text Page)  Reason for Stay (Diagnosis): Acute on chronic hypoxic respiratory failure secondary to underlying pneumonia and likely with CHF diastolic in exacerbation         Assessment and Plan:      Summary of Stay: Chaz Soler is a 89 year old male who was admitted on 7/14/2018 with SOB. Patient has a PMH of chronic HFpEF, AS s/p TAVR, pulmonary fibrosis and chronic hypoxic resp failure on 5L oxygen at baseline, CAD, Dementia, CKD he was recently hospitalized at this facility for pneumonia and discharged a few days ago.  According to the family, patient was not eating and drinking much, has been sleeping a lot and brought back to the ER and found to have acute on chronic hypoxic respiratory failure and admitted to the hospital.     1.  Acute on chronic hypoxemic respiratory failure with history of chronic pulmonary fibrosis requiring home oxygen at 5 L via nasal cannula prior to this hospitalization.  -still has stable leukocytosis but also remain on corticosteroids orally  .  -He remained afebrile but earlier had some clinical deterioration with his oxygen levels requiring high flow nasal cannula.  Repeat chest x-ray stating worsening infiltrates likely from pneumonia versus edema.  Earlier pro-calcitonin was low.  -He finished 7 day course of intravenous antibiotics with Zosyn  -I had a long discussion with our patient and his daughter was present in attendance at bedside earlier regarding the continuous course of his shortness of breath and likely this persistent hypoxia and easy oxygen desaturation is brought about by his chronic long-standing pulmonary fibrosis that might be reaching end-stage now.  This might be the patient's new baseline in terms of his oxygen needs.  If he truly had a pneumonia earlier than that should be treated already with a week course of IV antibiotics but still with no significant  improvement in terms of his oxygenation.  Trial of aggressive diuresis was also pursued earlier but did not really provide much of improvement as a matter fact might also be detrimental to him given now his kidney function is a bit worsening.  -I recommended to them that our goals of care should focus more on comfort and not be too aggressive with this persistent hypoxia.  His daughter stated that they were already expecting this as they are aware about the progression of his pulmonary fibrosis and numerous hospitalizations in the past.  -//palliative care are working with us and likely will be arranging how to pursue forward if we are changing her goals of care mostly to comfort measures.  -No further escalation of oxygen supplementation and avoid BiPAP.  -He will not be under ICU care setting anymore but continue with current oxygen supplementation as what were doing right now until we are truly on comfort care measures.     2.  Aortic stenosis status post TAVR.  Echo done stable.     3.  Chronic diastolic congestive heart failure with preserved EF.  BNP was normal.  However the past day or so his respiratory parameters are worsening with chest x-ray findings suggestive of increasing edema.  Likely a component also of acute on chronic diastolic CHF in exacerbation.  .     4.  Pulmonary fibrosis, with chronic hypoxic respiratory failure on 5 L oxygen.        5.  Acute encephalopathy on top of dementia-encephalopathy-  resolved.  It was suspected due to his respiratory failure and multiple other health issues.   -Started on low-dose oral Ativan.     6.  Chronic kidney disease.  Baseline is around 1.6-1.8.  Creatinine is at baseline.  -Improved after Vanco was stopped and also patient was off Lasix.  -Lasix given increasing serum creatinine.     7.  Dementia.  On Aricept.     8.  Hypertension.  Blood pressure was elevated last night, increased Toprol-XL to 50 mg p.o. Daily.  -Losartan on hold  "due to renal failure.     9.  Coronary artery disease.  No sign of any active ischemia, had a negative Lexiscan last year in October.     10.  Hypothyroidism on Synthroid.     11.  Epistaxis-earlier with no further recurrence. It is likely related to dry air with oxygen, better with humidified oxygen.  -Watch for further bleeding    Goals of care.    See above discussion.     DVT Prophylaxis:  On PCD's.  Patient also has some mild thrombocytopenia     Code Status: DNR/DNI     Disposition:  Transfer to adult [facility] bed, he will likely need hospice care referral as well.           Interval History (Subjective):      Continuing care today.  Seen and examined.  Chart reviewed.  Case discussed with nursing service and also spoke extensively with the patient's daughter in attendance at bedside.  He still requiring oxygen supplementation but was able to titrate down.  No reported fever spikes.  No reported nausea, vomiting or abdominal pain.  Sleeping this morning and appears comfortable laying in bed.  However our patient remained awake, following simple commands, conversant, and cooperative.  No reported agitation or combativeness.  Remained afebrile.            Physical Exam:      Last Vital Signs:  /73  Pulse 76  Temp 97.3  F (36.3  C) (Axillary)  Resp 21  Ht 1.727 m (5' 8\")  Wt 59.8 kg (131 lb 13.4 oz)  SpO2 95%  BMI 20.05 kg/m2    I/O last 3 completed shifts:  In: 510 [P.O.:410; I.V.:100]  Out: 920 [Urine:920]  Wt Readings from Last 1 Encounters:   07/21/18 59.8 kg (131 lb 13.4 oz)     Vitals:    07/16/18 0600 07/17/18 0400 07/18/18 0600 07/19/18 0630   Weight: 61.5 kg (135 lb 9.3 oz) 61.9 kg (136 lb 7.4 oz) 63 kg (138 lb 14.2 oz) 62.1 kg (136 lb 14.5 oz)    07/21/18 0003   Weight: 59.8 kg (131 lb 13.4 oz)       Constitutional: Awake, alert, cooperative, no apparent distress   Respiratory:  Fair air entry, bilateral mid lung the bases inspiratory crackles on posterior auscultation, no wheezing "   Cardiovascular: Regular rate and rhythm, normal S1 and S2, and no JVD   Abdomen: Normal bowel sounds, soft, non-distended, non-tender   Skin: No rashes, no cyanosis, dry to touch   Neuro: Alert and oriented x3, no weakness, spontaneous and coherent speech   Extremities: Nonpitting edema at the ankle level on both lower extremities, normal range of motion   Other(s): Euthymic mood, not agitated       All other systems: Negative          Medications:      All current medications were reviewed with changes reflected in problem list.         Data:      All new lab and imaging data was reviewed.   Labs:  No results for input(s): CULT in the last 168 hours.  No results for input(s): PH, PHARTERIAL, PO2, [id number], SAT, PCO2, HCO3, BASEEXCESS, DESEAN, BEB in the last 168 hours.    Invalid input(s): [id number]    Recent Labs  Lab 07/21/18  0548 07/20/18  0601 07/19/18  0557   WBC 13.3* 13.6* 22.6*   HGB 12.2* 11.2* 11.2*   HCT 37.5* 34.5* 34.5*   MCV 93 93 94   * 124* 140*       Recent Labs  Lab 07/21/18  0548 07/20/18  0601 07/19/18  0557  07/17/18  0551    143 141  < > 142   POTASSIUM 4.2 4.0 3.8  < > 4.8   CHLORIDE 103 103 105  < > 106   CO2 32 34* 29  < > 30   ANIONGAP 8 6 7  < > 6   * 105* 121*  < > 137*   BUN 67* 53* 50*  < > 58*   CR 1.93* 1.70* 1.59*  < > 1.84*   GFRESTIMATED 33* 38* 41*  < > 35*   GFRESTBLACK 40* 46* 50*  < > 42*   LEXIS 9.5 9.3 9.6  < > 9.3   MAG  --   --  2.4*  --   --    PROTTOTAL  --   --   --   --  6.2*   ALBUMIN  --   --   --   --  2.9*   BILITOTAL  --   --   --   --  0.7   ALKPHOS  --   --   --   --  129   AST  --   --   --   --  12   ALT  --   --   --   --  20   < > = values in this interval not displayed.    Recent Labs  Lab 07/21/18  0548 07/20/18  0601 07/19/18  0557 07/18/18  0611 07/17/18  0551 07/17/18  0411 07/16/18  2337 07/16/18  2024 07/16/18  1549 07/16/18  1150   * 105* 121* 154* 137*  --   --   --   --   --    BGM  --   --   --   --   --  133* 174*  231* 213* 186*       Recent Labs  Lab 07/14/18  1749   INR 0.95       Recent Labs  Lab 07/14/18  1749   TROPI <0.015       Recent Labs  Lab 07/14/18  1843   COLOR Yellow   APPEARANCE Clear   URINEGLC Negative   URINEBILI Negative   URINEKETONE Negative   SG 1.014   UBLD Negative   URINEPH 6.0   PROTEIN Negative   NITRITE Negative   LEUKEST Negative   RBCU 0   WBCU 1      Imaging:   Results for orders placed or performed during the hospital encounter of 07/14/18   XR Chest Port 1 View    Narrative    CHEST PORTABLE ONE VIEW   7/14/2018 6:12 PM     HISTORY: AMS.     COMPARISON: 7/2/2018.      Impression    IMPRESSION: Lung volumes remain low. Background of interstitial  opacities are again seen although the hazy opacities from prior  appears slightly improved which may be due to edema and/or pneumonia.  No significant pleural effusion or pneumothorax. Cardiac silhouette  remains enlarged.    EV BURNETT MD   CT Head w/o Contrast    Narrative    CT SCAN OF THE HEAD WITHOUT CONTRAST   7/14/2018 8:58 PM     HISTORY: AMS.     TECHNIQUE:  Axial images of the head and coronal reformations without  IV contrast material. Radiation dose for this scan was reduced using  automated exposure control, adjustment of the mA and/or kV according  to patient size, or iterative reconstruction technique.    COMPARISON: Head CT 6/3/2018.    FINDINGS: There is no evidence of intracranial hemorrhage, mass, acute  infarct or anomaly. There is generalized atrophy of the brain. There  is low attenuation in the white matter of the cerebral hemispheres  consistent with sequelae of small vessel ischemic disease. 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 Ventricular size is within  normal limits without evidence of hydrocephalus.     The visualized portions of the sinuses and mastoids appear normal. The  bony calvarium and bones of the skull base appear intact.       Impression    IMPRESSION:     1. No evidence of acute intracranial  hemorrhage, mass, or herniation.  2. There is generalized atrophy of the brain. White matter changes are  present in the cerebral hemispheres that are consistent with small  vessel ischemic disease in this age patient.      EV BURNETT MD   US Lower Extremity Venous Bilateral Port    Narrative    US LOWER EXTREMITY VENOUS DUPLEX BILATERAL PORTABLE   7/14/2018 11:26  PM     HISTORY: Shortness of breath. Rule out deep venous thrombosis.    COMPARISON: None.    FINDINGS: Gray-scale, color and Doppler spectral analysis ultrasound  was performed of the legs. Compression and augmentation imaging was  performed.    There is no evidence for deep venous thrombosis. The veins compress  and augment normally.      Impression    IMPRESSION: No deep venous thrombosis.    ALCIRA SINGLETARY MD   XR Chest Port 1 View    Narrative    XR CHEST PORT 1 VW  7/19/2018 5:33 AM     INDICATION: Shortness of breath.    COMPARISON: 7/14/2018.      Impression    IMPRESSION: Patchy bilateral interstitial and alveolar infiltrates  have increased and may be due to pneumonia or edema. Shallow  inspiration. Stable heart size, borderline prominent. Postoperative  changes aortic valve.    DALILA ATKINS MD

## 2018-07-21 NOTE — PLAN OF CARE
Problem: Patient Care Overview  Goal: Plan of Care/Patient Progress Review  Outcome: No Change  ICU End of Shift Summary.  For vital signs and complete assessments, please see documentation flowsheets.     Pertinent assessments: Pt alert, follows commands, however confused at times. Pt denied pain throughout night. Tele shows SR with occasional PACs. BPs and HRs stable. PRN hydralazine given x1. Continues on HiFlo at 50% FiO2 and 30L. Lung sounds diminished with intermittent coarse crackles in bases. Abdomen is soft and non-tender. Soft semi-loose stool throughout night. Voiding in brief throughout night. Did not wake to ask for urinal. Urinal offered throughout night. Continues with bruises throughout. Mepilex to pressure sore on bottom. Turned every 2 hours. Labs in AM   Major Shift Events:  Venous blood gas results of pH 7.49, pO2 at 103. Informed RT. Attempted to turn down HiFlo and flowmeter. Pt immediately de-sated to 79-81%. Pt became anxious. Increased HiFlo back to 50% and flowmeter to 30L. Pt recovered slowly. O2 sats at 90-93% and pt less anxious.   Plan (Upcoming Events): PT, decrease HiFlo and attempt to transition to NC.   Discharge/Transfer Needs: plan to discharge once stable on oxygen and BiPAP PRN?  Bedside Shift Report Completed : yes  Bedside Safety Check Completed: yes          s

## 2018-07-21 NOTE — PROGRESS NOTES
Respiratory Therapy Note        Pt remains on HFNC now at 50% 20 Lpm.  Breath sounds are diminished bilaterally without change post neb.    July 21, 2018 4:07 PM  Jason Lucero

## 2018-07-21 NOTE — PLAN OF CARE
Problem: Patient Care Overview  Goal: Plan of Care/Patient Progress Review  Discharge Planner PT   Patient plan for discharge: Not discussed.   Current status: Chart reviewed. Spoke with RN. RN advises completion of PT/OT orders at this time as skilled therapy is not appropriate and family is making decisions about POC/goals. Will complete order at this time. Please reorder if status/needs change.   Barriers to return to prior living situation: N/A  Recommendations for discharge: Defer to medical team.   Rationale for recommendations: Skilled IP PT not indicated at current time.        Entered by: Nora Menezes 07/21/2018 11:25 AM

## 2018-07-22 NOTE — PROGRESS NOTES
[facility]  Hospitalist Progress Note  Danish Maynard MD, MD 07/22/2018  (Text Page)  Reason for Stay (Diagnosis): Acute on chronic hypoxic respiratory failure secondary to underlying pneumonia and likely with CHF diastolic in exacerbation         Assessment and Plan:      Summary of Stay: Chaz Soler is a 89 year old male who was admitted on 7/14/2018 with SOB. Patient has a PMH of chronic HFpEF, AS s/p TAVR, pulmonary fibrosis and chronic hypoxic resp failure on 5L oxygen at baseline, CAD, Dementia, CKD he was recently hospitalized at this facility for pneumonia and discharged a few days ago.  According to the family, patient was not eating and drinking much, has been sleeping a lot and brought back to the ER and found to have acute on chronic hypoxic respiratory failure and admitted to the hospital.     1.  Acute on chronic hypoxemic respiratory failure with history of chronic pulmonary fibrosis requiring home oxygen at 5 L via nasal cannula prior to this hospitalization.  -still has stable leukocytosis but also remain on corticosteroids orally  -Decrease oral prednisone to 20 mg.  Doubtful if this is still helpful for our patient long-term  -He remained afebrile but earlier had some clinical deterioration with his oxygen levels requiring high flow nasal cannula.  Repeat chest x-ray stating worsening infiltrates likely from pneumonia versus edema.  Earlier pro-calcitonin was low.  -He finished 7 day course of intravenous antibiotics with Zosyn    -I had a long discussion with our patient and his daughter was present in attendance at bedside earlier regarding the tenous course of his stay, persistent  shortness of breath and likely this persistent hypoxia and easy oxygen desaturation is brought about by his chronic long-standing pulmonary fibrosis that likely end-stage now.  This is likely patient's new baseline in terms of his oxygen needs.  If he truly had a pneumonia earlier than that  should be treated already with a week course of IV antibiotics but still with no significant improvement in terms of his oxygenation.  Trial of aggressive diuresis was also pursued earlier but did not really provide much of improvement as a matter fact might also be detrimental to him given now his kidney function is a bit worsening.  -I recommended to them that our goals of care should focus more on comfort and not be too aggressive with this persistent hypoxia.  His daughter stated that they were already expecting this as they are aware about the progression of his pulmonary fibrosis and numerous hospitalizations in the past.  -//palliative care are working with us and likely will be arranging how to pursue forward if we are changing her goals of care mostly to comfort measures.  -No further escalation of oxygen supplementation and avoid BiPAP.  -He will not be under ICU care setting anymore but continue with current oxygen supplementation as what were doing right now until we are truly on comfort care measures.     2.  Aortic stenosis status post TAVR.  Echo done stable.     3.  Chronic diastolic congestive heart failure with preserved EF.  BNP was normal.  However the past day or so his respiratory parameters are worsening with chest x-ray findings suggestive of increasing edema.  Likely a component also of acute on chronic diastolic CHF in exacerbation.  .     4.  Pulmonary fibrosis, with chronic hypoxic respiratory failure on 5 L oxygen.    Likely reaching end-stage process now     5.  Acute encephalopathy on top of dementia-encephalopathy-  resolved.  It was suspected due to his respiratory failure and multiple other health issues.   -Started on low-dose oral Ativan.     6.  Chronic kidney disease.  Baseline is around 1.6-1.8.  Creatinine is at baseline.  -Improved after Vanco was stopped and also patient was off Lasix.  -Lasix held due to increasing serum creatinine.  -Most recent  "creatinine has been decreasing trend but still with azotemia     7.  Dementia.  On Aricept.     8.  Hypertension.  Blood pressure was elevated last night, increased Toprol-XL to 50 mg p.o. Daily.  -Losartan on hold due to renal failure.     9.  Coronary artery disease.  No sign of any active ischemia, had a negative Lexiscan last year in October.     10.  Hypothyroidism on Synthroid.     11.  Epistaxis-earlier with no further recurrence. It is likely related to dry air with oxygen, better with humidified oxygen.  -Watch for further bleeding    Goals of care.    See above discussion.     DVT Prophylaxis:  On PCD's.  Patient also has some mild thrombocytopenia     Code Status: DNR/DNI     Disposition:  Transfer to adult [facility] bed, he will likely need hospice care referral as well.           Interval History (Subjective):      Continuing care today.  Seen and examined.  Chart reviewed.  Case discussed with nursing service.  No significant reported events overnight.  Remained stable on high flow nasal cannula.  He is sleeping but easily arouse as any new complaints.  Main afebrile.  No reported diarrhea, nausea or vomiting.       Physical Exam:      Last Vital Signs:  /68  Pulse 76  Temp 97.5  F (36.4  C) (Axillary)  Resp 16  Ht 1.727 m (5' 8\")  Wt 58.7 kg (129 lb 6.6 oz)  SpO2 100%  BMI 19.68 kg/m2    I/O last 3 completed shifts:  In: 660 [P.O.:660]  Out: 575 [Urine:575]  Wt Readings from Last 1 Encounters:   07/22/18 58.7 kg (129 lb 6.6 oz)     Vitals:    07/17/18 0400 07/18/18 0600 07/19/18 0630 07/21/18 0003   Weight: 61.9 kg (136 lb 7.4 oz) 63 kg (138 lb 14.2 oz) 62.1 kg (136 lb 14.5 oz) 59.8 kg (131 lb 13.4 oz)    07/22/18 0004   Weight: 58.7 kg (129 lb 6.6 oz)       Constitutional: Awake, alert, cooperative, no apparent distress   Respiratory:  Fair air entry, bilateral mid lung the bases inspiratory crackles on posterior auscultation, no wheezing   Cardiovascular: Regular rate and rhythm, normal " S1 and S2, and no JVD   Abdomen: Normal bowel sounds, soft, non-distended, non-tender   Skin: No rashes, no cyanosis, dry to touch   Neuro: Alert and oriented x3, no weakness, spontaneous and coherent speech   Extremities: Nonpitting edema at the ankle level on both lower extremities, normal range of motion   Other(s): Euthymic mood, not agitated       All other systems: Negative          Medications:      All current medications were reviewed with changes reflected in problem list.         Data:      All new lab and imaging data was reviewed.   Labs:  No results for input(s): CULT in the last 168 hours.  No results for input(s): PH, PHARTERIAL, PO2, [id number], SAT, PCO2, HCO3, BASEEXCESS, DESEAN, BEB in the last 168 hours.    Invalid input(s): [id number]    Recent Labs  Lab 07/22/18  0550 07/21/18  0548 07/20/18  0601   WBC 16.4* 13.3* 13.6*   HGB 11.3* 12.2* 11.2*   HCT 35.4* 37.5* 34.5*   MCV 93 93 93   * 116* 124*       Recent Labs  Lab 07/22/18  0550 07/21/18  0548 07/20/18  0601 07/19/18  0557  07/17/18  0551    143 143 141  < > 142   POTASSIUM 4.4 4.2 4.0 3.8  < > 4.8   CHLORIDE 101 103 103 105  < > 106   CO2 31 32 34* 29  < > 30   ANIONGAP 9 8 6 7  < > 6   * 147* 105* 121*  < > 137*   BUN 81* 67* 53* 50*  < > 58*   CR 1.73* 1.93* 1.70* 1.59*  < > 1.84*   GFRESTIMATED 37* 33* 38* 41*  < > 35*   GFRESTBLACK 45* 40* 46* 50*  < > 42*   LEXIS 9.3 9.5 9.3 9.6  < > 9.3   MAG  --   --   --  2.4*  --   --    PHOS 4.8*  --   --   --   --   --    PROTTOTAL  --   --   --   --   --  6.2*   ALBUMIN  --   --   --   --   --  2.9*   BILITOTAL  --   --   --   --   --  0.7   ALKPHOS  --   --   --   --   --  129   AST  --   --   --   --   --  12   ALT  --   --   --   --   --  20   < > = values in this interval not displayed.    Recent Labs  Lab 07/22/18  0550 07/21/18  0548 07/20/18  0601 07/19/18  0557 07/18/18  0611  07/17/18  0411 07/16/18  2337 07/16/18  2024 07/16/18  1549 07/16/18  1150   * 147*  105* 121* 154*  < >  --   --   --   --   --    BGM  --   --   --   --   --   --  133* 174* 231* 213* 186*   < > = values in this interval not displayed.  No results for input(s): INR in the last 168 hours.  No results for input(s): TROPONIN, TROPI, TROPR in the last 168 hours.    Invalid input(s): TROP, TROPONINIES  No results for input(s): COLOR, APPEARANCE, URINEGLC, URINEBILI, URINEKETONE, SG, UBLD, URINEPH, PROTEIN, UROBILINOGEN, NITRITE, LEUKEST, RBCU, WBCU in the last 168 hours.   Imaging:   Results for orders placed or performed during the hospital encounter of 07/14/18   XR Chest Port 1 View    Narrative    CHEST PORTABLE ONE VIEW   7/14/2018 6:12 PM     HISTORY: AMS.     COMPARISON: 7/2/2018.      Impression    IMPRESSION: Lung volumes remain low. Background of interstitial  opacities are again seen although the hazy opacities from prior  appears slightly improved which may be due to edema and/or pneumonia.  No significant pleural effusion or pneumothorax. Cardiac silhouette  remains enlarged.    EV BURNETT MD   CT Head w/o Contrast    Narrative    CT SCAN OF THE HEAD WITHOUT CONTRAST   7/14/2018 8:58 PM     HISTORY: AMS.     TECHNIQUE:  Axial images of the head and coronal reformations without  IV contrast material. Radiation dose for this scan was reduced using  automated exposure control, adjustment of the mA and/or kV according  to patient size, or iterative reconstruction technique.    COMPARISON: Head CT 6/3/2018.    FINDINGS: There is no evidence of intracranial hemorrhage, mass, acute  infarct or anomaly. There is generalized atrophy of the brain. There  is low attenuation in the white matter of the cerebral hemispheres  consistent with sequelae of small vessel ischemic disease. 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 Ventricular size is within  normal limits without evidence of hydrocephalus.     The visualized portions of the sinuses and mastoids appear normal. The  bony calvarium  and bones of the skull base appear intact.       Impression    IMPRESSION:     1. No evidence of acute intracranial hemorrhage, mass, or herniation.  2. There is generalized atrophy of the brain. White matter changes are  present in the cerebral hemispheres that are consistent with small  vessel ischemic disease in this age patient.      EV BURNETT MD   US Lower Extremity Venous Bilateral Port    Narrative    US LOWER EXTREMITY VENOUS DUPLEX BILATERAL PORTABLE   7/14/2018 11:26  PM     HISTORY: Shortness of breath. Rule out deep venous thrombosis.    COMPARISON: None.    FINDINGS: Gray-scale, color and Doppler spectral analysis ultrasound  was performed of the legs. Compression and augmentation imaging was  performed.    There is no evidence for deep venous thrombosis. The veins compress  and augment normally.      Impression    IMPRESSION: No deep venous thrombosis.    ALCIRA SINGLETARY MD   XR Chest Port 1 View    Narrative    XR CHEST PORT 1 VW  7/19/2018 5:33 AM     INDICATION: Shortness of breath.    COMPARISON: 7/14/2018.      Impression    IMPRESSION: Patchy bilateral interstitial and alveolar infiltrates  have increased and may be due to pneumonia or edema. Shallow  inspiration. Stable heart size, borderline prominent. Postoperative  changes aortic valve.    DALILA ATKINS MD

## 2018-07-22 NOTE — PROGRESS NOTES
Respiratory Therapy Note        Pt remains on HFNC currently 50% at 30 Lpm.  His breath sounds remain diminished and slightly coarse.    July 22, 2018 4:17 PM  Jason Lucero

## 2018-07-22 NOTE — PLAN OF CARE
Problem: Pneumonia (Adult)  Goal: Signs and Symptoms of Listed Potential Problems Will be Absent, Minimized or Managed (Pneumonia)  Signs and symptoms of listed potential problems will be absent, minimized or managed by discharge/transition of care (reference Pneumonia (Adult) CPG).   ICU End of Shift Summary.  For vital signs and complete assessments, please see documentation flowsheets.     Pertinent assessments: Alert/confused. Occasionally uses call light. Vitals stable. HF NC 30L and 50%. PRESCOTT, retractions. Lungs coarse. Urinary urgency. Small soft BM X 2. Skin intact. Coccyx pressure point with mepilex. PIV  Major Shift Events: none  Plan (Upcoming Events): transfer to m/s. Palliative consult Mon when family here for POC regarding hospice/cc  Discharge/Transfer Needs: unknown    Bedside Shift Report Completed   Bedside Safety Check Completed

## 2018-07-22 NOTE — PLAN OF CARE
Problem: Patient Care Overview  Goal: Plan of Care/Patient Progress Review  Outcome: No Change  Problem: Patient Care Overview  Goal: Plan of Care/Patient Progress Review  Outcome: No Change  ICU End of Shift Summary.  For vital signs and complete assessments, please see documentation flowsheets.      Pertinent assessments: Pt alert, follows commands, and pleasant throughout night. Disorientated to situation. Pt denied pain. Tele shows SR with occasional PACs. BPs and HRs stable. Continues on HiFlo at 60% FiO2 and 30L. Lung sounds diminished with bilat. Coarse bases. Abdomen is soft and non-tender. Voiding dribbles in brief, and using call light for urinal use throughout night. Continues with bruises throughout entire body. Mepilex to pressure sore on bottom. Turned every 2 hours. Labs in AM     Major Shift Events: None  Plan (Upcoming Events): Family care conference on Monday? All family flying or driving in per wife. Palliative care?  Discharge/Transfer Needs: TBD  Bedside Shift Report Completed : yes  Bedside Safety Check Completed: yes

## 2018-07-23 NOTE — PROGRESS NOTES
"[facility]  Palliative Care Progress Note  Text Page      Assessment & Plan   Chaz Soler is a 89 year old male who was admitted on 7/14/2018. I was asked to see the patient for goals of care.     Recommendations:  1.Decisional Capacity -  Unreliable. Patient has an advance directive dated 12/15/16.  Include patient in decision making as much as possible but involve health care agent attempting consensus with patient. Spouse, Kylee is his primary Health Care Agent and his alternate HCA is son Kwame Soler.  2. Pain- chronic low back pain-- good pain control  Tylenol 500 mg PO TID. Voltaren gel 1% 4 times per day to low back. Lidocaine 5% topical to low back q 4 hours prn - do not use heating pad to area.  Gabapentin 100 mg PO TID.  Continue with cymbalta 30 mg PO daily. Tramadol 50 mg PO TID prn moderate to severe pain. Hydromorphone 0.5-1 mg PO.SL q 2 hours prn moderate to severe pain if pt unable to take other options for pain or for dyspnea.  3. Dyspnea -  Respiratory reserve is fragile and he easily exacerbates   O2 via oxymizer - do not titrate. Steroids, o2 as per hospitalist. Fan at bedside prn. Observe for \"panic attacks\" due to shortness of breath. Lorazepam 0.5 mg PO/SL or WI q 4 hours prn - use cautiously and hold for sedation. Hydromorphone 0.5-1 mg PO/SL/IV q 2 hours prn dyspnea or for moderate to severe pain if pt is unable to take other options.  4. Cachexia/Dehydration - Liberalize pt's diet as tolerated. Allow family to supplement with foods that pt enjoys including chocolate ice cream, pudding.   5. Deconditioning - family has decided to make pt comfort care.    5. Spiritual Care- Oriented to Spiritual Health and Social Work Services as part of Palliative Care team. Pt is Rastafari.  is following.  is following.  6. Care Planning- family has decided to make pt comfort care. Appreciate SWS Corinne following to assist with discharge back to [facility] with [facility] " "Hospice support if pt is stable in 24-48 hours.     Goal of Care: DNR/DNI. 7/23/18 comfort care. Discharge in the next 2 days to [place] with  Hospice if pt is stable and symptoms are managed.     Disease Process/es & Symptoms:  Chaz Soler is a 89 year old patient admitted with symptoms of decreased mental status, hypotension and shortness of breath. He has been treated for acute on chronic hypoxemic respiratory failure due to pulmonary fibrosis and possible PNA, associated \"panic attacks\", acute encephalopathy, aortic stenosis s/p TAVR, chronic diastolic CHF with preserved EF, low back pain with compression fracture, CKD, dementia, HTN, CAD, hypothyroidism.       This is in the setting of pulmonary fibrosis diagnosed at the [facility] approximately 5 years ago, on chronic oxygen that has been gradually increasing to 4-5 l/min via NC, dementia, diastolic CHF, HTN, CAD, hypothyroidisim, GERD, depression/anxiety, chronic low back pain due to DDD with recent vertebral fracture s/p vertebroplasty L3, L4-L5 stenosis and facet arthropathy, s/p lumbosacral caudal epidural steroid injections.. He has been hospitalized 5 times in the past 10 months for recurrent respiratory failure and episodes of AMS, CHF and SOL/CKD. Prior to admission patient has moved to ICU from TCU for higher level of daily care needs. There has been a decline in daily function including increased time in wc, decreased ability to walk long distances, episodes of panic attacks.  There is a documented unitentional weight loss of 13 lbs over the past 9 months.     Findings & plan of care discussed with: Dr. Lemons, Dr. Melo, Medical Student Adrian, bedside nurse Eileen, SWS Corinne.  Follow-up plan from palliative team: will continue to follow this pt for symptom management and support for family with goals of care discussion.  Thank you for involving us in the patient's care    Marianna YANG, CNS  Pain Management and Palliative " "Care  [facility]  Pgr: [phone]    Time Spent on this Encounter   I spent 10 minutes (10:35:-10:45 am) in assessment of the patient, and 65 minutes 2:10-3:15pm in counseling and discussion with pt's wife Kylee, son Kwame, daughter Jannie and her , daughter Maria Isabel and daughter Jessy, and Medical Student Adrian as documented in sections below. Another 20 minutes in review of chart, documentation, coordination of care and discussion with the health care team.    Interval History   Chart reviewed.  Pt up in chair sleeping. Son reports pt ate a big breakfast.  Nursing notes episode of \"not feeling good\" with tachypnea. Pt did not have relief after dose of lorazepam. Pt did have relief after dose of hydromorphone orally.    Continues with leukocytosis, afebrile. Pt has completed course of antibiotic, continues with oral steriods, nebs, no longer receiving diuresis. Weight is below admission weight. Pt continues on HFNC at 30% 50-60L.    Pt's family at bedside, 4th daughter arriving at airport this am. Plan to meet again at 2pm.  Course of Hospitalization Discussions Data    7/23/2018  Met with pt's wife Kylee, son Kwame, daughter Jannie and her , daughter Maria Isabel and daughter Jessy along with Medical Student Adrian. Pts family shared the messages that they had received all weekend from the care team along with Dr. Rodriguez's update today. In summary, pt has been treated for possible PNA with a 7 day course of antibiotic, steroids for inflammation, and lasix diuretic for potential fluid on his lungs.  Despite these treatments, He is still not improving. This is most likely his pulmonary fibrosis. There are no other treatments to try to fix his disease and it is end stage. He is requiring a higher level of oxygen than we can discharge him on, although he is not far from his baseline oxygen requirement after speaking with respiratory. Family verbalize that pt is eating well intermittently, but then describe " "pt as being exhausted and sleeping after he eats, or gets in the chair, or talks for long periods.     We spent additional time talking about next steps, including comfort care and what that looks like for the patient and family while he is in the hospital, and discharging with hospice care. It is uncertain if pt will survive this hospital stay and discharge with hospice, or if he will die. Either way we will continue care focused on his comfort. Stressed that the treatments for hypoxia are opioids and benzodiazepines, and would not recommend titration of his oxygen. Talked about other non-pharmacologic interventions for dyspnea, including positioning, fan for pt comfort. \"I just don't want him to feel like he is drowning\". \"We would have been  66 years exactly 1 month from today\".  Family agree to comfort care for pt. And verbalize understanding of that care in the hospital as well as the process for discharge with hospice care to [facility] if pt remains stable over the next 24-48 hours. They will speak more with SWS tomorrow. Pt's wife Kylee states that she will contact their . All are aware that the chaplains are available for support if needed or desired. \"Were not a very Yazdanism family\" . The family, Medicine Student Adrian and I went back to the room and shared with pt an update about his condition, specifically that despite the treatments given for his breathing, it is not getting better, and there isn't more that can be done. Pt asked if he was going to die now. I said not now, but it could be in the next days to weeks. I reassured him that we would continue to take care of him and  focus on  Managing his dyspnea, keeping his pain under control. If he wants to eat, he should eat. If he wants to get up in the chair and play cribbage, he should do that. We want to focus on what he wants. We hope to be able to discharge him back to [facility] to [facility] for Hospice care.  Pt said, \"sounds good\" and gave " "me a thumbs up.     7/20/18: with Lissy Galeana APRN,CNP updated family on condition and rationale for Physical Therapy /OT. They agree that his respiratory status has deteriorated and that dyspneic episodes are more frequent with less endurance related provocation.  I asked Physical Therapy tot evaluate patient for TCU reentry.  If he is able to get off high flow O2, he still may not have the endurance to tolerate the intensity of therapy with TCU setting.  Goal is to promote quality of life .  Family is also in agreement that they do not want to keep coming back to hospital as this is poor or reduced QOL.  Will await outcome of Physical Therapy and if able to get off high flow O2 to see if goals of care discussion needs to take place.   7/19/2018  No family present. Pt tells me today, \" I sure wish I could go back to [facility] . . It gives me hope, that I am going to get better . .. Nothing against you guys\". I acknowledged his thoughts and that it must be scary to be in ICU and wondering what is happening. He said yes, but the care team is great. I asked him if all the interventions and treatments he is going through here are worth it for him to go through to get back to [facility] - he said yes. He denied feeling like he is suffering.  I shared with him that it is important for the care team and his family to know if he feels like he is suffering and it is too hard for him. He acknowledged this. Shared that I had spoken to his wife Kylee yesterday - he smiled. Told him that she was starting to feel better and hoped to visit him today.   7/18/2018  Called pt's wife Kylee at home. She has been ill and has not been able to come to the hospital. She is feeling better today and hopes to visit pt tomorrow. I updated her about parts of his day today. She shared that she is glad to know that he is returning to his baseline. \"We've been  for over 60 years . . . I hope to keep him for awhile longer\". I " "shared with her what I had heard from her son Kwame and daughter Maria Isabel on 7/16 that pt's goal of care is to be DNR DNI, but it would be ok for pt to receive more aggressive therapies such as bipap, antibiotics and other selective interventions for a trial period to see if he improves. Kylee agreed with this. We also discussed that pt has signs that his pulmonary fibrosis is progressing such as increased oxygen needs, increased sleeping, increase in \"panic attacks\", weight loss and more frequent hospitalizations for respiratory failure associated events. She agreed with this as well. She verbalizes that she can decided not to have him return to the hospital and enroll in hospice at any time if she feels that pt is suffering and not receiving benefit from restorative care. A hospice admission does not require pt to again be hospitalized. She agreed with me that the POLST completed 7/5/2018 is valid and I will send a copy with him back to [facility] at discharge. She verbalizes understanding that pt will return to [facility] TCU in the next day or two if he remains stable, and PT and OT have been consulted to work with pt while he is hospitalized.    7/17/2018  No family present. Pt is improving to baseline and expected to transfer out of ICU.    Discussed on July 16, 2018 with Marianna YANG, CNS:   Met with pt's son/alternate HCA Kwame and daughter Maria Isabel in ICU [facility]. Pt's wife Kylee went home to rest after the 3 of them had a  Hospice information meeting with Gifty Navarro RN. They describe pt as pleasant, a witty sense of humor, enjoys company of family \"he exists for us\". Pt has difficulty with short term memory but looks at newspapers and other things to figure out what day it is. Kwame has made a cribbage board for pt that reminds him of the steps of the game and they play with him when they visit. They note that pt is being admitted to the hospital more frequently for issues related to his respiratory status, " "slowly is increasing his need for oxygen over the last 2 years and is close to the maximum o2 flow he can have with his concentrators, is having more frequent episodes of \"panic\" attacks with his breathing even though his o2 sat is WNL, is only able to walk short distance with his walker and o2, spending more time in a w.c.  He has lost weight and they try to encourage his PO intake and include chocolate ice cream, pudding and other things he like to help with his intake. Prior to the last 2 admissions he had been living with his wife, who helped to care for him. He has been compliant with PT/OT at TCU. They agree that pt should be DNR DNI. They along with pt's wife Kylee, have noted that pt was able to recover after being hospitalized and is improving back to his baseline PTA. They hope he can continue to improve and feel that short term trials of bipap or other treatments, even including a keofeed tube would be worth it for pt, if it would help him overcome an acute exacerbation, but not long term. Pt's POLST completed prior to DC with pt's wife and my colleague Lissy Serrato 7/5/2018 was reviewed and is currently up to date with the information shared by Kwame and Maria Isabel.      Palliative Symptom Review (0=no symptom/no concern, 1=mild, 2=moderate, 3=severe):     Variable historian due to dementia.   Pt currently denies pain, SOB, PRESCOTT, insomnia, depression. + anxiety associated with PRESCOTT. Negative constipation      Physical Exam   Temp:  [97.2  F (36.2  C)-98.7  F (37.1  C)] 97.2  F (36.2  C)  Heart Rate:  [64-85] 68  Resp:  [11-50] 18  BP: (109-185)/(54-93) 152/68  FiO2 (%):  [40 %-60 %] 40 %  SpO2:  [90 %-100 %] 94 %  129 lbs 6.56 oz   GEN:  Weak appearing, drowsy, oriented x 2, appears comfortable, NAD.  HEENT:  Normocephalic/atraumatic, no scleral icterus, no nasal discharge, mouth moist.  CV:  RRR, S1, S2; no murmurs or other irregularities noted.  +3 DP/PT pulses bilatererally; no edema BLE.  RESP:  " Tachypnea. 24-30. Diminished to auscultation anteriorly bilaterally no rales,  no rhonchi/wheezing/retractions.  Symmetric chest rise on inhalation noted.   ABD:  Rounded, soft, non-tender/non-distended.  +BS. Incont of stool  EXT:  Edema & pulses as noted above.  CMS intact x 4.     M/S:   Deferred.  SKIN:  Dry to touch, no exanthems noted in the visualized areas. Bruising of UE bilaterally    NEURO: Symmetric strength. 5/5.  Sensation to touch intact all extremities.   There is no area of allodynia or hyperesthesia.  PAIN BEHAVIOR: Cooperative  Psych:  Withdrawn, weak affect.  Calm, cooperative, conversant appropriately but forgetful with short term memory deficits.    Medications       acetaminophen  500 mg Oral TID    Or     acetaminophen  650 mg Rectal TID     diclofenac  2 g Transdermal 4x Daily     donepezil  10 mg Oral At Bedtime     DULoxetine  30 mg Oral Daily     gabapentin  100 mg Oral TID     ipratropium - albuterol 0.5 mg/2.5 mg/3 mL  1 vial Nebulization 4x Daily     levothyroxine  88 mcg Oral Daily     metoprolol succinate  50 mg Oral Daily     pantoprazole  40 mg Oral QAM     predniSONE  20 mg Oral Daily     tamsulosin  0.4 mg Oral Daily       Data   Results for orders placed or performed during the hospital encounter of 07/14/18 (from the past 24 hour(s))   Platelet count   Result Value Ref Range    Platelet Count 125 (L) 150 - 450 10e9/L

## 2018-07-23 NOTE — PLAN OF CARE
"Problem: Patient Care Overview  Goal: Plan of Care/Patient Progress Review  Outcome: No Change  Problem: Patient Care Overview  Goal: Plan of Care/Patient Progress Review  Outcome: No Change  Problem: Patient Care Overview  Goal: Plan of Care/Patient Progress Review  Outcome: No Change  ICU End of Shift Summary.  For vital signs and complete assessments, please see documentation flowsheets.       Pertinent assessments: Pt alert, follows commands, and pleasant throughout night. Disorientated at times to situation and time. Pt denied pain. Tele shows SR with occasional PACs. HRs stable. Hydralazine give x1 PRN for BPs. Continues on HiFlo at 50%-60% FiO2 and 30L. Lung sounds diminished. Abdomen is soft and non-tender. Dribbles of urine in brief. Urgency with urination and pt will become vocal when needing urinal. Continues with bruises throughout entire body. Mepilex to pressure sore on bottom. Turned every 2 hours. Labs in AM      Major Shift Events: During beginning hours of shift, pt continuously c/o feeling \"uncomfotable\" Night time meds of gabapentin, Aricept, and tylenol did not alleviate uncomfortableness. Ativan given and was ineffective.                                       2200 hour: pt began displaying signs of air hunger. Pt given oral Dilaudid. Med effective.                                           Plan (Upcoming Events): Family care conference on Monday? All family flying or driving in per wife. Palliative care?  Discharge/Transfer Needs: TBD  Bedside Shift Report Completed : yes  Bedside Safety Check Completed: yes      "

## 2018-07-23 NOTE — PROGRESS NOTES
[facility]  Hospitalist Progress Note  Macario Rodriguez MD 07/23/2018    Reason for Stay (Diagnosis): resp failure         Assessment and Plan:      Summary of Stay: Chaz Soler is a 89 year old male who was admitted on 7/14/2018 with SOB. Patient has a PMH of chronic HFpEF, AS s/p TAVR, pulmonary fibrosis and chronic hypoxic resp failure on 5L oxygen at baseline, CAD, Dementia, CKD he was recently hospitalized at this facility for pneumonia and discharged a few days ago.  According to the family, patient was not eating and drinking much, has been sleeping a lot and brought back to the ER and found to have acute on chronic hypoxic respiratory failure and admitted to the hospital.    Despite treatment with antibiotics, steroids, and diuretics the patient has not made a significant improvement in his sx.  It is suspected that much of his sx are likely from pulm fibrosis and no clear reversible process to improve has been found.  Care conference being held today to establish goals of care.  ? Hospice/comfort cares in light of lack of response to treatment attempts this admit and multiple recent hospitalizations      1.  Acute on chronic hypoxemic respiratory failure with history of chronic pulmonary fibrosis requiring home oxygen at 5 L via nasal cannula prior to this hospitalization.  - continues to require high flow oxygen to maintain saturations  -continue prednisone at 20 mg daily for now.  Doubtful if this is still helpful for our patient long-term and does not appear to be on chronic steroids PTA  -He remained afebrile but earlier had some clinical deterioration with his oxygen levels requiring high flow nasal cannula.  Repeat chest x-ray stating worsening infiltrates likely from pneumonia versus edema.   -He finished 6 day course of intravenous antibiotics with Zosyn (7/16-7/21)  - suspect that his ongoing issues with resp failure related to end stage pulm fibrosis    2.  Aortic stenosis  status post TAVR.  Echo done showing normal EF and normal appearing post-TAVR valve      3.  Chronic diastolic congestive heart failure with preserved EF.  BNP was normal.  However the past day or so his respiratory parameters are worsening with chest x-ray findings suggestive of increasing edema.  Likely a component also of acute on chronic diastolic CHF in exacerbation.  has been diuresed without significant improvement in sx      4.  Pulmonary fibrosis, with chronic hypoxic respiratory failure on 5 L oxygen.    Likely reaching end-stage process now      5.  Acute encephalopathy on top of dementia-encephalopathy-  resolved.  It was suspected due to his respiratory failure and multiple other health issues.   -Started on low-dose oral Ativan.      6.  Chronic kidney disease.  Baseline is around 1.6-1.8.  Creatinine is at baseline.    7.  Dementia.  On Aricept.      8.  Hypertension.  Blood pressure was elevated last night, increased Toprol-XL to 50 mg p.o. Daily.  -Losartan on hold due to renal failure.      9.  Coronary artery disease.  No sign of any active ischemia, had a negative Lexiscan last year in October.      10.  Hypothyroidism on Synthroid.      11.  Epistaxis-earlier with no further recurrence. It is likely related to dry air with oxygen, better with humidified oxygen.  -Watch for further bleeding     Goals of care.    See above discussion.      DVT Prophylaxis:  On PCD's.  Patient also has some mild thrombocytopenia      Code Status: DNR/DNI      Disposition:  Transfer to adult [facility] bed, he will likely need hospice care referral as well.       ADDENDUM:  Family met with palliative care today and goal has now changed to comfort care.  Anticipate discharge likely later this week to facility with hospice capability          Interval History (Subjective):      No complaints from patient.  Continues to require high flow O2                  Physical Exam:      Last Vital Signs:  BP (!) 150/132 (BP  "Location: Left arm)  Pulse 76  Temp 97.8  F (36.6  C) (Oral)  Resp (!) 35  Ht 1.727 m (5' 8\")  Wt 58.7 kg (129 lb 6.6 oz)  SpO2 97%  BMI 19.68 kg/m2      Intake/Output Summary (Last 24 hours) at 07/23/18 1412  Last data filed at 07/23/18 1300   Gross per 24 hour   Intake             1290 ml   Output              975 ml   Net              315 ml       Constitutional: Awake, alert, cooperative, family present.  Will answer simple questions.  High flow O2 on   Respiratory: Scattered crackles.  No tachypnea   Cardiovascular: Regular rate and rhythm, normal S1 and S2, and no murmur noted   Abdomen: Normal bowel sounds, soft, non-distended, non-tender   Skin: No rashes, no cyanosis, dry to touch   Neuro: Alert and oriented x3, no weakness, numbness, memory loss   Extremities: No edema, normal range of motion   Other(s):        All other systems: Negative          Medications:      All current medications were reviewed with changes reflected in problem list.         Data:      All new lab and imaging data was reviewed.   Labs:    Recent Labs  Lab 07/23/18  0555 07/22/18  0550   WBC  --  16.4*   HGB  --  11.3*   HCT  --  35.4*   MCV  --  93   * 126*      Imaging:   No results found for this or any previous visit (from the past 24 hour(s)).   "

## 2018-07-23 NOTE — PROGRESS NOTES
RT- Patient transitioned to comfort cares today and taken off HFNC. Placed on oxymizer initally at 10L. Would prefer to keep on 5L or less if possible to be able to transition back to [facility].

## 2018-07-23 NOTE — PROGRESS NOTES
Discharge Planner   Discharge Plans in progress: pt now comfort cares and moving from ICU to 5th floor off High Flow o2  Barriers to discharge plan: Spoke with Pal Care team. Family would like pt to return to [facility] if possible on Hospice.   Follow up plan: pal Care team recommendation is to wait to see how pt does off High flow o2. IF pt does well then set up Hospice meeting for Wed with dc to [facility]  Called [facility] to update. They will reassess. Still need to follow up with family as they may not know placement will be private pay and will need $5,000 deposit        Entered by: Corinne C. White 07/23/2018 3:59 PM

## 2018-07-23 NOTE — PLAN OF CARE
Problem: Patient Care Overview  Goal: Plan of Care/Patient Progress Review  Outcome: No Change  ICU End of Shift Summary.  For vital signs and complete assessments, please see documentation flowsheets.     Pertinent assessments: Oriented to self, place. Reports pain in back, bilat arms. Restless and agitation intermittent. Tele SR, BP elevated. LS dimin, sats low/mid 90's on HiFlo 30L, 50%. Oxymizer 10L @ time of transfer, plan to wean to 5L oxmyizer once settled on MS5. Incontinent to urine/stool. Will request urinal when awake. Tolerating reg diet. Foam dressing to coccyx, barrier cream to buttocks.   Major Shift Events: Palliative consult, transition to comfort cares. Family request to delay Gabapentin dose @1600. HiFlo O2 off, nasal oxymizer  Plan (Upcoming Events): Transfer to MS5, Comfort Cares, Resume Hm O2 settings of 5L, Dilaudid/Ativan for Dyspnea and agitation.  Discharge/Transfer Needs: Hospice cares at discharge    Bedside Shift Report Completed : Phone report called to [facility]  Bedside Safety Check Completed:

## 2018-07-24 NOTE — PROGRESS NOTES
[facility]  Hospitalist Progress Note  Name: Chaz Soler    MRN: [id number]  Provider:  Susi Galvez MD  07/24/18    Initial presenting complaint/issue to hospital (Diagnosis): resp failure.         Assessment and Plan:      Summary of Stay: Chaz Soler is a 89 year old male admitted on 7/14/2018 with SOB. Patient has a PMH of chronic HFpEF, AS s/p TAVR, pulmonary fibrosis and chronic hypoxic resp failure on 5L oxygen at baseline, CAD, Dementia, CKD he was recently hospitalized at this facility for pneumonia and discharged a few days ago.  According to the family, patient was not eating and drinking much, has been sleeping a lot and brought back to the ER and found to have acute on chronic hypoxic respiratory failure and admitted to the hospital.     Despite treatment with antibiotics, steroids, and diuretics the patient has not made a significant improvement in his sx.  It is suspected that much of his sx are likely from pulm fibrosis and no clear reversible process to improve has been found.  Care conference being held today to establish goals of care.  ? Hospice/comfort cares in light of lack of response to treatment attempts this admit and multiple recent hospitalizations      1.  Acute on chronic hypoxemic respiratory failure with history of chronic pulmonary fibrosis requiring home oxygen at 5 L via nasal cannula prior to this hospitalization.  - continues to require high flow oxygen to maintain saturations  -continue prednisone at 20 mg daily for now.  Doubtful if this is still helpful for our patient long-term and does not appear to be on chronic steroids PTA  -He remained afebrile but earlier had some clinical deterioration with his oxygen levels requiring high flow nasal cannula.  Repeat chest x-ray stating worsening infiltrates likely from pneumonia versus edema.   -He finished 6 day course of intravenous antibiotics with Zosyn (7/16-7/21)  - suspect that his ongoing issues with  resp failure related to end stage pulm fibrosis     2.  Aortic stenosis status post TAVR.  Echo done showing normal EF and normal appearing post-TAVR valve      3.  Chronic diastolic congestive heart failure with preserved EF.  BNP was normal.  However the past day or so his respiratory parameters are worsening with chest x-ray findings suggestive of increasing edema.  Likely a component also of acute on chronic diastolic CHF in exacerbation.  has been diuresed without significant improvement in sx      4.  Pulmonary fibrosis, with chronic hypoxic respiratory failure on 5 L oxygen.    Likely reaching end-stage process now      5.  Acute encephalopathy on top of dementia-encephalopathy-  resolved.  It was suspected due to his respiratory failure and multiple other health issues.   -Started on low-dose oral Ativan.      6.  Chronic kidney disease.  Baseline is around 1.6-1.8.  Creatinine is at baseline.     7.  Dementia.  On Aricept.      8.  Hypertension.  Blood pressure was elevated last night, increased Toprol-XL to 50 mg p.o. Daily.  -Losartan on hold due to renal failure.      9.  Coronary artery disease.  No sign of any active ischemia, had a negative Lexiscan last year in October.      10.  Hypothyroidism on Synthroid.      11.  Epistaxis-earlier with no further recurrence. It is likely related to dry air with oxygen, better with humidified oxygen.  -Watch for further bleeding      Goals of care.  Comfort cares.      DVT Prophylaxis:  On PCD's.  Patient also has some mild thrombocytopenia      Code Status: DNR/DNI        Discharge Dispo: TBD.  Estimated Disch Date / # of Days until Discharge: 1-2 days.        Interval History:        No chest pain/SOB/N/V.                  Physical Exam:      Last Vital Signs:  Temp: 97.4  F (36.3  C) Temp src: Oral BP: 153/85 Pulse: 74 Heart Rate: 78 Resp: 20 SpO2: 93 % O2 Device: Oxymizer cannula Oxygen Delivery: 5 LPM    Intake/Output Summary (Last 24 hours) at 07/24/18  0913  Last data filed at 07/24/18 0728   Gross per 24 hour   Intake             1000 ml   Output              975 ml   Net               25 ml     I/O last 3 completed shifts:  In: 1000 [P.O.:1000]  Out: 1050 [Urine:1050]  Vitals:    07/17/18 0400 07/18/18 0600 07/19/18 0630 07/21/18 0003   Weight: 61.9 kg (136 lb 7.4 oz) 63 kg (138 lb 14.2 oz) 62.1 kg (136 lb 14.5 oz) 59.8 kg (131 lb 13.4 oz)    07/22/18 0004   Weight: 58.7 kg (129 lb 6.6 oz)     Gen - Alert, awake, [place], follows commands.  Lungs - + crackles B.  Heart - RR,S1+S2 nml, no m/g/r.  Abd - soft, NT, ND, + BS.  Ext - no edema.           Medications:      All current medications were reviewed.         Data:      All new lab and imaging data was reviewed.   Labs:  No results for input(s): CULT in the last 168 hours.    Recent Labs  Lab 07/23/18  0555 07/22/18  0550 07/21/18  0548 07/20/18  0601   WBC  --  16.4* 13.3* 13.6*   HGB  --  11.3* 12.2* 11.2*   HCT  --  35.4* 37.5* 34.5*   MCV  --  93 93 93   * 126* 116* 124*       Recent Labs  Lab 07/22/18  0550 07/21/18  0548 07/20/18  0601 07/19/18  0557    143 143 141   POTASSIUM 4.4 4.2 4.0 3.8   CHLORIDE 101 103 103 105   CO2 31 32 34* 29   ANIONGAP 9 8 6 7   * 147* 105* 121*   BUN 81* 67* 53* 50*   CR 1.73* 1.93* 1.70* 1.59*   GFRESTIMATED 37* 33* 38* 41*   GFRESTBLACK 45* 40* 46* 50*   LEXIS 9.3 9.5 9.3 9.6   MAG  --   --   --  2.4*   PHOS 4.8*  --   --   --       Recent Imaging:   No results found for this or any previous visit (from the past 24 hour(s)).

## 2018-07-24 NOTE — PLAN OF CARE
Problem: Patient Care Overview  Goal: Plan of Care/Patient Progress Review  Outcome: No Change  Pt remains hospitalized for AMS/PNA/Resp failure.   On comfort cares. Q2 repositioning, last done @ 0500.  Up with assist x 2 with lift.   Condom cath removed, good output.   PIV saline locked.   Currently on 5L, denies any pain.   Will continue to monitor.

## 2018-07-24 NOTE — PROGRESS NOTES
"[facility] RCAT     Date: 7/23/2018  Admission Dx: Encephalopathy   Pulmonary History pulmonary fibrosis, COPD  Home Nebulizer/MDI Use: duoneb QID, albuterol prn  Home Oxygen: 4-5 lpm  Acuity Level (RCAT flow sheet): 3  Aerosol Therapy initiated: continue with duoneb QID, albuterol prn       Pulmonary Hygiene initiated: deep breath and coughing technique      Volume Expansion initiated: IS      Current Oxygen Requirements: 5 lpm  Current SpO2: 95%    Re-evaluation date: 7/26/2018    Patient Education: continuing education on bronchodilator      See \"RT Assessments\" flow sheet for patient assessment scoring and Acuity Level Details.             "

## 2018-07-24 NOTE — PROGRESS NOTES
"[facility]  Palliative Care Progress Note  Text Page      Assessment & Plan   Chaz Soler is a 89 year old male who was admitted on 7/14/2018. I was asked to see the patient for goals of care.     Recommendations:  1.Decisional Capacity -  Unreliable. Patient has an advance directive dated 12/15/16.  Include patient in decision making as much as possible but involve health care agent attempting consensus with patient. Spouse, Kylee is his primary Health Care Agent and his alternate HCA is son Kwame Soler.  2. Pain- chronic low back pain-- good pain control  Tylenol 650 mg PO 4 times per day. Voltaren gel 1% 4 times per day to low back. Lidocaine 5% topical to low back q 4 hours prn - do not use heating pad to area - will not send lidocaine with pt at discharge.  Continue with Gabapentin 100 mg PO TID.  Continue with cymbalta 30 mg PO daily. DC Tramadol. Hydromorphone 0.5-1 mg PO.SL q 2 hours prn moderate to severe pain if pt unable to take other options for pain or for dyspnea.  3. Dyspnea -  Respiratory reserve is fragile and he easily exacerbates   O2 via oxymizer at 5 liters- do not titrate. Steroids, o2 as per hospitalist. Fan at bedside prn. Observe for \"panic attacks\" due to shortness of breath. Lorazepam 0.5 mg PO/SL or AL q 4 hours prn - use cautiously and hold for sedation. Hydromorphone 0.5-1 mg PO/SL/IV q 2 hours prn dyspnea or for moderate to severe pain if pt is unable to take other options.  4. Cachexia/Dehydration - Liberalize pt's diet as tolerated. Allow family to supplement with foods that pt enjoys including chocolate ice cream, pudding.   5. Deconditioning - family has decided to make pt comfort care.    5. Spiritual Care- Oriented to Spiritual Health and Social Work Services as part of Palliative Care team. Pt is Confucianist.  is following.  is following.  6. Care Planning- family has decided to make pt comfort care. Appreciate SWS Corinne following to assist " "with discharge back to [facility] with [facility] support if pt is stable in 24-48 hours.     Goal of Care: DNR/DNI. 7/23/18 comfort care. Discharge 7/25 at 11:30am to [facility] with [facility] if pt is stable and symptoms are managed. Hospice meds ordered. POLST completed. Hospice meds ordered with request for bubble packing.     Disease Process/es & Symptoms:  Chaz Soler is a 89 year old patient admitted with symptoms of decreased mental status, hypotension and shortness of breath. He has been treated for acute on chronic hypoxemic respiratory failure due to pulmonary fibrosis and possible PNA, associated \"panic attacks\", acute encephalopathy, aortic stenosis s/p TAVR, chronic diastolic CHF with preserved EF, low back pain with compression fracture, CKD, dementia, HTN, CAD, hypothyroidism.       This is in the setting of pulmonary fibrosis diagnosed at the [facility] approximately 5 years ago, on chronic oxygen that has been gradually increasing to 4-5 l/min via NC, dementia, diastolic CHF, HTN, CAD, hypothyroidisim, GERD, depression/anxiety, chronic low back pain due to DDD with recent vertebral fracture s/p vertebroplasty L3, L4-L5 stenosis and facet arthropathy, s/p lumbosacral caudal epidural steroid injections.. He has been hospitalized 5 times in the past 10 months for recurrent respiratory failure and episodes of AMS, CHF and SOL/CKD. Prior to admission patient has moved to ICU from TCU for higher level of daily care needs. There has been a decline in daily function including increased time in wc, decreased ability to walk long distances, episodes of panic attacks.  There is a documented unitentional weight loss of 13 lbs over the past 9 months.     Findings & plan of care discussed with:  MERARY Barrios, bedside nurse Jak.  Follow-up plan from palliative team: will continue to follow this pt for symptom management and support for family with goals of care discussion.  Thank you for involving us in the " patient's care    Marianna LOPEZ Nadine APRCATARINO, CNS  Pain Management and Palliative Care  [facility]  Pgr: [phone]    Time Spent on this Encounter   I spent 20 minutes (13:35:-13:55 am) in assessment of the patient, and  in counseling and discussion with pt's wife Kylee, son Kwame, daughter Jannie and her  as documented in sections below. Another 20 minutes in review of chart, documentation, coordination of care and discussion with the health care team.    Interval History   Chart reviewed.  Pt transferred out of ICU to 5th floor last evening. Has rested comfortably in bed with oxymizer at 5 liters/min. Has required intermittent dosing of hydromorphone for back discomfort and dyspnea/anxiety with relief. He is eating well, and then dozes off to sleep. He is pleasant and cooperative. Voiding per urinal.    Family - wife Kylee, daughter Jannie and her  present at bedside.   Course of Hospitalization Discussions Data    7/24/2018  Met at bedside with Kylee, Jannie and Jannie's . Discussed plan of care. Focus on care today is managing his symptoms and ensure he is comfortable. If he continues to remain stable with symptoms managed, he will discharge tomorrow at 11:30am with stretcher to [facility] for hospice care with  hospice. If pt has a sudden change and is deteriorating, we would hold his discharge and focus on his comfort. Reviewed treatment for dyspnea with medications, not by turning up pt's oxygen. Pt is encouraged to be up in a chair if he wants to, eat what he likes, do what he likes to do with his family. Pt has an awareness of what is happening, even though he is forgetful. He smiles and states gratitude. All questions answered. Reviewed POLST and completed with wife Kylee at pt bedside in presence of Jannie and .     7/23/2018  Met with pt's wife Kylee, son Kwame, daughter Jannie and her , daughter Maria Isabel and daughter Jessy along with Medical Student Adrian. Pts family shared  "the messages that they had received all weekend from the care team along with Dr. Rodriguez's update today. In summary, pt has been treated for possible PNA with a 7 day course of antibiotic, steroids for inflammation, and lasix diuretic for potential fluid on his lungs.  Despite these treatments, He is still not improving. This is most likely his pulmonary fibrosis. There are no other treatments to try to fix his disease and it is end stage. He is requiring a higher level of oxygen than we can discharge him on, although he is not far from his baseline oxygen requirement after speaking with respiratory. Family verbalize that pt is eating well intermittently, but then describe pt as being exhausted and sleeping after he eats, or gets in the chair, or talks for long periods.     We spent additional time talking about next steps, including comfort care and what that looks like for the patient and family while he is in the hospital, and discharging with hospice care. It is uncertain if pt will survive this hospital stay and discharge with hospice, or if he will die. Either way we will continue care focused on his comfort. Stressed that the treatments for hypoxia are opioids and benzodiazepines, and would not recommend titration of his oxygen. Talked about other non-pharmacologic interventions for dyspnea, including positioning, fan for pt comfort. \"I just don't want him to feel like he is drowning\". \"We would have been  66 years exactly 1 month from today\".  Family agree to comfort care for pt. And verbalize understanding of that care in the hospital as well as the process for discharge with hospice care to [facility] if pt remains stable over the next 24-48 hours. They will speak more with SWS tomorrow. Pt's wife Kylee states that she will contact their . All are aware that the chaplains are available for support if needed or desired. \"Were not a very Catholic family\" . The family, Medicine Student Adrian and " "I went back to the room and shared with pt an update about his condition, specifically that despite the treatments given for his breathing, it is not getting better, and there isn't more that can be done. Pt asked if he was going to die now. I said not now, but it could be in the next days to weeks. I reassured him that we would continue to take care of him and  focus on  Managing his dyspnea, keeping his pain under control. If he wants to eat, he should eat. If he wants to get up in the chair and play cribbage, he should do that. We want to focus on what he wants. We hope to be able to discharge him back to [facility] to LT for Hospice care.  Pt said, \"sounds good\" and gave me a thumbs up.     7/20/18: with CELIA Smith,CNP updated family on condition and rationale for Physical Therapy /OT. They agree that his respiratory status has deteriorated and that dyspneic episodes are more frequent with less endurance related provocation.  I asked Physical Therapy tot evaluate patient for TCU reentry.  If he is able to get off high flow O2, he still may not have the endurance to tolerate the intensity of therapy with TCU setting.  Goal is to promote quality of life .  Family is also in agreement that they do not want to keep coming back to hospital as this is poor or reduced QOL.  Will await outcome of Physical Therapy and if able to get off high flow O2 to see if goals of care discussion needs to take place.   7/19/2018  No family present. Pt tells me today, \" I sure wish I could go back to [facility] . . It gives me hope, that I am going to get better . .. Nothing against you guys\". I acknowledged his thoughts and that it must be scary to be in ICU and wondering what is happening. He said yes, but the care team is great. I asked him if all the interventions and treatments he is going through here are worth it for him to go through to get back to [facility] - he said yes. He denied feeling like he is " "suffering.  I shared with him that it is important for the care team and his family to know if he feels like he is suffering and it is too hard for him. He acknowledged this. Shared that I had spoken to his wife Kylee yesterday - he smiled. Told him that she was starting to feel better and hoped to visit him today.   7/18/2018  Called pt's wife Kylee at home. She has been ill and has not been able to come to the hospital. She is feeling better today and hopes to visit pt tomorrow. I updated her about parts of his day today. She shared that she is glad to know that he is returning to his baseline. \"We've been  for over 60 years . . . I hope to keep him for awhile longer\". I shared with her what I had heard from her son Kwame and daughter Maria Isabel on 7/16 that pt's goal of care is to be DNR DNI, but it would be ok for pt to receive more aggressive therapies such as bipap, antibiotics and other selective interventions for a trial period to see if he improves. Kylee agreed with this. We also discussed that pt has signs that his pulmonary fibrosis is progressing such as increased oxygen needs, increased sleeping, increase in \"panic attacks\", weight loss and more frequent hospitalizations for respiratory failure associated events. She agreed with this as well. She verbalizes that she can decided not to have him return to the hospital and enroll in hospice at any time if she feels that pt is suffering and not receiving benefit from restorative care. A hospice admission does not require pt to again be hospitalized. She agreed with me that the POLST completed 7/5/2018 is valid and I will send a copy with him back to [facility] at discharge. She verbalizes understanding that pt will return to [facility] TCU in the next day or two if he remains stable, and PT and OT have been consulted to work with pt while he is hospitalized.    7/17/2018  No family present. Pt is improving to baseline and expected to transfer out of ICU.    " "Discussed on July 16, 2018 with Marianna YANG, CNS:   Met with pt's son/alternate HCA Kwame and daughter Maria Isabel in ICU [facility]. Pt's wife Kylee went home to rest after the 3 of them had a  Hospice information meeting with Gifty Navarro RN. They describe pt as pleasant, a witty sense of humor, enjoys company of family \"he exists for us\". Pt has difficulty with short term memory but looks at newspapers and other things to figure out what day it is. Kwame has made a cribbage board for pt that reminds him of the steps of the game and they play with him when they visit. They note that pt is being admitted to the hospital more frequently for issues related to his respiratory status, slowly is increasing his need for oxygen over the last 2 years and is close to the maximum o2 flow he can have with his concentrators, is having more frequent episodes of \"panic\" attacks with his breathing even though his o2 sat is WNL, is only able to walk short distance with his walker and o2, spending more time in a w.c.  He has lost weight and they try to encourage his PO intake and include chocolate ice cream, pudding and other things he like to help with his intake. Prior to the last 2 admissions he had been living with his wife, who helped to care for him. He has been compliant with PT/OT at TCU. They agree that pt should be DNR DNI. They along with pt's wife Kylee, have noted that pt was able to recover after being hospitalized and is improving back to his baseline PTA. They hope he can continue to improve and feel that short term trials of bipap or other treatments, even including a keofeed tube would be worth it for pt, if it would help him overcome an acute exacerbation, but not long term. Pt's POLST completed prior to DC with pt's wife and my colleague Lissy Serrato 7/5/2018 was reviewed and is currently up to date with the information shared by Kwame and Maria Isabel.      Palliative Symptom Review (0=no symptom/no concern, " 1=mild, 2=moderate, 3=severe):     Variable historian due to dementia.   Pt currently denies pain, SOB, PRESCOTT, insomnia, depression. + anxiety associated with PRESCOTT. Negative constipation      Physical Exam   Temp:  [97.4  F (36.3  C)] 97.4  F (36.3  C)  Pulse:  [74] 74  Heart Rate:  [69-79] 78  Resp:  [20-50] 20  BP: (145-153)/(72-85) 153/85  FiO2 (%):  [50 %] 50 %  SpO2:  [91 %-98 %] 93 %  129 lbs 6.56 oz   GEN:  Weak appearing, drowsy, oriented x 2, appears comfortable, NAD.  HEENT:  Normocephalic/atraumatic, no scleral icterus, no nasal discharge, mouth moist.  CV:  RRR, S1, S2; no murmurs or other irregularities noted.  +3 DP/PT pulses bilatererally; no edema BLE.  RESP:  Tachypnea. 24-30. Diminished to auscultation anteriorly bilaterally no rales,  no rhonchi/wheezing/retractions.  Symmetric chest rise on inhalation noted. Oxymizer at 5 liters/min.  ABD:  Rounded, soft, non-tender/non-distended.  +BS. Incont of stool  EXT:  Edema & pulses as noted above.  CMS intact x 4.     M/S:   Deferred.  SKIN:  Dry to touch, no exanthems noted in the visualized areas. Bruising of UE bilaterally    NEURO: Symmetric strength. 5/5.  Sensation to touch intact all extremities.   There is no area of allodynia or hyperesthesia.  PAIN BEHAVIOR: Cooperative  Psych:  Withdrawn, weak affect.  Calm, cooperative, conversant appropriately but forgetful with short term memory deficits.    Medications       acetaminophen  500 mg Oral TID    Or     acetaminophen  650 mg Rectal TID     diclofenac  2 g Transdermal 4x Daily     DULoxetine  30 mg Oral Daily     gabapentin  100 mg Oral TID     ipratropium - albuterol 0.5 mg/2.5 mg/3 mL  1 vial Nebulization 4x Daily     levothyroxine  88 mcg Oral Daily     metoprolol succinate  50 mg Oral Daily     pantoprazole  40 mg Oral QAM     predniSONE  20 mg Oral Daily     senna-docusate  1 tablet Oral BID     sodium chloride (PF)  3 mL Intravenous Q8H     tamsulosin  0.4 mg Oral Daily       Data   No  results found for this or any previous visit (from the past 24 hour(s)).

## 2018-07-24 NOTE — PLAN OF CARE
Problem: Patient Care Overview  Goal: Plan of Care/Patient Progress Review  Outcome: No Change  Ambulatory Status:  Pt up lift. Q2 turns. Patient comfortable and resting most of shift.   VS:  No orders for vital signs. Weaned down from 10L to 5L oxygen.   Pain:  No pain this shift  Resp: LS dim  GI:  no nausea.  Good appetite and on regular diet, PO oral intake.  BS active.  Passing flatus.  Last BM smear this shift.  :  Condom cath in place, 150 mL out  Skin:  Buttocks wound, mepliex in place  Tx:  Comfort cares and oxygen  Consults:  Pallative/WOC  Disposition:  TBD

## 2018-07-24 NOTE — PLAN OF CARE
Problem: Patient Care Overview  Goal: Plan of Care/Patient Progress Review  Pt comfort cares. On 5L oxymixer. Prn sublingual dilaudid given x1 for pain in back/buttocks with relief. Pt stated/appeared very comfortable afterwards. Using urinal in bed. New foam dressing applied to bottom. Repositioning frequently. Calm and cooperative. Family at bedside throughout all shift. Discharge planned for Wednesday.

## 2018-07-24 NOTE — PROGRESS NOTES
SWS  Met with 2 dtrs as well as d/w Dr Galvez.  Pt will dc tomorrow (Wednesday) to [facility] at 1130 via [facility] stretcher.   Hospice will meet them at [facility] at 1300.  Kylee MCCABE at hospice will order 02. [place] aware as are dtrs.  P: DC tomorrow

## 2018-07-24 NOTE — PROGRESS NOTES
NUTRITION BRIEF NOTE  RD following for malnutrition. Upon chart review, comfort care election. RD to sign off. Please page/consult as needed.     Karma Abebe RDN, LD, [facility]  3rd floor/ICU: [phone]  All other floors: [phone]  Weekend/holiday: [phone]  Office: [phone]

## 2018-07-25 NOTE — PROGRESS NOTES
"Transition Communication Hand-off for Care Transitions to Next Level of Care Provider    Name: Chaz Soler  : 1928  MRN #: [id number]  Primary Care Provider: Alirio Fox  Primary Care MD Name: Dominique   Primary Clinic: [address] [facility] [address]  Primary Care Clinic Name: [facility]   Reason for Hospitalization:  Hospital-acquired pneumonia [J18.9]  Altered mental status, unspecified altered mental status type [R41.82]  Admit Date/Time: 2018  5:36 PM  Discharge Date: 18  Payor Source: Payor: MEDICA / Plan: MEDICA PRIME SOLUTION / Product Type: Indemnity /     Readmission Assessment Measure (PARAM) Risk Score/category: ELEVATED         Reason for Communication Hand-off Referral: Fragility  Other PARAM ELEVATED    Discharge Plan:       Concern for non-adherence with plan of care:  NO  Discharge Needs Assessment:  Needs       Most Recent Value    Equipment Currently Used at Home walker, rolling, wheelchair, manual    Skilled Nursing Facility [facility] ([place]) [phone], Fax: [phone]    PAS Number [id number]          Already enrolled in Tele-monitoring program and name of program:  na  Follow-up specialty is recommended: No    Follow-up plan:  No future appointments.    Any outstanding tests or procedures:    Procedures     Future Labs/Procedures    Oxygen - Nasal cannula     Comments:    5 Lpm by nasal cannula to keep O2 sats 92% or greater.              Key Recommendations: Key Recommendations: CTS following for elevated PARAM score 2/2 to readmit status, 3rd admit in 3 months, palli consulted GOC were discussed with a plan to continue with restorative goals for \"one more go around\" the family felt that the pt has \"come around:\" quite quickly and would lke to try the rehab route one more time. However, goals of care changed and Pt was discharged to [facility] with hospice support.      Eugenia Weller    AVS/Discharge Summary is the source of truth; this is a helpful " guide for improved communication of patient story

## 2018-07-25 NOTE — PROGRESS NOTES
"[facility]  Palliative Care Progress Note  Text Page      Assessment & Plan   Chaz Soler is a 89 year old male who was admitted on 7/14/2018. I was asked to see the patient for goals of care.     Recommendations:  1.Decisional Capacity -  Unreliable. Patient has an advance directive dated 12/15/16.  Include patient in decision making as much as possible but involve health care agent attempting consensus with patient. Spouse, Kylee is his primary Health Care Agent and his alternate HCA is son Kwame Soler.  2. Pain- chronic low back pain-- good pain control  Tylenol 650 mg PO 4 times per day. Voltaren gel 1% 4 times per day to low back. Lidocaine 5% topical to low back q 4 hours prn - do not use heating pad to area - will not send lidocaine with pt at discharge.  Continue with Gabapentin 100 mg PO TID.  Continue with cymbalta 30 mg PO daily. DC Tramadol. Hydromorphone 0.5-1 mg PO.SL q 2 hours prn moderate to severe pain if pt unable to take other options for pain or for dyspnea.  3. Dyspnea -  Respiratory reserve is fragile and he easily exacerbates   O2 via NC at 5 liters- do not titrate. Steroids, o2 as per hospitalist. Fan at bedside prn. Observe for \"panic attacks\" due to shortness of breath. Lorazepam 0.5 mg PO/SL or DE q 4 hours prn . Hydromorphone 1-2 mg PO/SL/IV q 2 hours prn dyspnea or for moderate to severe pain if pt is unable to take other options. Ordered saline nasal spray for discharge if pt's o2 is unable to be humidified due to epistaxis episodes during his stay.  4. Cachexia/Dehydration - Liberalize pt's diet as tolerated. Allow family to supplement with foods that pt enjoys including chocolate ice cream, pudding.   5. Deconditioning - family has decided to make pt comfort care.    5. Spiritual Care- Oriented to Spiritual Health and Social Work Services as part of Palliative Care team. Pt is Religion.  is following.  is following.  6. Care Planning- family has " "decided to make pt comfort care. Appreciate SWS Corinne following to assist with discharge back to [facility] with [facility] support if pt is stable in 24-48 hours.     Goal of Care: DNR/DNI. 7/23/18 comfort care. Discharge 7/25 at 11:30am to [facility] with [facility] if pt is stable and symptoms are managed. Hospice meds ordered. POLST completed. Hospice meds ordered with request for bubble packing.     Disease Process/es & Symptoms:  Chaz Soler is a 89 year old patient admitted with symptoms of decreased mental status, hypotension and shortness of breath. He has been treated for acute on chronic hypoxemic respiratory failure due to pulmonary fibrosis and possible PNA, associated \"panic attacks\", acute encephalopathy, aortic stenosis s/p TAVR, chronic diastolic CHF with preserved EF, low back pain with compression fracture, CKD, dementia, HTN, CAD, hypothyroidism.       This is in the setting of pulmonary fibrosis diagnosed at the [facility] approximately 5 years ago, on chronic oxygen that has been gradually increasing to 4-5 l/min via NC, dementia, diastolic CHF, HTN, CAD, hypothyroidisim, GERD, depression/anxiety, chronic low back pain due to DDD with recent vertebral fracture s/p vertebroplasty L3, L4-L5 stenosis and facet arthropathy, s/p lumbosacral caudal epidural steroid injections.. He has been hospitalized 5 times in the past 10 months for recurrent respiratory failure and episodes of AMS, CHF and SOL/CKD. Prior to admission patient has moved to ICU from TCU for higher level of daily care needs. There has been a decline in daily function including increased time in wc, decreased ability to walk long distances, episodes of panic attacks.  There is a documented unitentional weight loss of 13 lbs over the past 9 months.     Findings & plan of care discussed with:  MERARY Barrios, bedside nurse Jak.  Follow-up plan from palliative team: will continue to follow this pt for symptom management and support for " family with goals of care discussion.  Thank you for involving us in the patient's care    Marianna YANG, CNS  Pain Management and Palliative Care  [facility]  Pgr: [phone]    Time Spent on this Encounter   I spent 10 minutes (9:20-9:30 am) in assessment of the patient, and  in counseling and discussion with pt's daughter Maria Isabel and daughter Jessy as documented in sections below. Another 15 minutes in review of chart, documentation, coordination of care and discussion with the health care team.    Interval History   Chart reviewed.  Pt removed his o2 overnight and desaturated. Dyspnea controlled with o2, lorazepam and hydromorphone, positioning. O2 at 5 liters per nc.  Sleepy this am, but comfortable. His daughters Jessy and Maria Isabel are feeding him a magic cup. No new complaints.  Had a shower last evening. Discussed discharge with bedside Nurse Jak who will premedicate pt for dyspnea and discomfort prior to discharge today.  Course of Hospitalization Discussions Data    7/25/2018  Met with Jaxson at bedside. Reviewed treatments for dyspnea with side effect of drowsiness. Drowsiness also part of respiratory failure and fatigue with work of breathing, episode of hypoxia last night.  Maria Isabel and Jessy have questions related to hospice care such as how often pt will be seen, how they manage new symptoms as they arise, if pt can have magic cups after discharge.  Answered their questions and encouraged them to also ask them of the hospice team when they meet at [facility] later today.  No new questions. All are agreeable and understanding that pt will discharge this am.    7/24/2018  Met at bedside with Jannie Pichardo and Jannie's . Discussed plan of care. Focus on care today is managing his symptoms and ensure he is comfortable. If he continues to remain stable with symptoms managed, he will discharge tomorrow at 11:30am with stretcher to [facility] for hospice care with  hospice.  If pt has a sudden change and is deteriorating, we would hold his discharge and focus on his comfort. Reviewed treatment for dyspnea with medications, not by turning up pt's oxygen. Pt is encouraged to be up in a chair if he wants to, eat what he likes, do what he likes to do with his family. Pt has an awareness of what is happening, even though he is forgetful. He smiles and states gratitude. All questions answered. Reviewed POLST and completed with wife Kylee at pt bedside in presence of Jannie and .     7/23/2018  Met with pt's wife Kylee, son Kwame, daughter Jannie and her , daughter Maria Isabel and daughter Jessy along with Medical Student Adrian. Pts family shared the messages that they had received all weekend from the care team along with Dr. Rodriguez's update today. In summary, pt has been treated for possible PNA with a 7 day course of antibiotic, steroids for inflammation, and lasix diuretic for potential fluid on his lungs.  Despite these treatments, He is still not improving. This is most likely his pulmonary fibrosis. There are no other treatments to try to fix his disease and it is end stage. He is requiring a higher level of oxygen than we can discharge him on, although he is not far from his baseline oxygen requirement after speaking with respiratory. Family verbalize that pt is eating well intermittently, but then describe pt as being exhausted and sleeping after he eats, or gets in the chair, or talks for long periods.     We spent additional time talking about next steps, including comfort care and what that looks like for the patient and family while he is in the hospital, and discharging with hospice care. It is uncertain if pt will survive this hospital stay and discharge with hospice, or if he will die. Either way we will continue care focused on his comfort. Stressed that the treatments for hypoxia are opioids and benzodiazepines, and would not recommend titration of his oxygen. Talked about  "other non-pharmacologic interventions for dyspnea, including positioning, fan for pt comfort. \"I just don't want him to feel like he is drowning\". \"We would have been  66 years exactly 1 month from today\".  Family agree to comfort care for pt. And verbalize understanding of that care in the hospital as well as the process for discharge with hospice care to [facility] if pt remains stable over the next 24-48 hours. They will speak more with SWS tomorrow. Pt's wife Kylee states that she will contact their . All are aware that the chaplains are available for support if needed or desired. \"Were not a very Orthodox family\" . The family, Medicine Student Adrian and I went back to the room and shared with pt an update about his condition, specifically that despite the treatments given for his breathing, it is not getting better, and there isn't more that can be done. Pt asked if he was going to die now. I said not now, but it could be in the next days to weeks. I reassured him that we would continue to take care of him and  focus on  Managing his dyspnea, keeping his pain under control. If he wants to eat, he should eat. If he wants to get up in the chair and play cribbage, he should do that. We want to focus on what he wants. We hope to be able to discharge him back to [facility] to [facility] for Hospice care.  Pt said, \"sounds good\" and gave me a thumbs up.     7/20/18: with CELIA Smith,CNP updated family on condition and rationale for Physical Therapy /OT. They agree that his respiratory status has deteriorated and that dyspneic episodes are more frequent with less endurance related provocation.  I asked Physical Therapy tot evaluate patient for TCU reentry.  If he is able to get off high flow O2, he still may not have the endurance to tolerate the intensity of therapy with TCU setting.  Goal is to promote quality of life .  Family is also in agreement that they do not want to keep coming back to " "hospital as this is poor or reduced QOL.  Will await outcome of Physical Therapy and if able to get off high flow O2 to see if goals of care discussion needs to take place.   7/19/2018  No family present. Pt tells me today, \" I sure wish I could go back to [facility] . . It gives me hope, that I am going to get better . .. Nothing against you guys\". I acknowledged his thoughts and that it must be scary to be in ICU and wondering what is happening. He said yes, but the care team is great. I asked him if all the interventions and treatments he is going through here are worth it for him to go through to get back to [facility] - he said yes. He denied feeling like he is suffering.  I shared with him that it is important for the care team and his family to know if he feels like he is suffering and it is too hard for him. He acknowledged this. Shared that I had spoken to his wife Kylee yesterday - he smiled. Told him that she was starting to feel better and hoped to visit him today.   7/18/2018  Called pt's wife Kylee at home. She has been ill and has not been able to come to the hospital. She is feeling better today and hopes to visit pt tomorrow. I updated her about parts of his day today. She shared that she is glad to know that he is returning to his baseline. \"We've been  for over 60 years . . . I hope to keep him for awhile longer\". I shared with her what I had heard from her son Kwame and daughter Maria Isabel on 7/16 that pt's goal of care is to be DNR DNI, but it would be ok for pt to receive more aggressive therapies such as bipap, antibiotics and other selective interventions for a trial period to see if he improves. Kylee agreed with this. We also discussed that pt has signs that his pulmonary fibrosis is progressing such as increased oxygen needs, increased sleeping, increase in \"panic attacks\", weight loss and more frequent hospitalizations for respiratory failure associated events. She agreed with this as well. " "She verbalizes that she can decided not to have him return to the hospital and enroll in hospice at any time if she feels that pt is suffering and not receiving benefit from restorative care. A hospice admission does not require pt to again be hospitalized. She agreed with me that the POLST completed 7/5/2018 is valid and I will send a copy with him back to [facility] at discharge. She verbalizes understanding that [facility] next day or two if he remains stable, and PT and OT have been consulted to work with pt while he is hospitalized.    7/17/2018  No family present. Pt is improving to baseline and expected to transfer out of ICU.    Discussed on July 16, 2018 with Marianna YANG, CNS:   Met with pt's son/alternate HCA Kwame and daughter Maria Isabel in ICU [facility]. Pt's wife Kylee went home to rest after the 3 of them had a  Hospice information meeting with Gitfy Navarro RN. They describe pt as pleasant, a witty sense of humor, enjoys company of family \"he exists for us\". Pt has difficulty with short term memory but looks at newspapers and other things to figure out what day it is. Kwame has made a cribbage board for pt that reminds him of the steps of the game and they play with him when they visit. They note that pt is being admitted to the hospital more frequently for issues related to his respiratory status, slowly is increasing his need for oxygen over the last 2 years and is close to the maximum o2 flow he can have with his concentrators, is having more frequent episodes of \"panic\" attacks with his breathing even though his o2 sat is WNL, is only able to walk short distance with his walker and o2, spending more time in a w.c.  He has lost weight and they try to encourage his PO intake and include chocolate ice cream, pudding and other things he like to help with his intake. Prior to the last 2 admissions he had been living with his wife, who helped to care for him. He has been " compliant with PT/OT at TCU. They agree that pt should be DNR DNI. They along with pt's wife Kylee, have noted that pt was able to recover after being hospitalized and is improving back to his baseline PTA. They hope he can continue to improve and feel that short term trials of bipap or other treatments, even including a keofeed tube would be worth it for pt, if it would help him overcome an acute exacerbation, but not long term. Pt's POLST completed prior to DC with pt's wife and my colleague Lissy Serrato 7/5/2018 was reviewed and is currently up to date with the information shared by Kwame and Maria Isabel.      Palliative Symptom Review (0=no symptom/no concern, 1=mild, 2=moderate, 3=severe):     Variable historian due to dementia.   Pt currently denies pain, SOB, PRESCOTT, insomnia, depression. + anxiety associated with PRESCOTT. Negative constipation      Physical Exam   Resp:  [20-30] 30  SpO2:  [86 %-96 %] 91 %  129 lbs 6.56 oz   GEN:  Weak appearing, drowsy, oriented x 2, appears comfortable, NAD.  HEENT:  Normocephalic/atraumatic, no scleral icterus, no nasal discharge, mouth moist.  CV:  RRR, S1, S2; no murmurs or other irregularities noted.  +3 DP/PT pulses bilatererally; no edema BLE.  RESP:  Tachypnea 24. Diminished to auscultation anteriorly bilaterally no rales,  no rhonchi/wheezing/retractions.  Symmetric chest rise on inhalation noted. Humidified NC at 5 liters/min.  ABD:  Rounded, soft, non-tender/non-distended.  +BS. BM x2 yesterday.  EXT:  Edema & pulses as noted above.  CMS intact x 4.     M/S:   Deferred.  SKIN:  Dry to touch, no exanthems noted in the visualized areas. Bruising of UE bilaterally    NEURO: Symmetric strength. 5/5.  Sensation to touch intact all extremities.   There is no area of allodynia or hyperesthesia.  PAIN BEHAVIOR: Cooperative  Psych:  Withdrawn, weak affect.  Calm, cooperative, less verbal today. Able to make basic needs known. Aware of events, family present.    Medications        acetaminophen  650 mg Oral TID    Or     acetaminophen  650 mg Rectal TID     diclofenac  2 g Transdermal 4x Daily     DULoxetine  30 mg Oral Daily     gabapentin  100 mg Oral TID     ipratropium - albuterol 0.5 mg/2.5 mg/3 mL  1 vial Nebulization 4x Daily     levothyroxine  88 mcg Oral Daily     metoprolol succinate  50 mg Oral Daily     pantoprazole  40 mg Oral QAM     predniSONE  20 mg Oral Daily     senna-docusate  1 tablet Oral BID     sodium chloride (PF)  3 mL Intravenous Q8H     tamsulosin  0.4 mg Oral Daily       Data   No results found for this or any previous visit (from the past 24 hour(s)).

## 2018-07-25 NOTE — DISCHARGE SUMMARY
Discharge Summary  [facility]    Chaz Soler MRN# [id number]   YOB: 1928 Age: 89 year old     Date of Admission:  7/14/2018  Date of Discharge:  7/25/2018  Admitting Physician:  Susi Galvez MD  Discharge Physician: Susi Galvez MD  Discharging Service: Hospitalist     Primary Provider: Alirio Fox  Primary Care Physician Phone Number: [phone]         Discharge Diagnoses/Problem Oriented Hospital Course (Providers):    Chaz Soler was admitted on 7/14/2018 by Susi Galvez MD and I would refer you to their history and physical.  The following problems were addressed during his hospitalization:    1.  Acute on chronic hypoxemic respiratory failure with history of chronic pulmonary fibrosis requiring home oxygen at 5 L via nasal cannula prior to this hospitalization.  - continues to require high flow oxygen to maintain saturations  -continue prednisone at 20 mg daily for now.  Doubtful if this is still helpful for our patient long-term and does not appear to be on chronic steroids PTA  -He remained afebrile but earlier had some clinical deterioration with his oxygen levels requiring high flow nasal cannula.  Repeat chest x-ray stating worsening infiltrates likely from pneumonia versus edema.   -He finished 6 day course of intravenous antibiotics with Zosyn (7/16-7/21)  - suspect that his ongoing issues with resp failure related to end stage pulm fibrosis      2.  Aortic stenosis status post TAVR.  Echo done showing normal EF and normal appearing post-TAVR valve      3.  Chronic diastolic congestive heart failure with preserved EF.  BNP was normal.  However the past day or so his respiratory parameters are worsening with chest x-ray findings suggestive of increasing edema.  Likely a component also of acute on chronic diastolic CHF in exacerbation.  has been diuresed without significant improvement in sx      4.  Pulmonary fibrosis, with chronic hypoxic respiratory  failure on 5 L oxygen.    Likely reaching end-stage process now      5.  Acute encephalopathy on top of dementia-encephalopathy-  resolved.  It was suspected due to his respiratory failure and multiple other health issues.   -Started on low-dose oral Ativan.      6.  Chronic kidney disease.  Baseline is around 1.6-1.8.  Creatinine is at baseline.      7.  Dementia.  On Aricept.      8.  Hypertension.  Blood pressure was elevated last night, increased Toprol-XL to 50 mg p.o. Daily.  -Losartan on hold due to renal failure.      9.  Coronary artery disease.  No sign of any active ischemia, had a negative Lexiscan last year in October.      10.  Hypothyroidism on Synthroid.      11.  Epistaxis-earlier with no further recurrence. It is likely related to dry air with oxygen, better with humidified oxygen.  -Watch for further bleeding    12. Severe malnutrition in context of chronic illness.      Goals of care.  Comfort cares.     Chaz Soler is a 89 year old male admitted on 7/14/2018 with SOB. Patient has a PMH of chronic HFpEF, AS s/p TAVR, pulmonary fibrosis and chronic hypoxic resp failure on 5L oxygen at baseline, CAD, Dementia, CKD he was recently hospitalized at this facility for pneumonia and discharged a few days ago.  According to the family, patient was not eating and drinking much, has been sleeping a lot and brought back to the ER and found to have acute on chronic hypoxic respiratory failure and admitted to the hospital.      Despite treatment with antibiotics, steroids, and diuretics the patient has not made a significant improvement in his sx.  It is suspected that much of his sx are likely from pulm fibrosis and no clear reversible process to improve has been found.  Care conference being held today to establish goals of care.   Hospice/comfort cares in light of lack of response to treatment attempts this admit and multiple recent hospitalizations.           Code Status:      Comfort Care          Important Results:                Pending Results:        Unresulted Labs Ordered in the Past 30 Days of this Admission     No orders found from 5/15/2018 to 7/15/2018.               Discharge Instructions and Follow-Up:      Follow-up Appointments     Follow Up and recommended labs and tests       Follow up with Nursing home physician.  No follow up labs or test are   needed.  Follow up with hospice team.                         Discharge Disposition:      Discharged to LTC.          Discharge Medications:        Current Discharge Medication List      START taking these medications    Details   acetaminophen (TYLENOL) 650 MG Suppository Place 1 suppository (650 mg) rectally every 4 hours as needed for fever or mild pain (Do not exceed 4000 mg total acetaminophen per day.) Unwrap prior to insertion. See also oral tylenol order. Give if pt is unable to take PO.  Qty: 12 suppository, Refills: 0    Comments: All meds need to be bubble wrapped.  Associated Diagnoses: End of life care      atropine 1 % ophthalmic solution Take 2-4 drops by mouth, place under tongue or place inside cheek every 2 hours as needed for other (terminal respiratory secretions) Not for ophthalmic use.  Qty: 5 mL, Refills: 1    Comments: All meds need to be bubble wrapped.  Associated Diagnoses: End of life care      bisacodyl (DULCOLAX) 10 MG Suppository Unwrap and insert 1 suppository rectally twice daily as needed for constipation.  Qty: 4 suppository, Refills: 0    Comments: All meds need to be bubble wrapped.  Associated Diagnoses: End of life care      haloperidol (HALDOL) 2 MG/ML (HIGH CONC) solution Take 0.25-0.5 mLs (0.5-1 mg) by mouth, place under tongue or insert rectally every 6 hours as needed for agitation (nausea)  Qty: 30 mL, Refills: 0    Comments: All meds need to be bubble wrapped.  Associated Diagnoses: End of life care      HYDROmorphone, HIGH CONC, (DILAUDID) 10 mg/mL LIQD oral Take 0.1-0.2 mLs (1-2 mg) by mouth or place  under tongue every 2 hours as needed for moderate to severe pain or other (and/or  shortness of breath)  Qty: 30 mL, Refills: 0    Comments: All meds need to be bubble packed  Associated Diagnoses: End of life care      LORazepam (ATIVAN) 2 MG/ML (HIGH CONC) solution Take 0.25 mLs (0.5 mg) by mouth, place under tongue or insert rectally every 4 hours as needed for anxiety (restlessness)  Qty: 30 mL, Refills: 1    Comments: All meds need to be bubble wrapped.  Associated Diagnoses: End of life care      MEDICATION INSTRUCTION If care facility cannot accept or use ranges, facility is instructed to use lower end of dosing range    Associated Diagnoses: End of life care      sennosides (SENOKOT) 8.6 MG tablet Take 1-2 tablets by mouth 2 times daily  Qty: 100 tablet, Refills: 1    Comments: All meds need to be bubble wrapped.  Associated Diagnoses: End of life care      sodium chloride (OCEAN) 0.65 % nasal spray Spray 1 spray into both nostrils every 4 hours as needed for congestion (if o2 cannot be humidified - prevent epistaxis)  Qty: 1 Bottle, Refills: 0    Associated Diagnoses: End of life care         CONTINUE these medications which have CHANGED    Details   acetaminophen 325 MG TABS Take 650 mg by mouth 4 times daily  Qty: 1 Bottle, Refills: 0    Comments: All meds to be bubble packed.  Associated Diagnoses: End of life care      diclofenac (VOLTAREN) 1 % GEL topical gel Place 2 g onto the skin 4 times daily  Qty: 1 Tube, Refills: 0    Comments: All meds need to be bubble wrapped.  Associated Diagnoses: End of life care      gabapentin (NEURONTIN) 100 MG capsule Take 1 capsule (100 mg) by mouth 3 times daily  Qty: 15 capsule, Refills: 0    Associated Diagnoses: Chronic midline low back pain with left-sided sciatica         CONTINUE these medications which have NOT CHANGED    Details   albuterol (2.5 MG/3ML) 0.083% neb solution Take 2.5 mg by nebulization every 2 hours as needed       aspirin 81 MG tablet Take 1  tablet (81 mg) by mouth daily  Qty: 30 tablet, Refills: 11    Associated Diagnoses: Coronary artery disease involving native coronary artery of native heart without angina pectoris      cyanocobalamin (VITAMIN  B-12) 1000 MCG tablet Take 1,000 mcg by mouth daily      DULoxetine (CYMBALTA) 30 MG EC capsule Take 30 mg by mouth daily      furosemide (LASIX) 20 MG tablet Take 30 mg by mouth daily       ipratropium - albuterol 0.5 mg/2.5 mg/3 mL (DUONEB) 0.5-2.5 (3) MG/3ML neb solution Take 1 vial by nebulization 4 times daily      levothyroxine (SYNTHROID/LEVOTHROID) 88 MCG tablet TAKE ONE TABLET BY MOUTH ONCE DAILY  Qty: 90 tablet, Refills: 3    Associated Diagnoses: Hypothyroidism, unspecified type      metoprolol succinate (TOPROL-XL) 25 MG 24 hr tablet TAKE ONE TABLET BY MOUTH DAILY  Qty: 90 tablet, Refills: 2    Associated Diagnoses: Essential hypertension      Multiple Vitamins-Minerals (MULTIVITAL) TABS Take 1 tablet by mouth daily      pantoprazole (PROTONIX) 40 MG EC tablet TAKE ONE TABLET BY MOUTH EVERY MORNING  Qty: 90 tablet, Refills: 3    Associated Diagnoses: Gastroesophageal reflux disease, esophagitis presence not specified      tamsulosin (FLOMAX) 0.4 MG capsule TAKE ONE CAPSULE BY MOUTH ONCE DAILY  Qty: 90 capsule, Refills: 3    Associated Diagnoses: Benign prostatic hyperplasia with urinary retention         STOP taking these medications       amLODIPine (NORVASC) 10 MG tablet Comments:   Reason for Stopping:         atorvastatin (LIPITOR) 20 MG tablet Comments:   Reason for Stopping:         donepezil (ARICEPT) 10 MG tablet Comments:   Reason for Stopping:         HYDROmorphone (DILAUDID) 2 MG tablet Comments:   Reason for Stopping:         LORazepam (ATIVAN) 0.5 MG tablet Comments:   Reason for Stopping:         losartan (COZAAR) 100 MG tablet Comments:   Reason for Stopping:         traMADol (ULTRAM) 50 MG tablet Comments:   Reason for Stopping:                    Allergies:         Allergies  "  Allergen Reactions     Contrast Dye      SOB, increased Bp, difficulty swallowing. Date 6/3/96 (ipaque contrast)  Was premedicated prior to FRANCK and had no reaction at all (solumedrol and benadryl) 2016  Was premedicated with Methylprednisolone protocol, no reaction 1/25/17     Lisinopril      cough     Oxycodone      Delirium            Consultations This Hospital Stay:      No consultations were requested during this admission          Condition and Physical Exam on Discharge:      Discharge condition: Stable   Discharge vitals: Blood pressure 153/85, pulse 74, temperature 97.4  F (36.3  C), temperature source Oral, resp. rate 30, height 1.727 m (5' 8\"), weight 58.7 kg (129 lb 6.6 oz), SpO2 91 %.     Gen - Alert, awake, [place].  Lungs - + crackles B.  Heart - RR,S1+S2 nml, no m/g/r.  Abd - soft, NT, ND, + BS.  Ext - no edema.           Discharge Orders for Skilled Facility (from Discharge Orders):        After Care Instructions     Advance Diet as Tolerated       Follow this diet upon discharge: Orders Placed This Encounter      Room Service      Snacks/Supplements Adult: Magic Cup; With Meals      Regular Diet Adult            General info for SNF       Length of Stay Estimate: Short Term Care: Estimated # of Days <30  Condition at Discharge: Declining  Level of care:skilled   Rehabilitation Potential: Fair  Admission H&P remains valid and up-to-date: Yes  Recent Chemotherapy: N/A  Use Nursing Home Standing Orders: Yes            Mantoux instructions       Give two-step Mantoux (PPD) Per Facility Policy Yes            Oxygen - Nasal cannula       5 Lpm by nasal cannula to keep O2 sats 92% or greater.                           Rehab orders for Skilled Facility (from Discharge Orders):               Discharge Time:        > 30 mins on chart review, exam and .        Image Results From This Hospital Stay (For Non-EPIC Providers):        Results for orders placed or performed during the hospital encounter of " 07/14/18   XR Chest Port 1 View    Narrative    CHEST PORTABLE ONE VIEW   7/14/2018 6:12 PM     HISTORY: AMS.     COMPARISON: 7/2/2018.      Impression    IMPRESSION: Lung volumes remain low. Background of interstitial  opacities are again seen although the hazy opacities from prior  appears slightly improved which may be due to edema and/or pneumonia.  No significant pleural effusion or pneumothorax. Cardiac silhouette  remains enlarged.    EV BURNETT MD   CT Head w/o Contrast    Narrative    CT SCAN OF THE HEAD WITHOUT CONTRAST   7/14/2018 8:58 PM     HISTORY: AMS.     TECHNIQUE:  Axial images of the head and coronal reformations without  IV contrast material. Radiation dose for this scan was reduced using  automated exposure control, adjustment of the mA and/or kV according  to patient size, or iterative reconstruction technique.    COMPARISON: Head CT 6/3/2018.    FINDINGS: There is no evidence of intracranial hemorrhage, mass, acute  infarct or anomaly. There is generalized atrophy of the brain. There  is low attenuation in the white matter of the cerebral hemispheres  consistent with sequelae of small vessel ischemic disease. 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 Ventricular size is within  normal limits without evidence of hydrocephalus.     The visualized portions of the sinuses and mastoids appear normal. The  bony calvarium and bones of the skull base appear intact.       Impression    IMPRESSION:     1. No evidence of acute intracranial hemorrhage, mass, or herniation.  2. There is generalized atrophy of the brain. White matter changes are  present in the cerebral hemispheres that are consistent with small  vessel ischemic disease in this age patient.      EV BURNETT MD   US Lower Extremity Venous Bilateral Port    Narrative    US LOWER EXTREMITY VENOUS DUPLEX BILATERAL PORTABLE   7/14/2018 11:26  PM     HISTORY: Shortness of breath. Rule out deep venous thrombosis.    COMPARISON:  None.    FINDINGS: Gray-scale, color and Doppler spectral analysis ultrasound  was performed of the legs. Compression and augmentation imaging was  performed.    There is no evidence for deep venous thrombosis. The veins compress  and augment normally.      Impression    IMPRESSION: No deep venous thrombosis.    ALCIRA SINGLETARY MD   XR Chest Port 1 View    Narrative    XR CHEST PORT 1 VW  7/19/2018 5:33 AM     INDICATION: Shortness of breath.    COMPARISON: 7/14/2018.      Impression    IMPRESSION: Patchy bilateral interstitial and alveolar infiltrates  have increased and may be due to pneumonia or edema. Shallow  inspiration. Stable heart size, borderline prominent. Postoperative  changes aortic valve.    DALILA ATKINS MD           Most Recent Lab Results In EPIC (For Non-EPIC Providers):    No results found for this or any previous visit (from the past 24 hour(s)).

## 2018-07-25 NOTE — PLAN OF CARE
Problem: Patient Care Overview  Goal: Plan of Care/Patient Progress Review  Outcome: Adequate for Discharge Date Met: 07/25/18  Pt to D/C to [facility].  Pt/wife/family provided with d/c instructions, including new medications, when medications were last given, and when to take them again. Informed to f/u with hospitalist/hopsice at care facility. Family verbalized understanding of all d/c and f/u instructions.  All questions were answered at this time.  Copy of paperwork sent with family.  Medications sent with transport. pt.  HE stetcher to provide transport.  All personal belongings sent with patients family.

## 2018-07-25 NOTE — TELEPHONE ENCOUNTER
Patient's wife called to let us know that Chaz will be going into Hospice today and will no longer be needing any further follow up.

## 2018-07-25 NOTE — PROGRESS NOTES
PARAM ELEVATED.  dc'd to [facility] LTC with [facility].  Will f/u with nursing home physician and hospice MD.  Deborah RN [phone]

## 2018-07-25 NOTE — PLAN OF CARE
Problem: Patient Care Overview  Goal: Plan of Care/Patient Progress Review  Pt admitted with chronic hypoxemic respiratory failure and end stage pulmonary fibrosis. Transitioned to comfort cares and will discharge today to [facility]. No PIV status. Oriented to self only - confused and restless throughout the night. Reoriented and redirected. Dilaudid sublingual x2 for dyspnea, ativan x1 for restlessness.  in place and O2 titrated up to 7L by oxymask yesterday evening for comfort to reduce dyspneic episodes. Saturations between 88-96% throughout the night on 7L. x1 episode of respiratory distress with desaturation to 60's after patient had removed mask. Oxygen reapplied and saturations again maintained in 90's. Repositioned for comfort, mepilex on coccyx. Will continue to monitor.

## 2018-07-25 NOTE — PLAN OF CARE
Problem: Patient Care Overview  Goal: Plan of Care/Patient Progress Review  Outcome: No Change  Pt comfort cares. O2 oxymask 5-7L. No appetite. Showered this shift, new mepilex applied. SL dilaudid x2. Episode of O2 sats in high 60's, found with O2 mask removed, cont pulse ox placed and mask reapplied, O2 sats came back up to 92%. Potential discharge to [facility] tomorrow.

## 2018-07-25 NOTE — TELEPHONE ENCOUNTER
[place] [facility]    Chaz Soler daughter called.  He has been followed for an EVAR repair of his AAA from 2011.  Also follwed for asymptomatic carotid disease.  His last EVAR follow-up was in June 017 and looked fine.  We were planning a follow-up this summer.      Pt has many co-morbidities include home O2. Also he is 89 years old.  He is going to Hospice Care and thus is cancelling his follow-up appointment.      Suhail Camacho MD

## 2018-07-26 NOTE — PROGRESS NOTES
Clinic Care Coordination Contact    Situation: Patient chart reviewed by care coordinator.    Background: Pt has had ongoing respiratory and cardiovascular concerns resulting in ED visits.      Assessment: SW reviewed chart. Pt dc'd from hospital admitted to hospice.  Hospice will be taking the lead in care coordination and continuity of care.     Plan/Recommendations: SW to f/u as needed. No further outreaches will be made at this time unless a new referral is made or a change in the pt's status occurs. Patient was provided with this writer's contact information and encouraged to call with any questions or concerns.    HARINDER Summers  Clinic Care Coordinator  [facility]  [phone]  [email or web address]

## 2018-07-26 NOTE — LETTER
7/26/2018        RE: Chaz Soler  [address]  [facility] [address]        [facility]  PRIMARY CARE PROVIDER AND CLINIC:  Alirio Fox [address] [place] [address] / [facility] [id number]  Chief Complaint   Patient presents with     Hospital F/U     [facility] Medical Record Number:  [id number]    HPI:    Chaz Soler is a 89 year old  (5/21/1928),admitted to the [facility]  [place] [facility] Hospital  [facility].  Hospital stay 7/14/18 through 7/25/18.  Admitted to this facility for  hospice.  HPI information obtained from: facility chart records, facility staff and [facility] chart review.  Current issues are:       Patient seen today post hospital stay, he was previously followed by  geriatrics in the TCU, and is now moving to LTC and new to this provider.      Discharge Diagnoses/Problem Oriented Hospital Course (Providers):    Chaz Soler was admitted on 7/14/2018 by Susi Galvez MD and I would refer you to their history and physical.  The following problems were addressed during his hospitalization:     1.  Acute on chronic hypoxemic respiratory failure with history of chronic pulmonary fibrosis requiring home oxygen at 5 L via nasal cannula prior to this hospitalization.  - continues to require high flow oxygen to maintain saturations  -continue prednisone at 20 mg daily for now.  Doubtful if this is still helpful for our patient long-term and does not appear to be on chronic steroids PTA  -He remained afebrile but earlier had some clinical deterioration with his oxygen levels requiring high flow nasal cannula.  Repeat chest x-ray stating worsening infiltrates likely from pneumonia versus edema.   -He finished 6 day course of intravenous antibiotics with Zosyn (7/16-7/21)  - suspect that his ongoing issues with resp failure related to end stage pulm fibrosis      2.  Aortic stenosis status post TAVR.  Echo done showing normal EF and  normal appearing post-TAVR valve      3.  Chronic diastolic congestive heart failure with preserved EF.  BNP was normal.  However the past day or so his respiratory parameters are worsening with chest x-ray findings suggestive of increasing edema.  Likely a component also of acute on chronic diastolic CHF in exacerbation.  has been diuresed without significant improvement in sx      4.  Pulmonary fibrosis, with chronic hypoxic respiratory failure on 5 L oxygen.    Likely reaching end-stage process now      5.  Acute encephalopathy on top of dementia-encephalopathy-  resolved.  It was suspected due to his respiratory failure and multiple other health issues.   -Started on low-dose oral Ativan.      6.  Chronic kidney disease.  Baseline is around 1.6-1.8.  Creatinine is at baseline.      7.  Dementia.  On Aricept.      8.  Hypertension.  Blood pressure was elevated last night, increased Toprol-XL to 50 mg p.o. Daily.  -Losartan on hold due to renal failure.      9.  Coronary artery disease.  No sign of any active ischemia, had a negative Lexiscan last year in October.      10.  Hypothyroidism on Synthroid.      11.  Epistaxis-earlier with no further recurrence. It is likely related to dry air with oxygen, better with humidified oxygen.  -Watch for further bleeding      Goals of care.  Comfort cares.      Chaz Soler is a 89 year old male admitted on 7/14/2018 with SOB. Patient has a PMH of chronic HFpEF, AS s/p TAVR, pulmonary fibrosis and chronic hypoxic resp failure on 5L oxygen at baseline, CAD, Dementia, CKD he was recently hospitalized at this facility for pneumonia and discharged a few days ago.  According to the family, patient was not eating and drinking much, has been sleeping a lot and brought back to the ER and found to have acute on chronic hypoxic respiratory failure and admitted to the hospital.      Despite treatment with antibiotics, steroids, and diuretics the patient has not made a significant  improvement in his sx.  It is suspected that much of his sx are likely from pulm fibrosis and no clear reversible process to improve has been found.  Care conference being held today to establish goals of care.   Hospice/comfort cares in light of lack of response to treatment attempts this admit and multiple recent hospitalizations.     Acute on chronic respiratory failure with hypoxia (H)  Pulmonary fibrosis (H)  Hospice care patient  Chronic and end stage pulmonary fibrosis, O2 dependent, on 5L.  Has been hospitalized multiple times for respiratory failure, treated for pneumonia, sent to TCU.  He has made little improvement and with repeated hospitalizations.  Given this information, family elected to pursue comfort cares and admit to hospice.  He recently completed IV antibiotics while inpatient.    Today, patient seen in his room, fairly lethargic.  He does not provide much history of his own.  He is wearing O2 5L, non-labored breathing and no cough noted, afebrile.  He reports his breathing is comfortable, staff without concerns.  He currently has scheduled and prn nebs,and scheduled with prn dilaudid and prn Ativan for air hunger and anxiety with good effect       Aortic valve stenosis, etiology of cardiac valve disease unspecified  Per history, post TAVR, recent echo as noted below    Interpretation Summary     Sinus rhythm was noted. There was significant artifact with the tracings.  Technically difficult study limited views obtained due to the patient was on a  ventilator  The left ventricle is normal in size.  There is mild concentric left ventricular hypertrophy.  Hyperdynamic left ventricular function The visual ejection fraction is  estimated at 65-70%.  Grade I or early diastolic dysfunction.  The right ventricular systolic function is normal.  There is trace mitral regurgitation.  The left atrium is mild to moderately dilated by volume criteria at 41 ml/m2.  There is a bioprosthetic aortic valve. (26  mm Walter Sapian Valve). These  gradients noted below are stable and normal for this prosthetic aortic valve.  Compared to the prior echo study dated 6-2-2017, there have been no major  changes. The LA is more dilated.  The mean AoV pressure gradient is 8.7 mmHg. The peak AoV pressure gradient is  17.0 mmHg. The calculated aortic valve are is 2.5 cm^2.    Acute diastolic congestive heart failure (H)  History of exacerbations that have lead to inpatient admissions.  He has preserved EF.  His respiratory status worsened inpatient with x-ray findings suggestive of increasing edema that did not respond well to diuresis.  He is currently on Lasix 20mg po every day and metoprolol.  No edema noted to his LE.  Previously was going to be [facility], now on hospice and pursuing comfort cares.  Appears to have some orthopnea, does better with his head elevated. No weight gain noted     Wt Readings from Last 5 Encounters:   07/26/18 129 lb (58.5 kg)   07/22/18 129 lb 6.6 oz (58.7 kg)   07/12/18 136 lb 12.8 oz (62.1 kg)   07/09/18 135 lb (61.2 kg)   07/05/18 145 lb 8 oz (66 kg)         Encephalopathy  Dementia without behavioral disturbance, unspecified dementia type  Baseline dementia, orientated to self only.  Previously on Aricept, d'c'd.  Encephalopathy inpatient, resolved.  Per staff, no behaviors since return, sleeping well.      CKD (chronic kidney disease) stage 3  Per history, baseline creatinine 1.6-1.8    Lab Results   Component Value Date    CR 1.73 07/22/2018         Benign essential hypertension  Per history, currently on metoprolol and baby asa, his Losartan was d'c'd due to worsening kidney function.  No reports of chest pain, palpitations, dizziness or lightheadedness    Last 3 BP's: 128/74, 131/73, 160/74  Last 3 HR's: 68, 70, 73      Epistaxis  Noted inpatient, thought to be r/t dry air and O2 use.  No reports of repeat since admission to LTC    Hypothyroidism, unspecified type  Per history, on  synthroid    Lab Results   Component Value Date    TSH 1.01 11/28/2017           CODE STATUS/ADVANCE DIRECTIVES DISCUSSION:   DNR / DNI, Hospice  Patient's living condition: lives with spouse    ALLERGIES:Contrast dye; Lisinopril; and Oxycodone  PAST MEDICAL HISTORY:  has a past medical history of AAA (abdominal aortic aneurysm) (H) (10/5/2011); Anemia (11/30/2011); Aortic stenosis (10/26/2012); CAD (coronary artery disease); Carotid artery disease (H); CHF (congestive heart failure) (H) (12/31/2014); CKD (chronic kidney disease) stage 3, GFR 30-59 ml/min (11/30/2011); COPD (chronic obstructive pulmonary disease) (H); Dementia (8/4/2015); Edema, unspecified edema (1/17/2016); Essential hypertension; Gout (1/06); Hypercalcemia (1/2/2015); Hyperlipidemia LDL goal < 70; Hyperparathyroidism, unspecified (4/22/2015); Hyperplasia of prostate; LEFT LUNG GRANULOMA; Lumbago; Other chronic pain (10/11/2016); Proteinuria (2/8/2012); Pulmonary fibrosis (H); PVD (peripheral vascular disease) (H); Thrombocytopenia (H) (11/30/2011); Unspecified hypothyroidism; and Vitamin D deficiency.  PAST SURGICAL HISTORY:  has a past surgical history that includes Vasectomy; right cataract extraction/IOL (12/03); Colonoscopy (9/02 per patient); Laparoscopic cholecystectomy (Nov 2011); Repair aneurysm abdominal aorta (Nov 2011); Endovascular repair aneurysm abdominal aorta (11/18/2011); Laparoscopic cholecystectomy (11/18/2011); Discectomy lumbar posterior microscopic one level (8/11/2014); and TRANSCATHETER AORTIC VALVE IMPLANT ANEST (N/A, 11/12/2014).  FAMILY HISTORY: family history includes Hypertension in his father and mother.  SOCIAL HISTORY:  reports that he has never smoked. He has never used smokeless tobacco. He reports that he drinks alcohol. He reports that he does not use illicit drugs.    Post Discharge Medication Reconciliation Status: discharge medications reconciled and changed, per note/orders (see AVS).  Current Outpatient  Prescriptions   Medication Sig Dispense Refill     acetaminophen (TYLENOL) 650 MG Suppository Place 1 suppository (650 mg) rectally every 4 hours as needed for fever or mild pain (Do not exceed 4000 mg total acetaminophen per day.) Unwrap prior to insertion. See also oral tylenol order. Give if pt is unable to take PO. 12 suppository 0     acetaminophen 325 MG TABS Take 650 mg by mouth 4 times daily 1 Bottle 0     albuterol (2.5 MG/3ML) 0.083% neb solution Take 2.5 mg by nebulization every 2 hours as needed        aspirin 81 MG tablet Take 1 tablet (81 mg) by mouth daily 30 tablet 11     atropine 1 % ophthalmic solution Take 2-4 drops by mouth, place under tongue or place inside cheek every 2 hours as needed for other (terminal respiratory secretions) Not for ophthalmic use. 5 mL 1     bisacodyl (DULCOLAX) 10 MG Suppository Unwrap and insert 1 suppository rectally twice daily as needed for constipation. 4 suppository 0     diclofenac (VOLTAREN) 1 % GEL topical gel Place 2 g onto the skin 4 times daily 1 Tube 0     DULoxetine (CYMBALTA) 30 MG EC capsule Take 30 mg by mouth daily       gabapentin (NEURONTIN) 100 MG capsule Take 1 capsule (100 mg) by mouth 3 times daily 15 capsule 0     haloperidol (HALDOL) 2 MG/ML (HIGH CONC) solution Take 0.25-0.5 mLs (0.5-1 mg) by mouth, place under tongue or insert rectally every 6 hours as needed for agitation (nausea) 30 mL 0     HYDROMORPHONE HCL PO Take 2 mg by mouth every 6 hours AND 1 mg Q2H PRN       ipratropium - albuterol 0.5 mg/2.5 mg/3 mL (DUONEB) 0.5-2.5 (3) MG/3ML neb solution Take 1 vial by nebulization 4 times daily       levothyroxine (SYNTHROID/LEVOTHROID) 88 MCG tablet TAKE ONE TABLET BY MOUTH ONCE DAILY 90 tablet 3     LORazepam (ATIVAN) 2 MG/ML (HIGH CONC) solution Take 0.25 mLs (0.5 mg) by mouth, place under tongue or insert rectally every 4 hours as needed for anxiety (restlessness) 30 mL 1     MEDICATION INSTRUCTION If care facility cannot accept or use  "ranges, facility is instructed to use lower end of dosing range       metoprolol succinate (TOPROL-XL) 25 MG 24 hr tablet TAKE ONE TABLET BY MOUTH DAILY 90 tablet 2     pantoprazole (PROTONIX) 40 MG EC tablet TAKE ONE TABLET BY MOUTH EVERY MORNING 90 tablet 3     sennosides (SENOKOT) 8.6 MG tablet Take 1 tablet by mouth 2 times daily And 2 tabs QHS       sodium chloride (OCEAN) 0.65 % nasal spray Spray 1 spray into both nostrils every 4 hours as needed for congestion (if o2 cannot be humidified - prevent epistaxis) 1 Bottle 0     tamsulosin (FLOMAX) 0.4 MG capsule TAKE ONE CAPSULE BY MOUTH ONCE DAILY 90 capsule 3       ROS:  Limited secondary to cognitive impairment but today pt reports breathing comfortable, denies pain    Exam:  /74  Pulse 68  Temp 97.9  F (36.6  C)  Resp 17  Ht 5' 8\" (1.727 m)  Wt 129 lb (58.5 kg)  SpO2 95%  BMI 19.61 kg/m2  GENERAL APPEARANCE:  thin, appears ill, lethargic, arouses to touch  EYES:  EOM, conjunctivae, lids, pupils and irises normal, PERRL  NECK:  No adenopathy,masses or thyromegaly  RESP:  diminished breath sounds anteriorally, wearing 5L O2, no cough wheezing or crackles, rr 22, no respiratory distress noted  CV:  Palpation and auscultation of heart done , regular rate and rhythm, no murmur, rub, or gallop, no edema  ABDOMEN:  normal bowel sounds, soft, nontender, no hepatosplenomegaly or other masses  M/S:   Gait and station abnormal generalized weakness, no edema/erythema joints  SKIN:  frail, thin, eccymotic particularly to UE's  NEURO:   no facial asymmetry, sensation to touch intact, speech clear, no tremor  PSYCH:  orientated to person only, calm, insight, judgement and memory impaired    Lab/Diagnostic data:     CBC RESULTS:   Recent Labs   Lab Test  07/23/18   0555  07/22/18   0550  07/21/18   0548   WBC   --   16.4*  13.3*   RBC   --   3.79*  4.04*   HGB   --   11.3*  12.2*   HCT   --   35.4*  37.5*   MCV   --   93  93   MCH   --   29.8  30.2   MCHC   --   " 31.9  32.5   RDW   --   14.6  14.6   PLT  125*  126*  116*       Last Basic Metabolic Panel:  Recent Labs   Lab Test  07/22/18   0550  07/21/18   0548   NA  141  143   POTASSIUM  4.4  4.2   CHLORIDE  101  103   LEXIS  9.3  9.5   CO2  31  32   BUN  81*  67*   CR  1.73*  1.93*   GLC  140*  147*       Liver Function Studies -   Recent Labs   Lab Test  07/17/18   0551  01/12/18   0806  01/07/18   1853   PROTTOTAL  6.2*   --   7.4   ALBUMIN  2.9*   --   3.9   BILITOTAL  0.7   --   0.8   ALKPHOS  129   --   134   AST  12   --   23   ALT  20  20  25       ASSESSMENT/PLAN:  (J96.21) Acute on chronic respiratory failure with hypoxia (H)  (primary encounter diagnosis)  (J84.10) Pulmonary fibrosis (H)  Comment: end stage, now with hospice, appear comfortable today during encounter  Plan: hospice follows  -continue scheduled and prn nebs  -continue prn Ativan for air hunger and anxiety r/t to air hunger, order with 14 day limit and will reassess at that time  -continue O2     (I35.0) Aortic valve stenosis, etiology of cardiac valve disease unspecified  Comment: post TVAR  Plan: monitor     (I50.31) Acute diastolic congestive heart failure (H)  Comment: per history, with worsening respiratory status and increasing edema noted inpatient that did not respond to diuresis, comfort and hospice cares   Plan: continue metoprolol  -lasix 20mg every day  -dilaudid, Ativan for air hunger as above    (G93.40) Encephalopathy  Comment: noted inpatient and resolved  Plan: monitor     (F03.90) Dementia without behavioral disturbance, unspecified dementia type  Comment: advanced, orientated to self only  Plan: appropriate for LTC, staff dispense meds and assist with cares     (I10) Benign essential hypertension  Comment: per history, stable  Plan: continue metoprolol  -ok to d'c Q2hr BP checks, now on hospice     (R04.0) Epistaxis  Comment: noted inpatient, no reoccurrence noted since   Plan: monitor, humidity to O2     (E03.9) Hypothyroidism,  unspecified type  Comment: per history, on synthroid   Plan: continue synthroid       (N18.3) CKD (chronic kidney disease) stage 3, GFR 30-59 ml/min  Comment: per history, no longer monitoring labs  Plan: renally adjust meds, avoid nephrotoxic meds       Total time spent with patient visit at the skilled nursing facility was > 35 minutes including patient visit, review of past records and discussion with NH staff. Greater than 50% of total time spent with counseling and coordinating care     Electronically signed by:  CELIA Briseno CNP                    Sincerely,        CELIA Briseno CNP

## 2018-07-26 NOTE — PROGRESS NOTES
[facility]  PRIMARY CARE PROVIDER AND CLINIC:  Dominique Alirio MEEHAN [address]  / [facility] [address]  Chief Complaint   Patient presents with     Hospital F/U     [place] Medical Record Number:  [id number]    HPI:    Chaz Soler is a 89 year old  (5/21/1928),admitted to the [facility] from Hospital  [facility].  Hospital stay 7/14/18 through 7/25/18.  Admitted to this facility for  hospice.  HPI information obtained from: facility chart records, facility staff and [facility] chart review.  Current issues are:       Patient seen today post hospital stay, he was previously followed by  geriatrics in the TCU, and is now moving to LTC and new to this provider.      Discharge Diagnoses/Problem Oriented Hospital Course (Providers):    Chaz Soler was admitted on 7/14/2018 by Susi Galvez MD and I would refer you to their history and physical.  The following problems were addressed during his hospitalization:     1.  Acute on chronic hypoxemic respiratory failure with history of chronic pulmonary fibrosis requiring home oxygen at 5 L via nasal cannula prior to this hospitalization.  - continues to require high flow oxygen to maintain saturations  -continue prednisone at 20 mg daily for now.  Doubtful if this is still helpful for our patient long-term and does not appear to be on chronic steroids PTA  -He remained afebrile but earlier had some clinical deterioration with his oxygen levels requiring high flow nasal cannula.  Repeat chest x-ray stating worsening infiltrates likely from pneumonia versus edema.   -He finished 6 day course of intravenous antibiotics with Zosyn (7/16-7/21)  - suspect that his ongoing issues with resp failure related to end stage pulm fibrosis      2.  Aortic stenosis status post TAVR.  Echo done showing normal EF and normal appearing post-TAVR valve      3.  Chronic diastolic congestive heart failure with preserved EF.   BNP was normal.  However the past day or so his respiratory parameters are worsening with chest x-ray findings suggestive of increasing edema.  Likely a component also of acute on chronic diastolic CHF in exacerbation.  has been diuresed without significant improvement in sx      4.  Pulmonary fibrosis, with chronic hypoxic respiratory failure on 5 L oxygen.    Likely reaching end-stage process now      5.  Acute encephalopathy on top of dementia-encephalopathy-  resolved.  It was suspected due to his respiratory failure and multiple other health issues.   -Started on low-dose oral Ativan.      6.  Chronic kidney disease.  Baseline is around 1.6-1.8.  Creatinine is at baseline.      7.  Dementia.  On Aricept.      8.  Hypertension.  Blood pressure was elevated last night, increased Toprol-XL to 50 mg p.o. Daily.  -Losartan on hold due to renal failure.      9.  Coronary artery disease.  No sign of any active ischemia, had a negative Lexiscan last year in October.      10.  Hypothyroidism on Synthroid.      11.  Epistaxis-earlier with no further recurrence. It is likely related to dry air with oxygen, better with humidified oxygen.  -Watch for further bleeding      Goals of care.  Comfort cares.      Chaz Soler is a 89 year old male admitted on 7/14/2018 with SOB. Patient has a PMH of chronic HFpEF, AS s/p TAVR, pulmonary fibrosis and chronic hypoxic resp failure on 5L oxygen at baseline, CAD, Dementia, CKD he was recently hospitalized at this facility for pneumonia and discharged a few days ago.  According to the family, patient was not eating and drinking much, has been sleeping a lot and brought back to the ER and found to have acute on chronic hypoxic respiratory failure and admitted to the hospital.      Despite treatment with antibiotics, steroids, and diuretics the patient has not made a significant improvement in his sx.  It is suspected that much of his sx are likely from pulm fibrosis and no clear  reversible process to improve has been found.  Care conference being held today to establish goals of care.   Hospice/comfort cares in light of lack of response to treatment attempts this admit and multiple recent hospitalizations.     Acute on chronic respiratory failure with hypoxia (H)  Pulmonary fibrosis (H)  Hospice care patient  Chronic and end stage pulmonary fibrosis, O2 dependent, on 5L.  Has been hospitalized multiple times for respiratory failure, treated for pneumonia, sent to TCU.  He has made little improvement and with repeated hospitalizations.  Given this information, family elected to pursue comfort cares and admit to hospice.  He recently completed IV antibiotics while inpatient.    Today, patient seen in his room, fairly lethargic.  He does not provide much history of his own.  He is wearing O2 5L, non-labored breathing and no cough noted, afebrile.  He reports his breathing is comfortable, staff without concerns.  He currently has scheduled and prn nebs,and scheduled with prn dilaudid and prn Ativan for air hunger and anxiety with good effect       Aortic valve stenosis, etiology of cardiac valve disease unspecified  Per history, post TAVR, recent echo as noted below    Interpretation Summary     Sinus rhythm was noted. There was significant artifact with the tracings.  Technically difficult study limited views obtained due to the patient was on a  ventilator  The left ventricle is normal in size.  There is mild concentric left ventricular hypertrophy.  Hyperdynamic left ventricular function The visual ejection fraction is  estimated at 65-70%.  Grade I or early diastolic dysfunction.  The right ventricular systolic function is normal.  There is trace mitral regurgitation.  The left atrium is mild to moderately dilated by volume criteria at 41 ml/m2.  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 These  gradients noted below are stable and normal for this prosthetic aortic  valve.  Compared to the prior echo study dated 6-2-2017, there have been no major  changes. The LA is more dilated.  The mean AoV pressure gradient is 8.7 mmHg. The peak AoV pressure gradient is  17.0 mmHg. The calculated aortic valve are is 2.5 cm^2.    Acute diastolic congestive heart failure (H)  History of exacerbations that have lead to inpatient admissions.  He has preserved EF.  His respiratory status worsened inpatient with x-ray findings suggestive of increasing edema that did not respond well to diuresis.  He is currently on Lasix 20mg po every day and metoprolol.  No edema noted to his LE.  Previously was going to be [facility], now on hospice and pursuing comfort cares.  Appears to have some orthopnea, does better with his head elevated. No weight gain noted     Wt Readings from Last 5 Encounters:   07/26/18 129 lb (58.5 kg)   07/22/18 129 lb 6.6 oz (58.7 kg)   07/12/18 136 lb 12.8 oz (62.1 kg)   07/09/18 135 lb (61.2 kg)   07/05/18 145 lb 8 oz (66 kg)         Encephalopathy  Dementia without behavioral disturbance, unspecified dementia type  Baseline dementia, orientated to self only.  Previously on Aricept, d'c'd.  Encephalopathy inpatient, resolved.  Per staff, no behaviors since return, sleeping well.      CKD (chronic kidney disease) stage 3  Per history, baseline creatinine 1.6-1.8    Lab Results   Component Value Date    CR 1.73 07/22/2018         Benign essential hypertension  Per history, currently on metoprolol and baby asa, his Losartan was d'c'd due to worsening kidney function.  No reports of chest pain, palpitations, dizziness or lightheadedness    Last 3 BP's: 128/74, 131/73, 160/74  Last 3 HR's: 68, 70, 73      Epistaxis  Noted inpatient, thought to be r/t dry air and O2 use.  No reports of repeat since admission to LTC    Hypothyroidism, unspecified type  Per history, on synthroid    Lab Results   Component Value Date    TSH 1.01 11/28/2017           CODE STATUS/ADVANCE  DIRECTIVES DISCUSSION:   DNR / DNI, Hospice  Patient's living condition: lives with spouse    ALLERGIES:Contrast dye; Lisinopril; and Oxycodone  PAST MEDICAL HISTORY:  has a past medical history of AAA (abdominal aortic aneurysm) (H) (10/5/2011); Anemia (11/30/2011); Aortic stenosis (10/26/2012); CAD (coronary artery disease); Carotid artery disease (H); CHF (congestive heart failure) (H) (12/31/2014); CKD (chronic kidney disease) stage 3, GFR 30-59 ml/min (11/30/2011); COPD (chronic obstructive pulmonary disease) (H); Dementia (8/4/2015); Edema, unspecified edema (1/17/2016); Essential hypertension; Gout (1/06); Hypercalcemia (1/2/2015); Hyperlipidemia LDL goal < 70; Hyperparathyroidism, unspecified (4/22/2015); Hyperplasia of prostate; LEFT LUNG GRANULOMA; Lumbago; Other chronic pain (10/11/2016); Proteinuria (2/8/2012); Pulmonary fibrosis (H); PVD (peripheral vascular disease) (H); Thrombocytopenia (H) (11/30/2011); Unspecified hypothyroidism; and Vitamin D deficiency.  PAST SURGICAL HISTORY:  has a past surgical history that includes Vasectomy; right cataract extraction/IOL (12/03); Colonoscopy (9/02 per patient); Laparoscopic cholecystectomy (Nov 2011); Repair aneurysm abdominal aorta (Nov 2011); Endovascular repair aneurysm abdominal aorta (11/18/2011); Laparoscopic cholecystectomy (11/18/2011); Discectomy lumbar posterior microscopic one level (8/11/2014); and TRANSCATHETER AORTIC VALVE IMPLANT ANEST (N/A, 11/12/2014).  FAMILY HISTORY: family history includes Hypertension in his father and mother.  SOCIAL HISTORY:  reports that he has never smoked. He has never used smokeless tobacco. He reports that he drinks alcohol. He reports that he does not use illicit drugs.    Post Discharge Medication Reconciliation Status: discharge medications reconciled and changed, per note/orders (see AVS).  Current Outpatient Prescriptions   Medication Sig Dispense Refill     acetaminophen (TYLENOL) 650 MG Suppository Place 1  suppository (650 mg) rectally every 4 hours as needed for fever or mild pain (Do not exceed 4000 mg total acetaminophen per day.) Unwrap prior to insertion. See also oral tylenol order. Give if pt is unable to take PO. 12 suppository 0     acetaminophen 325 MG TABS Take 650 mg by mouth 4 times daily 1 Bottle 0     albuterol (2.5 MG/3ML) 0.083% neb solution Take 2.5 mg by nebulization every 2 hours as needed        aspirin 81 MG tablet Take 1 tablet (81 mg) by mouth daily 30 tablet 11     atropine 1 % ophthalmic solution Take 2-4 drops by mouth, place under tongue or place inside cheek every 2 hours as needed for other (terminal respiratory secretions) Not for ophthalmic use. 5 mL 1     bisacodyl (DULCOLAX) 10 MG Suppository Unwrap and insert 1 suppository rectally twice daily as needed for constipation. 4 suppository 0     diclofenac (VOLTAREN) 1 % GEL topical gel Place 2 g onto the skin 4 times daily 1 Tube 0     DULoxetine (CYMBALTA) 30 MG EC capsule Take 30 mg by mouth daily       gabapentin (NEURONTIN) 100 MG capsule Take 1 capsule (100 mg) by mouth 3 times daily 15 capsule 0     haloperidol (HALDOL) 2 MG/ML (HIGH CONC) solution Take 0.25-0.5 mLs (0.5-1 mg) by mouth, place under tongue or insert rectally every 6 hours as needed for agitation (nausea) 30 mL 0     HYDROMORPHONE HCL PO Take 2 mg by mouth every 6 hours AND 1 mg Q2H PRN       ipratropium - albuterol 0.5 mg/2.5 mg/3 mL (DUONEB) 0.5-2.5 (3) MG/3ML neb solution Take 1 vial by nebulization 4 times daily       levothyroxine (SYNTHROID/LEVOTHROID) 88 MCG tablet TAKE ONE TABLET BY MOUTH ONCE DAILY 90 tablet 3     LORazepam (ATIVAN) 2 MG/ML (HIGH CONC) solution Take 0.25 mLs (0.5 mg) by mouth, place under tongue or insert rectally every 4 hours as needed for anxiety (restlessness) 30 mL 1     MEDICATION INSTRUCTION If care facility cannot accept or use ranges, facility is instructed to use lower end of dosing range       metoprolol succinate (TOPROL-XL) 25  "MG 24 hr tablet TAKE ONE TABLET BY MOUTH DAILY 90 tablet 2     pantoprazole (PROTONIX) 40 MG EC tablet TAKE ONE TABLET BY MOUTH EVERY MORNING 90 tablet 3     sennosides (SENOKOT) 8.6 MG tablet Take 1 tablet by mouth 2 times daily And 2 tabs QHS       sodium chloride (OCEAN) 0.65 % nasal spray Spray 1 spray into both nostrils every 4 hours as needed for congestion (if o2 cannot be humidified - prevent epistaxis) 1 Bottle 0     tamsulosin (FLOMAX) 0.4 MG capsule TAKE ONE CAPSULE BY MOUTH ONCE DAILY 90 capsule 3       ROS:  Limited secondary to cognitive impairment but today pt reports breathing comfortable, denies pain    Exam:  /74  Pulse 68  Temp 97.9  F (36.6  C)  Resp 17  Ht 5' 8\" (1.727 m)  Wt 129 lb (58.5 kg)  SpO2 95%  BMI 19.61 kg/m2  GENERAL APPEARANCE:  thin, appears ill, lethargic, arouses to touch  EYES:  EOM, conjunctivae, lids, pupils and irises normal, PERRL  NECK:  No adenopathy,masses or thyromegaly  RESP:  diminished breath sounds anteriorally, wearing 5L O2, no cough wheezing or crackles, rr 22, no respiratory distress noted  CV:  Palpation and auscultation of heart done , regular rate and rhythm, no murmur, rub, or gallop, no edema  ABDOMEN:  normal bowel sounds, soft, nontender, no hepatosplenomegaly or other masses  M/S:   Gait and station abnormal generalized weakness, no edema/erythema joints  SKIN:  frail, thin, eccymotic particularly to UE's  NEURO:   no facial asymmetry, sensation to touch intact, speech clear, no tremor  PSYCH:  orientated to person only, calm, insight, judgement and memory impaired    Lab/Diagnostic data:     CBC RESULTS:   Recent Labs   Lab Test  07/23/18   0555  07/22/18   0550  07/21/18   0548   WBC   --   16.4*  13.3*   RBC   --   3.79*  4.04*   HGB   --   11.3*  12.2*   HCT   --   35.4*  37.5*   MCV   --   93  93   MCH   --   29.8  30.2   MCHC   --   31.9  32.5   RDW   --   14.6  14.6   PLT  125*  126*  116*       Last Basic Metabolic Panel:  Recent Labs "   Lab Test  07/22/18   0550  07/21/18   0548   NA  141  143   POTASSIUM  4.4  4.2   CHLORIDE  101  103   LEXIS  9.3  9.5   CO2  31  32   BUN  81*  67*   CR  1.73*  1.93*   GLC  140*  147*       Liver Function Studies -   Recent Labs   Lab Test  07/17/18   0551  01/12/18   0806  01/07/18   1853   PROTTOTAL  6.2*   --   7.4   ALBUMIN  2.9*   --   3.9   BILITOTAL  0.7   --   0.8   ALKPHOS  129   --   134   AST  12   --   23   ALT  20  20  25       ASSESSMENT/PLAN:  (J96.21) Acute on chronic respiratory failure with hypoxia (H)  (primary encounter diagnosis)  (J84.10) Pulmonary fibrosis (H)  Comment: end stage, now with hospice, appear comfortable today during encounter  Plan: hospice follows  -continue scheduled and prn nebs  -continue prn Ativan for air hunger and anxiety r/t to air hunger, order with 14 day limit and will reassess at that time  -continue O2     (I35.0) Aortic valve stenosis, etiology of cardiac valve disease unspecified  Comment: post TVAR  Plan: monitor     (I50.31) Acute diastolic congestive heart failure (H)  Comment: per history, with worsening respiratory status and increasing edema noted inpatient that did not respond to diuresis, comfort and hospice cares   Plan: continue metoprolol  -lasix 20mg every day  -dilaudid, Ativan for air hunger as above    (G93.40) Encephalopathy  Comment: noted inpatient and resolved  Plan: monitor     (F03.90) Dementia without behavioral disturbance, unspecified dementia type  Comment: advanced, orientated to self only  Plan: appropriate for LTC, staff dispense meds and assist with cares     (I10) Benign essential hypertension  Comment: per history, stable  Plan: continue metoprolol  -ok to d'c Q2hr BP checks, now on hospice     (R04.0) Epistaxis  Comment: noted inpatient, no reoccurrence noted since   Plan: monitor, humidity to O2     (E03.9) Hypothyroidism, unspecified type  Comment: per history, on synthroid   Plan: continue synthroid       (N18.3) CKD (chronic  kidney disease) stage 3, GFR 30-59 ml/min  Comment: per history, no longer monitoring labs  Plan: renally adjust meds, avoid nephrotoxic meds       Total time spent with patient visit at the skilled nursing facility was > 35 minutes including patient visit, review of past records and discussion with NH staff. Greater than 50% of total time spent with counseling and coordinating care     Electronically signed by:  CELIA Briseno CNP

## 2018-07-30 NOTE — LETTER
7/30/2018        RE: Chaz Soler  [facility]  [address]  [address]  [facility] [address]        [place] [facility]    Chief Complaint   Patient presents with     USP Acute       [place] Medical Record Number:  [id number]    HPI:    Chaz Soler is a 89 year old  (5/21/1928), who is being seen today for an episodic care visit at [facility]  [place].  HPI information obtained from: facility chart records, facility staff and [facility] chart review.Today's concern is:    Acute on chronic respiratory failure with hypoxia (H)  Pulmonary fibrosis (H)  Hospice care patient  Seen today at request of family.  Patient increasingly lethargic, and they wonder if he is overmedicated.  Chronic and end stage pulmonary fibrosis, O2 dependent, on 5L.  Has been hospitalized multiple times for respiratory failure, treated for pneumonia, sent to TCU.  He has made little improvement and with repeated hospitalizations.  Given this information, family elected to pursue comfort cares and admit to hospice.  He recently completed IV antibiotics while inpatient.    Currently on dilaudid 2mg po Qhrs.  He has required occasional prn doses and prn Ativan and Haldol doses for increased anxiety and agitation, and per chart review this has been effective when given.  Over the weekend, he fell.  Per notes was agitated and restless, his O2 tank had run out and his sats were 78%.  No injuries noted from fall.      He has had increased difficulty taking po meds due to decreased alertness.  Many times oral meds have not been given due to this.  During encounter today, he does not appear in any distress, his rr was 20, O2 sats 97% on 5L, his HR is elevated at 130 today.  He is lethargic and not easily arousable.  I spoke with family last week and they report they want to focus on patient's comfort      I spoke with hospice case manager today and he reviewed note from hospice  visit over the weekend.  I also spoke with patient's nurse today.        Chronic diastolic congestive heart failure (H)  History of exacerbations that have lead to inpatient admissions.  He has preserved EF.  His respiratory status worsened inpatient with x-ray findings suggestive of increasing edema that did not respond well to diuresis.  H is Lasix was discontinued by hospice over the weekend, patient with very little po intake at this point. He continues on metoprolol, although not reliable taken given his decreased ability to take po meds as noted above.  No edema noted to his LE.   Weight up slightly since his lasix was discontinued        Last 3 BP's: 110/82, 127/64, 108/65  Pulse Readings from Last 4 Encounters:   07/30/18 88   07/26/18 68   07/23/18 74   07/12/18 66     Wt Readings from Last 5 Encounters:   07/30/18 133 lb 4.8 oz (60.5 kg)   07/26/18 129 lb (58.5 kg)   07/22/18 129 lb 6.6 oz (58.7 kg)   07/12/18 136 lb 12.8 oz (62.1 kg)   07/09/18 135 lb (61.2 kg)       ALLERGIES: Contrast dye; Lisinopril; and Oxycodone  Past Medical, Surgical, Family and Social History reviewed and updated in [facility].    Current Outpatient Prescriptions   Medication Sig Dispense Refill     acetaminophen (TYLENOL) 650 MG Suppository Place 1 suppository (650 mg) rectally every 4 hours as needed for fever or mild pain (Do not exceed 4000 mg total acetaminophen per day.) Unwrap prior to insertion. See also oral tylenol order. Give if pt is unable to take PO. 12 suppository 0     acetaminophen 325 MG TABS Take 650 mg by mouth 4 times daily 1 Bottle 0     albuterol (2.5 MG/3ML) 0.083% neb solution Take 2.5 mg by nebulization every 2 hours as needed        aspirin 81 MG tablet Take 1 tablet (81 mg) by mouth daily 30 tablet 11     atropine 1 % ophthalmic solution Take 2-4 drops by mouth, place under tongue or place inside cheek every 2 hours as needed for other (terminal respiratory secretions) Not for ophthalmic use. 5 mL 1      bisacodyl (DULCOLAX) 10 MG Suppository Unwrap and insert 1 suppository rectally twice daily as needed for constipation. 4 suppository 0     diclofenac (VOLTAREN) 1 % GEL topical gel Place 2 g onto the skin 4 times daily 1 Tube 0     DULoxetine (CYMBALTA) 30 MG EC capsule Take 30 mg by mouth daily       gabapentin (NEURONTIN) 100 MG capsule Take 1 capsule (100 mg) by mouth 3 times daily 15 capsule 0     haloperidol (HALDOL) 2 MG/ML (HIGH CONC) solution Take 0.25-0.5 mLs (0.5-1 mg) by mouth, place under tongue or insert rectally every 6 hours as needed for agitation (nausea) 30 mL 0     HYDROMORPHONE HCL PO Take 2 mg by mouth every 6 hours AND 1 mg Q2H PRN       ipratropium - albuterol 0.5 mg/2.5 mg/3 mL (DUONEB) 0.5-2.5 (3) MG/3ML neb solution Take 1 vial by nebulization 4 times daily       levothyroxine (SYNTHROID/LEVOTHROID) 88 MCG tablet TAKE ONE TABLET BY MOUTH ONCE DAILY 90 tablet 3     LORazepam (ATIVAN) 2 MG/ML (HIGH CONC) solution Take 0.25 mLs (0.5 mg) by mouth, place under tongue or insert rectally every 4 hours as needed for anxiety (restlessness) 30 mL 1     MEDICATION INSTRUCTION If care facility cannot accept or use ranges, facility is instructed to use lower end of dosing range       metoprolol succinate (TOPROL-XL) 25 MG 24 hr tablet TAKE ONE TABLET BY MOUTH DAILY 90 tablet 2     pantoprazole (PROTONIX) 40 MG EC tablet TAKE ONE TABLET BY MOUTH EVERY MORNING 90 tablet 3     sennosides (SENOKOT) 8.6 MG tablet Take 2 tablets by mouth 2 times daily        sodium chloride (OCEAN) 0.65 % nasal spray Spray 1 spray into both nostrils every 4 hours as needed for congestion (if o2 cannot be humidified - prevent epistaxis) 1 Bottle 0     tamsulosin (FLOMAX) 0.4 MG capsule TAKE ONE CAPSULE BY MOUTH ONCE DAILY 90 capsule 3     Medications reviewed:  Medications reconciled to facility chart and changes were made to reflect current medications as identified as above med list. Below are the changes that were made:  "  Medications stopped since last EPIC medication reconciliation:   There are no discontinued medications.    Medications started since last [facility] medication reconciliation:  No orders of the defined types were placed in this encounter.        REVIEW OF SYSTEMS:  Unobtainable secondary to cognitive impairment.     Physical Exam:  /82  Pulse 88  Temp 98.2  F (36.8  C)  Resp 16  Ht 5' 8\" (1.727 m)  Wt 133 lb 4.8 oz (60.5 kg)  SpO2 96%  BMI 20.27 kg/m2  GENERAL APPEARANCE:  thin, appears ill, lethargic  RESP:  diminished breath sounds anteriorally, wearing 5L O2, no cough wheezing or crackles, rr 20, no respiratory distress noted  CV:  Palpation and auscultation of heart done , tachy but regular, no murmur, rub, or gallop, no edema  ABDOMEN:  normal bowel sounds, soft, nontender, no hepatosplenomegaly or other masses  SKIN:  frail, thin, eccymotic particularly to UE's  NEURO:   no facial asymmetry,  no tremor  PSYCH: appears calm and peaceful during encounter     Recent Labs:     CBC RESULTS:   Recent Labs   Lab Test  07/23/18   0555  07/22/18   0550  07/21/18   0548   WBC   --   16.4*  13.3*   RBC   --   3.79*  4.04*   HGB   --   11.3*  12.2*   HCT   --   35.4*  37.5*   MCV   --   93  93   MCH   --   29.8  30.2   MCHC   --   31.9  32.5   RDW   --   14.6  14.6   PLT  125*  126*  116*       Last Basic Metabolic Panel:  Recent Labs   Lab Test  07/22/18   0550  07/21/18   0548   NA  141  143   POTASSIUM  4.4  4.2   CHLORIDE  101  103   LEXIS  9.3  9.5   CO2  31  32   BUN  81*  67*   CR  1.73*  1.93*   GLC  140*  147*       Liver Function Studies -   Recent Labs   Lab Test  07/17/18   0551  01/12/18   0806  01/07/18   1853   PROTTOTAL  6.2*   --   7.4   ALBUMIN  2.9*   --   3.9   BILITOTAL  0.7   --   0.8   ALKPHOS  129   --   134   AST  12   --   23   ALT  20  20  25       Assessment/Plan:  (J96.21) Acute on chronic respiratory failure with hypoxia (H)  (primary encounter diagnosis)  (J84.10) Pulmonary fibrosis " (H)  (Z51.5) Hospice care patient  Comment: end stage, now with hospice, appears comfortable today during encounter, feel his increased lethargy in part related to disease progression, patient appears comfortable, discussed with hospice today, and they agree to not change his dilaudid at this time  Plan: hospice follows  -continue scheduled and prn nebs  -continue prn Ativan for air hunger and anxiety r/t to air hunger, order with 14 day limit and will reassess at that time.  Per notes has been effective  -continue O2  -d'c po meds APAP, tamsulosin, protonix, asa, gabapentin, Cymbalta, senna s, patient not able to take po and meds have not been able to be given over past several days    -prn suppository for bowels as patient no longer able to take stool softner     (I50.32) Chronic diastolic congestive heart failure (H)  Comment: per history, with worsening respiratory status and increasing edema noted inpatient that did not respond to diuresis, comfort and hospice cares   Plan: continue metoprolol  -lasix recently d'c'd per hospice, patient with very little po intake  -dilaudid, Ativan for air hunger as above      Electronically signed by  CELIA Briseno CNP                      Sincerely,        CELIA Briseno CNP

## 2018-07-30 NOTE — PROGRESS NOTES
[place] [facility]    Chief Complaint   Patient presents with     snf Acute       [place] Medical Record Number:  [id number]    HPI:    Chaz Soler is a 89 year old  (5/21/1928), who is being seen today for an episodic care visit at [facility].  HPI information obtained from: facility chart records, facility staff and [facility] Epic chart review.Today's concern is:    Acute on chronic respiratory failure with hypoxia (H)  Pulmonary fibrosis (H)  Hospice care patient  Seen today at request of family.  Patient increasingly lethargic, and they wonder if he is overmedicated.  Chronic and end stage pulmonary fibrosis, O2 dependent, on 5L.  Has been hospitalized multiple times for respiratory failure, treated for pneumonia, sent to TCU.  He has made little improvement and with repeated hospitalizations.  Given this information, family elected to pursue comfort cares and admit to hospice.  He recently completed IV antibiotics while inpatient.    Currently on dilaudid 2mg po Qhrs.  He has required occasional prn doses and prn Ativan and Haldol doses for increased anxiety and agitation, and per chart review this has been effective when given.  Over the weekend, he fell.  Per notes was agitated and restless, his O2 tank had run out and his sats were 78%.  No injuries noted from fall.      He has had increased difficulty taking po meds due to decreased alertness.  Many times oral meds have not been given due to this.  During encounter today, he does not appear in any distress, his rr was 20, O2 sats 97% on 5L, his HR is elevated at 130 today.  He is lethargic and not easily arousable.  I spoke with family last week and they report they want to focus on patient's comfort      I spoke with hospice case manager today and he reviewed note from hospice visit over the weekend.  I also spoke with patient's nurse today.        Chronic diastolic congestive heart failure  (H)  History of exacerbations that have lead to inpatient admissions.  He has preserved EF.  His respiratory status worsened inpatient with x-ray findings suggestive of increasing edema that did not respond well to diuresis.  His Lasix was discontinued by hospice over the weekend, patient with very little po intake at this point. He continues on metoprolol, although not reliable taken given his decreased ability to take po meds as noted above.  No edema noted to his LE.   Weight up slightly since his lasix was discontinued        Last 3 BP's: 110/82, 127/64, 108/65  Pulse Readings from Last 4 Encounters:   07/30/18 88   07/26/18 68   07/23/18 74   07/12/18 66     Wt Readings from Last 5 Encounters:   07/30/18 133 lb 4.8 oz (60.5 kg)   07/26/18 129 lb (58.5 kg)   07/22/18 129 lb 6.6 oz (58.7 kg)   07/12/18 136 lb 12.8 oz (62.1 kg)   07/09/18 135 lb (61.2 kg)       ALLERGIES: Contrast dye; Lisinopril; and Oxycodone  Past Medical, Surgical, Family and Social History reviewed and updated in [employer].    Current Outpatient Prescriptions   Medication Sig Dispense Refill     acetaminophen (TYLENOL) 650 MG Suppository Place 1 suppository (650 mg) rectally every 4 hours as needed for fever or mild pain (Do not exceed 4000 mg total acetaminophen per day.) Unwrap prior to insertion. See also oral tylenol order. Give if pt is unable to take PO. 12 suppository 0     acetaminophen 325 MG TABS Take 650 mg by mouth 4 times daily 1 Bottle 0     albuterol (2.5 MG/3ML) 0.083% neb solution Take 2.5 mg by nebulization every 2 hours as needed        aspirin 81 MG tablet Take 1 tablet (81 mg) by mouth daily 30 tablet 11     atropine 1 % ophthalmic solution Take 2-4 drops by mouth, place under tongue or place inside cheek every 2 hours as needed for other (terminal respiratory secretions) Not for ophthalmic use. 5 mL 1     bisacodyl (DULCOLAX) 10 MG Suppository Unwrap and insert 1 suppository rectally twice daily as needed for constipation. 4  suppository 0     diclofenac (VOLTAREN) 1 % GEL topical gel Place 2 g onto the skin 4 times daily 1 Tube 0     DULoxetine (CYMBALTA) 30 MG EC capsule Take 30 mg by mouth daily       gabapentin (NEURONTIN) 100 MG capsule Take 1 capsule (100 mg) by mouth 3 times daily 15 capsule 0     haloperidol (HALDOL) 2 MG/ML (HIGH CONC) solution Take 0.25-0.5 mLs (0.5-1 mg) by mouth, place under tongue or insert rectally every 6 hours as needed for agitation (nausea) 30 mL 0     HYDROMORPHONE HCL PO Take 2 mg by mouth every 6 hours AND 1 mg Q2H PRN       ipratropium - albuterol 0.5 mg/2.5 mg/3 mL (DUONEB) 0.5-2.5 (3) MG/3ML neb solution Take 1 vial by nebulization 4 times daily       levothyroxine (SYNTHROID/LEVOTHROID) 88 MCG tablet TAKE ONE TABLET BY MOUTH ONCE DAILY 90 tablet 3     LORazepam (ATIVAN) 2 MG/ML (HIGH CONC) solution Take 0.25 mLs (0.5 mg) by mouth, place under tongue or insert rectally every 4 hours as needed for anxiety (restlessness) 30 mL 1     MEDICATION INSTRUCTION If care facility cannot accept or use ranges, facility is instructed to use lower end of dosing range       metoprolol succinate (TOPROL-XL) 25 MG 24 hr tablet TAKE ONE TABLET BY MOUTH DAILY 90 tablet 2     pantoprazole (PROTONIX) 40 MG EC tablet TAKE ONE TABLET BY MOUTH EVERY MORNING 90 tablet 3     sennosides (SENOKOT) 8.6 MG tablet Take 2 tablets by mouth 2 times daily        sodium chloride (OCEAN) 0.65 % nasal spray Spray 1 spray into both nostrils every 4 hours as needed for congestion (if o2 cannot be humidified - prevent epistaxis) 1 Bottle 0     tamsulosin (FLOMAX) 0.4 MG capsule TAKE ONE CAPSULE BY MOUTH ONCE DAILY 90 capsule 3     Medications reviewed:  Medications reconciled to facility chart and changes were made to reflect current medications as identified as above med list. Below are the changes that were made:   Medications stopped since last EPIC medication reconciliation:   There are no discontinued medications.    Medications  "started since last EPIC medication reconciliation:  No orders of the defined types were placed in this encounter.        REVIEW OF SYSTEMS:  Unobtainable secondary to cognitive impairment.     Physical Exam:  /82  Pulse 88  Temp 98.2  F (36.8  C)  Resp 16  Ht 5' 8\" (1.727 m)  Wt 133 lb 4.8 oz (60.5 kg)  SpO2 96%  BMI 20.27 kg/m2  GENERAL APPEARANCE:  thin, appears ill, lethargic  RESP:  diminished breath sounds anteriorally, wearing 5L O2, no cough wheezing or crackles, rr 20, no respiratory distress noted  CV:  Palpation and auscultation of heart done , tachy but regular, no murmur, rub, or gallop, no edema  ABDOMEN:  normal bowel sounds, soft, nontender, no hepatosplenomegaly or other masses  SKIN:  frail, thin, eccymotic particularly to UE's  NEURO:   no facial asymmetry,  no tremor  PSYCH: appears calm and peaceful during encounter     Recent Labs:     CBC RESULTS:   Recent Labs   Lab Test  07/23/18   0555  07/22/18   0550  07/21/18   0548   WBC   --   16.4*  13.3*   RBC   --   3.79*  4.04*   HGB   --   11.3*  12.2*   HCT   --   35.4*  37.5*   MCV   --   93  93   MCH   --   29.8  30.2   MCHC   --   31.9  32.5   RDW   --   14.6  14.6   PLT  125*  126*  116*       Last Basic Metabolic Panel:  Recent Labs   Lab Test  07/22/18   0550  07/21/18   0548   NA  141  143   POTASSIUM  4.4  4.2   CHLORIDE  101  103   LEXIS  9.3  9.5   CO2  31  32   BUN  81*  67*   CR  1.73*  1.93*   GLC  140*  147*       Liver Function Studies -   Recent Labs   Lab Test  07/17/18   0551  01/12/18   0806  01/07/18   1853   PROTTOTAL  6.2*   --   7.4   ALBUMIN  2.9*   --   3.9   BILITOTAL  0.7   --   0.8   ALKPHOS  129   --   134   AST  12   --   23   ALT  20  20  25       Assessment/Plan:  (J96.21) Acute on chronic respiratory failure with hypoxia (H)  (primary encounter diagnosis)  (J84.10) Pulmonary fibrosis (H)  (Z51.5) Hospice care patient  Comment: end stage, now with hospice, appears comfortable today during encounter, feel " his increased lethargy in part related to disease progression, patient appears comfortable, discussed with hospice today, and they agree to not change his dilaudid at this time  Plan: hospice follows  -continue scheduled and prn nebs  -continue prn Ativan for air hunger and anxiety r/t to air hunger, order with 14 day limit and will reassess at that time.  Per notes has been effective  -continue O2  -d'c po meds APAP, tamsulosin, protonix, asa, gabapentin, Cymbalta, senna s, patient not able to take po and meds have not been able to be given over past several days    -prn suppository for bowels as patient no longer able to take stool softner     (I50.32) Chronic diastolic congestive heart failure (H)  Comment: per history, with worsening respiratory status and increasing edema noted inpatient that did not respond to diuresis, comfort and hospice cares   Plan: continue metoprolol  -lasix recently d'c'd per hospice, patient with very little po intake  -dilaudid, Ativan for air hunger as above      Electronically signed by  CELIA Briseno CNP

## 2022-11-29 NOTE — DISCHARGE SUMMARY
[facility]    Discharge Summary  Hospitalist    Date of Admission:  1/7/2018  Date of Discharge:  1/9/2018  Discharging Provider: Guero Ortiz PA-C    Discharge Diagnoses      Altered mental status  Weakness    History of Present Illness   Chaz Soler is an 89 year old male who presented with generalized weakness    HPI from admission H&P:  Chaz Soler is a 89 year old male who presents with weakness.  Unfortunately at the time of my visit with the patient his wife has left and the patient was unable to provide much history.  Apparently he has history of occasional weakness that waxes and wanes.  However, today he sat in his chair and his wife could not get him to stand up.  Ultimately EMS was called and patient was transported to the emergency department.  At the time of my visit the patient thought he was at home.  He denied any complaints including chest pain shortness of breath or GI symptoms.  His biggest complaint was back pain.    Hospital Course   Chaz Soler was admitted on 1/7/2018.  The following problems were addressed during his hospitalization:    Generalized weakness  Decreased strength reported by wife. Baseline able to ambulate with walker, lives at home with wife. Oriented to self, does not always know time or place. Seemed more lethargic over the past few days and requiring assistance getting out of chair prompting ED presentation. Infectious and metabolic workup negative in ED on admission. MR brain negative. Influenza swab negative. Pt was hydrated with IV fluids and markedly improved, was able to work with PT and wife felt he was near baseline, felt comfortable discharging home. Etiology remains unclear, possible underlying viral process versus transient delirium from recent tramadol use; wife strongly encouraged to limit its further usage and consider discontinuing alltogether. At time of discharge patient was near baseline, able to ambulate with walker and stand  by assistance. Discharged home in care of wife.     Chronic Medical conditions that remained stable:  Chronic hypoxic respiratory failure in the setting of pulmonary fibrosis: Requiring supplemental oxygen at 4 LPM at baseline. Followed by Dr. Aly of [facility].   Hypertension, uncontrolled: Resume PTA Losartan, Toprol XL, and Norvasc with parameters in place   Dementia: Continue his Aricept  Hypothyroidism: This above his TSH was checked in November was normal.  Resume synthroid at PTA dose.  BPH: Continue prior to admission Flomax  CKD III: Baseline 1.3-1.5.   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  GERD: Continue prior to admission Protonix  History of abdominal aortic aneurysm: Stable    Guero Ortiz PA-C    Significant Results and Procedures   As noted below    Pending Results   These results will be followed up by myself or PCP  Unresulted Labs Ordered in the Past 30 Days of this Admission     Date and Time Order Name Status Description    1/8/2018 0221 Urine Culture Preliminary           Code Status   DNI       Primary Care Physician   Alirio Fox    Physical Exam   Temp: 99  F (37.2  C) Temp src: Oral BP: 124/81   Heart Rate: 75 Resp: 18 SpO2: 94 % O2 Device: Nasal cannula Oxygen Delivery: 4 LPM  Vitals:    01/08/18 0048 01/09/18 0612   Weight: 67.7 kg (149 lb 4.8 oz) 68.1 kg (150 lb 3.2 oz)     Vital Signs with Ranges  Temp:  [98.6  F (37  C)-99.4  F (37.4  C)] 99  F (37.2  C)  Heart Rate:  [63-80] 75  Resp:  [18] 18  BP: (124-178)/(60-84) 124/81  SpO2:  [94 %-100 %] 94 %  I/O last 3 completed shifts:  In: 605 [P.O.:380; I.V.:225]  Out: 610 [Urine:610]    GEN: well-developed, well-nourished, appears stated age & comfortable  PULM: lungs CTA bilaterally, no increased work of breathing, no wheeze, rales, rhonchi  CV: RRR, S1 & S2, no murmur  GI: soft, nontender, nondistended, no guarding or  rigidity, +BS in all 4 quadrants  SKIN: warm & dry without rash, wound, or pedal edema, no bruising or bleeding    Discharge Disposition   Discharged to home  Condition at discharge: Stable    Consultations This Hospital Stay   PHYSICAL THERAPY ADULT IP CONSULT  OCCUPATIONAL THERAPY ADULT IP CONSULT  SOCIAL WORK IP CONSULT    Time Spent on this Encounter   IGuero, personally saw the patient today and spent less than or equal to 30 minutes discharging this patient.    Discharge Orders     Home care nursing referral     Home Care PT Referral for Hospital Discharge     Home Care OT Referral for Hospital Discharge     Reason for your hospital stay   You were admitted due to weakness. Your workup was not indicative of clear cause, you improved with IV hydration and rest. You were seen by physical therapy who recommended ongoing therapy and assistance at home for continued strengthening.     Follow-up and recommended labs and tests    Follow up with primary care provider, Alirio Fox, within 7 days for hospital follow- up.  No follow up labs or test are needed.     Activity   Your activity upon discharge: activity as tolerated     MD face to face encounter   Documentation of Face to Face and Certification for Home Health Services    I certify that patient: Chaz Soler is under my care and that I, or a nurse practitioner or physician's assistant working with me, had a face-to-face encounter that meets the physician face-to-face encounter requirements with this patient on: 1/9/2018.    This encounter with the patient was in whole, or in part, for the following medical condition, which is the primary reason for home health care: generalized weakness.    I certify that, based on my findings, the following services are medically necessary home health services: Nursing, Occupational Therapy and Physical Therapy.    My clinical findings support the need for the above services because: Nurse is needed: To provide  assessment and oversight required in the home to assure adherence to the medical plan due to: weakness.., Occupational Therapy Services are needed to assess and treat cognitive ability and address ADL safety due to impairment in ambulation, ADLs. and Physical Therapy Services are needed to assess and treat the following functional impairments: ambulation.    Further, I certify that my clinical findings support that this patient is homebound (i.e. absences from home require considerable and taxing effort and are for medical reasons or [employer] services or infrequently or of short duration when for other reasons) because: Requires assistance of another person or specialized equipment to access medical services because patient: Is unable to operate assistive equipment on their own...    Based on the above findings. I certify that this patient is confined to the home and needs intermittent skilled nursing care, physical therapy and/or speech therapy.  The patient is under my care, and I have initiated the establishment of the plan of care.  This patient will be followed by a physician who will periodically review the plan of care.  Physician/Provider to provide follow up care: Alirio Fox    Attending hospital physician (the Medicare certified [place] provider): Chas Mccallum MD  Physician Signature: See electronic signature associated with these discharge orders.  Date: 1/9/2018     Diet   Follow this diet upon discharge: Orders Placed This Encounter     Regular Diet Adult       Discharge Medications   Current Discharge Medication List      CONTINUE these medications which have NOT CHANGED    Details   TRAMADOL HCL PO Take 50 mg by mouth 3 times daily      Acetaminophen (TYLENOL PO) Take 500 mg by mouth 3 times daily      cyanocobalamin (VITAMIN  B-12) 1000 MCG tablet Take 1,000 mcg by mouth daily      Furosemide (LASIX PO) Take 20 mg by mouth daily      levothyroxine (SYNTHROID/LEVOTHROID) 88 MCG tablet TAKE  ONE TABLET BY MOUTH ONCE DAILY  Qty: 90 tablet, Refills: 3    Associated Diagnoses: Hypothyroidism, unspecified type      donepezil (ARICEPT) 10 MG tablet TAKE ONE TABLET BY MOUTH AT BEDTIME  Qty: 90 tablet, Refills: 3    Associated Diagnoses: Memory loss      atorvastatin (LIPITOR) 20 MG tablet Take 1 tablet (20 mg) by mouth daily  Qty: 30 tablet, Refills: 0    Comments: Additional refills may be requested at annual office visit on 12/22/17. Thank you.  Associated Diagnoses: Coronary artery disease involving native coronary artery of native heart without angina pectoris      Multiple Vitamins-Minerals (MULTIVITAL) TABS Take 1 tablet by mouth daily      pantoprazole (PROTONIX) 40 MG EC tablet TAKE ONE TABLET BY MOUTH EVERY MORNING  Qty: 90 tablet, Refills: 3    Associated Diagnoses: Gastroesophageal reflux disease, esophagitis presence not specified      metoprolol (TOPROL-XL) 25 MG 24 hr tablet TAKE ONE TABLET BY MOUTH DAILY  Qty: 90 tablet, Refills: 2    Associated Diagnoses: Essential hypertension      aspirin 81 MG tablet Take 1 tablet (81 mg) by mouth daily  Qty: 30 tablet, Refills: 11    Associated Diagnoses: Coronary artery disease involving native coronary artery of native heart without angina pectoris      tamsulosin (FLOMAX) 0.4 MG capsule TAKE ONE CAPSULE BY MOUTH ONCE DAILY  Qty: 90 capsule, Refills: 2    Associated Diagnoses: Benign prostatic hyperplasia with urinary retention      amLODIPine (NORVASC) 10 MG tablet TAKE ONE TABLET BY MOUTH ONCE DAILY  Qty: 90 tablet, Refills: 3    Associated Diagnoses: Essential hypertension      gabapentin (NEURONTIN) 100 MG capsule TAKE ONE CAPSULE BY MOUTH THREE TIMES DAILY FOR  PAIN  Qty: 90 capsule, Refills: 11    Associated Diagnoses: Compression fracture      losartan (COZAAR) 100 MG tablet Take 1 tablet (100 mg) by mouth daily  Qty: 90 tablet, Refills: 3    Comments: Note dose change back to 100mg daily. Profile only. Pt doesn't require refill at this  time  Associated Diagnoses: Essential hypertension      order for DME Equipment being ordered: TENS and supplies.  Qty: 1 each, Refills: 0    Associated Diagnoses: Other chronic pain; Lumbar radiculopathy; DDD (degenerative disc disease), lumbar; Spinal stenosis of lumbar region without neurogenic claudication           Allergies   Allergies   Allergen Reactions     Contrast Dye      SOB, increased Bp, difficulty swallowing. Date 6/3/96 (ipaque contrast)  Was premedicated prior to FRANCK and had no reaction at all (solumedrol and benadryl) 2016  Was premedicated with Methylprednisolone protocol, no reaction 1/25/17     Lisinopril      cough     Oxycodone      Delirium     Data   Most Recent 3 CBC's:  Recent Labs   Lab Test  01/09/18   0615  01/08/18   1221  01/07/18   1853   WBC  8.4  9.5  8.1   HGB  12.0*  12.3*  12.9*   MCV  90  90  90   PLT  138*  162  173      Most Recent 3 BMP's:  Recent Labs   Lab Test  01/09/18   0615  01/08/18   0659  01/07/18   1853   NA  143  138  137   POTASSIUM  4.1  3.8  4.6   CHLORIDE  106  102  99   CO2  31  32  32   BUN  21  23  28   CR  1.25  1.32*  1.51*   ANIONGAP  6  4  6   LEXIS  9.3  9.6  9.9   GLC  90  91  117*     Most Recent 2 LFT's:  Recent Labs   Lab Test  01/07/18   1853  11/28/17   1203   AST  23  23   ALT  25  33   ALKPHOS  134  151*   BILITOTAL  0.8  0.7     Most Recent INR's and Anticoagulation Dosing History:  Anticoagulation Dose History     Recent Dosing and Labs Latest Ref Rng & Units 11/15/2014 11/16/2014 11/17/2014 11/18/2014 11/19/2014 5/18/2017 10/1/2017    INR 0.86 - 1.14 1.05 0.97 0.99 1.01 0.99 0.97 0.94        Most Recent 3 Troponin's:  Recent Labs   Lab Test  01/08/18   0659  01/07/18   1853  10/01/17   2015   TROPI  <0.015  <0.015  <0.015     Most Recent Cholesterol Panel:  Recent Labs   Lab Test  06/02/17   0826   CHOL  163   LDL  68   HDL  63   TRIG  161*     Most Recent 6 Bacteria Isolates From Any Culture (See EPIC Reports for Culture Details):  Recent  Labs   Lab Test  01/07/18   2216  01/30/17   0930  01/29/17   2125  01/29/17   1850  11/19/14   0501  10/08/14   1135   CULT  PENDING  Light growth Normal jakub  No growth  No growth  No growth  No growth  No growth     Most Recent TSH, T4 and A1c Labs:  Recent Labs   Lab Test  11/28/17   1203   06/02/16   0835   11/13/14   0350   TSH  1.01   < >  4.07*   < >   --    T4   --    --   0.95   < >   --    A1C   --    --    --    --   5.5    < > = values in this interval not displayed.     Results for orders placed or performed during the hospital encounter of 01/07/18   MR Brain w/o & w Contrast    Narrative    MRI OF THE BRAIN WITHOUT AND WITH CONTRAST 1/7/2018 9:06 PM     COMPARISON: Head CT 11/28/2017.    HISTORY:  Confusion.     TECHNIQUE: Axial diffusion-weighted with ADC map, axial T2-weighted  with fat saturation, axial T1-weighted, axial turboFLAIR and coronal  T1-weighted images of the brain were acquired without intravenous  contrast.  Following intravenous administration of gadolinium (6 mL  Gadavist), axial T1-weighted images of the brain were acquired.     FINDINGS: There is moderate diffuse cerebral volume loss. There are  extensive confluent periventricular areas of abnormal T2 signal  hyperintensity in the cerebral white matter bilaterally that are  consistent with sequela of chronic small vessel ischemic disease.    The ventricles and basal cisterns are within normal limits in  configuration given the degree of cerebral volume loss.  There is no  midline shift.  There are no extra-axial fluid collections.  There is  no evidence for stroke or acute intracranial hemorrhage.  There is no  abnormal contrast enhancement in the brain or its coverings.    There is no sinusitis or mastoiditis.      Impression    IMPRESSION: Diffuse cerebral volume loss and cerebral white matter  changes consistent with chronic small vessel ischemic disease. No  evidence for acute intracranial pathology.      GAGE GARRETT MD    XR Chest Port 1 View    Narrative    XR CHEST PORT 1 VW 1/8/2018 11:36 AM    COMPARISON: 10/1/2017    HISTORY: Pulmonary fibrosis.      Impression    IMPRESSION: Fibrotic changes again seen in both lungs. No new focal  airspace disease. No pneumothorax.    CHADD MONTALVO MD        Suture Removal: 14 days

## (undated) RX ORDER — CEFAZOLIN SODIUM 2 G/100ML
INJECTION, SOLUTION INTRAVENOUS
Status: DISPENSED
Start: 2017-05-18